# Patient Record
Sex: FEMALE | Race: WHITE | NOT HISPANIC OR LATINO | Employment: OTHER | URBAN - METROPOLITAN AREA
[De-identification: names, ages, dates, MRNs, and addresses within clinical notes are randomized per-mention and may not be internally consistent; named-entity substitution may affect disease eponyms.]

---

## 2017-02-10 ENCOUNTER — HOSPITAL ENCOUNTER (OUTPATIENT)
Dept: RADIOLOGY | Facility: HOSPITAL | Age: 82
Discharge: HOME | End: 2017-02-10
Attending: FAMILY MEDICINE
Payer: MEDICARE

## 2017-02-10 DIAGNOSIS — Z12.31 ENCOUNTER FOR SCREENING MAMMOGRAM FOR BREAST CANCER: ICD-10-CM

## 2017-02-10 PROCEDURE — 77067 SCR MAMMO BI INCL CAD: CPT | Mod: 26,,, | Performed by: RADIOLOGY

## 2017-02-10 PROCEDURE — 77063 BREAST TOMOSYNTHESIS BI: CPT | Mod: 26,,, | Performed by: RADIOLOGY

## 2017-02-10 PROCEDURE — 77067 SCR MAMMO BI INCL CAD: CPT | Mod: TC

## 2017-03-27 ENCOUNTER — TELEPHONE (OUTPATIENT)
Dept: FAMILY MEDICINE | Facility: CLINIC | Age: 82
End: 2017-03-27

## 2017-03-27 NOTE — TELEPHONE ENCOUNTER
----- Message from George Rodriguez LPN sent at 3/27/2017 10:35 AM CDT -----  Contact: [phone]  I believe she is returning your call about a referral to uro gynecologist.     ----- Message -----     From: Clara Roy     Sent: 3/27/2017  10:33 AM       To: Ami Cook Staff    Patient is returning your call

## 2017-03-27 NOTE — TELEPHONE ENCOUNTER
----- Message from Clara Roy sent at 3/27/2017 10:33 AM CDT -----  Contact: [phone]  Patient is returning your call

## 2017-03-27 NOTE — TELEPHONE ENCOUNTER
----- Message from Tona Guevara MA sent at 3/25/2017  9:46 AM CDT -----  Contact: [phone]/self      ----- Message -----     From: Gris Sanchez     Sent: 3/25/2017   8:57 AM       To: Ami Cook Staff    Patient is returning your call. Please advise

## 2017-05-22 ENCOUNTER — OFFICE VISIT (OUTPATIENT)
Dept: UROLOGY | Facility: CLINIC | Age: 82
End: 2017-05-22
Payer: MEDICARE

## 2017-05-22 ENCOUNTER — TELEPHONE (OUTPATIENT)
Dept: FAMILY MEDICINE | Facility: CLINIC | Age: 82
End: 2017-05-22

## 2017-05-22 VITALS
WEIGHT: 130.06 LBS | DIASTOLIC BLOOD PRESSURE: 72 MMHG | BODY MASS INDEX: 22.2 KG/M2 | SYSTOLIC BLOOD PRESSURE: 148 MMHG | HEIGHT: 64 IN | HEART RATE: 59 BPM

## 2017-05-22 DIAGNOSIS — N81.12 LATERAL CYSTOCELE: ICD-10-CM

## 2017-05-22 DIAGNOSIS — N39.46 MIXED STRESS AND URGE URINARY INCONTINENCE: Primary | ICD-10-CM

## 2017-05-22 DIAGNOSIS — N81.9 VAGINAL VAULT PROLAPSE: ICD-10-CM

## 2017-05-22 PROCEDURE — 99999 PR PBB SHADOW E&M-EST. PATIENT-LVL III: CPT | Mod: PBBFAC,,, | Performed by: UROLOGY

## 2017-05-22 PROCEDURE — 99205 OFFICE O/P NEW HI 60 MIN: CPT | Mod: 25,S$GLB,, | Performed by: UROLOGY

## 2017-05-22 PROCEDURE — 87086 URINE CULTURE/COLONY COUNT: CPT

## 2017-05-22 PROCEDURE — 51701 INSERT BLADDER CATHETER: CPT | Mod: S$GLB,,, | Performed by: UROLOGY

## 2017-05-22 PROCEDURE — 1126F AMNT PAIN NOTED NONE PRSNT: CPT | Mod: S$GLB,,, | Performed by: UROLOGY

## 2017-05-22 PROCEDURE — 1160F RVW MEDS BY RX/DR IN RCRD: CPT | Mod: S$GLB,,, | Performed by: UROLOGY

## 2017-05-22 PROCEDURE — 81002 URINALYSIS NONAUTO W/O SCOPE: CPT | Mod: S$GLB,,, | Performed by: UROLOGY

## 2017-05-22 PROCEDURE — 1159F MED LIST DOCD IN RCRD: CPT | Mod: S$GLB,,, | Performed by: UROLOGY

## 2017-05-22 RX ORDER — LIDOCAINE HYDROCHLORIDE 20 MG/ML
JELLY TOPICAL ONCE
Status: CANCELLED | OUTPATIENT
Start: 2017-05-22 | End: 2017-05-22

## 2017-05-22 RX ORDER — CIPROFLOXACIN 250 MG/1
500 TABLET, FILM COATED ORAL ONCE
Status: CANCELLED | OUTPATIENT
Start: 2017-05-22 | End: 2017-05-22

## 2017-05-22 NOTE — PROGRESS NOTES
CHIEF COMPLAINT:    Mrs. Ghosh is a 82 y.o. 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 Joey Mueller    PRESENTING ILLNESS:    Cheryl Ghosh is a 82 y.o. female who appears to be much younger than her stated age.  She states that she has a fallen bladder for many years but it has gotten worse.  She also has mixed incontinence and for the past 2-3 years has worn pads.  She generally goes through 3 during the day and 1-2 at night.  At night she can have urge incontinence with large volume losses.  In addition to the mixed incontinence she has incontinence without sensory awareness.  The pads are a medium incontinence pad and at night it is a heavier one.  She has nocturia x 2-3, cannot quantify the frequency, sometimes she splints to urinate.  She denies any gross hematuria or recurrent UTI.      , vaginal deliveries, hysterectomy bilateral oophorectomy in her 30's.  She is sexually active and has no pain.  Tends towards constipation and uses a stool softener, diet and occasional saline enema if needed.      REVIEW OF SYSTEMS:    Review of Systems   Constitutional: Negative.    HENT: Negative.    Eyes: Negative.    Respiratory: Negative.    Cardiovascular: Negative.    Gastrointestinal: Negative.    Genitourinary: Positive for frequency and urgency.        Mixed incontinence   Musculoskeletal: Negative.    Skin: Negative.    Neurological: Negative.    Endo/Heme/Allergies: Negative.    Psychiatric/Behavioral: Negative.      PATIENT HISTORY:    Past Medical History:   Diagnosis Date    Arthritis     lumbar    Blood transfusion     Degenerative disc disease     lumbar    Insulinoma     Pancreatic disease     Urinary incontinence 2015       Past Surgical History:   Procedure Laterality Date    ADENOIDECTOMY      APPENDECTOMY      CATARACT EXTRACTION, BILATERAL      EYE SURGERY      HYSTERECTOMY      SAMM/BSO    PANCREAS SURGERY       Insulanoma    REFRACTIVE SURGERY Bilateral     TONSILLECTOMY         Family History   Problem Relation Age of Onset    Cancer Mother      Breast    Breast cancer Mother     Heart disease Father     Aneurysm Sister     Glaucoma Daughter     No Known Problems Son      Social History    Marital status:      Social History Main Topics    Smoking status: Never Smoker    Smokeless tobacco: Not on file    Alcohol use 0.6 oz/week     1 Glasses of wine per week    Drug use: No    Sexual activity: Yes     Partners: Male       Allergies:  Pcn [penicillins]    Medications:  Outpatient Encounter Prescriptions as of 5/22/2017   Medication Sig Dispense Refill    b complex vitamins tablet Take 1 tablet by mouth once daily.      CALCIUM CARBONATE/VITAMIN D3 (VITAMIN D-3 ORAL) Take 5,000 Units by mouth once daily.      gabapentin (NEURONTIN) 300 MG capsule Take 1 capsule (300 mg total) by mouth every evening. 90 capsule 3    gabapentin (NEURONTIN) 300 MG capsule Take 1 capsule (300 mg total) by mouth every evening. 90 capsule 3    pyridoxine (VITAMIN B-6) 100 MG Tab Take 100 mg by mouth once daily.      calcium carbonate (OS-THOMAS) 600 mg (1,500 mg) Tab Take 600 mg by mouth 2 (two) times daily with meals.        fish oil-omega-3 fatty acids 300-1,000 mg capsule Take 2 g by mouth once daily.      meloxicam (MOBIC) 15 MG tablet Take 1 tablet (15 mg total) by mouth daily as needed for Pain. 90 tablet 3    triamcinolone acetonide 0.1% (KENALOG) 0.1 % cream Apply topically 2 (two) times daily. 45 g 3     No facility-administered encounter medications on file as of 5/22/2017.          PHYSICAL EXAMINATION:    The patient generally appears in good health, is appropriately interactive, and is in no apparent distress.    Skin: No lesions.    Mental: Cooperative with normal affect.    Neuro: Grossly intact.    HEENT: Normal. No evidence of lymphadenopathy.    Chest: Clear to ascultation bilaterally, normal  inspiratory effort.    Heart: Regular rhythm.  No murmurs    Abdomen: BS active. Soft, non-tender. No masses or organomegaly. Bladder is not palpable. No evidence of flank discomfort. No evidence of inguinal hernia.    Extremities: No clubbing, cyanosis, or edema    Normal external female genitalia  Urethral meatus is normal  Urethra and bladder are nontender to bimanual exam   Uterus and cervix are surgically absent  No adnexal masses  Grade II cystocele, with the turn point about 2 cm from the hymen  Vaginal vault prolapse, to -1 cm  Posteriorly, turn point is 7 cm.    TVL 9 cm  PVR by catheterization was 220 ml  Urethral mobility from 0-60 degrees    LABS:    UA 1.015, pH 5, otherwise, negative.    IMPRESSION:    Encounter Diagnoses   Name Primary?    Mixed stress and urge urinary incontinence Yes    Vaginal vault prolapse     Lateral cystocele        PLAN:    1.  Would recommend SUDS/cysto, which was ordered.  Discussed that she is beyond Kegels to help with symptoms.    2.  It is possible to do a vaginal repair with SSL fixation and anterior colporrhaphy, +/- sling depending on how well she empties with the prolapse reduced.      Copy to:  Dr. Mueller

## 2017-05-22 NOTE — PATIENT INSTRUCTIONS
What are Urodynamics Studies?     The bladder holds urine until it leaves the body through the urethra.     Urodynamics studies are a series of tests that give your doctor a close look at the working of your bladder and urethra. The tests can help your doctor learn about any problems storing urine or voiding (eliminating) urine from your body.  Understanding the lower urinary tract  The lower part of the urinary tract has several parts.  · The bladder stores urine until youre ready to release it.  · The urethra is the tube that carries urine from the bladder out of the body.  · The sphincter is made up of muscles around the opening of the bladder. The sphincter muscles tighten to hold urine in the bladder. They relax to let urine flow. Signals from the brain tell the sphincter when to tighten and relax. These signals also tell the bladder when to contract to let urine flow out of the body.  Why you need a urodynamics study  This test may be ordered if you:  · Are incontinent (leak urine).  · Have a bladder that does not empty all the way.  · Have symptoms such as the need to urinate often or a constant strong need to urinate.  · Have intermittent or weak urine stream.  · Have persistent urinary tract infections.  Preparing for the study  · Tell your doctor about any medications youre taking. Ask if you should stop them before the study.  · Keep a diary of your bathroom habits. Do this for a few days before the study. This diary can be a helpful part of the evaluation.  · Ask if you need to arrive for the study with a full bladder.  Date Last Reviewed: 9/12/2014  © 2203-7478 [employer]. [address] [place] [address], [place] [address]. All rights reserved. This information is not intended as a substitute for professional medical care. Always follow your healthcare professional's instructions.          Urodynamic Studies     The equipment used for the study varies depending upon the facility and what  tests are done.     Urodynamic studies may be done in your doctors office, a clinic, or a hospital. The studies may take up to an hour or more. This depends on which tests your doctor does. The tests are generally painless. You wont need sedating medication.  Tests that may be done  Uroflowmetry. This measures the amount and speed of urine you void from your bladder. You urinate into a funnel. Its attached to a computer that records your urine flow over time. The amount of urine left in your bladder after you void may also be measured right after this test.  Cystometry. This test evaluates how much your bladder can hold. It also measures how strong your bladder muscle is and how well the signals work that tell you when your bladder is full. Your health care provider fills your bladder with sterile water or saline solution, through a catheter. Your doctor will instruct you to report any sensations you feel. Mention if theyre similar to symptoms youve felt at home. Your doctor may ask you to cough, stand and walk, or bear down during this test.  Electromyogram. This helps evaluate the muscle contractions that control urination, such as sphincter muscle contractions. Your health care provider may place electrode patches or wires near your rectum or urethra to make the recording. He or she may ask you to try to tighten or relax your sphincter muscles during this test.  Pressure flow study. This test measures your detrusor, urethral, and abdominal pressures. Detrusor is the muscle surrounding the bladder walls that relaxes to allow your bladder to fill, and and contracts to squeeze out urine. A pressure flow study is often done after cystometry. Youre asked to urinate while a probe in your urethra measures pressures.  Video cystourethrography. This takes video pictures of urine flow through your urinary tract. It can help identify blockages or other problems. The bladder is filled with an X-ray contrast fluid. Then  X-ray video pictures are taken as the fluid is urinated out. Ultrasound imaging may also be combined with routine urodynamic studies.  Ambulatory urodynamics. This test can be used to evaluate you while doing usual activities.  Getting your results  After the study, youll get dressed and return to the consultation room. Test results may be ready soon after the study is finished. Or, you may return to your doctors office in a few days for your results. Your doctor can talk with you about the study report and your options.   Date Last Reviewed: 9/12/2014  © 2644-0286 [employer]. [address] [place] [address], [place] [address]. All rights reserved. This information is not intended as a substitute for professional medical care. Always follow your healthcare professional's instructions.

## 2017-05-22 NOTE — TELEPHONE ENCOUNTER
Spoke with patient about her appointment with Dr Rowe on today 05/22/17 for Dizziness, SOB and unable to walk.  Patient states she is experiencing Shortness of Breath while she walks and sitting followed with a dizziness spell.  As per Dr Rowe, patient was advised to get to a near ER.  Patient states she will be seen Dr Wiggins at [facility] today at 2:20 pm will ask if is ok to get to ER.  Patient verbalized understanding.

## 2017-05-22 NOTE — LETTER
May 22, 2017      Joey Mueller MD  [address]  [address]  [facility] [address]           [facility] - Urology 4th Floor  [address]  [place] [address]  Phone: [phone]          Patient: Cheryl Ghosh   MR Number: [id number]   YOB: 1934   Date of Visit: 5/22/2017       Dear Dr. Joey Mueller:    Thank you for referring Cheryl Ghosh to me for evaluation. Attached you will find relevant portions of my assessment and plan of care.    If you have questions, please do not hesitate to call me. I look forward to following Cheryl Ghosh along with you.    Sincerely,    Noelle Wiggins MD    Enclosure  CC:  No Recipients    If you would like to receive this communication electronically, please contact [email or web address] or [phone] to request more information on [employer] access.    For providers and/or their staff who would like to refer a patient to [facility], please contact us through our one-stop-shop provider referral line, [facility], at [phone].    If you feel you have received this communication in error or would no longer like to receive these types of communications, please e-mail [email or web address]

## 2017-05-24 LAB — BACTERIA UR CULT: NO GROWTH

## 2017-05-25 ENCOUNTER — OFFICE VISIT (OUTPATIENT)
Dept: FAMILY MEDICINE | Facility: CLINIC | Age: 82
End: 2017-05-25
Payer: MEDICARE

## 2017-05-25 VITALS
RESPIRATION RATE: 18 BRPM | TEMPERATURE: 98 F | HEART RATE: 58 BPM | HEIGHT: 64 IN | DIASTOLIC BLOOD PRESSURE: 68 MMHG | OXYGEN SATURATION: 98 % | SYSTOLIC BLOOD PRESSURE: 152 MMHG | WEIGHT: 130.63 LBS | BODY MASS INDEX: 22.3 KG/M2

## 2017-05-25 DIAGNOSIS — R53.83 FATIGUE, UNSPECIFIED TYPE: ICD-10-CM

## 2017-05-25 DIAGNOSIS — R94.31 ABNORMAL EKG: ICD-10-CM

## 2017-05-25 DIAGNOSIS — I49.9 CARDIAC ARRHYTHMIA, UNSPECIFIED CARDIAC ARRHYTHMIA TYPE: Primary | ICD-10-CM

## 2017-05-25 PROCEDURE — 1159F MED LIST DOCD IN RCRD: CPT | Mod: S$GLB,,, | Performed by: FAMILY MEDICINE

## 2017-05-25 PROCEDURE — 1126F AMNT PAIN NOTED NONE PRSNT: CPT | Mod: S$GLB,,, | Performed by: FAMILY MEDICINE

## 2017-05-25 PROCEDURE — 99999 PR PBB SHADOW E&M-EST. PATIENT-LVL IV: CPT | Mod: PBBFAC,,, | Performed by: FAMILY MEDICINE

## 2017-05-25 PROCEDURE — 1157F ADVNC CARE PLAN IN RCRD: CPT | Mod: 8P,S$GLB,, | Performed by: FAMILY MEDICINE

## 2017-05-25 PROCEDURE — 93005 ELECTROCARDIOGRAM TRACING: CPT | Mod: S$GLB,,, | Performed by: FAMILY MEDICINE

## 2017-05-25 PROCEDURE — 99214 OFFICE O/P EST MOD 30 MIN: CPT | Mod: S$GLB,,, | Performed by: FAMILY MEDICINE

## 2017-05-25 PROCEDURE — 93010 ELECTROCARDIOGRAM REPORT: CPT | Mod: S$GLB,,, | Performed by: INTERNAL MEDICINE

## 2017-05-25 NOTE — PROGRESS NOTES
HPI:  Cheryl Ghosh is a 82 y.o. year old female that  presents with concern of feeling fatigued, short of breath,  and having abnormal heart palpations. She has been noticing that she has not been able to keep up with her physical workouts and walking up steps that she normally does without a problem now cause her to be extremely tired and she is extremely tired. She feels a thumping in her chest. She has been taking sleep aide to help her sleep because her mind is racing.Dayne has had afew personal problems that keeps her thinking a lot. She still works daily at the [employer].She has a prolapsed bladder and she has seen   Chief Complaint   Patient presents with    Fatigue    Palpitations   .     HPI    Past Medical History:   Diagnosis Date    Arthritis     lumbar    Blood transfusion     Degenerative disc disease     lumbar    Insulinoma     Pancreatic disease     Urinary incontinence 12/7/2015     Social History     Social History    Marital status:      Spouse name: N/A    Number of children: N/A    Years of education: N/A     Occupational History    Not on file.     Social History Main Topics    Smoking status: Never Smoker    Smokeless tobacco: Never Used    Alcohol use 0.6 oz/week     1 Glasses of wine per week    Drug use: No    Sexual activity: Yes     Partners: Male     Other Topics Concern    Not on file     Social History Narrative    No narrative on file     Past Surgical History:   Procedure Laterality Date    ADENOIDECTOMY      APPENDECTOMY      CATARACT EXTRACTION, BILATERAL      EYE SURGERY      HYSTERECTOMY      SAMM/BSO    PANCREAS SURGERY      Insulanoma    REFRACTIVE SURGERY Bilateral     TONSILLECTOMY       Family History   Problem Relation Age of Onset    Breast cancer Mother     Heart disease Father     Aneurysm Sister     Glaucoma Daughter     Heart attack Son     Breast cancer Other            Review of Systems  General ROS: negative  "for chills, fever or weight loss  Psychological ROS: negative for hallucination, depression or suicidal ideation  Ophthalmic ROS: negative for blurry vision, photophobia or eye pain  ENT ROS: negative for epistaxis, sore throat or rhinorrhea  Respiratory ROS: no cough, shortness of breath, or wheezing  Cardiovascular ROS: no chest pain , pos dyspnea on exertion-new, palpations  Gastrointestinal ROS: no abdominal pain, change in bowel habits, or black/ bloody stools  Genito-Urinary ROS: no dysuria, trouble voiding, or hematuria  Musculoskeletal ROS: negative for gait disturbance or muscular weakness  Neurological ROS: no syncope or seizures; no ataxia  Dermatological ROS: negative for pruritis, rash and jaundice      Physical Exam:  BP (!) 152/68 (BP Location: Right arm, Patient Position: Sitting, BP Method: Manual)   Pulse (!) 58   Temp 97.8 °F (36.6 °C) (Oral)   Resp 18   Ht 5' 4" (1.626 m)   Wt 59.2 kg (130 lb 10 oz)   SpO2 98%   BMI 22.42 kg/m²   General appearance: alert, cooperative, no distress  Constitutional:Oriented to person, place, and time.appears well-developed and well-nourished.  HEENT: Normocephalic, atraumatic, neck symmetrical, no nasal discharge, TM - clear bilaterally   Eyes: conjunctivae/corneas clear, PERRL, EOM's intact  Lungs: clear to auscultation bilaterally, no dullness to percussion bilaterally  Heart: regular rate and rhythm without rub; no displacement of the PMI , pos murmur without radiation  Abdomen: soft, non-tender; bowel sounds normoactive; no organomegaly  Extremities: extremities symmetric; no clubbing, cyanosis, or edema  Integument: Skin color, texture, turgor normal; no rashes; hair distrubution normal  Neurologic: Alert and oriented X 3, normal strength, normal coordination and gait  Psychiatric: no pressured speech; normal affect; no evidence of impaired cognition   Physical Exam  LABS:    Complete Blood Count  Lab Results   Component Value Date    RBC 4.51 " 05/26/2017    HGB 14.0 05/26/2017    HCT 41.1 05/26/2017    MCV 91 05/26/2017    MCH 31.0 05/26/2017    MCHC 34.1 05/26/2017    RDW 13.2 05/26/2017     05/26/2017    MPV 10.6 05/26/2017    GRAN 2.7 05/26/2017    GRAN 43.7 05/26/2017    LYMPH 2.8 05/26/2017    LYMPH 45.0 05/26/2017    MONO 0.6 05/26/2017    MONO 9.0 05/26/2017    EOS 0.1 05/26/2017    BASO 0.03 05/26/2017    EOSINOPHIL 1.8 05/26/2017    BASOPHIL 0.5 05/26/2017    DIFFMETHOD Automated 05/26/2017       Comprehensive Metabolic Panel  Lab Results   Component Value Date    GLU 91 12/02/2016    BUN 21 12/02/2016    CREATININE 1.0 12/02/2016     12/02/2016    K 4.3 12/02/2016     12/02/2016    PROT 6.8 12/02/2016    ALBUMIN 3.8 12/02/2016    BILITOT 0.7 12/02/2016    AST 23 12/02/2016    ALKPHOS 58 12/02/2016    CO2 27 12/02/2016    ALT 14 12/02/2016    ANIONGAP 8 12/02/2016    EGFRNONAA 53 (A) 12/02/2016    ESTGFRAFRICA >60 12/02/2016       LIPID  Lab Results   Component Value Date    CHOL 205 (H) 12/02/2016    HDL 43 12/02/2016       TSH  Lab Results   Component Value Date    TSH 3.285 05/26/2017       Current Outpatient Prescriptions   Medication Sig Dispense Refill    b complex vitamins tablet Take 1 tablet by mouth once daily.      calcium carbonate (OS-THOMAS) 600 mg (1,500 mg) Tab Take 600 mg by mouth 2 (two) times daily with meals.        CALCIUM CARBONATE/VITAMIN D3 (VITAMIN D-3 ORAL) Take 5,000 Units by mouth once daily.      gabapentin (NEURONTIN) 300 MG capsule Take 1 capsule (300 mg total) by mouth every evening. 90 capsule 3    meloxicam (MOBIC) 15 MG tablet Take 1 tablet (15 mg total) by mouth daily as needed for Pain. 90 tablet 3    pyridoxine (VITAMIN B-6) 100 MG Tab Take 100 mg by mouth once daily.       No current facility-administered medications for this visit.        Assessment:    ICD-10-CM ICD-9-CM    1. Cardiac arrhythmia, unspecified cardiac arrhythmia type I49.9 427.9 IN OFFICE EKG 12-LEAD (to Muse)       Brain natriuretic peptide      CBC auto differential      Ambulatory Referral to Cardiology      TSH   2. Abnormal EKG R94.31 794.31 Brain natriuretic peptide      Ambulatory Referral to Cardiology   3. Fatigue, unspecified type R53.83 780.79 Brain natriuretic peptide      CBC auto differential      TSH         Plan:    Return in 5 weeks (on 6/26/2017).          Marika Vaz MD

## 2017-05-26 ENCOUNTER — LAB VISIT (OUTPATIENT)
Dept: LAB | Facility: HOSPITAL | Age: 82
End: 2017-05-26
Attending: FAMILY MEDICINE
Payer: MEDICARE

## 2017-05-26 ENCOUNTER — OFFICE VISIT (OUTPATIENT)
Dept: CARDIOLOGY | Facility: CLINIC | Age: 82
End: 2017-05-26
Payer: MEDICARE

## 2017-05-26 VITALS
OXYGEN SATURATION: 98 % | HEART RATE: 63 BPM | SYSTOLIC BLOOD PRESSURE: 134 MMHG | WEIGHT: 130 LBS | DIASTOLIC BLOOD PRESSURE: 78 MMHG | BODY MASS INDEX: 22.2 KG/M2 | HEIGHT: 64 IN

## 2017-05-26 DIAGNOSIS — I49.9 CARDIAC ARRHYTHMIA, UNSPECIFIED CARDIAC ARRHYTHMIA TYPE: ICD-10-CM

## 2017-05-26 DIAGNOSIS — R53.83 FATIGUE, UNSPECIFIED TYPE: ICD-10-CM

## 2017-05-26 DIAGNOSIS — R00.2 PALPITATIONS: Chronic | ICD-10-CM

## 2017-05-26 DIAGNOSIS — R06.09 DYSPNEA ON EXERTION: Chronic | ICD-10-CM

## 2017-05-26 DIAGNOSIS — R94.31 ABNORMAL EKG: ICD-10-CM

## 2017-05-26 LAB
BASOPHILS # BLD AUTO: 0.03 K/UL
BASOPHILS NFR BLD: 0.5 %
BNP SERPL-MCNC: 81 PG/ML
DIFFERENTIAL METHOD: NORMAL
EOSINOPHIL # BLD AUTO: 0.1 K/UL
EOSINOPHIL NFR BLD: 1.8 %
ERYTHROCYTE [DISTWIDTH] IN BLOOD BY AUTOMATED COUNT: 13.2 %
HCT VFR BLD AUTO: 41.1 %
HGB BLD-MCNC: 14 G/DL
LYMPHOCYTES # BLD AUTO: 2.8 K/UL
LYMPHOCYTES NFR BLD: 45 %
MCH RBC QN AUTO: 31 PG
MCHC RBC AUTO-ENTMCNC: 34.1 %
MCV RBC AUTO: 91 FL
MONOCYTES # BLD AUTO: 0.6 K/UL
MONOCYTES NFR BLD: 9 %
NEUTROPHILS # BLD AUTO: 2.7 K/UL
NEUTROPHILS NFR BLD: 43.7 %
PLATELET # BLD AUTO: 158 K/UL
PMV BLD AUTO: 10.6 FL
RBC # BLD AUTO: 4.51 M/UL
TSH SERPL DL<=0.005 MIU/L-ACNC: 3.29 UIU/ML
WBC # BLD AUTO: 6.13 K/UL

## 2017-05-26 PROCEDURE — 84443 ASSAY THYROID STIM HORMONE: CPT

## 2017-05-26 PROCEDURE — 1159F MED LIST DOCD IN RCRD: CPT | Mod: S$GLB,,, | Performed by: INTERNAL MEDICINE

## 2017-05-26 PROCEDURE — 1126F AMNT PAIN NOTED NONE PRSNT: CPT | Mod: S$GLB,,, | Performed by: INTERNAL MEDICINE

## 2017-05-26 PROCEDURE — 36415 COLL VENOUS BLD VENIPUNCTURE: CPT

## 2017-05-26 PROCEDURE — 83880 ASSAY OF NATRIURETIC PEPTIDE: CPT

## 2017-05-26 PROCEDURE — 99204 OFFICE O/P NEW MOD 45 MIN: CPT | Mod: S$GLB,,, | Performed by: INTERNAL MEDICINE

## 2017-05-26 PROCEDURE — 99999 PR PBB SHADOW E&M-EST. PATIENT-LVL III: CPT | Mod: PBBFAC,,, | Performed by: INTERNAL MEDICINE

## 2017-05-26 PROCEDURE — 85025 COMPLETE CBC W/AUTO DIFF WBC: CPT

## 2017-05-26 NOTE — PROGRESS NOTES
Subjective:   Chief Complaint:  Cheryl Ghosh is a 82 y.o. female who presents for evaluation of Fatigue; Shortness of Breath; Palpitations; and Dizziness      HPI:   Mrs. Ghosh is a healthy 82 year-old female hwo complains predominantly of palpitations, but also fatigue, easy tirability with poor exercise tolerance, and dyspnea with exertion which has been worsening for past 6 months.  Palpitations described as irregular heart beats lasting minutes to hours, occurring nearly daily.  No prior history of Afib or other arrhythmias.  No CHF symptoms. No chest pain.  No prior stroke.    Patient Active Problem List    Diagnosis Date Noted    Palpitations 2017    Dyspnea on exertion 2017    Neuropathy 2016    Sensation of fullness in both ears 2016    Impacted cerumen of both ears 2016    Female bladder prolapse 2016    Urinary incontinence 2015    Bilateral lumbar radiculopathy 2015    Pure hyperglyceridemia 2015    Osteopenia 2015    OA (osteoarthritis) of knee 2015    Intercostal neuralgia 2014    Trochanteric bursitis of right hip 2014    Lumbosacral spondylosis without myelopathy 2013    Facet arthritis of lumbar region 10/02/2012           Right Arm BP - Sittin/77  Left Arm BP - Sittin/75    Current Outpatient Prescriptions   Medication Sig    b complex vitamins tablet Take 1 tablet by mouth once daily.    calcium carbonate (OS-THOMAS) 600 mg (1,500 mg) Tab Take 600 mg by mouth 2 (two) times daily with meals.      CALCIUM CARBONATE/VITAMIN D3 (VITAMIN D-3 ORAL) Take 5,000 Units by mouth once daily.    gabapentin (NEURONTIN) 300 MG capsule Take 1 capsule (300 mg total) by mouth every evening.    meloxicam (MOBIC) 15 MG tablet Take 1 tablet (15 mg total) by mouth daily as needed for Pain.    pyridoxine (VITAMIN B-6) 100 MG Tab Take 100 mg by mouth once daily.     No current facility-administered  medications for this visit.        Review of Systems   Constitution: Negative for diaphoresis, weakness, malaise/fatigue, weight gain and weight loss.   HENT: Negative for nosebleeds.    Cardiovascular: Positive for chest pain. Negative for claudication, cyanosis, dyspnea on exertion, irregular heartbeat, leg swelling, near-syncope, orthopnea, palpitations, paroxysmal nocturnal dyspnea and syncope.   Respiratory: Negative for cough, hemoptysis, shortness of breath, sleep disturbances due to breathing, snoring, sputum production and wheezing.    Endocrine: Negative for polyuria.   Hematologic/Lymphatic: Negative for bleeding problem. Does not bruise/bleed easily.   Skin: Negative for poor wound healing and rash.   Musculoskeletal: Negative for myalgias.   Gastrointestinal: Negative for heartburn, hematemesis, hematochezia and melena.   Neurological: Negative for dizziness, focal weakness, light-headedness and loss of balance.   Psychiatric/Behavioral: Negative for altered mental status, depression and memory loss.         Objective:   Physical Exam   Constitutional: She is oriented to person, place, and time. She appears well-developed and well-nourished. No distress. She is not intubated.   HENT:   Head: Atraumatic.   Neck: No JVD present.   Cardiovascular: Normal rate, regular rhythm, S1 normal, S2 normal, normal heart sounds and intact distal pulses.  PMI is not displaced.  Exam reveals no gallop, no S3, no S4, no distant heart sounds, no friction rub, no midsystolic click and no opening snap.    No murmur heard.  Pulses:       Carotid pulses are 2+ on the right side, and 2+ on the left side.       Radial pulses are 2+ on the right side, and 2+ on the left side.        Dorsalis pedis pulses are 2+ on the right side, and 2+ on the left side.   Pulmonary/Chest: Effort normal and breath sounds normal. No accessory muscle usage. No apnea, no tachypnea and no bradypnea. She is not intubated. No respiratory distress.  She has no decreased breath sounds. She has no wheezes. She has no rhonchi. She has no rales. She exhibits no tenderness.   Abdominal: Soft. Normal aorta and bowel sounds are normal. She exhibits no distension, no abdominal bruit, no pulsatile midline mass and no mass. There is no tenderness.   Musculoskeletal: She exhibits no edema.   Neurological: She is alert and oriented to person, place, and time.   Skin: Skin is warm. No rash noted. No erythema. No pallor.   Psychiatric: She has a normal mood and affect. Her behavior is normal. Judgment and thought content normal.   Vitals reviewed.      LABS    LAST HbA1c  No results found for: HGBA1C    Lipid panel  Lab Results   Component Value Date    CHOL 205 (H) 12/02/2016    CHOL 201 (H) 09/12/2016    CHOL 214 (H) 02/26/2015     Lab Results   Component Value Date    HDL 43 12/02/2016    HDL 39 (L) 09/12/2016    HDL 49 02/26/2015     Lab Results   Component Value Date    LDLCALC 133.6 12/02/2016    LDLCALC 125.8 09/12/2016    LDLCALC 140.2 02/26/2015     Lab Results   Component Value Date    TRIG 142 12/02/2016    TRIG 181 (H) 09/12/2016    TRIG 124 02/26/2015     Lab Results   Component Value Date    CHOLHDL 21.0 12/02/2016    CHOLHDL 19.4 (L) 09/12/2016    CHOLHDL 22.9 02/26/2015        CMP  Sodium   Date Value Ref Range Status   12/02/2016 142 136 - 145 mmol/L Final     Potassium   Date Value Ref Range Status   12/02/2016 4.3 3.5 - 5.1 mmol/L Final     Chloride   Date Value Ref Range Status   12/02/2016 107 95 - 110 mmol/L Final     CO2   Date Value Ref Range Status   12/02/2016 27 23 - 29 mmol/L Final     Glucose   Date Value Ref Range Status   12/02/2016 91 70 - 110 mg/dL Final     BUN, Bld   Date Value Ref Range Status   12/02/2016 21 8 - 23 mg/dL Final     Creatinine   Date Value Ref Range Status   12/02/2016 1.0 0.5 - 1.4 mg/dL Final     Calcium   Date Value Ref Range Status   12/02/2016 9.1 8.7 - 10.5 mg/dL Final     Total Protein   Date Value Ref Range Status    12/02/2016 6.8 6.0 - 8.4 g/dL Final     Albumin   Date Value Ref Range Status   12/02/2016 3.8 3.5 - 5.2 g/dL Final     Total Bilirubin   Date Value Ref Range Status   12/02/2016 0.7 0.1 - 1.0 mg/dL Final     Comment:     For infants and newborns, interpretation of results should be based  on gestational age, weight and in agreement with clinical  observations.  Premature Infant recommended reference ranges:  Up to 24 hours.............<8.0 mg/dL  Up to 48 hours............<12.0 mg/dL  3-5 days..................<15.0 mg/dL  6-29 days.................<15.0 mg/dL       Alkaline Phosphatase   Date Value Ref Range Status   12/02/2016 58 55 - 135 U/L Final     AST   Date Value Ref Range Status   12/02/2016 23 10 - 40 U/L Final     ALT   Date Value Ref Range Status   12/02/2016 14 10 - 44 U/L Final     Anion Gap   Date Value Ref Range Status   12/02/2016 8 8 - 16 mmol/L Final     eGFR if    Date Value Ref Range Status   12/02/2016 >60 >60 mL/min/1.73 m^2 Final     eGFR if non    Date Value Ref Range Status   12/02/2016 53 (A) >60 mL/min/1.73 m^2 Final     Comment:     Calculation used to obtain the estimated glomerular filtration  rate (eGFR) is the CKD-EPI equation. Since race is unknown   in our information system, the eGFR values for   -American and Non--American patients are given   for each creatinine result.         Lab Results   Component Value Date    WBC 6.13 05/26/2017    HGB 14.0 05/26/2017    HCT 41.1 05/26/2017    MCV 91 05/26/2017     05/26/2017     LABS, ECG, RADIOLOGY DATA REVIEWED    Assessment:     1. Palpitations    2. Dyspnea on exertion      Symptoms concerning for Afib.  BNP was normal, but diastolic dysfunction/LVH or CAD still possible.  Plan:     -48 hour Holter monitor to evaluate for Afib.  -TTE  -If above normal, consider longer monitoring such as SEEQ or ILR and/or stress testing.  -RTC 1 month    Continue with current medical plan and  lifestyle changes.    I have reviewed the patient's medical history in detail and updated the computerized patient record.    Orders Placed This Encounter   Procedures    Holter monitor - 48 hour     Standing Status:   Future     Standing Expiration Date:   5/26/2018    2D echo with color flow doppler     Standing Status:   Future     Standing Expiration Date:   5/26/2018       Follow up as scheduled. Return sooner for concerns or questions      She expressed verbal understanding and agreed with the plan          Devin Contreras MD, FACC, CCDS  Interventional Cardiology  [facility]

## 2017-05-26 NOTE — LETTER
May 26, 2017      Marika Vaz MD  [address]  [address]  [facility] [address]           [facility]  [address], [address]  [place] [facility] [address]  Phone: [phone]          Patient: Cheryl Ghosh   MR Number: [id number]   YOB: 1934   Date of Visit: 5/26/2017       Dear Dr. Marika Vaz:    Thank you for referring Cheryl Ghosh to me for evaluation. Attached you will find relevant portions of my assessment and plan of care.    If you have questions, please do not hesitate to call me. I look forward to following Cheryl Ghosh along with you.    Sincerely,    Devin Contreras MD    Enclosure  CC:  No Recipients    If you would like to receive this communication electronically, please contact [email or web address] or [phone] to request more information on [email or web address] Link access.    For providers and/or their staff who would like to refer a patient to [facility], please contact us through our one-stop-shop provider referral line, [facility], at [phone].    If you feel you have received this communication in error or would no longer like to receive these types of communications, please e-mail [email or web address]

## 2017-06-14 ENCOUNTER — PROCEDURE VISIT (OUTPATIENT)
Dept: UROLOGY | Facility: CLINIC | Age: 82
End: 2017-06-14
Payer: MEDICARE

## 2017-06-14 VITALS
WEIGHT: 130 LBS | TEMPERATURE: 98 F | SYSTOLIC BLOOD PRESSURE: 156 MMHG | HEART RATE: 54 BPM | DIASTOLIC BLOOD PRESSURE: 78 MMHG | BODY MASS INDEX: 22.2 KG/M2 | HEIGHT: 64 IN

## 2017-06-14 DIAGNOSIS — N39.46 MIXED STRESS AND URGE URINARY INCONTINENCE: ICD-10-CM

## 2017-06-14 DIAGNOSIS — N81.12 LATERAL CYSTOCELE: ICD-10-CM

## 2017-06-14 DIAGNOSIS — N81.9 VAGINAL VAULT PROLAPSE: ICD-10-CM

## 2017-06-14 PROCEDURE — 51728 CYSTOMETROGRAM W/VP: CPT | Mod: S$GLB,,, | Performed by: UROLOGY

## 2017-06-14 PROCEDURE — 51797 INTRAABDOMINAL PRESSURE TEST: CPT | Mod: S$GLB,,, | Performed by: UROLOGY

## 2017-06-14 PROCEDURE — 51784 ANAL/URINARY MUSCLE STUDY: CPT | Mod: 51,S$GLB,, | Performed by: UROLOGY

## 2017-06-14 RX ORDER — CIPROFLOXACIN 250 MG/1
500 TABLET, FILM COATED ORAL ONCE
Status: COMPLETED | OUTPATIENT
Start: 2017-06-14 | End: 2017-06-14

## 2017-06-14 RX ADMIN — CIPROFLOXACIN 500 MG: 250 TABLET, FILM COATED ORAL at 02:06

## 2017-06-14 NOTE — PROCEDURES
Simple Urodynamics  Date/Time: 6/14/2017 6:11 PM  Performed by: SORAYA ORTEGA.  Authorized by: SORAYA ORTEGA.            Urodynamic Report    Indication:  Grade II cystocele, history of mixed incontinence    Patient was taken to the Urodynamic Suite with a comfortably full bladder and asked to perform a free uroflow.  Next, the patient was prepped and the urinary residual was drained with a 14 Fr catheter.  A 7 Fr dual lumen catheter was placed to measure intravesical pressures.  A 10 Fr balloon manometer was placed into the rectum for abdominal pressure measurements.  Patch EMG electrodes were placed on the perineum.  The patient was connected to the [facility] Urodynamic machine, using a multichannel technique, the data were interpreted.  The bladder was filled with sterile water at room temperature at a rate of 30 ml/min.  Patient is filled to urgency.  Filling is performed with the patient in the seated position.  Abdominal leak point pressures are checked at 1st desire, then serially at 50cc increments first with Valsalva then with coughing.  The patient was then asked to sit and void for a pressure flow study.    The following are the results of the study:  1.  Uroflow       Q max:  Could not void       Voided volume:       Pattern of the curve:    2.  Amount in bladder:  350 ml    3.  CMG       Sensation:         First Desire:  63.8 ml         Normal Desire:  110.7 ml         Strong Desire:  476.2 ml         Urgency:  546.4 ml       Capacity:  605.1 ml       Abnormal Contractions:  none       Compliance:  normal    4.  Abdominal Leak Point Pressure:  Checked with a half speculum       Valsalva:  59.3 cm H2O at 111.4 ml       Cough:  92.7 cm H2O at 480.2 ml    5.  EMG:  Normal guarding reflex, relaxed with voiding    6.  Voiding phase  Placed a vaginal pack for the voiding phase       Q max:  18.8 ml/sec       P det at Q max:  13.9 cm H2O       Pattern of the curve:  continuous       Voided volume:  80.1  ml       PVR:  525 ml    7.  Analysis:  Normal sensation and capacity, stress incontinence noted with incomplete bladder emptying.      8.  Recommendations:       A.  Anterior colporrhapy with SSL and no sling.  Recommended seeing how she does and then consider either a bulking agent or sling       B.  We also discussed pessary, observation.       C.  She asked about laparoscopic surgeries, most common is the lap robotic ASC which it sounds like a friend may have had but unable to tell for sure without additional information.  Warned that the incontinence could get worse after repair as the prolapse can compress the urethra.      The patient and her  expressed understanding.  She likes to swim in a pool, and the period post op for no swimming or sitting in water is 6 weeks.  Card given for Tadeo.

## 2017-06-14 NOTE — PATIENT INSTRUCTIONS
SIMPLE URODYNAMIC STUDY (SUDS)   UROLOGY CLINIC DISCHARGE INSTRUCTIONS    You have had a procedure that will require time to properly heal. Follow the instructions you have been given on how to care for yourself once you are home. Below is additional information to help in your recovery.    ACTIVITY  · There are no restrictions in activity. Start doing again the things you did before the procedure.  · You may experience a slight burning sensation. You may notice a small amount of blood in your urine. This will clear up within a day. Call the clinic if this continues beyond 48 hours.    DIET  · Continue your normal diet. You may eat the same foods you ate before your procedure.  · Drink plenty of fluids during the first 24-48 hours following your procedure.    MEDICATIONS  · Resume all other previous medications from your prescribing physician.  · Continue any pre=procedure antibiotics until they are all gone.    SIGNS AND SYMPTOMS TO REPORT TO THE DOCTOR  · Chills or fever greater than 101° F within 24 hours of procedure.  · Changes in urination, such as increased bleeding, foul smell, cloudy urine, or painful urination.  · Call your doctor with any questions or concerns.    For any emergency situation, call [phone] immediately or go to your nearest emergency room.    [facility]  [phone]      Instructed by_________________________________          Patient Signature___________________________________                                                                                                                                                                                             If any problems after hours or weekends, you may call [phone] and ask for the urology resident on call.

## 2017-06-26 ENCOUNTER — OFFICE VISIT (OUTPATIENT)
Dept: FAMILY MEDICINE | Facility: CLINIC | Age: 82
End: 2017-06-26
Payer: MEDICARE

## 2017-06-26 VITALS
HEART RATE: 68 BPM | SYSTOLIC BLOOD PRESSURE: 102 MMHG | RESPIRATION RATE: 18 BRPM | TEMPERATURE: 98 F | WEIGHT: 131.94 LBS | BODY MASS INDEX: 22.53 KG/M2 | DIASTOLIC BLOOD PRESSURE: 62 MMHG | OXYGEN SATURATION: 98 % | HEIGHT: 64 IN

## 2017-06-26 DIAGNOSIS — R53.83 FATIGUE, UNSPECIFIED TYPE: Primary | ICD-10-CM

## 2017-06-26 PROCEDURE — 1126F AMNT PAIN NOTED NONE PRSNT: CPT | Mod: S$GLB,,, | Performed by: FAMILY MEDICINE

## 2017-06-26 PROCEDURE — 99999 PR PBB SHADOW E&M-EST. PATIENT-LVL III: CPT | Mod: PBBFAC,,, | Performed by: FAMILY MEDICINE

## 2017-06-26 PROCEDURE — 99213 OFFICE O/P EST LOW 20 MIN: CPT | Mod: S$GLB,,, | Performed by: FAMILY MEDICINE

## 2017-06-26 PROCEDURE — 1159F MED LIST DOCD IN RCRD: CPT | Mod: S$GLB,,, | Performed by: FAMILY MEDICINE

## 2017-06-27 PROBLEM — R53.83 FATIGUE: Status: ACTIVE | Noted: 2017-06-27

## 2017-06-27 NOTE — PROGRESS NOTES
HPI:  Cheryl Ghosh is a 82 y.o. year old female that  presents for f/u after having her cardiac stress test. She has not had anymore episodes of fatigue with exertion. She performed very well on her stress test. Will review her labs. She has been away on a very relaxing holiday.  Chief Complaint   Patient presents with    Follow-up   .     HPI      Past Medical History:   Diagnosis Date    Arthritis     lumbar    Blood transfusion     Degenerative disc disease     lumbar    Insulinoma     Pancreatic disease     Urinary incontinence 12/7/2015     Social History     Social History    Marital status:      Spouse name: N/A    Number of children: N/A    Years of education: N/A     Occupational History    Not on file.     Social History Main Topics    Smoking status: Never Smoker    Smokeless tobacco: Never Used    Alcohol use 0.6 oz/week     1 Glasses of wine per week    Drug use: No    Sexual activity: Yes     Partners: Male     Other Topics Concern    Not on file     Social History Narrative    No narrative on file     Past Surgical History:   Procedure Laterality Date    ADENOIDECTOMY      APPENDECTOMY      CATARACT EXTRACTION, BILATERAL      EYE SURGERY      HYSTERECTOMY      SAMM/BSO    PANCREAS SURGERY      Insulanoma    REFRACTIVE SURGERY Bilateral     TONSILLECTOMY       Family History   Problem Relation Age of Onset    Breast cancer Mother     Heart disease Father     Aneurysm Sister     Glaucoma Daughter     Heart attack Son     Breast cancer Other            Review of Systems  General ROS: negative for chills, fever or weight loss  ENT ROS: negative for epistaxis, sore throat or rhinorrhea  Respiratory ROS: no cough, shortness of breath, or wheezing  Cardiovascular ROS: no chest pain or dyspnea on exertion  Gastrointestinal ROS: no abdominal pain, change in bowel habits, or black/ bloody stools    Physical Exam:  /62 (BP Location: Left arm, Patient Position:  "Sitting, BP Method: Automatic)   Pulse 68   Temp 97.9 °F (36.6 °C) (Oral)   Resp 18   Ht 5' 4" (1.626 m)   Wt 59.8 kg (131 lb 15.1 oz)   SpO2 98%   BMI 22.65 kg/m²   General appearance: alert, cooperative, no distress  Constitutional:Oriented to person, place, and time.appears well-developed and well-nourished.  HEENT: Normocephalic, atraumatic, neck symmetrical, no nasal discharge, TM- clear bilaterally  Lungs: clear to auscultation bilaterally, no dullness to percussion bilaterally  Heart: regular rate and rhythm without rub; no displacement of the PMI , S1&S2 present    Physical Exam    LABS:    Complete Blood Count  Lab Results   Component Value Date    RBC 4.51 05/26/2017    HGB 14.0 05/26/2017    HCT 41.1 05/26/2017    MCV 91 05/26/2017    MCH 31.0 05/26/2017    MCHC 34.1 05/26/2017    RDW 13.2 05/26/2017     05/26/2017    MPV 10.6 05/26/2017    GRAN 2.7 05/26/2017    GRAN 43.7 05/26/2017    LYMPH 2.8 05/26/2017    LYMPH 45.0 05/26/2017    MONO 0.6 05/26/2017    MONO 9.0 05/26/2017    EOS 0.1 05/26/2017    BASO 0.03 05/26/2017    EOSINOPHIL 1.8 05/26/2017    BASOPHIL 0.5 05/26/2017    DIFFMETHOD Automated 05/26/2017       Comprehensive Metabolic Panel  Lab Results   Component Value Date    GLU 91 12/02/2016    BUN 21 12/02/2016    CREATININE 1.0 12/02/2016     12/02/2016    K 4.3 12/02/2016     12/02/2016    PROT 6.8 12/02/2016    ALBUMIN 3.8 12/02/2016    BILITOT 0.7 12/02/2016    AST 23 12/02/2016    ALKPHOS 58 12/02/2016    CO2 27 12/02/2016    ALT 14 12/02/2016    ANIONGAP 8 12/02/2016    EGFRNONAA 53 (A) 12/02/2016    ESTGFRAFRICA >60 12/02/2016       LIPID  Lab Results   Component Value Date    CHOL 205 (H) 12/02/2016    HDL 43 12/02/2016         TSH  Lab Results   Component Value Date    TSH 3.285 05/26/2017       Current Outpatient Prescriptions   Medication Sig Dispense Refill    b complex vitamins tablet Take 1 tablet by mouth once daily.      calcium carbonate (OS-THOMAS) " 600 mg (1,500 mg) Tab Take 600 mg by mouth 2 (two) times daily with meals.        CALCIUM CARBONATE/VITAMIN D3 (VITAMIN D-3 ORAL) Take 5,000 Units by mouth once daily.      gabapentin (NEURONTIN) 300 MG capsule Take 1 capsule (300 mg total) by mouth every evening. 90 capsule 3    meloxicam (MOBIC) 15 MG tablet Take 1 tablet (15 mg total) by mouth daily as needed for Pain. 90 tablet 3    pyridoxine (VITAMIN B-6) 100 MG Tab Take 100 mg by mouth once daily.       No current facility-administered medications for this visit.        Assessment:    ICD-10-CM ICD-9-CM    1. Fatigue, unspecified type R53.83 780.79          Plan:  Labs; wnl with BNP =wnl, EF 60 -  65 %  Return in about 1 year (around 6/26/2018), or if symptoms worsen , for Annual Well Visit.          Marika Vaz MD

## 2017-07-07 ENCOUNTER — TELEPHONE (OUTPATIENT)
Dept: UROLOGY | Facility: CLINIC | Age: 82
End: 2017-07-07

## 2017-07-07 DIAGNOSIS — N81.12 LATERAL CYSTOCELE: ICD-10-CM

## 2017-07-07 DIAGNOSIS — N81.9 VAGINAL VAULT PROLAPSE: Primary | ICD-10-CM

## 2017-07-21 ENCOUNTER — PATIENT MESSAGE (OUTPATIENT)
Dept: UROLOGY | Facility: CLINIC | Age: 82
End: 2017-07-21

## 2017-07-21 ENCOUNTER — TELEPHONE (OUTPATIENT)
Dept: UROLOGY | Facility: CLINIC | Age: 82
End: 2017-07-21

## 2017-07-21 NOTE — TELEPHONE ENCOUNTER
"----- Message from Tadeo Alvarez MA sent at 7/21/2017  8:54 AM CDT -----  Pt scheduled for surgery on 8/29 and coming in for preop on 8/18. She states she is "on the fence" about the surgery and would like you to call her to discuss some things before her preop appt.  "

## 2017-07-24 ENCOUNTER — PATIENT MESSAGE (OUTPATIENT)
Dept: UROLOGY | Facility: CLINIC | Age: 82
End: 2017-07-24

## 2017-08-02 ENCOUNTER — OFFICE VISIT (OUTPATIENT)
Dept: FAMILY MEDICINE | Facility: CLINIC | Age: 82
End: 2017-08-02
Payer: MEDICARE

## 2017-08-02 VITALS
SYSTOLIC BLOOD PRESSURE: 129 MMHG | DIASTOLIC BLOOD PRESSURE: 75 MMHG | OXYGEN SATURATION: 98 % | HEART RATE: 67 BPM | WEIGHT: 131.38 LBS | BODY MASS INDEX: 22.43 KG/M2 | HEIGHT: 64 IN

## 2017-08-02 DIAGNOSIS — I70.0 AORTIC ATHEROSCLEROSIS: ICD-10-CM

## 2017-08-02 DIAGNOSIS — M85.80 OSTEOPENIA, UNSPECIFIED LOCATION: ICD-10-CM

## 2017-08-02 DIAGNOSIS — G62.9 NEUROPATHY: ICD-10-CM

## 2017-08-02 DIAGNOSIS — M47.819 ARTHRITIS OF FACET JOINTS AT MULTIPLE VERTEBRAL LEVELS: ICD-10-CM

## 2017-08-02 DIAGNOSIS — Z00.00 ENCOUNTER FOR PREVENTIVE HEALTH EXAMINATION: Primary | ICD-10-CM

## 2017-08-02 DIAGNOSIS — N18.30 CHRONIC KIDNEY DISEASE, STAGE III (MODERATE): ICD-10-CM

## 2017-08-02 DIAGNOSIS — E78.2 MIXED HYPERLIPIDEMIA: ICD-10-CM

## 2017-08-02 PROBLEM — R00.2 PALPITATIONS: Chronic | Status: RESOLVED | Noted: 2017-05-26 | Resolved: 2017-08-02

## 2017-08-02 PROBLEM — R06.09 DYSPNEA ON EXERTION: Chronic | Status: RESOLVED | Noted: 2017-05-26 | Resolved: 2017-08-02

## 2017-08-02 PROCEDURE — 99499 UNLISTED E&M SERVICE: CPT | Mod: S$GLB,,, | Performed by: NURSE PRACTITIONER

## 2017-08-02 PROCEDURE — G0439 PPPS, SUBSEQ VISIT: HCPCS | Mod: S$GLB,,, | Performed by: NURSE PRACTITIONER

## 2017-08-02 PROCEDURE — 99999 PR PBB SHADOW E&M-EST. PATIENT-LVL IV: CPT | Mod: PBBFAC,,, | Performed by: NURSE PRACTITIONER

## 2017-08-02 NOTE — PATIENT INSTRUCTIONS
Counseling and Referral of Other Preventative  (Italic type indicates deductible and co-insurance are waived)    Patient Name: Cheryl Ghosh  Today's Date: 8/2/2017      SERVICE LIMITATIONS RECOMMENDATION    Vaccines    · Pneumococcal (once after 65)    · Influenza (annually)    · Hepatitis B (if medium/high risk)    · Prevnar 13      Hepatitis B medium/high risk factors:       - End-stage renal disease       - Hemophiliacs who received Factor VII or         IX concentrates       - Clients of institutions for the mentally             retarded       - Persons who live in the same house as          a HepB carrier       - Homosexual men       - Illicit injectable drug abusers     Pneumococcal: Done, no repeat necessary     Influenza: Done, repeat in one year     Hepatitis B: N/A     Prevnar 13: Done, no repeat necessary    Mammogram (biennial age 50-74)  Annually (age 40 or over)  N/A    Pap (up to age 70 and after 70 if unknown history or abnormal study last 10 years)    N/A     The USPSTF recommends against screening for cervical cancer in women older than age 65 years who have had adequate prior screening and are not otherwise at high risk for cervical cancer.      Colorectal cancer screening (to age 75)    · Fecal occult blood test (annual)  · Flexible sigmoidoscopy (5y)  · Screening colonoscopy (10y)  · Barium enema   N/A    Diabetes self-management training (no USPSTF recommendations)  Requires referral by treating physician for patient with diabetes or renal disease. 10 hours of initial DSMT sessions of no less than 30 minutes each in a continuous 12-month period. 2 hours of follow-up DSMT in subsequent years.  N/A    Bone mass measurements (age 65 & older, biennial)  Requires diagnosis related to osteoporosis or estrogen deficiency. Biennial benefit unless patient has history of long-term glucocorticoid  Last done 12/08/2015, recommend to repeat every 3  years    Glaucoma screening (no USPSTF recommendation)   Diabetes mellitus, family history   , age 50 or over    American, age 65 or over  Done this year, repeat every year    Medical nutrition therapy for diabetes or renal disease (no recommended schedule)  Requires referral by treating physician for patient with diabetes or renal disease or kidney transplant within the past 3 years.  Can be provided in same year as diabetes self-management training (DSMT), and CMS recommends medical nutrition therapy take place after DSMT. Up to 3 hours for initial year and 2 hours in subsequent years.  N/A    Cardiovascular screening blood tests (every 5 years)  · Fasting lipid panel  Order as a panel if possible  Done this year, repeat every year    Diabetes screening tests (at least every 3 years, Medicare covers annually or at 6-month intervals for prediabetic patients)  · Fasting blood sugar (FBS) or glucose tolerance test (GTT)  Patient must be diagnosed with one of the following:       - Hypertension       - Dyslipidemia       - Obesity (BMI 30kg/m2)       - Previous elevated impaired FBS or GTT       ... or any two of the following:       - Overweight (BMI 25 but <30)       - Family history of diabetes       - Age 65 or older       - History of gestational diabetes or birth of baby weighing more than 9 pounds  Done this year, repeat every year    Abdominal aortic aneurysm screening (once)  · Sonogram   Limited to patients who meet one of the following criteria:       - Men who are 65-75 years old and have smoked more than 100 cigarette in their lifetime       - Anyone with a family history of abdominal aortic aneurysm       - Anyone recommended for screening by the USPSTF  N/A    HIV screening (annually for increased risk patients)  · HIV-1 and HIV-2 by EIA, or SOLEDAD, rapid antibody test or oral mucosa transudate  Patients must be at increased risk for HIV infection per USPSTF guidelines or pregnant. Tests covered annually for patient at increased risk  or as requested by the patient. Pregnant patients may receive up to 3 tests during pregnancy.  Risks discussed, screening is not recommended    Smoking cessation counseling (up to 8 sessions per year)  Patients must be asymptomatic of tobacco-related conditions to receive as a preventative service.  Non-smoker    Subsequent annual wellness visit  At least 12 months since last AWV  Return in one year     The following information is provided to all patients.  This information is to help you find resources for any of the problems found today that may be affecting your health:                Living healthy guide: www.[facility].[place].[facility]      Understanding Diabetes: www.diabetes.org      Eating healthy: www.cdc.gov/healthyweight      [facility] home safety checklist: www.cdc.gov/steadi/patient.html      Agency on Aging: www.goea.[place].[facility]      Alcoholics anonymous (AA): www.aa.org      Physical Activity: www.dhaval.nih.gov/vo2fzoj      Tobacco use: www.quitwithusla.org

## 2017-08-02 NOTE — PROGRESS NOTES
"Cheryl Ghosh presented for a  Medicare AWV and comprehensive Health Risk Assessment today. The following components were reviewed and updated:    · Medical history  · Family History  · Social history  · Allergies and Current Medications  · Health Risk Assessment  · Health Maintenance  · Care Team     ** See Completed Assessments for Annual Wellness Visit within the encounter summary.**       The following assessments were completed:  · Living Situation  · CAGE  · Depression Screening  · Timed Get Up and Go  · Whisper Test  · Cognitive Function Screening  · Nutrition Screening  · ADL Screening  · PAQ Screening    Vitals:    08/02/17 1315   BP: 129/75   Pulse: 67   SpO2: 98%   Weight: 59.6 kg (131 lb 6.3 oz)   Height: 5' 4" (1.626 m)     Body mass index is 22.55 kg/m².     Physical Exam   Constitutional: She is oriented to person, place, and time. She appears well-developed and well-nourished. No distress.   HENT:   Head: Normocephalic and atraumatic.   Eyes: EOM are normal. Pupils are equal, round, and reactive to light.   Neck: Neck supple. No JVD present. No tracheal deviation present.   Cardiovascular: Normal rate, regular rhythm, normal heart sounds and intact distal pulses.    No murmur heard.  Pulmonary/Chest: Effort normal and breath sounds normal. No respiratory distress. She has no wheezes. She has no rales.   Abdominal: Soft. Bowel sounds are normal. She exhibits no distension and no mass. There is no tenderness.   Musculoskeletal: Normal range of motion. She exhibits no edema or tenderness.   Neurological: She is alert and oriented to person, place, and time. Coordination normal.   Skin: Skin is warm and dry. No erythema. No pallor.   Psychiatric: She has a normal mood and affect. Her behavior is normal. Judgment and thought content normal. Cognition and memory are normal. She expresses no homicidal and no suicidal ideation.   Nursing note and vitals reviewed.        Diagnoses and health risks identified " today and associated recommendations/orders:    1. Encounter for preventive health examination    2. Mixed hyperlipidemia  Chronic; stable.  Followed by PCP.    3. Aortic atherosclerosis  Chronic; stable.  Seen on imaging dated 04/24/14.  Followed by PCP.    4. Chronic kidney disease, stage III (moderate)  Chronic; stable.  Followed by PCP.    5. Neuropathy  Chronic; stable on medication.  Followed by PCP.    6. 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  Seen on imaging dated 09/27/12.  Followed by PCP.    7. Osteopenia, unspecified location  Chronic; stable on medication.  Followed by PCP.    8. BMI 22.0-22.9, adult      Provided Cheryl with a 5-10 year written screening schedule and personal prevention plan. Recommendations were developed using the USPSTF age appropriate recommendations. Education, counseling, and referrals were provided as needed. After Visit Summary printed and given to patient which includes a list of additional screenings\tests needed.    Return in 7 weeks (on 9/22/2017) for annual visit with PCP, HRA visit in 1 year.    Melissa Martin NP

## 2017-08-16 ENCOUNTER — OFFICE VISIT (OUTPATIENT)
Dept: OBSTETRICS AND GYNECOLOGY | Facility: CLINIC | Age: 82
End: 2017-08-16
Payer: MEDICARE

## 2017-08-16 VITALS
SYSTOLIC BLOOD PRESSURE: 130 MMHG | WEIGHT: 132.25 LBS | BODY MASS INDEX: 22.58 KG/M2 | DIASTOLIC BLOOD PRESSURE: 70 MMHG | HEIGHT: 64 IN

## 2017-08-16 DIAGNOSIS — N95.2 VAGINAL ATROPHY: ICD-10-CM

## 2017-08-16 DIAGNOSIS — N39.46 MIXED STRESS AND URGE URINARY INCONTINENCE: Primary | ICD-10-CM

## 2017-08-16 PROCEDURE — 99999 PR PBB SHADOW E&M-EST. PATIENT-LVL II: CPT | Mod: PBBFAC,,, | Performed by: OBSTETRICS & GYNECOLOGY

## 2017-08-16 PROCEDURE — 99203 OFFICE O/P NEW LOW 30 MIN: CPT | Mod: 25,S$GLB,, | Performed by: OBSTETRICS & GYNECOLOGY

## 2017-08-16 PROCEDURE — 57160 INSERT PESSARY/OTHER DEVICE: CPT | Mod: S$GLB,,, | Performed by: OBSTETRICS & GYNECOLOGY

## 2017-08-16 PROCEDURE — 1126F AMNT PAIN NOTED NONE PRSNT: CPT | Mod: S$GLB,,, | Performed by: OBSTETRICS & GYNECOLOGY

## 2017-08-16 PROCEDURE — 3008F BODY MASS INDEX DOCD: CPT | Mod: S$GLB,,, | Performed by: OBSTETRICS & GYNECOLOGY

## 2017-08-16 PROCEDURE — 1159F MED LIST DOCD IN RCRD: CPT | Mod: S$GLB,,, | Performed by: OBSTETRICS & GYNECOLOGY

## 2017-08-16 NOTE — PROGRESS NOTES
GYNECOLOGY OFFICE NOTE    Reason for visit: mixed urinary incontinence    HPI: Pt is a 82 y.o.  female  who presents for evaluation of RAMEZ. Saw Dr. Wiggins for issue and was initially scheduled for anterior repair and sling and pt canceled surgery. Interested in pessary.    Past Medical History:   Diagnosis Date    Arthritis     lumbar    Blood transfusion     Chronic kidney disease, stage III (moderate) 2017    Degenerative disc disease     lumbar    Insulinoma     Pancreatic disease     Urinary incontinence 2015       Past Surgical History:   Procedure Laterality Date    ADENOIDECTOMY      APPENDECTOMY      CATARACT EXTRACTION, BILATERAL      EYE SURGERY      HYSTERECTOMY      SAMM/BSO    PANCREAS SURGERY      Insulanoma    REFRACTIVE SURGERY Bilateral     TONSILLECTOMY         Family History   Problem Relation Age of Onset    Breast cancer Mother     Heart disease Father     Aneurysm Sister     Glaucoma Daughter     Heart attack Son     Breast cancer Other        Social History   Substance Use Topics    Smoking status: Never Smoker    Smokeless tobacco: Never Used    Alcohol use 0.6 - 1.2 oz/week     1 - 2 Glasses of wine per week       OB History    Para Term  AB Living   2 2 2     2   SAB TAB Ectopic Multiple Live Births           2      # Outcome Date GA Lbr Rhett/2nd Weight Sex Delivery Anes PTL Lv   2 Term      Vag-Spont  N AZUL   1 Term      Vag-Spont  N AZUL          Current Outpatient Prescriptions   Medication Sig    b complex vitamins tablet Take 1 tablet by mouth once daily.    calcium carbonate (OS-THOMAS) 600 mg (1,500 mg) Tab Take 600 mg by mouth 2 (two) times daily with meals.      CALCIUM CARBONATE/VITAMIN D3 (VITAMIN D-3 ORAL) Take 5,000 Units by mouth once daily.    gabapentin (NEURONTIN) 300 MG capsule Take 1 capsule (300 mg total) by mouth every evening.    meloxicam (MOBIC) 15 MG tablet Take 1 tablet (15 mg total) by mouth daily as  "needed for Pain.    pyridoxine (VITAMIN B-6) 100 MG Tab Take 100 mg by mouth once daily.    conjugated estrogens (PREMARIN) vaginal cream Place 1 g vaginally every evening. Use 1 gram of estrogen cream in vagina nightly x 2 weeks, then twice a week thereafter.     No current facility-administered medications for this visit.        Allergies: Pcn [penicillins]     /70   Ht 5' 4" (1.626 m)   Wt 60 kg (132 lb 4.4 oz)   BMI 22.71 kg/m²     ROS:  GENERAL: Denies fever or chills.   SKIN: Denies rash or lesions.   HEAD: Denies head injury or headache.   CHEST: Denies chest pain or shortness of breath.   CARDIOVASCULAR: Denies palpitations or chest pain.   ABDOMEN: No abdominal pain, constipation, diarrhea, nausea, vomiting or rectal bleeding.   URINARY: No dysuria, hematuria, or burning on urination.  REPRODUCTIVE: See HPI.   BREASTS: Denies pain, lumps, or nipple discharge.   NEUROLOGIC: Denies syncope or weakness.     Physical Exam:  GENERAL: alert, appears stated age and cooperative  CHEST: Normal respiratory effort  HEART: S1 and S2 normal, regular rate and rhythm  NECK: normal appearance, no thyromegaly masses or tenderness  SKIN: no acne, striae, hirsutism  ABDOMEN: abdomen is soft without significant tenderness, masses, organomegaly or guarding, no hernias noted  EXTERNAL GENITALIA:  normal general appearance  URETHRA: normal urethra, normal urethral meatus  VAGINA:  atrophic mucosa, grade 2 cystocele with vaginal vault prolapse as well. Fitted with size #1 pessary. Able to place 1 finger between pessary and vaginal wall. Pt able to ambulate, void and brace as if having a BM without expelling. States urine flow normal with pessary in place instead of slow dribble.   CERVIX:  absent cervix  UTERUS:  surgically absent    Diagnosis:  1. Mixed stress and urge urinary incontinence    2. Vaginal atrophy        Plan:   1/2. Size 1 ring pessary with support placed. Rx premarin vaginal cream sent. RTC in 1 week " for reevaluation.    Orders Placed This Encounter    conjugated estrogens (PREMARIN) vaginal cream     Patient was counseled today on the new ACS guidelines for cervical cytology screening as well as the current recommendations for breast cancer screening. She was counseled to follow up with her PCP for other routine health maintenance.     Return in about 1 week (around 8/23/2017) for pessary check.      Fabby Alonzo MD  OB/GYN  Pager: [phone]

## 2017-08-24 ENCOUNTER — OFFICE VISIT (OUTPATIENT)
Dept: OBSTETRICS AND GYNECOLOGY | Facility: CLINIC | Age: 82
End: 2017-08-24
Payer: MEDICARE

## 2017-08-24 VITALS — DIASTOLIC BLOOD PRESSURE: 74 MMHG | BODY MASS INDEX: 23.08 KG/M2 | SYSTOLIC BLOOD PRESSURE: 138 MMHG | WEIGHT: 134.5 LBS

## 2017-08-24 DIAGNOSIS — Z46.89 PESSARY MAINTENANCE: Primary | ICD-10-CM

## 2017-08-24 DIAGNOSIS — N95.2 VAGINAL ATROPHY: ICD-10-CM

## 2017-08-24 PROCEDURE — 99212 OFFICE O/P EST SF 10 MIN: CPT | Mod: S$GLB,,, | Performed by: OBSTETRICS & GYNECOLOGY

## 2017-08-24 PROCEDURE — 3008F BODY MASS INDEX DOCD: CPT | Mod: S$GLB,,, | Performed by: OBSTETRICS & GYNECOLOGY

## 2017-08-24 PROCEDURE — 1126F AMNT PAIN NOTED NONE PRSNT: CPT | Mod: S$GLB,,, | Performed by: OBSTETRICS & GYNECOLOGY

## 2017-08-24 PROCEDURE — 99999 PR PBB SHADOW E&M-EST. PATIENT-LVL II: CPT | Mod: PBBFAC,,, | Performed by: OBSTETRICS & GYNECOLOGY

## 2017-08-24 PROCEDURE — 1159F MED LIST DOCD IN RCRD: CPT | Mod: S$GLB,,, | Performed by: OBSTETRICS & GYNECOLOGY

## 2017-08-24 RX ORDER — ESTRADIOL 0.1 MG/G
1 CREAM VAGINAL DAILY
Qty: 42.5 G | Refills: 1 | Status: SHIPPED | OUTPATIENT
Start: 2017-08-24 | End: 2017-09-29 | Stop reason: SDUPTHER

## 2017-08-24 NOTE — PROGRESS NOTES
GYNECOLOGY OFFICE NOTE    Reason for visit: pessary check    HPI: Pt is a 82 y.o.  female  who presents for pessary check. Was fitted with size #1 ring with support last week secondary to grade 2 cystocele. Pt denies  discomfort, expulsion, persistent bulge or pressure symptoms, difficulty with urination or bowel movements, or vaginal bleeding or discharge. Still some persistent RAMEZ but improved. Tried removing it once and reinserted successfully without issues.    Past Medical History:   Diagnosis Date    Arthritis     lumbar    Blood transfusion     Chronic kidney disease, stage III (moderate) 2017    Degenerative disc disease     lumbar    Insulinoma     Pancreatic disease     Urinary incontinence 2015       Past Surgical History:   Procedure Laterality Date    ADENOIDECTOMY      APPENDECTOMY      CATARACT EXTRACTION, BILATERAL      EYE SURGERY      HYSTERECTOMY      SAMM/BSO    PANCREAS SURGERY      Insulanoma    REFRACTIVE SURGERY Bilateral     TONSILLECTOMY         Family History   Problem Relation Age of Onset    Breast cancer Mother     Heart disease Father     Aneurysm Sister     Glaucoma Daughter     Heart attack Son     Breast cancer Other        Social History   Substance Use Topics    Smoking status: Never Smoker    Smokeless tobacco: Never Used    Alcohol use 0.6 - 1.2 oz/week     1 - 2 Glasses of wine per week       OB History    Para Term  AB Living   2 2 2     2   SAB TAB Ectopic Multiple Live Births           2      # Outcome Date GA Lbr Rhett/2nd Weight Sex Delivery Anes PTL Lv   2 Term      Vag-Spont  N AZUL   1 Term      Vag-Spont  N AZUL          Current Outpatient Prescriptions   Medication Sig    b complex vitamins tablet Take 1 tablet by mouth once daily.    calcium carbonate (OS-THOMAS) 600 mg (1,500 mg) Tab Take 600 mg by mouth 2 (two) times daily with meals.      CALCIUM CARBONATE/VITAMIN D3 (VITAMIN D-3 ORAL) Take 5,000 Units by  mouth once daily.    gabapentin (NEURONTIN) 300 MG capsule Take 1 capsule (300 mg total) by mouth every evening.    meloxicam (MOBIC) 15 MG tablet Take 1 tablet (15 mg total) by mouth daily as needed for Pain.    pyridoxine (VITAMIN B-6) 100 MG Tab Take 100 mg by mouth once daily.    estradiol (ESTRACE) 0.01 % (0.1 mg/gram) vaginal cream Place 1 g vaginally once daily. Use 1 gram of estrogen cream in vagina nightly x 2 weeks, then twice a week thereafter. .     No current facility-administered medications for this visit.        Allergies: Pcn [penicillins]     /74   Wt 61 kg (134 lb 7.7 oz)   BMI 23.08 kg/m²     ROS:  GENERAL: Denies fever or chills.   SKIN: Denies rash or lesions.   HEAD: Denies head injury or headache.   CHEST: Denies chest pain or shortness of breath.   CARDIOVASCULAR: Denies palpitations or chest pain.   ABDOMEN: No abdominal pain, constipation, diarrhea, nausea, vomiting or rectal bleeding.   URINARY: see HPI  REPRODUCTIVE: See HPI.   BREASTS: Denies pain, lumps, or nipple discharge.   NEUROLOGIC: Denies syncope or weakness.     Physical Exam:  GENERAL: alert, appears stated age and cooperative  CHEST: Normal respiratory effort  HEART: S1 and S2 normal, regular rate and rhythm  NECK: normal appearance, no thyromegaly masses or tenderness  SKIN: no acne, striae, hirsutism  ABDOMEN: abdomen is soft without significant tenderness, masses, organomegaly or guarding, no hernias noted  EXTERNAL GENITALIA:  normal general appearance  URETHRA: normal urethra, normal urethral meatus  VAGINA:  atrophic mucosa    The pessary was removed and cleaned with soap and water. Vagina examined and was free of erosions or ulcerations. She was taught how to remove, clean, and reinsert the pessary.     Diagnosis:  1. Pessary maintenance    2. Vaginal atrophy        Plan:   1. Reinserted without issues.     Care: Remove pessary as frequently as nightly and as infrequently as every 2-3 weeks.  Remove before  intercourse.  May need to remove before bowel movements if straining dislodges.   Wash with soap and water (no ).  Replace using small amount of water-based lubricant (like KY jelly) at end being introduced into vagina.      Instructions: The pessary should be removed, cleaned, and left out overnight every one to two weeks; it should also be removed before sexual intercourse.    Follow-up for self care patients: Second follow-up visit in one to two months, and every 12 months thereafter.   Follow-up for patient who need assistance with care: Second follow-up in one to two months, and then every three to six months thereafter for pessary cleaning and assessme    Orders Placed This Encounter    estradiol (ESTRACE) 0.01 % (0.1 mg/gram) vaginal cream       Patient was counseled today on the new ACS guidelines for cervical cytology screening as well as the current recommendations for breast cancer screening. She was counseled to follow up with her PCP for other routine health maintenance.     Return in about 2 months (around 10/24/2017) for pessary check.      Fabby Alonzo MD  OB/GYN  Pager: [phone]

## 2017-09-22 ENCOUNTER — LAB VISIT (OUTPATIENT)
Dept: LAB | Facility: HOSPITAL | Age: 82
End: 2017-09-22
Attending: FAMILY MEDICINE
Payer: MEDICARE

## 2017-09-22 ENCOUNTER — OFFICE VISIT (OUTPATIENT)
Dept: FAMILY MEDICINE | Facility: CLINIC | Age: 82
End: 2017-09-22
Attending: FAMILY MEDICINE
Payer: MEDICARE

## 2017-09-22 VITALS
BODY MASS INDEX: 22.5 KG/M2 | SYSTOLIC BLOOD PRESSURE: 138 MMHG | DIASTOLIC BLOOD PRESSURE: 84 MMHG | HEIGHT: 64 IN | WEIGHT: 131.81 LBS | OXYGEN SATURATION: 95 % | HEART RATE: 60 BPM

## 2017-09-22 DIAGNOSIS — G57.93 NEUROPATHIC PAIN OF BOTH LEGS: ICD-10-CM

## 2017-09-22 DIAGNOSIS — N18.30 CHRONIC KIDNEY DISEASE, STAGE III (MODERATE): ICD-10-CM

## 2017-09-22 DIAGNOSIS — G47.00 INSOMNIA, UNSPECIFIED TYPE: ICD-10-CM

## 2017-09-22 DIAGNOSIS — M47.819 ARTHRITIS OF FACET JOINTS AT MULTIPLE VERTEBRAL LEVELS: ICD-10-CM

## 2017-09-22 DIAGNOSIS — I70.0 AORTIC ATHEROSCLEROSIS: ICD-10-CM

## 2017-09-22 DIAGNOSIS — R32 URINARY INCONTINENCE, UNSPECIFIED TYPE: ICD-10-CM

## 2017-09-22 DIAGNOSIS — E78.2 MIXED HYPERLIPIDEMIA: ICD-10-CM

## 2017-09-22 DIAGNOSIS — M47.816 FACET ARTHRITIS OF LUMBAR REGION: ICD-10-CM

## 2017-09-22 DIAGNOSIS — M17.0 PRIMARY OSTEOARTHRITIS OF BOTH KNEES: ICD-10-CM

## 2017-09-22 DIAGNOSIS — Z00.00 ROUTINE GENERAL MEDICAL EXAMINATION AT A HEALTH CARE FACILITY: Primary | ICD-10-CM

## 2017-09-22 DIAGNOSIS — Z00.00 ROUTINE GENERAL MEDICAL EXAMINATION AT A HEALTH CARE FACILITY: ICD-10-CM

## 2017-09-22 LAB
25(OH)D3+25(OH)D2 SERPL-MCNC: 48 NG/ML
ALBUMIN SERPL BCP-MCNC: 3.7 G/DL
ALP SERPL-CCNC: 67 U/L
ALT SERPL W/O P-5'-P-CCNC: 11 U/L
ANION GAP SERPL CALC-SCNC: 7 MMOL/L
AST SERPL-CCNC: 19 U/L
BASOPHILS # BLD AUTO: 0.03 K/UL
BASOPHILS NFR BLD: 0.5 %
BILIRUB SERPL-MCNC: 0.8 MG/DL
BUN SERPL-MCNC: 22 MG/DL
CALCIUM SERPL-MCNC: 9.4 MG/DL
CHLORIDE SERPL-SCNC: 105 MMOL/L
CHOLEST SERPL-MCNC: 228 MG/DL
CHOLEST/HDLC SERPL: 5.2 {RATIO}
CO2 SERPL-SCNC: 29 MMOL/L
CREAT SERPL-MCNC: 1 MG/DL
DIFFERENTIAL METHOD: NORMAL
EOSINOPHIL # BLD AUTO: 0.1 K/UL
EOSINOPHIL NFR BLD: 1.5 %
ERYTHROCYTE [DISTWIDTH] IN BLOOD BY AUTOMATED COUNT: 13 %
EST. GFR  (AFRICAN AMERICAN): >60 ML/MIN/1.73 M^2
EST. GFR  (NON AFRICAN AMERICAN): 53 ML/MIN/1.73 M^2
GLUCOSE SERPL-MCNC: 90 MG/DL
HCT VFR BLD AUTO: 41.2 %
HDLC SERPL-MCNC: 44 MG/DL
HDLC SERPL: 19.3 %
HGB BLD-MCNC: 13.7 G/DL
LDLC SERPL CALC-MCNC: 146.4 MG/DL
LYMPHOCYTES # BLD AUTO: 2.6 K/UL
LYMPHOCYTES NFR BLD: 43.1 %
MCH RBC QN AUTO: 30.8 PG
MCHC RBC AUTO-ENTMCNC: 33.3 G/DL
MCV RBC AUTO: 93 FL
MONOCYTES # BLD AUTO: 0.5 K/UL
MONOCYTES NFR BLD: 8.3 %
NEUTROPHILS # BLD AUTO: 2.8 K/UL
NEUTROPHILS NFR BLD: 46.4 %
NONHDLC SERPL-MCNC: 184 MG/DL
PLATELET # BLD AUTO: 164 K/UL
PMV BLD AUTO: 10.1 FL
POTASSIUM SERPL-SCNC: 4.1 MMOL/L
PROT SERPL-MCNC: 7.2 G/DL
RBC # BLD AUTO: 4.45 M/UL
SODIUM SERPL-SCNC: 141 MMOL/L
TRIGL SERPL-MCNC: 188 MG/DL
TSH SERPL DL<=0.005 MIU/L-ACNC: 2.25 UIU/ML
WBC # BLD AUTO: 6.06 K/UL

## 2017-09-22 PROCEDURE — 99999 PR PBB SHADOW E&M-EST. PATIENT-LVL III: CPT | Mod: PBBFAC,,, | Performed by: FAMILY MEDICINE

## 2017-09-22 PROCEDURE — 80061 LIPID PANEL: CPT

## 2017-09-22 PROCEDURE — 99397 PER PM REEVAL EST PAT 65+ YR: CPT | Mod: S$GLB,,, | Performed by: FAMILY MEDICINE

## 2017-09-22 PROCEDURE — 82306 VITAMIN D 25 HYDROXY: CPT

## 2017-09-22 PROCEDURE — 84443 ASSAY THYROID STIM HORMONE: CPT

## 2017-09-22 PROCEDURE — 85025 COMPLETE CBC W/AUTO DIFF WBC: CPT

## 2017-09-22 PROCEDURE — 80053 COMPREHEN METABOLIC PANEL: CPT

## 2017-09-22 PROCEDURE — 99499 UNLISTED E&M SERVICE: CPT | Mod: S$GLB,,, | Performed by: FAMILY MEDICINE

## 2017-09-22 PROCEDURE — 36415 COLL VENOUS BLD VENIPUNCTURE: CPT

## 2017-09-22 RX ORDER — MELOXICAM 15 MG/1
15 TABLET ORAL DAILY PRN
Qty: 90 TABLET | Refills: 3 | Status: SHIPPED | OUTPATIENT
Start: 2017-09-22 | End: 2018-07-24 | Stop reason: SDUPTHER

## 2017-09-22 RX ORDER — TEMAZEPAM 7.5 MG/1
7.5 CAPSULE ORAL NIGHTLY PRN
Qty: 30 CAPSULE | Refills: 0 | Status: SHIPPED | OUTPATIENT
Start: 2017-09-22 | End: 2017-10-22

## 2017-09-22 RX ORDER — GABAPENTIN 300 MG/1
300 CAPSULE ORAL NIGHTLY
Qty: 90 CAPSULE | Refills: 3 | Status: SHIPPED | OUTPATIENT
Start: 2017-09-22 | End: 2018-09-25 | Stop reason: SDUPTHER

## 2017-09-22 NOTE — PROGRESS NOTES
Subjective:       Patient ID: Cheryl Ghosh is a 82 y.o. female.    Chief Complaint: No chief complaint on file.    82 yr old pleasant white female with OA, osteopenia hip, hyperlipidemia, chronic back pain, BPPV, presents today for annual wellness check and lab work. She also reports incontinence for  Years and seen GYN for that. C/o insomnia and nothing works.      Osteopenia hip - stable - had dexa in 12/15 and she is talking fosamax since many years and stopped last year- she is due for dexa in 12/17 - no side effects    HLD - uncontrolled but improving -   LDLCALC                  133.6               12/02/2016                  - she was asked to start statin but due to her fall and issues with her ribs and back, she was never able to start it.    Back pain - she follows Dr. Phillips for injection - c/o neuropathic pain in legs - was on neurontin in the past and she started taking it and working    HISTORY as below - reviewed    Health maintenance  -labs UTD  -mammo UTD  -colon screen UTD  -vaccines UTD      Hyperlipidemia   This is a chronic problem. The current episode started more than 1 year ago. The problem is uncontrolled. Recent lipid tests were reviewed and are high. She has no history of chronic renal disease, diabetes, hypothyroidism, liver disease, obesity or nephrotic syndrome. There are no known factors aggravating her hyperlipidemia. Pertinent negatives include no chest pain, focal sensory loss, leg pain, myalgias or shortness of breath. Current antihyperlipidemic treatment includes diet change. The current treatment provides mild improvement of lipids. There are no compliance problems.  Risk factors for coronary artery disease include dyslipidemia.     Review of Systems   Constitutional: Negative.  Negative for activity change, diaphoresis and unexpected weight change.   HENT: Negative.  Negative for congestion, ear discharge, hearing loss, rhinorrhea, sore throat and voice change.    Eyes:  Negative.  Negative for pain, discharge and visual disturbance.   Respiratory: Negative.  Negative for chest tightness, shortness of breath and wheezing.    Cardiovascular: Negative.  Negative for chest pain.   Gastrointestinal: Negative.  Negative for abdominal distention, anal bleeding, constipation and nausea.   Endocrine: Negative.  Negative for cold intolerance, polydipsia and polyuria.   Genitourinary: Negative.  Negative for decreased urine volume, difficulty urinating, dysuria, frequency, menstrual problem and vaginal pain.   Musculoskeletal: Negative.  Negative for arthralgias, gait problem and myalgias.   Skin: Negative.  Negative for color change, pallor and wound.   Allergic/Immunologic: Negative.  Negative for environmental allergies and immunocompromised state.   Neurological: Negative.  Negative for dizziness, tremors, seizures, speech difficulty and headaches.   Hematological: Negative.  Negative for adenopathy. Does not bruise/bleed easily.   Psychiatric/Behavioral: Negative.  Negative for agitation, confusion, decreased concentration, hallucinations, self-injury and suicidal ideas. The patient is not nervous/anxious.        PMH/PSH/FH/SH/MED/ALLERGY reviewed    Objective:       Vitals:    09/22/17 0923   BP: 138/84   Pulse: 60       Physical Exam   Constitutional: She is oriented to person, place, and time. She appears well-developed and well-nourished. No distress.   HENT:   Head: Normocephalic and atraumatic.   Right Ear: External ear normal.   Left Ear: External ear normal.   Nose: Nose normal.   Mouth/Throat: Oropharynx is clear and moist. No oropharyngeal exudate.   Eyes: Conjunctivae and EOM are normal. Pupils are equal, round, and reactive to light. Right eye exhibits no discharge. Left eye exhibits no discharge. No scleral icterus.   Neck: Normal range of motion. Neck supple. No JVD present. No tracheal deviation present. No thyromegaly present.   Cardiovascular: Normal rate, regular  rhythm, normal heart sounds and intact distal pulses.  Exam reveals no gallop and no friction rub.    No murmur heard.  Pulmonary/Chest: Effort normal and breath sounds normal. No stridor. She has no wheezes. She has no rales. She exhibits no tenderness.   Abdominal: Soft. Bowel sounds are normal. She exhibits no distension and no mass. There is no tenderness. There is no rebound and no guarding. No hernia.   Musculoskeletal: Normal range of motion. She exhibits no edema or tenderness.   Lymphadenopathy:     She has no cervical adenopathy.   Neurological: She is alert and oriented to person, place, and time. She has normal reflexes. She displays normal reflexes. No cranial nerve deficit. She exhibits normal muscle tone. Coordination normal.   Skin: Skin is warm and dry. No rash noted. She is not diaphoretic. No erythema. No pallor.   Psychiatric: She has a normal mood and affect. Her behavior is normal. Judgment and thought content normal.       Assessment:       1. Routine general medical examination at a health care facility    2. Aortic atherosclerosis    3. Mixed hyperlipidemia    4. Urinary incontinence, unspecified type    5. Chronic kidney disease, stage III (moderate)    6. 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 Facet arthritis of lumbar region    8. Insomnia, unspecified type    9. Neuropathic pain of both legs    10. Primary osteoarthritis of both knees        Plan:       Diagnoses and all orders for this visit:    Routine general medical examination at a health care facility  -     CBC auto differential; Future  -     Comprehensive metabolic panel; Future  -     Lipid panel; Future  -     Vitamin D; Future  -     TSH; Future    Aortic atherosclerosis  -     Vitamin D; Future    Mixed hyperlipidemia  -     CBC auto differential; Future  -     Comprehensive metabolic panel; Future  -     Lipid panel; Future  -     Vitamin D; Future  -     TSH; Future    Urinary incontinence,  unspecified type  -     Vitamin D; Future    Chronic kidney disease, stage III (moderate)  -     CBC auto differential; Future  -     Comprehensive metabolic panel; Future  -     Vitamin D; Future    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  -     Vitamin D; Future  -     TSH; Future    Neuropathic pain of both legs  -     gabapentin (NEURONTIN) 300 MG capsule; Take 1 capsule (300 mg total) by mouth every evening.  -     meloxicam (MOBIC) 15 MG tablet; Take 1 tablet (15 mg total) by mouth daily as needed for Pain.  -     Vitamin D; Future    Primary osteoarthritis of both knees  -     meloxicam (MOBIC) 15 MG tablet; Take 1 tablet (15 mg total) by mouth daily as needed for Pain.  -     Vitamin D; Future      Wellness check  -normal exam  -labs    Insomnia  -try benadryl and if that's not helping - try restoril    HLD  -diet controlled    Urine incontinence  -lifestyle changes - intolerant to meds    Neuropathic pain  -restart neurontin    OA knee B/L  -mobic prn        Spent adequate time in obtaining history and explaining differentials    40 minutes spent during this visit of which greater than 50% devoted to face-face counseling and coordination of care regarding diagnosis and management plan    Return in about 1 year (around 9/22/2018), or if symptoms worsen or fail to improve.

## 2017-09-29 ENCOUNTER — PATIENT MESSAGE (OUTPATIENT)
Dept: OBSTETRICS AND GYNECOLOGY | Facility: CLINIC | Age: 82
End: 2017-09-29

## 2017-09-29 DIAGNOSIS — Z46.89 PESSARY MAINTENANCE: ICD-10-CM

## 2017-09-29 DIAGNOSIS — N95.2 VAGINAL ATROPHY: ICD-10-CM

## 2017-09-29 RX ORDER — ESTRADIOL 0.1 MG/G
1 CREAM VAGINAL DAILY
Qty: 42.5 G | Refills: 1 | Status: SHIPPED | OUTPATIENT
Start: 2017-09-29 | End: 2019-01-14

## 2017-11-24 ENCOUNTER — HOSPITAL ENCOUNTER (OUTPATIENT)
Facility: HOSPITAL | Age: 82
Discharge: HOME | End: 2017-11-25
Attending: EMERGENCY MEDICINE | Admitting: INTERNAL MEDICINE
Payer: MEDICARE

## 2017-11-24 ENCOUNTER — NURSE TRIAGE (OUTPATIENT)
Dept: ADMINISTRATIVE | Facility: CLINIC | Age: 82
End: 2017-11-24

## 2017-11-24 DIAGNOSIS — N18.30 CHRONIC KIDNEY DISEASE, STAGE III (MODERATE): ICD-10-CM

## 2017-11-24 DIAGNOSIS — G45.9 TRANSIENT CEREBRAL ISCHEMIA, UNSPECIFIED TYPE: Primary | ICD-10-CM

## 2017-11-24 DIAGNOSIS — D64.9 NORMOCYTIC ANEMIA: ICD-10-CM

## 2017-11-24 DIAGNOSIS — E78.5 HYPERLIPIDEMIA, UNSPECIFIED HYPERLIPIDEMIA TYPE: ICD-10-CM

## 2017-11-24 DIAGNOSIS — I16.0 HYPERTENSIVE URGENCY: ICD-10-CM

## 2017-11-24 DIAGNOSIS — G45.9 TIA (TRANSIENT ISCHEMIC ATTACK): ICD-10-CM

## 2017-11-24 DIAGNOSIS — M85.80 OSTEOPENIA, UNSPECIFIED LOCATION: ICD-10-CM

## 2017-11-24 LAB
ALBUMIN SERPL BCP-MCNC: 3.4 G/DL
ALP SERPL-CCNC: 74 U/L
ALT SERPL W/O P-5'-P-CCNC: 17 U/L
ANION GAP SERPL CALC-SCNC: 8 MMOL/L
AST SERPL-CCNC: 21 U/L
BASOPHILS # BLD AUTO: 0.03 K/UL
BASOPHILS NFR BLD: 0.4 %
BILIRUB SERPL-MCNC: 0.3 MG/DL
BUN SERPL-MCNC: 21 MG/DL
CALCIUM SERPL-MCNC: 8.7 MG/DL
CHLORIDE SERPL-SCNC: 110 MMOL/L
CHOLEST SERPL-MCNC: 160 MG/DL
CHOLEST/HDLC SERPL: 4.3 {RATIO}
CO2 SERPL-SCNC: 25 MMOL/L
CREAT SERPL-MCNC: 1 MG/DL
DIASTOLIC DYSFUNCTION: YES
DIFFERENTIAL METHOD: NORMAL
EOSINOPHIL # BLD AUTO: 0.1 K/UL
EOSINOPHIL NFR BLD: 1.2 %
ERYTHROCYTE [DISTWIDTH] IN BLOOD BY AUTOMATED COUNT: 12.7 %
EST. GFR  (AFRICAN AMERICAN): >60 ML/MIN/1.73 M^2
EST. GFR  (NON AFRICAN AMERICAN): 53 ML/MIN/1.73 M^2
ESTIMATED AVG GLUCOSE: 103 MG/DL
ESTIMATED PA SYSTOLIC PRESSURE: 23.07
GLOBAL PERICARDIAL EFFUSION: ABNORMAL
GLUCOSE SERPL-MCNC: 96 MG/DL
HBA1C MFR BLD HPLC: 5.2 %
HCT VFR BLD AUTO: 37.9 %
HDLC SERPL-MCNC: 37 MG/DL
HDLC SERPL: 23.1 %
HGB BLD-MCNC: 12.7 G/DL
INR PPP: 0.9
LDLC SERPL CALC-MCNC: 92 MG/DL
LYMPHOCYTES # BLD AUTO: 3.1 K/UL
LYMPHOCYTES NFR BLD: 40.3 %
MCH RBC QN AUTO: 30.9 PG
MCHC RBC AUTO-ENTMCNC: 33.5 G/DL
MCV RBC AUTO: 92 FL
MITRAL VALVE REGURGITATION: ABNORMAL
MONOCYTES # BLD AUTO: 0.7 K/UL
MONOCYTES NFR BLD: 9.5 %
NEUTROPHILS # BLD AUTO: 3.7 K/UL
NEUTROPHILS NFR BLD: 48.6 %
NONHDLC SERPL-MCNC: 123 MG/DL
PLATELET # BLD AUTO: 156 K/UL
PMV BLD AUTO: 10.3 FL
POCT GLUCOSE: 96 MG/DL (ref 70–110)
POTASSIUM SERPL-SCNC: 4.1 MMOL/L
PROT SERPL-MCNC: 6.8 G/DL
PROTHROMBIN TIME: 10 SEC
RBC # BLD AUTO: 4.11 M/UL
RETIRED EF AND QEF - SEE NOTES: 60 (ref 55–65)
SODIUM SERPL-SCNC: 143 MMOL/L
TRIGL SERPL-MCNC: 155 MG/DL
TSH SERPL DL<=0.005 MIU/L-ACNC: 1.21 UIU/ML
WBC # BLD AUTO: 7.66 K/UL

## 2017-11-24 PROCEDURE — 25500020 PHARM REV CODE 255: Performed by: INTERNAL MEDICINE

## 2017-11-24 PROCEDURE — 93005 ELECTROCARDIOGRAM TRACING: CPT

## 2017-11-24 PROCEDURE — 83036 HEMOGLOBIN GLYCOSYLATED A1C: CPT

## 2017-11-24 PROCEDURE — 93010 ELECTROCARDIOGRAM REPORT: CPT | Mod: ,,, | Performed by: INTERNAL MEDICINE

## 2017-11-24 PROCEDURE — 94761 N-INVAS EAR/PLS OXIMETRY MLT: CPT

## 2017-11-24 PROCEDURE — 84443 ASSAY THYROID STIM HORMONE: CPT

## 2017-11-24 PROCEDURE — 80053 COMPREHEN METABOLIC PANEL: CPT

## 2017-11-24 PROCEDURE — 25000003 PHARM REV CODE 250: Performed by: STUDENT IN AN ORGANIZED HEALTH CARE EDUCATION/TRAINING PROGRAM

## 2017-11-24 PROCEDURE — 97530 THERAPEUTIC ACTIVITIES: CPT

## 2017-11-24 PROCEDURE — G0378 HOSPITAL OBSERVATION PER HR: HCPCS

## 2017-11-24 PROCEDURE — 80061 LIPID PANEL: CPT

## 2017-11-24 PROCEDURE — 93306 TTE W/DOPPLER COMPLETE: CPT | Mod: 26,,, | Performed by: INTERNAL MEDICINE

## 2017-11-24 PROCEDURE — 85025 COMPLETE CBC W/AUTO DIFF WBC: CPT

## 2017-11-24 PROCEDURE — 97165 OT EVAL LOW COMPLEX 30 MIN: CPT

## 2017-11-24 PROCEDURE — G8989 SELF CARE D/C STATUS: HCPCS | Mod: CH

## 2017-11-24 PROCEDURE — G8987 SELF CARE CURRENT STATUS: HCPCS | Mod: CH

## 2017-11-24 PROCEDURE — 93306 TTE W/DOPPLER COMPLETE: CPT

## 2017-11-24 PROCEDURE — G8988 SELF CARE GOAL STATUS: HCPCS | Mod: CH

## 2017-11-24 PROCEDURE — 63600175 PHARM REV CODE 636 W HCPCS: Performed by: INTERNAL MEDICINE

## 2017-11-24 PROCEDURE — 25000003 PHARM REV CODE 250: Performed by: INTERNAL MEDICINE

## 2017-11-24 PROCEDURE — 99285 EMERGENCY DEPT VISIT HI MDM: CPT

## 2017-11-24 PROCEDURE — 85610 PROTHROMBIN TIME: CPT

## 2017-11-24 RX ORDER — SODIUM CHLORIDE 9 MG/ML
INJECTION, SOLUTION INTRAVENOUS CONTINUOUS
Status: ACTIVE | OUTPATIENT
Start: 2017-11-24 | End: 2017-11-25

## 2017-11-24 RX ORDER — ENOXAPARIN SODIUM 100 MG/ML
40 INJECTION SUBCUTANEOUS EVERY 24 HOURS
Status: DISCONTINUED | OUTPATIENT
Start: 2017-11-24 | End: 2017-11-25 | Stop reason: HOSPADM

## 2017-11-24 RX ORDER — CALCIUM CARBONATE 500(1250)
1500 TABLET ORAL 2 TIMES DAILY WITH MEALS
Status: DISCONTINUED | OUTPATIENT
Start: 2017-11-24 | End: 2017-11-24 | Stop reason: DRUGHIGH

## 2017-11-24 RX ORDER — ATORVASTATIN CALCIUM 40 MG/1
40 TABLET, FILM COATED ORAL DAILY
Status: DISCONTINUED | OUTPATIENT
Start: 2017-11-24 | End: 2017-11-25 | Stop reason: HOSPADM

## 2017-11-24 RX ORDER — NAPROXEN SODIUM 220 MG/1
81 TABLET, FILM COATED ORAL DAILY
Status: DISCONTINUED | OUTPATIENT
Start: 2017-11-24 | End: 2017-11-25 | Stop reason: HOSPADM

## 2017-11-24 RX ORDER — LABETALOL HYDROCHLORIDE 5 MG/ML
10 INJECTION, SOLUTION INTRAVENOUS ONCE
Status: COMPLETED | OUTPATIENT
Start: 2017-11-24 | End: 2017-11-24

## 2017-11-24 RX ORDER — CHOLECALCIFEROL (VITAMIN D3) 25 MCG
5000 TABLET ORAL DAILY
Status: DISCONTINUED | OUTPATIENT
Start: 2017-11-24 | End: 2017-11-24 | Stop reason: SDUPTHER

## 2017-11-24 RX ORDER — LANOLIN ALCOHOL/MO/W.PET/CERES
100 CREAM (GRAM) TOPICAL DAILY
Status: DISCONTINUED | OUTPATIENT
Start: 2017-11-24 | End: 2017-11-25 | Stop reason: HOSPADM

## 2017-11-24 RX ORDER — CLOPIDOGREL BISULFATE 75 MG/1
75 TABLET ORAL DAILY
Status: DISCONTINUED | OUTPATIENT
Start: 2017-11-25 | End: 2017-11-25 | Stop reason: HOSPADM

## 2017-11-24 RX ORDER — SODIUM CHLORIDE 0.9 % (FLUSH) 0.9 %
5 SYRINGE (ML) INJECTION
Status: DISCONTINUED | OUTPATIENT
Start: 2017-11-24 | End: 2017-11-25 | Stop reason: HOSPADM

## 2017-11-24 RX ORDER — FERROUS SULFATE, DRIED 160(50) MG
2 TABLET, EXTENDED RELEASE ORAL 2 TIMES DAILY WITH MEALS
Status: DISCONTINUED | OUTPATIENT
Start: 2017-11-24 | End: 2017-11-25 | Stop reason: HOSPADM

## 2017-11-24 RX ADMIN — LABETALOL HYDROCHLORIDE 10 MG: 5 INJECTION INTRAVENOUS at 04:11

## 2017-11-24 RX ADMIN — ENOXAPARIN SODIUM 40 MG: 40 INJECTION SUBCUTANEOUS at 05:11

## 2017-11-24 RX ADMIN — PYRIDOXINE HCL TAB 50 MG 100 MG: 50 TAB at 03:11

## 2017-11-24 RX ADMIN — IOHEXOL 100 ML: 350 INJECTION, SOLUTION INTRAVENOUS at 09:11

## 2017-11-24 RX ADMIN — SODIUM CHLORIDE: 0.9 INJECTION, SOLUTION INTRAVENOUS at 03:11

## 2017-11-24 RX ADMIN — ASPIRIN 81 MG 81 MG: 81 TABLET ORAL at 03:11

## 2017-11-24 RX ADMIN — ATORVASTATIN CALCIUM 40 MG: 40 TABLET, FILM COATED ORAL at 03:11

## 2017-11-24 RX ADMIN — Medication 2 TABLET: at 04:11

## 2017-11-24 NOTE — PLAN OF CARE
Problem: Occupational Therapy Goal  Goal: Occupational Therapy Goal  Outcome: Outcome(s) achieved Date Met: 11/24/17  OT eval performed, report to follow    No OT needs, discharge

## 2017-11-24 NOTE — PROGRESS NOTES
V/O Dr Crawford Change Calcium and Vitamin D tablets to Oscal 500mg + Vitamin d 200iu 2 tabs po bid with meals

## 2017-11-24 NOTE — TELEPHONE ENCOUNTER
"  Reason for Disposition   [1] Weakness (i.e., paralysis, loss of muscle strength) of the face, arm / hand, or leg / foot on one side of the body AND [2] sudden onset AND [3] brief (now gone)    Protocols used:  NEUROLOGIC DEFICIT-A-    Cheryl is calling to report that on two separate occasions she had symptoms of a "mini stroke"  Cheryl states that she was dizzy, having difficulty walking, having difficulty controlling her arm and just felt weird.  No symptoms at this time.  Per protocol advised ER.  "

## 2017-11-24 NOTE — ED NOTES
Pt sitting up in bed, AAO x's 3,  at bedside. Pt stated she came to the ER with c/o dizziness and weakness. Pts  reports that she had some slurred speech about 3 days ago and she felt like she was walking on a slanted sidewalk. Pt reports recently being sick with a virus where she vomiting for a day. Denies CP, fever, SOB or blurred vision.   APPEARANCE: Alert, oriented and in no acute distress.  CARDIAC: Normal rate and rhythm, no murmur heard.   PERIPHERAL VASCULAR: peripheral pulses present. Normal cap refill. No edema. Warm to touch.    RESPIRATORY:Normal rate and effort, breath sounds clear bilaterally throughout chest. Respirations are equal and unlabored no obvious signs of distress.  GASTRO: soft, bowel sounds normal, no tenderness, no abdominal distention.  MUSC: Full ROM. No bony tenderness or soft tissue tenderness. No obvious deformity.  SKIN: Skin is warm and dry, normal skin turgor, mucous membranes moist.  NEURO: 5/5 strength major flexors/extensors bilaterally. Sensory intact to light touch bilaterally. Meseret coma scale: eyes open spontaneously-4, oriented & converses-5, obeys commands-6. No neurological abnormalities.   MENTAL STATUS: awake, alert and aware of environment.  EYE: PERRL, both eyes: pupils brisk and reactive to light. Normal size.  ENT: EARS: no obvious drainage. NOSE: no active bleeding.

## 2017-11-24 NOTE — ED PROVIDER NOTES
Encounter Date: 11/24/2017       History     Chief Complaint   Patient presents with    Weakness     pt having slurred speech, weakness , with feeling sidewalk slanting, symptoms since thursday.    Dizziness     82 y.o. female with PMH of HLD, BPPV, CKD3A, insulinoma s/p removal here with 9 days of aches, fatigue, n/v. 7 days ago had an episode where she felt like her legs were wobbly, and slurred her speech. Dizziness and vertigo intermittently, but felt different than previous episodes of vertigo. Yesterday, had numbness of L arm. No cough, SOB, CP. No diarrhea. She performs all her ADLS independently             Review of patient's allergies indicates:   Allergen Reactions    Pcn [penicillins] Hives and Nausea And Vomiting     Past Medical History:   Diagnosis Date    Arthritis     lumbar    Blood transfusion     Chronic kidney disease, stage III (moderate) 8/2/2017    Degenerative disc disease     lumbar    Insulinoma     Pancreatic disease     Urinary incontinence 12/7/2015     Past Surgical History:   Procedure Laterality Date    ADENOIDECTOMY      APPENDECTOMY      CATARACT EXTRACTION, BILATERAL      EYE SURGERY      HYSTERECTOMY      SAMM/BSO    PANCREAS SURGERY      Insulanoma    REFRACTIVE SURGERY Bilateral     TONSILLECTOMY       Family History   Problem Relation Age of Onset    Breast cancer Mother     Heart disease Father     Aneurysm Sister     Glaucoma Daughter     Heart attack Son     Breast cancer Other      Social History   Substance Use Topics    Smoking status: Never Smoker    Smokeless tobacco: Never Used    Alcohol use 0.6 - 1.2 oz/week     1 - 2 Glasses of wine per week     Review of Systems   Constitutional: Positive for fatigue. Negative for fever.   Respiratory: Negative.    Cardiovascular: Negative.    Neurological: Positive for dizziness, speech difficulty, weakness, light-headedness and numbness.   All other systems reviewed and are negative.      Physical Exam      Initial Vitals [11/24/17 0929]   BP Pulse Resp Temp SpO2   (!) 213/93 69 18 97.7 °F (36.5 °C) --      MAP       133         Physical Exam    Nursing note and vitals reviewed.  Constitutional: She appears well-developed and well-nourished. She appears distressed.   HENT:   Head: Normocephalic and atraumatic.   Eyes: EOM are normal. Pupils are equal, round, and reactive to light.   Neck: Normal range of motion. Neck supple.   Cardiovascular: Normal rate.   Pulmonary/Chest: No respiratory distress.   Abdominal: Soft.   Musculoskeletal: Normal range of motion.   Neurological: She is alert and oriented to person, place, and time. She has normal strength. No cranial nerve deficit.    AAOx3; CN II-XII intact, strength 5/5 and sensation intact to light touch in upper and lower extremities romberg negative, no dysmetria  No nystamgus  No reproduction of dizziness with head movements   Skin: Skin is warm and dry.   Psychiatric: She has a normal mood and affect.         ED Course   Critical Care  Date/Time: 12/5/2017 12:22 PM  Performed by: KRISSY SUN  Authorized by: MEGAN MAYER   Direct patient critical care time: 10 minutes  Additional history critical care time: 10 minutes  Ordering / reviewing critical care time: 10 minutes  Documentation critical care time: 10 minutes  Total critical care time (exclusive of procedural time) : 40 minutes  Critical care was necessary to treat or prevent imminent or life-threatening deterioration of the following conditions: CNS failure or compromise.  Critical care was time spent personally by me on the following activities: discussions with consultants, discussions with primary provider, evaluation of patient's response to treatment, interpretation of cardiac output measurements, examination of patient, obtaining history from patient or surrogate, ordering and performing treatments and interventions, ordering and review of laboratory studies, ordering and review of  radiographic studies, re-evaluation of patient's condition and review of old charts.        Labs Reviewed   COMPREHENSIVE METABOLIC PANEL - Abnormal; Notable for the following:        Result Value    Albumin 3.4 (*)     eGFR if non  53 (*)     All other components within normal limits   LIPID PANEL - Abnormal; Notable for the following:     Triglycerides 155 (*)     HDL 37 (*)     All other components within normal limits   CBC W/ AUTO DIFFERENTIAL   PROTIME-INR   TSH   POCT GLUCOSE          X-Rays:   Independently Interpreted Readings:   Other Readings:  Chest Xray was independently reviewed by me; I agree with radiologist interpretation.       Medical Decision Making:   Initial Assessment:   83 yo F here with weakness, and dizziness with multiple previous TIA's  Impression: TIA/CVA/hypertensive emergency  ADmission for neurology consult and med management, blood pressure control                   ED Course      Clinical Impression:   The primary encounter diagnosis was Transient cerebral ischemia, unspecified type. Diagnoses of TIA (transient ischemic attack), Normocytic anemia, Chronic kidney disease, stage III (moderate), Hyperlipidemia, unspecified hyperlipidemia type, Hypertensive urgency, and Osteopenia, unspecified location were also pertinent to this visit.                           Candice Rahman MD  12/05/17 [id number]

## 2017-11-24 NOTE — H&P
"[facility] History and Physical - Resident Note    Primary Team: [facility] - Team A  Attending Physician: Shira Mendoza MD  Resident: Jennie Daniels  Intern: Bertin Bates    Date of Admit: 11/24/2017    Chief Complaint     Dizziness and weakness x 9 days    Subjective:      History of Present Illness:  Cheryl Ghosh is a 82 y.o. female who has past medical history of HLD, BPPV, CKD3A, insulinoma s/p removal.    She was in her USOH, which is independent with all ADLs, until 9 day prior to admission when she began having nonbloody N/V. It lasted for 1 day and was associated with generalized aches and fatigue; denies dairrhea. Her fatigue and malaise persisted x 2 days. 7 days ago, she had an episode upon standing where she slurred her words, felt like her legs were "wobbly", and she "counldn't control [her] head" that lasted minutes. She had another episode 3 days PTA of weakness and dizziness that she describes as lightheadedness and different from her vertigo.  1 day PTA she had episode of L arm numbness lasting seconds. She states that the episodes of dizziness occur when going from sitting to standing. In addition to the episodes, she feels "fuzzy" and not quite like her self since 9 day PTA. She has had good PO intake. Denies focal weakness, changes in vision, headaches.    She has h/o BPPV that resolved with Teofilo-halpike. During my exam, had episode of dizziness when moving head.    Past Medical History:  Past Medical History:   Diagnosis Date    Arthritis     lumbar    Blood transfusion     Chronic kidney disease, stage III (moderate) 8/2/2017    Degenerative disc disease     lumbar    Insulinoma     Pancreatic disease     Urinary incontinence 12/7/2015       Past Surgical History:  Past Surgical History:   Procedure Laterality Date    ADENOIDECTOMY      APPENDECTOMY      CATARACT EXTRACTION, BILATERAL      EYE SURGERY      HYSTERECTOMY      SAMM/BSO    " PANCREAS SURGERY      Insulanoma    REFRACTIVE SURGERY Bilateral     TONSILLECTOMY         Allergies:  Review of patient's allergies indicates:   Allergen Reactions    Pcn [penicillins] Hives and Nausea And Vomiting       Home Medications:  Prior to Admission medications    Medication Sig Start Date End Date Taking? Authorizing Provider   b complex vitamins tablet Take 1 tablet by mouth once daily.    Historical Provider, MD   calcium carbonate (OS-THOMAS) 600 mg (1,500 mg) Tab Take 600 mg by mouth 2 (two) times daily with meals.      Historical Provider, MD   CALCIUM CARBONATE/VITAMIN D3 (VITAMIN D-3 ORAL) Take 5,000 Units by mouth once daily.    Historical Provider, MD   estradiol (ESTRACE) 0.01 % (0.1 mg/gram) vaginal cream Place 1 g vaginally once daily. Use 1 gram of estrogen cream in vagina nightly x 2 weeks, then twice a week thereafter. . 9/29/17 9/29/18  Fabby Alonzo MD   gabapentin (NEURONTIN) 300 MG capsule Take 1 capsule (300 mg total) by mouth every evening. 9/22/17   Joey Mueller MD   meloxicam (MOBIC) 15 MG tablet Take 1 tablet (15 mg total) by mouth daily as needed for Pain. 9/22/17   Joey Mueller MD   pyridoxine (VITAMIN B-6) 100 MG Tab Take 100 mg by mouth once daily.    Historical Provider, MD       Family History:  Family History   Problem Relation Age of Onset    Breast cancer Mother     Heart disease Father     Aneurysm Sister     Glaucoma Daughter     Heart attack Son     Breast cancer Other        Social History:  Social History   Substance Use Topics    Smoking status: Never Smoker    Smokeless tobacco: Never Used    Alcohol use 0.6 - 1.2 oz/week     1 - 2 Glasses of wine per week       Review of Systems:  Pertinent positives and negatives are listed in HPI.  All other systems are reviewed and are negative.    Health Maintaince :   Primary Care Physician: Ami  Immunizations:   TDap is up to date.  Influenza is up to date.  Pneumovax is up to date.  Cancer  "Screening:  PAP:hysterectomy  Mammogram: is up to date.   Colonoscopy: is up to date.     Objective:   Last 24 Hour Vital Signs:  BP  Min: 139/56  Max: 213/93  Temp  Av.7 °F (36.5 °C)  Min: 97.7 °F (36.5 °C)  Max: 97.7 °F (36.5 °C)  Pulse  Av  Min: 69  Max: 69  Resp  Av  Min: 18  Max: 18  Height  Av' 4" (162.6 cm)  Min: 5' 4" (162.6 cm)  Max: 5' 4" (162.6 cm)  Weight  Av.9 kg (132 lb)  Min: 59.9 kg (132 lb)  Max: 59.9 kg (132 lb)  Body mass index is 22.66 kg/m².  No intake/output data recorded.    Orthostatics:  Lyin/76, HR 66  Standin/73, HR 60    Physical Examination:  General: Alert and awake in NAD  Head:  Normocephalic and atraumatic  Eyes:  PERRL; EOMi with anicteric sclera and clear conjunctivae  Mouth:  Oropharynx clear and without exudate; moist mucous membranes  Cardio:  Regular rate and rhythm with normal S1 and S2; no murmurs or rubs; JVP 6; no carotid bruits  Resp:  CTAB and unlabored; no wheezes, crackles or rhonchi  Abdom: Soft, NTND with normoactive bowel sounds  Extrem: Trace edema to mid shins; WWP with no clubbing, cyanosis  Skin:  No rashes, lesions, or color changes  Pulses: 2+ and symmetric distally  Neuro:  AAOx3; CN II-XII intact, strength 5/5 and sensation intact to light touch in upper and lower extremities, reflexes 3/4 knee jerk and bicep, romberg negative, no dysmetria, PERRLA    Laboratory:  Most Recent Data:  CBC: Lab Results   Component Value Date    WBC 7.66 2017    HGB 12.7 2017    HCT 37.9 2017     2017    MCV 92 2017    RDW 12.7 2017     BMP: Lab Results   Component Value Date     2017    K 4.1 2017     2017    CO2 25 2017    BUN 21 2017    CREATININE 1.0 2017    GLU 96 2017    CALCIUM 8.7 2017     LFTs: Lab Results   Component Value Date    PROT 6.8 2017    ALBUMIN 3.4 (L) 2017    BILITOT 0.3 2017    AST 21 2017    " ALKPHOS 74 11/24/2017    ALT 17 11/24/2017     Coags:   Lab Results   Component Value Date    INR 0.9 11/24/2017     Urinalysis: Lab Results   Component Value Date    LABURIN No growth 05/22/2017    COLORU Yellow 04/21/2014    SPECGRAV 1.015 04/21/2014    NITRITE Negative 04/21/2014    KETONESU Negative 04/21/2014    UROBILINOGEN Negative 04/21/2014    WBCUA 1 11/15/2010       Trended Lab Data:    Recent Labs  Lab 11/24/17  1010   WBC 7.66   HGB 12.7   HCT 37.9      MCV 92   RDW 12.7      K 4.1      CO2 25   BUN 21   CREATININE 1.0   GLU 96   PROT 6.8   ALBUMIN 3.4*   BILITOT 0.3   AST 21   ALKPHOS 74   ALT 17       Trended Cardiac Data:  No results for input(s): TROPONINI, CKTOTAL, CKMB, BNP in the last 168 hours.    Microbiology Data:  none    Other Laboratory Data:  none    Other Results:  EKG (my interpretation):   Sinus bradycardia--rate 59    Radiology:  Imaging Results          CT Head Without Contrast (Final result)  Result time 11/24/17 10:43:05    Final result by Riley Osullivan MD (11/24/17 10:43:05)                 Impression:      No acute intracranial abnormality is identified on noncontrasted CT examination.        Dr. Riley Osullivan discussed preliminary findings with Dr. KRISSY SUN at 10:42:43 11/24/17      Electronically signed by: RILEY OSULLIVAN MD  Date:     11/24/17  Time:    10:43              Narrative:    Comparison: None    Technique: Noncontrast head CT with sagittal and coronal reformats.    Findings: No intracranial hemorrhage, mass, or noncontrast CT evidence of a major vascular territory infarction.  The ventricles and cisterns are within normal limits for size and configuration.  No extra-axial fluid collections are seen.  Craniocervical junction appears within normal limits.    Mastoid air cells and paranasal sinuses are clear.                             X-Ray Chest AP Portable (Final result)  Result time 11/24/17 10:19:02    Final result by Riley SILVESTRE  MD Abran (11/24/17 10:19:02)                 Impression:      No radiographic evidence of acute chest disease.      Electronically signed by: RILEY SNELL MD  Date:     11/24/17  Time:    10:19              Narrative:    Time of Procedure: 11/24/17 09:57:00  Accession # [id number]    Technique: Chest radiograph AP    Indication: Stroke    Comparison: CT chest 4/24/14    Findings:  Mediastinal structures are midline.  Cardiomediastinal silhouette is within normal limits for size and configuration.  There is no significant pleural fluid, pneumothorax, or consolidation.  The bones show mild degenerative changes.  Visualized upper abdominal structures are unremarkable.                                 Assessment:     Cheryl Ghosh is a 82 y.o. female with:     Plan:     TIA  -multiple episodes of generalized weakness, dizziness, slurred speech, left arm numbness  on admit, no neurologic complaints  neuro exam without focal findings  /90s, orthostatics negative  -Differential for episodic dizziness includes TIA, vertigo, orthostasis  -CT head without infarct  ABCD 4  -Started high intensity statin, ASA  Echo pending, MRI brain, MRA head/neck pending  will consider plavix  -PT/OT eval pending, SLP for bedside swallow  Q4h neuro checks    Hypertensive urgency  -On admit, /90  No signs of end organ damage  EKG without ischemic changes  -Permissive HTN, goal 150-170  PRN labetalol for SBP > 180    HLD  -Lipid panel-cholesterol 160, HDL 37, LDL 92, Triglycerides 155  -Started high intensity statin    CKD 3A  -On admit, Cr 1, GFR 35--at baseline  -will monitor    Mixed stress/urge incontinence  -No acute issues  follows with Dr. Wiggins    Osteopenia  -DEXA 2015, T-score -2  continue calcium supplementation  no acute issues    Chronic low back pain  -No acute issues  follows with Dr. Phillips    Code Status:     Full    Fluids: none  Diet: NPO pending bedside swallow  Ppx: zane Fatima  Yvette  \[facility]\"" Internal Medicine HO-I  \[facility]\"" Medicine Service    \[facility]\"" Medicine Hospitalist Pager numbers:   \[facility]\"" Hospitalist Medicine Team A (Kelly/Hamzah): [phone]  \[facility]\"" Hospitalist Medicine Team B (Karan/Laurent):  [phone]

## 2017-11-24 NOTE — PT/OT/SLP EVAL
"Occupational Therapy  Evaluation/Discharge    Cheryl Ghosh   MRN: [id number]   Admitting Diagnosis: TIA (transient ischemic attack)    OT Date of Treatment: 11/24/17   OT Start Time: 1510  OT Stop Time: 1534  OT Total Time (min): 24 min    Billable Minutes:  Evaluation 15  Therapeutic Activity 9    Diagnosis: TIA (transient ischemic attack)     Past Medical History:   Diagnosis Date    Arthritis     lumbar    Blood transfusion     Chronic kidney disease, stage III (moderate) 8/2/2017    Degenerative disc disease     lumbar    Insulinoma     Pancreatic disease     Urinary incontinence 12/7/2015      Past Surgical History:   Procedure Laterality Date    ADENOIDECTOMY      APPENDECTOMY      CATARACT EXTRACTION, BILATERAL      EYE SURGERY      HYSTERECTOMY      SAMM/BSO    PANCREAS SURGERY      Insulanoma    REFRACTIVE SURGERY Bilateral     TONSILLECTOMY           General Precautions: Standard, fall  Orthopedic Precautions:    Braces:            Patient History:  Living Environment  Living Environment Comment: Lives w/spouse in [place], [address].  Indep and active PTA, works at [employer]. No DME  Equipment Currently Used at Home: none    Prior level of function:            Dominant hand: right    Subjective:  Communicated with nurse prior to session.  I just didn't feel right for a couple of days"  Chief Complaint: none  Patient/Family stated goals: return to PLOF    Pain/Comfort  Pain Rating 1: 0/10  Pain Rating Post-Intervention 1: 0/10    Objective:       Cognitive Exam:  Oriented to: AO4  Follows Commands/attention: Follows multistep  commands  Communication: clear/fluent  Memory:  No Deficits noted  Safety awareness/insight to disability: intact  Coping skills/emotional control: Appropriate to situation    Visual/perceptual:  Intact    Physical Exam:  Postural examination/scapula alignment: Rounded shoulder  Skin integrity: Visible skin intact  Edema: None noted     Sensation: "   Intact    Upper Extremity Range of Motion:  Right Upper Extremity: WFL  Left Upper Extremity: WFL    Upper Extremity Strength:  Right Upper Extremity: WFL  Left Upper Extremity: WFL   Strength: WFL    Fine motor coordination:   Intact    Gross motor coordination: WFL    Functional Mobility:  Bed Mobility:  Rolling/Turning to Left: Modified independent  Rolling/Turning Right: Modified independent  Scooting/Bridging: Modified Independent  Supine to Sit: Modified Independent  Sit to Supine: Modified Independent    Transfers:  Sit <> Stand Assistance: Modified Independent  Sit <> Stand Assistive Device: No Assistive Device  Bed <> Chair Transfer Assistance: Modified Independent  Bed <> Chair Assistive Device: No Assistive Device  Toilet Transfer Technique: Stand Pivot  Toilet Transfer Assistance: Modified Independent  Toilet Transfer Assistive Device: No Assistive Device    Functional Ambulation: Mod I in room; gait from WC to bathroom and back to bed; no AD    Activities of Daily Living:  Feeding Level of Assistance: Independent  Feeding adaptive equipment:      UE adaptive equipment:   LE Dressing Level of Assistance: Modified independent  LE adaptive equipment:   Grooming Position: Standing at sink  Grooming Level of Assistance: Modified independent  Toileting Where Assessed: Toilet  Toileting Level of Assistance: Modified independent        Bathing adaptive equipment:     Balance:   Static Sit: NORMAL: No deviations seen in posture held statically  Dynamic Sit: NORMAL: No deviations seen in posture held dynamically  Static Stand: NORMAL: No deviations seen in posture held statically  Dynamic stand: NORMAL: No deviations seen in posture held dynamically    Therapeutic Activities and Exercises:  Pt educated on role of OT/POC, general home safety, benefits of continuing with exercise program, stretching program, relaxation techniques    AM-PAC 6 CLICK ADL  How much help from another person does this patient  "currently need?  1 = Unable, Total/Dependent Assistance  2 = A lot, Maximum/Moderate Assistance  3 = A little, Minimum/Contact Guard/Supervision  4 = None, Modified [place]/Independent    Putting on and taking off regular lower body clothing? : 4  Bathing (including washing, rinsing, drying)?: 4  Toileting, which includes using toilet, bedpan, or urinal? : 4  Putting on and taking off regular upper body clothing?: 4  Taking care of personal grooming such as brushing teeth?: 4  Eating meals?: 4  Total Score: 24    AM-PAC Raw Score CMS "G-Code Modifier Level of Impairment Assistance   6 % Total / Unable   7 - 9 CM 80 - 100% Maximal Assist   10-14 CL 60 - 80% Moderate Assist   15 - 19 CK 40 - 60% Moderate Assist   20 - 22 CJ 20 - 40% Minimal Assist   23 CI 1-20% SBA / CGA   24 CH 0% Independent/ Mod I       Patient left seated EOB with all lines intact, call button in reach and nurse present    Assessment:  Cheryl Ghosh is a 82 y.o. female with a medical diagnosis of TIA (transient ischemic attack) and presents with no deficits at this time.  No skilled OT needs identified; discharge OT.    Rehab identified problem list/impairments: Rehab identified problem list/impairments: other (comment) (none)    Rehab potential is excellent.    Activity tolerance: Excellent    Discharge recommendations: Discharge Facility/Level Of Care Needs: home     Barriers to discharge: Barriers to Discharge: None    Equipment recommendations: none     GOALS:    Occupational Therapy Goals     Not on file          Multidisciplinary Problems (Resolved)        Problem: Occupational Therapy Goal    Goal Priority Disciplines Outcome Interventions   Occupational Therapy Goal   (Resolved)     OT, PT/OT Outcome(s) achieved                    PLAN:  Discharge OT  Plan of Care reviewed with: patient    OT G-codes  Functional Assessment Tool Used: [facility]  Score: 24  Functional Limitation: Self care  Self Care Current Status (): "   Self Care Goal Status ():   Self Care Discharge Status (): AMAURY Reyes OT  11/24/2017

## 2017-11-24 NOTE — PT/OT/SLP PROGRESS
Physical Therapy      Cheryl Ghosh  MRN: [id number]    Patient NEERAJ in MRI. Will follow-up as able    Xavier Orosco, PT

## 2017-11-25 VITALS
HEIGHT: 64 IN | DIASTOLIC BLOOD PRESSURE: 72 MMHG | SYSTOLIC BLOOD PRESSURE: 152 MMHG | WEIGHT: 127 LBS | OXYGEN SATURATION: 97 % | HEART RATE: 62 BPM | RESPIRATION RATE: 16 BRPM | BODY MASS INDEX: 21.68 KG/M2 | TEMPERATURE: 97 F

## 2017-11-25 PROBLEM — I50.32 CHRONIC DIASTOLIC HEART FAILURE: Status: ACTIVE | Noted: 2017-11-25

## 2017-11-25 LAB
ANION GAP SERPL CALC-SCNC: 6 MMOL/L
BASOPHILS # BLD AUTO: 0.02 K/UL
BASOPHILS NFR BLD: 0.3 %
BUN SERPL-MCNC: 17 MG/DL
CALCIUM SERPL-MCNC: 8.3 MG/DL
CHLORIDE SERPL-SCNC: 112 MMOL/L
CO2 SERPL-SCNC: 25 MMOL/L
CREAT SERPL-MCNC: 0.9 MG/DL
DIFFERENTIAL METHOD: ABNORMAL
EOSINOPHIL # BLD AUTO: 0.2 K/UL
EOSINOPHIL NFR BLD: 2 %
ERYTHROCYTE [DISTWIDTH] IN BLOOD BY AUTOMATED COUNT: 13.1 %
EST. GFR  (AFRICAN AMERICAN): >60 ML/MIN/1.73 M^2
EST. GFR  (NON AFRICAN AMERICAN): 60 ML/MIN/1.73 M^2
FERRITIN SERPL-MCNC: 97 NG/ML
FOLATE SERPL-MCNC: 11.1 NG/ML
GLUCOSE SERPL-MCNC: 92 MG/DL
HCT VFR BLD AUTO: 35.3 %
HGB BLD-MCNC: 11.6 G/DL
IRON SERPL-MCNC: 54 UG/DL
LYMPHOCYTES # BLD AUTO: 3.6 K/UL
LYMPHOCYTES NFR BLD: 46.4 %
MAGNESIUM SERPL-MCNC: 1.9 MG/DL
MCH RBC QN AUTO: 30.5 PG
MCHC RBC AUTO-ENTMCNC: 32.9 G/DL
MCV RBC AUTO: 93 FL
MONOCYTES # BLD AUTO: 0.8 K/UL
MONOCYTES NFR BLD: 10.4 %
NEUTROPHILS # BLD AUTO: 3.1 K/UL
NEUTROPHILS NFR BLD: 40.6 %
PHOSPHATE SERPL-MCNC: 3.4 MG/DL
PLATELET # BLD AUTO: 159 K/UL
PMV BLD AUTO: 10.5 FL
POTASSIUM SERPL-SCNC: 4.5 MMOL/L
RBC # BLD AUTO: 3.8 M/UL
SATURATED IRON: 19 %
SODIUM SERPL-SCNC: 143 MMOL/L
TOTAL IRON BINDING CAPACITY: 281 UG/DL
TRANSFERRIN SERPL-MCNC: 190 MG/DL
VIT B12 SERPL-MCNC: 654 PG/ML
WBC # BLD AUTO: 7.68 K/UL

## 2017-11-25 PROCEDURE — 83735 ASSAY OF MAGNESIUM: CPT

## 2017-11-25 PROCEDURE — G8978 MOBILITY CURRENT STATUS: HCPCS | Mod: CH

## 2017-11-25 PROCEDURE — 97161 PT EVAL LOW COMPLEX 20 MIN: CPT

## 2017-11-25 PROCEDURE — 94761 N-INVAS EAR/PLS OXIMETRY MLT: CPT

## 2017-11-25 PROCEDURE — G8980 MOBILITY D/C STATUS: HCPCS | Mod: CH

## 2017-11-25 PROCEDURE — 85025 COMPLETE CBC W/AUTO DIFF WBC: CPT

## 2017-11-25 PROCEDURE — 80048 BASIC METABOLIC PNL TOTAL CA: CPT

## 2017-11-25 PROCEDURE — G0378 HOSPITAL OBSERVATION PER HR: HCPCS

## 2017-11-25 PROCEDURE — 82607 VITAMIN B-12: CPT

## 2017-11-25 PROCEDURE — 82746 ASSAY OF FOLIC ACID SERUM: CPT

## 2017-11-25 PROCEDURE — G8997 SWALLOW GOAL STATUS: HCPCS | Mod: CH

## 2017-11-25 PROCEDURE — 25000003 PHARM REV CODE 250: Performed by: STUDENT IN AN ORGANIZED HEALTH CARE EDUCATION/TRAINING PROGRAM

## 2017-11-25 PROCEDURE — 82728 ASSAY OF FERRITIN: CPT

## 2017-11-25 PROCEDURE — G8996 SWALLOW CURRENT STATUS: HCPCS | Mod: CH

## 2017-11-25 PROCEDURE — 25000003 PHARM REV CODE 250: Performed by: INTERNAL MEDICINE

## 2017-11-25 PROCEDURE — 84100 ASSAY OF PHOSPHORUS: CPT

## 2017-11-25 PROCEDURE — 92610 EVALUATE SWALLOWING FUNCTION: CPT

## 2017-11-25 PROCEDURE — G8998 SWALLOW D/C STATUS: HCPCS | Mod: CH

## 2017-11-25 PROCEDURE — 83540 ASSAY OF IRON: CPT

## 2017-11-25 PROCEDURE — G8979 MOBILITY GOAL STATUS: HCPCS | Mod: CH

## 2017-11-25 RX ORDER — ATORVASTATIN CALCIUM 40 MG/1
40 TABLET, FILM COATED ORAL DAILY
Qty: 90 TABLET | Refills: 3 | Status: SHIPPED | OUTPATIENT
Start: 2017-11-26 | End: 2018-03-05

## 2017-11-25 RX ORDER — NAPROXEN SODIUM 220 MG/1
81 TABLET, FILM COATED ORAL DAILY
Qty: 21 TABLET | Refills: 0 | Status: SHIPPED | OUTPATIENT
Start: 2017-11-26 | End: 2017-11-25

## 2017-11-25 RX ORDER — CLOPIDOGREL BISULFATE 75 MG/1
75 TABLET ORAL DAILY
Qty: 90 TABLET | Refills: 3 | Status: SHIPPED | OUTPATIENT
Start: 2017-11-26 | End: 2017-11-25

## 2017-11-25 RX ORDER — NAPROXEN SODIUM 220 MG/1
81 TABLET, FILM COATED ORAL DAILY
Qty: 90 TABLET | Refills: 3 | Status: SHIPPED | OUTPATIENT
Start: 2017-11-26 | End: 2017-11-25

## 2017-11-25 RX ORDER — CLOPIDOGREL BISULFATE 75 MG/1
75 TABLET ORAL DAILY
Qty: 90 TABLET | Refills: 0 | Status: SHIPPED | OUTPATIENT
Start: 2017-11-26 | End: 2018-03-05

## 2017-11-25 RX ORDER — NAPROXEN SODIUM 220 MG/1
81 TABLET, FILM COATED ORAL DAILY
Qty: 90 TABLET | Refills: 3 | Status: ON HOLD | OUTPATIENT
Start: 2017-11-26 | End: 2019-02-06 | Stop reason: HOSPADM

## 2017-11-25 RX ORDER — LISINOPRIL AND HYDROCHLOROTHIAZIDE 10; 12.5 MG/1; MG/1
1 TABLET ORAL DAILY
Qty: 90 TABLET | Refills: 3 | Status: SHIPPED | OUTPATIENT
Start: 2017-11-25 | End: 2018-03-05

## 2017-11-25 RX ORDER — LISINOPRIL 5 MG/1
10 TABLET ORAL DAILY
Status: DISCONTINUED | OUTPATIENT
Start: 2017-11-25 | End: 2017-11-25 | Stop reason: HOSPADM

## 2017-11-25 RX ORDER — HYDROCHLOROTHIAZIDE 12.5 MG/1
12.5 TABLET ORAL DAILY
Status: DISCONTINUED | OUTPATIENT
Start: 2017-11-25 | End: 2017-11-25 | Stop reason: HOSPADM

## 2017-11-25 RX ORDER — ATORVASTATIN CALCIUM 40 MG/1
40 TABLET, FILM COATED ORAL DAILY
Qty: 90 TABLET | Refills: 3 | Status: SHIPPED | OUTPATIENT
Start: 2017-11-26 | End: 2017-11-25

## 2017-11-25 RX ADMIN — PYRIDOXINE HCL TAB 50 MG 100 MG: 50 TAB at 08:11

## 2017-11-25 RX ADMIN — LISINOPRIL 10 MG: 5 TABLET ORAL at 12:11

## 2017-11-25 RX ADMIN — HYDROCHLOROTHIAZIDE 12.5 MG: 12.5 TABLET ORAL at 12:11

## 2017-11-25 RX ADMIN — CLOPIDOGREL BISULFATE 75 MG: 75 TABLET ORAL at 08:11

## 2017-11-25 RX ADMIN — ATORVASTATIN CALCIUM 40 MG: 40 TABLET, FILM COATED ORAL at 08:11

## 2017-11-25 RX ADMIN — Medication 2 TABLET: at 08:11

## 2017-11-25 RX ADMIN — ASPIRIN 81 MG 81 MG: 81 TABLET ORAL at 08:11

## 2017-11-25 RX ADMIN — SODIUM CHLORIDE: 0.9 INJECTION, SOLUTION INTRAVENOUS at 03:11

## 2017-11-25 NOTE — PT/OT/SLP EVAL
Physical Therapy  Evaluation/Discharge Summary    Cheryl Ghosh   MRN: [id number]   Admitting Diagnosis: TIA (transient ischemic attack)    PT Received On: 11/25/17  PT Start Time: 0915     PT Stop Time: 0924    PT Total Time (min): 9 min       Billable Minutes:  Evaluation 9    Diagnosis: TIA (transient ischemic attack)  The primary encounter diagnosis was Transient cerebral ischemia, unspecified type. Diagnoses of TIA (transient ischemic attack), Normocytic anemia, Chronic kidney disease, stage III (moderate), Hyperlipidemia, unspecified hyperlipidemia type, Hypertensive urgency, and Osteopenia, unspecified location were also pertinent to this visit.      Past Medical History:   Diagnosis Date    Arthritis     lumbar    Blood transfusion     Chronic kidney disease, stage III (moderate) 8/2/2017    Degenerative disc disease     lumbar    Insulinoma     Pancreatic disease     Urinary incontinence 12/7/2015      Past Surgical History:   Procedure Laterality Date    ADENOIDECTOMY      APPENDECTOMY      CATARACT EXTRACTION, BILATERAL      EYE SURGERY      HYSTERECTOMY      SAMM/BSO    PANCREAS SURGERY      Insulanoma    REFRACTIVE SURGERY Bilateral     TONSILLECTOMY         Referring physician: Tait Daniels MD  Date referred to PT: 11/24/17    General Precautions: Standard, fall  Orthopedic Precautions: N/A   Braces: N/A       Do you have any cultural, spiritual, Faith conflicts, given your current situation?: None verbalized to PT    Patient History:  Lives With: spouse  Living Arrangements: house  Home Accessibility: stairs to enter home  Number of Stairs to Enter Home: 3  Stair Railings at Home: outside, present at both sides  Transportation Available: car  Living Environment Comment: Pt lives with spouse in [facility] with [address] [place]. Indep with all ADLs works as  at [employer]. No DME, activity restrictions PTA  Equipment Currently Used at Home: none  DME owned (not currently used):  "none    Previous Level of Function:  Ambulation Skills: independent  Transfer Skills: independent  ADL Skills: independent  Work/Leisure Activity: independent    Subjective:  Communicated with MONALISA Camara prior to session.  Pt agreeable to treatment session.   "I get around great, I walk up and down about 15 steps everyday at work."  Chief Complaint: none   Patient goals: none stated.     Pain/Comfort  Pain Rating 1: 0/10  Pain Rating Post-Intervention 1: 0/10      Objective:   Patient found with: peripheral IV, telemetry     Cognitive Exam:  Oriented to: Person, Place, Time and Situation    Follows Commands/attention: Follows multistep  commands  Communication: clear/fluent  Safety awareness/insight to disability: intact    Physical Exam:  Postural examination/scapula alignment: Rounded shoulder and Head forward    Skin integrity: Visible skin intact  Edema: None noted BLE    Sensation:   Intact    Upper Extremity Range of Motion: See OT evaluation.     Lower Extremity Range of Motion:  Right Lower Extremity: WFL  Left Lower Extremity: WFL    Lower Extremity Strength:  Right Lower Extremity: WFL  Left Lower Extremity: WFL     Fine motor coordination:  Intact    Gross motor coordination: WFL    Functional Mobility:  Bed Mobility:  Rolling/Turning to Left: Modified independent  Rolling/Turning Right: Modified independent  Scooting/Bridging: Modified Independent  Supine to Sit: Modified Independent  Sit to Supine: Modified Independent    Transfers:  Sit <> Stand Assistance: Modified Independent  Sit <> Stand Assistive Device: No Assistive Device  Bed <> Chair Assistance: Activity did not occur  Toilet Transfer Technique:  (Ambulated)  Toilet Transfer Assistance: Modified Independent  Toilet Transfer Assistive Device: No Assistive Device  Car Transfer Assistance: Activity did not occur    Gait:   Gait Distance: >400 feet  Assistance 1: Modified Independent  Gait Assistive Device: No device  Gait Pattern: reciprocal  Gait " Deviation(s):  (no significant devations noted)    Stairs:  Activity did not occur.     Balance:   Static Sit: NORMAL: No deviations seen in posture held statically  Dynamic Sit: NORMAL: No deviations seen in posture held dynamically  Static Stand: NORMAL: No deviations seen in posture held statically  Dynamic stand: NORMAL: No deviations seen in posture held dynamically    Therapeutic Activities and Exercises:  -bed mobility as listed above.  -pt ambulated >400 feet without AD, demonstrating stable gait and no episodes of LOB.   -Pt mod I with toileting and hygiene.     AM-PAC 6 CLICK MOBILITY  How much help from another person does this patient currently need?   1 = Unable, Total/Dependent Assistance  2 = A lot, Maximum/Moderate Assistance  3 = A little, Minimum/Contact Guard/Supervision  4 = None, Modified [place]/Independent    Turning over in bed (including adjusting bedclothes, sheets and blankets)?: 4  Sitting down on and standing up from a chair with arms (e.g., wheelchair, bedside commode, etc.): 4  Moving from lying on back to sitting on the side of the bed?: 4  Moving to and from a bed to a chair (including a wheelchair)?: 4  Need to walk in hospital room?: 4  Climbing 3-5 steps with a railing?: 4  Total Score: 24     AM-PAC Raw Score CMS G-Code Modifier Level of Impairment Assistance   6 % Total / Unable   7 - 9 CM 80 - 100% Maximal Assist   10 - 14 CL 60 - 80% Moderate Assist   15 - 19 CK 40 - 60% Moderate Assist   20 - 22 CJ 20 - 40% Minimal Assist   23 CI 1-20% SBA / CGA   24 CH 0% Independent/ Mod I     Patient left HOB elevated with all lines intact, call button in reach and  present.    Assessment:   Cheryl Ghosh is a 82 y.o. female with a medical diagnosis of TIA (transient ischemic attack) and appears to be at her baseline. Pt with no PT needs at this time.     Rehab identified problem list/impairments: Rehab identified problem list/impairments:  (none)    Activity  tolerance: Excellent    Discharge recommendations: Discharge Facility/Level Of Care Needs: home     Barriers to discharge: Barriers to Discharge: None    Equipment recommendations: Equipment Needed After Discharge: none     GOALS:    Physical Therapy Goals     Not on file          Multidisciplinary Problems (Resolved)        Problem: Physical Therapy Goal    Goal Priority Disciplines Outcome Goal Variances Interventions   Physical Therapy Goal   (Resolved)     PT/OT, PT Outcome(s) achieved                     PLAN:    Patient to be discharged from PT at this time as she has no PT needs.   Plan of Care reviewed with: patient, spouse          Anibal Meyer, PT  11/25/2017

## 2017-11-25 NOTE — PLAN OF CARE
Problem: Physical Therapy Goal  Goal: Physical Therapy Goal  Outcome: Outcome(s) achieved Date Met: 11/25/17  Initial evaluation complete, pt with no PT needs at this time. Pt to be d/c from PT.

## 2017-11-25 NOTE — PLAN OF CARE
Problem: Patient Care Overview  Goal: Plan of Care Review  Outcome: Ongoing (interventions implemented as appropriate)   11/24/17 [id number]   Coping/Psychosocial   Plan Of Care Reviewed With patient;spouse   Patient awake, alert and oriented. Spouse at the bedside. Patient has a steady gait and has no deficits and no complaints of pain. Completed a BETZAIDA at the bedside, patient swallowed fine and I gave meds after BETZAIDA was complete. Patient ate sandwich and then MD ordered a CTA and made Pt NPO again. Test is scheduled for 830 pm tonight,

## 2017-11-25 NOTE — NURSING
Discharge instructions and follow up information reviewed. Educated on new home medications. All questions and concerns addressed. Follow up information provided. Wheeled off unit in wheelchair, discharged to home with .

## 2017-11-25 NOTE — PLAN OF CARE
Discharge orders noted, no HH or HME ordered.    Pt's nurse will go over medications/signs and symptoms prior to discharge       11/25/17 1551   Final Note   Assessment Type Final Discharge Note   Discharge Disposition Home   What phone number can be called within the next 1-3 days to see how you are doing after discharge? [phone]   Hospital Follow Up  Appt(s) scheduled? Yes   Right Care Referral Info   Post Acute Recommendation No Care     Alcira Ray RN Transitional Navigator  [phone]     DISPLAY PLAN FREE TEXT

## 2017-11-25 NOTE — PLAN OF CARE
Problem: SLP Goal  Goal: SLP Goal  SLP Short Term Goals:  1. Patient will successfully participate in clinical swallow evaluation and tolerate po trials with no overt s/s of aspiration. --MET 11/25  Outcome: Outcome(s) achieved Date Met: 11/25/17 11/25/2017: Swallow study completed this AM. Pt is at baseline for speech, language, cognition, and swallowing. Pt is safe for regular solids/thin liquids PO diet. No additional ST services warranted at this time.  PATRICIA Joel. CCC-SLP  Speech-Language Pathologist

## 2017-11-25 NOTE — CONSULTS
"NEUROLOGY INPATIENT CONSULT EVALUATION    Reason for consult:  TIA    Informant:  Patient, primary team       Other sources of information : past medical records    CC:  "dizziness"    HPI:   Cheryl Ghosh is a 82 y.o. year old with hyperlipidemia, peripheral neuropathy, CKD stage 3 and h/o insulinoma who presented on 11/24 following an episode of dizziness and admitted for TIA work-up. Neurology consulted for TIA.     Patient was at baseline until 11/16 when she developed several hours of NBNB vomiting without other infectious symptoms. On 11/17 she experienced several minutes of inability to control her neck and < 1 minute of slurred speech. All symptoms resolved completely. On 11/21 she experienced dizziness (room spinning) for ~18 hours and also fully resolved. She continued to feel fatigue over the past week. On 11/24 she came to the ER for further work-up for those episodes upon her family's insistence.     MRI brain 11/24 showed no acute infarction and MRA showed possible stenosis involving left petrous ICA and mid basilar artery. 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  Echo unremarkable.     Patient has never been on anticoagulation or antiplatelet medications in the past. No history of bleeding diathesis or GI bleed.     ROS: Pertinent items are noted in HPI.    Histories:     Allergies:  Pcn [penicillins]    Current Medications:    Current Facility-Administered Medications   Medication Dose Route Frequency Provider Last Rate Last Dose    aspirin chewable tablet 81 mg  81 mg Oral Daily Tati Daniels MD   81 mg at 11/25/17 0846    atorvastatin tablet 40 mg  40 mg Oral Daily Tati Daniels MD   40 mg at 11/25/17 0846    calcium-vitamin D3 500 mg(1,250mg) -200 unit per tablet 2 tablet  2 tablet Oral BID  Bertin Crawford MD   2 tablet at 11/25/17 0845    clopidogrel tablet 75 mg  75 mg Oral Daily Tati Daniels MD   75 mg at 11/25/17 0846    enoxaparin injection 40 " mg  40 mg Subcutaneous Daily Tati Daniels MD   40 mg at 11/24/17 1700    hydroCHLOROthiazide tablet 12.5 mg  12.5 mg Oral Daily Tati Daniels MD   12.5 mg at 11/25/17 1217    lisinopril tablet 10 mg  10 mg Oral Daily Tati Daniels MD   10 mg at 11/25/17 1217    pyridoxine (vitamin B6) tablet 100 mg  100 mg Oral Daily Tati Daniels MD   100 mg at 11/25/17 0846    sodium chloride 0.9% flush 5 mL  5 mL Intravenous PRN Tati Daniels MD           OTC medications of note: none    Past Medical/Surgical/Family History:  PMHx:   Past Medical History:   Diagnosis Date    Arthritis     lumbar    Blood transfusion     Chronic kidney disease, stage III (moderate) 8/2/2017    Degenerative disc disease     lumbar    Insulinoma     Pancreatic disease     Urinary incontinence 12/7/2015      Surgeries:   Past Surgical History:   Procedure Laterality Date    ADENOIDECTOMY      APPENDECTOMY      CATARACT EXTRACTION, BILATERAL      EYE SURGERY      HYSTERECTOMY      SAMM/BSO    PANCREAS SURGERY      Insulanoma    REFRACTIVE SURGERY Bilateral     TONSILLECTOMY        Family  Hx:   Family History   Problem Relation Age of Onset    Breast cancer Mother     Heart disease Father     Aneurysm Sister     Glaucoma Daughter     Heart attack Son     Breast cancer Other        Social History:    Substance Abuse/Dependence Histor  Tobacco: denied  EtOH: none  Illicit drugs: denied    Occupational/Employment History:  Occupation: part time     Outpatient Neurologist or PCP: Dr. Mueller (PCP)     Current Evaluation:     Vital Signs:   Vitals:    11/25/17 1123   BP:    Pulse: (!) 58   Resp:    Temp:       Neurological Exam   Mental Status:  Alert and oriented to self, place, and time.     Language:  No aphasia, no dysarthria.     Cranial Nerves:  Visual fields were intact to confrontation, no RAPD, no anisocoria, EOM intact bilaterally, V1-V3 with good sensation to light touch, no facial asymmetry, hearing  grossly intact, palate, and tongue midline. Good shoulder rug.     Motor:  Strength: 5/5 in all 4 extremities through out.   Tone: Good muscle tone.    No pronator drift    DTRs:  Symmetric and 2+ through out.     Sensation:  Intact to light touch through out.  Vibration and temperature intact in bilateral upper extremities; mildly decreased bilaterally in bilateral toes     Cerebellar:  Negative Romberg   Good finger to nose.  Good heal to shin  Rapid alternating movement without any problems.    Gait and Stand:  Normal gait and stand    LABORATORY STUDIES:  Recent Results (from the past 24 hour(s))   CBC auto differential    Collection Time: 11/25/17  5:47 AM   Result Value Ref Range    WBC 7.68 3.90 - 12.70 K/uL    RBC 3.80 (L) 4.00 - 5.40 M/uL    Hemoglobin 11.6 (L) 12.0 - 16.0 g/dL    Hematocrit 35.3 (L) 37.0 - 48.5 %    MCV 93 82 - 98 fL    MCH 30.5 27.0 - 31.0 pg    MCHC 32.9 32.0 - 36.0 g/dL    RDW 13.1 11.5 - 14.5 %    Platelets 159 150 - 350 K/uL    MPV 10.5 9.2 - 12.9 fL    Gran # 3.1 1.8 - 7.7 K/uL    Lymph # 3.6 1.0 - 4.8 K/uL    Mono # 0.8 0.3 - 1.0 K/uL    Eos # 0.2 0.0 - 0.5 K/uL    Baso # 0.02 0.00 - 0.20 K/uL    Gran% 40.6 38.0 - 73.0 %    Lymph% 46.4 18.0 - 48.0 %    Mono% 10.4 4.0 - 15.0 %    Eosinophil% 2.0 0.0 - 8.0 %    Basophil% 0.3 0.0 - 1.9 %    Differential Method Automated    Basic metabolic panel    Collection Time: 11/25/17  5:47 AM   Result Value Ref Range    Sodium 143 136 - 145 mmol/L    Potassium 4.5 3.5 - 5.1 mmol/L    Chloride 112 (H) 95 - 110 mmol/L    CO2 25 23 - 29 mmol/L    Glucose 92 70 - 110 mg/dL    BUN, Bld 17 8 - 23 mg/dL    Creatinine 0.9 0.5 - 1.4 mg/dL    Calcium 8.3 (L) 8.7 - 10.5 mg/dL    Anion Gap 6 (L) 8 - 16 mmol/L    eGFR if African American >60 >60 mL/min/1.73 m^2    eGFR if non African American 60 >60 mL/min/1.73 m^2   Magnesium    Collection Time: 11/25/17  5:47 AM   Result Value Ref Range    Magnesium 1.9 1.6 - 2.6 mg/dL   Phosphorus    Collection Time:  11/25/17  5:47 AM   Result Value Ref Range    Phosphorus 3.4 2.7 - 4.5 mg/dL   Vitamin B12    Collection Time: 11/25/17  5:47 AM   Result Value Ref Range    Vitamin B-12 654 210 - 950 pg/mL   Folate    Collection Time: 11/25/17  5:47 AM   Result Value Ref Range    Folate 11.1 4.0 - 24.0 ng/mL   Iron and TIBC    Collection Time: 11/25/17  5:47 AM   Result Value Ref Range    Iron 54 30 - 160 ug/dL    Transferrin 190 (L) 200 - 375 mg/dL    TIBC 281 250 - 450 ug/dL    Saturated Iron 19 (L) 20 - 50 %   Ferritin    Collection Time: 11/25/17  5:47 AM   Result Value Ref Range    Ferritin 97 20.0 - 300.0 ng/mL         RADIOLOGY STUDIES:  I have personally reviewed the images performed.     HEAD CT: normal result     CTA head 11/24:   1. No acute abnormality.    2. 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    3. Mild nonsignificant stenosis of the cavernous portion of the right internal carotid artery.    MRI/MRA brain 11/23  No acute infarction.    Subtle area of gradient susceptibility involving the left anterior inferior frontal lobe white matter presumably related to remote hemorrhage.    Possible area of stenosis involving the left petrous ICA, the mid basilar artery, and possibly both posterior cerebral arteries as detailed above.  If deemed clinically useful this could be further evaluated with CTA of the head or conventional angiography.      Assessment:  P     82 y.o. year old with hyperlipidemia, peripheral neuropathy, CKD stage 3 and h/o insulinoma who presented on 11/24 following an episode of dizziness and admitted for TIA work-up. Neurology consulted for TIA.     Treatment Plan    1. TIA: Several transient episodes of dizziness, vomiting, slurred speech within past 10 days, and all deficits resolved in < 24 hours. Currently normal neurologic exam. MRI brain 11/24 showed no acute infarction and MRA showed possible stenosis involving left petrous ICA and mid basilar artery. 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  Cardiac echo unremarkable. Risk factors for TIA/stroke includes age and hyperlipidemia. No h/o HTN. HgA1c, Tsh, folate and vit B12 WNL. Triglycerides 155, LDL 92.    - recommend to continue DAPT for secondary stroke prevention with aspirin 81mg daily and Plavix 75mg daily for 90 days for intracranial atherosclerosis. Common side effects of antiplatelet discussed with patient, and patient is to notify PCP or outpatient neurologist should she develop signs/symptoms of bleeding.   - Following 90 days of DAPT patient may step down to aspirin monotherapy as she has not been on anti-platelet therapy previously  - recommend high-dose statin: may start with Lipitor 40mg daily and re-check lipid panel in 3 months and increase to Lipitor 80mg at that time if well-tolerated.  - recommend carotid echo - may be done as outpatient   - permissive hypertension sBP < 180; patient may follow-up with PCP regarding BP management  - Follow-up with outpatient neurology Dr. Kapadia at [facility]. please fax a referral to [phone] with attention Gretel Blanco.  - life-style modifications discussed with patient    Differential diagnosis was explained to the patient. All questions were answered. Patient understood and agreed to adhere to plan.     Discussed with staff, Dr. Jones. Recommendations discussed with primary team.     Neurology will sign off. Please do not hesitate to contact us for further assistance in the care of this patient. We appreciate the consult.       Akiko Lan MD  PGY III, [facility]  11/25/2017

## 2017-11-25 NOTE — PLAN OF CARE
Spoke to Ct in reference to patient having scan done tonight at 2030. Patient last had something to eat @ 1630.

## 2017-11-25 NOTE — PT/OT/SLP EVAL
"Speech Language Pathology  Evaluation    Cheryl Ghosh   MRN: [id number]   Admitting Diagnosis: TIA (transient ischemic attack)    Diet recommendations: Solid Diet Level: Regular  Liquid Diet Level: Thin     SLP Treatment Date: 11/25/17  Speech Start Time: 1045     Speech Stop Time: 1100     Speech Total (min): 15 min       TREATMENT BILLABLE MINUTES:  Eval Swallow and Oral Function 15 minutes    Diagnosis: TIA (transient ischemic attack)  Cheryl Ghosh is a 82 y.o. female who has past medical history of HLD, BPPV, CKD3A, insulinoma s/p removal.     She was in her USOH, which is independent with all ADLs, until 9 day prior to admission when she began having nonbloody N/V. It lasted for 1 day and was associated with generalized aches and fatigue; denies dairrhea. Her fatigue and malaise persisted x 2 days. 7 days ago, she had an episode upon standing where she slurred her words, felt like her legs were "wobbly", and she "counldn't control [her] head" that lasted minutes. She had another episode 3 days PTA of weakness and dizziness that she describes as lightheadedness and different from her vertigo.  1 day PTA she had episode of L arm numbness lasting seconds. She states that the episodes of dizziness occur when going from sitting to standing. In addition to the episodes, she feels "fuzzy" and not quite like her self since 9 day PTA. She has had good PO intake. Denies focal weakness, changes in vision, headaches.     She has h/o BPPV that resolved with Dewitt-halpike. During my exam, had episode of dizziness when moving head.    Past Medical History:   Diagnosis Date    Arthritis     lumbar    Blood transfusion     Chronic kidney disease, stage III (moderate) 8/2/2017    Degenerative disc disease     lumbar    Insulinoma     Pancreatic disease     Urinary incontinence 12/7/2015     Past Surgical History:   Procedure Laterality Date    ADENOIDECTOMY      APPENDECTOMY      CATARACT EXTRACTION, BILATERAL      " EYE SURGERY      HYSTERECTOMY      SAMM/BSO    PANCREAS SURGERY      Insulanoma    REFRACTIVE SURGERY Bilateral     TONSILLECTOMY         Has the patient been evaluated by SLP for swallowing? : Yes  Keep patient NPO?: No   General Precautions: Standard, fall          Social Hx: Patient lives with spouse.    Prior diet: regular solids/thin liquids.    Subjective:  Pt awake, alert, sitting in bedside chair talking on phone. No anomia or dysarthria noted.    Pain/Comfort  Pain Rating 1: 0/10    Objective:   Patient found with: peripheral IV, telemetry    Oral Musculature Evaluation  Oral Musculature: WNL  Dentition: present and adequate  Mucosal Quality: good  Mandibular Strength and Mobility: WNL  Oral Labial Strength and Mobility: WNL  Lingual Strength and Mobility: WNL  Velar Elevation: WNL  Buccal Strength and Mobility: WNL     Bedside Swallow Eval:  No overt s/s of aspiration or dysphagia noted with any consistencies. Pt is at baseline.    FIM:  Social Interaction: 7 Complete Elliott--The patient interacts appropriately with staff, other patients, and family members (e.g., controls temper, accepts criticism, is aware that words and actions have an impact on others), and does not require medication for   Problem Solvin Complete Elliott--The patient consistently recognizes problems when present, makes appropriate decisions, initiates and carries out a sequence of steps to solve complex problems until the task is completed, and self-corrects if errors are made.   Comprehension: 7 Complete Elliott--The patient understand complex or abstract directions and conversation, and understand either spoken or written language (not necessarily English).   Expression: 7 Complete Elliott--The patient expresses complex or abstract ideas clearly and fluently (not necessarily in English).   Memory: 7 Complete Elliott--The patient recognizes people frequently encountered, remebers daily routines, and  executes requests of others without need for repetition.    Assessment:  Cheryl Ghosh is a 82 y.o. female with a medical diagnosis of TIA (transient ischemic attack) and presents with no swallowing/speech/language/cognitive deficits. Pt is at baseline and is safe for regular solids/thin liquids. No additional ST services warranted at this time.    Do you have any cultural, spiritual, Sabianism conflicts, given your current situation?: n/a     Discharge recommendations: Discharge Facility/Level Of Care Needs: home     Goals:    SLP Goals     Not on file          Multidisciplinary Problems (Resolved)        Problem: SLP Goal    Goal Priority Disciplines Outcome   SLP Goal   (Resolved)     SLP Outcome(s) achieved   Description:  SLP Short Term Goals:  1. Patient will successfully participate in clinical swallow evaluation and tolerate po trials with no overt s/s of aspiration. --MET 11/25                     Plan:   Patient to be seen     Plan of Care expires:    Plan of Care reviewed with: patient  SLP Follow-up?: No         SLP G-Codes  Functional Assessment Tool Used: NOMS  Score: 7  Functional Limitations: Swallowing  Swallow Current Status ():   Swallow Goal Status ():   Swallow Discharge Status (): AMAURY Thompson CCC-SLP  11/25/2017

## 2017-11-25 NOTE — DISCHARGE SUMMARY
"[facility] Discharge Summary    Primary Team: [facility] - Team A  Attending Physician: Shira Mendoza MD  Resident: Jennie Daniels  Intern: Bertin Crawford    Date of Admit: 11/24/2017  Date of Discharge: 11/25/2017    Discharge to: Home  Condition: stable    Discharge Diagnoses     Patient Active Problem List   Diagnosis    Facet arthritis of lumbar region    Lumbosacral spondylosis without myelopathy    Trochanteric bursitis of right hip    Intercostal neuralgia    OA (osteoarthritis) of knee    Osteopenia    Bilateral lumbar radiculopathy    Urinary incontinence    Female bladder prolapse    Neuropathy    Aortic atherosclerosis    Arthritis of facet joints at multiple vertebral levels    Hyperlipidemia    Chronic kidney disease, stage III (moderate)    TIA (transient ischemic attack)    Hypertensive urgency    Chronic diastolic heart failure       Consultants and Procedures     Consultants:  neurology    Procedures:   none    Brief History of Present Illness      Cheryl Ghosh is a 82 y.o. female who has past medical history of HLD, BPPV, CKD3A, insulinoma s/p removal.     She was in her USOH, which is independent with all ADLs, until 9 day prior to admission when she began having nonbloody N/V. It lasted for 1 day and was associated with generalized aches and fatigue; denies dairrhea. Her fatigue and malaise persisted x 2 days. 7 days ago, she had an episode upon standing where she slurred her words, felt like her legs were "wobbly", and she "counldn't control [her] head" that lasted minutes. She had another episode 3 days PTA of weakness and dizziness that she describes as lightheadedness and different from her vertigo.  1 day PTA she had episode of L arm numbness lasting seconds. She states that the episodes of dizziness occur when going from sitting to standing. In addition to the episodes, she feels "fuzzy" and not quite like her self since 9 day PTA. She has had good PO intake. Denies " focal weakness, changes in vision, headaches.     She has h/o BPPV that resolved with Teofilo-halpike. During my exam, had episode of dizziness when moving head.    Hospital Course By Problem with Pertinent Findings     TIA  -multiple episodes of generalized weakness, dizziness, slurred speech, left arm numbness  on admit, no neurologic complaints  neuro exam without focal findings  /90s, orthostatics negative  ABCD 4  -Differential for episodic dizziness includes TIA, vertigo, orthostasis  -CT head without infarct  MRI brain/MRA head&neck--no acute infarct. 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 Possible stenosis of left petrous ICA and mid basilar and b/l posterior cerebral arterieis-suggest CTA  CTA head with 50% stenosis of mid basilar artery and nonsignificant stenosis of cavernous ICA  -Started high intensity statin, ASA, plavix  Echo EF 60%, grade 1 diastolic d/f  -PT/OT rec-home, Passed bedside swallow  Q4h neuro checks  no additional episodes while inpatient  -Discharge with atorvastatin 40, ASA 81, and plavix for 90 days per neuro  F/u with Dr. Kapadia  carotid doppler as an outpatient     Hypertensive urgency  -On admit, /90  No signs of end organ damage  EKG without ischemic changes  -Permissive HTN, goal 150-170  PRN labetalol for SBP > 180  -Started on and discharge with HCTZ-lisinopril 12.5-10     HLD  -Lipid panel-cholesterol 160, HDL 37, LDL 92, Triglycerides 155  -Started high intensity statin     CKD 3A  -On admit, Cr 1, GFR 53--at baseline  -stable     Normocytic anemia  -H/h 11.6/35, MCV 96  anemia w/u consistent with chronic disease (Fe 54, TIBC 281, ferritin 97, folate 11, b12 654)     Mixed stress/urge incontinence  -No acute issues  follows with Dr. Wiggins     Osteopenia  -DEXA 2015, T-score -2  continue calcium supplementation  no acute issues     Chronic low back pain  -No acute issues  follows with Dr. Phillips     HCM  -A1C 5.2    Discharge  Medications        Medication List      START taking these medications    aspirin 81 MG Chew  Take 1 tablet (81 mg total) by mouth once daily.  Start taking on:  11/26/2017     atorvastatin 40 MG tablet  Commonly known as:  LIPITOR  Take 1 tablet (40 mg total) by mouth once daily.  Start taking on:  11/26/2017     clopidogrel 75 mg tablet  Commonly known as:  PLAVIX  Take 1 tablet (75 mg total) by mouth once daily.  Start taking on:  11/26/2017     lisinopril-hydrochlorothiazide 10-12.5 mg per tablet  Commonly known as:  PRINZIDE,ZESTORETIC  Take 1 tablet by mouth once daily.        CONTINUE taking these medications    b complex vitamins tablet     calcium carbonate 600 mg calcium (1,500 mg) Tab  Commonly known as:  OS-THOMAS     estradiol 0.01 % (0.1 mg/gram) vaginal cream  Commonly known as:  ESTRACE  Place 1 g vaginally once daily. Use 1 gram of estrogen cream in vagina nightly x 2 weeks, then twice a week thereafter. .     gabapentin 300 MG capsule  Commonly known as:  NEURONTIN  Take 1 capsule (300 mg total) by mouth every evening.     meloxicam 15 MG tablet  Commonly known as:  MOBIC  Take 1 tablet (15 mg total) by mouth daily as needed for Pain.     VITAMIN B-6 100 MG Tab  Generic drug:  pyridoxine (vitamin B6)     VITAMIN D-3 ORAL           Where to Get Your Medications      These medications [id number] [place] [facility] [address] [facility], [address]  [address] [address], [facility] [address]    Phone:  [phone]   · aspirin 81 MG Chew  · clopidogrel 75 mg tablet     You can get these medications from any pharmacy    Bring a paper prescription for each of these medications  · atorvastatin 40 MG tablet  · lisinopril-hydrochlorothiazide 10-12.5 mg per tablet         Discharge Information:   Diet:  cardiac    Physical Activity:  As tolerated    Instructions:  1. Take all medications as prescribed  2. Keep all follow-up appointments  3. Return to the hospital or call your primary care  physicians if any worsening symptoms such as weakness, dizziness occur.    Follow-Up Appointments:  PCP, Ami, 1 week  Neurology, Dr. Kapadia, 1 month    Bertin Crawford  [facility] Internal [facility], [facility]

## 2017-11-25 NOTE — PLAN OF CARE
Patient noted to have recent vital signs of pulse 35 and /67 at 1950. Went to see patient. Sitting up in bed in no acute distress. Pulse manually checked and 60 bpm. Manually checked BP and 157/60. Patient noted to have different pulsations alternating with each beat. Looked at her telemetry monitor and patient in bigeminy pattern with HR in 60s-70s. As patient is asymptomatic with no complaints, will monitor.     Goal BP 150s-180s. Holding off on initiating BP meds. Continue IVF at 125ml/hr.     Hoda Daniels  [facility] HO-3  [facility] - Pediatrics

## 2017-11-25 NOTE — DISCHARGE INSTRUCTIONS
Hydrochlorothiazide, HCTZ capsules or tablets  What is this medicine?  HYDROCHLOROTHIAZIDE (brandt droe klor oh THYE a zide) is a diuretic. It increases the amount of urine passed, which causes the body to lose salt and water. This medicine is used to treat high blood pressure. It is also reduces the swelling and water retention caused by various medical conditions, such as heart, liver, or kidney disease.  How should I use this medicine?  Take this medicine by mouth with a glass of water. Follow the directions on the prescription label. Take your medicine at regular intervals. Remember that you will need to pass urine frequently after taking this medicine. Do not take your doses at a time of day that will cause you problems. Do not stop taking your medicine unless your doctor tells you to.  Talk to your pediatrician regarding the use of this medicine in children. Special care may be needed.  What side effects may I notice from receiving this medicine?  Side effects that you should report to your doctor or health care professional as soon as possible:  · allergic reactions such as skin rash or itching, hives, swelling of the lips, mouth, tongue, or throat  · changes in vision  · chest pain  · eye pain  · fast or irregular heartbeat  · feeling faint or lightheaded, falls  · gout attack  · muscle pain or cramps  · pain or difficulty when passing urine  · pain, tingling, numbness in the hands or feet  · redness, blistering, peeling or loosening of the skin, including inside the mouth  · unusually weak or tired  Side effects that usually do not require medical attention (report to your doctor or health care professional if they continue or are bothersome):  · change in sex drive or performance  · dry mouth  · headache  · stomach upset  What may interact with this medicine?  · cholestyramine  · colestipol  · digoxin  · dofetilide  · lithium  · medicines for blood pressure  · medicines for diabetes  · medicines that relax  muscles for surgery  · other diuretics  · steroid medicines like prednisone or cortisone  What if I miss a dose?  If you miss a dose, take it as soon as you can. If it is almost time for your next dose, take only that dose. Do not take double or extra doses.  Where should I keep my medicine?  Keep out of the reach of children.  Store at room temperature between 15 and 30 degrees C (59 and 86 degrees F). Do not freeze. Protect from light and moisture. Keep container closed tightly. Throw away any unused medicine after the expiration date.  What should I tell my health care provider before I take this medicine?  They need to know if you have any of these conditions:  · diabetes  · gout  · immune system problems, like lupus  · kidney disease or kidney stones  · liver disease  · pancreatitis  · small amount of urine or difficulty passing urine  · an unusual or allergic reaction to hydrochlorothiazide, sulfa drugs, other medicines, foods, dyes, or preservatives  · pregnant or trying to get pregnant  · breast-feeding  What should I watch for while using this medicine?  Visit your doctor or health care professional for regular checks on your progress. Check your blood pressure as directed. Ask your doctor or health care professional what your blood pressure should be and when you should contact him or her.  You may need to be on a special diet while taking this medicine. Ask your doctor.  Check with your doctor or health care professional if you get an attack of severe diarrhea, nausea and vomiting, or if you sweat a lot. The loss of too much body fluid can make it dangerous for you to take this medicine.  You may get drowsy or dizzy. Do not drive, use machinery, or do anything that needs mental alertness until you know how this medicine affects you. Do not stand or sit up quickly, especially if you are an older patient. This reduces the risk of dizzy or fainting spells. Alcohol may interfere with the effect of this  medicine. Avoid alcoholic drinks.  This medicine may affect your blood sugar level. If you have diabetes, check with your doctor or health care professional before changing the dose of your diabetic medicine.  This medicine can make you more sensitive to the sun. Keep out of the sun. If you cannot avoid being in the sun, wear protective clothing and use sunscreen. Do not use sun lamps or tanning beds/booths.  NOTE:This sheet is a summary. It may not cover all possible information. If you have questions about this medicine, talk to your doctor, pharmacist, or health care provider. Copyright© 2017 [employer]        Lisinopril tablets  What is this medicine?  LISINOPRIL (lyse IN oh pril) is an ACE inhibitor. This medicine is used to treat high blood pressure and heart failure. It is also used to protect the heart immediately after a heart attack.  How should I use this medicine?  Take this medicine by mouth with a glass of water. Follow the directions on your prescription label. You may take this medicine with or without food. If it upsets your stomach, take it with food. Take your medicine at regular intervals. Do not take it more often than directed. Do not stop taking except on your doctor's advice.  Talk to your pediatrician regarding the use of this medicine in children. Special care may be needed. While this drug may be prescribed for children as young as 6 years of age for selected conditions, precautions do apply.  What side effects may I notice from receiving this medicine?  Side effects that you should report to your doctor or health care professional as soon as possible:  · allergic reactions like skin rash, itching or hives, swelling of the hands, feet, face, lips, throat, or tongue  · breathing problems  · signs and symptoms of kidney injury like trouble passing urine or change in the amount of urine  · signs and symptoms of increased potassium like muscle weakness; chest pain; or fast, irregular  heartbeat  · signs and symptoms of liver injury like dark yellow or brown urine; general ill feeling or flu-like symptoms; light-colored stools; loss of appetite; nausea; right upper belly pain; unusually weak or tired; yellowing of the eyes or skin  · signs and symptoms of low blood pressure like dizziness; feeling faint or lightheaded, falls; unusually weak or tired  · stomach pain with or without nausea and vomiting  Side effects that usually do not require medical attention (report to your doctor or health care professional if they continue or are bothersome):  · changes in taste  · cough  · dizziness  · fever  · headache  · sensitivity to light  What may interact with this medicine?  Do not take this medicine with any of the following medications:  · hymenoptera venomThis medicines may also interact with the following medications:  · aliskiren  · angiotensin receptor blockers, like losartan or valsartan  · certain medicines for diabetes  · diuretics  · everolimus  · gold compounds  · lithium  · NSAIDs, medicines for pain and inflammation, like ibuprofen or naproxen  · potassium salts or supplements  · salt substitutes  · sirolimus  · temsirolimus  What if I miss a dose?  If you miss a dose, take it as soon as you can. If it is almost time for your next dose, take only that dose. Do not take double or extra doses.  Where should I keep my medicine?  Keep out of the reach of children.  Store at room temperature between 15 and 30 degrees C (59 and 86 degrees F). Protect from moisture. Keep container tightly closed. Throw away any unused medicine after the expiration date.  What should I tell my health care provider before I take this medicine?  They need to know if you have any of these conditions:  · diabetes  · heart or blood vessel disease  · kidney disease  · low blood pressure  · previous swelling of the tongue, face, or lips with difficulty breathing, difficulty swallowing, hoarseness, or tightening of the  throat  · an unusual or allergic reaction to lisinopril, other ACE inhibitors, insect venom, foods, dyes, or preservatives  · pregnant or trying to get pregnant  · breast-feeding  What should I watch for while using this medicine?  Visit your doctor or health care professional for regular check ups. Check your blood pressure as directed. Ask your doctor what your blood pressure should be, and when you should contact him or her.  Do not treat yourself for coughs, colds, or pain while you are using this medicine without asking your doctor or health care professional for advice. Some ingredients may increase your blood pressure.  Women should inform their doctor if they wish to become pregnant or think they might be pregnant. There is a potential for serious side effects to an unborn child. Talk to your health care professional or pharmacist for more information.  Check with your doctor or health care professional if you get an attack of severe diarrhea, nausea and vomiting, or if you sweat a lot. The loss of too much body fluid can make it dangerous for you to take this medicine.  You may get drowsy or dizzy. Do not drive, use machinery, or do anything that needs mental alertness until you know how this drug affects you. Do not stand or sit up quickly, especially if you are an older patient. This reduces the risk of dizzy or fainting spells. Alcohol can make you more drowsy and dizzy. Avoid alcoholic drinks.  Avoid salt substitutes unless you are told otherwise by your doctor or health care professional.  NOTE:This sheet is a summary. It may not cover all possible information. If you have questions about this medicine, talk to your doctor, pharmacist, or health care provider. Copyright© 2017 [employer]

## 2017-11-25 NOTE — PROGRESS NOTES
"[facility] IM Resident Progress Note    Subjective:    She has no complaints today. Feels back at baseline. No additional episodes of dizziness/weakness. No focal weakness/numbness.    Objective:   Last 24 Hour Vital Signs:  BP  Min: 120/67  Max: 213/93  Temp  Av.9 °F (36.6 °C)  Min: 96.5 °F (35.8 °C)  Max: 100.2 °F (37.9 °C)  Pulse  Av.7  Min: 34  Max: 74  Resp  Av.7  Min: 18  Max: 20  SpO2  Av.5 %  Min: 96 %  Max: 99 %  Height  Av' 4" (162.6 cm)  Min: 5' 4" (162.6 cm)  Max: 5' 4" (162.6 cm)  Weight  Av.7 kg (129 lb 7.9 oz)  Min: 57.6 kg (126 lb 15.8 oz)  Max: 59.9 kg (132 lb)  I/O last 3 completed shifts:  In: 375 [P.O.:375]  Out: -     Physical Examination:  General:          Alert and awake in NAD  Head:               Normocephalic and atraumatic  Eyes:               PERRL; EOMi with anicteric sclera and clear conjunctivae  Mouth:             Oropharynx clear and without exudate; moist mucous membranes  Cardio:             Regular rate and rhythm with normal S1 and S2; no murmurs or rubs; JVP 6; no carotid bruits  Resp:               CTAB and unlabored; no wheezes, crackles or rhonchi  Abdom:            Soft, NTND with normoactive bowel sounds  Extrem:            Trace edema to mid shins; WWP with no clubbing, cyanosis  Skin:                No rashes, lesions, or color changes  Pulses:            2+ and symmetric distally  Neuro:             AAOx3; CN II-XII intact, strength 5/5 and sensation intact to light touch in upper and lower extremities, reflexes 3/4 knee jerk and bicep, romberg negative, no dysmetria, PERRLA    Laboratory:  Laboratory Data Reviewed: yes  Pertinent Findings:    Recent Labs  Lab 17  1010 17  0547   WBC 7.66 7.68   HGB 12.7 11.6*   HCT 37.9 35.3*    159   MCV 92 93   RDW 12.7 13.1    143   K 4.1 4.5    112*   CO2 25 25   BUN 21 17   CREATININE 1.0 0.9   GLU 96 92   PROT 6.8  --    ALBUMIN 3.4*  --    BILITOT 0.3  --    AST 21  --  "   ALKPHOS 74  --    ALT 17  --      A1C 5.2  TSH 1.2    Microbiology Data Reviewed: yes  Pertinent Findings:  none    Other Results:  EKG (my interpretation):   Sinus bradycardia--rate 59    Radiology Data Reviewed: yes  Pertinent Findings:  CT head, noncon: no hemorrhage  MRI brain/ MRA head and neck: no acute infarct. 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 Possible stenosis of left petrous ICA and mid basilar and b/l posterior cerebral arterieis-suggest CTA  CTA head-pending    Current Medications:     Infusions:        Scheduled:   aspirin  81 mg Oral Daily    atorvastatin  40 mg Oral Daily    calcium-vitamin D3  2 tablet Oral BID WM    clopidogrel  75 mg Oral Daily    enoxparin  40 mg Subcutaneous Daily    pyridoxine (vitamin B6)  100 mg Oral Daily        PRN:  sodium chloride 0.9%    Antibiotics and Day Number of Therapy:  none  Assessment:     Cheryl Ghosh is a 82 y.o.female with  Patient Active Problem List    Diagnosis Date Noted    TIA (transient ischemic attack) 11/24/2017    Hypertensive urgency 11/24/2017    Hyperlipidemia 08/02/2017    Chronic kidney disease, stage III (moderate) 08/02/2017    Neuropathy 12/02/2016    Female bladder prolapse 07/22/2016    Urinary incontinence 12/07/2015    Bilateral lumbar radiculopathy 08/13/2015    Osteopenia 02/26/2015    OA (osteoarthritis) of knee 01/05/2015    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    Aortic atherosclerosis 04/24/2014    Trochanteric bursitis of right hip 01/27/2014    Lumbosacral spondylosis without myelopathy 11/06/2013    Facet arthritis of lumbar region 10/02/2012    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 Possible stenosis of left petrous ICA and mid basilar and b/l posterior cerebral arterieis-suggest CTA  CTA head pending  -Started high intensity statin, ASA, plavix  Echo EF 60%, grade 1 diastolic d/f  -PT/OT rec-home, Passed bedside swallow  Q4h neuro checks  no additional episodes while inpatient     Hypertensive urgency  -On admit, /90  No signs of end organ damage  EKG without ischemic changes  -Permissive HTN, goal 150-170  PRN labetalol for SBP > 180  will start PO regimen if necessary or on discharge     HLD  -Lipid panel-cholesterol 160, HDL 37, LDL 92, Triglycerides 155  -Started high intensity statin     CKD 3A  -On admit, Cr 1, GFR 35--at baseline  -will monitor    Normocytic anemia  -H/h 11.6/35, MCV 96  anemia w/u pending     Mixed stress/urge incontinence  -No acute issues  follows with Dr. Wiggins     Osteopenia  -DEXA 2015, T-score -2  continue calcium supplementation  no acute issues     Chronic low back pain  -No acute issues  follows with Dr. Phillips    HCM  -A1C 5.2    Bertin Crawford  [facility] Internal Medicine HO-I  [facility] IM Service Team A    [facility] Medicine Hospitalist Pager numbers:   [facility] Hospitalist Medicine Team A (Kelly/Hamzah): [phone]  [facility] Hospitalist Medicine Team B (Karan/Laurent):  [phone]

## 2017-11-27 ENCOUNTER — TELEPHONE (OUTPATIENT)
Dept: FAMILY MEDICINE | Facility: CLINIC | Age: 82
End: 2017-11-27

## 2017-11-27 NOTE — TELEPHONE ENCOUNTER
----- Message from Clara Roy sent at 11/27/2017  9:02 AM CST -----  Contact: [phone]  Patient states she would like to see  this week for a hospital f/u

## 2017-11-29 ENCOUNTER — NURSE TRIAGE (OUTPATIENT)
Dept: ADMINISTRATIVE | Facility: CLINIC | Age: 82
End: 2017-11-29

## 2017-11-29 NOTE — TELEPHONE ENCOUNTER
Reason for Disposition   Fall in systolic BP > 20 mm Hg from normal and feeling dizzy, lightheaded, or weak    Protocols used: ST LOW BLOOD PRESSURE-A-OH    Ms. Ghosh states her blood pressure is low. She states her readings are 112/62, then 120/68 with a pulse of 67. She states she is dizzy with weakness. Patient advised to go to ED, patient refused. Ms. Ghosh is requesting to speak with Dr. Mueller, she thinks her medication is too strong.

## 2017-11-29 NOTE — TELEPHONE ENCOUNTER
Spoke to pt and states that she took her BP at 1:30pm and it was 155/65. Pt was instructed to go to the ER. Pt stated that she will go to be evaluated and treated.

## 2017-11-30 ENCOUNTER — OFFICE VISIT (OUTPATIENT)
Dept: NEUROLOGY | Facility: CLINIC | Age: 82
End: 2017-11-30
Payer: MEDICARE

## 2017-11-30 VITALS
DIASTOLIC BLOOD PRESSURE: 70 MMHG | HEIGHT: 64 IN | WEIGHT: 130.06 LBS | SYSTOLIC BLOOD PRESSURE: 120 MMHG | HEART RATE: 65 BPM | BODY MASS INDEX: 22.2 KG/M2

## 2017-11-30 DIAGNOSIS — G45.0 VERTEBROBASILAR ARTERY SYNDROME: ICD-10-CM

## 2017-11-30 PROCEDURE — 99204 OFFICE O/P NEW MOD 45 MIN: CPT | Mod: S$GLB,,, | Performed by: PSYCHIATRY & NEUROLOGY

## 2017-11-30 PROCEDURE — 99999 PR PBB SHADOW E&M-EST. PATIENT-LVL III: CPT | Mod: PBBFAC,,, | Performed by: PSYCHIATRY & NEUROLOGY

## 2017-11-30 NOTE — PROGRESS NOTES
Subjective:       Patient ID: Cheryl Ghosh is a 82 y.o. female.    Chief Complaint:  Transient Ischemic Attack      History of Present Illness  HPI  Stroke Follow-up  She presents for follow-up of TIA. Event occurred 7 days ago. She has residual symptoms of None.During episode, she developed slurred speech, BLE with lightheadedness, feeling of generalized weakness and malaise on Thanksgiving. Has stomach flu 1 week before for a few day and felt unwell for the week leading up to hospital stay.. Ataxia of gait and  She denies balance disturbance, inability to speak, slurred speech. Overall she feels the condition is improved. Stroke risk factors include hyperlipidemia and hypertension. She also complains of none. She denies chest pain, palpitations, seizures and shortness of breath.         Review of SystemsReview of Systems   Constitutional: Negative.    Neurological: Positive for light-headedness.   All other systems reviewed and are negative.      Objective:      Neurologic Exam     Mental Status   Oriented to person, place, and time.   Oriented to person.   Oriented to place.   Oriented to time. Oriented to year, month, date, day and season.   Registration: recalls 3 of 3 objects. Recall at 5 minutes: recalls 2 of 3 objects. Follows 3 step commands.   Attention: normal. Concentration: normal.   Speech: speech is normal   Level of consciousness: alert  Knowledge: good. Able to perform simple calculations.   Able to name object. Able to read. Able to repeat. Able to write. Normal comprehension.     Cranial Nerves     CN III, IV, VI   Pupils are equal, round, and reactive to light.  Extraocular motions are normal.     Motor Exam   Muscle bulk: normal  Overall muscle tone: normal    Strength   Strength 5/5 except as noted.     Sensory Exam   Light touch normal.   Vibration normal.   Proprioception normal.   Pinprick normal.     Gait, Coordination, and Reflexes     Gait  Gait: normal    Coordination   Romberg:  negative  Finger to nose coordination: normal  Heel to shin coordination: normal  Tandem walking coordination: normal    Tremor   Resting tremor: absent  Intention tremor: absent  Action tremor: absent    Reflexes   Reflexes 2+ except as noted.   Right plantar: normal  Left plantar: normal  Right Shipman: absent  Left Shipman: absent  Right ankle clonus: absent  Left ankle clonus: absent  Right pendular knee jerk: absent  Left pendular knee jerk: absent      Physical Exam   Constitutional: She is oriented to person, place, and time. She appears well-developed.   HENT:   Head: Normocephalic and atraumatic.   Eyes: EOM are normal. Pupils are equal, round, and reactive to light.   Neck: Normal range of motion. Neck supple.   Cardiovascular: Normal rate and normal heart sounds.    Pulmonary/Chest: Effort normal and breath sounds normal.   Musculoskeletal: Normal range of motion.   Neurological: She is alert and oriented to person, place, and time. She has a normal Finger-Nose-Finger Test, a normal Heel to Shin Test, a normal Romberg Test and a normal Tandem Gait Test. Gait normal.   Skin: Skin is warm and dry.   Psychiatric: She has a normal mood and affect. Her speech is normal and behavior is normal. Judgment and thought content normal.   Vitals reviewed.        Assessment:     Problem List Items Addressed This Visit        Neuro    TIA (transient ischemic attack)     Patient is doing well. Does not need carotid ultrasound. Continue ASA and Plavix. In 1 month, d/c Plavix.  RTC 6 months.                 Plan:

## 2017-11-30 NOTE — ASSESSMENT & PLAN NOTE
Patient is doing well. Does not need carotid ultrasound. Continue ASA and Plavix. In 1 month, d/c Plavix.  RTC 6 months.

## 2017-12-05 ENCOUNTER — TELEPHONE (OUTPATIENT)
Dept: FAMILY MEDICINE | Facility: CLINIC | Age: 82
End: 2017-12-05

## 2017-12-05 NOTE — TELEPHONE ENCOUNTER
----- Message from Flower Hayes sent at 12/5/2017  8:33 AM CST -----  No. [phone]    Patient returned your call.    Patient did not go to the ER.  She will see Dr. Mueller on 12/8/17.

## 2017-12-05 NOTE — TELEPHONE ENCOUNTER
Spoke to pt and states that she did not see the necessity in her going to the ER. Pt will see Dr. Mueller at her scheduled appt time on 12/8.

## 2017-12-08 ENCOUNTER — OFFICE VISIT (OUTPATIENT)
Dept: FAMILY MEDICINE | Facility: CLINIC | Age: 82
End: 2017-12-08
Attending: FAMILY MEDICINE
Payer: MEDICARE

## 2017-12-08 VITALS
SYSTOLIC BLOOD PRESSURE: 118 MMHG | DIASTOLIC BLOOD PRESSURE: 75 MMHG | WEIGHT: 130.94 LBS | OXYGEN SATURATION: 99 % | HEART RATE: 62 BPM | BODY MASS INDEX: 22.48 KG/M2

## 2017-12-08 DIAGNOSIS — N39.42 URINARY INCONTINENCE WITHOUT SENSORY AWARENESS: ICD-10-CM

## 2017-12-08 DIAGNOSIS — N18.30 CHRONIC KIDNEY DISEASE, STAGE III (MODERATE): ICD-10-CM

## 2017-12-08 DIAGNOSIS — E78.5 HYPERLIPIDEMIA, UNSPECIFIED HYPERLIPIDEMIA TYPE: ICD-10-CM

## 2017-12-08 DIAGNOSIS — M85.80 OSTEOPENIA, UNSPECIFIED LOCATION: ICD-10-CM

## 2017-12-08 DIAGNOSIS — G45.0 VERTEBROBASILAR ARTERY SYNDROME: ICD-10-CM

## 2017-12-08 DIAGNOSIS — I70.0 AORTIC ATHEROSCLEROSIS: Primary | ICD-10-CM

## 2017-12-08 DIAGNOSIS — I50.32 CHRONIC DIASTOLIC HEART FAILURE: ICD-10-CM

## 2017-12-08 PROBLEM — I16.0 HYPERTENSIVE URGENCY: Status: RESOLVED | Noted: 2017-11-24 | Resolved: 2017-12-08

## 2017-12-08 PROCEDURE — 99999 PR PBB SHADOW E&M-EST. PATIENT-LVL III: CPT | Mod: PBBFAC,,, | Performed by: FAMILY MEDICINE

## 2017-12-08 PROCEDURE — 99214 OFFICE O/P EST MOD 30 MIN: CPT | Mod: S$GLB,,, | Performed by: FAMILY MEDICINE

## 2017-12-08 PROCEDURE — 99499 UNLISTED E&M SERVICE: CPT | Mod: S$GLB,,, | Performed by: FAMILY MEDICINE

## 2017-12-08 NOTE — PATIENT INSTRUCTIONS
1. Decrease Lipitor to half tablet daily    2. Decrease lisinopril to half tablet as long as BP stays above 100 systolic. If systolic > 140 - then take the whole tablet. If BP below 100 systolic - DO NOT take BP medication

## 2017-12-08 NOTE — PROGRESS NOTES
Subjective:       Patient ID: Cheryl Ghosh is a 82 y.o. female.    Chief Complaint: Follow-up (hospital )    82 yr old pleasant white female with OA, osteopenia hip, hyperlipidemia, chronic back pain, BPPV, presents today for post hospital DC follow up and issues with BP medication. It s dropping very low and she feels lightheaded and tired and dry all the time. She also have mild dry cough. She had TIA before thanksgiving and she was admitted in hospital. MRI did not show any acute events. She was DC on plavix and lisinopril-HCTZ. Her BP is fluctuating since then. She saw neurology who recommend statin and follow up in 6 months.     Osteopenia hip - stable - had dexa in 12/15 and she is talking fosamax since many years and stopped last year- she is due for dexa in 12/17 - no side effects    HLD - controlled but improving - LDLCALC                  92.0                11/24/2017              - she was asked to start statin but due to her fall and issues with her ribs and back, she was never able to start it.    Back pain - she follows Dr. Phillips for injection - c/o neuropathic pain in legs - was on neurontin in the past and she started taking it and working    HISTORY as below - reviewed    Health maintenance  -labs UTD  -mammo UTD  -colon screen UTD  -vaccines UTD      Hyperlipidemia   This is a chronic problem. The current episode started more than 1 year ago. The problem is uncontrolled. Recent lipid tests were reviewed and are high. She has no history of chronic renal disease, diabetes, hypothyroidism, liver disease, obesity or nephrotic syndrome. There are no known factors aggravating her hyperlipidemia. Pertinent negatives include no chest pain, focal sensory loss, leg pain, myalgias or shortness of breath. Current antihyperlipidemic treatment includes diet change. The current treatment provides mild improvement of lipids. There are no compliance problems.  Risk factors for coronary artery disease include  dyslipidemia.     Review of Systems   Constitutional: Negative.  Negative for activity change, diaphoresis and unexpected weight change.   HENT: Negative.  Negative for congestion, ear discharge, hearing loss, rhinorrhea, sore throat and voice change.    Eyes: Negative.  Negative for pain, discharge and visual disturbance.   Respiratory: Negative.  Negative for chest tightness, shortness of breath and wheezing.    Cardiovascular: Negative.  Negative for chest pain.   Gastrointestinal: Negative.  Negative for abdominal distention, anal bleeding, constipation and nausea.   Endocrine: Negative.  Negative for cold intolerance, polydipsia and polyuria.   Genitourinary: Negative.  Negative for decreased urine volume, difficulty urinating, dysuria, frequency, menstrual problem and vaginal pain.   Musculoskeletal: Negative.  Negative for arthralgias, gait problem and myalgias.   Skin: Negative.  Negative for color change, pallor and wound.   Allergic/Immunologic: Negative.  Negative for environmental allergies and immunocompromised state.   Neurological: Negative.  Negative for dizziness, tremors, seizures, speech difficulty and headaches.   Hematological: Negative.  Negative for adenopathy. Does not bruise/bleed easily.   Psychiatric/Behavioral: Negative.  Negative for agitation, confusion, decreased concentration, hallucinations, self-injury and suicidal ideas. The patient is not nervous/anxious.        PMH/PSH/FH/SH/MED/ALLERGY reviewed    Objective:       Vitals:    12/08/17 1057   BP: 118/75   Pulse: 62       Physical Exam   Constitutional: She is oriented to person, place, and time. She appears well-developed and well-nourished. No distress.   HENT:   Head: Normocephalic and atraumatic.   Right Ear: External ear normal.   Left Ear: External ear normal.   Nose: Nose normal.   Mouth/Throat: Oropharynx is clear and moist. No oropharyngeal exudate.   Eyes: Conjunctivae and EOM are normal. Pupils are equal, round, and  reactive to light. Right eye exhibits no discharge. Left eye exhibits no discharge. No scleral icterus.   Neck: Normal range of motion. Neck supple. No JVD present. No tracheal deviation present. No thyromegaly present.   Cardiovascular: Normal rate, regular rhythm, normal heart sounds and intact distal pulses.  Exam reveals no gallop and no friction rub.    No murmur heard.  Pulmonary/Chest: Effort normal and breath sounds normal. No stridor. She has no wheezes. She has no rales. She exhibits no tenderness.   Abdominal: Soft. Bowel sounds are normal. She exhibits no distension and no mass. There is no tenderness. There is no rebound and no guarding. No hernia.   Musculoskeletal: Normal range of motion. She exhibits no edema or tenderness.   Lymphadenopathy:     She has no cervical adenopathy.   Neurological: She is alert and oriented to person, place, and time. She has normal reflexes. She displays normal reflexes. No cranial nerve deficit. She exhibits normal muscle tone. Coordination normal.   Skin: Skin is warm and dry. No rash noted. She is not diaphoretic. No erythema. No pallor.   Psychiatric: She has a normal mood and affect. Her behavior is normal. Judgment and thought content normal.       Assessment:       1. Aortic atherosclerosis    2. Hyperlipidemia, unspecified hyperlipidemia type    3. Vertebrobasilar artery syndrome    4. Chronic diastolic heart failure    5. Chronic kidney disease, stage III (moderate)    6. Urinary incontinence without sensory awareness    7. Osteopenia, unspecified location        Plan:       Cheryl was seen today for follow-up.    Diagnoses and all orders for this visit:    Aortic atherosclerosis    Hyperlipidemia, unspecified hyperlipidemia type    Vertebrobasilar artery syndrome    Chronic diastolic heart failure    Chronic kidney disease, stage III (moderate)    Urinary incontinence without sensory awareness    Osteopenia, unspecified location        Low BP  -Hold  lisinopril    TIA  -follow neurology - DC plavix per neuro recommendations and just stay on ASA    Insomnia  -try benadryl and if that's not helping - try restoril    HLD  -diet controlled and statin    Urine incontinence  -lifestyle changes - intolerant to meds    Neuropathic pain  -restart neurontin    OA knee B/L  -mobic prn        Spent adequate time in obtaining history and explaining differentials    40 minutes spent during this visit of which greater than 50% devoted to face-face counseling and coordination of care regarding diagnosis and management plan      Return in about 3 months (around 3/8/2018), or if symptoms worsen or fail to improve.

## 2018-01-28 ENCOUNTER — PATIENT MESSAGE (OUTPATIENT)
Dept: FAMILY MEDICINE | Facility: CLINIC | Age: 83
End: 2018-01-28

## 2018-02-19 ENCOUNTER — PATIENT MESSAGE (OUTPATIENT)
Dept: FAMILY MEDICINE | Facility: CLINIC | Age: 83
End: 2018-02-19

## 2018-02-19 DIAGNOSIS — N18.30 CHRONIC KIDNEY DISEASE, STAGE III (MODERATE): ICD-10-CM

## 2018-02-19 DIAGNOSIS — E78.5 HYPERLIPIDEMIA, UNSPECIFIED HYPERLIPIDEMIA TYPE: Primary | ICD-10-CM

## 2018-02-19 DIAGNOSIS — Z12.31 ENCOUNTER FOR SCREENING MAMMOGRAM FOR BREAST CANCER: ICD-10-CM

## 2018-02-25 ENCOUNTER — PATIENT MESSAGE (OUTPATIENT)
Dept: FAMILY MEDICINE | Facility: CLINIC | Age: 83
End: 2018-02-25

## 2018-02-26 ENCOUNTER — HOSPITAL ENCOUNTER (OUTPATIENT)
Dept: RADIOLOGY | Facility: HOSPITAL | Age: 83
Discharge: HOME | End: 2018-02-26
Attending: FAMILY MEDICINE
Payer: MEDICARE

## 2018-02-26 DIAGNOSIS — Z12.31 ENCOUNTER FOR SCREENING MAMMOGRAM FOR BREAST CANCER: ICD-10-CM

## 2018-02-26 PROCEDURE — 77063 BREAST TOMOSYNTHESIS BI: CPT | Mod: 26,,, | Performed by: RADIOLOGY

## 2018-02-26 PROCEDURE — 77067 SCR MAMMO BI INCL CAD: CPT | Mod: TC

## 2018-02-26 PROCEDURE — 77067 SCR MAMMO BI INCL CAD: CPT | Mod: 26,,, | Performed by: RADIOLOGY

## 2018-02-27 ENCOUNTER — LAB VISIT (OUTPATIENT)
Dept: LAB | Facility: HOSPITAL | Age: 83
End: 2018-02-27
Attending: FAMILY MEDICINE
Payer: MEDICARE

## 2018-02-27 DIAGNOSIS — N18.30 CHRONIC KIDNEY DISEASE, STAGE III (MODERATE): ICD-10-CM

## 2018-02-27 DIAGNOSIS — E78.5 HYPERLIPIDEMIA, UNSPECIFIED HYPERLIPIDEMIA TYPE: ICD-10-CM

## 2018-02-27 LAB
ALBUMIN SERPL BCP-MCNC: 3.6 G/DL
ALP SERPL-CCNC: 65 U/L
ALT SERPL W/O P-5'-P-CCNC: 13 U/L
ANION GAP SERPL CALC-SCNC: 7 MMOL/L
AST SERPL-CCNC: 20 U/L
BASOPHILS # BLD AUTO: 0.03 K/UL
BASOPHILS NFR BLD: 0.4 %
BILIRUB SERPL-MCNC: 0.7 MG/DL
BUN SERPL-MCNC: 25 MG/DL
CALCIUM SERPL-MCNC: 9.2 MG/DL
CHLORIDE SERPL-SCNC: 108 MMOL/L
CHOLEST SERPL-MCNC: 210 MG/DL
CHOLEST/HDLC SERPL: 5.7 {RATIO}
CO2 SERPL-SCNC: 26 MMOL/L
CREAT SERPL-MCNC: 1.1 MG/DL
DIFFERENTIAL METHOD: ABNORMAL
EOSINOPHIL # BLD AUTO: 0.3 K/UL
EOSINOPHIL NFR BLD: 4 %
ERYTHROCYTE [DISTWIDTH] IN BLOOD BY AUTOMATED COUNT: 13.8 %
EST. GFR  (AFRICAN AMERICAN): 54 ML/MIN/1.73 M^2
EST. GFR  (NON AFRICAN AMERICAN): 47 ML/MIN/1.73 M^2
GLUCOSE SERPL-MCNC: 96 MG/DL
HCT VFR BLD AUTO: 35.4 %
HDLC SERPL-MCNC: 37 MG/DL
HDLC SERPL: 17.6 %
HGB BLD-MCNC: 11.6 G/DL
LDLC SERPL CALC-MCNC: 136.8 MG/DL
LYMPHOCYTES # BLD AUTO: 3 K/UL
LYMPHOCYTES NFR BLD: 42.2 %
MCH RBC QN AUTO: 30.5 PG
MCHC RBC AUTO-ENTMCNC: 32.8 G/DL
MCV RBC AUTO: 93 FL
MONOCYTES # BLD AUTO: 0.6 K/UL
MONOCYTES NFR BLD: 8.8 %
NEUTROPHILS # BLD AUTO: 3.2 K/UL
NEUTROPHILS NFR BLD: 44.6 %
NONHDLC SERPL-MCNC: 173 MG/DL
PLATELET # BLD AUTO: 194 K/UL
PMV BLD AUTO: 10 FL
POTASSIUM SERPL-SCNC: 4.4 MMOL/L
PROT SERPL-MCNC: 6.7 G/DL
RBC # BLD AUTO: 3.8 M/UL
SODIUM SERPL-SCNC: 141 MMOL/L
TRIGL SERPL-MCNC: 181 MG/DL
WBC # BLD AUTO: 7.08 K/UL

## 2018-02-27 PROCEDURE — 85025 COMPLETE CBC W/AUTO DIFF WBC: CPT

## 2018-02-27 PROCEDURE — 36415 COLL VENOUS BLD VENIPUNCTURE: CPT

## 2018-02-27 PROCEDURE — 80053 COMPREHEN METABOLIC PANEL: CPT

## 2018-02-27 PROCEDURE — 80061 LIPID PANEL: CPT

## 2018-03-05 ENCOUNTER — LAB VISIT (OUTPATIENT)
Dept: LAB | Facility: HOSPITAL | Age: 83
End: 2018-03-05
Attending: FAMILY MEDICINE
Payer: MEDICARE

## 2018-03-05 ENCOUNTER — OFFICE VISIT (OUTPATIENT)
Dept: FAMILY MEDICINE | Facility: CLINIC | Age: 83
End: 2018-03-05
Attending: FAMILY MEDICINE
Payer: MEDICARE

## 2018-03-05 VITALS
BODY MASS INDEX: 22.88 KG/M2 | OXYGEN SATURATION: 99 % | HEART RATE: 66 BPM | HEIGHT: 64 IN | SYSTOLIC BLOOD PRESSURE: 119 MMHG | TEMPERATURE: 98 F | DIASTOLIC BLOOD PRESSURE: 64 MMHG | WEIGHT: 134 LBS

## 2018-03-05 DIAGNOSIS — G45.0 VERTEBROBASILAR ARTERY SYNDROME: ICD-10-CM

## 2018-03-05 DIAGNOSIS — I50.32 CHRONIC DIASTOLIC HEART FAILURE: ICD-10-CM

## 2018-03-05 DIAGNOSIS — I70.0 AORTIC ATHEROSCLEROSIS: ICD-10-CM

## 2018-03-05 DIAGNOSIS — N18.30 CHRONIC KIDNEY DISEASE, STAGE III (MODERATE): ICD-10-CM

## 2018-03-05 DIAGNOSIS — I10 ESSENTIAL HYPERTENSION: ICD-10-CM

## 2018-03-05 DIAGNOSIS — M17.0 PRIMARY OSTEOARTHRITIS OF BOTH KNEES: ICD-10-CM

## 2018-03-05 DIAGNOSIS — M47.819 ARTHRITIS OF FACET JOINTS AT MULTIPLE VERTEBRAL LEVELS: ICD-10-CM

## 2018-03-05 DIAGNOSIS — I10 ESSENTIAL HYPERTENSION: Primary | ICD-10-CM

## 2018-03-05 DIAGNOSIS — E78.5 HYPERLIPIDEMIA, UNSPECIFIED HYPERLIPIDEMIA TYPE: ICD-10-CM

## 2018-03-05 LAB
BILIRUB UR QL STRIP: NEGATIVE
CLARITY UR: CLEAR
COLOR UR: YELLOW
CREAT UR-MCNC: 74.5 MG/DL
GLUCOSE UR QL STRIP: NEGATIVE
HGB UR QL STRIP: NEGATIVE
KETONES UR QL STRIP: NEGATIVE
LEUKOCYTE ESTERASE UR QL STRIP: NEGATIVE
NITRITE UR QL STRIP: NEGATIVE
PH UR STRIP: 6 [PH] (ref 5–8)
PROT UR QL STRIP: NEGATIVE
PROT UR-MCNC: <7 MG/DL
PROT/CREAT RATIO, UR: NORMAL
SP GR UR STRIP: 1.01 (ref 1–1.03)
URN SPEC COLLECT METH UR: NORMAL
UROBILINOGEN UR STRIP-ACNC: NEGATIVE EU/DL

## 2018-03-05 PROCEDURE — 99499 UNLISTED E&M SERVICE: CPT | Mod: S$GLB,,, | Performed by: FAMILY MEDICINE

## 2018-03-05 PROCEDURE — 82570 ASSAY OF URINE CREATININE: CPT

## 2018-03-05 PROCEDURE — 99999 PR PBB SHADOW E&M-EST. PATIENT-LVL IV: CPT | Mod: PBBFAC,,, | Performed by: FAMILY MEDICINE

## 2018-03-05 PROCEDURE — 3074F SYST BP LT 130 MM HG: CPT | Mod: S$GLB,,, | Performed by: FAMILY MEDICINE

## 2018-03-05 PROCEDURE — 3078F DIAST BP <80 MM HG: CPT | Mod: S$GLB,,, | Performed by: FAMILY MEDICINE

## 2018-03-05 PROCEDURE — 81003 URINALYSIS AUTO W/O SCOPE: CPT

## 2018-03-05 PROCEDURE — 99214 OFFICE O/P EST MOD 30 MIN: CPT | Mod: S$GLB,,, | Performed by: FAMILY MEDICINE

## 2018-03-05 RX ORDER — LOSARTAN POTASSIUM 25 MG/1
25 TABLET ORAL DAILY
Qty: 90 TABLET | Refills: 3 | Status: SHIPPED | OUTPATIENT
Start: 2018-03-05 | End: 2018-09-14

## 2018-03-05 RX ORDER — OMEGA-3-ACID ETHYL ESTERS 1 G/1
2 CAPSULE, LIQUID FILLED ORAL 2 TIMES DAILY
COMMUNITY
End: 2019-01-14

## 2018-03-05 NOTE — PROGRESS NOTES
Subjective:       Patient ID: Cheryl Ghosh is a 83 y.o. female.    Chief Complaint: Follow-up    82 yr old pleasant white female with HTN, OA, osteopenia hip, hyperlipidemia, chronic back pain, BPPV, presents today for post hospital DC follow up and issues with BP medication.     Osteopenia hip - stable - had dexa in 12/15 and she is talking fosamax since many years and stopped last year- she is due for dexa in 12/17 - no side effects      HTN - fluctuating - on lisinopril-HCTZ - c/o dry hacking cough and low BP - also low kidney functions   HLD - slightly worsening since stopped statin x 30 days           - she was asked to start statin but due to her fall and issues with her ribs and back, she was never able to start it.    Back pain - she follows Dr. Phillips for injection - c/o neuropathic pain in legs - was on neurontin in the past and she started taking it and working    HISTORY as below - reviewed    Health maintenance  -labs UTD  -mammo UTD  -colon screen UTD  -vaccines UTD      Hyperlipidemia   This is a chronic problem. The current episode started more than 1 year ago. The problem is uncontrolled. Recent lipid tests were reviewed and are high. She has no history of chronic renal disease, diabetes, hypothyroidism, liver disease, obesity or nephrotic syndrome. There are no known factors aggravating her hyperlipidemia. Pertinent negatives include no chest pain, focal sensory loss, leg pain, myalgias or shortness of breath. Current antihyperlipidemic treatment includes diet change. The current treatment provides mild improvement of lipids. There are no compliance problems.  Risk factors for coronary artery disease include dyslipidemia.     Review of Systems   Constitutional: Negative.  Negative for activity change, diaphoresis and unexpected weight change.   HENT: Negative.  Negative for congestion, ear discharge, hearing loss, rhinorrhea, sore throat and voice change.    Eyes: Negative.  Negative for pain,  discharge and visual disturbance.   Respiratory: Negative.  Negative for chest tightness, shortness of breath and wheezing.    Cardiovascular: Negative.  Negative for chest pain.   Gastrointestinal: Negative.  Negative for abdominal distention, anal bleeding, constipation and nausea.   Endocrine: Negative.  Negative for cold intolerance, polydipsia and polyuria.   Genitourinary: Negative.  Negative for decreased urine volume, difficulty urinating, dysuria, frequency, menstrual problem and vaginal pain.   Musculoskeletal: Negative.  Negative for arthralgias, gait problem and myalgias.   Skin: Negative.  Negative for color change, pallor and wound.   Allergic/Immunologic: Negative.  Negative for environmental allergies and immunocompromised state.   Neurological: Negative.  Negative for dizziness, tremors, seizures, speech difficulty and headaches.   Hematological: Negative.  Negative for adenopathy. Does not bruise/bleed easily.   Psychiatric/Behavioral: Negative.  Negative for agitation, confusion, decreased concentration, hallucinations, self-injury and suicidal ideas. The patient is not nervous/anxious.        PMH/PSH/FH/SH/MED/ALLERGY reviewed    Objective:       Vitals:    03/05/18 1514   BP: 119/64   Pulse: 66   Temp: 98.3 °F (36.8 °C)       Physical Exam   Constitutional: She is oriented to person, place, and time. She appears well-developed and well-nourished. No distress.   HENT:   Head: Normocephalic and atraumatic.   Right Ear: External ear normal.   Left Ear: External ear normal.   Nose: Nose normal.   Mouth/Throat: Oropharynx is clear and moist. No oropharyngeal exudate.   Eyes: Conjunctivae and EOM are normal. Pupils are equal, round, and reactive to light. Right eye exhibits no discharge. Left eye exhibits no discharge. No scleral icterus.   Neck: Normal range of motion. Neck supple. No JVD present. No tracheal deviation present. No thyromegaly present.   Cardiovascular: Normal rate, regular rhythm,  normal heart sounds and intact distal pulses.  Exam reveals no gallop and no friction rub.    No murmur heard.  Pulmonary/Chest: Effort normal and breath sounds normal. No stridor. She has no wheezes. She has no rales. She exhibits no tenderness.   Abdominal: Soft. Bowel sounds are normal. She exhibits no distension and no mass. There is no tenderness. There is no rebound and no guarding. No hernia.   Musculoskeletal: Normal range of motion. She exhibits no edema or tenderness.   Lymphadenopathy:     She has no cervical adenopathy.   Neurological: She is alert and oriented to person, place, and time. She has normal reflexes. She displays normal reflexes. No cranial nerve deficit. She exhibits normal muscle tone. Coordination normal.   Skin: Skin is warm and dry. No rash noted. She is not diaphoretic. No erythema. No pallor.   Psychiatric: She has a normal mood and affect. Her behavior is normal. Judgment and thought content normal.       Assessment:       1. Essential hypertension    2. Chronic kidney disease, stage III (moderate)    3. Chronic diastolic heart failure    4. 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 Aortic atherosclerosis    6. Hyperlipidemia, unspecified hyperlipidemia type    7. Primary osteoarthritis of both knees    8. Vertebrobasilar artery syndrome        Plan:       Cheryl was seen today for follow-up.    Diagnoses and all orders for this visit:    Essential hypertension  -     losartan (COZAAR) 25 MG tablet; Take 1 tablet (25 mg total) by mouth once daily.  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    Aortic atherosclerosis    Hyperlipidemia, unspecified  hyperlipidemia type  -     Comprehensive metabolic panel; Future  -     Lipid panel; Future    Primary osteoarthritis of both knees    Vertebrobasilar artery syndrome    HTN  -DC lisinopril and try Losartan 25 daily    TIA  -follow neurology - DC plavix per neuro recommendations and just stay on ASA    Insomnia  -try benadryl and if that's not helping - try restoril    HLD  -diet controlled and statin    Urine incontinence  -lifestyle changes - intolerant to meds    Neuropathic pain  -continue neurontin    OA knee B/L  -mobic prn    CKD III  -lab and urine analysis  -DC HCTZ  -adequate hydration      Spent adequate time in obtaining history and explaining differentials    40 minutes spent during this visit of which greater than 50% devoted to face-face counseling and coordination of care regarding diagnosis and management plan    Follow-up in about 3 months (around 6/5/2018), or if symptoms worsen or fail to improve.

## 2018-04-02 ENCOUNTER — PATIENT MESSAGE (OUTPATIENT)
Dept: FAMILY MEDICINE | Facility: CLINIC | Age: 83
End: 2018-04-02

## 2018-04-06 ENCOUNTER — PATIENT MESSAGE (OUTPATIENT)
Dept: FAMILY MEDICINE | Facility: CLINIC | Age: 83
End: 2018-04-06

## 2018-04-07 ENCOUNTER — TELEPHONE (OUTPATIENT)
Dept: FAMILY MEDICINE | Facility: CLINIC | Age: 83
End: 2018-04-07

## 2018-04-10 ENCOUNTER — TELEPHONE (OUTPATIENT)
Dept: FAMILY MEDICINE | Facility: CLINIC | Age: 83
End: 2018-04-10

## 2018-04-10 NOTE — TELEPHONE ENCOUNTER
----- Message from Neha Neff sent at 4/10/2018  9:43 AM CDT -----  Contact: [phone]  Patient is requesting a call back regarding her appointment . Can she come in at 1 tomorrow instead of 4:20pm?

## 2018-04-11 ENCOUNTER — OFFICE VISIT (OUTPATIENT)
Dept: FAMILY MEDICINE | Facility: CLINIC | Age: 83
End: 2018-04-11
Attending: FAMILY MEDICINE
Payer: MEDICARE

## 2018-04-11 VITALS
SYSTOLIC BLOOD PRESSURE: 118 MMHG | WEIGHT: 132.5 LBS | TEMPERATURE: 97 F | HEART RATE: 75 BPM | DIASTOLIC BLOOD PRESSURE: 64 MMHG | BODY MASS INDEX: 22.62 KG/M2 | HEIGHT: 64 IN | OXYGEN SATURATION: 92 %

## 2018-04-11 DIAGNOSIS — G47.00 INSOMNIA, UNSPECIFIED TYPE: ICD-10-CM

## 2018-04-11 DIAGNOSIS — G62.9 NEUROPATHY: ICD-10-CM

## 2018-04-11 DIAGNOSIS — N81.10 FEMALE BLADDER PROLAPSE: ICD-10-CM

## 2018-04-11 DIAGNOSIS — I70.0 AORTIC ATHEROSCLEROSIS: ICD-10-CM

## 2018-04-11 DIAGNOSIS — E78.5 HYPERLIPIDEMIA, UNSPECIFIED HYPERLIPIDEMIA TYPE: ICD-10-CM

## 2018-04-11 DIAGNOSIS — N18.30 CHRONIC KIDNEY DISEASE, STAGE III (MODERATE): ICD-10-CM

## 2018-04-11 DIAGNOSIS — M47.816 FACET ARTHRITIS OF LUMBAR REGION: ICD-10-CM

## 2018-04-11 DIAGNOSIS — M47.819 ARTHRITIS OF FACET JOINTS AT MULTIPLE VERTEBRAL LEVELS: ICD-10-CM

## 2018-04-11 DIAGNOSIS — I50.32 CHRONIC DIASTOLIC HEART FAILURE: ICD-10-CM

## 2018-04-11 DIAGNOSIS — I10 HTN (HYPERTENSION), BENIGN: Primary | ICD-10-CM

## 2018-04-11 DIAGNOSIS — G45.0 VERTEBROBASILAR ARTERY SYNDROME: ICD-10-CM

## 2018-04-11 DIAGNOSIS — M47.817 LUMBOSACRAL SPONDYLOSIS WITHOUT MYELOPATHY: ICD-10-CM

## 2018-04-11 PROCEDURE — 99499 UNLISTED E&M SERVICE: CPT | Mod: S$PBB,,, | Performed by: FAMILY MEDICINE

## 2018-04-11 PROCEDURE — 3078F DIAST BP <80 MM HG: CPT | Mod: CPTII,S$GLB,, | Performed by: FAMILY MEDICINE

## 2018-04-11 PROCEDURE — 3074F SYST BP LT 130 MM HG: CPT | Mod: CPTII,S$GLB,, | Performed by: FAMILY MEDICINE

## 2018-04-11 PROCEDURE — 99214 OFFICE O/P EST MOD 30 MIN: CPT | Mod: S$GLB,,, | Performed by: FAMILY MEDICINE

## 2018-04-11 PROCEDURE — 99999 PR PBB SHADOW E&M-EST. PATIENT-LVL IV: CPT | Mod: PBBFAC,,, | Performed by: FAMILY MEDICINE

## 2018-04-11 RX ORDER — TEMAZEPAM 7.5 MG/1
15 CAPSULE ORAL NIGHTLY PRN
Qty: 30 CAPSULE | Refills: 0 | Status: SHIPPED | OUTPATIENT
Start: 2018-04-11 | End: 2018-04-19

## 2018-04-11 NOTE — PROGRESS NOTES
Subjective:       Patient ID: Cheryl Ghsoh is a 83 y.o. female.    Chief Complaint: Hypertension    83 yr old pleasant white female with HTN, OA, osteopenia hip, hyperlipidemia, chronic back pain, BPPV, presents today for her follow up and concerns regarding BP.    Osteopenia hip - stable - had dexa in 12/15 and she is talking fosamax since many years and stopped last year- she is due for dexa in 12/17 - no side effects      HTN - fluctuating - on losartan 25 daily - some numbers in evening are high - her diet has not changed -       HLD - slightly worsening since stopped statin x 30 days           - she was asked to start statin but due to her fall and issues with her ribs and back, she was never able to start it.    Back pain - she follows Dr. Phillips for injection - c/o neuropathic pain in legs - was on neurontin in the past and she started taking it and working    HISTORY as below - reviewed    Health maintenance  -labs UTD  -mammo UTD  -colon screen UTD  -vaccines UTD      Hypertension   This is a chronic problem. The current episode started more than 1 month ago. The problem has been waxing and waning since onset. The problem is resistant. Associated symptoms include anxiety, headaches and malaise/fatigue. Pertinent negatives include no blurred vision, chest pain, neck pain, orthopnea, palpitations, peripheral edema, PND, shortness of breath or sweats. There are no associated agents to hypertension. Risk factors for coronary artery disease include dyslipidemia and family history. Past treatments include nothing. Compliance problems include exercise and medication side effects.  There is no history of chronic renal disease.   Hyperlipidemia   This is a chronic problem. The current episode started more than 1 year ago. The problem is uncontrolled. Recent lipid tests were reviewed and are high. She has no history of chronic renal disease, diabetes, hypothyroidism, liver disease, obesity or nephrotic syndrome.  There are no known factors aggravating her hyperlipidemia. Pertinent negatives include no chest pain, focal sensory loss, leg pain, myalgias or shortness of breath. Current antihyperlipidemic treatment includes diet change. The current treatment provides mild improvement of lipids. There are no compliance problems.  Risk factors for coronary artery disease include dyslipidemia.     Review of Systems   Constitutional: Positive for malaise/fatigue. Negative for activity change, diaphoresis and unexpected weight change.   HENT: Negative.  Negative for congestion, ear discharge, hearing loss, rhinorrhea, sore throat and voice change.    Eyes: Negative.  Negative for blurred vision, pain, discharge and visual disturbance.   Respiratory: Negative.  Negative for chest tightness, shortness of breath and wheezing.    Cardiovascular: Negative.  Negative for chest pain, palpitations, orthopnea and PND.   Gastrointestinal: Negative.  Negative for abdominal distention, anal bleeding, constipation and nausea.   Endocrine: Negative.  Negative for cold intolerance, polydipsia and polyuria.   Genitourinary: Negative.  Negative for decreased urine volume, difficulty urinating, dysuria, frequency, menstrual problem and vaginal pain.   Musculoskeletal: Negative.  Negative for arthralgias, gait problem, myalgias and neck pain.   Skin: Negative.  Negative for color change, pallor and wound.   Allergic/Immunologic: Negative.  Negative for environmental allergies and immunocompromised state.   Neurological: Positive for headaches. Negative for dizziness, tremors, seizures and speech difficulty.   Hematological: Negative.  Negative for adenopathy. Does not bruise/bleed easily.   Psychiatric/Behavioral: Negative.  Negative for agitation, confusion, decreased concentration, hallucinations, self-injury and suicidal ideas. The patient is not nervous/anxious.        PMH/PSH/FH/SH/MED/ALLERGY reviewed    Objective:       Vitals:    04/11/18 1300    BP: 118/64   Pulse: 75   Temp: 97.4 °F (36.3 °C)       Physical Exam   Constitutional: She is oriented to person, place, and time. She appears well-developed and well-nourished. No distress.   HENT:   Head: Normocephalic and atraumatic.   Right Ear: External ear normal.   Left Ear: External ear normal.   Nose: Nose normal.   Mouth/Throat: Oropharynx is clear and moist. No oropharyngeal exudate.   Eyes: Conjunctivae and EOM are normal. Pupils are equal, round, and reactive to light. Right eye exhibits no discharge. Left eye exhibits no discharge. No scleral icterus.   Neck: Normal range of motion. Neck supple. No JVD present. No tracheal deviation present. No thyromegaly present.   Cardiovascular: Normal rate, regular rhythm, normal heart sounds and intact distal pulses.  Exam reveals no gallop and no friction rub.    No murmur heard.  Pulmonary/Chest: Effort normal and breath sounds normal. No stridor. She has no wheezes. She has no rales. She exhibits no tenderness.   Abdominal: Soft. Bowel sounds are normal. She exhibits no distension and no mass. There is no tenderness. There is no rebound and no guarding. No hernia.   Musculoskeletal: Normal range of motion. She exhibits no edema or tenderness.   Lymphadenopathy:     She has no cervical adenopathy.   Neurological: She is alert and oriented to person, place, and time. She has normal reflexes. She displays normal reflexes. No cranial nerve deficit. She exhibits normal muscle tone. Coordination normal.   Skin: Skin is warm and dry. No rash noted. She is not diaphoretic. No erythema. No pallor.   Psychiatric: She has a normal mood and affect. Her behavior is normal. Judgment and thought content normal.       Assessment:       1. HTN (hypertension), benign    2. Hyperlipidemia, unspecified hyperlipidemia type    3. Chronic kidney disease, stage III (moderate)    4. Chronic diastolic heart failure    5. 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  Aortic atherosclerosis    7. Vertebrobasilar artery syndrome    8. Facet arthritis of lumbar region    9. Female bladder prolapse    10. Neuropathy    11. Lumbosacral spondylosis without myelopathy    12. Insomnia, unspecified type        Plan:       Cheryl was seen today for hypertension.    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      HTN  -fluctuating  -increase losartan 50 mg daily    TIA  -follow neurology - DC plavix per neuro recommendations and just stay on ASA    Insomnia  -try benadryl and if that's not helping - try restoril    HLD  -diet controlled and statin    Urine incontinence  -lifestyle changes - intolerant to meds    Neuropathic pain  -continue neurontin    OA knee B/L  -mobic prn    CKD III  -lab and urine analysis  -DC HCTZ  -adequate hydration      Spent adequate time in obtaining history and explaining differentials    40 minutes spent during this visit of which greater than 50% devoted to face-face counseling and coordination of care regarding diagnosis and management plan    Follow-up in about 2 months (around 6/11/2018), or if symptoms worsen or fail to improve.

## 2018-04-13 ENCOUNTER — PATIENT MESSAGE (OUTPATIENT)
Dept: FAMILY MEDICINE | Facility: CLINIC | Age: 83
End: 2018-04-13

## 2018-04-19 ENCOUNTER — PATIENT MESSAGE (OUTPATIENT)
Dept: FAMILY MEDICINE | Facility: CLINIC | Age: 83
End: 2018-04-19

## 2018-04-19 DIAGNOSIS — G47.00 INSOMNIA, UNSPECIFIED TYPE: Primary | ICD-10-CM

## 2018-04-23 ENCOUNTER — PATIENT MESSAGE (OUTPATIENT)
Dept: FAMILY MEDICINE | Facility: CLINIC | Age: 83
End: 2018-04-23

## 2018-05-29 ENCOUNTER — PES CALL (OUTPATIENT)
Dept: ADMINISTRATIVE | Facility: CLINIC | Age: 83
End: 2018-05-29

## 2018-05-29 ENCOUNTER — LAB VISIT (OUTPATIENT)
Dept: LAB | Facility: HOSPITAL | Age: 83
End: 2018-05-29
Attending: FAMILY MEDICINE
Payer: MEDICARE

## 2018-05-29 DIAGNOSIS — E78.5 HYPERLIPIDEMIA, UNSPECIFIED HYPERLIPIDEMIA TYPE: ICD-10-CM

## 2018-05-29 DIAGNOSIS — I10 ESSENTIAL HYPERTENSION: ICD-10-CM

## 2018-05-29 DIAGNOSIS — N18.30 CHRONIC KIDNEY DISEASE, STAGE III (MODERATE): ICD-10-CM

## 2018-05-29 LAB
ALBUMIN SERPL BCP-MCNC: 3.8 G/DL
ALP SERPL-CCNC: 62 U/L
ALT SERPL W/O P-5'-P-CCNC: 14 U/L
ANION GAP SERPL CALC-SCNC: 4 MMOL/L
AST SERPL-CCNC: 20 U/L
BILIRUB SERPL-MCNC: 0.5 MG/DL
BUN SERPL-MCNC: 25 MG/DL
CALCIUM SERPL-MCNC: 9.3 MG/DL
CHLORIDE SERPL-SCNC: 108 MMOL/L
CHOLEST SERPL-MCNC: 230 MG/DL
CHOLEST/HDLC SERPL: 5.5 {RATIO}
CO2 SERPL-SCNC: 30 MMOL/L
CREAT SERPL-MCNC: 1.1 MG/DL
EST. GFR  (AFRICAN AMERICAN): 54 ML/MIN/1.73 M^2
EST. GFR  (NON AFRICAN AMERICAN): 47 ML/MIN/1.73 M^2
GLUCOSE SERPL-MCNC: 99 MG/DL
HDLC SERPL-MCNC: 42 MG/DL
HDLC SERPL: 18.3 %
LDLC SERPL CALC-MCNC: 163 MG/DL
NONHDLC SERPL-MCNC: 188 MG/DL
POTASSIUM SERPL-SCNC: 4.6 MMOL/L
PROT SERPL-MCNC: 6.9 G/DL
SODIUM SERPL-SCNC: 142 MMOL/L
TRIGL SERPL-MCNC: 125 MG/DL

## 2018-05-29 PROCEDURE — 80053 COMPREHEN METABOLIC PANEL: CPT

## 2018-05-29 PROCEDURE — 80061 LIPID PANEL: CPT

## 2018-05-29 PROCEDURE — 36415 COLL VENOUS BLD VENIPUNCTURE: CPT

## 2018-06-05 ENCOUNTER — OFFICE VISIT (OUTPATIENT)
Dept: FAMILY MEDICINE | Facility: CLINIC | Age: 83
End: 2018-06-05
Attending: FAMILY MEDICINE
Payer: MEDICARE

## 2018-06-05 VITALS
HEIGHT: 64 IN | DIASTOLIC BLOOD PRESSURE: 71 MMHG | WEIGHT: 130.31 LBS | BODY MASS INDEX: 22.25 KG/M2 | SYSTOLIC BLOOD PRESSURE: 117 MMHG | TEMPERATURE: 99 F | OXYGEN SATURATION: 98 % | HEART RATE: 61 BPM

## 2018-06-05 DIAGNOSIS — M47.819 ARTHRITIS OF FACET JOINTS AT MULTIPLE VERTEBRAL LEVELS: ICD-10-CM

## 2018-06-05 DIAGNOSIS — E78.5 HYPERLIPIDEMIA, UNSPECIFIED HYPERLIPIDEMIA TYPE: ICD-10-CM

## 2018-06-05 DIAGNOSIS — I10 HTN (HYPERTENSION), BENIGN: Primary | ICD-10-CM

## 2018-06-05 DIAGNOSIS — I70.0 AORTIC ATHEROSCLEROSIS: ICD-10-CM

## 2018-06-05 DIAGNOSIS — I50.32 CHRONIC DIASTOLIC HEART FAILURE: ICD-10-CM

## 2018-06-05 DIAGNOSIS — N18.30 CHRONIC KIDNEY DISEASE, STAGE III (MODERATE): ICD-10-CM

## 2018-06-05 DIAGNOSIS — M47.816 FACET ARTHRITIS OF LUMBAR REGION: ICD-10-CM

## 2018-06-05 PROCEDURE — 3074F SYST BP LT 130 MM HG: CPT | Mod: CPTII,S$GLB,, | Performed by: FAMILY MEDICINE

## 2018-06-05 PROCEDURE — 99499 UNLISTED E&M SERVICE: CPT | Mod: S$PBB,,, | Performed by: FAMILY MEDICINE

## 2018-06-05 PROCEDURE — 99999 PR PBB SHADOW E&M-EST. PATIENT-LVL III: CPT | Mod: PBBFAC,,, | Performed by: FAMILY MEDICINE

## 2018-06-05 PROCEDURE — 3078F DIAST BP <80 MM HG: CPT | Mod: CPTII,S$GLB,, | Performed by: FAMILY MEDICINE

## 2018-06-05 PROCEDURE — 99214 OFFICE O/P EST MOD 30 MIN: CPT | Mod: S$GLB,,, | Performed by: FAMILY MEDICINE

## 2018-06-05 RX ORDER — PRAVASTATIN SODIUM 10 MG/1
10 TABLET ORAL DAILY
Qty: 90 TABLET | Refills: 3 | Status: SHIPPED | OUTPATIENT
Start: 2018-06-05 | End: 2018-10-12 | Stop reason: SDUPTHER

## 2018-06-05 NOTE — PROGRESS NOTES
Subjective:       Patient ID: Cheryl Ghosh is a 83 y.o. female.    Chief Complaint: Hypertension and Results    83 yr old pleasant white female with HTN, OA, osteopenia hip, hyperlipidemia, chronic back pain, BPPV, presents today for her follow up and concerns regarding BP.    Osteopenia hip - stable - had dexa in 12/15 and she is talking fosamax since many years and stopped last year- she is due for dexa in 12/17 - no side effects      HTN - fluctuating - on losartan 25 daily - some numbers in evening are high - her diet has not changed -       HLD - slightly worsening since stopped statin x 30 days           - she was asked to start statin but due to her fall and issues with her ribs and back, she was never able to start it.    Back pain - she follows Dr. Phillips for injection - c/o neuropathic pain in legs - was on neurontin in the past and she started taking it and working    HISTORY as below - reviewed    Health maintenance  -labs UTD  -mammo UTD  -colon screen UTD  -vaccines UTD      Hypertension   This is a chronic problem. The current episode started more than 1 month ago. The problem has been waxing and waning since onset. The problem is resistant. Associated symptoms include anxiety, headaches and malaise/fatigue. Pertinent negatives include no blurred vision, chest pain, neck pain, orthopnea, palpitations, peripheral edema, PND, shortness of breath or sweats. There are no associated agents to hypertension. Risk factors for coronary artery disease include dyslipidemia and family history. Past treatments include nothing. Compliance problems include exercise and medication side effects.  There is no history of chronic renal disease.   Hyperlipidemia   This is a chronic problem. The current episode started more than 1 year ago. The problem is uncontrolled. Recent lipid tests were reviewed and are high. She has no history of chronic renal disease, diabetes, hypothyroidism, liver disease, obesity or nephrotic  syndrome. There are no known factors aggravating her hyperlipidemia. Associated symptoms include myalgias. Pertinent negatives include no chest pain, focal sensory loss, leg pain or shortness of breath. Current antihyperlipidemic treatment includes diet change. The current treatment provides mild improvement of lipids. There are no compliance problems.  Risk factors for coronary artery disease include dyslipidemia.     Review of Systems   Constitutional: Positive for malaise/fatigue. Negative for activity change, diaphoresis and unexpected weight change.   HENT: Negative.  Negative for congestion, ear discharge, hearing loss, rhinorrhea, sore throat and voice change.    Eyes: Negative.  Negative for blurred vision, pain, discharge and visual disturbance.   Respiratory: Negative.  Negative for chest tightness, shortness of breath and wheezing.    Cardiovascular: Negative.  Negative for chest pain, palpitations, orthopnea and PND.   Gastrointestinal: Negative.  Negative for abdominal distention, anal bleeding, constipation and nausea.   Endocrine: Negative.  Negative for cold intolerance, polydipsia and polyuria.   Genitourinary: Negative.  Negative for decreased urine volume, difficulty urinating, dysuria, frequency, menstrual problem and vaginal pain.   Musculoskeletal: Positive for arthralgias, back pain and myalgias. Negative for gait problem and neck pain.   Skin: Negative.  Negative for color change, pallor and wound.   Allergic/Immunologic: Negative.  Negative for environmental allergies and immunocompromised state.   Neurological: Positive for headaches. Negative for dizziness, tremors, seizures and speech difficulty.   Hematological: Negative.  Negative for adenopathy. Does not bruise/bleed easily.   Psychiatric/Behavioral: Negative.  Negative for agitation, confusion, decreased concentration, hallucinations, self-injury and suicidal ideas. The patient is not nervous/anxious.        PMH/PSH/FH/SH/MED/ALLERGY  reviewed    Objective:       Vitals:    06/05/18 1005   BP: 117/71   Pulse: 61   Temp: 98.5 °F (36.9 °C)       Physical Exam   Constitutional: She is oriented to person, place, and time. She appears well-developed and well-nourished. No distress.   HENT:   Head: Normocephalic and atraumatic.   Right Ear: External ear normal.   Left Ear: External ear normal.   Nose: Nose normal.   Mouth/Throat: Oropharynx is clear and moist. No oropharyngeal exudate.   Eyes: Conjunctivae and EOM are normal. Pupils are equal, round, and reactive to light. Right eye exhibits no discharge. Left eye exhibits no discharge. No scleral icterus.   Neck: Normal range of motion. Neck supple. No JVD present. No tracheal deviation present. No thyromegaly present.   Cardiovascular: Normal rate, regular rhythm, normal heart sounds and intact distal pulses.  Exam reveals no gallop and no friction rub.    No murmur heard.  Pulmonary/Chest: Effort normal and breath sounds normal. No stridor. She has no wheezes. She has no rales. She exhibits no tenderness.   Abdominal: Soft. Bowel sounds are normal. She exhibits no distension and no mass. There is no tenderness. There is no rebound and no guarding. No hernia.   Musculoskeletal: Normal range of motion. She exhibits no edema or tenderness.   Lymphadenopathy:     She has no cervical adenopathy.   Neurological: She is alert and oriented to person, place, and time. She has normal reflexes. She displays normal reflexes. No cranial nerve deficit. She exhibits normal muscle tone. Coordination normal.   Skin: Skin is warm and dry. No rash noted. She is not diaphoretic. No erythema. No pallor.   Psychiatric: She has a normal mood and affect. Her behavior is normal. Judgment and thought content normal.       Assessment:       1. HTN (hypertension), benign    2. Hyperlipidemia, unspecified hyperlipidemia type    3. Chronic diastolic heart failure    4. Aortic atherosclerosis    5. Facet arthritis of lumbar  region    6. 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 Chronic kidney disease, stage III (moderate)        Plan:       Cheryl was seen today for hypertension and results.    Diagnoses and all orders for this visit:    HTN (hypertension), benign  -     CBC auto differential; Future  -     Comprehensive metabolic panel; Future  -     Lipid panel; Future  -     Vitamin D; Future    Hyperlipidemia, unspecified hyperlipidemia type  -     pravastatin (PRAVACHOL) 10 MG tablet; Take 1 tablet (10 mg total) by mouth once daily.  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    Chronic kidney disease, stage III (moderate)  -     CBC auto differential; Future  -     Comprehensive metabolic panel; Future  -     PTH, intact; Future  -     Vitamin D; Future        HTN  continue losartan 25    TIA  -follow neurology - DC plavix per neuro recommendations and just stay on ASA    Insomnia  -try benadryl only if needed    HLD  -diet controlled and statin    Urine incontinence  -lifestyle changes - intolerant to meds    Neuropathic pain  -continue neurontin    OA knee B/L  -mobic prn    CKD III  -lab and urine analysis  -DC HCTZ  -adequate hydration      Spent adequate time in obtaining history and explaining differentials    40 minutes spent during this visit of which greater than 50% devoted to face-face counseling and coordination of care regarding diagnosis and management plan    Follow-up in about 3 months (around 9/5/2018), or if symptoms worsen or fail to improve.

## 2018-07-05 ENCOUNTER — PES CALL (OUTPATIENT)
Dept: ADMINISTRATIVE | Facility: CLINIC | Age: 83
End: 2018-07-05

## 2018-07-24 ENCOUNTER — OFFICE VISIT (OUTPATIENT)
Dept: PAIN MEDICINE | Facility: CLINIC | Age: 83
End: 2018-07-24
Payer: MEDICARE

## 2018-07-24 VITALS
DIASTOLIC BLOOD PRESSURE: 83 MMHG | WEIGHT: 132.81 LBS | HEIGHT: 64 IN | TEMPERATURE: 98 F | SYSTOLIC BLOOD PRESSURE: 204 MMHG | HEART RATE: 59 BPM | BODY MASS INDEX: 22.67 KG/M2

## 2018-07-24 DIAGNOSIS — M46.1 SACROILIITIS: Primary | ICD-10-CM

## 2018-07-24 DIAGNOSIS — M47.9 OSTEOARTHRITIS OF SPINE, UNSPECIFIED SPINAL OSTEOARTHRITIS COMPLICATION STATUS, UNSPECIFIED SPINAL REGION: ICD-10-CM

## 2018-07-24 DIAGNOSIS — M47.819 SPONDYLOSIS WITHOUT MYELOPATHY: ICD-10-CM

## 2018-07-24 DIAGNOSIS — M17.0 PRIMARY OSTEOARTHRITIS OF BOTH KNEES: ICD-10-CM

## 2018-07-24 PROCEDURE — 20611 DRAIN/INJ JOINT/BURSA W/US: CPT | Mod: RT,S$GLB,, | Performed by: ANESTHESIOLOGY

## 2018-07-24 PROCEDURE — 3079F DIAST BP 80-89 MM HG: CPT | Mod: CPTII,S$GLB,, | Performed by: ANESTHESIOLOGY

## 2018-07-24 PROCEDURE — 3077F SYST BP >= 140 MM HG: CPT | Mod: CPTII,S$GLB,, | Performed by: ANESTHESIOLOGY

## 2018-07-24 PROCEDURE — 99999 PR PBB SHADOW E&M-EST. PATIENT-LVL III: CPT | Mod: PBBFAC,,, | Performed by: ANESTHESIOLOGY

## 2018-07-24 PROCEDURE — 99214 OFFICE O/P EST MOD 30 MIN: CPT | Mod: 25,GC,S$GLB, | Performed by: ANESTHESIOLOGY

## 2018-07-24 PROCEDURE — 99499 UNLISTED E&M SERVICE: CPT | Mod: S$GLB,,, | Performed by: ANESTHESIOLOGY

## 2018-07-24 RX ORDER — MELOXICAM 15 MG/1
15 TABLET ORAL DAILY PRN
Qty: 90 TABLET | Refills: 0 | Status: SHIPPED | OUTPATIENT
Start: 2018-07-24 | End: 2018-10-12 | Stop reason: SDUPTHER

## 2018-07-24 NOTE — PROGRESS NOTES
Chronic patient Established Note (Follow up visit)      SUBJECTIVE:    Cheryl Ghosh presents to the clinic for a follow-up appointment for back pain. Since the last visit, Cheryl Ghosh states the pain has been persistant. Current pain intensity is 4/10.  She states that the onset was slow over several months.  The pain is mostly in the right buttock and back and does not radiate.  It is aching in character.  Alleviating factors are rest.  Exacerbating factors are standing and activity.The pain is constant.  It is scaled 8/10 at worst.  It seems to improve as the day goes on.   She gets 4-6 hrs of uninterrupted sleep per night.    Mr. Ghosh presents with complaints as above.  Of note, she is quite hypertensive in clinic, with sys BP of 209.  She denies HA, visual changes and was instructed to take her BP medication and contact her PCP.  She states she exercises frequently but has no undergone formal PT in some time.        Pain Disability Index Review:  Last 3 PDI Scores 2018   Pain Disability Index (PDI) 20 13 23       Pain Medications:    - Opioids: None  - Adjuvant Medications: Mobic (Meloxicam) and Neurontin (Gabapentin)  - Anti-Coagulants: Aspirin  - Others: See med list    Opioid Contract: no     report:  Reviewed and consistent with medication use as prescribed.    Pain Procedures:   RIGHT KNEE INTRAARTICULAR STEROID INJECTION 1/05/15  bilateral T7,8,9 INTERCOSTAL NERVE BLOCK,   Left L3,4,5 Facet MB RFA (13)   Right L3,4,5 Facet MB RFA (10/6/13)    Physical Therapy/Home Exercise: no    Imagin/13/15 Lumbar Spine MRI      Narrative      MRI OF THE LUMBAR SPINE WITHOUT CONTRAST.     Technique: Sagittal T1, sagittal T2, sagittal STIR, axial T1 and axial T2 weighted images of the lumbar spine obtained without contrast.     Comparison: None.     Findings:    Lumbar spine alignment is within normal limits. The vertebral body heights are well maintained, with no  fracture. No marrow signal abnormality suspicious for an infiltrative process.     The conus is normal in appearance, and terminates at the L1 level. The adjacent soft tissue structures show no significant abnormalities. 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    There are findings of multi-level lumbar spondylosis, as below.    L1/2 through L4/5 demonstrates disc desiccation and minimal posterior disc bulge producing mild neuroforaminal narrowing without significant canal stenosis. More pronounced disc space narrowing is seen at L4/5 and L5/S1. At L5/S1 there is moderate   bilateral neuroforaminal narrowing without significant canal stenosis.. Moderate facet arthropathy is also noted within the lower lumbar levels    L5-S1: There is no focal disc herniation. No significant central canal or neural foraminal narrowing.       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    Moderate bilateral neuroforaminal narrowing at L5/S1.        Electronically signed by: LUIS FERNANDO RUBIN MD  Date: 08/18/15  Time: 14:55                  Lumbar MRI 9/25/2012      MRI lumbar spine without contrast:    No comparative studies. No fracture or subluxation. Marrow space, spinal cord normal. Neural sheath ectasia at T11 and T12 neural foramen, right S2 level, later 1.7-CM. Left kidney smaller than right, 3.6 x 4.2 CM, compared with 4.3 x 5.4 cm.    Degenerative disk disease with Modic chronic endplate change particular L4 L5.     L1-L2 mild circumflex, bulge disk, no significant spinal or foraminal stenosis. L2-L3 mild circumferential bulge disk, mild spinal stenosis, facet joint arthropathy detected on right.    L3-L4 limited circumferential bulge, facet joint arthropathy, mild spinal stenosis.    L4-L5 mild circumferential bulge, spondylosis, mild spinal and foraminal stenosis, facet joint arthropathy.    L5-S1 facet joint arthropathy, linear fissure right foraminal annulus.        Result Impression      1.  Degenerative disk spondylosis at the L4-5. No significant spinal or foraminal stenosis, disk prolapse.         X-Ray cervical, thoracic, and lumbar spine 2/10/14      Result Narrative      COMPARISON: None    FINDINGS:     C-spine: Generalized osteopenia. Mild dextrocurvature of the cervical spine which may be positional. Cervical lordosis appears maintained. There is a 3-mm retrolisthesis of C4 on 5 and 2-mm anterolisthesis of C5 on 6 without radiolucency identified   and likely secondary to degenerative change. No displaced fracture or dislocation seen. Vertebral body heights appear maintained. Mild degenerative change at the atlanto-odontoid interval. Otherwise the dens and lateral masses appear intact. There   is mild to moderate degenerative disk disease with uncovertebral and facet arthrosis most prominent at C4-5. No prevertebral soft tissue swelling. Cervical soft tissues appear within normal limits. No subcutaneous emphysema or radiodense foreign body.   Visualized lung apices are clear.    T-spine: Generalized osteopenia. Mild levocurvature of the thoracic spine which may be positional. No displaced fracture, dislocation or significant listhesis seen. Vertebral body heights appear maintained. Vertebral body and disk space heights   appear maintained. Age-appropriate mild multilevel degenerative change. No prevertebral soft tissue swelling.    L-spine: Generalized osteopenia. There are 5 non-rib bearing lumbar type vertebral bodies. Mild levocurvature of the lumbar spine which may be positional. No displaced fracture, dislocation or significant listhesis seen. Vertebral body heights appear  maintained. Mild-to-moderate multilevel degenerative disk disease most prominent at L4-5 and age-appropriate mild to moderate multilevel degenerative change. No prevertebral soft tissue swelling. Vascular calcifications noted. Nonspecific bowel gas   pattern.      Please note that subtle ligamentous injuries may not be  detected by radiography and CT or MRI of the spine can be obtained if indicated.        Result Impression          No acute process seen.      Electronically signed by: MARLON ARREOLA MD, MD  Date: 02/10/14  Time: 18:33                XRAY KNEE 7/8/13      Result Narrative      Comparison is made with May 28, 2012. Bilateral erect sunrise and right lateral views were obtained. There is slight medial joint space narrowing is noted. Minor osteophytic spurs are emanating from the intercondylar prominence of the tibia and the   posterior superior aspect of the right patella. This is unchanged since 2012        Result Impression          Minor degenerative changes      Electronically signed by: Jorge Lagunas MD  Date: 07/08/13  Time: 13:58          Allergies:   Review of patient's allergies indicates:   Allergen Reactions    Pcn [penicillins] Hives and Nausea And Vomiting       Current Medications:   Current Outpatient Prescriptions   Medication Sig Dispense Refill    aspirin 81 MG Chew Take 1 tablet (81 mg total) by mouth once daily. 90 tablet 3    b complex vitamins tablet Take 1 tablet by mouth once daily.      estradiol (ESTRACE) 0.01 % (0.1 mg/gram) vaginal cream Place 1 g vaginally once daily. Use 1 gram of estrogen cream in vagina nightly x 2 weeks, then twice a week thereafter. . 42.5 g 1    gabapentin (NEURONTIN) 300 MG capsule Take 1 capsule (300 mg total) by mouth every evening. 90 capsule 3    losartan (COZAAR) 25 MG tablet Take 1 tablet (25 mg total) by mouth once daily. 90 tablet 3    meloxicam (MOBIC) 15 MG tablet Take 1 tablet (15 mg total) by mouth daily as needed for Pain. 90 tablet 3    omega-3 acid ethyl esters (LOVAZA) 1 gram capsule Take 2 g by mouth 2 (two) times daily.      pravastatin (PRAVACHOL) 10 MG tablet Take 1 tablet (10 mg total) by mouth once daily. 90 tablet 3    pyridoxine (VITAMIN B-6) 100 MG Tab Take 100 mg by mouth once daily.       No current facility-administered medications  for this visit.        REVIEW OF SYSTEMS:    GENERAL:  No weight loss, malaise or fevers.  HEENT:  Negative for frequent or significant headaches.  NECK:  Negative for lumps, goiter, pain and significant neck swelling.  RESPIRATORY:  Negative for cough, wheezing or shortness of breath.  CARDIOVASCULAR:  Negative for chest pain, leg swelling or palpitations.  GI:  Negative for abdominal discomfort, blood in stools or black stools or change in bowel habits.  MUSCULOSKELETAL:  See HPI.  SKIN:  Negative for lesions, rash, and itching.  PSYCH:  + for sleep disturbance, mood disorder and recent psychosocial stressors.  HEMATOLOGY/LYMPHOLOGY:  Negative for prolonged bleeding, bruising easily or swollen nodes.  NEURO:   No history of headaches, syncope, paralysis, seizures or tremors.  All other reviewed and negative other than HPI.    Past Medical History:  Past Medical History:   Diagnosis Date    Arthritis     lumbar    Blood transfusion     Chronic kidney disease, stage III (moderate) 8/2/2017    Degenerative disc disease     lumbar    Insulinoma     Pancreatic disease     Urinary incontinence 12/7/2015       Past Surgical History:  Past Surgical History:   Procedure Laterality Date    ADENOIDECTOMY      APPENDECTOMY      CATARACT EXTRACTION, BILATERAL      EYE SURGERY      HYSTERECTOMY      SAMM/BSO    OOPHORECTOMY      PANCREAS SURGERY      Insulanoma    REFRACTIVE SURGERY Bilateral     TONSILLECTOMY         Family History:  Family History   Problem Relation Age of Onset    Breast cancer Mother     Heart disease Father     Aneurysm Sister     Glaucoma Daughter     Heart attack Son     Breast cancer Other        Social History:  Social History     Social History    Marital status:      Spouse name: N/A    Number of children: N/A    Years of education: N/A     Social History Main Topics    Smoking status: Never Smoker    Smokeless tobacco: Never Used    Alcohol use 0.6 - 1.2 oz/week      1 - 2 Glasses of wine per week    Drug use: No    Sexual activity: Yes     Partners: Male     Other Topics Concern    Not on file     Social History Narrative    No narrative on file       OBJECTIVE:    LMP  (LMP Unknown)     PHYSICAL EXAMINATION:    General appearance: Well appearing, in no acute distress, alert and oriented x3.  Psych:  Mood and affect appropriate.  Skin: Skin color, texture, turgor normal, no rashes or lesions, in both upper and lower body.  Head/face:  Atraumatic, normocephalic. No palpable lymph nodes  Neck: No pain to palpation over the cervical paraspinous muscles. Spurling Negative. No pain with neck flexion, extension, or lateral flexion. .  Cor: RRR  Pulm: CTA  GI: Abdomen soft and non-tender.  Back: Straight leg raising in the sitting and supine positions is negative to radicular pain. No pain to palpation over the spine or costovertebral angles. Normal range of motion without pain reproduction.  Extremities: Peripheral joint ROM is full and pain free without obvious instability or laxity in all four extremities. No deformities, edema, or skin discoloration. Good capillary refill.  Musculoskeletal: CARMITA positive on right.  Other spine and hip provacative maneuvers are negative.  + Axial loading bilaterally, R>L.  . Bilateral upper and lower extremity strength is normal and symmetric.  No atrophy or tone abnormalities are noted.  Neuro: Bilateral upper and lower extremity coordination and muscle stretch reflexes are physiologic and symmetric.  Plantar response are downgoing. No loss of sensation is noted.  Gait: Normal.    IMAGING:  Narrative     MRI OF THE LUMBAR SPINE WITHOUT CONTRAST.     Technique:  Sagittal T1, sagittal T2, sagittal STIR, axial T1 and axial T2 weighted images of the lumbar spine obtained without contrast.     Comparison: None.     Findings:    Lumbar spine alignment is within normal limits. The vertebral body heights are well maintained, with no fracture.  No  marrow signal abnormality suspicious for an infiltrative process.      The conus is normal in appearance, and terminates at the L1 level.  The adjacent soft tissue structures show no significant abnormalities.  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    There are findings of multi-level  lumbar spondylosis, as below.    L1/2 through L4/5 demonstrates disc desiccation and minimal posterior disc bulge producing mild neuroforaminal narrowing without significant canal stenosis.  More pronounced disc space narrowing is seen at L4/5 and L5/S1.  At L5/S1 there is moderate   bilateral neuroforaminal narrowing without significant canal stenosis..  Moderate facet arthropathy is also noted within the lower lumbar levels    L5-S1: There is no focal disc herniation. No significant central canal or neural foraminal narrowing.   Impression          Tarlov cyst is seen along the left S2 nerve root.    Moderate bilateral neuroforaminal narrowing at L5/S1.        Electronically signed by: LUIS FERNANDO RUBIN MD  Date: 08/18/15  Time: 14:55          ASSESSMENT: 83 y.o. year old female with right low back pain, consistent with :     1. Sacroiliitis  X-Ray Lumbar Spine Complete 5 View    X-Ray Hips Bilateral 2 View Incl AP Pelvis   2. Primary osteoarthritis of both knees  meloxicam (MOBIC) 15 MG tablet    X-Ray Lumbar Spine Complete 5 View    X-Ray Hips Bilateral 2 View Incl AP Pelvis   3. Spondylosis without myelopathy  X-Ray Lumbar Spine Complete 5 View    X-Ray Hips Bilateral 2 View Incl AP Pelvis   4. Osteoarthritis of spine, unspecified spinal osteoarthritis complication status, unspecified spinal region  X-Ray Lumbar Spine Complete 5 View    X-Ray Hips Bilateral 2 View Incl AP Pelvis         PLAN:     - I have stressed the importance of physical activity and a home exercise plan to help with pain and improve health.  - Patient can continue with medications for now since they are providing benefits, using them appropriately, and  "without side effects.  - SI Joint under ultrasound today in office  - Encouraged to take daily meloxicam  - X-ray of hip, 2 view today, lumbar series  - RTC 2 weeks following procedure for f/u with APRN  - Counseled patient regarding the importance of activity modification, constant sleeping habits and physical therapy.      Patient Name: Cheryl Ghosh  MRN: [id number]    INFORMED CONSENT: The procedure, risks, benefits and options were discussed with patient. There are no contraindications to the procedure. The patient expressed understanding and agreed to proceed. The personnel performing the procedure was discussed. I verify that I personally obtained Cheryl's consent prior to the start of the procedure and the signed consent can be found on the patient's chart.    Procedure Date: 07/24/2018      Pre Procedure diagnosis:   1. Sacroiliitis    2. Primary osteoarthritis of both knees    3. Spondylosis without myelopathy    4. Osteoarthritis of spine, unspecified spinal osteoarthritis complication status, unspecified spinal region        Post-Procedure diagnosis: same      Sedation: None    PROCEDURE: right Sacroiliac Joint injection under ultrasound guidance      DESCRIPTION OF PROCEDURE: The patient was brought to the procedure room. . The patient was positioned prone on table. . . The skin overlying the rt sacroiliac area was prepped with chlorhexidene and draped in a sterile fashion.  A  21 gauge 4" simplex needle was slowly advanced through under ultrasound guidance into the cleft between the bony contours of the sacrum and ileum representing the posterior aspect of the SI joint with caudal tilt to enter the lower portion of the joint. When the needle tip was clearly visualized in the joint space. . Negative aspiration was confirmed. . A combination of 3 cc of Bupivacaine 0.25% and 40 mg depomedrol was easily injected. The needle was removed and bleeding was nil. A sterile dressing was applied.    Blood Loss: " Nill  Specimen: None    Eddie Vega MD      The above plan and management options were discussed at length with patient. Patient is in agreement with the above and verbalized understanding.    Stephon Luke MD  [facility]  [phone]  [id number]   I have personally reviewed the history and exam of this patient and agree with the resident/fellow/NPs note as stated above.    Eddie Vega MD

## 2018-07-25 ENCOUNTER — PATIENT MESSAGE (OUTPATIENT)
Dept: FAMILY MEDICINE | Facility: CLINIC | Age: 83
End: 2018-07-25

## 2018-07-25 ENCOUNTER — HOSPITAL ENCOUNTER (OUTPATIENT)
Dept: RADIOLOGY | Facility: HOSPITAL | Age: 83
Discharge: HOME | End: 2018-07-25
Attending: ANESTHESIOLOGY
Payer: MEDICARE

## 2018-07-25 DIAGNOSIS — M47.819 SPONDYLOSIS WITHOUT MYELOPATHY: ICD-10-CM

## 2018-07-25 DIAGNOSIS — M17.0 PRIMARY OSTEOARTHRITIS OF BOTH KNEES: ICD-10-CM

## 2018-07-25 DIAGNOSIS — M46.1 SACROILIITIS: ICD-10-CM

## 2018-07-25 DIAGNOSIS — M47.9 OSTEOARTHRITIS OF SPINE, UNSPECIFIED SPINAL OSTEOARTHRITIS COMPLICATION STATUS, UNSPECIFIED SPINAL REGION: ICD-10-CM

## 2018-07-25 PROCEDURE — 73521 X-RAY EXAM HIPS BI 2 VIEWS: CPT | Mod: 26,,, | Performed by: RADIOLOGY

## 2018-07-25 PROCEDURE — 73521 X-RAY EXAM HIPS BI 2 VIEWS: CPT | Mod: TC,FY

## 2018-07-25 PROCEDURE — 72110 X-RAY EXAM L-2 SPINE 4/>VWS: CPT | Mod: TC,FY

## 2018-07-25 PROCEDURE — 72110 X-RAY EXAM L-2 SPINE 4/>VWS: CPT | Mod: 26,,, | Performed by: RADIOLOGY

## 2018-07-27 ENCOUNTER — PATIENT MESSAGE (OUTPATIENT)
Dept: FAMILY MEDICINE | Facility: CLINIC | Age: 83
End: 2018-07-27

## 2018-07-30 ENCOUNTER — PATIENT MESSAGE (OUTPATIENT)
Dept: FAMILY MEDICINE | Facility: CLINIC | Age: 83
End: 2018-07-30

## 2018-07-31 ENCOUNTER — PATIENT MESSAGE (OUTPATIENT)
Dept: FAMILY MEDICINE | Facility: CLINIC | Age: 83
End: 2018-07-31

## 2018-08-03 ENCOUNTER — PATIENT MESSAGE (OUTPATIENT)
Dept: FAMILY MEDICINE | Facility: CLINIC | Age: 83
End: 2018-08-03

## 2018-08-03 NOTE — H&P (VIEW-ONLY)
Chronic patient Established Note (Follow up visit)      SUBJECTIVE:    Cheryl Ghosh presents to the clinic for a follow-up appointment for low back and right hip pain. She is S/P  right Sacroiliac Joint injection under ultrasound guidance on 18 with 90% continued relief, however she states she has been having low back pain, she has been treated per  Dr. Phillips for low back pain, she is s/p Right and Left L3-4-5 RFAs in  with great relief,  Previous imaging was reviewed and discussed with the patient today, discussed that we will repeat Right then left lumbar RFA  @ L3-4-5 bilaterally.    Since the last visit, Cheryl Ghosh states the pain has been improving. Current pain intensity is 3/10.    Pain Disability Index Review:  Last 3 PDI Scores 2018   Pain Disability Index (PDI) 31 20 13       Pain Medications:     - Opioids: None  - Adjuvant Medications: Mobic (Meloxicam) and Neurontin (Gabapentin)  - Anti-Coagulants: Aspirin  - Others: See med list     Opioid Contract: no      report:  Reviewed and consistent with medication use as prescribed.     Pain Procedures: 18  right Sacroiliac Joint injection under ultrasound guidance   RIGHT KNEE INTRAARTICULAR STEROID INJECTION 1/05/15  bilateral T7,8,9 INTERCOSTAL NERVE BLOCK,   Left L3,4,5 Facet MB RFA (13)   Right L3,4,5 Facet MB RFA (10/6/13)     Physical Therapy/Home Exercise: no     Imagin/13/15 Lumbar Spine MRI        Narrative      MRI OF THE LUMBAR SPINE WITHOUT CONTRAST.     Technique: Sagittal T1, sagittal T2, sagittal STIR, axial T1 and axial T2 weighted images of the lumbar spine obtained without contrast.     Comparison: None.     Findings:    Lumbar spine alignment is within normal limits. The vertebral body heights are well maintained, with no fracture. No marrow signal abnormality suspicious for an infiltrative process.     The conus is normal in appearance, and terminates at the L1 level. The  adjacent soft tissue structures show no significant abnormalities. 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    There are findings of multi-level lumbar spondylosis, as below.    L1/2 through L4/5 demonstrates disc desiccation and minimal posterior disc bulge producing mild neuroforaminal narrowing without significant canal stenosis. More pronounced disc space narrowing is seen at L4/5 and L5/S1. At L5/S1 there is moderate   bilateral neuroforaminal narrowing without significant canal stenosis.. Moderate facet arthropathy is also noted within the lower lumbar levels    L5-S1: There is no focal disc herniation. No significant central canal or neural foraminal narrowing.       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    Moderate bilateral neuroforaminal narrowing at L5/S1.        Electronically signed by: LUIS FERNANDO RUBIN MD  Date: 08/18/15  Time: 14:55                  Lumbar MRI 9/25/2012        MRI lumbar spine without contrast:    No comparative studies. No fracture or subluxation. Marrow space, spinal cord normal. Neural sheath ectasia at T11 and T12 neural foramen, right S2 level, later 1.7-CM. Left kidney smaller than right, 3.6 x 4.2 CM, compared with 4.3 x 5.4 cm.    Degenerative disk disease with Modic chronic endplate change particular L4 L5.     L1-L2 mild circumflex, bulge disk, no significant spinal or foraminal stenosis. L2-L3 mild circumferential bulge disk, mild spinal stenosis, facet joint arthropathy detected on right.    L3-L4 limited circumferential bulge, facet joint arthropathy, mild spinal stenosis.    L4-L5 mild circumferential bulge, spondylosis, mild spinal and foraminal stenosis, facet joint arthropathy.    L5-S1 facet joint arthropathy, linear fissure right foraminal annulus.        Result Impression      1. Degenerative disk spondylosis at the L4-5. No significant spinal or foraminal stenosis, disk prolapse.         X-Ray cervical, thoracic, and lumbar  spine 2/10/14        Result Narrative      COMPARISON: None    FINDINGS:     C-spine: Generalized osteopenia. Mild dextrocurvature of the cervical spine which may be positional. Cervical lordosis appears maintained. There is a 3-mm retrolisthesis of C4 on 5 and 2-mm anterolisthesis of C5 on 6 without radiolucency identified   and likely secondary to degenerative change. No displaced fracture or dislocation seen. Vertebral body heights appear maintained. Mild degenerative change at the atlanto-odontoid interval. Otherwise the dens and lateral masses appear intact. There   is mild to moderate degenerative disk disease with uncovertebral and facet arthrosis most prominent at C4-5. No prevertebral soft tissue swelling. Cervical soft tissues appear within normal limits. No subcutaneous emphysema or radiodense foreign body.   Visualized lung apices are clear.    T-spine: Generalized osteopenia. Mild levocurvature of the thoracic spine which may be positional. No displaced fracture, dislocation or significant listhesis seen. Vertebral body heights appear maintained. Vertebral body and disk space heights   appear maintained. Age-appropriate mild multilevel degenerative change. No prevertebral soft tissue swelling.    L-spine: Generalized osteopenia. There are 5 non-rib bearing lumbar type vertebral bodies. Mild levocurvature of the lumbar spine which may be positional. No displaced fracture, dislocation or significant listhesis seen. Vertebral body heights appear  maintained. Mild-to-moderate multilevel degenerative disk disease most prominent at L4-5 and age-appropriate mild to moderate multilevel degenerative change. No prevertebral soft tissue swelling. Vascular calcifications noted. Nonspecific bowel gas   pattern.      Please note that subtle ligamentous injuries may not be detected by radiography and CT or MRI of the spine can be obtained if indicated.        Result Impression          No acute process  seen.      Electronically signed by: MARLON ARREOLA MD, MD  Date: 02/10/14  Time: 18:33                XRAY KNEE 7/8/13        Result Narrative      Comparison is made with May 28, 2012. Bilateral erect sunrise and right lateral views were obtained. There is slight medial joint space narrowing is noted. Minor osteophytic spurs are emanating from the intercondylar prominence of the tibia and the   posterior superior aspect of the right patella. This is unchanged since 2012        Result Impression          Minor degenerative changes      Electronically signed by: Jorge Lagunas MD  Date: 07/08/13  Time: 13:58        Allergies:   Review of patient's allergies indicates:   Allergen Reactions    Pcn [penicillins] Hives and Nausea And Vomiting       Current Medications:   Current Outpatient Prescriptions   Medication Sig Dispense Refill    aspirin 81 MG Chew Take 1 tablet (81 mg total) by mouth once daily. 90 tablet 3    b complex vitamins tablet Take 1 tablet by mouth once daily.      estradiol (ESTRACE) 0.01 % (0.1 mg/gram) vaginal cream Place 1 g vaginally once daily. Use 1 gram of estrogen cream in vagina nightly x 2 weeks, then twice a week thereafter. . 42.5 g 1    gabapentin (NEURONTIN) 300 MG capsule Take 1 capsule (300 mg total) by mouth every evening. 90 capsule 3    losartan (COZAAR) 25 MG tablet Take 1 tablet (25 mg total) by mouth once daily. 90 tablet 3    meloxicam (MOBIC) 15 MG tablet Take 1 tablet (15 mg total) by mouth daily as needed for Pain. 90 tablet 0    omega-3 acid ethyl esters (LOVAZA) 1 gram capsule Take 2 g by mouth 2 (two) times daily.      pravastatin (PRAVACHOL) 10 MG tablet Take 1 tablet (10 mg total) by mouth once daily. 90 tablet 3    pyridoxine (VITAMIN B-6) 100 MG Tab Take 100 mg by mouth once daily.       No current facility-administered medications for this visit.        REVIEW OF SYSTEMS:    GENERAL:  No weight loss, malaise or fevers.  HEENT:  Negative for frequent or  significant headaches.  NECK:  Negative for lumps, goiter, pain and significant neck swelling.  RESPIRATORY:  Negative for cough, wheezing or shortness of breath.  CARDIOVASCULAR:  Negative for chest pain, leg swelling or palpitations.  GI:  Negative for abdominal discomfort, blood in stools or black stools or change in bowel habits.  MUSCULOSKELETAL:  See HPI.  SKIN:  Negative for lesions, rash, and itching.  PSYCH:  +  for sleep disturbance, mood disorder and recent psychosocial stressors.  HEMATOLOGY/LYMPHOLOGY:  Negative for prolonged bleeding, bruising easily or swollen nodes.  NEURO:   No history of headaches, syncope, paralysis, seizures or tremors.  All other reviewed and negative other than HPI.    Past Medical History:  Past Medical History:   Diagnosis Date    Arthritis     lumbar    Blood transfusion     Chronic kidney disease, stage III (moderate) 8/2/2017    Degenerative disc disease     lumbar    Insulinoma     Pancreatic disease     Urinary incontinence 12/7/2015       Past Surgical History:  Past Surgical History:   Procedure Laterality Date    ADENOIDECTOMY      APPENDECTOMY      CATARACT EXTRACTION, BILATERAL      EYE SURGERY      HYSTERECTOMY      SAMM/BSO    OOPHORECTOMY      PANCREAS SURGERY      Insulanoma    REFRACTIVE SURGERY Bilateral     TONSILLECTOMY         Family History:  Family History   Problem Relation Age of Onset    Breast cancer Mother     Heart disease Father     Aneurysm Sister     Glaucoma Daughter     Heart attack Son     Breast cancer Other        Social History:  Social History     Social History    Marital status:      Spouse name: N/A    Number of children: N/A    Years of education: N/A     Social History Main Topics    Smoking status: Never Smoker    Smokeless tobacco: Never Used    Alcohol use 0.6 - 1.2 oz/week     1 - 2 Glasses of wine per week    Drug use: No    Sexual activity: Yes     Partners: Male     Other Topics Concern     None     Social History Narrative    None       OBJECTIVE:    BP (!) 157/61   Pulse 65   Wt 60.3 kg (132 lb 15 oz)   LMP  (LMP Unknown)   BMI 22.82 kg/m²     PHYSICAL EXAMINATION:    General appearance: Well appearing, in no acute distress, alert and oriented x3.  Psych:  Mood and affect appropriate.  Skin: Skin color, texture, turgor normal, no rashes or lesions, in both upper and lower body.  Head/face:  Atraumatic, normocephalic. No palpable lymph nodes  Neck: No pain to palpation over the cervical paraspinous muscles. Spurling Negative. No pain with neck flexion, extension, or lateral flexion. .  Cor: RRR  Pulm: CTA  GI: Abdomen soft and non-tender.  Back: Straight leg raising in the sitting and supine positions is negative to radicular pain.  + pain to palpation over the L-spine or costovertebral angles. Normal range of motion with pain reproduction. + Facet Loading Bilaterally.  Extremities: Peripheral joint ROM is full and pain free without obvious instability or laxity in all four extremities. No deformities, edema, or skin discoloration. Good capillary refill.  Musculoskeletal: Shoulder, hip, sacroiliac and knee provocative maneuvers are negative. Bilateral upper and lower extremity strength is normal and symmetric.  No atrophy or tone abnormalities are noted.  Neuro: Bilateral upper and lower extremity coordination and muscle stretch reflexes are physiologic and symmetric.  Plantar response are downgoing. No loss of sensation is noted.  Gait: Normal.    ASSESSMENT: 83 y.o. year old female with axial low back  pain, consistent with      Diagnosis:    1. Spondylosis without myelopathy     2. Osteoarthritis of spine, unspecified spinal osteoarthritis complication status, unspecified spinal region     3. Facet arthritis of lumbar region           PLAN:     - I have stressed the importance of physical activity and a home exercise plan to help with pain and improve health.  - Patient can continue with  medications for now since they are providing benefits, using them appropriately, and without side effects.  - Counseled patient regarding the importance of activity modification, constant sleeping habits and physical therapy.  - Previous imaging was reviewed and discussed with the patient today.   - Schedule for a Radiofrequency ablation of the medial branches at L3,4, and L5 for longer pain relief. Right then Left  - I have explained the risks, benefits, and alternatives of the procedure in detail. The patient voices understanding and all questions have been answered. The patient agrees to proceed as planned. Written Consent obtained.   -RTC 2-3 weeks following procedure.  -The above plan and management options were discussed at length with patient. Patient is in agreement with the above and verbalized understanding. Dr. Vega   was consulted on this patient  and agrees with this plan.     TREVOR Kim  Interventional Pain Management      08/07/2018

## 2018-08-07 ENCOUNTER — OFFICE VISIT (OUTPATIENT)
Dept: PAIN MEDICINE | Facility: CLINIC | Age: 83
End: 2018-08-07
Payer: MEDICARE

## 2018-08-07 VITALS
BODY MASS INDEX: 22.82 KG/M2 | HEART RATE: 65 BPM | WEIGHT: 132.94 LBS | DIASTOLIC BLOOD PRESSURE: 61 MMHG | SYSTOLIC BLOOD PRESSURE: 157 MMHG

## 2018-08-07 DIAGNOSIS — M47.816 FACET ARTHRITIS OF LUMBAR REGION: ICD-10-CM

## 2018-08-07 DIAGNOSIS — M47.819 SPONDYLOSIS WITHOUT MYELOPATHY: Primary | ICD-10-CM

## 2018-08-07 DIAGNOSIS — M47.9 OSTEOARTHRITIS OF SPINE, UNSPECIFIED SPINAL OSTEOARTHRITIS COMPLICATION STATUS, UNSPECIFIED SPINAL REGION: ICD-10-CM

## 2018-08-07 PROCEDURE — 99999 PR PBB SHADOW E&M-EST. PATIENT-LVL III: CPT | Mod: PBBFAC,,, | Performed by: NURSE PRACTITIONER

## 2018-08-07 PROCEDURE — 99214 OFFICE O/P EST MOD 30 MIN: CPT | Mod: S$GLB,,, | Performed by: NURSE PRACTITIONER

## 2018-08-07 PROCEDURE — 3077F SYST BP >= 140 MM HG: CPT | Mod: CPTII,S$GLB,, | Performed by: NURSE PRACTITIONER

## 2018-08-07 PROCEDURE — 3078F DIAST BP <80 MM HG: CPT | Mod: CPTII,S$GLB,, | Performed by: NURSE PRACTITIONER

## 2018-08-16 ENCOUNTER — TELEPHONE (OUTPATIENT)
Dept: PAIN MEDICINE | Facility: CLINIC | Age: 83
End: 2018-08-16

## 2018-08-16 NOTE — TELEPHONE ENCOUNTER
Staff contacted and spoke to patient regarding her message.     She reports she spoke to someone last week whom she requested to have her procedure sooner, she was provided 08/28/2018 and 09/04/2018, however she is on the schedule for 09/04/2018 and 09/11/2018.    She would like someone to contact her to [facility] for the days she requested as she has rearranged a lot of her personal things to make those dates.     She was informed that her request will be presented to the schedulers in [place] to contact her.

## 2018-08-16 NOTE — TELEPHONE ENCOUNTER
----- Message from Hannah Zuluaga sent at 8/16/2018 11:03 AM CDT -----  Contact: Self. [phone]  Patient would like to speak with you about not having an appointment for 8/28/18 like she was told. Please advise

## 2018-08-27 NOTE — DISCHARGE INSTRUCTIONS
Home Care Instructions Pain Management:    1.  DIET:    You may resume your normal diet today.    2.  BATHING:    You may shower with luke warm water.    3.  DRESSING:    You may remove your bandage today.    4.  ACTIVITY LEVEL:      You may resume your normal activities 24 hours after your procedure.    5.  MEDICATIONS:    You may resume your normal medications today.    6.  SPECIAL INSTRUCTIONS:    No heat to the injection site for 24 hours including bath or shower, heating pad, moist heat or hot tubs.    Use an ice pack to the injection site for any pain or discomfort.  Apply ice packs for 20 minute intervals as needed.    If you have received any sedatives by mouth today, you can not drive for 12 hours.    If you have received sedation through an IV, you can not drive for 24 hours.    PLEASE CALL YOUR DOCTOR FOR THE FOLLOWIN.  Redness or swelling around the injection site.  2.  Fever of 101 degrees.  3.  Drainage (pus) from the injection site.  4.  For any continuous bleeding (some dried blood over the incision is normal.)    FOR EMERGENCIES:    If any unusual problems or difficulties occur during clinic hours, call [phone] or dial [phone].    Follow up with with your physician in 2-3 weeks.       Procedural Sedation  Procedural sedation is medicine to ease discomfort, pain, and anxiety during a procedure. The medicine is often given through an IV (intravenous) line in your arm or hand. In some cases, the medicine may be taken by mouth or inhaled. While you are under sedation, you will likely be awake. But you may not remember anything afterward.  Why procedural sedation is used  Sedation is used for many types of procedures. The goal is to reduce pain, anxiety, and stressful memories of a procedure. It can also help your healthcare provider treat you. For example, having a broken bone fixed may be easier if you feel relaxed.  Procedural sedation is used only for short, basic procedures. It is not used  for complex surgeries. Some procedures that use this type of sedation include:  · Dental surgery  · Breast biopsy, to take a sample of breast tissue  · Endoscopy, to look at gastrointestinal problems  · Bronchoscopy, to check for lung problems  · Bone or joint realignment, to fix a broken bone or dislocated joint  · Minor foot or skin surgery  · Electrical cardioversion, to restore a normal heart rhythm  · Lumbar puncture, to assess neurological disease  Risks of procedural sedation  Procedural sedation has some risks and possible side effects, such as:  · Headache  · Nausea and vomiting  · Unpleasant memory of the procedure  · Lowered rate of breathing  · Changes in heart rate and blood pressure (rare)  · Inhalation of stomach contents into your lungs (rare)  Side effects will likely go away shortly after the procedure. Your healthcare team will watch your heart rate and breathing during your sedation. This is to help prevent problems.  Your own risks may vary based on your age and your overall health. They also depend on the type of sedation you are given. Talk with your healthcare provider about the risks that apply most to you.  Getting ready for procedural sedation  Talk with your healthcare provider about how to get ready for your procedure. Tell him or her about all the medicines you take. This includes over-the-counter medicines such as ibuprofen. It also includes vitamins, herbs, and other supplements. You may need to stop taking some medicines before the procedure, such as blood thinners and aspirin. If you smoke, you should stop to lessen the chance of a lung issue. Talk with your healthcare provider if you need help to stop smoking.  Tell your healthcare provider if you:  · Have had any problems in the past with sedation or anesthesia  · Have had any recent changes in your health, such as an infection or fever  · Are pregnant or think you may be pregnant  Also be sure to:  · Ask a family member or friend  to take you home after the procedure. You cant drive on the day you receive sedation.  · Not eat or drink after midnight the night before your procedure, if advised.  · Not plan on making any important decisions, such as financial or legal, for the day after you receive the sedation.  · Follow all other instructions from your healthcare provider.  During your procedural sedation  You may have your procedure in a hospital or a medical clinic. Sedation is done by a trained healthcare provider. In general, you can expect the following:  · You will be given medicine through an IV line in your arm or hand. Or you may receive a shot. The medicine may also be given by mouth. Or you may inhale it through a mask.  · If you receive medicine through an IV, you may feel the effects very quickly. You will start to feel relaxed and drowsy.  · During the procedure, your heart rate, breathing, and blood pressure will be closely watched. Your breathing and blood pressure may decrease a little. But you will likely not need help with your breathing. You may receive a little extra oxygen through a mask or through some soft plastic prongs underneath your nose.  · You will probably be awake the entire time. If you do fall asleep, you should be easy to wake up, if needed. You should feel little or no pain.  · When your procedure is over, the sedative medicine will be stopped.  After your procedural sedation  You will begin to feel more awake and aware. But you will likely be drowsy for a while afterward. You will be closely watched as you become more alert. You may have a faint memory of the procedure. Or you may not remember it at all.  You should be able to return home within an hour or two after your procedure. Plan to have someone stay with you for a few hours. Side effects such as headache and nausea may go away quickly. Tell your healthcare provider if they continue.  Dont drive or make any important decisions for at least 24  hours. Be sure to follow all after-care instructions.     When to call your healthcare provider  Have someone call your healthcare provider right away if you have any of these:  · Drowsiness that gets worse  · Weakness or dizziness that gets worse  · Repeated vomiting  · You cant be awakened  · Severe or ongoing pain from the procedure, not relieved by the pain medicine   Date Last Reviewed: 2/1/2017  © 2280-5978 [employer]. [address] [place] [address], [place] [address]. All rights reserved. This information is not intended as a substitute for professional medical care. Always follow your healthcare professional's instructions.

## 2018-08-27 NOTE — OR NURSING
Instructed the patient to arrive for procedure at 0800, pt also instructed to fast after midnight and transportation is required. Pt verb. Understanding. Last ASA taken on 8/24/2018.

## 2018-08-28 ENCOUNTER — HOSPITAL ENCOUNTER (OUTPATIENT)
Facility: HOSPITAL | Age: 83
Discharge: HOME | End: 2018-08-28
Attending: ANESTHESIOLOGY | Admitting: ANESTHESIOLOGY
Payer: MEDICARE

## 2018-08-28 VITALS
DIASTOLIC BLOOD PRESSURE: 77 MMHG | OXYGEN SATURATION: 100 % | SYSTOLIC BLOOD PRESSURE: 178 MMHG | RESPIRATION RATE: 17 BRPM | BODY MASS INDEX: 22.53 KG/M2 | HEART RATE: 58 BPM | TEMPERATURE: 98 F | WEIGHT: 132 LBS | HEIGHT: 64 IN

## 2018-08-28 DIAGNOSIS — M47.816 LUMBAR SPONDYLOSIS: Primary | ICD-10-CM

## 2018-08-28 DIAGNOSIS — G89.29 CHRONIC PAIN: ICD-10-CM

## 2018-08-28 PROCEDURE — 64635 DESTROY LUMB/SAC FACET JNT: CPT | Mod: RT,,, | Performed by: ANESTHESIOLOGY

## 2018-08-28 PROCEDURE — 25000003 PHARM REV CODE 250: Performed by: ANESTHESIOLOGY

## 2018-08-28 PROCEDURE — 63600175 PHARM REV CODE 636 W HCPCS: Performed by: ANESTHESIOLOGY

## 2018-08-28 PROCEDURE — 64635 DESTROY LUMB/SAC FACET JNT: CPT | Performed by: ANESTHESIOLOGY

## 2018-08-28 PROCEDURE — 64636 DESTROY L/S FACET JNT ADDL: CPT | Mod: RT,,, | Performed by: ANESTHESIOLOGY

## 2018-08-28 PROCEDURE — 99152 MOD SED SAME PHYS/QHP 5/>YRS: CPT | Mod: ,,, | Performed by: ANESTHESIOLOGY

## 2018-08-28 PROCEDURE — 64636 DESTROY L/S FACET JNT ADDL: CPT | Performed by: ANESTHESIOLOGY

## 2018-08-28 RX ORDER — MIDAZOLAM HYDROCHLORIDE 1 MG/ML
INJECTION INTRAMUSCULAR; INTRAVENOUS
Status: DISCONTINUED | OUTPATIENT
Start: 2018-08-28 | End: 2018-08-28 | Stop reason: HOSPADM

## 2018-08-28 RX ORDER — LIDOCAINE HYDROCHLORIDE 10 MG/ML
INJECTION, SOLUTION EPIDURAL; INFILTRATION; INTRACAUDAL; PERINEURAL
Status: DISCONTINUED | OUTPATIENT
Start: 2018-08-28 | End: 2018-08-28 | Stop reason: HOSPADM

## 2018-08-28 RX ORDER — SODIUM CHLORIDE 9 MG/ML
500 INJECTION, SOLUTION INTRAVENOUS CONTINUOUS
Status: DISCONTINUED | OUTPATIENT
Start: 2018-08-28 | End: 2018-08-28 | Stop reason: HOSPADM

## 2018-08-28 RX ORDER — BUPIVACAINE HYDROCHLORIDE 2.5 MG/ML
INJECTION, SOLUTION EPIDURAL; INFILTRATION; INTRACAUDAL
Status: DISCONTINUED | OUTPATIENT
Start: 2018-08-28 | End: 2018-08-28 | Stop reason: HOSPADM

## 2018-08-28 RX ORDER — FENTANYL CITRATE 50 UG/ML
INJECTION, SOLUTION INTRAMUSCULAR; INTRAVENOUS
Status: DISCONTINUED | OUTPATIENT
Start: 2018-08-28 | End: 2018-08-28 | Stop reason: HOSPADM

## 2018-08-28 RX ORDER — DEXAMETHASONE SODIUM PHOSPHATE 4 MG/ML
INJECTION, SOLUTION INTRA-ARTICULAR; INTRALESIONAL; INTRAMUSCULAR; INTRAVENOUS; SOFT TISSUE
Status: DISCONTINUED | OUTPATIENT
Start: 2018-08-28 | End: 2018-08-28 | Stop reason: HOSPADM

## 2018-08-28 RX ADMIN — SODIUM CHLORIDE 500 ML: 0.9 INJECTION, SOLUTION INTRAVENOUS at 08:08

## 2018-08-28 NOTE — PLAN OF CARE
Problem: Patient Care Overview  Goal: Plan of Care Review  Procedure completed, tolerated well. Discharge instructions given and explained to patient . Patient verbalized understanding. Will adhere to scheduled procedure on next week. Discharged home with  () via private vehicle. Safety maintained.

## 2018-08-28 NOTE — DISCHARGE SUMMARY
Discharge Note  Short Stay      SUMMARY     Admit Date: 8/28/2018    Attending Physician: Stephon Luke      Discharge Physician: Stephon Luke      Discharge Date: 8/28/2018 10:38 AM    Procedure(s) (LRB):  RADIOFREQUENCY ABLATION, NERVE, MEDIAL BRANCH, LUMBAR, 1 LEVEL- Right L3,4,5 IV SEDATION (Right)    Final Diagnosis: Facet arthropathy, lumbar [M46.96]  Facet hypertrophy of lumbar region [M47.896]    Disposition: Home or self care    Patient Instructions:   Discharge Medication List as of 8/27/2018 10:36 AM      START taking these medications    Details   aspirin 81 MG Chew Take 1 tablet (81 mg total) by mouth once daily., Starting Sun 11/26/2017, Until Mon 11/26/2018, Normal      b complex vitamins tablet Take 1 tablet by mouth once daily., Until Discontinued, Historical Med      estradiol (ESTRACE) 0.01 % (0.1 mg/gram) vaginal cream Place 1 g vaginally once daily. Use 1 gram of estrogen cream in vagina nightly x 2 weeks, then twice a week thereafter. ., Starting Fri 9/29/2017, Until Sat 9/29/2018, Normal      gabapentin (NEURONTIN) 300 MG capsule Take 1 capsule (300 mg total) by mouth every evening., Starting Fri 9/22/2017, Normal      losartan (COZAAR) 25 MG tablet Take 1 tablet (25 mg total) by mouth once daily., Starting Mon 3/5/2018, Until Tue 3/5/2019, Normal      meloxicam (MOBIC) 15 MG tablet Take 1 tablet (15 mg total) by mouth daily as needed for Pain., Starting Tue 7/24/2018, Until Mon 10/22/2018, Normal      omega-3 acid ethyl esters (LOVAZA) 1 gram capsule Take 2 g by mouth 2 (two) times daily., Historical Med      pravastatin (PRAVACHOL) 10 MG tablet Take 1 tablet (10 mg total) by mouth once daily., Starting Tue 6/5/2018, Normal      pyridoxine (VITAMIN B-6) 100 MG Tab Take 100 mg by mouth once daily., Until Discontinued, Historical Med                 Discharge Diagnosis: Facet arthropathy, lumbar [M46.96]  Facet hypertrophy of lumbar region [M47.896]  Condition on Discharge:  Stable with no complications to procedure   Diet on Discharge: Same as before.  Activity: as per instruction sheet.  Discharge to: Home with a responsible adult.  Follow up: 2-4 weeks

## 2018-08-28 NOTE — INTERVAL H&P NOTE
"The patient has been examined and the H&P has been reviewed:    I concur with the findings and no changes have occurred since H&P was written.    Anesthesia/Surgery risks, benefits and alternative options discussed and understood by patient/family.    HPI    Mrs. Ghosh is a 83 year old female with a past medical history significant for spondylosis without myelopathy, osteoarthritis of the spine, and facet arthritis of the lumbar region who presents for RFA medial branch of right L3, L4, L5.    PMHx, PSHx, Allergies, Medications reviewed in epic    ROS negative except pain complaints in HPI    OBJECTIVE:    BP (!) 176/81 (BP Location: Right arm, Patient Position: Lying)   Pulse (!) 52   Temp 97.7 °F (36.5 °C) (Oral)   Resp 16   Ht 5' 4" (1.626 m)   Wt 59.9 kg (132 lb)   LMP  (LMP Unknown)   SpO2 100%   Breastfeeding? No   BMI 22.66 kg/m²     PHYSICAL EXAMINATION:    GENERAL: Well appearing, in no acute distress, alert and oriented x3.  PSYCH:  Mood and affect appropriate.  SKIN: Skin color, texture, turgor normal, no rashes or lesions.  CV: RRR with palpation of the radial artery.  PULM: No evidence of respiratory difficulty, symmetric chest rise. Clear to auscultation.  NEURO: Cranial nerves grossly intact.    Plan:    Proceed with procedure as planned    Thanh Sung  08/28/2018        Active Hospital Problems    Diagnosis  POA    Chronic pain [G89.29]  Yes      Resolved Hospital Problems   No resolved problems to display.     "

## 2018-08-28 NOTE — OP NOTE
Patient Name: Cheryl Ghosh  MRN: [id number]    INFORMED CONSENT: The procedure, risks, benefits and options were discussed with patient. There are no contraindications to the procedure. The patient expressed understanding and agreed to proceed. The personnel performing the procedure was discussed. I verify that I personally obtained Cheryl's consent prior to the start of the procedure and the signed consent can be found on the patient's chart.    Procedure Date: 08/28/2018    Anesthesia: Topical    Assistant:  Stephon Luke MD    Pre Procedure diagnosis: Facet arthropathy, lumbar [M46.96]  Facet hypertrophy of lumbar region [M47.896]  1. Lumbar spondylosis    2. Chronic pain    3. Osteoarthritis of the spine m47.9  Post-Procedure diagnosis: SAME      Moderate Sedation: Yes - Fentanyl 50 mcg and Midazolam 1 mg    PROCEDURE: L3, L4, L5 FACET MEDIAL BRANCH NERVE RADIOFREQUENCY NEUROTOMY (lumbar)        DESCRIPTION OF PROCEDURE: The patient was brought to the procedure room.  After performing time out IV access was obtained prior to the procedure. The patient was positioned prone on the fluoroscopy table. Continuous hemodynamic monitoring was initiated including blood pressure and pulse oximetry. IV sedation was administered incrementally to allow the patient to remain comfortable and conversant throughout the procedure. The area of the lumbar spine was prepped chlorhexidine three times and draped into a sterile field.  Fluoroscopy was used to identify the location of the left side L3, L4, and L5 medial branch nerves at the junctions of the superior articular process and the transverse processes of L4, L5, and the sacral ala respectively.  Skin anesthesia was achieved using 3 cc of Lidocaine 1% over the injection sites. A 20 gauge, 100mm (10mm active tip) curved RF needle was slowly inserted at each level using AP, lateral and oblique fluoroscopic imaging. Negative aspiration for blood or CSF was confirmed.  Sensory  stimulation at 50Hz below 0.5V was achieved at every level. Motor stimulation at 2Hz up to 1.5V did not cause any radicular symptoms at any level. Each level was anesthetized with 1.5 cc of lidocaine 1%.  Radiofrequency lesioning was performed for 90 seconds at 80 degrees in two different positions at each level.  Total of 3 cc of bupivacaine 0.25% and 10 mg of Decadron was injected was injected at all levels, cumulatively.. The needles were removed and bleeding was nil.  A sterile dressing was applied. Cheryl was taken back to the recovery room for further observation.     Stimulation Results:    L3 = Sensory positive @ 0.5, Motor negative @ 1.5  L4 = Sensory positive @ 0.5, Motor negative @ 1.5  L5 = Sensory positive @ 0.5, Motor negative @ 1.5    Blood Loss: Nill  Specimen: None    Stephon Luke MD       I certify that I provided the above services.  I was present for the entire procedure, which was performed by myself with the assistance of the resident/fellow physician.  There were no parts of the procedure that were performed not by myself or without my direct supervision.

## 2018-08-31 NOTE — DISCHARGE INSTRUCTIONS
Home Care Instructions Pain Management:    1.  DIET:    You may resume your normal diet today.    2.  BATHING:    You may shower with luke warm water.    3.  DRESSING:    You may remove your bandage today.    4.  ACTIVITY LEVEL:      You may resume your normal activities 24 hours after your procedure.    5.  MEDICATIONS:    You may resume your normal medications today.    6.  SPECIAL INSTRUCTIONS:    No heat to the injection site for 24 hours including bath or shower, heating pad, moist heat or hot tubs.    Use an ice pack to the injection site for any pain or discomfort.  Apply ice packs for 20 minute intervals as needed.    If you have received any sedatives by mouth today, you can not drive for 12 hours.    If you have received sedation through an IV, you can not drive for 24 hours.    PLEASE CALL YOUR DOCTOR FOR THE FOLLOWIN.  Redness or swelling around the injection site.  2.  Fever of 101 degrees.  3.  Drainage (pus) from the injection site.  4.  For any continuous bleeding (some dried blood over the incision is normal.)    FOR EMERGENCIES:    If any unusual problems or difficulties occur during clinic hours, call [phone] or dial [phone].    Follow up with with your physician in 2-3 weeks.       Recovery After Procedural Sedation (Adult)  You have been given medicine by vein to make you sleep during your surgery. This may have included both a pain medicine and sleeping medicine. Most of the effects have worn off. But you may still have some drowsiness for the next 6 to 8 hours.  Home care  Follow these guidelines when you get home:  · For the next 8 hours, you should be watched by a responsible adult. This person should make sure your condition is not getting worse.  · Don't drink any alcohol for the next 24 hours.  · Don't drive, operate dangerous machinery, or make important business or personal decisions during the next 24 hours.  Note: Your healthcare provider may tell you not to take any  medicine by mouth for pain or sleep in the next 4 hours. These medicines may react with the medicines you were given in the hospital. This could cause a much stronger response than usual.  Follow-up care  Follow up with your healthcare provider if you are not alert and back to your usual level of activity within 12 hours.  When to seek medical advice  Call your healthcare provider right away if any of these occur:  · Drowsiness gets worse  · Weakness or dizziness gets worse  · Repeated vomiting  · You can't be awakened   Date Last Reviewed: 10/18/2016  © 5895-0670 [employer]. [address] [place] [address]. All rights reserved. This information is not intended as a substitute for professional medical care. Always follow your healthcare professional's instructions.

## 2018-09-04 ENCOUNTER — PATIENT MESSAGE (OUTPATIENT)
Dept: FAMILY MEDICINE | Facility: CLINIC | Age: 83
End: 2018-09-04

## 2018-09-04 ENCOUNTER — HOSPITAL ENCOUNTER (OUTPATIENT)
Facility: HOSPITAL | Age: 83
Discharge: HOME | End: 2018-09-04
Attending: ANESTHESIOLOGY | Admitting: ANESTHESIOLOGY
Payer: MEDICARE

## 2018-09-04 VITALS
DIASTOLIC BLOOD PRESSURE: 74 MMHG | HEIGHT: 64 IN | SYSTOLIC BLOOD PRESSURE: 158 MMHG | TEMPERATURE: 97 F | BODY MASS INDEX: 22.53 KG/M2 | WEIGHT: 132 LBS | OXYGEN SATURATION: 100 % | HEART RATE: 54 BPM | RESPIRATION RATE: 16 BRPM

## 2018-09-04 DIAGNOSIS — G89.29 CHRONIC PAIN: ICD-10-CM

## 2018-09-04 DIAGNOSIS — M47.9 OSTEOARTHRITIS OF SPINE, UNSPECIFIED SPINAL OSTEOARTHRITIS COMPLICATION STATUS, UNSPECIFIED SPINAL REGION: Primary | ICD-10-CM

## 2018-09-04 DIAGNOSIS — M47.819 SPONDYLOSIS WITHOUT MYELOPATHY: ICD-10-CM

## 2018-09-04 DIAGNOSIS — M47.815 OSTEOARTHRITIS OF THORACOLUMBAR SPINE, UNSPECIFIED SPINAL OSTEOARTHRITIS COMPLICATION STATUS: ICD-10-CM

## 2018-09-04 PROCEDURE — 25000003 PHARM REV CODE 250: Performed by: ANESTHESIOLOGY

## 2018-09-04 PROCEDURE — 25000003 PHARM REV CODE 250: Performed by: STUDENT IN AN ORGANIZED HEALTH CARE EDUCATION/TRAINING PROGRAM

## 2018-09-04 PROCEDURE — 63600175 PHARM REV CODE 636 W HCPCS: Performed by: ANESTHESIOLOGY

## 2018-09-04 PROCEDURE — 64636 DESTROY L/S FACET JNT ADDL: CPT | Mod: LT,,, | Performed by: ANESTHESIOLOGY

## 2018-09-04 PROCEDURE — 64636 DESTROY L/S FACET JNT ADDL: CPT | Performed by: ANESTHESIOLOGY

## 2018-09-04 PROCEDURE — 99152 MOD SED SAME PHYS/QHP 5/>YRS: CPT | Mod: ,,, | Performed by: ANESTHESIOLOGY

## 2018-09-04 PROCEDURE — 64635 DESTROY LUMB/SAC FACET JNT: CPT | Performed by: ANESTHESIOLOGY

## 2018-09-04 PROCEDURE — 64635 DESTROY LUMB/SAC FACET JNT: CPT | Mod: LT,,, | Performed by: ANESTHESIOLOGY

## 2018-09-04 RX ORDER — FENTANYL CITRATE 50 UG/ML
INJECTION, SOLUTION INTRAMUSCULAR; INTRAVENOUS
Status: DISCONTINUED | OUTPATIENT
Start: 2018-09-04 | End: 2018-09-04 | Stop reason: HOSPADM

## 2018-09-04 RX ORDER — DEXAMETHASONE SODIUM PHOSPHATE 10 MG/ML
INJECTION INTRAMUSCULAR; INTRAVENOUS
Status: DISCONTINUED | OUTPATIENT
Start: 2018-09-04 | End: 2018-09-04 | Stop reason: HOSPADM

## 2018-09-04 RX ORDER — SODIUM CHLORIDE 9 MG/ML
500 INJECTION, SOLUTION INTRAVENOUS CONTINUOUS
Status: DISCONTINUED | OUTPATIENT
Start: 2018-09-04 | End: 2018-09-04 | Stop reason: HOSPADM

## 2018-09-04 RX ORDER — MIDAZOLAM HYDROCHLORIDE 1 MG/ML
INJECTION INTRAMUSCULAR; INTRAVENOUS
Status: DISCONTINUED | OUTPATIENT
Start: 2018-09-04 | End: 2018-09-04 | Stop reason: HOSPADM

## 2018-09-04 RX ORDER — LIDOCAINE HYDROCHLORIDE 10 MG/ML
INJECTION, SOLUTION EPIDURAL; INFILTRATION; INTRACAUDAL; PERINEURAL
Status: DISCONTINUED | OUTPATIENT
Start: 2018-09-04 | End: 2018-09-04 | Stop reason: HOSPADM

## 2018-09-04 RX ORDER — BUPIVACAINE HYDROCHLORIDE 2.5 MG/ML
INJECTION, SOLUTION EPIDURAL; INFILTRATION; INTRACAUDAL
Status: DISCONTINUED | OUTPATIENT
Start: 2018-09-04 | End: 2018-09-04 | Stop reason: HOSPADM

## 2018-09-04 RX ADMIN — SODIUM CHLORIDE 500 ML: 0.9 INJECTION, SOLUTION INTRAVENOUS at 10:09

## 2018-09-04 NOTE — PLAN OF CARE
Problem: Patient Care Overview  Goal: Plan of Care Review  Outcome: Outcome(s) achieved Date Met: 09/04/18  Procedure complete, tolerated well. Discharge instructions explained and given to patient. Patient verbalized understanding. Discharged home with family member Tanner () via private vehicle. Will adhere to follow up visit in pain clinic as scheduled. Safety maintained.

## 2018-09-04 NOTE — OP NOTE
Patient Name: Cheryl Ghosh  MRN: [id number]    INFORMED CONSENT: The procedure, risks, benefits and options were discussed with patient. There are no contraindications to the procedure. The patient expressed understanding and agreed to proceed. The personnel performing the procedure was discussed. I verify that I personally obtained Cheryl's consent prior to the start of the procedure and the signed consent can be found on the patient's chart.    Procedure Date: 09/04/2018    Anesthesia: Topical    Pre Procedure diagnosis: Facet arthropathy, lumbar [M46.96]  Facet hypertrophy of lumbar region [M47.896]  1. Osteoarthritis of spine, unspecified spinal osteoarthritis complication status, unspecified spinal region    2. Spondylosis without myelopathy    3. Osteoarthritis of thoracolumbar spine, unspecified spinal osteoarthritis complication status    4. Chronic pain      Post-Procedure diagnosis: SAME      Moderate Sedation: Yes - Fentanyl 50 mcg and Midazolam 1 mg    PROCEDURE: left L3-4-5 FACET MEDIAL BRANCH NERVE RADIOFREQUENCY NEUROTOMY (lumbar)          DESCRIPTION OF PROCEDURE: The patient was brought to the procedure room.  After performing time out IV access was obtained prior to the procedure. The patient was positioned prone on the fluoroscopy table. Continuous hemodynamic monitoring was initiated including blood pressure and pulse oximetry. IV sedation was administered incrementally to allow the patient to remain comfortable and conversant throughout the procedure. The area of the lumbar spine was prepped chlorhexidine three times and draped into a sterile field.  Fluoroscopy was used to identify the location of the left side L3, L4, and L5 medial branch nerves at the junctions of the superior articular process and the transverse processes of  L4, L5, and the sacral ala respectively.  Skin anesthesia was achieved using 3 cc of Lidocaine 1% over the injection sites. A 20 gauge, 100mm (10mm active tip) curved  RF needle was slowly inserted at each level using AP, lateral and oblique fluoroscopic imaging. Negative aspiration for blood or CSF was confirmed.  Sensory stimulation at 50Hz below 0.5V was achieved at every level. Motor stimulation at 2Hz up to 1.5V did not cause any radicular symptoms at any level. Each level was anesthetized with 1.5 cc of lidocaine 1%.  Radiofrequency lesioning was performed for 90 seconds at 80 degrees in two different positions at each level.  Total of 3 cc of bupivacaine 0.25% and 10 mg of Decadron was injected was injected at all levels.. The needles were removed and bleeding was nil.  A sterile dressing was applied. Cheryl was taken back to the recovery room for further observation.     Stimulation Results:    L3 = Sensory positive @ 0.4, Motor negative @ 1.5  L4 = Sensory positive @ 0.5, Motor negative @ 1.5  L5 = Sensory positive @ 0.4, Motor negative @ 1.5    Blood Loss: Nill  Specimen: None    Eddie Vega MD

## 2018-09-04 NOTE — DISCHARGE SUMMARY
Discharge Note  Short Stay      SUMMARY     Admit Date: 9/4/2018    Attending Physician: Eddie Vega      Discharge Physician: Eddie Vega      Discharge Date: 9/4/2018 11:16 AM    Procedure(s) (LRB):  RADIOFREQUENCY ABLATION, NERVE, MEDIAL BRANCH, LUMBAR, 1 LEVEL - Left L3,4,5 IV SEADTION (Left)    Final Diagnosis: Facet arthropathy, lumbar [M46.96]  Facet hypertrophy of lumbar region [M47.896]    Disposition: Home or self care    Patient Instructions:   Current Discharge Medication List      CONTINUE these medications which have NOT CHANGED    Details   b complex vitamins tablet Take 1 tablet by mouth once daily.      gabapentin (NEURONTIN) 300 MG capsule Take 1 capsule (300 mg total) by mouth every evening.  Qty: 90 capsule, Refills: 3    Associated Diagnoses: Neuropathic pain of both legs      losartan (COZAAR) 25 MG tablet Take 1 tablet (25 mg total) by mouth once daily.  Qty: 90 tablet, Refills: 3    Associated Diagnoses: Essential hypertension      meloxicam (MOBIC) 15 MG tablet Take 1 tablet (15 mg total) by mouth daily as needed for Pain.  Qty: 90 tablet, Refills: 0    Associated Diagnoses: Primary osteoarthritis of both knees      omega-3 acid ethyl esters (LOVAZA) 1 gram capsule Take 2 g by mouth 2 (two) times daily.      pravastatin (PRAVACHOL) 10 MG tablet Take 1 tablet (10 mg total) by mouth once daily.  Qty: 90 tablet, Refills: 3    Associated Diagnoses: Hyperlipidemia, unspecified hyperlipidemia type      pyridoxine (VITAMIN B-6) 100 MG Tab Take 100 mg by mouth once daily.      aspirin 81 MG Chew Take 1 tablet (81 mg total) by mouth once daily.  Qty: 90 tablet, Refills: 3      estradiol (ESTRACE) 0.01 % (0.1 mg/gram) vaginal cream Place 1 g vaginally once daily. Use 1 gram of estrogen cream in vagina nightly x 2 weeks, then twice a week thereafter. .  Qty: 42.5 g, Refills: 1    Comments: BIN#:[id number] PCN#:[id number] EDNA#:[id number] ID#:[id number]  Associated Diagnoses: Pessary maintenance; Vaginal  atrophy                 Discharge Diagnosis: Facet arthropathy, lumbar [M46.96]  Facet hypertrophy of lumbar region [M47.896]  Condition on Discharge: Stable with no complications to procedure   Diet on Discharge: Same as before.  Activity: as per instruction sheet.  Discharge to: Home with a responsible adult.  Follow up: 2-4 weeks

## 2018-09-04 NOTE — H&P
"HPI   Ms. Ghosh presents for lumbar radiofrequency ablation for lumbar osteoarthritis of the spine. She denies recent antibiotic use, anticoagulation or changes in her health history.         Past Medical History:   Diagnosis Date    Arthritis     lumbar    Blood transfusion     Chronic kidney disease, stage III (moderate) 8/2/2017    Degenerative disc disease     lumbar    Insulinoma     Pancreatic disease     Urinary incontinence 12/7/2015     Past Surgical History:   Procedure Laterality Date    ADENOIDECTOMY      APPENDECTOMY      CATARACT EXTRACTION, BILATERAL      EYE SURGERY      HYSTERECTOMY      SAMM/BSO    OOPHORECTOMY      PANCREAS SURGERY      Insulanoma    REFRACTIVE SURGERY Bilateral     TONSILLECTOMY       Review of patient's allergies indicates:   Allergen Reactions    Pcn [penicillins] Hives and Nausea And Vomiting        PMHx, PSHx, Allergies, Medications reviewed in epic      ROS negative except pain complaints in HPI    OBJECTIVE:    BP (!) 182/82 (BP Location: Right arm, Patient Position: Lying)   Pulse (!) 53   Temp 97.6 °F (36.4 °C) (Oral)   Resp 16   Ht 5' 4" (1.626 m)   Wt 59.9 kg (132 lb)   LMP  (LMP Unknown)   SpO2 100%   Breastfeeding? No   BMI 22.66 kg/m²     PHYSICAL EXAMINATION:    GENERAL: Well appearing, in no acute distress, alert and oriented x3.  PSYCH:  Mood and affect appropriate.  SKIN: Skin color, texture, turgor normal, no rashes or lesions.  CV: RRR with palpation of the radial artery.  PULM: No evidence of respiratory difficulty, symmetric chest rise. Clear to auscultation.  NEURO: Cranial nerves grossly intact.    Plan:    Proceed with procedure as planned lumbar radiofrequency ablation.     Kathy Meek  09/04/2018    "

## 2018-09-07 ENCOUNTER — LAB VISIT (OUTPATIENT)
Dept: LAB | Facility: HOSPITAL | Age: 83
End: 2018-09-07
Attending: FAMILY MEDICINE
Payer: MEDICARE

## 2018-09-07 DIAGNOSIS — N18.30 CHRONIC KIDNEY DISEASE, STAGE III (MODERATE): ICD-10-CM

## 2018-09-07 DIAGNOSIS — E78.5 HYPERLIPIDEMIA, UNSPECIFIED HYPERLIPIDEMIA TYPE: ICD-10-CM

## 2018-09-07 DIAGNOSIS — I10 HTN (HYPERTENSION), BENIGN: ICD-10-CM

## 2018-09-07 LAB
25(OH)D3+25(OH)D2 SERPL-MCNC: 39 NG/ML
ALBUMIN SERPL BCP-MCNC: 3.7 G/DL
ALP SERPL-CCNC: 59 U/L
ALT SERPL W/O P-5'-P-CCNC: 15 U/L
ANION GAP SERPL CALC-SCNC: 7 MMOL/L
AST SERPL-CCNC: 23 U/L
BASOPHILS # BLD AUTO: 0.03 K/UL
BASOPHILS NFR BLD: 0.4 %
BILIRUB SERPL-MCNC: 0.5 MG/DL
BUN SERPL-MCNC: 28 MG/DL
CALCIUM SERPL-MCNC: 9.4 MG/DL
CHLORIDE SERPL-SCNC: 110 MMOL/L
CHOLEST SERPL-MCNC: 183 MG/DL
CHOLEST/HDLC SERPL: 4.6 {RATIO}
CO2 SERPL-SCNC: 26 MMOL/L
CREAT SERPL-MCNC: 1.2 MG/DL
DIFFERENTIAL METHOD: ABNORMAL
EOSINOPHIL # BLD AUTO: 0.2 K/UL
EOSINOPHIL NFR BLD: 2 %
ERYTHROCYTE [DISTWIDTH] IN BLOOD BY AUTOMATED COUNT: 13.6 %
EST. GFR  (AFRICAN AMERICAN): 48 ML/MIN/1.73 M^2
EST. GFR  (NON AFRICAN AMERICAN): 42 ML/MIN/1.73 M^2
GLUCOSE SERPL-MCNC: 93 MG/DL
HCT VFR BLD AUTO: 36.9 %
HDLC SERPL-MCNC: 40 MG/DL
HDLC SERPL: 21.9 %
HGB BLD-MCNC: 12.1 G/DL
LDLC SERPL CALC-MCNC: 91.6 MG/DL
LYMPHOCYTES # BLD AUTO: 4.2 K/UL
LYMPHOCYTES NFR BLD: 52.2 %
MCH RBC QN AUTO: 30.6 PG
MCHC RBC AUTO-ENTMCNC: 32.8 G/DL
MCV RBC AUTO: 93 FL
MONOCYTES # BLD AUTO: 0.8 K/UL
MONOCYTES NFR BLD: 10.3 %
NEUTROPHILS # BLD AUTO: 2.8 K/UL
NEUTROPHILS NFR BLD: 35 %
NONHDLC SERPL-MCNC: 143 MG/DL
PLATELET # BLD AUTO: 157 K/UL
PMV BLD AUTO: 10.2 FL
POTASSIUM SERPL-SCNC: 4.4 MMOL/L
PROT SERPL-MCNC: 6.5 G/DL
PTH-INTACT SERPL-MCNC: 95.7 PG/ML
RBC # BLD AUTO: 3.95 M/UL
SODIUM SERPL-SCNC: 143 MMOL/L
TRIGL SERPL-MCNC: 257 MG/DL
WBC # BLD AUTO: 8.07 K/UL

## 2018-09-07 PROCEDURE — 80061 LIPID PANEL: CPT

## 2018-09-07 PROCEDURE — 82306 VITAMIN D 25 HYDROXY: CPT

## 2018-09-07 PROCEDURE — 85025 COMPLETE CBC W/AUTO DIFF WBC: CPT

## 2018-09-07 PROCEDURE — 83970 ASSAY OF PARATHORMONE: CPT

## 2018-09-07 PROCEDURE — 36415 COLL VENOUS BLD VENIPUNCTURE: CPT

## 2018-09-07 PROCEDURE — 80053 COMPREHEN METABOLIC PANEL: CPT

## 2018-09-14 ENCOUNTER — OFFICE VISIT (OUTPATIENT)
Dept: FAMILY MEDICINE | Facility: CLINIC | Age: 83
End: 2018-09-14
Attending: FAMILY MEDICINE
Payer: MEDICARE

## 2018-09-14 VITALS
WEIGHT: 134.06 LBS | DIASTOLIC BLOOD PRESSURE: 74 MMHG | BODY MASS INDEX: 22.89 KG/M2 | HEART RATE: 62 BPM | HEIGHT: 64 IN | TEMPERATURE: 98 F | OXYGEN SATURATION: 99 % | SYSTOLIC BLOOD PRESSURE: 139 MMHG

## 2018-09-14 DIAGNOSIS — E78.2 MIXED HYPERLIPIDEMIA: ICD-10-CM

## 2018-09-14 DIAGNOSIS — N18.30 CHRONIC KIDNEY DISEASE, STAGE III (MODERATE): ICD-10-CM

## 2018-09-14 DIAGNOSIS — I50.32 CHRONIC DIASTOLIC HEART FAILURE: ICD-10-CM

## 2018-09-14 DIAGNOSIS — I70.0 AORTIC ATHEROSCLEROSIS: ICD-10-CM

## 2018-09-14 DIAGNOSIS — I10 HTN (HYPERTENSION), BENIGN: Primary | ICD-10-CM

## 2018-09-14 PROCEDURE — 1101F PT FALLS ASSESS-DOCD LE1/YR: CPT | Mod: CPTII,,, | Performed by: FAMILY MEDICINE

## 2018-09-14 PROCEDURE — 99999 PR PBB SHADOW E&M-EST. PATIENT-LVL III: CPT | Mod: PBBFAC,,, | Performed by: FAMILY MEDICINE

## 2018-09-14 PROCEDURE — 99213 OFFICE O/P EST LOW 20 MIN: CPT | Mod: PBBFAC,PO | Performed by: FAMILY MEDICINE

## 2018-09-14 PROCEDURE — 3078F DIAST BP <80 MM HG: CPT | Mod: CPTII,,, | Performed by: FAMILY MEDICINE

## 2018-09-14 PROCEDURE — 3075F SYST BP GE 130 - 139MM HG: CPT | Mod: CPTII,,, | Performed by: FAMILY MEDICINE

## 2018-09-14 PROCEDURE — 99214 OFFICE O/P EST MOD 30 MIN: CPT | Mod: S$PBB,,, | Performed by: FAMILY MEDICINE

## 2018-09-14 RX ORDER — LISINOPRIL 20 MG/1
20 TABLET ORAL 2 TIMES DAILY
Qty: 60 TABLET | Refills: 2 | Status: SHIPPED | OUTPATIENT
Start: 2018-09-14 | End: 2018-10-12 | Stop reason: SDUPTHER

## 2018-09-14 NOTE — PATIENT INSTRUCTIONS
Take lisinopril 20 mg two times a day - skip the dosage if systolic is 110 or below - do take it if systolic is more than 110

## 2018-09-14 NOTE — PROGRESS NOTES
Subjective:       Patient ID: Cheryl Ghosh is a 83 y.o. female.    Chief Complaint: Hypertension    83 yr old pleasant white female with HTN, OA, osteopenia hip, hyperlipidemia, chronic back pain, BPPV, presents today for her follow up and concerns regarding BP.        HTN - fluctuating - on losartan 25 daily - some numbers in evening are high - her diet has not changed -       HLD - slightly worsening since stopped statin x 30 days           - she was asked to start statin but due to her fall and issues with her ribs and back, she was never able to start it.    Back pain - she follows pain clinic for injections - c/o neuropathic pain in legs - was on neurontin in the past and she started taking it and working    Osteopenia hip - stable - had dexa in 12/15 and she is talking fosamax since many years and stopped last year- she is due for dexa in 12/17 - no side effects    HISTORY as below - reviewed    Health maintenance  -labs UTD  -mammo UTD  -colon screen UTD  -vaccines UTD      Hypertension   This is a chronic problem. The current episode started more than 1 month ago. The problem has been gradually worsening since onset. The problem is resistant. Associated symptoms include headaches, malaise/fatigue and neck pain. Pertinent negatives include no anxiety, blurred vision, chest pain, orthopnea, palpitations, peripheral edema, PND, shortness of breath or sweats. Agents associated with hypertension include amphetamines and NSAIDs. Risk factors for coronary artery disease include dyslipidemia and family history. Past treatments include nothing. Compliance problems include exercise and medication side effects.  There is no history of chronic renal disease.   Hyperlipidemia   This is a chronic problem. The current episode started more than 1 year ago. The problem is uncontrolled. Recent lipid tests were reviewed and are high. She has no history of chronic renal disease, diabetes, hypothyroidism, liver disease,  obesity or nephrotic syndrome. There are no known factors aggravating her hyperlipidemia. Associated symptoms include myalgias. Pertinent negatives include no chest pain, focal sensory loss, leg pain or shortness of breath. Current antihyperlipidemic treatment includes diet change. The current treatment provides mild improvement of lipids. There are no compliance problems.  Risk factors for coronary artery disease include dyslipidemia.     Review of Systems   Constitutional: Positive for malaise/fatigue. Negative for activity change, diaphoresis and unexpected weight change.   HENT: Negative.  Negative for congestion, ear discharge, hearing loss, rhinorrhea, sore throat and voice change.    Eyes: Negative.  Negative for blurred vision, pain, discharge and visual disturbance.   Respiratory: Negative.  Negative for chest tightness, shortness of breath and wheezing.    Cardiovascular: Negative.  Negative for chest pain, palpitations, orthopnea and PND.   Gastrointestinal: Negative.  Negative for abdominal distention, anal bleeding, constipation and nausea.   Endocrine: Negative.  Negative for cold intolerance, polydipsia and polyuria.   Genitourinary: Negative.  Negative for decreased urine volume, difficulty urinating, dysuria, frequency, menstrual problem and vaginal pain.   Musculoskeletal: Positive for arthralgias, back pain, myalgias and neck pain. Negative for gait problem.   Skin: Negative.  Negative for color change, pallor and wound.   Allergic/Immunologic: Negative.  Negative for environmental allergies and immunocompromised state.   Neurological: Positive for headaches. Negative for dizziness, tremors, seizures and speech difficulty.   Hematological: Negative.  Negative for adenopathy. Does not bruise/bleed easily.   Psychiatric/Behavioral: Negative.  Negative for agitation, confusion, decreased concentration, hallucinations, self-injury and suicidal ideas. The patient is not nervous/anxious.         PMH/PSH/FH/SH/MED/ALLERGY reviewed    Objective:       Vitals:    09/14/18 0958   BP: 139/74   Pulse: 62   Temp: 97.8 °F (36.6 °C)       Physical Exam   Constitutional: She is oriented to person, place, and time. She appears well-developed and well-nourished. No distress.   HENT:   Head: Normocephalic and atraumatic.   Right Ear: External ear normal.   Left Ear: External ear normal.   Nose: Nose normal.   Mouth/Throat: Oropharynx is clear and moist. No oropharyngeal exudate.   Eyes: Conjunctivae and EOM are normal. Pupils are equal, round, and reactive to light. Right eye exhibits no discharge. Left eye exhibits no discharge. No scleral icterus.   Neck: Normal range of motion. Neck supple. No JVD present. No tracheal deviation present. No thyromegaly present.   Cardiovascular: Normal rate, regular rhythm, normal heart sounds and intact distal pulses. Exam reveals no gallop and no friction rub.   No murmur heard.  Pulmonary/Chest: Effort normal and breath sounds normal. No stridor. She has no wheezes. She has no rales. She exhibits no tenderness.   Abdominal: Soft. Bowel sounds are normal. She exhibits no distension and no mass. There is no tenderness. There is no rebound and no guarding. No hernia.   Musculoskeletal: Normal range of motion. She exhibits no edema or tenderness.   Lymphadenopathy:     She has no cervical adenopathy.   Neurological: She is alert and oriented to person, place, and time. She has normal reflexes. She displays normal reflexes. No cranial nerve deficit. She exhibits normal muscle tone. Coordination normal.   Skin: Skin is warm and dry. No rash noted. She is not diaphoretic. No erythema. No pallor.   Psychiatric: She has a normal mood and affect. Her behavior is normal. Judgment and thought content normal.       Assessment:       1. HTN (hypertension), benign    2. Chronic kidney disease, stage III (moderate)    3. Chronic diastolic heart failure    4. Aortic atherosclerosis    5. Mixed  hyperlipidemia        Plan:       Cheryl was seen today for hypertension.    Diagnoses and all orders for this visit:    HTN (hypertension), benign  -     lisinopril (PRINIVIL,ZESTRIL) 20 MG tablet; Take 1 tablet (20 mg total) by mouth 2 (two) times daily.    Chronic kidney disease, stage III (moderate)    Chronic diastolic heart failure    Aortic atherosclerosis    Mixed hyperlipidemia        HTN  -DC losartan 25 and start lisinopril 20 BID. Side effects of medications have been discussed and patient agreed to proceed with treatment and understands the risks and benefits.        TIA  -follow neurology - DC plavix per neuro recommendations and just stay on ASA    Insomnia  -try benadryl only if needed    HLD  -diet controlled and statin    Urine incontinence  -lifestyle changes - intolerant to meds    Neuropathic pain  -continue neurontin    OA knee B/L  -mobic prn    CKD III  -lab and urine analysis  -DC HCTZ  -adequate hydration      Spent adequate time in obtaining history and explaining differentials    40 minutes spent during this visit of which greater than 50% devoted to face-face counseling and coordination of care regarding diagnosis and management plan    Follow-up in about 4 weeks (around 10/12/2018), or if symptoms worsen or fail to improve.

## 2018-09-20 ENCOUNTER — OFFICE VISIT (OUTPATIENT)
Dept: PAIN MEDICINE | Facility: CLINIC | Age: 83
End: 2018-09-20
Payer: MEDICARE

## 2018-09-20 VITALS
DIASTOLIC BLOOD PRESSURE: 72 MMHG | BODY MASS INDEX: 24.33 KG/M2 | WEIGHT: 142.5 LBS | HEIGHT: 64 IN | SYSTOLIC BLOOD PRESSURE: 185 MMHG | HEART RATE: 67 BPM

## 2018-09-20 DIAGNOSIS — M46.1 SACROILIITIS: ICD-10-CM

## 2018-09-20 DIAGNOSIS — M47.9 OSTEOARTHRITIS OF SPINE, UNSPECIFIED SPINAL OSTEOARTHRITIS COMPLICATION STATUS, UNSPECIFIED SPINAL REGION: Primary | ICD-10-CM

## 2018-09-20 DIAGNOSIS — G89.4 CHRONIC PAIN SYNDROME: ICD-10-CM

## 2018-09-20 DIAGNOSIS — M47.819 SPONDYLOSIS WITHOUT MYELOPATHY: ICD-10-CM

## 2018-09-20 DIAGNOSIS — M47.816 FACET ARTHRITIS OF LUMBAR REGION: ICD-10-CM

## 2018-09-20 PROCEDURE — 99214 OFFICE O/P EST MOD 30 MIN: CPT | Mod: S$PBB,,, | Performed by: NURSE PRACTITIONER

## 2018-09-20 PROCEDURE — 99499 UNLISTED E&M SERVICE: CPT | Mod: S$GLB,,, | Performed by: NURSE PRACTITIONER

## 2018-09-20 PROCEDURE — 3077F SYST BP >= 140 MM HG: CPT | Mod: CPTII,,, | Performed by: NURSE PRACTITIONER

## 2018-09-20 PROCEDURE — 99213 OFFICE O/P EST LOW 20 MIN: CPT | Mod: PBBFAC,PO | Performed by: NURSE PRACTITIONER

## 2018-09-20 PROCEDURE — 1101F PT FALLS ASSESS-DOCD LE1/YR: CPT | Mod: CPTII,,, | Performed by: NURSE PRACTITIONER

## 2018-09-20 PROCEDURE — 99999 PR PBB SHADOW E&M-EST. PATIENT-LVL III: CPT | Mod: PBBFAC,,, | Performed by: NURSE PRACTITIONER

## 2018-09-20 PROCEDURE — 3078F DIAST BP <80 MM HG: CPT | Mod: CPTII,,, | Performed by: NURSE PRACTITIONER

## 2018-09-20 RX ORDER — METHYLPREDNISOLONE 4 MG/1
TABLET ORAL
Qty: 1 PACKAGE | Refills: 0 | Status: SHIPPED | OUTPATIENT
Start: 2018-09-20 | End: 2018-10-12

## 2018-09-21 ENCOUNTER — PATIENT MESSAGE (OUTPATIENT)
Dept: PAIN MEDICINE | Facility: CLINIC | Age: 83
End: 2018-09-21

## 2018-09-21 ENCOUNTER — TELEPHONE (OUTPATIENT)
Dept: PAIN MEDICINE | Facility: CLINIC | Age: 83
End: 2018-09-21

## 2018-09-25 DIAGNOSIS — G57.93 NEUROPATHIC PAIN OF BOTH LEGS: ICD-10-CM

## 2018-09-25 RX ORDER — GABAPENTIN 300 MG/1
300 CAPSULE ORAL NIGHTLY
Qty: 90 CAPSULE | Refills: 2 | Status: SHIPPED | OUTPATIENT
Start: 2018-09-25 | End: 2018-09-28 | Stop reason: SDUPTHER

## 2018-09-26 ENCOUNTER — PATIENT MESSAGE (OUTPATIENT)
Dept: PAIN MEDICINE | Facility: CLINIC | Age: 83
End: 2018-09-26

## 2018-09-28 DIAGNOSIS — G57.93 NEUROPATHIC PAIN OF BOTH LEGS: ICD-10-CM

## 2018-09-28 RX ORDER — GABAPENTIN 300 MG/1
300 CAPSULE ORAL NIGHTLY
Qty: 90 CAPSULE | Refills: 2 | Status: SHIPPED | OUTPATIENT
Start: 2018-09-28 | End: 2018-10-12 | Stop reason: SDUPTHER

## 2018-10-02 ENCOUNTER — HOSPITAL ENCOUNTER (OUTPATIENT)
Dept: RADIOLOGY | Facility: HOSPITAL | Age: 83
Discharge: HOME | End: 2018-10-02
Attending: NURSE PRACTITIONER
Payer: MEDICARE

## 2018-10-02 DIAGNOSIS — G89.4 CHRONIC PAIN SYNDROME: ICD-10-CM

## 2018-10-02 DIAGNOSIS — M47.819 SPONDYLOSIS WITHOUT MYELOPATHY: ICD-10-CM

## 2018-10-02 DIAGNOSIS — M47.9 OSTEOARTHRITIS OF SPINE, UNSPECIFIED SPINAL OSTEOARTHRITIS COMPLICATION STATUS, UNSPECIFIED SPINAL REGION: ICD-10-CM

## 2018-10-02 DIAGNOSIS — M47.816 FACET ARTHRITIS OF LUMBAR REGION: ICD-10-CM

## 2018-10-02 PROCEDURE — 72148 MRI LUMBAR SPINE W/O DYE: CPT | Mod: TC

## 2018-10-02 PROCEDURE — 72148 MRI LUMBAR SPINE W/O DYE: CPT | Mod: 26,,, | Performed by: RADIOLOGY

## 2018-10-03 ENCOUNTER — PATIENT MESSAGE (OUTPATIENT)
Dept: PAIN MEDICINE | Facility: CLINIC | Age: 83
End: 2018-10-03

## 2018-10-08 ENCOUNTER — OFFICE VISIT (OUTPATIENT)
Dept: PAIN MEDICINE | Facility: CLINIC | Age: 83
End: 2018-10-08
Payer: MEDICARE

## 2018-10-08 VITALS
BODY MASS INDEX: 23.12 KG/M2 | WEIGHT: 134.69 LBS | HEART RATE: 73 BPM | SYSTOLIC BLOOD PRESSURE: 131 MMHG | DIASTOLIC BLOOD PRESSURE: 73 MMHG

## 2018-10-08 DIAGNOSIS — M46.1 SACROILIITIS: ICD-10-CM

## 2018-10-08 DIAGNOSIS — G89.4 CHRONIC PAIN SYNDROME: ICD-10-CM

## 2018-10-08 DIAGNOSIS — M47.816 FACET ARTHRITIS OF LUMBAR REGION: ICD-10-CM

## 2018-10-08 DIAGNOSIS — M47.819 SPONDYLOSIS WITHOUT MYELOPATHY: ICD-10-CM

## 2018-10-08 DIAGNOSIS — M47.9 OSTEOARTHRITIS OF SPINE, UNSPECIFIED SPINAL OSTEOARTHRITIS COMPLICATION STATUS, UNSPECIFIED SPINAL REGION: Primary | ICD-10-CM

## 2018-10-08 PROCEDURE — 99214 OFFICE O/P EST MOD 30 MIN: CPT | Mod: S$PBB,,, | Performed by: NURSE PRACTITIONER

## 2018-10-08 PROCEDURE — 3078F DIAST BP <80 MM HG: CPT | Mod: CPTII,,, | Performed by: NURSE PRACTITIONER

## 2018-10-08 PROCEDURE — 1101F PT FALLS ASSESS-DOCD LE1/YR: CPT | Mod: CPTII,,, | Performed by: NURSE PRACTITIONER

## 2018-10-08 PROCEDURE — 3075F SYST BP GE 130 - 139MM HG: CPT | Mod: CPTII,,, | Performed by: NURSE PRACTITIONER

## 2018-10-08 PROCEDURE — 99213 OFFICE O/P EST LOW 20 MIN: CPT | Mod: PBBFAC,PO | Performed by: NURSE PRACTITIONER

## 2018-10-08 PROCEDURE — 99999 PR PBB SHADOW E&M-EST. PATIENT-LVL III: CPT | Mod: PBBFAC,,, | Performed by: NURSE PRACTITIONER

## 2018-10-08 PROCEDURE — 99499 UNLISTED E&M SERVICE: CPT | Mod: S$GLB,,, | Performed by: NURSE PRACTITIONER

## 2018-10-08 NOTE — PROGRESS NOTES
Chronic patient Established Note (Follow up visit)      SUBJECTIVE:    Cheryl Ghosh presents to the clinic for a follow-up appointment for low back right hip and MRI results. Since the last visit, Cheryl Ghosh states the pain has been stable. Current pain intensity is 2/10. Pt states today her pain level is down, MRI was reviewed and discussed with the patient today, discussed that we will hold off on any more pain interventions at this point pt will continue HE plan.  She is s/p recent  left L3-4-5 FACET MEDIAL BRANCH NERVE RADIOFREQUENCY NEUROTOMY on 9/4/18 with minimal to moderate relief.     Pain Disability Index Review:  Last 3 PDI Scores 10/8/2018 8/7/2018 7/24/2018   Pain Disability Index (PDI) 17 31 20       Pain Medications:     - Opioids: None  - Adjuvant Medications: Mobic (Meloxicam) and Neurontin (Gabapentin)  - Anti-Coagulants: Aspirin  - Others: See med list     Opioid Contract: no      report:  Reviewed and consistent with medication use as prescribed.     Pain Procedures: 9/4/18 left L3-4-5 FACET MEDIAL BRANCH NERVE RADIOFREQUENCY NEUROTOMY  8/29/18 L3, L4, L5 FACET MEDIAL BRANCH NERVE RADIOFREQUENCY NEUROTOMY   7/31/18  right Sacroiliac Joint injection under ultrasound guidance   RIGHT KNEE INTRAARTICULAR STEROID INJECTION 1/05/15  bilateral T7,8,9 INTERCOSTAL NERVE BLOCK,   Left L3,4,5 Facet MB RFA (11/27/13)   Right L3,4,5 Facet MB RFA (10/6/13)     Physical Therapy/Home Exercise: no     Imaging: 10/2/18  MRI Lumbar Spine Without Contrast    Narrative     EXAMINATION:  MRI LUMBAR SPINE WITHOUT CONTRAST    CLINICAL HISTORY:  chronic low back pain last mri was 2015; Spondylosis, unspecified    TECHNIQUE:  Multiplanar, multisequence MR images were acquired from the thoracolumbar junction to the sacrum without contrast.    COMPARISON:  08/18/2015    FINDINGS:  Alignment: Normal.    Vertebrae: Degenerative endplate changes are noted.  No evidence for fracture or marrow infiltrative  process.    Discs: There is multilevel disc desiccation.  Moderate to severe disc height loss noted at L4-L5 and L5-S1.    Cord: Normal.  Conus terminates at L1.    Degenerative findings:    T12-L1: No spinal canal stenosis or neural foraminal narrowing.    L1-L2: Circumferential disc bulge noted.  No spinal canal stenosis or neural foraminal narrowing.    L2-L3: Circumferential disc bulge and mild facet arthropathy noted.  No spinal canal stenosis or neural foraminal narrowing.    L3-L4: Circumferential disc bulge with right paracentral disc protrusion result in mild effacement of the right lateral recess.    L4-L5: Circumferential disc bulge and mild facet arthropathy result in mild right neural foraminal narrowing.    L5-S1: Circumferential disc bulge and moderate facet arthropathy result in mild bilateral neural foraminal narrowing.    Paraspinal muscles & soft tissues: Multiple stones layer within the gallbladder lumen.  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              08/13/15 Lumbar Spine MRI        Narrative      MRI OF THE LUMBAR SPINE WITHOUT CONTRAST.     Technique: Sagittal T1, sagittal T2, sagittal STIR, axial T1 and axial T2 weighted images of the lumbar spine obtained without contrast.     Comparison: None.     Findings:    Lumbar spine alignment is within normal limits. The vertebral body heights are well maintained, with no fracture. No marrow signal abnormality suspicious for an infiltrative process.     The conus is normal in appearance, and terminates at the L1 level. The adjacent soft tissue structures show no significant abnormalities. 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    There are findings of multi-level lumbar spondylosis, as below.    L1/2 through L4/5 demonstrates disc desiccation and minimal posterior disc bulge producing mild neuroforaminal narrowing without significant canal stenosis. More pronounced disc space narrowing is seen at L4/5 and L5/S1. At L5/S1 there is moderate    bilateral neuroforaminal narrowing without significant canal stenosis.. Moderate facet arthropathy is also noted within the lower lumbar levels    L5-S1: There is no focal disc herniation. No significant central canal or neural foraminal narrowing.       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    Moderate bilateral neuroforaminal narrowing at L5/S1.        Electronically signed by: LUIS FERNANDO RUBIN MD  Date: 08/18/15  Time: 14:55                  Lumbar MRI 9/25/2012        MRI lumbar spine without contrast:    No comparative studies. No fracture or subluxation. Marrow space, spinal cord normal. Neural sheath ectasia at T11 and T12 neural foramen, right S2 level, later 1.7-CM. Left kidney smaller than right, 3.6 x 4.2 CM, compared with 4.3 x 5.4 cm.    Degenerative disk disease with Modic chronic endplate change particular L4 L5.     L1-L2 mild circumflex, bulge disk, no significant spinal or foraminal stenosis. L2-L3 mild circumferential bulge disk, mild spinal stenosis, facet joint arthropathy detected on right.    L3-L4 limited circumferential bulge, facet joint arthropathy, mild spinal stenosis.    L4-L5 mild circumferential bulge, spondylosis, mild spinal and foraminal stenosis, facet joint arthropathy.    L5-S1 facet joint arthropathy, linear fissure right foraminal annulus.        Result Impression      1. Degenerative disk spondylosis at the L4-5. No significant spinal or foraminal stenosis, disk prolapse.         X-Ray cervical, thoracic, and lumbar spine 2/10/14        Result Narrative      COMPARISON: None    FINDINGS:     C-spine: Generalized osteopenia. Mild dextrocurvature of the cervical spine which may be positional. Cervical lordosis appears maintained. There is a 3-mm retrolisthesis of C4 on 5 and 2-mm anterolisthesis of C5 on 6 without radiolucency identified   and likely secondary to degenerative change. No displaced fracture or dislocation seen. Vertebral body heights  appear maintained. Mild degenerative change at the atlanto-odontoid interval. Otherwise the dens and lateral masses appear intact. There   is mild to moderate degenerative disk disease with uncovertebral and facet arthrosis most prominent at C4-5. No prevertebral soft tissue swelling. Cervical soft tissues appear within normal limits. No subcutaneous emphysema or radiodense foreign body.   Visualized lung apices are clear.    T-spine: Generalized osteopenia. Mild levocurvature of the thoracic spine which may be positional. No displaced fracture, dislocation or significant listhesis seen. Vertebral body heights appear maintained. Vertebral body and disk space heights   appear maintained. Age-appropriate mild multilevel degenerative change. No prevertebral soft tissue swelling.    L-spine: Generalized osteopenia. There are 5 non-rib bearing lumbar type vertebral bodies. Mild levocurvature of the lumbar spine which may be positional. No displaced fracture, dislocation or significant listhesis seen. Vertebral body heights appear  maintained. Mild-to-moderate multilevel degenerative disk disease most prominent at L4-5 and age-appropriate mild to moderate multilevel degenerative change. No prevertebral soft tissue swelling. Vascular calcifications noted. Nonspecific bowel gas   pattern.      Please note that subtle ligamentous injuries may not be detected by radiography and CT or MRI of the spine can be obtained if indicated.        Result Impression          No acute process seen.      Electronically signed by: MARLON ARREOLA MD, MD  Date: 02/10/14  Time: 18:33                XRAY KNEE 7/8/13        Result Narrative      Comparison is made with May 28, 2012. Bilateral erect sunrise and right lateral views were obtained. There is slight medial joint space narrowing is noted. Minor osteophytic spurs are emanating from the intercondylar prominence of the tibia and the   posterior superior aspect of the right patella. This is  unchanged since 2012        Result Impression          Minor degenerative changes      Electronically signed by: Jorge Lagunas MD  Date: 07/08/13  Time: 13:58               Allergies:   Review of patient's allergies indicates:   Allergen Reactions    Pcn [penicillins] Hives and Nausea And Vomiting       Current Medications:   Current Outpatient Medications   Medication Sig Dispense Refill    aspirin 81 MG Chew Take 1 tablet (81 mg total) by mouth once daily. 90 tablet 3    b complex vitamins tablet Take 1 tablet by mouth once daily.      gabapentin (NEURONTIN) 300 MG capsule Take 1 capsule (300 mg total) by mouth every evening. 90 capsule 2    lisinopril (PRINIVIL,ZESTRIL) 20 MG tablet Take 1 tablet (20 mg total) by mouth 2 (two) times daily. 60 tablet 2    meloxicam (MOBIC) 15 MG tablet Take 1 tablet (15 mg total) by mouth daily as needed for Pain. 90 tablet 0    methylPREDNISolone (MEDROL DOSEPACK) 4 mg tablet use as directed 1 Package 0    omega-3 acid ethyl esters (LOVAZA) 1 gram capsule Take 2 g by mouth 2 (two) times daily.      pravastatin (PRAVACHOL) 10 MG tablet Take 1 tablet (10 mg total) by mouth once daily. 90 tablet 3    pyridoxine (VITAMIN B-6) 100 MG Tab Take 100 mg by mouth once daily.      estradiol (ESTRACE) 0.01 % (0.1 mg/gram) vaginal cream Place 1 g vaginally once daily. Use 1 gram of estrogen cream in vagina nightly x 2 weeks, then twice a week thereafter. . 42.5 g 1     No current facility-administered medications for this visit.        REVIEW OF SYSTEMS:    GENERAL:  No weight loss, malaise or fevers.  HEENT:  Negative for frequent or significant headaches.  NECK:  Negative for lumps, goiter, pain and significant neck swelling.  RESPIRATORY:  Negative for cough, wheezing or shortness of breath.  CARDIOVASCULAR:  Negative for chest pain, leg swelling or palpitations.  GI:  Negative for abdominal discomfort, blood in stools or black stools or change in bowel habits.  MUSCULOSKELETAL:   See HPI.  SKIN:  Negative for lesions, rash, and itching.  PSYCH:  +  for sleep disturbance, mood disorder and recent psychosocial stressors.  HEMATOLOGY/LYMPHOLOGY:  Negative for prolonged bleeding, bruising easily or swollen nodes.  NEURO:   No history of headaches, syncope, paralysis, seizures or tremors.  All other reviewed and negative other than HPI.    Past Medical History:  Past Medical History:   Diagnosis Date    Arthritis     lumbar    Blood transfusion     Chronic kidney disease, stage III (moderate) 8/2/2017    Degenerative disc disease     lumbar    Insulinoma     Pancreatic disease     Urinary incontinence 12/7/2015       Past Surgical History:  Past Surgical History:   Procedure Laterality Date    ADENOIDECTOMY      APPENDECTOMY      CATARACT EXTRACTION, BILATERAL      EYE SURGERY      HYSTERECTOMY      SAMM/BSO    OOPHORECTOMY      PANCREAS SURGERY      Insulanoma    RADIOFREQUENCY ABLATION OF LUMBAR MEDIAL BRANCH NERVE AT SINGLE LEVEL Right 8/28/2018    Procedure: RADIOFREQUENCY ABLATION, NERVE, MEDIAL BRANCH, LUMBAR, 1 LEVEL- Right L3,4,5 IV SEDATION;  Surgeon: Eddie Vega MD;  Location: [facility];  Service: Pain Management;  Laterality: Right;    RADIOFREQUENCY ABLATION OF LUMBAR MEDIAL BRANCH NERVE AT SINGLE LEVEL Left 9/4/2018    Procedure: RADIOFREQUENCY ABLATION, NERVE, MEDIAL BRANCH, LUMBAR, 1 LEVEL - Left L3,4,5 IV SEADTION;  Surgeon: Eddie Vega MD;  Location: [facility];  Service: Pain Management;  Laterality: Left;  Patient takes ASA     RADIOFREQUENCY ABLATION, NERVE, MEDIAL BRANCH, LUMBAR, 1 LEVEL - Left L3,4,5 IV SEADTION Left 9/4/2018    Performed by Eddie Vega MD at [facility]    RADIOFREQUENCY ABLATION, NERVE, MEDIAL BRANCH, LUMBAR, 1 LEVEL- Right L3,4,5 IV SEDATION Right 8/28/2018    Performed by Eddie [facility]ega MD at [facility]    REFRACTIVE SURGERY Bilateral     TONSILLECTOMY         Family History:  Family History    Problem Relation Age of Onset    Breast cancer Mother     Heart disease Father     Aneurysm Sister     Glaucoma Daughter     Heart attack Son     Breast cancer Other        Social History:  Social History     Socioeconomic History    Marital status:      Spouse name: None    Number of children: None    Years of education: None    Highest education level: None   Social Needs    Financial resource strain: None    Food insecurity - worry: None    Food insecurity - inability: None    Transportation needs - medical: None    Transportation needs - non-medical: None   Occupational History    None   Tobacco Use    Smoking status: Never Smoker    Smokeless tobacco: Never Used   Substance and Sexual Activity    Alcohol use: Yes     Alcohol/week: 0.6 - 1.2 oz     Types: 1 - 2 Glasses of wine per week    Drug use: No    Sexual activity: Yes     Partners: Male   Other Topics Concern    None   Social History Narrative    None       OBJECTIVE:    /73   Pulse 73   Wt 61.1 kg (134 lb 11.2 oz)   LMP  (LMP Unknown)   BMI 23.12 kg/m²     PHYSICAL EXAMINATION:    General appearance: Well appearing, in no acute distress, alert and oriented x3.  Psych:  Mood and affect appropriate.  Skin: Skin color, texture, turgor normal, no rashes or lesions, in both upper and lower body.  Head/face:  Atraumatic, normocephalic. No palpable lymph nodes  Neck: No pain to palpation over the cervical paraspinous muscles. Spurling Negative. No pain with neck flexion, extension, or lateral flexion. .  Cor: RRR  Pulm: CTA  GI: Abdomen soft and non-tender.  Back: Straight leg raising in the sitting and supine positions is negative to radicular pain.  + pain to palpation over the L-spine or costovertebral angles. Normal range of motion with pain reproduction.   Extremities: Peripheral joint ROM is full and pain free without obvious instability or laxity in all four extremities. No deformities, edema, or skin  discoloration. Good capillary refill.  Musculoskeletal: Shoulder, hip, sacroiliac and knee provocative maneuvers are negative. Bilateral upper and lower extremity strength is normal and symmetric.  No atrophy or tone abnormalities are noted.  Neuro: Bilateral upper and lower extremity coordination and muscle stretch reflexes are physiologic and symmetric.  Plantar response are downgoing. No loss of sensation is noted.  Gait: Normal.       ASSESSMENT: 83 y.o. year old female with low back  pain, consistent with      Diagnosis:    1. Osteoarthritis of spine, unspecified spinal osteoarthritis complication status, unspecified spinal region     2. Spondylosis without myelopathy     3. Chronic pain syndrome     4. Facet arthritis of lumbar region     5. Sacroiliitis           PLAN:     - I have stressed the importance of physical activity and a home exercise plan to help with pain and improve health.  - Patient can continue with medications for now since they are providing benefits, using them appropriately, and without side effects.  - Previous imaging was reviewed and discussed with the patient today.   - Counseled patient regarding the importance of activity modification, constant sleeping habits and physical therapy.  - In the future will consider bilateral SI joint injections  -RTC as needed for returning or new pain  --More than 25 minutes spent with patient, over 50% of that time was spent in counseling.  -The above plan and management options were discussed at length with patient. Patient is in agreement with the above and verbalized understanding. Dr. Vega   was consulted on this patient  and agrees with this plan.     TREVOR Kim  Interventional Pain Management      10/08/2018     Disclaimer: This note was partly generated using dictation software which may occasionally result in transcription errors.

## 2018-10-12 ENCOUNTER — OFFICE VISIT (OUTPATIENT)
Dept: FAMILY MEDICINE | Facility: CLINIC | Age: 83
End: 2018-10-12
Attending: FAMILY MEDICINE
Payer: MEDICARE

## 2018-10-12 VITALS
TEMPERATURE: 98 F | WEIGHT: 133.38 LBS | HEART RATE: 78 BPM | OXYGEN SATURATION: 95 % | BODY MASS INDEX: 22.77 KG/M2 | HEIGHT: 64 IN | SYSTOLIC BLOOD PRESSURE: 120 MMHG | DIASTOLIC BLOOD PRESSURE: 73 MMHG

## 2018-10-12 DIAGNOSIS — G57.93 NEUROPATHIC PAIN OF BOTH LEGS: ICD-10-CM

## 2018-10-12 DIAGNOSIS — M47.816 FACET ARTHRITIS OF LUMBAR REGION: ICD-10-CM

## 2018-10-12 DIAGNOSIS — M17.0 PRIMARY OSTEOARTHRITIS OF BOTH KNEES: ICD-10-CM

## 2018-10-12 DIAGNOSIS — M46.1 SACROILIITIS: ICD-10-CM

## 2018-10-12 DIAGNOSIS — E78.2 MIXED HYPERLIPIDEMIA: ICD-10-CM

## 2018-10-12 DIAGNOSIS — I70.0 AORTIC ATHEROSCLEROSIS: ICD-10-CM

## 2018-10-12 DIAGNOSIS — I50.32 CHRONIC DIASTOLIC HEART FAILURE: ICD-10-CM

## 2018-10-12 DIAGNOSIS — N25.81 SECONDARY HYPERPARATHYROIDISM OF RENAL ORIGIN: ICD-10-CM

## 2018-10-12 DIAGNOSIS — Z23 IMMUNIZATION DUE: ICD-10-CM

## 2018-10-12 DIAGNOSIS — E78.5 HYPERLIPIDEMIA, UNSPECIFIED HYPERLIPIDEMIA TYPE: ICD-10-CM

## 2018-10-12 DIAGNOSIS — I10 HTN (HYPERTENSION), BENIGN: ICD-10-CM

## 2018-10-12 DIAGNOSIS — Z00.00 ROUTINE GENERAL MEDICAL EXAMINATION AT A HEALTH CARE FACILITY: Primary | ICD-10-CM

## 2018-10-12 PROCEDURE — 99999 PR PBB SHADOW E&M-EST. PATIENT-LVL III: CPT | Mod: PBBFAC,,, | Performed by: FAMILY MEDICINE

## 2018-10-12 PROCEDURE — 99397 PER PM REEVAL EST PAT 65+ YR: CPT | Mod: 25,S$PBB,, | Performed by: FAMILY MEDICINE

## 2018-10-12 PROCEDURE — 99499 UNLISTED E&M SERVICE: CPT | Mod: S$GLB,,, | Performed by: FAMILY MEDICINE

## 2018-10-12 PROCEDURE — 3078F DIAST BP <80 MM HG: CPT | Mod: CPTII,,, | Performed by: FAMILY MEDICINE

## 2018-10-12 PROCEDURE — 90662 IIV NO PRSV INCREASED AG IM: CPT | Mod: PBBFAC,PO

## 2018-10-12 PROCEDURE — 99213 OFFICE O/P EST LOW 20 MIN: CPT | Mod: PBBFAC,PO,25 | Performed by: FAMILY MEDICINE

## 2018-10-12 PROCEDURE — 3074F SYST BP LT 130 MM HG: CPT | Mod: CPTII,,, | Performed by: FAMILY MEDICINE

## 2018-10-12 RX ORDER — PRAVASTATIN SODIUM 10 MG/1
10 TABLET ORAL DAILY
Qty: 90 TABLET | Refills: 3 | Status: SHIPPED | OUTPATIENT
Start: 2018-10-12 | End: 2019-03-15 | Stop reason: SDUPTHER

## 2018-10-12 RX ORDER — LISINOPRIL 20 MG/1
20 TABLET ORAL 2 TIMES DAILY
Qty: 180 TABLET | Refills: 3 | Status: SHIPPED | OUTPATIENT
Start: 2018-10-12 | End: 2019-03-15 | Stop reason: SDUPTHER

## 2018-10-12 RX ORDER — MELOXICAM 15 MG/1
15 TABLET ORAL DAILY PRN
Qty: 90 TABLET | Refills: 3 | Status: ON HOLD | OUTPATIENT
Start: 2018-10-12 | End: 2019-02-06 | Stop reason: HOSPADM

## 2018-10-12 RX ORDER — GABAPENTIN 300 MG/1
300 CAPSULE ORAL NIGHTLY
Qty: 90 CAPSULE | Refills: 3 | Status: SHIPPED | OUTPATIENT
Start: 2018-10-12 | End: 2019-03-04 | Stop reason: SDUPTHER

## 2018-10-12 NOTE — PROGRESS NOTES
Subjective:       Patient ID: Cheryl Ghosh is a 83 y.o. female.    Chief Complaint: Annual Exam    83 yr old pleasant white female with HTN, OA, osteopenia hip, hyperlipidemia, chronic back pain, BPPV, presents today for her annual wellness check, lab work and follow up and concerns regarding BP.        HTN - fluctuating - on lisinopril 20 BID - stable home log  - her diet has not changed -       HLD - slightly worsening since stopped statin x 30 days           - she was asked to start statin but due to her fall and issues with her ribs and back, she was never able to start it.    Back pain - she follows pain clinic for injections - c/o neuropathic pain in legs - was on neurontin in the past and she started taking it and working    Osteopenia hip - stable - had dexa in 12/15 and she is talking fosamax since many years and stopped last year- she is due for dexa in 12/17 - no side effects    HISTORY as below - reviewed    Health maintenance  -labs UTD  -mammo UTD  -colon screen UTD  -vaccines UTD      Hypertension   This is a chronic problem. The current episode started more than 1 month ago. The problem has been waxing and waning since onset. The problem is resistant. Associated symptoms include headaches, malaise/fatigue and neck pain. Pertinent negatives include no anxiety, blurred vision, chest pain, orthopnea, palpitations, peripheral edema, PND, shortness of breath or sweats. Agents associated with hypertension include NSAIDs. There are no known risk factors for coronary artery disease. Past treatments include nothing. Compliance problems include exercise.  There is no history of chronic renal disease.   Hyperlipidemia   This is a chronic problem. The current episode started more than 1 year ago. The problem is uncontrolled. Recent lipid tests were reviewed and are high. She has no history of chronic renal disease, diabetes, hypothyroidism, liver disease, obesity or nephrotic syndrome. There are no known  factors aggravating her hyperlipidemia. Associated symptoms include myalgias. Pertinent negatives include no chest pain, focal sensory loss, leg pain or shortness of breath. Current antihyperlipidemic treatment includes diet change. The current treatment provides mild improvement of lipids. There are no compliance problems.  Risk factors for coronary artery disease include dyslipidemia.     Review of Systems   Constitutional: Positive for malaise/fatigue. Negative for activity change, diaphoresis and unexpected weight change.   HENT: Negative.  Negative for congestion, ear discharge, hearing loss, rhinorrhea, sore throat and voice change.    Eyes: Negative.  Negative for blurred vision, pain, discharge and visual disturbance.   Respiratory: Negative.  Negative for chest tightness, shortness of breath and wheezing.    Cardiovascular: Negative.  Negative for chest pain, palpitations, orthopnea and PND.   Gastrointestinal: Negative.  Negative for abdominal distention, anal bleeding, constipation and nausea.   Endocrine: Negative.  Negative for cold intolerance, polydipsia and polyuria.   Genitourinary: Negative.  Negative for decreased urine volume, difficulty urinating, dysuria, frequency, menstrual problem and vaginal pain.   Musculoskeletal: Positive for myalgias and neck pain. Negative for arthralgias and gait problem.   Skin: Negative.  Negative for color change, pallor and wound.   Allergic/Immunologic: Negative.  Negative for environmental allergies and immunocompromised state.   Neurological: Positive for headaches. Negative for dizziness, tremors, seizures and speech difficulty.   Hematological: Negative.  Negative for adenopathy. Does not bruise/bleed easily.   Psychiatric/Behavioral: Negative.  Negative for agitation, confusion, decreased concentration, hallucinations, self-injury and suicidal ideas. The patient is not nervous/anxious.        PMH/PSH/FH/SH/MED/ALLERGY reviewed  '  Objective:       Vitals:     10/12/18 0954   BP: 120/73   Pulse: 78   Temp: 98.4 °F (36.9 °C)       Physical Exam   Constitutional: She is oriented to person, place, and time. She appears well-developed and well-nourished. No distress.   HENT:   Head: Normocephalic and atraumatic.   Right Ear: External ear normal.   Left Ear: External ear normal.   Nose: Nose normal.   Mouth/Throat: Oropharynx is clear and moist. No oropharyngeal exudate.   Eyes: Conjunctivae and EOM are normal. Pupils are equal, round, and reactive to light. Right eye exhibits no discharge. Left eye exhibits no discharge. No scleral icterus.   Neck: Normal range of motion. Neck supple. No JVD present. No tracheal deviation present. No thyromegaly present.   Cardiovascular: Normal rate, regular rhythm, normal heart sounds and intact distal pulses. Exam reveals no gallop and no friction rub.   No murmur heard.  Pulmonary/Chest: Effort normal and breath sounds normal. No stridor. She has no wheezes. She has no rales. She exhibits no tenderness.   Abdominal: Soft. Bowel sounds are normal. She exhibits no distension and no mass. There is no tenderness. There is no rebound and no guarding. No hernia.   Musculoskeletal: Normal range of motion. She exhibits no edema or tenderness.   Lymphadenopathy:     She has no cervical adenopathy.   Neurological: She is alert and oriented to person, place, and time. She has normal reflexes. She displays normal reflexes. No cranial nerve deficit. She exhibits normal muscle tone. Coordination normal.   Skin: Skin is warm and dry. No rash noted. She is not diaphoretic. No erythema. No pallor.   Psychiatric: She has a normal mood and affect. Her behavior is normal. Judgment and thought content normal.       Assessment:       1. Routine general medical examination at a health care facility    2. HTN (hypertension), benign    3. Chronic diastolic heart failure    4. Mixed hyperlipidemia    5. Aortic atherosclerosis    6. Facet arthritis of lumbar region     7. Sacroiliitis    8. Secondary hyperparathyroidism of renal origin    9. Hyperlipidemia, unspecified hyperlipidemia type    10. Neuropathic pain of both legs    11. Primary osteoarthritis of both knees    12. Immunization due        Plan:       Cheryl was seen today for annual exam.    Diagnoses and all orders for this visit:    Routine general medical examination at a health care facility    HTN (hypertension), benign  -     lisinopril (PRINIVIL,ZESTRIL) 20 MG tablet; Take 1 tablet (20 mg total) by mouth 2 (two) times daily.    Chronic diastolic heart failure    Mixed hyperlipidemia  -     pravastatin (PRAVACHOL) 10 MG tablet; Take 1 tablet (10 mg total) by mouth once daily.    Aortic atherosclerosis  -     pravastatin (PRAVACHOL) 10 MG tablet; Take 1 tablet (10 mg total) by mouth once daily.    Facet arthritis of lumbar region  -     meloxicam (MOBIC) 15 MG tablet; Take 1 tablet (15 mg total) by mouth daily as needed for Pain.    Sacroiliitis  -     meloxicam (MOBIC) 15 MG tablet; Take 1 tablet (15 mg total) by mouth daily as needed for Pain.    Secondary hyperparathyroidism of renal origin    Hyperlipidemia, unspecified hyperlipidemia type  -     pravastatin (PRAVACHOL) 10 MG tablet; Take 1 tablet (10 mg total) by mouth once daily.    Neuropathic pain of both legs  -     gabapentin (NEURONTIN) 300 MG capsule; Take 1 capsule (300 mg total) by mouth every evening.    Primary osteoarthritis of both knees  -     meloxicam (MOBIC) 15 MG tablet; Take 1 tablet (15 mg total) by mouth daily as needed for Pain.    Immunization due  -     Influenza - High Dose (65+) (PF) (IM)      Wellness check  -normal exam  -labs      HTN  -continue lisinopril 20 BID. Side effects of medications have been discussed and patient agreed to proceed with treatment and understands the risks and benefits.        TIA  -follow neurology - DC plavix per neuro recommendations and just stay on ASA    Insomnia  -try benadryl only if  needed    HLD  -diet controlled and statin    Urine incontinence  -lifestyle changes - intolerant to meds    Neuropathic pain  -continue neurontin    OA knee B/L  -mobic prn    CKD III  -lab and urine analysis  -DC HCTZ  -adequate hydration      Spent adequate time in obtaining history and explaining differentials    40 minutes spent during this visit of which greater than 50% devoted to face-face counseling and coordination of care regarding diagnosis and management plan    Follow-up in about 3 months (around 1/12/2019), or if symptoms worsen or fail to improve.

## 2018-11-01 ENCOUNTER — PATIENT MESSAGE (OUTPATIENT)
Dept: PAIN MEDICINE | Facility: CLINIC | Age: 83
End: 2018-11-01

## 2018-11-23 ENCOUNTER — PATIENT MESSAGE (OUTPATIENT)
Dept: FAMILY MEDICINE | Facility: CLINIC | Age: 83
End: 2018-11-23

## 2018-11-26 ENCOUNTER — OFFICE VISIT (OUTPATIENT)
Dept: NEUROSURGERY | Facility: CLINIC | Age: 83
End: 2018-11-26
Payer: MEDICARE

## 2018-11-26 VITALS
BODY MASS INDEX: 23.5 KG/M2 | SYSTOLIC BLOOD PRESSURE: 152 MMHG | WEIGHT: 136.88 LBS | DIASTOLIC BLOOD PRESSURE: 72 MMHG | HEART RATE: 63 BPM

## 2018-11-26 DIAGNOSIS — M53.3 SACROILIAC JOINT DYSFUNCTION OF RIGHT SIDE: Primary | ICD-10-CM

## 2018-11-26 PROCEDURE — 3077F SYST BP >= 140 MM HG: CPT | Mod: CPTII,HCNC,S$GLB, | Performed by: NEUROLOGICAL SURGERY

## 2018-11-26 PROCEDURE — 1101F PT FALLS ASSESS-DOCD LE1/YR: CPT | Mod: CPTII,HCNC,S$GLB, | Performed by: NEUROLOGICAL SURGERY

## 2018-11-26 PROCEDURE — 99999 PR PBB SHADOW E&M-EST. PATIENT-LVL III: CPT | Mod: PBBFAC,HCNC,, | Performed by: NEUROLOGICAL SURGERY

## 2018-11-26 PROCEDURE — 3078F DIAST BP <80 MM HG: CPT | Mod: CPTII,HCNC,S$GLB, | Performed by: NEUROLOGICAL SURGERY

## 2018-11-26 PROCEDURE — 99204 OFFICE O/P NEW MOD 45 MIN: CPT | Mod: HCNC,S$GLB,, | Performed by: NEUROLOGICAL SURGERY

## 2018-11-26 NOTE — PROGRESS NOTES
NEUROSURGICAL OUTPATIENT CONSULTATION NOTE    DATE OF SERVICE:  11/26/2018    ATTENDING PHYSICIAN:  Xavier Dasilva MD    CONSULT REQUESTED BY:  Dr Vega    REASON FOR CONSULT:  Right SI joint pain    SUBJECTIVE:    HISTORY OF PRESENT ILLNESS:  This is a very pleasant 83 y.o. female who reports lumbosacral pain, severe right SI joint pain that has been worsening over the last 10 years. The pain is interfering with walking, climbing up stairs. The pain can be stabbing and sharp. Pain is constant. Pain travels to the hip on the right side. She did Pilates and now is doing SI joint PT exercises on her own with some improvement. Pain today is better than usual. Received medial branch block with mild pain relief. Remember receiving a SI joint block in the office under ultrasound with some relief. Not using a SI joint belt.     Low Back Pain Scale  R Low Back-Pain Score: 6  R Low Back-Pain Intensity: Pain killers give very little relief from pain  R Low Back-Pain Score: It is painful to look after myself and I am slow and careful  Low Back-Lifting: Pain prevents me from lifting heavy weights  but I can manage light weights if they are conveniently placed   Low Back-Walking: Pain prevents me walking more than .25 mile   Low Back-Sitting: I can sit only in my favorite chair as long as I like   Low Back-Standing: I cannot stand for longer than 30 mins without increasing pain   Low Back-Sleeping: I have no pain in bed   Low Back-Social Life: Pain has no significant effect on my social life apart from limiting my more en   Low Back-Traveling: I have some pain when traveling but kevin of my usual forms of travel make it any worse   Low Back-Changing Degree of Pain: My pain is gradually worsening         PAST MEDICAL HISTORY:  Active Ambulatory Problems     Diagnosis Date Noted    Facet arthritis of lumbar region 10/02/2012    Spondylosis without myelopathy 11/06/2013    Trochanteric bursitis of right hip 01/27/2014     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    OA (osteoarthritis) of knee 01/05/2015    Osteopenia 02/26/2015    Bilateral lumbar radiculopathy 08/13/2015    Urinary incontinence 12/07/2015    Female bladder prolapse 07/22/2016    Neuropathy 12/02/2016    Aortic atherosclerosis 04/24/2014    Osteoarthritis of spine 09/27/2012    Hyperlipidemia 08/02/2017    Chronic kidney disease, stage III (moderate) 08/02/2017    TIA (transient ischemic attack) 11/24/2017    Chronic diastolic heart failure 11/25/2017    HTN (hypertension), benign 04/11/2018    Sacroiliitis 07/24/2018    Chronic pain 08/28/2018    Secondary hyperparathyroidism of renal origin 10/12/2018     Resolved Ambulatory Problems     Diagnosis Date Noted    Chest pain 05/01/2014    Cholecystolithiasis 05/01/2014    Chest wall pain 08/26/2014    BPPV (benign paroxysmal positional vertigo) 01/08/2015    Acute sinusitis 11/16/2015    Laryngitis 11/16/2015    Bronchitis 11/16/2015    BMI 22.0-22.9, adult 07/22/2016    Palpitations 05/26/2017    Dyspnea on exertion 05/26/2017    Hypertensive urgency 11/24/2017     Past Medical History:   Diagnosis Date    Arthritis     Blood transfusion     Chronic kidney disease, stage III (moderate) 8/2/2017    Degenerative disc disease     Insulinoma     Pancreatic disease     Urinary incontinence 12/7/2015       PAST SURGICAL HISTORY:  Past Surgical History:   Procedure Laterality Date    ADENOIDECTOMY      APPENDECTOMY      CATARACT EXTRACTION, BILATERAL      EYE SURGERY      HYSTERECTOMY      SAMM/BSO    OOPHORECTOMY      PANCREAS SURGERY      Insulanoma    RADIOFREQUENCY ABLATION OF LUMBAR MEDIAL BRANCH NERVE AT SINGLE LEVEL Right 8/28/2018    Procedure: RADIOFREQUENCY ABLATION, NERVE, MEDIAL BRANCH, LUMBAR, 1 LEVEL- Right L3,4,5 IV SEDATION;  Surgeon: Eddie Vega MD;  Location: [facility];  Service: Pain Management;  Laterality: Right;    RADIOFREQUENCY ABLATION OF LUMBAR MEDIAL  BRANCH NERVE AT SINGLE LEVEL Left 9/4/2018    Procedure: RADIOFREQUENCY ABLATION, NERVE, MEDIAL BRANCH, LUMBAR, 1 LEVEL - Left L3,4,5 IV SEADTION;  Surgeon: Eddie Vega MD;  Location: [facility];  Service: Pain Management;  Laterality: Left;  Patient takes ASA     RADIOFREQUENCY ABLATION, NERVE, MEDIAL BRANCH, LUMBAR, 1 LEVEL - Left L3,4,5 IV SEADTION Left 9/4/2018    Performed by Eddie Vega MD at [facility]    RADIOFREQUENCY ABLATION, NERVE, MEDIAL BRANCH, LUMBAR, 1 LEVEL- Right L3,4,5 IV SEDATION Right 8/28/2018    Performed by Eddie Vega MD at [facility]    REFRACTIVE SURGERY Bilateral     TONSILLECTOMY         SOCIAL HISTORY:   Social History     Socioeconomic History    Marital status:      Spouse name: Not on file    Number of children: Not on file    Years of education: Not on file    Highest education level: Not on file   Social Needs    Financial resource strain: Not on file    Food insecurity - worry: Not on file    Food insecurity - inability: Not on file    Transportation needs - medical: Not on file    Transportation needs - non-medical: Not on file   Occupational History    Not on file   Tobacco Use    Smoking status: Never Smoker    Smokeless tobacco: Never Used   Substance and Sexual Activity    Alcohol use: Yes     Alcohol/week: 0.6 - 1.2 oz     Types: 1 - 2 Glasses of wine per week    Drug use: No    Sexual activity: Yes     Partners: Male   Other Topics Concern    Not on file   Social History Narrative    Not on file       FAMILY HISTORY:  Family History   Problem Relation Age of Onset    Breast cancer Mother     Heart disease Father     Aneurysm Sister     Glaucoma Daughter     Heart attack Son     Breast cancer Other        CURRENTS MEDICATIONS:  Current Outpatient Medications on File Prior to Visit   Medication Sig Dispense Refill    aspirin 81 MG Chew Take 1 tablet (81 mg total) by mouth once daily. 90 tablet 3    b complex  vitamins tablet Take 1 tablet by mouth once daily.      estradiol (ESTRACE) 0.01 % (0.1 mg/gram) vaginal cream Place 1 g vaginally once daily. Use 1 gram of estrogen cream in vagina nightly x 2 weeks, then twice a week thereafter. . 42.5 g 1    gabapentin (NEURONTIN) 300 MG capsule Take 1 capsule (300 mg total) by mouth every evening. 90 capsule 3    lisinopril (PRINIVIL,ZESTRIL) 20 MG tablet Take 1 tablet (20 mg total) by mouth 2 (two) times daily. 180 tablet 3    meloxicam (MOBIC) 15 MG tablet Take 1 tablet (15 mg total) by mouth daily as needed for Pain. 90 tablet 3    omega-3 acid ethyl esters (LOVAZA) 1 gram capsule Take 2 g by mouth 2 (two) times daily.      pravastatin (PRAVACHOL) 10 MG tablet Take 1 tablet (10 mg total) by mouth once daily. 90 tablet 3    pyridoxine (VITAMIN B-6) 100 MG Tab Take 100 mg by mouth once daily.       No current facility-administered medications on file prior to visit.        ALLERGIES:  Review of patient's allergies indicates:   Allergen Reactions    Pcn [penicillins] Hives and Nausea And Vomiting       REVIEW OF SYSTEMS:  Review of Systems   Constitutional: Negative for diaphoresis, fever and weight loss.   Respiratory: Negative for shortness of breath.    Cardiovascular: Negative for chest pain.   Gastrointestinal: Negative for blood in stool.   Genitourinary: Negative for hematuria.   Endo/Heme/Allergies: Does not bruise/bleed easily.   All other systems reviewed and are negative.      OBJECTIVE:    PHYSICAL EXAMINATION:   Vitals:    11/26/18 1105   BP: (!) 152/72   Pulse: 63       Physical Exam:  Vitals reviewed.    Constitutional: She appears well-developed and well-nourished.     Eyes: Pupils are equal, round, and reactive to light. Conjunctivae and EOM are normal.     Cardiovascular: Normal distal pulses and no edema.     Abdominal: Soft.     Skin: Skin displays no rash on trunk and no rash on extremities. Skin displays no lesions on trunk and no lesions on  extremities.     Psych/Behavior: She is alert. She is oriented to person, place, and time. She has a normal mood and affect.     Musculoskeletal:        Neck: Range of motion is full.     Neurological:        DTRs: Tricep reflexes are 2+ on the right side and 2+ on the left side. Bicep reflexes are 2+ on the right side and 2+ on the left side. Brachioradialis reflexes are 2+ on the right side and 2+ on the left side. Patellar reflexes are 2+ on the right side and 2+ on the left side. Achilles reflexes are 2+ on the right side and 2+ on the left side.       Back Exam     Tenderness   The patient is experiencing tenderness in the sacroiliac (right iliac crest higher than left, right acute pain on SI joint palpation).    Range of Motion   Extension: normal   Flexion:  70 abnormal   Lateral bend right: normal   Lateral bend left: normal   Rotation right: normal   Rotation left: normal     Muscle Strength   Right Quadriceps:  5/5   Left Quadriceps:  5/5   Right Hamstrings:  5/5   Left Hamstrings:  5/5     Tests   Straight leg raise right: negative  Straight leg raise left: negative    Other   Toe walk: normal  Heel walk: normal            SI joint:   Palpation at the right SI joint is painful  CARMITA test is positive on the right side, poor hip flexibility on the right side, no pain in internal rotation.   Thigh thrust test is positive on the right side    Neurologic Exam     Mental Status   Oriented to person, place, and time.   Speech: speech is normal   Level of consciousness: alert    Cranial Nerves   Cranial nerves II through XII intact.     CN III, IV, VI   Pupils are equal, round, and reactive to light.  Extraocular motions are normal.     Motor Exam   Muscle bulk: normal  Overall muscle tone: normal    Strength   Right deltoid: 5/5  Left deltoid: 5/5  Right biceps: 5/5  Left biceps: 5/5  Right triceps: 5/5  Left triceps: 5/5  Right wrist flexion: 5/5  Left wrist flexion: 5/5  Right wrist extension: 5/5  Left wrist  extension: 5/5  Right interossei: 5/5  Left interossei: 5/5  Right iliopsoas: 5/5  Left iliopsoas: 5/5  Right quadriceps: 5/5  Left quadriceps: 5/5  Right hamstrin/5  Left hamstrin/5  Right anterior tibial: 5/5  Left anterior tibial: 5/5  Right posterior tibial: 5/5  Left posterior tibial: 5/5  Right peroneal: 5/5  Left peroneal: 5/5  Right gastroc: 5/5  Left gastroc: 5/5    Sensory Exam   Light touch normal.   Pinprick normal.     Gait, Coordination, and Reflexes     Gait  Gait: normal    Coordination   Finger to nose coordination: normal  Tandem walking coordination: normal    Reflexes   Right brachioradialis: 2+  Left brachioradialis: 2+  Right biceps: 2+  Left biceps: 2+  Right triceps: 2+  Left triceps: 2+  Right patellar: 2+  Left patellar: 2+  Right achilles: 2+  Left achilles: 2+  Right plantar: normal  Left plantar: normal  Right Shipman: absent  Left Shipman: absent  Right ankle clonus: absent  Left ankle clonus: absent        DIAGNOSTIC DATA:  I personally interpreted the following imaging:   Lumbar spine MRI 10/02/2018: diffuse spondylosis without nerve root compression, no spondylolisthesis, stretching of the right iliolumbar ligament compared to the left side.     ASSESMENT:  This is a 83 y.o. female with     Problem List Items Addressed This Visit     None      Visit Diagnoses     Sacroiliac joint dysfunction of right side    -  Primary    Relevant Orders    Ambulatory consult to Physical Therapy          PLAN:  SI joint belt  SI joint PT 3 times a week for 6 weeks  Right SI joint block with pain journal            Xavier Dasilva MD  Cell:[phone]

## 2018-11-26 NOTE — LETTER
November 26, 2018      Eddie Vega MD  [address]  [facility] [address]           [facility]  [address]  [facility] [address]  Phone: [phone]          Patient: Cheryl Ghosh   MR Number: [id number]   YOB: 1934   Date of Visit: 11/26/2018       Dear Dr. Eddie Vega:    Thank you for referring Cheryl Ghosh to me for evaluation. Attached you will find relevant portions of my assessment and plan of care.    If you have questions, please do not hesitate to call me. I look forward to following Cheryl Ghosh along with you.    Sincerely,    Xavier Dasilva MD    Enclosure  CC:  No Recipients    If you would like to receive this communication electronically, please contact [email or web address] or [phone] to request more information on [employer] access.    For providers and/or their staff who would like to refer a patient to [facility], please contact us through our one-stop-shop provider referral line, [facility], at [phone].    If you feel you have received this communication in error or would no longer like to receive these types of communications, please e-mail [email or web address]

## 2018-11-28 ENCOUNTER — TELEPHONE (OUTPATIENT)
Dept: NEUROSURGERY | Facility: CLINIC | Age: 83
End: 2018-11-28

## 2018-11-28 DIAGNOSIS — M53.3 SI (SACROILIAC) JOINT DYSFUNCTION: Primary | ICD-10-CM

## 2018-12-07 ENCOUNTER — TELEPHONE (OUTPATIENT)
Dept: FAMILY MEDICINE | Facility: CLINIC | Age: 83
End: 2018-12-07

## 2018-12-07 DIAGNOSIS — N18.30 CHRONIC KIDNEY DISEASE, STAGE III (MODERATE): ICD-10-CM

## 2018-12-07 DIAGNOSIS — I10 HTN (HYPERTENSION), BENIGN: ICD-10-CM

## 2018-12-07 DIAGNOSIS — E78.2 MIXED HYPERLIPIDEMIA: Primary | ICD-10-CM

## 2018-12-19 ENCOUNTER — ANESTHESIA EVENT (OUTPATIENT)
Dept: SURGERY | Facility: HOSPITAL | Age: 83
End: 2018-12-19
Payer: MEDICARE

## 2018-12-20 ENCOUNTER — HOSPITAL ENCOUNTER (OUTPATIENT)
Facility: HOSPITAL | Age: 83
Discharge: HOME | End: 2018-12-20
Attending: NEUROLOGICAL SURGERY | Admitting: NEUROLOGICAL SURGERY
Payer: MEDICARE

## 2018-12-20 ENCOUNTER — ANESTHESIA (OUTPATIENT)
Dept: SURGERY | Facility: HOSPITAL | Age: 83
End: 2018-12-20
Payer: MEDICARE

## 2018-12-20 VITALS
WEIGHT: 132 LBS | TEMPERATURE: 98 F | HEIGHT: 64 IN | BODY MASS INDEX: 22.53 KG/M2 | RESPIRATION RATE: 18 BRPM | DIASTOLIC BLOOD PRESSURE: 62 MMHG | HEART RATE: 63 BPM | SYSTOLIC BLOOD PRESSURE: 137 MMHG

## 2018-12-20 DIAGNOSIS — G89.29 CHRONIC SI JOINT PAIN: ICD-10-CM

## 2018-12-20 DIAGNOSIS — M53.3 CHRONIC SI JOINT PAIN: ICD-10-CM

## 2018-12-20 PROCEDURE — 36000704 HC OR TIME LEV I 1ST 15 MIN: Mod: HCNC | Performed by: NEUROLOGICAL SURGERY

## 2018-12-20 PROCEDURE — 37000009 HC ANESTHESIA EA ADD 15 MINS: Mod: HCNC | Performed by: NEUROLOGICAL SURGERY

## 2018-12-20 PROCEDURE — 25500020 PHARM REV CODE 255: Mod: HCNC | Performed by: NEUROLOGICAL SURGERY

## 2018-12-20 PROCEDURE — 71000015 HC POSTOP RECOV 1ST HR: Mod: HCNC | Performed by: NEUROLOGICAL SURGERY

## 2018-12-20 PROCEDURE — 25000003 PHARM REV CODE 250: Mod: HCNC | Performed by: NURSE ANESTHETIST, CERTIFIED REGISTERED

## 2018-12-20 PROCEDURE — 63600175 PHARM REV CODE 636 W HCPCS: Mod: HCNC | Performed by: NURSE ANESTHETIST, CERTIFIED REGISTERED

## 2018-12-20 PROCEDURE — 71000016 HC POSTOP RECOV ADDL HR: Mod: HCNC | Performed by: NEUROLOGICAL SURGERY

## 2018-12-20 PROCEDURE — 25000003 PHARM REV CODE 250: Mod: HCNC | Performed by: NEUROLOGICAL SURGERY

## 2018-12-20 PROCEDURE — 27096 INJECT SACROILIAC JOINT: CPT | Mod: HCNC,RT,, | Performed by: NEUROLOGICAL SURGERY

## 2018-12-20 PROCEDURE — 37000008 HC ANESTHESIA 1ST 15 MINUTES: Mod: HCNC | Performed by: NEUROLOGICAL SURGERY

## 2018-12-20 PROCEDURE — 36000705 HC OR TIME LEV I EA ADD 15 MIN: Mod: HCNC | Performed by: NEUROLOGICAL SURGERY

## 2018-12-20 PROCEDURE — S0020 INJECTION, BUPIVICAINE HYDRO: HCPCS | Mod: HCNC | Performed by: NEUROLOGICAL SURGERY

## 2018-12-20 RX ORDER — OXYCODONE HYDROCHLORIDE 5 MG/1
5 TABLET ORAL
Status: CANCELLED | OUTPATIENT
Start: 2018-12-20

## 2018-12-20 RX ORDER — PROPOFOL 10 MG/ML
VIAL (ML) INTRAVENOUS CONTINUOUS PRN
Status: DISCONTINUED | OUTPATIENT
Start: 2018-12-20 | End: 2018-12-20

## 2018-12-20 RX ORDER — SODIUM CHLORIDE, SODIUM LACTATE, POTASSIUM CHLORIDE, CALCIUM CHLORIDE 600; 310; 30; 20 MG/100ML; MG/100ML; MG/100ML; MG/100ML
INJECTION, SOLUTION INTRAVENOUS CONTINUOUS PRN
Status: DISCONTINUED | OUTPATIENT
Start: 2018-12-20 | End: 2018-12-20

## 2018-12-20 RX ORDER — MIDAZOLAM HYDROCHLORIDE 1 MG/ML
INJECTION, SOLUTION INTRAMUSCULAR; INTRAVENOUS
Status: DISCONTINUED | OUTPATIENT
Start: 2018-12-20 | End: 2018-12-20

## 2018-12-20 RX ORDER — MUPIROCIN 20 MG/G
1 OINTMENT TOPICAL 2 TIMES DAILY
Status: DISCONTINUED | OUTPATIENT
Start: 2018-12-20 | End: 2018-12-20 | Stop reason: HOSPADM

## 2018-12-20 RX ORDER — HYDROMORPHONE HYDROCHLORIDE 2 MG/ML
0.5 INJECTION, SOLUTION INTRAMUSCULAR; INTRAVENOUS; SUBCUTANEOUS EVERY 5 MIN PRN
Status: CANCELLED | OUTPATIENT
Start: 2018-12-20

## 2018-12-20 RX ORDER — LIDOCAINE HYDROCHLORIDE 10 MG/ML
INJECTION INFILTRATION; PERINEURAL
Status: DISCONTINUED | OUTPATIENT
Start: 2018-12-20 | End: 2018-12-20 | Stop reason: HOSPADM

## 2018-12-20 RX ORDER — GLYCOPYRROLATE 0.2 MG/ML
INJECTION INTRAMUSCULAR; INTRAVENOUS
Status: DISCONTINUED | OUTPATIENT
Start: 2018-12-20 | End: 2018-12-20

## 2018-12-20 RX ORDER — LIDOCAINE HCL/PF 100 MG/5ML
SYRINGE (ML) INTRAVENOUS
Status: DISCONTINUED | OUTPATIENT
Start: 2018-12-20 | End: 2018-12-20

## 2018-12-20 RX ORDER — FENTANYL CITRATE 50 UG/ML
INJECTION, SOLUTION INTRAMUSCULAR; INTRAVENOUS
Status: DISCONTINUED | OUTPATIENT
Start: 2018-12-20 | End: 2018-12-20

## 2018-12-20 RX ORDER — BUPIVACAINE HYDROCHLORIDE 5 MG/ML
INJECTION, SOLUTION EPIDURAL; INTRACAUDAL
Status: DISCONTINUED | OUTPATIENT
Start: 2018-12-20 | End: 2018-12-20 | Stop reason: HOSPADM

## 2018-12-20 RX ORDER — KETAMINE HYDROCHLORIDE 50 MG/ML
INJECTION, SOLUTION INTRAMUSCULAR; INTRAVENOUS
Status: DISCONTINUED | OUTPATIENT
Start: 2018-12-20 | End: 2018-12-20

## 2018-12-20 RX ORDER — ACETAMINOPHEN 325 MG/1
325 TABLET ORAL EVERY 6 HOURS PRN
Status: DISCONTINUED | OUTPATIENT
Start: 2018-12-20 | End: 2018-12-20 | Stop reason: HOSPADM

## 2018-12-20 RX ORDER — ONDANSETRON 2 MG/ML
4 INJECTION INTRAMUSCULAR; INTRAVENOUS DAILY PRN
Status: CANCELLED | OUTPATIENT
Start: 2018-12-20

## 2018-12-20 RX ADMIN — MIDAZOLAM 1 MG: 1 INJECTION INTRAMUSCULAR; INTRAVENOUS at 08:12

## 2018-12-20 RX ADMIN — LIDOCAINE HYDROCHLORIDE 50 MG: 20 INJECTION, SOLUTION INTRAVENOUS at 08:12

## 2018-12-20 RX ADMIN — KETAMINE HYDROCHLORIDE 10 MG: 50 INJECTION, SOLUTION, CONCENTRATE INTRAMUSCULAR; INTRAVENOUS at 08:12

## 2018-12-20 RX ADMIN — PROPOFOL 100 MCG/KG/MIN: 10 INJECTION, EMULSION INTRAVENOUS at 08:12

## 2018-12-20 RX ADMIN — FENTANYL CITRATE 50 MCG: 50 INJECTION, SOLUTION INTRAMUSCULAR; INTRAVENOUS at 08:12

## 2018-12-20 RX ADMIN — GLYCOPYRROLATE 0.2 MG: 0.2 INJECTION, SOLUTION INTRAMUSCULAR; INTRAVENOUS at 08:12

## 2018-12-20 RX ADMIN — FENTANYL CITRATE 25 MCG: 50 INJECTION, SOLUTION INTRAMUSCULAR; INTRAVENOUS at 08:12

## 2018-12-20 RX ADMIN — KETAMINE HYDROCHLORIDE 15 MG: 50 INJECTION, SOLUTION, CONCENTRATE INTRAMUSCULAR; INTRAVENOUS at 08:12

## 2018-12-20 RX ADMIN — SODIUM CHLORIDE, SODIUM LACTATE, POTASSIUM CHLORIDE, AND CALCIUM CHLORIDE: .6; .31; .03; .02 INJECTION, SOLUTION INTRAVENOUS at 08:12

## 2018-12-20 RX ADMIN — KETAMINE HYDROCHLORIDE 5 MG: 50 INJECTION, SOLUTION, CONCENTRATE INTRAMUSCULAR; INTRAVENOUS at 08:12

## 2018-12-20 NOTE — DISCHARGE INSTRUCTIONS
-Patient to complete pain journal.  -2 week follow up with Dr. Dasilva in clinic.  -Take tylenol if have any mild pain from injection.     ANESTHESIA  -For the first 24 hours after surgery:  Do not drive, use heavy equipment, make important decisions, or drink alcohol  -It is normal to feel sleepy for several hours.  Rest until you are more awake.  -Have someone stay with you, if needed.  They can watch for problems and help keep you safe.  -Some possible post anesthesia side effects include: nausea and vomiting, sore throat and hoarseness, sleepiness, and dizziness.    PAIN  -If you have pain after surgery, pain medicine will help you feel better.  Take it as directed, before pain becomes severe.  Most pain relievers taken by mouth need at least 20-30 minutes to start working.  -Do not drive or drink alcohol while taking pain medicine.  -Pain medication can upset your stomach.  Taking them with a little food may help.  -Other ways to help control pain: elevation, ice, and relaxation  -Call your surgeon if still having unmanageable pain an hour after taking pain medicine.  -Pain medicine can cause constipation.  Taking an over-the counter stool softener while on prescription pain medicine and drinking plenty of fluids can prevent this side effect.  -Call your surgeon if you have severe side effects like: breathing problems, trouble waking up, dizziness, confusion, or severe constipation.    NAUSEA  -Some people have nausea after surgery.  This is often because of anesthesia, pain, pain medicine, or the stress of surgery.  -Do not push yourself to eat.  Start off with clear liquids and soup.  Slowly move to solid foods.  Don't eat fatty, rich, spicy foods at first.  Eat smaller amounts.  -If you develop persistent nausea and vomiting please notify your surgeon immediately.    BLEEDING  -Different types of surgery require different types of care and dressing changes.  It is important to follow all instructions and  advice from your surgeon.  Change dressing as directed.  Call your surgeon for any concerns regarding postop bleeding.    SIGNS OF INFECTION  -Signs of infection include: fever, swelling, drainage, and redness  -Notify your surgeon if you have a fever of 100.4 F (38.0 C) or higher.  -Notify your surgeon if you notice redness, swelling, increased pain, pus, or a foul smell at the incision site.

## 2018-12-20 NOTE — H&P
NEUROSURGICAL H&P NOTE     DATE OF SERVICE:  12/20/2018     ATTENDING PHYSICIAN:  Xavier Dasilva MD     CONSULT REQUESTED BY:  Dr Vega     REASON FOR CONSULT:  Right SI joint pain     SUBJECTIVE:     HISTORY OF PRESENT ILLNESS:  This is a very pleasant 83 y.o. female who reports lumbosacral pain, severe right SI joint pain that has been worsening over the last 10 years. The pain is interfering with walking, climbing up stairs. The pain can be stabbing and sharp. Pain is constant. Pain travels to the hip on the right side. She did Pilates and now is doing SI joint PT exercises on her own with some improvement. Pain today is better than usual. Received medial branch block with mild pain relief. Remember receiving a SI joint block in the office under ultrasound with some relief. Not using a SI joint belt.      Low Back Pain Scale  R Low Back-Pain Score: 6  R Low Back-Pain Intensity: Pain killers give very little relief from pain  R Low Back-Pain Score: It is painful to look after myself and I am slow and careful  Low Back-Lifting: Pain prevents me from lifting heavy weights  but I can manage light weights if they are conveniently placed   Low Back-Walking: Pain prevents me walking more than .25 mile   Low Back-Sitting: I can sit only in my favorite chair as long as I like   Low Back-Standing: I cannot stand for longer than 30 mins without increasing pain   Low Back-Sleeping: I have no pain in bed   Low Back-Social Life: Pain has no significant effect on my social life apart from limiting my more en   Low Back-Traveling: I have some pain when traveling but kevin of my usual forms of travel make it any worse   Low Back-Changing Degree of Pain: My pain is gradually worsening        PAST MEDICAL HISTORY:       Active Ambulatory Problems     Diagnosis Date Noted    Facet arthritis of lumbar region 10/02/2012    Spondylosis without myelopathy 11/06/2013    Trochanteric bursitis of right hip 01/27/2014    Intercostal  neuralgia 08/26/2014    OA (osteoarthritis) of knee 01/05/2015    Osteopenia 02/26/2015    Bilateral lumbar radiculopathy 08/13/2015    Urinary incontinence 12/07/2015    Female bladder prolapse 07/22/2016    Neuropathy 12/02/2016    Aortic atherosclerosis 04/24/2014    Osteoarthritis of spine 09/27/2012    Hyperlipidemia 08/02/2017    Chronic kidney disease, stage III (moderate) 08/02/2017    TIA (transient ischemic attack) 11/24/2017    Chronic diastolic heart failure 11/25/2017    HTN (hypertension), benign 04/11/2018    Sacroiliitis 07/24/2018    Chronic pain 08/28/2018    Secondary hyperparathyroidism of renal origin 10/12/2018           Resolved Ambulatory Problems     Diagnosis Date Noted    Chest pain 05/01/2014    Cholecystolithiasis 05/01/2014    Chest wall pain 08/26/2014    BPPV (benign paroxysmal positional vertigo) 01/08/2015    Acute sinusitis 11/16/2015    Laryngitis 11/16/2015    Bronchitis 11/16/2015    BMI 22.0-22.9, adult 07/22/2016    Palpitations 05/26/2017    Dyspnea on exertion 05/26/2017    Hypertensive urgency 11/24/2017           Past Medical History:   Diagnosis Date    Arthritis      Blood transfusion      Chronic kidney disease, stage III (moderate) 8/2/2017    Degenerative disc disease      Insulinoma      Pancreatic disease      Urinary incontinence 12/7/2015         PAST SURGICAL HISTORY:        Past Surgical History:   Procedure Laterality Date    ADENOIDECTOMY        APPENDECTOMY        CATARACT EXTRACTION, BILATERAL        EYE SURGERY        HYSTERECTOMY         SAMM/BSO    OOPHORECTOMY        PANCREAS SURGERY         Insulanoma    RADIOFREQUENCY ABLATION OF LUMBAR MEDIAL BRANCH NERVE AT SINGLE LEVEL Right 8/28/2018     Procedure: RADIOFREQUENCY ABLATION, NERVE, MEDIAL BRANCH, LUMBAR, 1 LEVEL- Right L3,4,5 IV SEDATION;  Surgeon: Eddie Vega MD;  Location: [facility];  Service: Pain Management;  Laterality: Right;     RADIOFREQUENCY ABLATION OF LUMBAR MEDIAL BRANCH NERVE AT SINGLE LEVEL Left 9/4/2018     Procedure: RADIOFREQUENCY ABLATION, NERVE, MEDIAL BRANCH, LUMBAR, 1 LEVEL - Left L3,4,5 IV SEADTION;  Surgeon: Eddie Vega MD;  Location: [facility];  Service: Pain Management;  Laterality: Left;  Patient takes ASA     RADIOFREQUENCY ABLATION, NERVE, MEDIAL BRANCH, LUMBAR, 1 LEVEL - Left L3,4,5 IV SEADTION Left 9/4/2018     Performed by Eddie Vega MD at [facility]    RADIOFREQUENCY ABLATION, NERVE, MEDIAL BRANCH, LUMBAR, 1 LEVEL- Right L3,4,5 IV SEDATION Right 8/28/2018     Performed by Eddie Vega MD at [facility]    REFRACTIVE SURGERY Bilateral      TONSILLECTOMY             SOCIAL HISTORY:   Social History               Socioeconomic History    Marital status:        Spouse name: Not on file    Number of children: Not on file    Years of education: Not on file    Highest education level: Not on file   Social Needs    Financial resource strain: Not on file    Food insecurity - worry: Not on file    Food insecurity - inability: Not on file    Transportation needs - medical: Not on file    Transportation needs - non-medical: Not on file   Occupational History    Not on file   Tobacco Use    Smoking status: Never Smoker    Smokeless tobacco: Never Used   Substance and Sexual Activity    Alcohol use: Yes       Alcohol/week: 0.6 - 1.2 oz       Types: 1 - 2 Glasses of wine per week    Drug use: No    Sexual activity: Yes       Partners: Male   Other Topics Concern    Not on file   Social History Narrative    Not on file            FAMILY HISTORY:        Family History   Problem Relation Age of Onset    Breast cancer Mother      Heart disease Father      Aneurysm Sister      Glaucoma Daughter      Heart attack Son      Breast cancer Other           CURRENTS MEDICATIONS:         Current Outpatient Medications on File Prior to Visit   Medication Sig Dispense Refill     aspirin 81 MG Chew Take 1 tablet (81 mg total) by mouth once daily. 90 tablet 3    b complex vitamins tablet Take 1 tablet by mouth once daily.        estradiol (ESTRACE) 0.01 % (0.1 mg/gram) vaginal cream Place 1 g vaginally once daily. Use 1 gram of estrogen cream in vagina nightly x 2 weeks, then twice a week thereafter. . 42.5 g 1    gabapentin (NEURONTIN) 300 MG capsule Take 1 capsule (300 mg total) by mouth every evening. 90 capsule 3    lisinopril (PRINIVIL,ZESTRIL) 20 MG tablet Take 1 tablet (20 mg total) by mouth 2 (two) times daily. 180 tablet 3    meloxicam (MOBIC) 15 MG tablet Take 1 tablet (15 mg total) by mouth daily as needed for Pain. 90 tablet 3    omega-3 acid ethyl esters (LOVAZA) 1 gram capsule Take 2 g by mouth 2 (two) times daily.        pravastatin (PRAVACHOL) 10 MG tablet Take 1 tablet (10 mg total) by mouth once daily. 90 tablet 3    pyridoxine (VITAMIN B-6) 100 MG Tab Take 100 mg by mouth once daily.          No current facility-administered medications on file prior to visit.          ALLERGIES:       Review of patient's allergies indicates:   Allergen Reactions    Pcn [penicillins] Hives and Nausea And Vomiting         REVIEW OF SYSTEMS:  Review of Systems   Constitutional: Negative for diaphoresis, fever and weight loss.   Respiratory: Negative for shortness of breath.    Cardiovascular: Negative for chest pain.   Gastrointestinal: Negative for blood in stool.   Genitourinary: Negative for hematuria.   Endo/Heme/Allergies: Does not bruise/bleed easily.   All other systems reviewed and are negative.        OBJECTIVE:     PHYSICAL EXAMINATION:       Vitals:     11/26/18 1105   BP: (!) 152/72   Pulse: 63         Physical Exam:  Vitals reviewed.     Constitutional: She appears well-developed and well-nourished.      Eyes: Pupils are equal, round, and reactive to light. Conjunctivae and EOM are normal.      Cardiovascular: Normal distal pulses and no edema.      Abdominal: Soft.       Skin: Skin displays no rash on trunk and no rash on extremities. Skin displays no lesions on trunk and no lesions on extremities.     Psych/Behavior: She is alert. She is oriented to person, place, and time. She has a normal mood and affect.     Musculoskeletal:        Neck: Range of motion is full.     Neurological:        DTRs: Tricep reflexes are 2+ on the right side and 2+ on the left side. Bicep reflexes are 2+ on the right side and 2+ on the left side. Brachioradialis reflexes are 2+ on the right side and 2+ on the left side. Patellar reflexes are 2+ on the right side and 2+ on the left side. Achilles reflexes are 2+ on the right side and 2+ on the left side.         Back Exam      Tenderness   The patient is experiencing tenderness in the sacroiliac (right iliac crest higher than left, right acute pain on SI joint palpation).     Range of Motion   Extension: normal   Flexion:  70 abnormal   Lateral bend right: normal   Lateral bend left: normal   Rotation right: normal   Rotation left: normal      Muscle Strength   Right Quadriceps:  5/5   Left Quadriceps:  5/5   Right Hamstrings:  5/5   Left Hamstrings:  5/5      Tests   Straight leg raise right: negative  Straight leg raise left: negative     Other   Toe walk: normal  Heel walk: normal                 SI joint:   Palpation at the right SI joint is painful  CARMITA test is positive on the right side, poor hip flexibility on the right side, no pain in internal rotation.   Thigh thrust test is positive on the right side     Neurologic Exam      Mental Status   Oriented to person, place, and time.   Speech: speech is normal   Level of consciousness: alert     Cranial Nerves   Cranial nerves II through XII intact.      CN III, IV, VI   Pupils are equal, round, and reactive to light.  Extraocular motions are normal.      Motor Exam   Muscle bulk: normal  Overall muscle tone: normal     Strength   Right deltoid: 5/5  Left deltoid: 5/5  Right biceps: 5/5  Left  biceps: 5/5  Right triceps: 5/5  Left triceps: 5/5  Right wrist flexion: 5/5  Left wrist flexion: 5/5  Right wrist extension: 5/5  Left wrist extension: 5/5  Right interossei: 5/5  Left interossei: 5/5  Right iliopsoas: 5/5  Left iliopsoas: 5/5  Right quadriceps: 5/5  Left quadriceps: 5/5  Right hamstrin/5  Left hamstrin/5  Right anterior tibial: 5/5  Left anterior tibial: 5/5  Right posterior tibial: 5/5  Left posterior tibial: 5/5  Right peroneal: 5/5  Left peroneal: 5/5  Right gastroc: 5/5  Left gastroc: 5/5     Sensory Exam   Light touch normal.   Pinprick normal.      Gait, Coordination, and Reflexes      Gait  Gait: normal     Coordination   Finger to nose coordination: normal  Tandem walking coordination: normal     Reflexes   Right brachioradialis: 2+  Left brachioradialis: 2+  Right biceps: 2+  Left biceps: 2+  Right triceps: 2+  Left triceps: 2+  Right patellar: 2+  Left patellar: 2+  Right achilles: 2+  Left achilles: 2+  Right plantar: normal  Left plantar: normal  Right Shipman: absent  Left Shipman: absent  Right ankle clonus: absent  Left ankle clonus: absent           DIAGNOSTIC DATA:  I personally interpreted the following imaging:   Lumbar spine MRI 10/02/2018: diffuse spondylosis without nerve root compression, no spondylolisthesis, stretching of the right iliolumbar ligament compared to the left side.      ASSESMENT:  This is a 83 y.o. female with          Problem List Items Addressed This Visit      None               Visit Diagnoses      Sacroiliac joint dysfunction of right side    -  Primary     Relevant Orders     Ambulatory consult to Physical Therapy             PLAN:    Right SI joint block with pain journal                Xavier Dasilva MD  Cell:[phone]

## 2018-12-20 NOTE — ANESTHESIA PREPROCEDURE EVALUATION
12/20/2018  Cheryl Ghosh is a 84 y.o., female here for SI Joint Injection    Past Medical History:   Diagnosis Date    Arthritis     lumbar    Blood transfusion     Chronic kidney disease, stage III (moderate) 8/2/2017    Degenerative disc disease     lumbar    Insulinoma     Pancreatic disease     Urinary incontinence 12/7/2015     Past Surgical History:   Procedure Laterality Date    ADENOIDECTOMY      APPENDECTOMY      CATARACT EXTRACTION, BILATERAL      EYE SURGERY      HYSTERECTOMY      SAMM/BSO    OOPHORECTOMY      PANCREAS SURGERY      Insulanoma    RADIOFREQUENCY ABLATION OF LUMBAR MEDIAL BRANCH NERVE AT SINGLE LEVEL Right 8/28/2018    Procedure: RADIOFREQUENCY ABLATION, NERVE, MEDIAL BRANCH, LUMBAR, 1 LEVEL- Right L3,4,5 IV SEDATION;  Surgeon: Eddie Vega MD;  Location: [facility];  Service: Pain Management;  Laterality: Right;    RADIOFREQUENCY ABLATION OF LUMBAR MEDIAL BRANCH NERVE AT SINGLE LEVEL Left 9/4/2018    Procedure: RADIOFREQUENCY ABLATION, NERVE, MEDIAL BRANCH, LUMBAR, 1 LEVEL - Left L3,4,5 IV SEADTION;  Surgeon: Eddie Vega MD;  Location: [facility];  Service: Pain Management;  Laterality: Left;  Patient takes ASA     RADIOFREQUENCY ABLATION, NERVE, MEDIAL BRANCH, LUMBAR, 1 LEVEL - Left L3,4,5 IV SEADTION Left 9/4/2018    Performed by Eddie Vega MD at [facility]    RADIOFREQUENCY ABLATION, NERVE, MEDIAL BRANCH, LUMBAR, 1 LEVEL- Right L3,4,5 IV SEDATION Right 8/28/2018    Performed by Eddie Vega MD at [facility]    REFRACTIVE SURGERY Bilateral     TONSILLECTOMY           Anesthesia Evaluation    I have reviewed the Patient Summary Reports.    I have reviewed the Nursing Notes.   I have reviewed the Medications.     Review of Systems  Anesthesia Hx:  History of prior surgery of interest to airway management or planning:   Denies Personal Hx of Anesthesia complications.   Hematology/Oncology:     Oncology Normal     Cardiovascular:   Hypertension    Pulmonary:  Pulmonary Normal    Renal/:   Chronic Renal Disease    Musculoskeletal:   Arthritis  Lower back pain Spine Disorders:    Neurological:   TIA,    Endocrine:  Endocrine Normal Hx of insulinoma       Physical Exam  General:  Well nourished     Eyes/Ears/Nose:  EYES/EARS/NOSE FINDINGS: Normal    Chest/Lungs:  Chest/Lungs Clear    Heart/Vascular:  Heart Findings: Normal            Anesthesia Plan  Type of Anesthesia, risks & benefits discussed:  Anesthesia Type:  general, MAC  Patient's Preference:   Intra-op Monitoring Plan:   Intra-op Monitoring Plan Comments:   Post Op Pain Control Plan:   Post Op Pain Control Plan Comments:   Induction:    Beta Blocker:         Informed Consent: Patient understands risks and agrees with Anesthesia plan.  Questions answered. Anesthesia consent signed with patient.  ASA Score: 2     Day of Surgery Review of History & Physical:            Ready For Surgery From Anesthesia Perspective.

## 2018-12-20 NOTE — ANESTHESIA POSTPROCEDURE EVALUATION
"Anesthesia Post Evaluation    Patient: Cheryl Ghosh    Procedure(s) Performed: Procedure(s) (LRB):  INJECTION, STEROID-- Right SI Joint Block and  Steroid Injection (Right)    Final Anesthesia Type: MAC  Patient location during evaluation: OPS  Patient participation: Yes- Able to Participate  Level of consciousness: awake and alert and oriented  Post-procedure vital signs: reviewed and stable  Pain management: adequate  Airway patency: patent  PONV status at discharge: No PONV  Anesthetic complications: no      Cardiovascular status: blood pressure returned to baseline, hemodynamically stable and stable  Respiratory status: spontaneous ventilation, unassisted and room air  Hydration status: euvolemic  Follow-up not needed.        Visit Vitals  BP (!) 154/67   Pulse (!) 57   Temp 36.6 °C (97.9 °F) (Oral)   Resp 20   Ht 5' 4" (1.626 m)   Wt 59.9 kg (132 lb)   LMP  (LMP Unknown)   SpO2 (!) 0%   Breastfeeding? No   BMI 22.66 kg/m²       Pain/Enid Score: No Data Recorded      "

## 2018-12-20 NOTE — PROGRESS NOTES
Certification of Assistant at Surgery       Surgery Date: 12/20/2018     Participating Surgeons:  Surgeon(s) and Role:     * Xavier Dasilva MD - Primary     * Wiliam Urias PA-C - Assisting       Procedures:  Procedure(s) (LRB):  INJECTION, STEROID-- Right SI Joint Block and  Steroid Injection (Right)    Assistant Surgeon's Certification of Necessity:  I understand that section 1842 (b) (6) (d) of the Social Security Act generally prohibits Medicare Part B reasonable charge payment for the services of assistants at surgery in [facility] hospitals when qualified residents are available to furnish such services. I certify that the services for which payment is claimed were medically necessary, and that no qualified resident was available to perform the services. I further understand that these services are subject to post-payment review by the Medicare carrier.      Wiliam Uiras PA-C    12/20/2018  2:54 PM

## 2018-12-20 NOTE — TRANSFER OF CARE
"Anesthesia Transfer of Care Note    Patient: Cheryl Ghosh    Procedure(s) Performed: Procedure(s) (LRB):  INJECTION, STEROID-- Right SI Joint Block and  Steroid Injection (Right)    Patient location: OPS    Anesthesia Type: MAC    Transport from OR: Transported from OR on room air with adequate spontaneous ventilation    Post pain: adequate analgesia    Post assessment: no apparent anesthetic complications and tolerated procedure well    Post vital signs: stable    Level of consciousness: awake, alert and oriented    Nausea/Vomiting: no nausea/vomiting    Complications: none    Transfer of care protocol was followed      Last vitals:   Visit Vitals  BP (!) 154/67   Pulse (!) 57   Temp 36.6 °C (97.9 °F) (Oral)   Resp 20   Ht 5' 4" (1.626 m)   Wt 59.9 kg (132 lb)   LMP  (LMP Unknown)   SpO2 (!) 0%   Breastfeeding? No   BMI 22.66 kg/m²     "

## 2018-12-20 NOTE — HPI
Cheryl Ghosh is a very pleasant 83 y.o. 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 The pain is interfering with walking, climbing up stairs. The pain can be stabbing and sharp. Pain is constant. Pain travels to the hip on the right side. She did Pilates and now is doing SI joint PT exercises on her own with some improvement.Received medial branch block with mild pain relief. Remember receiving a SI joint block in the office under ultrasound with some relief. Not using a SI joint belt.     Patient is here for elective right SI joint steroid injection.

## 2018-12-20 NOTE — HOSPITAL COURSE
Patient presented to or for right SI joint steroid injection.  she tolerated the procedure well, and there were no intra-operative difficulties. She recovered in PACU, where her pain was controlled. Patient was able to void on her own, ambulated without assistance, and tolerated PO diet. On 12/19/2018, patient was discharged home with pain medication and follow up appointments. Regular diet. Activity as tolerated. At the time of discharge, vital signs were stable, patient was afebrile and neurologically stable.  she  was counseled on wound care, activity restrictions, and follow up prior to discharge.  Discharge instructions were given verbally/written to the patient and their family. All of their questions were answered. Patient and family voiced understanding. They were encouraged to call the clinic with any questions they might have prior to the follow up appointments.       Please see the patient instructions tab for detailed instructions and follow up information

## 2018-12-20 NOTE — OP NOTE
DATE OF PROCEDURE: 12/20/2018     PREOPERATIVE DIAGNOSES:  1. Right sacroiliac joint dysfunction and refractory pain     POSTOPERATIVE DIAGNOSES:   1. Right sacroiliac joint dysfunction and refractory pain     NAME OF OPERATION:  1. Right sacroiliac joint block and steroid injection  2. Fluoroscopy     PRIMARY SURGEON: Xavier Dasilva M.D.     ASSISTANT SURGEON: PAT        INDICATION FOR PROCEDURE: This is a 84-year-old female who presented with right SI joint pain that was refractory to conservative treatment. With walking the pain can increase to 6/10 on average. Risks, benefits, alternative and limitation were discussed with the patient. The risk included nerve root injury that can cause paralysis, cerebrospinal fluid leak, wound infection and blood loss. The patient wanted to proceed and a consent was obtained.      DESCRIPTION OF THE PROCEDURE     The patient was placed on MAC anesthesia and was prone on the Zaki table.  All pressure points were carefully padded. The patient was prepped and draped   in the typical sterile fashion and the incision was made with a #15 blade.   Hemostasis was complete and the gluteal fascia was penetrated with a k-wire. Using the lateral and outlet views, and after local anesthesia with 1% lidocaine, the 22 spinal christoph needle was inserted inside the right sacroiliac joint and 0.5cc of Omnipaque was injected to confirm the needle tip placement.  We then injected a mixture of 1cc of 40mg of Depo-Medrol and 3 cc of 0.25% marcaine. The wounds were dressed with a sterile dressing. The blood loss was less than 1 cc. The final count was completed and nothing was missing. There was no complication.

## 2019-01-03 ENCOUNTER — PATIENT MESSAGE (OUTPATIENT)
Dept: FAMILY MEDICINE | Facility: CLINIC | Age: 84
End: 2019-01-03

## 2019-01-07 ENCOUNTER — LAB VISIT (OUTPATIENT)
Dept: LAB | Facility: HOSPITAL | Age: 84
End: 2019-01-07
Attending: FAMILY MEDICINE
Payer: MEDICARE

## 2019-01-07 DIAGNOSIS — N18.30 CHRONIC KIDNEY DISEASE, STAGE III (MODERATE): ICD-10-CM

## 2019-01-07 DIAGNOSIS — E78.2 MIXED HYPERLIPIDEMIA: ICD-10-CM

## 2019-01-07 DIAGNOSIS — I10 HTN (HYPERTENSION), BENIGN: ICD-10-CM

## 2019-01-07 LAB
ALBUMIN SERPL BCP-MCNC: 3.6 G/DL
ALP SERPL-CCNC: 61 U/L
ALT SERPL W/O P-5'-P-CCNC: 14 U/L
ANION GAP SERPL CALC-SCNC: 7 MMOL/L
AST SERPL-CCNC: 20 U/L
BILIRUB SERPL-MCNC: 0.3 MG/DL
BUN SERPL-MCNC: 40 MG/DL
CALCIUM SERPL-MCNC: 9.3 MG/DL
CHLORIDE SERPL-SCNC: 111 MMOL/L
CHOLEST SERPL-MCNC: 192 MG/DL
CHOLEST/HDLC SERPL: 4 {RATIO}
CO2 SERPL-SCNC: 24 MMOL/L
CREAT SERPL-MCNC: 1.4 MG/DL
EST. GFR  (AFRICAN AMERICAN): 40 ML/MIN/1.73 M^2
EST. GFR  (NON AFRICAN AMERICAN): 35 ML/MIN/1.73 M^2
GLUCOSE SERPL-MCNC: 99 MG/DL
HDLC SERPL-MCNC: 48 MG/DL
HDLC SERPL: 25 %
LDLC SERPL CALC-MCNC: 119.8 MG/DL
NONHDLC SERPL-MCNC: 144 MG/DL
POTASSIUM SERPL-SCNC: 5.1 MMOL/L
PROT SERPL-MCNC: 6.7 G/DL
SODIUM SERPL-SCNC: 142 MMOL/L
TRIGL SERPL-MCNC: 121 MG/DL

## 2019-01-07 PROCEDURE — 80061 LIPID PANEL: CPT

## 2019-01-07 PROCEDURE — 36415 COLL VENOUS BLD VENIPUNCTURE: CPT

## 2019-01-07 PROCEDURE — 80053 COMPREHEN METABOLIC PANEL: CPT

## 2019-01-08 ENCOUNTER — OFFICE VISIT (OUTPATIENT)
Dept: NEUROSURGERY | Facility: CLINIC | Age: 84
End: 2019-01-08
Payer: MEDICARE

## 2019-01-08 VITALS
DIASTOLIC BLOOD PRESSURE: 75 MMHG | SYSTOLIC BLOOD PRESSURE: 173 MMHG | HEART RATE: 56 BPM | HEIGHT: 64 IN | WEIGHT: 132 LBS | BODY MASS INDEX: 22.53 KG/M2

## 2019-01-08 DIAGNOSIS — M53.3 SACROILIAC JOINT DYSFUNCTION OF RIGHT SIDE: Primary | ICD-10-CM

## 2019-01-08 PROCEDURE — 3077F SYST BP >= 140 MM HG: CPT | Mod: CPTII,S$GLB,, | Performed by: NEUROLOGICAL SURGERY

## 2019-01-08 PROCEDURE — 3077F PR MOST RECENT SYSTOLIC BLOOD PRESSURE >= 140 MM HG: ICD-10-PCS | Mod: CPTII,S$GLB,, | Performed by: NEUROLOGICAL SURGERY

## 2019-01-08 PROCEDURE — 99999 PR PBB SHADOW E&M-EST. PATIENT-LVL III: ICD-10-PCS | Mod: PBBFAC,,, | Performed by: NEUROLOGICAL SURGERY

## 2019-01-08 PROCEDURE — 3078F DIAST BP <80 MM HG: CPT | Mod: CPTII,S$GLB,, | Performed by: NEUROLOGICAL SURGERY

## 2019-01-08 PROCEDURE — 3078F PR MOST RECENT DIASTOLIC BLOOD PRESSURE < 80 MM HG: ICD-10-PCS | Mod: CPTII,S$GLB,, | Performed by: NEUROLOGICAL SURGERY

## 2019-01-08 PROCEDURE — 99999 PR PBB SHADOW E&M-EST. PATIENT-LVL III: CPT | Mod: PBBFAC,,, | Performed by: NEUROLOGICAL SURGERY

## 2019-01-08 PROCEDURE — 1101F PT FALLS ASSESS-DOCD LE1/YR: CPT | Mod: CPTII,S$GLB,, | Performed by: NEUROLOGICAL SURGERY

## 2019-01-08 PROCEDURE — 99214 OFFICE O/P EST MOD 30 MIN: CPT | Mod: S$GLB,,, | Performed by: NEUROLOGICAL SURGERY

## 2019-01-08 PROCEDURE — 1101F PR PT FALLS ASSESS DOC 0-1 FALLS W/OUT INJ PAST YR: ICD-10-PCS | Mod: CPTII,S$GLB,, | Performed by: NEUROLOGICAL SURGERY

## 2019-01-08 PROCEDURE — 99214 PR OFFICE/OUTPT VISIT, EST, LEVL IV, 30-39 MIN: ICD-10-PCS | Mod: S$GLB,,, | Performed by: NEUROLOGICAL SURGERY

## 2019-01-08 NOTE — PROGRESS NOTES
NEUROSURGICAL POST-OPERATIVE PROGRESS NOTE    DATE OF SERVICE:  01/08/2019      ATTENDING PHYSICIAN:  Xavier Dasilva MD    SUBJECTIVE:    INTERIM HISTORY:    This is a very pleasant 84 y.o. y.o. female, who is s/p right SI joint injection 12/20/18 presents in fu.  Pt states that she experienced relief of pain for about 4 days following the injection.  However pain has returned and she still describes deep right buttocks pain that is nonradiating.  She has tried PT with minimal relief.  At this point she is interested in SI joint fusion.  She is still ambulating but finds it difficult to work as  secondary to pain.  She also is unable to climb stairs d/t her right SI pain.    Low Back Pain Scale  R Low Back-Pain Score: 2  R Low Back-Pain Intensity: Pain killers give moderate relief from pain  R Low Back-Pain Score: I need some help, but manage most of my personal care  Low Back-Lifting: Pain prevents me from lifting heavy weights  but I can manage light weights if they are conveniently placed   Low Back-Walking: Pain prevents me walking more than .25 mile   Low Back-Sitting: I can sit only in my favorite chair as long as I like   Low Back-Standing: I cannot stand for longer than 30 mins without increasing pain   Low Back-Sleeping: I have pain in bed but it does not prevent me from sleeping well   Low Back-Social Life: My social life is normal but it increases the degree of pain   Low Back-Traveling: I have extra pain while traveling but it does not compel me to seek alternate forms of travel   Low Back-Changing Degree of Pain: (My pain is neither getting better or worse)         OBJECTIVE:    PHYSICAL EXAMINATION:         Back Exam     Tenderness   The patient is experiencing tenderness in the sacroiliac (right).    Range of Motion   Extension: normal   Flexion: normal   Lateral bend right: normal   Lateral bend left: normal   Rotation right: normal   Rotation left: normal     Muscle Strength   Right  Quadriceps:  5/5   Left Quadriceps:  5/5   Right Hamstrings:  5/5   Left Hamstrings:  5/5             Neurologic Exam     Mental Status   Oriented to person, place, and time.   Speech: speech is normal   Level of consciousness: alert    Cranial Nerves   Cranial nerves II through XII intact.     Motor Exam   Muscle bulk: normal  Overall muscle tone: normal    Strength   Right deltoid: 5/5  Left deltoid: 5/5  Right biceps: 5/5  Left biceps: 5/5  Right triceps: 5/5  Left triceps: 5/5  Right wrist flexion: 5/5  Left wrist flexion: 5/5  Right wrist extension: 5/5  Left wrist extension: 5/5  Right interossei: 5/5  Left interossei: 5/5  Right iliopsoas: 5/5  Left iliopsoas: 5/5  Right quadriceps: 5/5  Left quadriceps: 5/5  Right hamstrin/5  Left hamstrin/5  Right anterior tibial: 5/5  Left anterior tibial: 5/5  Right posterior tibial: 5/5  Left posterior tibial: 5/5  Right peroneal: 5/5  Left peroneal: 5/5  Right gastroc: 5/5  Left gastroc: 5/5    Sensory Exam   Light touch normal.   Pinprick normal.     Gait, Coordination, and Reflexes     Gait  Gait: normal    Coordination   Finger to nose coordination: normal  Tandem walking coordination: normal    Reflexes   Right brachioradialis: 2+  Left brachioradialis: 2+  Right biceps: 2+  Left biceps: 2+  Right triceps: 2+  Left triceps: 2+  Right patellar: 2+  Left patellar: 2+  Right achilles: 2+  Left achilles: 2+  Right plantar: normal  Left plantar: normal  Right Shipman: absent  Left Shipman: absent  Right ankle clonus: absent  Left ankle clonus: absent      SI joint exam:   Enriqueta test is positive on the right  Tristian test is positive on the right  Gaenslen test is positive on the right  Ever's test is negative      DIAGNOSTIC DATA:    Lumbar spine MRI 10/02/2018: widening of the bilateral SI joint spaces, no stenosis.     ASSESMENT:    This is a 84 y.o. female who is s/p right SI joint injection. She was more functional after the SI joint block for 3 days. Was able  to do more ADLs. Reports 100% pain relief for more than 4 consecutive hours. She failed conservative management including PT aimed at SI joint dysfunction. She had many RFAs in the past without significant pain relief.     Problem List Items Addressed This Visit     None      Chronic Right SI joint pain.  Right SI joint dysfunction.    PLAN:    I explained the natural history of the disease and all treatment options. I recommended a minimally invasive right SI joint fusion using the iFuse system.     We have discussed the risks of surgery including bleeding, infection, failure of surgery, CSF leak, nerve root injury, spinal cord injury, weakness, paralysis, peripheral neuropathy, malplaced hardware, migration of hardware, non-union, need for reoperation. Patient understands the risks and would like to proceed with surgery.      Continue PT in the interim          Xavier Dasilva MD  Pager: [phone]

## 2019-01-10 ENCOUNTER — TELEPHONE (OUTPATIENT)
Dept: NEUROSURGERY | Facility: CLINIC | Age: 84
End: 2019-01-10

## 2019-01-10 DIAGNOSIS — Z98.1 S/P SPINAL FUSION: ICD-10-CM

## 2019-01-10 DIAGNOSIS — M53.3 SACROILIAC JOINT DYSFUNCTION OF RIGHT SIDE: Primary | ICD-10-CM

## 2019-01-14 ENCOUNTER — PATIENT MESSAGE (OUTPATIENT)
Dept: FAMILY MEDICINE | Facility: CLINIC | Age: 84
End: 2019-01-14

## 2019-01-14 ENCOUNTER — OFFICE VISIT (OUTPATIENT)
Dept: FAMILY MEDICINE | Facility: CLINIC | Age: 84
End: 2019-01-14
Attending: FAMILY MEDICINE
Payer: MEDICARE

## 2019-01-14 ENCOUNTER — HOSPITAL ENCOUNTER (OUTPATIENT)
Dept: RADIOLOGY | Facility: HOSPITAL | Age: 84
Discharge: HOME | End: 2019-01-14
Attending: FAMILY MEDICINE
Payer: MEDICARE

## 2019-01-14 VITALS
DIASTOLIC BLOOD PRESSURE: 75 MMHG | WEIGHT: 134.06 LBS | HEART RATE: 67 BPM | SYSTOLIC BLOOD PRESSURE: 135 MMHG | OXYGEN SATURATION: 99 % | BODY MASS INDEX: 22.89 KG/M2 | HEIGHT: 64 IN

## 2019-01-14 DIAGNOSIS — Z01.818 PRE-OPERATIVE CLEARANCE: Primary | ICD-10-CM

## 2019-01-14 DIAGNOSIS — I10 HTN (HYPERTENSION), BENIGN: ICD-10-CM

## 2019-01-14 DIAGNOSIS — N18.9 CHRONIC KIDNEY DISEASE, UNSPECIFIED CKD STAGE: ICD-10-CM

## 2019-01-14 DIAGNOSIS — N18.30 CHRONIC KIDNEY DISEASE, STAGE III (MODERATE): ICD-10-CM

## 2019-01-14 DIAGNOSIS — M46.1 SACROILIITIS: ICD-10-CM

## 2019-01-14 DIAGNOSIS — Z01.818 PRE-OPERATIVE CLEARANCE: ICD-10-CM

## 2019-01-14 DIAGNOSIS — E78.2 MIXED HYPERLIPIDEMIA: ICD-10-CM

## 2019-01-14 DIAGNOSIS — I50.32 CHRONIC DIASTOLIC HEART FAILURE: ICD-10-CM

## 2019-01-14 PROCEDURE — 99214 PR OFFICE/OUTPT VISIT, EST, LEVL IV, 30-39 MIN: ICD-10-PCS | Mod: 25,S$GLB,, | Performed by: FAMILY MEDICINE

## 2019-01-14 PROCEDURE — 93010 EKG 12-LEAD: ICD-10-PCS | Mod: S$GLB,,, | Performed by: INTERNAL MEDICINE

## 2019-01-14 PROCEDURE — 3075F PR MOST RECENT SYSTOLIC BLOOD PRESS GE 130-139MM HG: ICD-10-PCS | Mod: CPTII,S$GLB,, | Performed by: FAMILY MEDICINE

## 2019-01-14 PROCEDURE — 3075F SYST BP GE 130 - 139MM HG: CPT | Mod: CPTII,S$GLB,, | Performed by: FAMILY MEDICINE

## 2019-01-14 PROCEDURE — 71046 X-RAY EXAM CHEST 2 VIEWS: CPT | Mod: 26,,, | Performed by: RADIOLOGY

## 2019-01-14 PROCEDURE — 93010 ELECTROCARDIOGRAM REPORT: CPT | Mod: S$GLB,,, | Performed by: INTERNAL MEDICINE

## 2019-01-14 PROCEDURE — 3078F DIAST BP <80 MM HG: CPT | Mod: CPTII,S$GLB,, | Performed by: FAMILY MEDICINE

## 2019-01-14 PROCEDURE — 99499 RISK ADDL DX/OHS AUDIT: ICD-10-PCS | Mod: S$GLB,,, | Performed by: FAMILY MEDICINE

## 2019-01-14 PROCEDURE — 1101F PR PT FALLS ASSESS DOC 0-1 FALLS W/OUT INJ PAST YR: ICD-10-PCS | Mod: CPTII,S$GLB,, | Performed by: FAMILY MEDICINE

## 2019-01-14 PROCEDURE — 71046 X-RAY EXAM CHEST 2 VIEWS: CPT | Mod: TC,FY

## 2019-01-14 PROCEDURE — 3078F PR MOST RECENT DIASTOLIC BLOOD PRESSURE < 80 MM HG: ICD-10-PCS | Mod: CPTII,S$GLB,, | Performed by: FAMILY MEDICINE

## 2019-01-14 PROCEDURE — 99499 UNLISTED E&M SERVICE: CPT | Mod: S$GLB,,, | Performed by: FAMILY MEDICINE

## 2019-01-14 PROCEDURE — 99999 PR PBB SHADOW E&M-EST. PATIENT-LVL IV: CPT | Mod: PBBFAC,,, | Performed by: FAMILY MEDICINE

## 2019-01-14 PROCEDURE — 99999 PR PBB SHADOW E&M-EST. PATIENT-LVL IV: ICD-10-PCS | Mod: PBBFAC,,, | Performed by: FAMILY MEDICINE

## 2019-01-14 PROCEDURE — 1101F PT FALLS ASSESS-DOCD LE1/YR: CPT | Mod: CPTII,S$GLB,, | Performed by: FAMILY MEDICINE

## 2019-01-14 PROCEDURE — 93005 ELECTROCARDIOGRAM TRACING: CPT

## 2019-01-14 PROCEDURE — 99214 OFFICE O/P EST MOD 30 MIN: CPT | Mod: 25,S$GLB,, | Performed by: FAMILY MEDICINE

## 2019-01-14 PROCEDURE — 71046 XR CHEST PA AND LATERAL: ICD-10-PCS | Mod: 26,,, | Performed by: RADIOLOGY

## 2019-01-14 NOTE — PROGRESS NOTES
Subjective:       Patient ID: Cheryl Ghosh is a 84 y.o. female.    Chief Complaint: Hypertension and Ear Fullness (Right Ear)    84 yr old pleasant white female with HTN, OA, osteopenia hip, hyperlipidemia, chronic back pain, BPPV, presents today for her pre op clearance, lab work and follow up and concerns regarding BP. She brought log and her evening BP readings are elevated. She is asymptomatic though. She has right ear fullness as well.    Pre op clearance - she is scheduled for spinal surgery on 02/06 and need clearance. She denies any cardiovascular symptoms. She is able to do activities equal to 4 Mets with no symptoms. Non smoker.      HTN - fluctuating - on lisinopril 20 BID - stable home log  - her diet has not changed -       HLD - slightly worsening since stopped statin x 30 days           - she was asked to start statin but due to her fall and issues with her ribs and back, she was never able to start it.    Back pain - she follows pain clinic for injections - c/o neuropathic pain in legs - was on neurontin in the past and she started taking it and working    Osteopenia hip - stable - had dexa in 12/15 and she is talking fosamax since many years and stopped last year- she is due for dexa in 12/17 - no side effects    HISTORY as below - reviewed    Health maintenance  -labs UTD  -mammo UTD  -colon screen UTD  -vaccines UTD      Hypertension   This is a chronic problem. The current episode started more than 1 year ago. The problem has been waxing and waning since onset. The problem is resistant. Associated symptoms include malaise/fatigue. Pertinent negatives include no anxiety, blurred vision, chest pain, headaches, neck pain, orthopnea, palpitations, peripheral edema, PND, shortness of breath or sweats. Agents associated with hypertension include NSAIDs. Risk factors for coronary artery disease include family history. Past treatments include lifestyle changes. Compliance problems include exercise.   There is no history of chronic renal disease.   Hyperlipidemia   This is a chronic problem. The current episode started more than 1 year ago. The problem is uncontrolled. Recent lipid tests were reviewed and are high. She has no history of chronic renal disease, diabetes, hypothyroidism, liver disease, obesity or nephrotic syndrome. There are no known factors aggravating her hyperlipidemia. Associated symptoms include myalgias. Pertinent negatives include no chest pain, focal sensory loss, leg pain or shortness of breath. Current antihyperlipidemic treatment includes diet change. The current treatment provides mild improvement of lipids. There are no compliance problems.  Risk factors for coronary artery disease include dyslipidemia.     Review of Systems   Constitutional: Positive for malaise/fatigue. Negative for activity change, diaphoresis and unexpected weight change.   HENT: Positive for hearing loss. Negative for congestion, ear discharge, rhinorrhea, sore throat and voice change.    Eyes: Negative.  Negative for blurred vision, pain, discharge and visual disturbance.   Respiratory: Negative.  Negative for chest tightness, shortness of breath and wheezing.    Cardiovascular: Negative.  Negative for chest pain, palpitations, orthopnea and PND.   Gastrointestinal: Negative.  Negative for abdominal distention, anal bleeding, constipation and nausea.   Endocrine: Negative.  Negative for cold intolerance, polydipsia and polyuria.   Genitourinary: Negative.  Negative for decreased urine volume, difficulty urinating, dysuria, frequency, menstrual problem and vaginal pain.   Musculoskeletal: Positive for myalgias. Negative for arthralgias, gait problem and neck pain.   Skin: Negative.  Negative for color change, pallor and wound.   Allergic/Immunologic: Negative.  Negative for environmental allergies and immunocompromised state.   Neurological: Negative.  Negative for dizziness, tremors, seizures, speech difficulty  and headaches.   Hematological: Negative.  Negative for adenopathy. Does not bruise/bleed easily.   Psychiatric/Behavioral: Negative.  Negative for agitation, confusion, decreased concentration, hallucinations, self-injury and suicidal ideas. The patient is not nervous/anxious.        PMH/PSH/FH/SH/MED/ALLERGY reviewed    Objective:       Vitals:    01/14/19 1311   BP: 135/75   Pulse: 67       Physical Exam   Constitutional: She is oriented to person, place, and time. She appears well-developed and well-nourished. No distress.   HENT:   Head: Normocephalic and atraumatic.   Right Ear: External ear normal.   Left Ear: External ear normal.   Nose: Nose normal.   Mouth/Throat: Oropharynx is clear and moist. No oropharyngeal exudate.   Cerumen impaction R>L   Eyes: Conjunctivae and EOM are normal. Pupils are equal, round, and reactive to light. Right eye exhibits no discharge. Left eye exhibits no discharge. No scleral icterus.   Neck: Normal range of motion. Neck supple. No JVD present. No tracheal deviation present. No thyromegaly present.   Cardiovascular: Normal rate, regular rhythm, normal heart sounds and intact distal pulses. Exam reveals no gallop and no friction rub.   No murmur heard.  Pulmonary/Chest: Effort normal and breath sounds normal. No stridor. She has no wheezes. She has no rales. She exhibits no tenderness.   Abdominal: Soft. Bowel sounds are normal. She exhibits no distension and no mass. There is no tenderness. There is no rebound and no guarding. No hernia.   Musculoskeletal: Normal range of motion. She exhibits no edema or tenderness.   Lymphadenopathy:     She has no cervical adenopathy.   Neurological: She is alert and oriented to person, place, and time. She has normal reflexes. She displays normal reflexes. No cranial nerve deficit. She exhibits normal muscle tone. Coordination normal.   Skin: Skin is warm and dry. No rash noted. She is not diaphoretic. No erythema. No pallor.   Psychiatric:  She has a normal mood and affect. Her behavior is normal. Judgment and thought content normal.       Assessment:       1. Pre-operative clearance    2. HTN (hypertension), benign    3. Mixed hyperlipidemia    4. Sacroiliitis    5. Chronic kidney disease, stage III (moderate)    6. Chronic diastolic heart failure    7. Chronic kidney disease, unspecified CKD stage         Plan:       Cheryl was seen today for hypertension and ear fullness.    Diagnoses and all orders for this visit:    Pre-operative clearance  -     X-Ray Chest PA And Lateral; Future  -     EKG 12-lead  -     APTT; Future  -     Protime-INR; Future  -     Urinalysis; Future    HTN (hypertension), benign  -     X-Ray Chest PA And Lateral; Future  -     EKG 12-lead  -     APTT; Future  -     Protime-INR; Future  -     Urinalysis; Future    Mixed hyperlipidemia    Sacroiliitis    Chronic kidney disease, stage III (moderate)  -     APTT; Future  -     Protime-INR; Future    Chronic diastolic heart failure  -     EKG 12-lead    Chronic kidney disease, unspecified CKD stage   -     APTT; Future  -     Protime-INR; Future      Pre op clearance  -labs  -EKG and CXR  -UA    ACC/AHA 2007 Guidelines for clearance    Step 1: No need for emergency non cardiac surgery  Step 2: No active cardiac conditions  Step 3: No low risk surgery  Step4: Yes - Functional capacity greater than or equal to 4 METs without symptoms - YES - Class IIa, ABDULAZIZ B - Proceed with surgery    She is medically optimized and intermediate risk for surgery    HTN  -continue lisinopril 20 BID. Side effects of medications have been discussed and patient agreed to proceed with treatment and understands the risks and benefits.        TIA  -follow neurology - DC plavix per neuro recommendations and just stay on ASA    Insomnia  -try benadryl only if needed    HLD  -diet controlled and statin    Urine incontinence  -lifestyle changes - intolerant to meds    Neuropathic pain  -continue neurontin    OA  knee B/L  -mobic prn    CKD III  -lab and urine analysis  -adequate hydration    Cerumen impaction  -ear wax removal      Spent adequate time in obtaining history and explaining differentials    40 minutes spent during this visit of which greater than 50% devoted to face-face counseling and coordination of care regarding diagnosis and management plan    Follow-up in about 6 weeks (around 2/22/2019), or if symptoms worsen or fail to improve.

## 2019-01-22 ENCOUNTER — PATIENT MESSAGE (OUTPATIENT)
Dept: SURGERY | Facility: HOSPITAL | Age: 84
End: 2019-01-22

## 2019-01-28 ENCOUNTER — TELEPHONE (OUTPATIENT)
Dept: NEUROSURGERY | Facility: CLINIC | Age: 84
End: 2019-01-28

## 2019-01-28 NOTE — TELEPHONE ENCOUNTER
Returned call pt thought that her pre-admit appt on 2/4/19 Was the same as the surgical clearance appt she had with her PCP. I informed pt that this appt was with admitting. Pt verbalized understanding      ----- Message from Hannah Zuluaga sent at 1/28/2019  2:41 PM CST -----  Contact: Self [phone]  Patient would like to speak with you about already having pre op with her PCP. Please advise

## 2019-02-01 ENCOUNTER — PATIENT MESSAGE (OUTPATIENT)
Dept: SURGERY | Facility: HOSPITAL | Age: 84
End: 2019-02-01

## 2019-02-04 ENCOUNTER — DOCUMENTATION ONLY (OUTPATIENT)
Dept: NEUROSURGERY | Facility: CLINIC | Age: 84
End: 2019-02-04

## 2019-02-04 ENCOUNTER — HOSPITAL ENCOUNTER (OUTPATIENT)
Dept: PREADMISSION TESTING | Facility: HOSPITAL | Age: 84
Discharge: HOME | End: 2019-02-04
Attending: NEUROLOGICAL SURGERY
Payer: MEDICARE

## 2019-02-04 ENCOUNTER — ANESTHESIA EVENT (OUTPATIENT)
Dept: SURGERY | Facility: HOSPITAL | Age: 84
End: 2019-02-04
Payer: MEDICARE

## 2019-02-04 RX ORDER — SODIUM CHLORIDE, SODIUM LACTATE, POTASSIUM CHLORIDE, CALCIUM CHLORIDE 600; 310; 30; 20 MG/100ML; MG/100ML; MG/100ML; MG/100ML
INJECTION, SOLUTION INTRAVENOUS CONTINUOUS
Status: CANCELLED | OUTPATIENT
Start: 2019-02-04

## 2019-02-04 RX ORDER — LIDOCAINE HYDROCHLORIDE 10 MG/ML
1 INJECTION, SOLUTION EPIDURAL; INFILTRATION; INTRACAUDAL; PERINEURAL ONCE
Status: CANCELLED | OUTPATIENT
Start: 2019-02-04 | End: 2019-02-04

## 2019-02-04 NOTE — ANESTHESIA PREPROCEDURE EVALUATION
"                                                                                                             02/04/2019  Cheryl Ghosh is a 84 y.o., female is scheduled for minimally invasive right SI joint fusion on 2/6/19.    Per PCP, "She is medically optimized and intermediate risk for surgery"    Past Medical History:   Diagnosis Date    Arthritis     lumbar    Blood transfusion     Chronic kidney disease, stage III (moderate) 8/2/2017    Degenerative disc disease     lumbar    Insulinoma     Pancreatic disease     Urinary incontinence 12/7/2015     Past Surgical History:   Procedure Laterality Date    ADENOIDECTOMY      APPENDECTOMY      CATARACT EXTRACTION, BILATERAL      EYE SURGERY      HYSTERECTOMY      SAMM/BSO    INJECTION, STEROID-- Right SI Joint Block and  Steroid Injection Right 12/20/2018    [facility] by [facility]    OOPHORECTOMY      PANCREAS SURGERY      Insulanoma    RADIOFREQUENCY ABLATION, NERVE, MEDIAL BRANCH, LUMBAR, 1 LEVEL - Left L3,4,5 IV SEADTION Left 9/4/2018    Performed by [facility] at [facility]    RADIOFREQUENCY ABLATION, NERVE, MEDIAL BRANCH, LUMBAR, 1 LEVEL- Right L3,4,5 IV SEDATION Right 8/28/2018    Performed by [facility] at [facility]    REFRACTIVE SURGERY Bilateral     TONSILLECTOMY         Anesthesia Evaluation    I have reviewed the Patient Summary Reports.    I have reviewed the Nursing Notes.   I have reviewed the Medications.     Review of Systems  Anesthesia Hx:  No problems with previous Anesthesia  History of prior surgery of interest to airway management or planning:   Social:  Non-Smoker, Social Alcohol Use    Hematology/Oncology:     Oncology Normal     Cardiovascular:   Hypertension  Denies Angina. hyperlipidemia        Pulmonary:  Pulmonary Normal    Renal/:   Chronic Renal Disease    Hepatic/GI:  Hepatic/GI Normal  Denies GERD. Denies Liver Disease.    Musculoskeletal:   Arthritis   Spine " Disorders:    Neurological:   TIA, Denies Seizures.    Endocrine:  Endocrine Normal        Physical Exam  General:  Well nourished     Eyes/Ears/Nose:  EYES/EARS/NOSE FINDINGS: Normal   Dental:  Dental Findings:    Chest/Lungs:  Chest/Lungs Findings: Clear to auscultation, Normal Respiratory Rate     Heart/Vascular:  Heart Findings: Rate: Normal  Rhythm: Regular Rhythm  Sounds: Normal        Mental Status:  Mental Status Findings:  Cooperative, Alert and Oriented       EKG 1/14/19:  Sinus bradycardia  Otherwise normal ECG  When compared with ECG of 24-NOV-2017 10:11, No significant change was found  Confirmed by Rajat TRAYLOR, [facility]     Echo 11/24/17:  CONCLUSIONS     1 - Normal left ventricular systolic function (EF 60-65%).     2 - Impaired LV relaxation, normal LAP (grade 1 diastolic dysfunction).     3 - No wall motion abnormalities.     4 - Normal right ventricular systolic function .     5 - The estimated PA systolic pressure is 23 mmHg.       Anesthesia Plan  Type of Anesthesia, risks & benefits discussed:  Anesthesia Type:  general  Patient's Preference:   Intra-op Monitoring Plan:   Intra-op Monitoring Plan Comments:   Post Op Pain Control Plan:   Post Op Pain Control Plan Comments:   Induction:   IV  Beta Blocker:  Patient is not currently on a Beta-Blocker (No further documentation required).       Informed Consent: Patient understands risks and agrees with Anesthesia plan.  Questions answered.   ASA Score: 2     Day of Surgery Review of History & Physical:        Anesthesia Plan Notes: Anesthesia consent to be signed prior to procedure on 2/6/19  PCP medical optimization in Epic          Ready For Surgery From Anesthesia Perspective.

## 2019-02-04 NOTE — DISCHARGE INSTRUCTIONS
Your surgery is scheduled for 2/6/19.    Please report to Outpatient Surgery Intake Office on the 2nd FLOOR at 5:30 a.m.          INSTRUCTIONS IMPORTANT!!!  ¨ Do not eat or drink after 12 midnight-including water. OK to brush teeth, no   gum, candy or mints!    ¨ Take only these medicines with a small swallow of water-morning of surgery: Lisinopril          ____  Do not wear makeup, including mascara.  ____  No powder, lotions or creams to surgical area.  ____  Please remove all jewelry, including piercings and leave at home.  ____  No money or valuables needed. Please leave at home.  ____  Please bring any documents given by your doctor.  ____  If going home the same day, arrange for a ride home. You will not be able to             drive if Anesthesia was used.  ____  Wear loose fitting clothing. Allow for dressings, bandages.  ____  Stop Aspirin, Ibuprofen, Motrin and Aleve at least 3-5 days before surgery, unless otherwise instructed by your doctor, or the nurse.   You MAY use Tylenol/acetaminophen until day of surgery.  ____  Wash the surgical area with Hibiclens the night before surgery, and again the             morning of surgery.  Be sure to rinse hibiclens off completely (if instructed by   nurse).  ____  If you take diabetic medication, do not take am of surgery unless instructed by Doctor.  ____  Call MD for temperature above 101 degrees or any other signs of infection such as Urinary (bladder) infection, Upper respiratory infection, skin boils, etc.  ____ Stop taking any Fish Oil supplement or any Vitamins that contain Vitamin E at least 5 days prior to surgery.  ____ Do Not wear your contact lenses the day of your procedure.  You may wear your glasses.      ____Do not shave for 3 days prior to surgery.  ____ Practice Good hand washing before, during, and after procedure.      I have read or had read and explained to me, and understand the above information.  Additional comments or instructions:  For  additional questions call [phone]      Pre-Op Bathing Instructions    Before surgery, you can play an important role in your own health.    Because skin is not sterile, we need to be sure that your skin is as free of germs as possible. By following the instructions below, you can reduce the number of germs on your skin before surgery.    IMPORTANT: You will need to shower with a special soap called Hibiclens*, available at any pharmacy.  If you are allergic to Chlorhexidine (the antiseptic in Hibiclens), use an antibacterial soap such as Dial Soap for your preoperative shower.  You will shower with Hibiclens both the night before your surgery and the morning of your surgery.  Do not use Hibiclens on the head, face or genitals to avoid injury to those areas.    STEP #1: THE NIGHT BEFORE YOUR SURGERY     1. Do not shave the area of your body where your surgery will be performed.  2. Shower and wash your hair and body as usual with your normal soap and shampoo.  3. Rinse your hair and body thoroughly after you shower to remove all soap residue.  4. With your hand, apply one packet of Hibiclens soap to the surgical site.   5. Wash the site gently for five (5) minutes. Do not scrub your skin too hard.   6. Do not wash with your regular soap after Hibiclens is used.  7. Rinse your body thoroughly.  8. Pat yourself dry with a clean, soft towel.  9. Do not use lotion, cream, or powder.  10. Wear clean clothes.    STEP #2: THE MORNING OF YOUR SURGERY     1. Repeat Step #1.    * Not to be used by people allergic to Chlorhexidine.

## 2019-02-04 NOTE — PRE-PROCEDURE INSTRUCTIONS
Jace  [phone] -     Allergies, medical, surgical, family and psychosocial histories reviewed with patient. Periop plan of care reviewed. Preop instructions given, including medications to take and to hold. Hibiclens soap and instructions on use given. Time allotted for questions to be addressed.  Patient verbalized understanding.

## 2019-02-04 NOTE — PROGRESS NOTES
Patient was diagnosed with right SI joint dysfunction.    Her SI joint exam shows positive right Enriqueta test, CARMITA test, Gaenslen test, compression test and thigh trust test    There is no destructive lesion in the SI joint shown on MRI of the lumbar spine, her hips XR did not show hip pathologies    She had 100% pain relief for 4 consecutive days after a selective right SI joint block on two separate occasions.    She is a good candidate for a right SI joint fusion using the iFuse system.    Xavier Dasilva MD   [phone]

## 2019-02-06 ENCOUNTER — HOSPITAL ENCOUNTER (OUTPATIENT)
Facility: HOSPITAL | Age: 84
Discharge: HOME | End: 2019-02-06
Attending: NEUROLOGICAL SURGERY | Admitting: NEUROLOGICAL SURGERY
Payer: MEDICARE

## 2019-02-06 ENCOUNTER — ANESTHESIA (OUTPATIENT)
Dept: SURGERY | Facility: HOSPITAL | Age: 84
End: 2019-02-06
Payer: MEDICARE

## 2019-02-06 VITALS
OXYGEN SATURATION: 99 % | SYSTOLIC BLOOD PRESSURE: 150 MMHG | WEIGHT: 132 LBS | HEIGHT: 64 IN | RESPIRATION RATE: 20 BRPM | BODY MASS INDEX: 22.53 KG/M2 | HEART RATE: 70 BPM | TEMPERATURE: 98 F | DIASTOLIC BLOOD PRESSURE: 68 MMHG

## 2019-02-06 DIAGNOSIS — M53.3 SACROILIAC JOINT DYSFUNCTION OF RIGHT SIDE: ICD-10-CM

## 2019-02-06 PROCEDURE — 36000710: Performed by: NEUROLOGICAL SURGERY

## 2019-02-06 PROCEDURE — 37000009 HC ANESTHESIA EA ADD 15 MINS: Performed by: NEUROLOGICAL SURGERY

## 2019-02-06 PROCEDURE — 25000003 PHARM REV CODE 250: Performed by: NURSE PRACTITIONER

## 2019-02-06 PROCEDURE — 71000033 HC RECOVERY, INTIAL HOUR: Performed by: NEUROLOGICAL SURGERY

## 2019-02-06 PROCEDURE — 71000015 HC POSTOP RECOV 1ST HR: Performed by: NEUROLOGICAL SURGERY

## 2019-02-06 PROCEDURE — 37000008 HC ANESTHESIA 1ST 15 MINUTES: Performed by: NEUROLOGICAL SURGERY

## 2019-02-06 PROCEDURE — 36000711: Performed by: NEUROLOGICAL SURGERY

## 2019-02-06 PROCEDURE — 27096 INJECT SACROILIAC JOINT: CPT | Mod: 51,RT,, | Performed by: NEUROLOGICAL SURGERY

## 2019-02-06 PROCEDURE — 27279 ARTHRD SI JT PLMT TARTCLR DV: CPT | Mod: AS,RT,,

## 2019-02-06 PROCEDURE — 63600175 PHARM REV CODE 636 W HCPCS: Performed by: PHYSICIAN ASSISTANT

## 2019-02-06 PROCEDURE — 27279 PR ARTHRODESIS SACROILIAC JNT PERQ W/IMAGE GUIDE INCL BONE GRAFT: ICD-10-PCS | Mod: AS,RT,,

## 2019-02-06 PROCEDURE — 63600175 PHARM REV CODE 636 W HCPCS: Performed by: NEUROLOGICAL SURGERY

## 2019-02-06 PROCEDURE — 25000003 PHARM REV CODE 250: Performed by: NURSE ANESTHETIST, CERTIFIED REGISTERED

## 2019-02-06 PROCEDURE — 27279 ARTHRD SI JT PLMT TARTCLR DV: CPT | Mod: RT,,, | Performed by: NEUROLOGICAL SURGERY

## 2019-02-06 PROCEDURE — 25000003 PHARM REV CODE 250: Performed by: NEUROLOGICAL SURGERY

## 2019-02-06 PROCEDURE — 63600175 PHARM REV CODE 636 W HCPCS: Performed by: NURSE ANESTHETIST, CERTIFIED REGISTERED

## 2019-02-06 PROCEDURE — 25000003 PHARM REV CODE 250: Performed by: PHYSICIAN ASSISTANT

## 2019-02-06 PROCEDURE — 27279 PR ARTHRODESIS SACROILIAC JNT PERQ W/IMAGE GUIDE INCL BONE GRAFT: ICD-10-PCS | Mod: RT,,, | Performed by: NEUROLOGICAL SURGERY

## 2019-02-06 PROCEDURE — 27096 PR INJECTION,SACROILIAC JOINT: ICD-10-PCS | Mod: 51,RT,, | Performed by: NEUROLOGICAL SURGERY

## 2019-02-06 PROCEDURE — 71000016 HC POSTOP RECOV ADDL HR: Performed by: NEUROLOGICAL SURGERY

## 2019-02-06 PROCEDURE — C1769 GUIDE WIRE: HCPCS | Performed by: NEUROLOGICAL SURGERY

## 2019-02-06 PROCEDURE — 27800903 OPTIME MED/SURG SUP & DEVICES OTHER IMPLANTS: Performed by: NEUROLOGICAL SURGERY

## 2019-02-06 RX ORDER — PROPOFOL 10 MG/ML
VIAL (ML) INTRAVENOUS
Status: DISCONTINUED | OUTPATIENT
Start: 2019-02-06 | End: 2019-02-06

## 2019-02-06 RX ORDER — LIDOCAINE HYDROCHLORIDE 10 MG/ML
1 INJECTION, SOLUTION EPIDURAL; INFILTRATION; INTRACAUDAL; PERINEURAL ONCE
Status: DISCONTINUED | OUTPATIENT
Start: 2019-02-06 | End: 2019-02-06 | Stop reason: HOSPADM

## 2019-02-06 RX ORDER — VANCOMYCIN HYDROCHLORIDE 1 G/20ML
INJECTION, POWDER, LYOPHILIZED, FOR SOLUTION INTRAVENOUS
Status: DISCONTINUED | OUTPATIENT
Start: 2019-02-06 | End: 2019-02-06 | Stop reason: HOSPADM

## 2019-02-06 RX ORDER — NEOSTIGMINE METHYLSULFATE 1 MG/ML
INJECTION, SOLUTION INTRAVENOUS
Status: DISCONTINUED | OUTPATIENT
Start: 2019-02-06 | End: 2019-02-06

## 2019-02-06 RX ORDER — OXYCODONE HYDROCHLORIDE 5 MG/1
5 TABLET ORAL EVERY 6 HOURS PRN
Status: DISCONTINUED | OUTPATIENT
Start: 2019-02-06 | End: 2019-02-06 | Stop reason: HOSPADM

## 2019-02-06 RX ORDER — CELECOXIB 100 MG/1
200 CAPSULE ORAL
Status: COMPLETED | OUTPATIENT
Start: 2019-02-06 | End: 2019-02-06

## 2019-02-06 RX ORDER — ACETAMINOPHEN 500 MG
1000 TABLET ORAL
Status: COMPLETED | OUTPATIENT
Start: 2019-02-06 | End: 2019-02-06

## 2019-02-06 RX ORDER — VANCOMYCIN HCL IN 5 % DEXTROSE 1G/250ML
1000 PLASTIC BAG, INJECTION (ML) INTRAVENOUS
Status: DISCONTINUED | OUTPATIENT
Start: 2019-02-06 | End: 2019-02-06 | Stop reason: HOSPADM

## 2019-02-06 RX ORDER — CYCLOBENZAPRINE HCL 5 MG
5 TABLET ORAL
Status: COMPLETED | OUTPATIENT
Start: 2019-02-06 | End: 2019-02-06

## 2019-02-06 RX ORDER — HYDROCODONE BITARTRATE AND ACETAMINOPHEN 5; 325 MG/1; MG/1
1 TABLET ORAL EVERY 6 HOURS PRN
Qty: 40 TABLET | Refills: 0 | Status: SHIPPED | OUTPATIENT
Start: 2019-02-06 | End: 2019-04-17

## 2019-02-06 RX ORDER — SODIUM CHLORIDE 0.9 % (FLUSH) 0.9 %
3 SYRINGE (ML) INJECTION EVERY 8 HOURS
Status: DISCONTINUED | OUTPATIENT
Start: 2019-02-06 | End: 2019-02-06 | Stop reason: HOSPADM

## 2019-02-06 RX ORDER — EPHEDRINE SULFATE 50 MG/ML
INJECTION, SOLUTION INTRAVENOUS
Status: DISCONTINUED | OUTPATIENT
Start: 2019-02-06 | End: 2019-02-06

## 2019-02-06 RX ORDER — MUPIROCIN 20 MG/G
1 OINTMENT TOPICAL 2 TIMES DAILY
Status: DISCONTINUED | OUTPATIENT
Start: 2019-02-06 | End: 2019-02-06 | Stop reason: HOSPADM

## 2019-02-06 RX ORDER — GLYCOPYRROLATE 0.2 MG/ML
INJECTION INTRAMUSCULAR; INTRAVENOUS
Status: DISCONTINUED | OUTPATIENT
Start: 2019-02-06 | End: 2019-02-06

## 2019-02-06 RX ORDER — HYDROMORPHONE HYDROCHLORIDE 2 MG/ML
0.5 INJECTION, SOLUTION INTRAMUSCULAR; INTRAVENOUS; SUBCUTANEOUS
Status: DISCONTINUED | OUTPATIENT
Start: 2019-02-06 | End: 2019-02-06 | Stop reason: HOSPADM

## 2019-02-06 RX ORDER — GENTAMICIN SULFATE 80 MG/100ML
80 INJECTION, SOLUTION INTRAVENOUS
Status: DISCONTINUED | OUTPATIENT
Start: 2019-02-06 | End: 2019-02-06

## 2019-02-06 RX ORDER — ACETAMINOPHEN 325 MG/1
650 TABLET ORAL EVERY 6 HOURS PRN
Status: DISCONTINUED | OUTPATIENT
Start: 2019-02-06 | End: 2019-02-06 | Stop reason: HOSPADM

## 2019-02-06 RX ORDER — BACITRACIN 50000 [IU]/1
INJECTION, POWDER, FOR SOLUTION INTRAMUSCULAR
Status: DISCONTINUED | OUTPATIENT
Start: 2019-02-06 | End: 2019-02-06 | Stop reason: HOSPADM

## 2019-02-06 RX ORDER — SODIUM CHLORIDE 0.9 % (FLUSH) 0.9 %
3 SYRINGE (ML) INJECTION
Status: DISCONTINUED | OUTPATIENT
Start: 2019-02-06 | End: 2019-02-06 | Stop reason: HOSPADM

## 2019-02-06 RX ORDER — METHYLPREDNISOLONE ACETATE 40 MG/ML
INJECTION, SUSPENSION INTRA-ARTICULAR; INTRALESIONAL; INTRAMUSCULAR; SOFT TISSUE
Status: DISCONTINUED | OUTPATIENT
Start: 2019-02-06 | End: 2019-02-06 | Stop reason: HOSPADM

## 2019-02-06 RX ORDER — SODIUM CHLORIDE, SODIUM LACTATE, POTASSIUM CHLORIDE, CALCIUM CHLORIDE 600; 310; 30; 20 MG/100ML; MG/100ML; MG/100ML; MG/100ML
INJECTION, SOLUTION INTRAVENOUS CONTINUOUS
Status: DISCONTINUED | OUTPATIENT
Start: 2019-02-06 | End: 2019-02-06 | Stop reason: HOSPADM

## 2019-02-06 RX ORDER — TIZANIDINE 2 MG/1
2 TABLET ORAL EVERY 8 HOURS PRN
Qty: 40 TABLET | Refills: 0 | Status: SHIPPED | OUTPATIENT
Start: 2019-02-06 | End: 2019-02-20

## 2019-02-06 RX ORDER — FENTANYL CITRATE 50 UG/ML
INJECTION, SOLUTION INTRAMUSCULAR; INTRAVENOUS
Status: DISCONTINUED | OUTPATIENT
Start: 2019-02-06 | End: 2019-02-06

## 2019-02-06 RX ORDER — ROCURONIUM BROMIDE 10 MG/ML
INJECTION, SOLUTION INTRAVENOUS
Status: DISCONTINUED | OUTPATIENT
Start: 2019-02-06 | End: 2019-02-06

## 2019-02-06 RX ORDER — BUPIVACAINE HYDROCHLORIDE 2.5 MG/ML
INJECTION, SOLUTION EPIDURAL; INFILTRATION; INTRACAUDAL
Status: DISCONTINUED | OUTPATIENT
Start: 2019-02-06 | End: 2019-02-06 | Stop reason: HOSPADM

## 2019-02-06 RX ORDER — DEXAMETHASONE SODIUM PHOSPHATE 4 MG/ML
INJECTION, SOLUTION INTRA-ARTICULAR; INTRALESIONAL; INTRAMUSCULAR; INTRAVENOUS; SOFT TISSUE
Status: DISCONTINUED | OUTPATIENT
Start: 2019-02-06 | End: 2019-02-06

## 2019-02-06 RX ORDER — ONDANSETRON 2 MG/ML
4 INJECTION INTRAMUSCULAR; INTRAVENOUS ONCE AS NEEDED
Status: DISCONTINUED | OUTPATIENT
Start: 2019-02-06 | End: 2019-02-06 | Stop reason: HOSPADM

## 2019-02-06 RX ORDER — SODIUM CHLORIDE 9 MG/ML
INJECTION, SOLUTION INTRAVENOUS CONTINUOUS PRN
Status: DISCONTINUED | OUTPATIENT
Start: 2019-02-06 | End: 2019-02-06

## 2019-02-06 RX ORDER — ONDANSETRON 2 MG/ML
INJECTION INTRAMUSCULAR; INTRAVENOUS
Status: DISCONTINUED | OUTPATIENT
Start: 2019-02-06 | End: 2019-02-06

## 2019-02-06 RX ORDER — SUCCINYLCHOLINE CHLORIDE 20 MG/ML
INJECTION INTRAMUSCULAR; INTRAVENOUS
Status: DISCONTINUED | OUTPATIENT
Start: 2019-02-06 | End: 2019-02-06

## 2019-02-06 RX ORDER — HYDROMORPHONE HYDROCHLORIDE 2 MG/ML
0.5 INJECTION, SOLUTION INTRAMUSCULAR; INTRAVENOUS; SUBCUTANEOUS EVERY 5 MIN PRN
Status: DISCONTINUED | OUTPATIENT
Start: 2019-02-06 | End: 2019-02-06 | Stop reason: HOSPADM

## 2019-02-06 RX ORDER — LIDOCAINE HCL/PF 100 MG/5ML
SYRINGE (ML) INTRAVENOUS
Status: DISCONTINUED | OUTPATIENT
Start: 2019-02-06 | End: 2019-02-06

## 2019-02-06 RX ORDER — PREGABALIN 50 MG/1
50 CAPSULE ORAL
Status: COMPLETED | OUTPATIENT
Start: 2019-02-06 | End: 2019-02-06

## 2019-02-06 RX ORDER — OXYCODONE HYDROCHLORIDE 5 MG/1
5 TABLET ORAL
Status: DISCONTINUED | OUTPATIENT
Start: 2019-02-06 | End: 2019-02-06 | Stop reason: HOSPADM

## 2019-02-06 RX ORDER — LIDOCAINE HYDROCHLORIDE AND EPINEPHRINE 10; 10 MG/ML; UG/ML
INJECTION, SOLUTION INFILTRATION; PERINEURAL
Status: DISCONTINUED | OUTPATIENT
Start: 2019-02-06 | End: 2019-02-06 | Stop reason: HOSPADM

## 2019-02-06 RX ORDER — PHENYLEPHRINE HYDROCHLORIDE 10 MG/ML
INJECTION INTRAVENOUS
Status: DISCONTINUED | OUTPATIENT
Start: 2019-02-06 | End: 2019-02-06

## 2019-02-06 RX ADMIN — ROCURONIUM BROMIDE 15 MG: 10 INJECTION, SOLUTION INTRAVENOUS at 07:02

## 2019-02-06 RX ADMIN — ONDANSETRON 8 MG: 2 INJECTION, SOLUTION INTRAMUSCULAR; INTRAVENOUS at 07:02

## 2019-02-06 RX ADMIN — LIDOCAINE HYDROCHLORIDE 50 MG: 20 INJECTION, SOLUTION INTRAVENOUS at 07:02

## 2019-02-06 RX ADMIN — SODIUM CHLORIDE: 0.9 INJECTION, SOLUTION INTRAVENOUS at 07:02

## 2019-02-06 RX ADMIN — ACETAMINOPHEN 1000 MG: 500 TABLET ORAL at 06:02

## 2019-02-06 RX ADMIN — SUCCINYLCHOLINE CHLORIDE 100 MG: 20 INJECTION, SOLUTION INTRAMUSCULAR; INTRAVENOUS at 07:02

## 2019-02-06 RX ADMIN — GLYCOPYRROLATE 0.6 MCG: 0.2 INJECTION, SOLUTION INTRAMUSCULAR; INTRAVENOUS at 07:02

## 2019-02-06 RX ADMIN — NEOSTIGMINE METHYLSULFATE 5 MG: 1 INJECTION INTRAVENOUS at 07:02

## 2019-02-06 RX ADMIN — EPHEDRINE SULFATE 5 MG: 50 INJECTION, SOLUTION INTRAMUSCULAR; INTRAVENOUS; SUBCUTANEOUS at 08:02

## 2019-02-06 RX ADMIN — PHENYLEPHRINE HYDROCHLORIDE 50 MCG: 10 INJECTION INTRAVENOUS at 07:02

## 2019-02-06 RX ADMIN — SODIUM CHLORIDE, SODIUM LACTATE, POTASSIUM CHLORIDE, AND CALCIUM CHLORIDE 10 ML/HR: .6; .31; .03; .02 INJECTION, SOLUTION INTRAVENOUS at 06:02

## 2019-02-06 RX ADMIN — PREGABALIN 50 MG: 50 CAPSULE ORAL at 06:02

## 2019-02-06 RX ADMIN — CELECOXIB 200 MG: 100 CAPSULE ORAL at 06:02

## 2019-02-06 RX ADMIN — EPHEDRINE SULFATE 10 MG: 50 INJECTION, SOLUTION INTRAMUSCULAR; INTRAVENOUS; SUBCUTANEOUS at 07:02

## 2019-02-06 RX ADMIN — ROCURONIUM BROMIDE 5 MG: 10 INJECTION, SOLUTION INTRAVENOUS at 07:02

## 2019-02-06 RX ADMIN — PHENYLEPHRINE HYDROCHLORIDE 100 MCG: 10 INJECTION INTRAVENOUS at 07:02

## 2019-02-06 RX ADMIN — CYCLOBENZAPRINE HYDROCHLORIDE 5 MG: 5 TABLET, FILM COATED ORAL at 06:02

## 2019-02-06 RX ADMIN — Medication 1000 MG: at 07:02

## 2019-02-06 RX ADMIN — PROPOFOL 150 MG: 10 INJECTION, EMULSION INTRAVENOUS at 07:02

## 2019-02-06 RX ADMIN — DEXAMETHASONE SODIUM PHOSPHATE 4 MG: 4 INJECTION, SOLUTION INTRAMUSCULAR; INTRAVENOUS at 07:02

## 2019-02-06 RX ADMIN — FENTANYL CITRATE 100 MCG: 50 INJECTION, SOLUTION INTRAMUSCULAR; INTRAVENOUS at 07:02

## 2019-02-06 NOTE — PLAN OF CARE
Patient has met PACU discharge criteria, VSS, denies pain. Family updated by phone. Released from PACU by Dr. Chandra

## 2019-02-06 NOTE — ANESTHESIA POSTPROCEDURE EVALUATION
"Anesthesia Post Evaluation    Patient: Cheryl Ghosh    Procedure(s) Performed: Procedure(s) (LRB):  FUSION, SPINE, MINIMALLY INVASIVE Right Sacroiliac Joint Fusion -  I Fuse (Right)    Final Anesthesia Type: general  Patient location during evaluation: PACU  Patient participation: Yes- Able to Participate  Level of consciousness: awake and alert  Post-procedure vital signs: reviewed and stable  Pain management: adequate  Airway patency: patent  PONV status at discharge: No PONV  Anesthetic complications: no      Cardiovascular status: blood pressure returned to baseline  Respiratory status: unassisted  Hydration status: euvolemic  Follow-up not needed.        Visit Vitals  BP (!) 149/68   Pulse 62   Temp 36.6 °C (97.8 °F)   Resp 16   Ht 5' 4" (1.626 m)   Wt 59.9 kg (132 lb)   LMP  (LMP Unknown)   SpO2 97%   Breastfeeding? No   BMI 22.66 kg/m²       Pain/Enid Score: Pain Rating Prior to Med Admin: 4 (2/6/2019  6:46 AM)  Enid Score: 9 (2/6/2019  9:15 AM)        "

## 2019-02-06 NOTE — PROGRESS NOTES
Certification of Assistant at Surgery       Surgery Date: 2/6/2019     Participating Surgeons:  Surgeon(s) and Role:     * Xavier Dasilva MD - Primary     * Wiliam Urias PA-C - Assisting    Procedures:  Procedure(s) (LRB):  FUSION, SPINE, MINIMALLY INVASIVE Right Sacroiliac Joint Fusion -  I Fuse (Right)    Assistant Surgeon's Certification of Necessity:  I understand that section 1842 (b) (6) (d) of the Social Security Act generally prohibits Medicare Part B reasonable charge payment for the services of assistants at surgery in teaching hospitals when qualified residents are available to furnish such services. I certify that the services for which payment is claimed were medically necessary, and that no qualified resident was available to perform the services. I further understand that these services are subject to post-payment review by the Medicare carrier.      Wiliam Urias PA-C    02/06/2019  8:24 AM

## 2019-02-06 NOTE — OP NOTE
DATE OF PROCEDURE: 02/06/2019     PREOPERATIVE DIAGNOSES:  1. 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 Right sacroiliitis       POSTOPERATIVE DIAGNOSES:   1. 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 Right sacroiliitis       NAME OF OPERATION:  1. Right minimally invasive sacroiliac joint fusion using the SI-Bone system under fluoroscopy   2. Right sacro-iliac joint block and steroid injection under fluoroscopy        PRIMARY SURGEON: Xavier Dasilva M.D.     ASSISTANT SURGEON: SARA Arredondo was the first assistant for this procedure as there was no qualified available resident to assist.     INDICATION FOR PROCEDURE: This is a 84-year-old female who presented with right SI joint pain that was refractory to conservative treatment. Risks, benefits, alternative and limitation were discussed with the patient. The risk included nerve root injury that can cause paralysis, cerebrospinal fluid leak, wound infection and blood loss. The patient wanted to proceed and a consent was obtained.      DESCRIPTION OF THE PROCEDURE     The patient was intubated under general anesthesia and was placed prone on the Zaki 4-posts table. All pressure points were carefully padded. A 25 mm incision was planned on the right side using fluoroscopy at the intersection on the alar line and the dorsal sacrum line. The patient was prepped and draped in the typical sterile fashion and the incision was made with a #15 blade.   Hemostasis was complete and the gluteal fascia was penetrated with a k-wire. Using the lateral, outlet and inlet views the k-wire was hammered and power-drilled across the SI joint making sure to respect the pelvic brim and the S1 foramen bilaterally.  We dilated the muscle. A measuring guide was used the measure the length of each implant. A broche was used over the k-wire. On the right side, cranial to caudal, we impacted one 40 mm, one 35 mm and one 40 mm  triangular coated iFuse implants.  We removed the dilator and k-wire. Then after copious irrigation we put some vancomycin powder in the surgical sites.     Using the lateral and outlet views, and after local anesthesia with 1% lidocaine, the 22 spinal christoph needle was inserted inside the right sacroiliac joint and 0.5cc of Omnipaque was injected to confirm the needle tip placement.  We then injected a mixture of 1cc of 40mg of Depo-Medrol and 2 cc of 0.25% marcaine.      Subcutaneous layer was closed with inverted 3-0 Vicryl and the skin was closed with a running 4-0 Monocryl. The wounds were dressed with Steri-Strips and the patient was transferred to the Recovery Room under the care of the anesthesiologist. The blood loss was about 20 mL. The final count was completed and nothing was missing. There was no complication.

## 2019-02-06 NOTE — H&P
NEUROSURGICAL POST-OPERATIVE PROGRESS NOTE     DATE OF SERVICE:  2/6/2019        ATTENDING PHYSICIAN:  Xavier Dasilva MD     SUBJECTIVE:     INTERIM HISTORY:     This is a very pleasant 84 y.o. y.o. female, who is s/p right SI joint injection 12/20/18 presents in fu.  Pt states that she experienced relief of pain for about 4 days following the injection.  However pain has returned and she still describes deep right buttocks pain that is nonradiating.  She has tried PT with minimal relief.  At this point she is interested in SI joint fusion.  She is still ambulating but finds it difficult to work as  secondary to pain.  She also is unable to climb stairs d/t her right SI pain.     Low Back Pain Scale  R Low Back-Pain Score: 2  R Low Back-Pain Intensity: Pain killers give moderate relief from pain  R Low Back-Pain Score: I need some help, but manage most of my personal care  Low Back-Lifting: Pain prevents me from lifting heavy weights  but I can manage light weights if they are conveniently placed   Low Back-Walking: Pain prevents me walking more than .25 mile   Low Back-Sitting: I can sit only in my favorite chair as long as I like   Low Back-Standing: I cannot stand for longer than 30 mins without increasing pain   Low Back-Sleeping: I have pain in bed but it does not prevent me from sleeping well   Low Back-Social Life: My social life is normal but it increases the degree of pain   Low Back-Traveling: I have extra pain while traveling but it does not compel me to seek alternate forms of travel   Low Back-Changing Degree of Pain: (My pain is neither getting better or worse)        OBJECTIVE:     PHYSICAL EXAMINATION:            Back Exam      Tenderness   The patient is experiencing tenderness in the sacroiliac (right).     Range of Motion   Extension: normal   Flexion: normal   Lateral bend right: normal   Lateral bend left: normal   Rotation right: normal   Rotation left: normal      Muscle Strength    Right Quadriceps:  5/5   Left Quadriceps:  5/5   Right Hamstrings:  5/5   Left Hamstrings:  5/5                  Neurologic Exam      Mental Status   Oriented to person, place, and time.   Speech: speech is normal   Level of consciousness: alert     Cranial Nerves   Cranial nerves II through XII intact.      Motor Exam   Muscle bulk: normal  Overall muscle tone: normal     Strength   Right deltoid: 5/5  Left deltoid: 5/5  Right biceps: 5/5  Left biceps: 5/5  Right triceps: 5/5  Left triceps: 5/5  Right wrist flexion: 5/5  Left wrist flexion: 5/5  Right wrist extension: 5/5  Left wrist extension: 5/5  Right interossei: 5/5  Left interossei: 5/5  Right iliopsoas: 5/5  Left iliopsoas: 5/5  Right quadriceps: 5/5  Left quadriceps: 5/5  Right hamstrin/5  Left hamstrin/5  Right anterior tibial: 5/5  Left anterior tibial: 5/5  Right posterior tibial: 5/5  Left posterior tibial: 5/5  Right peroneal: 5/5  Left peroneal: 5/5  Right gastroc: 5/5  Left gastroc: 5/5     Sensory Exam   Light touch normal.   Pinprick normal.      Gait, Coordination, and Reflexes      Gait  Gait: normal     Coordination   Finger to nose coordination: normal  Tandem walking coordination: normal     Reflexes   Right brachioradialis: 2+  Left brachioradialis: 2+  Right biceps: 2+  Left biceps: 2+  Right triceps: 2+  Left triceps: 2+  Right patellar: 2+  Left patellar: 2+  Right achilles: 2+  Left achilles: 2+  Right plantar: normal  Left plantar: normal  Right Shipman: absent  Left Shipman: absent  Right ankle clonus: absent  Left ankle clonus: absent        SI joint exam:   Enriqueta test is positive on the right  Tristian test is positive on the right  Gaenslen test is positive on the right  Ever's test is negative        DIAGNOSTIC DATA:     Lumbar spine MRI 10/02/2018: widening of the bilateral SI joint spaces, no stenosis.      ASSESMENT:     This is a 84 y.o. female who is s/p right SI joint injection. She was more functional after the SI  joint block for 3 days. Was able to do more ADLs. Reports 100% pain relief for more than 4 consecutive hours. She failed conservative management including PT aimed at SI joint dysfunction. She had many RFAs in the past without significant pain relief.          Problem List Items Addressed This Visit      None       Chronic Right SI joint pain.  Right SI joint dysfunction.     PLAN:     I explained the natural history of the disease and all treatment options. I recommended a minimally invasive right SI joint fusion using the iFuse system.      We have discussed the risks of surgery including bleeding, infection, failure of surgery, CSF leak, nerve root injury, spinal cord injury, weakness, paralysis, peripheral neuropathy, malplaced hardware, migration of hardware, non-union, need for reoperation. Patient understands the risks and would like to proceed with surgery.           Xavier Dasilva MD  Pager: [phone]

## 2019-02-06 NOTE — DISCHARGE SUMMARY
[facility]  Neurosurgery  Discharge Summary      Patient Name: Cheryl Ghosh  MRN: [id number]  Admission Date: 2/6/2019  Hospital Length of Stay: 0 days  Discharge Date and Time:  02/06/2019   Attending Physician: Xavier Dasilva MD   Discharging Provider: Wiliam Urias PA-C  Primary Care Provider: Joey Mueller MD     HPI/Hospital Course:   Cheryl Ghosh is a 84-year-old female who presented with right SI joint pain that was refractory to conservative treatment.  She presents for elective right SI joint fusion. Patient failed conservative therapy. After the risks, benefits, and alternatives were described, the patient wished to proceed with surgery. She tolerated the procedure well, and there were no intra-operative difficulties. She recovered in day of surgery, where her pain was controlled. Patient was able to void on her own, ambulated without assistance, and tolerated PO diet. On 2/6/19, patient was discharged home with pain medication and follow up appointments. Regular diet. Activity as tolerated. At the time of discharge, vital signs were stable, patient was afebrile and neurologically stable.  she  was counseled on wound care, activity restrictions, and follow up prior to discharge.  Discharge instructions were given verbally/written to the patient and their family. All of their questions were answered. Patient and family voiced understanding. They were encouraged to call the clinic with any questions they might have prior to the follow up appointments.           Procedure(s) (LRB):  FUSION, SPINE, MINIMALLY INVASIVE Right Sacroiliac Joint Fusion -  I Fuse (Right)           Pending Diagnostic Studies:     None        Final Active Diagnoses:    Diagnosis Date Noted POA    Sacroiliac joint dysfunction of right side [M53.3] 02/06/2019 Yes      Problems Resolved During this Admission:      Discharged Condition: good    Disposition: home     Follow Up:  Follow-up Information     Wiliam  ISSAC Urias PA-C.    Specialty:  Neurosurgery  Contact information:  [address]  [address]  [place] [id number] [phone]                 Patient Instructions:   -Please see the patient instructions tab for detailed instructions and follow up information  -resume ASA 81mg 3 days after surgery       Diet general     Remove dressing in 48 hours     Medications:  Reconciled Home Medications:      Medication List      START taking these medications    HYDROcodone-acetaminophen 5-325 mg per tablet  Commonly known as:  NORCO  Take 1 tablet by mouth every 6 (six) hours as needed for Pain.     tiZANidine 2 MG tablet  Commonly known as:  ZANAFLEX  Take 1 tablet (2 mg total) by mouth every 8 (eight) hours as needed.        CONTINUE taking these medications    b complex vitamins tablet  Take 1 tablet by mouth once daily.     FISH -160-1,000 mg Cap  Generic drug:  omega 3-dha-epa-fish oil     gabapentin 300 MG capsule  Commonly known as:  NEURONTIN  Take 1 capsule (300 mg total) by mouth every evening.     lisinopril 20 MG tablet  Commonly known as:  PRINIVIL,ZESTRIL  Take 1 tablet (20 mg total) by mouth 2 (two) times daily.     pravastatin 10 MG tablet  Commonly known as:  PRAVACHOL  Take 1 tablet (10 mg total) by mouth once daily.     VITAMIN B-6 100 MG Tab  Generic drug:  pyridoxine (vitamin B6)  Take 100 mg by mouth once daily.        STOP taking these medications    aspirin 81 MG Chew     meloxicam 15 MG tablet  Commonly known as:  MOBIC            Wiliam Urias PA-C  Neurosurgery  [facility]

## 2019-02-06 NOTE — PLAN OF CARE
Discharge instructions gone over with patient and family. Patient discharged to home in wheelchair with family

## 2019-02-06 NOTE — TRANSFER OF CARE
"Anesthesia Transfer of Care Note    Patient: Cheryl Ghosh    Procedure(s) Performed: Procedure(s) (LRB):  FUSION, SPINE, MINIMALLY INVASIVE Right Sacroiliac Joint Fusion -  I Fuse (Right)    Patient location: PACU    Anesthesia Type: general    Transport from OR: Transported from OR on 6-10 L/min O2 by face mask with adequate spontaneous ventilation    Post pain: adequate analgesia    Post assessment: no apparent anesthetic complications    Post vital signs: stable    Level of consciousness: awake and alert    Nausea/Vomiting: no nausea/vomiting    Complications: none    Transfer of care protocol was followed      Last vitals:   Visit Vitals  /78   Pulse 82    Temp 36.6 °C (97.9 °F) (Skin)   Resp 15   Ht 5' 4" (1.626 m)   Wt 59.9 kg (132 lb)   LMP  (LMP Unknown)   SpO2 100%   Breastfeeding? No   BMI 22.66 kg/m²     "

## 2019-02-06 NOTE — DISCHARGE INSTRUCTIONS
Remain active with walking and other light activities daily.  No heavy lifting, no extreme bending or twisting.   Use a walker at home for at least one week  Remove outer clear dressing on 02/08/2019. Do not remove white steri strips. Allow steri-strips to fall off on their own.  Ok to shower on 02/08/2019, but do not immerse wound in water.  Resume ASA 81mg 3 days after surgery    ANESTHESIA  -For the first 24 hours after surgery:  Do not drive, use heavy equipment, make important decisions, or drink alcohol  -It is normal to feel sleepy for several hours.  Rest until you are more awake.  -Have someone stay with you, if needed.  They can watch for problems and help keep you safe.  -Some possible post anesthesia side effects include: nausea and vomiting, sore throat and hoarseness, sleepiness, and dizziness.    PAIN  -If you have pain after surgery, pain medicine will help you feel better.  Take it as directed, before pain becomes severe.  Most pain relievers taken by mouth need at least 20-30 minutes to start working.  -Do not drive or drink alcohol while taking pain medicine.  -Pain medication can upset your stomach.  Taking them with a little food may help.  -Other ways to help control pain: elevation, ice, and relaxation  -Call your surgeon if still having unmanageable pain an hour after taking pain medicine.  -Pain medicine can cause constipation.  Taking an over-the counter stool softener while on prescription pain medicine and drinking plenty of fluids can prevent this side effect.  -Call your surgeon if you have severe side effects like: breathing problems, trouble waking up, dizziness, confusion, or severe constipation.    NAUSEA  -Some people have nausea after surgery.  This is often because of anesthesia, pain, pain medicine, or the stress of surgery.  -Do not push yourself to eat.  Start off with clear liquids and soup.  Slowly move to solid foods.  Don't eat fatty, rich, spicy foods at first.  Eat  smaller amounts.  -If you develop persistent nausea and vomiting please notify your surgeon immediately.    BLEEDING  -Different types of surgery require different types of care and dressing changes.  It is important to follow all instructions and advice from your surgeon.  Change dressing as directed.  Call your surgeon for any concerns regarding postop bleeding.    SIGNS OF INFECTION  -Signs of infection include: fever, swelling, drainage, and redness  -Notify your surgeon if you have a fever of 100.4 F (38.0 C) or higher.  -Notify your surgeon if you notice redness, swelling, increased pain, pus, or a foul smell at the incision site.

## 2019-02-07 ENCOUNTER — TELEPHONE (OUTPATIENT)
Dept: FAMILY MEDICINE | Facility: CLINIC | Age: 84
End: 2019-02-07

## 2019-02-07 DIAGNOSIS — M85.80 OSTEOPENIA, UNSPECIFIED LOCATION: Primary | ICD-10-CM

## 2019-02-07 DIAGNOSIS — M89.9 DISORDER OF BONE: ICD-10-CM

## 2019-02-07 NOTE — TELEPHONE ENCOUNTER
----- Message from Wiliam Urias PA-C sent at 2/6/2019  8:33 AM CST -----  Mat Mueller,    We did SI joint fusion on this patient today and we are concerned she may have worsening osteoporosis given her bone quality in surgery. Last dexa scan was 2015. Can you follow up with this patient to see if she can start treatment?     Thanks!  Wiliam

## 2019-02-14 ENCOUNTER — HOSPITAL ENCOUNTER (OUTPATIENT)
Dept: RADIOLOGY | Facility: HOSPITAL | Age: 84
Discharge: HOME | End: 2019-02-14
Attending: FAMILY MEDICINE
Payer: MEDICARE

## 2019-02-14 DIAGNOSIS — M89.9 DISORDER OF BONE: ICD-10-CM

## 2019-02-14 DIAGNOSIS — M85.80 OSTEOPENIA, UNSPECIFIED LOCATION: ICD-10-CM

## 2019-02-14 PROCEDURE — 77080 DXA BONE DENSITY AXIAL: CPT | Mod: TC

## 2019-02-14 PROCEDURE — 77080 DEXA BONE DENSITY SPINE HIP: ICD-10-PCS | Mod: 26,,, | Performed by: RADIOLOGY

## 2019-02-14 PROCEDURE — 77080 DXA BONE DENSITY AXIAL: CPT | Mod: 26,,, | Performed by: RADIOLOGY

## 2019-02-19 ENCOUNTER — PATIENT MESSAGE (OUTPATIENT)
Dept: FAMILY MEDICINE | Facility: CLINIC | Age: 84
End: 2019-02-19

## 2019-02-19 DIAGNOSIS — I10 HTN (HYPERTENSION), BENIGN: Primary | ICD-10-CM

## 2019-02-22 ENCOUNTER — HOSPITAL ENCOUNTER (OUTPATIENT)
Dept: RADIOLOGY | Facility: HOSPITAL | Age: 84
Discharge: HOME | End: 2019-02-22
Attending: NEUROLOGICAL SURGERY
Payer: MEDICARE

## 2019-02-22 ENCOUNTER — OFFICE VISIT (OUTPATIENT)
Dept: NEUROSURGERY | Facility: CLINIC | Age: 84
End: 2019-02-22
Payer: MEDICARE

## 2019-02-22 VITALS
DIASTOLIC BLOOD PRESSURE: 70 MMHG | HEART RATE: 57 BPM | BODY MASS INDEX: 22.74 KG/M2 | WEIGHT: 133.19 LBS | SYSTOLIC BLOOD PRESSURE: 130 MMHG | HEIGHT: 64 IN

## 2019-02-22 DIAGNOSIS — Z98.1 S/P SPINAL FUSION: ICD-10-CM

## 2019-02-22 DIAGNOSIS — Z51.89 VISIT FOR WOUND CHECK: Primary | ICD-10-CM

## 2019-02-22 PROCEDURE — 99999 PR PBB SHADOW E&M-EST. PATIENT-LVL III: ICD-10-PCS | Mod: PBBFAC,,, | Performed by: PHYSICIAN ASSISTANT

## 2019-02-22 PROCEDURE — 72202 XR SACROILIAC JOINTS COMPLETE: ICD-10-PCS | Mod: 26,,, | Performed by: RADIOLOGY

## 2019-02-22 PROCEDURE — 99024 PR POST-OP FOLLOW-UP VISIT: ICD-10-PCS | Mod: S$GLB,,, | Performed by: PHYSICIAN ASSISTANT

## 2019-02-22 PROCEDURE — 99999 PR PBB SHADOW E&M-EST. PATIENT-LVL III: CPT | Mod: PBBFAC,,, | Performed by: PHYSICIAN ASSISTANT

## 2019-02-22 PROCEDURE — 72202 X-RAY EXAM SI JOINTS 3/> VWS: CPT | Mod: TC,PN

## 2019-02-22 PROCEDURE — 99024 POSTOP FOLLOW-UP VISIT: CPT | Mod: S$GLB,,, | Performed by: PHYSICIAN ASSISTANT

## 2019-02-22 PROCEDURE — 72202 X-RAY EXAM SI JOINTS 3/> VWS: CPT | Mod: 26,,, | Performed by: RADIOLOGY

## 2019-02-22 RX ORDER — MULTIVITAMIN
TABLET ORAL DAILY
COMMUNITY
Start: 2019-02-18

## 2019-02-22 RX ORDER — LIDOCAINE 50 MG/G
OINTMENT TOPICAL
Refills: 3 | COMMUNITY
Start: 2019-02-13 | End: 2019-03-21 | Stop reason: SDUPTHER

## 2019-02-22 RX ORDER — GEL BASE NO.184
GEL (GRAM) TOPICAL
Refills: 3 | COMMUNITY
Start: 2019-02-13 | End: 2021-02-02

## 2019-02-22 RX ORDER — DICLOFENAC SODIUM 10 MG/G
GEL TOPICAL
Refills: 3 | COMMUNITY
Start: 2019-02-13 | End: 2021-02-02

## 2019-02-22 NOTE — PROGRESS NOTES
Neurosurgery Wound Check Progress Note    HPI  Cheryl Ghosh is 84 y.o. female with history of hypertension, hyperlipidemia, CHF, CKD stage 3, secondary hyperparathyroidism, previous TIA, and right sacroiliac dysfunction who is now status post right SI joint fusion on 02/06/2019 with Dr. Dasilva.  Patient presents for 2 weeks wound check.  She states she is doing very well overall.  She still has significant incisional soreness and tenderness.  Preoperative right buttock deep aching pain has resolved.  She has been taking a very easy and not doing a lot of activities secondary to the pain.  Denies any incisional issues, fever, no radicular leg pain/weakness, or gait issues.  She has been using compound pain cream on right buttocks.  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        Low Back Pain Scale  R Low Back-Pain Intensity: I can tolerate the pain I have without having to use pain killers  R Low Back-Pain Score: I can look after myself normally but it causes extra pain   Low Back-Walking: Pain prevents me walking more than 1 mile   Low Back-Sitting: I can sit only in my favorite chair as long as I like   Low Back-Standing: I cannot stand for longer than 10 minutes with increasing pain   Low Back-Changing Degree of Pain: My pain fluctuates but is definitely getting better           EXAM  Vitals:    02/22/19 1021   BP: 130/70   Pulse: (!) 57       General: well developed, well nourished. no acute distress. Comfortable.   Neurologic: Awake, alert and oriented x3. Thought content appropriate.  Head: normocephalic, atraumatic    GCS: Motor: 6/Verbal: 5/Eyes: 4 GCS Total: 15  Cranial nerves: face symmetric, tongue midline, pupils equal, round, reactive to light with accomodation, extraocular muscles intact. CN II-XII grossly intact.    Sensory: response to light touch throughout  Motor Strength: Moves all extremities spontaneously  with good tone. Full strength upper and lower extremities. No abnormal movements seen.    No focal numbness or weakness   No midline or paraspinal tenderness to palpation  Gait is normal.   Sensation intact to light touch.  Negative straight leg raise bilaterally.    No tenderness to palpation bilateral SI joint.    Heart: RRR, no cyanosis, pallor, or edema.    Lungs: normal respiratory effort  Abdomen: soft, non-tender and symmetric  Extremities: warm with no cyanosis, edema, or clubbing  Skin: warm, dry and intact. No visible rashes or lesions.      Right buttock incision:  Surgical incision is C/D/I without any signs of infection.  Incision is healing well.  No erythema, warmth, edema, TTP.  No drainage.  Wound edges are well approximated.          IMAGING:  Sacroiliac x-rays dated 02/22/2019 personally reviewed and compared to previous imaging   Stable fusion of right SI joint.  No migration/fractures of hardware.    DEXA bone scan dated 02/14/2018 personally reviewed  Osteopenia present      ASSESSMENT/PLAN     84 y.o. female with history of hypertension, hyperlipidemia, CHF, CKD stage 3, secondary hyperparathyroidism, previous TIA, and right sacroiliac dysfunction who is now status post right SI joint fusion on 02/06/2019 with Dr. Dasilva.  Patient presents for 2 weeks wound check.  She states she is doing very well overall.  She still has significant incisional soreness and tenderness which is expected.  Preoperative right deep buttock pain has resolved since surgery.  Incision is healing very well.  X-rays are stable. I have reiterated wound care, activity restrictions, and follow up appts as below.     -continue using compound pain cream as directed.  -Do not submerge incision for another 6 weeks. Pat the incision dry after your shower.  -Patient to continue using bacitracin twice a day for another week.  -Keep incision open to air.  -Monocryl suture will dissolve on it's own.   -Continue p.r.n. stool  softener and miralax to prevent constipation with pain med use.   -Increase ambulation. No heavy lifting >10lbs.   No over bending or twisting at incisional site.  Fall precautions.  -Patient has follow up with Dr. Dasilva in 6 weeks with sacroiliac xrays.     All questions were answered. Patient was encouraged to call clinic with any future concerns prior to follow up appt. If any worsening symptoms, patient should report to ED.     Please call with any questions.    Wiliam Urias PA-C  Neurosurgery

## 2019-03-01 DIAGNOSIS — G57.93 NEUROPATHIC PAIN OF BOTH LEGS: ICD-10-CM

## 2019-03-01 RX ORDER — GABAPENTIN 300 MG/1
300 CAPSULE ORAL NIGHTLY
Qty: 90 CAPSULE | Refills: 3 | Status: CANCELLED | OUTPATIENT
Start: 2019-03-01

## 2019-03-04 DIAGNOSIS — G57.93 NEUROPATHIC PAIN OF BOTH LEGS: ICD-10-CM

## 2019-03-04 RX ORDER — GABAPENTIN 300 MG/1
300 CAPSULE ORAL NIGHTLY
Qty: 90 CAPSULE | Refills: 3 | Status: SHIPPED | OUTPATIENT
Start: 2019-03-04 | End: 2019-04-29 | Stop reason: SDUPTHER

## 2019-03-08 ENCOUNTER — PATIENT MESSAGE (OUTPATIENT)
Dept: FAMILY MEDICINE | Facility: CLINIC | Age: 84
End: 2019-03-08

## 2019-03-08 ENCOUNTER — TELEPHONE (OUTPATIENT)
Dept: NEUROSURGERY | Facility: CLINIC | Age: 84
End: 2019-03-08

## 2019-03-08 NOTE — TELEPHONE ENCOUNTER
----- Message from Xavier Dasilva MD sent at 3/8/2019 10:45 AM CST -----  Contact: [phone]  She needs to go to the ED if her knee is swollen  ----- Message -----  From: Humera Lui  Sent: 3/8/2019  10:17 AM  To: [facility] Staff    Patient advised her knee has been swollen since Sunday and would like to be seen sooner than the next available appt. Please call.

## 2019-03-15 DIAGNOSIS — I70.0 AORTIC ATHEROSCLEROSIS: ICD-10-CM

## 2019-03-15 DIAGNOSIS — E78.2 MIXED HYPERLIPIDEMIA: ICD-10-CM

## 2019-03-15 DIAGNOSIS — E78.5 HYPERLIPIDEMIA, UNSPECIFIED HYPERLIPIDEMIA TYPE: ICD-10-CM

## 2019-03-15 DIAGNOSIS — I10 HTN (HYPERTENSION), BENIGN: ICD-10-CM

## 2019-03-15 RX ORDER — LISINOPRIL 20 MG/1
20 TABLET ORAL 2 TIMES DAILY
Qty: 180 TABLET | Refills: 3 | Status: SHIPPED | OUTPATIENT
Start: 2019-03-15 | End: 2019-04-17

## 2019-03-15 RX ORDER — PRAVASTATIN SODIUM 10 MG/1
10 TABLET ORAL DAILY
Qty: 90 TABLET | Refills: 3 | Status: SHIPPED | OUTPATIENT
Start: 2019-03-15 | End: 2020-03-14 | Stop reason: SDUPTHER

## 2019-03-18 ENCOUNTER — HOSPITAL ENCOUNTER (OUTPATIENT)
Dept: RADIOLOGY | Facility: HOSPITAL | Age: 84
Discharge: HOME | End: 2019-03-18
Attending: NEUROLOGICAL SURGERY
Payer: MEDICARE

## 2019-03-18 ENCOUNTER — OFFICE VISIT (OUTPATIENT)
Dept: NEUROSURGERY | Facility: CLINIC | Age: 84
End: 2019-03-18
Payer: MEDICARE

## 2019-03-18 VITALS
HEART RATE: 67 BPM | SYSTOLIC BLOOD PRESSURE: 141 MMHG | DIASTOLIC BLOOD PRESSURE: 70 MMHG | WEIGHT: 134 LBS | BODY MASS INDEX: 23 KG/M2

## 2019-03-18 DIAGNOSIS — M47.817 ARTHROPATHY OF LUMBOSACRAL FACET JOINT: ICD-10-CM

## 2019-03-18 DIAGNOSIS — M53.3 SACROILIAC JOINT DYSFUNCTION OF RIGHT SIDE: Primary | ICD-10-CM

## 2019-03-18 DIAGNOSIS — Z98.1 S/P SPINAL FUSION: ICD-10-CM

## 2019-03-18 DIAGNOSIS — Z78.0 OSTEOPENIA AFTER MENOPAUSE: ICD-10-CM

## 2019-03-18 DIAGNOSIS — M85.80 OSTEOPENIA AFTER MENOPAUSE: ICD-10-CM

## 2019-03-18 PROCEDURE — 99999 PR PBB SHADOW E&M-EST. PATIENT-LVL III: ICD-10-PCS | Mod: PBBFAC,,, | Performed by: NEUROLOGICAL SURGERY

## 2019-03-18 PROCEDURE — 72202 X-RAY EXAM SI JOINTS 3/> VWS: CPT | Mod: TC,PN

## 2019-03-18 PROCEDURE — 72202 XR SACROILIAC JOINTS COMPLETE: ICD-10-PCS | Mod: 26,,, | Performed by: RADIOLOGY

## 2019-03-18 PROCEDURE — 99499 RISK ADDL DX/OHS AUDIT: ICD-10-PCS | Mod: S$GLB,,, | Performed by: NEUROLOGICAL SURGERY

## 2019-03-18 PROCEDURE — 99024 PR POST-OP FOLLOW-UP VISIT: ICD-10-PCS | Mod: S$GLB,,, | Performed by: NEUROLOGICAL SURGERY

## 2019-03-18 PROCEDURE — 99024 POSTOP FOLLOW-UP VISIT: CPT | Mod: S$GLB,,, | Performed by: NEUROLOGICAL SURGERY

## 2019-03-18 PROCEDURE — 99999 PR PBB SHADOW E&M-EST. PATIENT-LVL III: CPT | Mod: PBBFAC,,, | Performed by: NEUROLOGICAL SURGERY

## 2019-03-18 PROCEDURE — 99499 UNLISTED E&M SERVICE: CPT | Mod: S$GLB,,, | Performed by: NEUROLOGICAL SURGERY

## 2019-03-18 PROCEDURE — 72202 X-RAY EXAM SI JOINTS 3/> VWS: CPT | Mod: 26,,, | Performed by: RADIOLOGY

## 2019-03-18 NOTE — PROGRESS NOTES
NEUROSURGICAL POST-OPERATIVE PROGRESS NOTE    DATE OF SERVICE:  03/18/2019      ATTENDING PHYSICIAN:  Xavier Dasilva MD    SUBJECTIVE:    INTERIM HISTORY:    This is a very pleasant 84 y.o. y.o. female, who is status right SI joint fusion using the iFuse system.  She reports partial improvement in the right SI joint pain.  She still has low back pain on the right side around the L5-S1 area.  She has been compliant with her activity restrictions.  No new numbness or weakness.  She still has pain when she bend over rapidly .     Low Back Pain Scale  R Low Back-Pain Score: 3  R Low Back-Pain Intensity: Pain killers give moderate relief from pain  R Low Back-Pain Score: I need some help, but manage most of my personal care  Low Back-Lifting: Pain prevents me from lifting heavy weights off the floor, but I can manage if they are conveniently positioned for example on a table   Low Back-Walking: Pain prevents me walking more than .25 mile   Low Back-Sitting: I can sit only in my favorite chair as long as I like   Low Back-Standing: I cannot stand for longer than 10 minutes with increasing pain   Low Back-Sleeping: I have pain in bed but it does not prevent me from sleeping well   Low Back-Social Life: My social life is normal but it increases the degree of pain   Low Back-Traveling: I have extra pain while traveling which compels me to seek alternate forms of travel   Low Back-Changing Degree of Pain: My pain seems to be getting better but improvement is slow         OBJECTIVE:    PHYSICAL EXAMINATION:   Vitals:    03/18/19 1543   BP: (!) 141/70   Pulse: 67       Neurosurgery Physical Exam    Ortho Exam    Neurologic Exam     Motor Exam     Strength   Strength 5/5 throughout.     Pain on palpation of the right L5-S1 facet joint  CARMITA test is positive on the right side    Wound has healed.    DIAGNOSTIC DATA:    X-ray of the SI joint reveals the fusion hardware is intact. No loosening of screws or migration of  hardware.  Bone density study shows osteopenia    ASSESMENT:    This is a 84 y.o. female who is s/p 6 weeks right SI joint fusion with partial improvement in her pain.    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  She is aware that she has decreased renal function.  Ok to resumed SI joint physical therapy  Compound cream ordered  Follow-up in 6 weeks        Xavier Dasilva MD  Pager: [phone]

## 2019-03-20 ENCOUNTER — PATIENT MESSAGE (OUTPATIENT)
Dept: NEUROSURGERY | Facility: CLINIC | Age: 84
End: 2019-03-20

## 2019-03-21 RX ORDER — LIDOCAINE 50 MG/G
OINTMENT TOPICAL
Qty: 1 TUBE | Refills: 3 | Status: SHIPPED | OUTPATIENT
Start: 2019-03-21 | End: 2019-03-26 | Stop reason: SDUPTHER

## 2019-03-25 RX ORDER — LOSARTAN POTASSIUM 25 MG/1
TABLET ORAL
Qty: 90 TABLET | Refills: 2 | Status: SHIPPED | OUTPATIENT
Start: 2019-03-25 | End: 2019-04-17

## 2019-03-26 RX ORDER — LIDOCAINE 50 MG/G
OINTMENT TOPICAL
Qty: 1 TUBE | Refills: 11 | Status: SHIPPED | OUTPATIENT
Start: 2019-03-26 | End: 2021-02-02

## 2019-04-05 ENCOUNTER — TELEPHONE (OUTPATIENT)
Dept: NEUROSURGERY | Facility: CLINIC | Age: 84
End: 2019-04-05

## 2019-04-05 NOTE — TELEPHONE ENCOUNTER
----- Message from Karolina Zuleta sent at 4/5/2019  8:48 AM CDT -----  Contact: Self/ [phone]  Patient has a question about surgery she recently had.    Please call.

## 2019-04-07 ENCOUNTER — PATIENT MESSAGE (OUTPATIENT)
Dept: NEUROSURGERY | Facility: CLINIC | Age: 84
End: 2019-04-07

## 2019-04-07 DIAGNOSIS — N28.9 RENAL FUNCTION IMPAIRMENT: Primary | ICD-10-CM

## 2019-04-07 RX ORDER — CELECOXIB 100 MG/1
100 CAPSULE ORAL 2 TIMES DAILY
Qty: 60 CAPSULE | Refills: 6 | Status: SHIPPED | OUTPATIENT
Start: 2019-04-07 | End: 2019-04-29 | Stop reason: SDUPTHER

## 2019-04-08 ENCOUNTER — TELEPHONE (OUTPATIENT)
Dept: ORTHOPEDICS | Facility: CLINIC | Age: 84
End: 2019-04-08

## 2019-04-08 ENCOUNTER — LAB VISIT (OUTPATIENT)
Dept: LAB | Facility: HOSPITAL | Age: 84
End: 2019-04-08
Attending: NEUROLOGICAL SURGERY
Payer: MEDICARE

## 2019-04-08 DIAGNOSIS — N28.9 RENAL FUNCTION IMPAIRMENT: ICD-10-CM

## 2019-04-08 LAB
ANION GAP SERPL CALC-SCNC: 4 MMOL/L (ref 8–16)
BUN SERPL-MCNC: 21 MG/DL (ref 8–23)
CALCIUM SERPL-MCNC: 9.6 MG/DL (ref 8.7–10.5)
CHLORIDE SERPL-SCNC: 106 MMOL/L (ref 95–110)
CO2 SERPL-SCNC: 30 MMOL/L (ref 23–29)
CREAT SERPL-MCNC: 1.3 MG/DL (ref 0.5–1.4)
EST. GFR  (AFRICAN AMERICAN): 44 ML/MIN/1.73 M^2
EST. GFR  (NON AFRICAN AMERICAN): 38 ML/MIN/1.73 M^2
GLUCOSE SERPL-MCNC: 99 MG/DL (ref 70–110)
POTASSIUM SERPL-SCNC: 4.6 MMOL/L (ref 3.5–5.1)
SODIUM SERPL-SCNC: 140 MMOL/L (ref 136–145)

## 2019-04-08 PROCEDURE — 36415 COLL VENOUS BLD VENIPUNCTURE: CPT

## 2019-04-08 PROCEDURE — 80048 BASIC METABOLIC PNL TOTAL CA: CPT

## 2019-04-08 NOTE — TELEPHONE ENCOUNTER
----- Message from Humera Lui sent at 4/8/2019 11:12 AM CDT -----  Contact: [phone]  Patient called in returning your call. Please call.

## 2019-04-16 ENCOUNTER — PES CALL (OUTPATIENT)
Dept: ADMINISTRATIVE | Facility: CLINIC | Age: 84
End: 2019-04-16

## 2019-04-16 ENCOUNTER — TELEPHONE (OUTPATIENT)
Dept: FAMILY MEDICINE | Facility: CLINIC | Age: 84
End: 2019-04-16

## 2019-04-16 NOTE — TELEPHONE ENCOUNTER
----- Message from Marcelle Anderson sent at 4/16/2019  1:47 PM CDT -----  Contact: Marcelle Anderson  I contacted patient to schedule an AWV visit and while we were on the phone she stated that her blood pressure was low and had dropped while she was at work and she would like for someone to contact her to discuss this as she isn't feeling well.

## 2019-04-17 ENCOUNTER — OFFICE VISIT (OUTPATIENT)
Dept: FAMILY MEDICINE | Facility: CLINIC | Age: 84
End: 2019-04-17
Attending: FAMILY MEDICINE
Payer: MEDICARE

## 2019-04-17 VITALS
BODY MASS INDEX: 22.89 KG/M2 | SYSTOLIC BLOOD PRESSURE: 120 MMHG | HEART RATE: 60 BPM | WEIGHT: 134.06 LBS | HEIGHT: 64 IN | DIASTOLIC BLOOD PRESSURE: 60 MMHG | OXYGEN SATURATION: 99 %

## 2019-04-17 DIAGNOSIS — I70.0 AORTIC ATHEROSCLEROSIS: ICD-10-CM

## 2019-04-17 DIAGNOSIS — M47.815 OSTEOARTHRITIS OF THORACOLUMBAR SPINE, UNSPECIFIED SPINAL OSTEOARTHRITIS COMPLICATION STATUS: ICD-10-CM

## 2019-04-17 DIAGNOSIS — N18.30 CHRONIC KIDNEY DISEASE, STAGE III (MODERATE): ICD-10-CM

## 2019-04-17 DIAGNOSIS — M46.1 SACROILIITIS: ICD-10-CM

## 2019-04-17 DIAGNOSIS — M47.816 FACET ARTHRITIS OF LUMBAR REGION: ICD-10-CM

## 2019-04-17 DIAGNOSIS — M54.16 BILATERAL LUMBAR RADICULOPATHY: ICD-10-CM

## 2019-04-17 DIAGNOSIS — M17.0 PRIMARY OSTEOARTHRITIS OF BOTH KNEES: ICD-10-CM

## 2019-04-17 DIAGNOSIS — I50.32 CHRONIC DIASTOLIC HEART FAILURE: ICD-10-CM

## 2019-04-17 DIAGNOSIS — I10 HTN (HYPERTENSION), BENIGN: Primary | ICD-10-CM

## 2019-04-17 DIAGNOSIS — N25.81 SECONDARY HYPERPARATHYROIDISM OF RENAL ORIGIN: ICD-10-CM

## 2019-04-17 PROCEDURE — 99999 PR PBB SHADOW E&M-EST. PATIENT-LVL III: CPT | Mod: PBBFAC,,, | Performed by: FAMILY MEDICINE

## 2019-04-17 PROCEDURE — 3078F PR MOST RECENT DIASTOLIC BLOOD PRESSURE < 80 MM HG: ICD-10-PCS | Mod: CPTII,S$GLB,, | Performed by: FAMILY MEDICINE

## 2019-04-17 PROCEDURE — 99214 OFFICE O/P EST MOD 30 MIN: CPT | Mod: S$GLB,,, | Performed by: FAMILY MEDICINE

## 2019-04-17 PROCEDURE — 1101F PR PT FALLS ASSESS DOC 0-1 FALLS W/OUT INJ PAST YR: ICD-10-PCS | Mod: CPTII,S$GLB,, | Performed by: FAMILY MEDICINE

## 2019-04-17 PROCEDURE — 99499 UNLISTED E&M SERVICE: CPT | Mod: S$GLB,,, | Performed by: FAMILY MEDICINE

## 2019-04-17 PROCEDURE — 3078F DIAST BP <80 MM HG: CPT | Mod: CPTII,S$GLB,, | Performed by: FAMILY MEDICINE

## 2019-04-17 PROCEDURE — 1101F PT FALLS ASSESS-DOCD LE1/YR: CPT | Mod: CPTII,S$GLB,, | Performed by: FAMILY MEDICINE

## 2019-04-17 PROCEDURE — 3074F SYST BP LT 130 MM HG: CPT | Mod: CPTII,S$GLB,, | Performed by: FAMILY MEDICINE

## 2019-04-17 PROCEDURE — 99214 PR OFFICE/OUTPT VISIT, EST, LEVL IV, 30-39 MIN: ICD-10-PCS | Mod: S$GLB,,, | Performed by: FAMILY MEDICINE

## 2019-04-17 PROCEDURE — 99499 RISK ADDL DX/OHS AUDIT: ICD-10-PCS | Mod: S$GLB,,, | Performed by: FAMILY MEDICINE

## 2019-04-17 PROCEDURE — 3074F PR MOST RECENT SYSTOLIC BLOOD PRESSURE < 130 MM HG: ICD-10-PCS | Mod: CPTII,S$GLB,, | Performed by: FAMILY MEDICINE

## 2019-04-17 PROCEDURE — 99999 PR PBB SHADOW E&M-EST. PATIENT-LVL III: ICD-10-PCS | Mod: PBBFAC,,, | Performed by: FAMILY MEDICINE

## 2019-04-17 NOTE — PROGRESS NOTES
Subjective:       Patient ID: Cheryl Ghosh is a 84 y.o. female.    Chief Complaint: Hypotension    84 yr old pleasant white female with HTN, OA, osteopenia hip, hyperlipidemia, chronic back pain, BPPV, presents today for her 3 month check, lab work review and follow up and concerns regarding BP.        HTN - controlled - low salt diet - having issues with medications lisinopril n losartan      HLD - slightly worsening since stopped statin x 30 days           - she was asked to start statin but due to her fall and issues with her ribs and back, she was never able to start it.    Back pain - she follows pain clinic for injections - c/o neuropathic pain in legs - was on neurontin in the past and she started taking it and working    Osteopenia hip - stable - had dexa in 12/15 and she is talking fosamax since many years and stopped last year- she is due for dexa in 12/17 - no side effects    HISTORY as below - reviewed    Health maintenance  -labs UTD  -mammo UTD  -colon screen UTD  -vaccines UTD    Hypertension   This is a chronic problem. The current episode started more than 1 month ago. The problem has been waxing and waning since onset. The problem is resistant. Associated symptoms include headaches, malaise/fatigue and neck pain. Pertinent negatives include no anxiety, blurred vision, chest pain, orthopnea, palpitations, peripheral edema, PND, shortness of breath or sweats. Agents associated with hypertension include NSAIDs. There are no known risk factors for coronary artery disease. Past treatments include nothing. Compliance problems include exercise.  There is no history of chronic renal disease.   Hyperlipidemia   This is a chronic problem. The current episode started more than 1 year ago. The problem is uncontrolled. Recent lipid tests were reviewed and are high. She has no history of chronic renal disease, diabetes, hypothyroidism, liver disease, obesity or nephrotic syndrome. There are no known factors  aggravating her hyperlipidemia. Associated symptoms include myalgias. Pertinent negatives include no chest pain, focal sensory loss, leg pain or shortness of breath. Current antihyperlipidemic treatment includes diet change. The current treatment provides mild improvement of lipids. There are no compliance problems.  Risk factors for coronary artery disease include dyslipidemia.     Review of Systems   Constitutional: Positive for malaise/fatigue. Negative for activity change, diaphoresis and unexpected weight change.   HENT: Negative.  Negative for congestion, ear discharge, hearing loss, rhinorrhea, sore throat and voice change.    Eyes: Negative.  Negative for blurred vision, pain, discharge and visual disturbance.   Respiratory: Negative.  Negative for chest tightness, shortness of breath and wheezing.    Cardiovascular: Negative.  Negative for chest pain, palpitations, orthopnea and PND.   Gastrointestinal: Negative.  Negative for abdominal distention, anal bleeding, constipation and nausea.   Endocrine: Negative.  Negative for cold intolerance, polydipsia and polyuria.   Genitourinary: Negative.  Negative for decreased urine volume, difficulty urinating, dysuria, frequency, menstrual problem and vaginal pain.   Musculoskeletal: Positive for myalgias and neck pain. Negative for arthralgias and gait problem.   Skin: Negative.  Negative for color change, pallor and wound.   Allergic/Immunologic: Negative.  Negative for environmental allergies and immunocompromised state.   Neurological: Positive for headaches. Negative for dizziness, tremors, seizures and speech difficulty.   Hematological: Negative.  Negative for adenopathy. Does not bruise/bleed easily.   Psychiatric/Behavioral: Negative.  Negative for agitation, confusion, decreased concentration, hallucinations, self-injury and suicidal ideas. The patient is not nervous/anxious.        PMH/PSH/FH/SH/MED/ALLERGY reviewed    Objective:       Vitals:    04/17/19  1054   BP: 120/60   Pulse: 60       Physical Exam   Constitutional: She is oriented to person, place, and time. She appears well-developed and well-nourished. No distress.   HENT:   Head: Normocephalic and atraumatic.   Right Ear: External ear normal.   Left Ear: External ear normal.   Nose: Nose normal.   Mouth/Throat: Oropharynx is clear and moist. No oropharyngeal exudate.   Cerumen impaction R>L   Eyes: Pupils are equal, round, and reactive to light. Conjunctivae and EOM are normal. Right eye exhibits no discharge. Left eye exhibits no discharge. No scleral icterus.   Neck: Normal range of motion. Neck supple. No JVD present. No tracheal deviation present. No thyromegaly present.   Cardiovascular: Normal rate, regular rhythm, normal heart sounds and intact distal pulses. Exam reveals no gallop and no friction rub.   No murmur heard.  Pulmonary/Chest: Effort normal and breath sounds normal. No stridor. She has no wheezes. She has no rales. She exhibits no tenderness.   Abdominal: Soft. Bowel sounds are normal. She exhibits no distension and no mass. There is no tenderness. There is no rebound and no guarding. No hernia.   Musculoskeletal: Normal range of motion. She exhibits no edema or tenderness.   Lymphadenopathy:     She has no cervical adenopathy.   Neurological: She is alert and oriented to person, place, and time. She has normal reflexes. She displays normal reflexes. No cranial nerve deficit. She exhibits normal muscle tone. Coordination normal.   Skin: Skin is warm and dry. No rash noted. She is not diaphoretic. No erythema. No pallor.   Psychiatric: She has a normal mood and affect. Her behavior is normal. Judgment and thought content normal.       Assessment:       1. HTN (hypertension), benign    2. Facet arthritis of lumbar region    3. Chronic kidney disease, stage III (moderate)    4. Chronic diastolic heart failure    5. Bilateral lumbar radiculopathy    6. Aortic atherosclerosis    7. Primary  osteoarthritis of both knees    8. Osteoarthritis of thoracolumbar spine, unspecified spinal osteoarthritis complication status    9. Sacroiliitis    10. Secondary hyperparathyroidism of renal origin        Plan:         Cheryl was seen today for hypotension.    Diagnoses and all orders for this visit:    HTN (hypertension), benign  -     Ambulatory referral to Cardiology  -     Comprehensive metabolic panel; Future    Facet arthritis of lumbar region    Chronic kidney disease, stage III (moderate)  -     Comprehensive metabolic panel; Future    Chronic diastolic heart failure    Bilateral lumbar radiculopathy    Aortic atherosclerosis    Primary osteoarthritis of both knees    Osteoarthritis of thoracolumbar spine, unspecified spinal osteoarthritis complication status    Sacroiliitis    Secondary hyperparathyroidism of renal origin      HTN  -controlled  -DC all meds  -low salt diet  -refer cardiology          TIA  -follow neurology - DC plavix per neuro recommendations and just stay on ASA      Insomnia  -try benadryl only if needed    HLD  -diet controlled and statin    Urine incontinence  -lifestyle changes - intolerant to meds    Neuropathic pain  -continue neurontin    OA knee B/L  -mobic prn    CKD III  -lab and urine analysis  -adequate hydration    Cerumen impaction  -ear wax removal      Spent adequate time in obtaining history and explaining differentials    40 minutes spent during this visit of which greater than 50% devoted to face-face counseling and coordination of care regarding diagnosis and management plan    RTC 3 months

## 2019-04-22 ENCOUNTER — OFFICE VISIT (OUTPATIENT)
Dept: CARDIOLOGY | Facility: CLINIC | Age: 84
End: 2019-04-22
Attending: FAMILY MEDICINE
Payer: MEDICARE

## 2019-04-22 ENCOUNTER — PATIENT MESSAGE (OUTPATIENT)
Dept: CARDIOLOGY | Facility: CLINIC | Age: 84
End: 2019-04-22

## 2019-04-22 ENCOUNTER — PES CALL (OUTPATIENT)
Dept: ADMINISTRATIVE | Facility: CLINIC | Age: 84
End: 2019-04-22

## 2019-04-22 VITALS
OXYGEN SATURATION: 96 % | BODY MASS INDEX: 23.29 KG/M2 | SYSTOLIC BLOOD PRESSURE: 162 MMHG | WEIGHT: 136.44 LBS | DIASTOLIC BLOOD PRESSURE: 74 MMHG | HEIGHT: 64 IN | HEART RATE: 71 BPM

## 2019-04-22 DIAGNOSIS — G45.9 TIA (TRANSIENT ISCHEMIC ATTACK): ICD-10-CM

## 2019-04-22 DIAGNOSIS — E78.2 MIXED HYPERLIPIDEMIA: ICD-10-CM

## 2019-04-22 DIAGNOSIS — M46.1 SACROILIITIS: ICD-10-CM

## 2019-04-22 DIAGNOSIS — I10 HTN (HYPERTENSION), BENIGN: Primary | ICD-10-CM

## 2019-04-22 PROCEDURE — 3078F DIAST BP <80 MM HG: CPT | Mod: CPTII,S$GLB,, | Performed by: STUDENT IN AN ORGANIZED HEALTH CARE EDUCATION/TRAINING PROGRAM

## 2019-04-22 PROCEDURE — 3078F PR MOST RECENT DIASTOLIC BLOOD PRESSURE < 80 MM HG: ICD-10-PCS | Mod: CPTII,S$GLB,, | Performed by: STUDENT IN AN ORGANIZED HEALTH CARE EDUCATION/TRAINING PROGRAM

## 2019-04-22 PROCEDURE — 1101F PT FALLS ASSESS-DOCD LE1/YR: CPT | Mod: CPTII,S$GLB,, | Performed by: STUDENT IN AN ORGANIZED HEALTH CARE EDUCATION/TRAINING PROGRAM

## 2019-04-22 PROCEDURE — 1101F PR PT FALLS ASSESS DOC 0-1 FALLS W/OUT INJ PAST YR: ICD-10-PCS | Mod: CPTII,S$GLB,, | Performed by: STUDENT IN AN ORGANIZED HEALTH CARE EDUCATION/TRAINING PROGRAM

## 2019-04-22 PROCEDURE — 99999 PR PBB SHADOW E&M-EST. PATIENT-LVL III: ICD-10-PCS | Mod: PBBFAC,,, | Performed by: STUDENT IN AN ORGANIZED HEALTH CARE EDUCATION/TRAINING PROGRAM

## 2019-04-22 PROCEDURE — 99204 PR OFFICE/OUTPT VISIT, NEW, LEVL IV, 45-59 MIN: ICD-10-PCS | Mod: S$GLB,,, | Performed by: STUDENT IN AN ORGANIZED HEALTH CARE EDUCATION/TRAINING PROGRAM

## 2019-04-22 PROCEDURE — 99204 OFFICE O/P NEW MOD 45 MIN: CPT | Mod: S$GLB,,, | Performed by: STUDENT IN AN ORGANIZED HEALTH CARE EDUCATION/TRAINING PROGRAM

## 2019-04-22 PROCEDURE — 3077F PR MOST RECENT SYSTOLIC BLOOD PRESSURE >= 140 MM HG: ICD-10-PCS | Mod: CPTII,S$GLB,, | Performed by: STUDENT IN AN ORGANIZED HEALTH CARE EDUCATION/TRAINING PROGRAM

## 2019-04-22 PROCEDURE — 99999 PR PBB SHADOW E&M-EST. PATIENT-LVL III: CPT | Mod: PBBFAC,,, | Performed by: STUDENT IN AN ORGANIZED HEALTH CARE EDUCATION/TRAINING PROGRAM

## 2019-04-22 PROCEDURE — 3077F SYST BP >= 140 MM HG: CPT | Mod: CPTII,S$GLB,, | Performed by: STUDENT IN AN ORGANIZED HEALTH CARE EDUCATION/TRAINING PROGRAM

## 2019-04-22 RX ORDER — AMLODIPINE BESYLATE 2.5 MG/1
2.5 TABLET ORAL DAILY
Qty: 30 TABLET | Refills: 3 | Status: SHIPPED | OUTPATIENT
Start: 2019-04-22 | End: 2019-05-24 | Stop reason: SDUPTHER

## 2019-04-22 NOTE — PROGRESS NOTES
"   Cardiology Clinic note    Subjective:   Patient ID:  Cheryl Ghohs is a 84 y.o. female who presents for evaluation of HTN    HPI:   Cheryl Ghosh  has a past medical history of Arthritis, Blood transfusion, Chronic kidney disease, stage III (moderate) (8/2/2017), Degenerative disc disease, Insulinoma, Pancreatic disease, and Urinary incontinence (12/7/2015).  Nov 2018: HTN urgency ER vist    4/22/19: Pt seen and examined. Referral from Dr. Mueller. Pt notes Episodes of flushing, throwing up, changes in vision. She has been taking her BP very religiously. She is most asymptomatic aside from these episodes of throwing up and weakness. Since then, Dr. Mueller instructed pt to be off lisinopril. Most -130's but gets up 150's with moderate salt meals  - Seems to be salt senitive with lisinopril. Home log of BP range is from ; mean 's    Notes back pain from significant sacroillac joint pain.   Vitals  Vitals:    04/22/19 1030   BP: (!) 162/74   Pulse: 71   SpO2: 96%   Weight: 61.9 kg (136 lb 7.4 oz)   Height: 5' 4" (1.626 m)       Patient Active Problem List    Diagnosis Date Noted    Sacroiliac joint dysfunction of right side 02/06/2019    Chronic SI joint pain 12/20/2018    Secondary hyperparathyroidism of renal origin 10/12/2018    Chronic pain 08/28/2018    Sacroiliitis 07/24/2018    HTN (hypertension), benign 04/11/2018    Chronic diastolic heart failure 11/25/2017    TIA (transient ischemic attack) 11/24/2017    Hyperlipidemia 08/02/2017    Chronic kidney disease, stage III (moderate) 08/02/2017    Neuropathy 12/02/2016    Female bladder prolapse 07/22/2016    Urinary incontinence 12/07/2015    Bilateral lumbar radiculopathy 08/13/2015    Osteopenia 02/26/2015    OA (osteoarthritis) of knee 01/05/2015    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    B COMPLEX VITAMINS TABLET    Take 1 tablet by mouth once daily.    CALCIUM-VITAMIN D (CALCIUM WITH VITAMIN D) 600 MG(1,500MG) -400 UNIT TAB        CELECOXIB (CELEBREX) 100 MG CAPSULE    Take 1 capsule (100 mg total) by mouth 2 (two) times daily.    DICLOFENAC SODIUM (VOLTAREN) 1 % GEL    APPLY 2.5 GRAMS TO THE AFFECTED AREA 3-4 TIMES DAILY STARTING 2 WEEKS POST SURGERY.    GABAPENTIN (NEURONTIN) 300 MG CAPSULE    Take 1 capsule (300 mg total) by mouth every evening.    LIDOCAINE (XYLOCAINE) 5 % OINT OINTMENT    APPLY 2.5 GRAMS TO THE AFFECTED AREA 3-4 TIMES DAILY STARTING 2 WEEKS POST SURGERY.    OMEGA 3-DHA-EPA-FISH OIL (FISH OIL) 100-160-1,000 MG CAP        PRAVASTATIN (PRAVACHOL) 10 MG TABLET    Take 1 tablet (10 mg total) by mouth once daily.    PYRIDOXINE (VITAMIN B-6) 100 MG TAB    Take 100 mg by mouth once daily.    SANARE ADV SCAR THERAPY BASE GEL    APPLY 1/2 GRAM (1 PUMP) TO AFFECTED AREAS TWICE DAILY STARTING 4 WEEKS POST SURGERY.   Modified Medications    No medications on file   Discontinued Medications    No medications on file         Review of Systems   Constitution: Negative for chills, decreased appetite, malaise/fatigue and weight gain.   HENT: Negative for congestion and ear discharge.    Eyes: Negative for blurred vision and double vision.   Cardiovascular: Negative for chest pain, cyanosis, dyspnea on exertion, irregular heartbeat, leg swelling, near-syncope, orthopnea, palpitations and syncope.   Respiratory: Negative for cough, shortness of breath and sleep disturbances due to breathing.    Skin: Negative for color change and dry skin.   Musculoskeletal: Negative for joint pain, joint  swelling and muscle cramps.   Gastrointestinal: Negative for bloating, heartburn, hematemesis and hematochezia.   Genitourinary: Negative for bladder incontinence and dysuria.   Neurological: Negative for aphonia, excessive daytime sleepiness, dizziness, focal weakness, headaches, light-headedness, loss of balance and weakness.   Psychiatric/Behavioral: Negative for altered mental status, depression and memory loss. The patient does not have insomnia and is not nervous/anxious.          Objective:   Physical Exam   Constitutional: She is oriented to person, place, and time. She appears well-developed and well-nourished.   HENT:   Head: Normocephalic and atraumatic.   Eyes: Conjunctivae and EOM are normal.   Neck: Normal range of motion. Neck supple. No JVD present.   Cardiovascular: Normal rate, regular rhythm and normal heart sounds.   No murmur heard.  Pulmonary/Chest: Effort normal and breath sounds normal. No respiratory distress. She has no wheezes. She has no rales.   Abdominal: Soft. Bowel sounds are normal. She exhibits no distension.   Musculoskeletal: Normal range of motion. She exhibits no edema.   Neurological: She is alert and oriented to person, place, and time.   Skin: Skin is warm and dry. No erythema.   Psychiatric: She has a normal mood and affect. Her behavior is normal. Judgment and thought content normal.   Nursing note and vitals reviewed.      Lab Results    Lab Results   Component Value Date     04/08/2019    K 4.6 04/08/2019     04/08/2019    CO2 30 (H) 04/08/2019    BUN 21 04/08/2019    CREATININE 1.3 04/08/2019    GLU 99 04/08/2019    HGBA1C 5.2 11/24/2017    MG 1.9 11/25/2017    AST 20 01/07/2019    ALT 14 01/07/2019    ALBUMIN 3.6 01/07/2019    PROT 6.7 01/07/2019    BILITOT 0.3 01/07/2019    WBC 8.07 09/07/2018    HGB 12.1 09/07/2018    HCT 36.9 (L) 09/07/2018    MCV 93 09/07/2018     09/07/2018    INR 0.9 01/14/2019    TSH 1.208 11/24/2017    CHOL 192 01/07/2019     HDL 48 01/07/2019    LDLCALC 119.8 01/07/2019    TRIG 121 01/07/2019    BNP 81 05/26/2017       Lipid panel  Lab Results   Component Value Date    CHOL 192 01/07/2019     Lab Results   Component Value Date    HDL 48 01/07/2019     Lab Results   Component Value Date    LDLCALC 119.8 01/07/2019     Lab Results   Component Value Date    TRIG 121 01/07/2019       Cardiac Studies  Significant Imaging: Echocardiogram:   2D echo with color flow doppler:   Results for orders placed or performed during the hospital encounter of 11/24/17   2D echo with color flow doppler   Result Value Ref Range    QEF 60 55 - 65    Mitral Valve Regurgitation TRIVIAL     Diastolic Dysfunction Yes (A)     Est. PA Systolic Pressure 23.07     Pericardial Effusion NONE     and Transthoracic echo (TTE) complete (Cupid Only): No results found for this or any previous visit.  ECG:  unchanged from previous tracings, sinus bradycardia. Jan 2019    Cath study : n/a    Cardiac stress testing. 2-3 years were WNL.  Assessment:     1. HTN (hypertension), benign    2. Mixed hyperlipidemia    3. TIA (transient ischemic attack)    4. Sacroiliitis        Plan:     1. Essential HTN  - agree to stop lisinopril  - will try norvasc 2.5 (vasodilator) rather than salt medication.  - pt seems to very salt sensitive,  - pt was drinking gatorades episodically   - advised only to check BP if pt has episodes.  - unabel to do digitial HTN because doesn't have smart phone.    2. HLD  - will spend time at next visit trying to add statin for 2nd prevention of TIA    Continue with current medical plan and lifestyle changes.  Return sooner for concerns or questions. If symptoms persist go to the ED  Total duration of face to face visit time 30 minutes.  Total time spent counseling greater than fifty percent of total visit time.  Counseling included discussion regarding imaging findings, diagnosis, possibilities, treatment options, risks and benefits.      No orders of the  defined types were placed in this encounter.      Follow up as scheduled. Return to clinic in 4-6 weeks for bp check  She expressed verbal understanding and agreed with the plan    Thank you for the opportunity to care for this patient. Will be available for questions if needed.     kB Rojo MD [facility]  Interventional Cardiology  [facility]  Pager: [phone]

## 2019-04-29 ENCOUNTER — OFFICE VISIT (OUTPATIENT)
Dept: NEUROSURGERY | Facility: CLINIC | Age: 84
End: 2019-04-29
Payer: MEDICARE

## 2019-04-29 ENCOUNTER — HOSPITAL ENCOUNTER (OUTPATIENT)
Dept: RADIOLOGY | Facility: HOSPITAL | Age: 84
Discharge: HOME | End: 2019-04-29
Attending: NEUROLOGICAL SURGERY
Payer: MEDICARE

## 2019-04-29 VITALS
DIASTOLIC BLOOD PRESSURE: 66 MMHG | HEART RATE: 55 BPM | WEIGHT: 137.38 LBS | SYSTOLIC BLOOD PRESSURE: 162 MMHG | BODY MASS INDEX: 23.45 KG/M2 | HEIGHT: 64 IN

## 2019-04-29 DIAGNOSIS — M53.3 SACROILIAC JOINT DYSFUNCTION OF RIGHT SIDE: ICD-10-CM

## 2019-04-29 DIAGNOSIS — G57.93 NEUROPATHIC PAIN OF BOTH LEGS: ICD-10-CM

## 2019-04-29 DIAGNOSIS — G89.29 CHRONIC BILATERAL LOW BACK PAIN WITHOUT SCIATICA: Primary | ICD-10-CM

## 2019-04-29 DIAGNOSIS — M54.50 CHRONIC BILATERAL LOW BACK PAIN WITHOUT SCIATICA: Primary | ICD-10-CM

## 2019-04-29 DIAGNOSIS — Z98.1 S/P SPINAL FUSION: ICD-10-CM

## 2019-04-29 PROCEDURE — 72202 XR SACROILIAC JOINTS COMPLETE: ICD-10-PCS | Mod: 26,,, | Performed by: RADIOLOGY

## 2019-04-29 PROCEDURE — 99999 PR PBB SHADOW E&M-EST. PATIENT-LVL IV: CPT | Mod: PBBFAC,,, | Performed by: NEUROLOGICAL SURGERY

## 2019-04-29 PROCEDURE — 99999 PR PBB SHADOW E&M-EST. PATIENT-LVL IV: ICD-10-PCS | Mod: PBBFAC,,, | Performed by: NEUROLOGICAL SURGERY

## 2019-04-29 PROCEDURE — 72202 X-RAY EXAM SI JOINTS 3/> VWS: CPT | Mod: TC,PN

## 2019-04-29 PROCEDURE — 99024 POSTOP FOLLOW-UP VISIT: CPT | Mod: S$GLB,,, | Performed by: NEUROLOGICAL SURGERY

## 2019-04-29 PROCEDURE — 99024 PR POST-OP FOLLOW-UP VISIT: ICD-10-PCS | Mod: S$GLB,,, | Performed by: NEUROLOGICAL SURGERY

## 2019-04-29 PROCEDURE — 72202 X-RAY EXAM SI JOINTS 3/> VWS: CPT | Mod: 26,,, | Performed by: RADIOLOGY

## 2019-04-29 RX ORDER — CELECOXIB 200 MG/1
200 CAPSULE ORAL 2 TIMES DAILY
Qty: 60 CAPSULE | Refills: 6 | Status: SHIPPED | OUTPATIENT
Start: 2019-04-29 | End: 2019-07-18 | Stop reason: CLARIF

## 2019-04-29 RX ORDER — TIZANIDINE 4 MG/1
4 TABLET ORAL EVERY 8 HOURS PRN
Qty: 90 TABLET | Refills: 3 | Status: SHIPPED | OUTPATIENT
Start: 2019-04-29 | End: 2019-05-09

## 2019-04-29 RX ORDER — GABAPENTIN 300 MG/1
300 CAPSULE ORAL 3 TIMES DAILY
Qty: 90 CAPSULE | Refills: 3 | Status: SHIPPED | OUTPATIENT
Start: 2019-04-29 | End: 2019-07-20 | Stop reason: SDUPTHER

## 2019-04-29 NOTE — PROGRESS NOTES
NEUROSURGICAL POST-OPERATIVE PROGRESS NOTE    DATE OF SERVICE:  04/29/2019      ATTENDING PHYSICIAN:  Xavier Dasilva MD    SUBJECTIVE:    INTERIM HISTORY:    This is a very pleasant 84 y.o. y.o. female, who is status less than 3 months right SI joint fusion using the [employer] system.  She reports significant improvement in her right SI joint pain.  However her lumbosacral pain has worsened.  Pain is usually triggered by ADLs.  Pain seems to be myofascial.  Pains affect her emotionally.  She reports significant improvement of pain with Celebrex and muscle relaxer.  No new weakness or numbness      Low Back Pain Scale  R Low Back-Pain Score: 4  R Low Back-Pain Intensity: Pain killers give very little relief from pain  R Low Back-Pain Score: I need some help, but manage most of my personal care  Low Back-Lifting: Pain prevents me from lifting heavy weights off the floor, but I can manage if they are conveniently positioned for example on a table   Low Back-Walking: Pain prevents me walking more than .25 mile   Low Back-Sitting: I can sit only in my favorite chair as long as I like   Low Back-Standing: I cannot stand for longer than 10 minutes with increasing pain   Low Back-Sleeping: Because of pain my normal nights sleep is reduced by less than one quarter   Low Back-Social Life: Pain has no significant effect on my social life apart from limiting my more en   Low Back-Traveling: I have extra pain while traveling which compels me to seek alternate forms of travel   Low Back-Changing Degree of Pain: My pain fluctuates but is definitely getting better         OBJECTIVE:    PHYSICAL EXAMINATION:     Neurosurgery Physical Exam    Back Exam     Tenderness   The patient is experiencing tenderness in the lumbar (Iliac crest asymmetry, right higher than left).            Neurologic Exam    Wound has healed.    DIAGNOSTIC DATA:    X-ray of the SI joint  views reveals the fusion hardware is intact. No loosening of screws or  migration of hardware.    ASSESMENT:    This is a 84 y.o. female who is s/p less than 3 months right SI joint fusion with improvement of the SI joint pain but worsening lumbosacral pain    Problem List Items Addressed This Visit        Neuro    Sacroiliac joint dysfunction of right side    Relevant Orders    Ambulatory Referral to [facility] ([facility])      Other Visit Diagnoses     Chronic bilateral low back pain without sciatica    -  Primary    Relevant Orders    Ambulatory Referral to [facility] ([facility])    Neuropathic pain of both legs        Relevant Medications    gabapentin (NEURONTIN) 300 MG capsule          PLAN:    [facility]  Follow-up in 6 weeks  The have increase the gabapentin to 300 mg 3 times a day, increase the Celebrex to 200 mg twice a day and ordered Zanaflex 4 mg 3 times a day as needed        Xavier Dasilva MD  Pager: [phone]

## 2019-05-24 ENCOUNTER — OFFICE VISIT (OUTPATIENT)
Dept: CARDIOLOGY | Facility: CLINIC | Age: 84
End: 2019-05-24
Payer: MEDICARE

## 2019-05-24 VITALS
BODY MASS INDEX: 23.34 KG/M2 | SYSTOLIC BLOOD PRESSURE: 169 MMHG | HEART RATE: 58 BPM | OXYGEN SATURATION: 98 % | HEIGHT: 64 IN | DIASTOLIC BLOOD PRESSURE: 75 MMHG | WEIGHT: 136.69 LBS

## 2019-05-24 DIAGNOSIS — I10 HTN (HYPERTENSION), BENIGN: ICD-10-CM

## 2019-05-24 DIAGNOSIS — G89.4 CHRONIC PAIN SYNDROME: Primary | ICD-10-CM

## 2019-05-24 PROCEDURE — 3078F PR MOST RECENT DIASTOLIC BLOOD PRESSURE < 80 MM HG: ICD-10-PCS | Mod: CPTII,S$GLB,, | Performed by: STUDENT IN AN ORGANIZED HEALTH CARE EDUCATION/TRAINING PROGRAM

## 2019-05-24 PROCEDURE — 99999 PR PBB SHADOW E&M-EST. PATIENT-LVL III: CPT | Mod: PBBFAC,,, | Performed by: STUDENT IN AN ORGANIZED HEALTH CARE EDUCATION/TRAINING PROGRAM

## 2019-05-24 PROCEDURE — 3288F PR FALLS RISK ASSESSMENT DOCUMENTED: ICD-10-PCS | Mod: CPTII,S$GLB,, | Performed by: STUDENT IN AN ORGANIZED HEALTH CARE EDUCATION/TRAINING PROGRAM

## 2019-05-24 PROCEDURE — 3077F SYST BP >= 140 MM HG: CPT | Mod: CPTII,S$GLB,, | Performed by: STUDENT IN AN ORGANIZED HEALTH CARE EDUCATION/TRAINING PROGRAM

## 2019-05-24 PROCEDURE — 99499 RISK ADDL DX/OHS AUDIT: ICD-10-PCS | Mod: S$GLB,,, | Performed by: STUDENT IN AN ORGANIZED HEALTH CARE EDUCATION/TRAINING PROGRAM

## 2019-05-24 PROCEDURE — 99999 PR PBB SHADOW E&M-EST. PATIENT-LVL III: ICD-10-PCS | Mod: PBBFAC,,, | Performed by: STUDENT IN AN ORGANIZED HEALTH CARE EDUCATION/TRAINING PROGRAM

## 2019-05-24 PROCEDURE — 3288F FALL RISK ASSESSMENT DOCD: CPT | Mod: CPTII,S$GLB,, | Performed by: STUDENT IN AN ORGANIZED HEALTH CARE EDUCATION/TRAINING PROGRAM

## 2019-05-24 PROCEDURE — 99214 PR OFFICE/OUTPT VISIT, EST, LEVL IV, 30-39 MIN: ICD-10-PCS | Mod: S$GLB,,, | Performed by: STUDENT IN AN ORGANIZED HEALTH CARE EDUCATION/TRAINING PROGRAM

## 2019-05-24 PROCEDURE — 3077F PR MOST RECENT SYSTOLIC BLOOD PRESSURE >= 140 MM HG: ICD-10-PCS | Mod: CPTII,S$GLB,, | Performed by: STUDENT IN AN ORGANIZED HEALTH CARE EDUCATION/TRAINING PROGRAM

## 2019-05-24 PROCEDURE — 1100F PTFALLS ASSESS-DOCD GE2>/YR: CPT | Mod: CPTII,S$GLB,, | Performed by: STUDENT IN AN ORGANIZED HEALTH CARE EDUCATION/TRAINING PROGRAM

## 2019-05-24 PROCEDURE — 3078F DIAST BP <80 MM HG: CPT | Mod: CPTII,S$GLB,, | Performed by: STUDENT IN AN ORGANIZED HEALTH CARE EDUCATION/TRAINING PROGRAM

## 2019-05-24 PROCEDURE — 1100F PR PT FALLS ASSESS DOC 2+ FALLS/FALL W/INJURY/YR: ICD-10-PCS | Mod: CPTII,S$GLB,, | Performed by: STUDENT IN AN ORGANIZED HEALTH CARE EDUCATION/TRAINING PROGRAM

## 2019-05-24 PROCEDURE — 99499 UNLISTED E&M SERVICE: CPT | Mod: S$GLB,,, | Performed by: STUDENT IN AN ORGANIZED HEALTH CARE EDUCATION/TRAINING PROGRAM

## 2019-05-24 PROCEDURE — 99214 OFFICE O/P EST MOD 30 MIN: CPT | Mod: S$GLB,,, | Performed by: STUDENT IN AN ORGANIZED HEALTH CARE EDUCATION/TRAINING PROGRAM

## 2019-05-24 RX ORDER — AMLODIPINE BESYLATE 2.5 MG/1
2.5 TABLET ORAL 2 TIMES DAILY
Qty: 180 TABLET | Refills: 3 | Status: SHIPPED | OUTPATIENT
Start: 2019-05-24 | End: 2019-10-28 | Stop reason: SDUPTHER

## 2019-05-24 NOTE — PROGRESS NOTES
"   Cardiology Clinic note    Subjective:   Patient ID:  Cheryl Ghosh is a 84 y.o. female who presents for evaluation of HTN    HPI:   Cheryl Ghosh  has a past medical history of Arthritis, Blood transfusion, Chronic kidney disease, stage III (moderate) (8/2/2017), Degenerative disc disease, Insulinoma, Pancreatic disease, and Urinary incontinence (12/7/2015).  Nov 2018: HTN urgency ER vist    4/22/19: Pt seen and examined. Referral from Dr. Mueller. Pt notes Episodes of flushing, throwing up, changes in vision. She has been taking her BP very religiously. She is most asymptomatic aside from these episodes of throwing up and weakness. Since then, Dr. Mueller instructed pt to be off lisinopril. Most -130's but gets up 150's with moderate salt meals  - Seems to be salt senitive with lisinopril. Home log of BP range is from ; mean 's    Notes back pain from significant sacroillac joint pain.     5/24/19: Here for HTN check  SBP is 130-150. Switched to norvasc 2.5 last visit (lisinopril)  One time took old hydrocodone and SBP dropped to 100's  - Pt has a detailed BP log.. Good SBP mostly. Again explained pt does not need to record BP on a regular basis, only if she feels bad.  - is mod uncontrolled today.    Vitals  Vitals:    05/24/19 1008   BP: (!) 169/75   Pulse: (!) 58   SpO2: 98%   Weight: 62 kg (136 lb 11 oz)   Height: 5' 4" (1.626 m)       Patient Active Problem List    Diagnosis Date Noted    Sacroiliac joint dysfunction of right side 02/06/2019    Chronic SI joint pain 12/20/2018    Secondary hyperparathyroidism of renal origin 10/12/2018    Chronic pain 08/28/2018    Sacroiliitis 07/24/2018    HTN (hypertension), benign 04/11/2018    Chronic diastolic heart failure 11/25/2017    TIA (transient ischemic attack) 11/24/2017    Hyperlipidemia 08/02/2017    Chronic kidney disease, stage III (moderate) 08/02/2017    Neuropathy 12/02/2016    Female bladder prolapse 07/22/2016    " Urinary incontinence 12/07/2015    Bilateral lumbar radiculopathy 08/13/2015    Osteopenia 02/26/2015    OA (osteoarthritis) of knee 01/05/2015    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    B COMPLEX VITAMINS TABLET    Take 1 tablet by mouth once daily.    CALCIUM-VITAMIN D (CALCIUM WITH VITAMIN D) 600 MG(1,500MG) -400 UNIT TAB        CELECOXIB (CELEBREX) 200 MG CAPSULE    Take 1 capsule (200 mg total) by mouth 2 (two) times daily.    DICLOFENAC SODIUM (VOLTAREN) 1 % GEL    APPLY 2.5 GRAMS TO THE AFFECTED AREA 3-4 TIMES DAILY STARTING 2 WEEKS POST SURGERY.    GABAPENTIN (NEURONTIN) 300 MG CAPSULE    Take 1 capsule (300 mg total) by mouth 3 (three) times daily.    LIDOCAINE (XYLOCAINE) 5 % OINT OINTMENT    APPLY 2.5 GRAMS TO THE AFFECTED AREA 3-4 TIMES DAILY STARTING 2 WEEKS POST SURGERY.    OMEGA 3-DHA-EPA-FISH OIL (FISH OIL) 100-160-1,000 MG CAP        PRAVASTATIN (PRAVACHOL) 10 MG TABLET    Take 1 tablet (10 mg total) by mouth once daily.    PYRIDOXINE (VITAMIN B-6) 100 MG TAB    Take 100 mg by mouth once daily.    SANARE ADV SCAR THERAPY BASE GEL    APPLY 1/2 GRAM (1 PUMP) TO AFFECTED AREAS TWICE DAILY STARTING 4 WEEKS POST SURGERY.   Modified Medications    Modified Medication Previous Medication    AMLODIPINE (NORVASC) 2.5 MG TABLET amLODIPine (NORVASC) 2.5 MG tablet       Take 1 tablet (2.5 mg total) by mouth 2 (two) times daily.    Take 1 tablet (2.5 mg total) by mouth once daily.   Discontinued Medications    No medications on file         Review of Systems    Constitution: Negative for chills, decreased appetite, malaise/fatigue and weight gain.   HENT: Negative for congestion and ear discharge.    Eyes: Negative for blurred vision and double vision.   Cardiovascular: Negative for chest pain, cyanosis, dyspnea on exertion, irregular heartbeat, leg swelling, near-syncope, orthopnea, palpitations and syncope.   Respiratory: Negative for cough, shortness of breath and sleep disturbances due to breathing.    Skin: Negative for color change and dry skin.   Musculoskeletal: Negative for joint pain, joint swelling and muscle cramps.   Gastrointestinal: Negative for bloating, heartburn, hematemesis and hematochezia.   Genitourinary: Negative for bladder incontinence and dysuria.   Neurological: Negative for aphonia, excessive daytime sleepiness, dizziness, focal weakness, headaches, light-headedness, loss of balance and weakness.   Psychiatric/Behavioral: Negative for altered mental status, depression and memory loss. The patient does not have insomnia and is not nervous/anxious.          Objective:   Physical Exam   Constitutional: She is oriented to person, place, and time. She appears well-developed and well-nourished.   HENT:   Head: Normocephalic and atraumatic.   Eyes: Conjunctivae and EOM are normal.   Neck: Normal range of motion. Neck supple. No JVD present.   Cardiovascular: Normal rate, regular rhythm and normal heart sounds.   No murmur heard.  Pulmonary/Chest: Effort normal and breath sounds normal. No respiratory distress. She has no wheezes. She has no rales.   Abdominal: Soft. Bowel sounds are normal. She exhibits no distension.   Musculoskeletal: Normal range of motion. She exhibits no edema.   Neurological: She is alert and oriented to person, place, and time.   Skin: Skin is warm and dry. No erythema.   Psychiatric: She has a normal mood and affect. Her behavior is normal. Judgment and thought content normal.   Nursing note and vitals reviewed.      Lab  Results    Lab Results   Component Value Date     04/08/2019    K 4.6 04/08/2019     04/08/2019    CO2 30 (H) 04/08/2019    BUN 21 04/08/2019    CREATININE 1.3 04/08/2019    GLU 99 04/08/2019    HGBA1C 5.2 11/24/2017    MG 1.9 11/25/2017    AST 20 01/07/2019    ALT 14 01/07/2019    ALBUMIN 3.6 01/07/2019    PROT 6.7 01/07/2019    BILITOT 0.3 01/07/2019    WBC 8.07 09/07/2018    HGB 12.1 09/07/2018    HCT 36.9 (L) 09/07/2018    MCV 93 09/07/2018     09/07/2018    INR 0.9 01/14/2019    TSH 1.208 11/24/2017    CHOL 192 01/07/2019    HDL 48 01/07/2019    LDLCALC 119.8 01/07/2019    TRIG 121 01/07/2019    BNP 81 05/26/2017       Lipid panel  Lab Results   Component Value Date    CHOL 192 01/07/2019     Lab Results   Component Value Date    HDL 48 01/07/2019     Lab Results   Component Value Date    LDLCALC 119.8 01/07/2019     Lab Results   Component Value Date    TRIG 121 01/07/2019       Cardiac Studies  Significant Imaging: Echocardiogram:   2D echo with color flow doppler:   Results for orders placed or performed during the hospital encounter of 11/24/17   2D echo with color flow doppler   Result Value Ref Range    QEF 60 55 - 65    Mitral Valve Regurgitation TRIVIAL     Diastolic Dysfunction Yes (A)     Est. PA Systolic Pressure 23.07     Pericardial Effusion NONE     and Transthoracic echo (TTE) complete (Cupid Only): No results found for this or any previous visit.  ECG:  unchanged from previous tracings, sinus bradycardia. Jan 2019    Cath study : n/a    Cardiac stress testing. 2-3 years were WNL.  Assessment:     1. Chronic pain syndrome    2. HTN (hypertension), benign        Plan:     1. Essential HTN  - doing better with norvasc 2.5 daily  - will perscribe it BID, dosing now (norvasc 5 total daily)  - advised to only take norvasc once a day if she takes another hydrocodone for her chronic pain that day.    2. HLD  - will spend time at next visit trying to add statin for 2nd prevention of  TIA  - LDL is controlled with LDL <120    3. Muscle spasm  -PT/OT    Continue with current medical plan and lifestyle changes.  Return sooner for concerns or questions. If symptoms persist go to the ED  Total duration of face to face visit time 30 minutes.  Total time spent counseling greater than fifty percent of total visit time.  Counseling included discussion regarding imaging findings, diagnosis, possibilities, treatment options, risks and benefits.      No orders of the defined types were placed in this encounter.      Follow up as scheduled. Return to clinic in 2 months  weeks for bp check  She expressed verbal understanding and agreed with the plan    Thank you for the opportunity to care for this patient. Will be available for questions if needed.     Bk Rojo MD [facility]  Interventional Cardiology  [facility]  Pager: [phone]

## 2019-06-06 ENCOUNTER — TELEPHONE (OUTPATIENT)
Dept: NEUROSURGERY | Facility: CLINIC | Age: 84
End: 2019-06-06

## 2019-06-06 DIAGNOSIS — Z98.1 S/P SPINAL FUSION: Primary | ICD-10-CM

## 2019-06-10 ENCOUNTER — HOSPITAL ENCOUNTER (OUTPATIENT)
Dept: RADIOLOGY | Facility: HOSPITAL | Age: 84
Discharge: HOME | End: 2019-06-10
Attending: NEUROLOGICAL SURGERY
Payer: MEDICARE

## 2019-06-10 ENCOUNTER — OFFICE VISIT (OUTPATIENT)
Dept: NEUROSURGERY | Facility: CLINIC | Age: 84
End: 2019-06-10
Payer: MEDICARE

## 2019-06-10 VITALS
DIASTOLIC BLOOD PRESSURE: 79 MMHG | WEIGHT: 135.81 LBS | SYSTOLIC BLOOD PRESSURE: 176 MMHG | HEIGHT: 64 IN | BODY MASS INDEX: 23.18 KG/M2 | HEART RATE: 56 BPM

## 2019-06-10 DIAGNOSIS — Z98.1 S/P SPINAL FUSION: ICD-10-CM

## 2019-06-10 DIAGNOSIS — M53.3 SACROILIAC JOINT DYSFUNCTION OF LEFT SIDE: Primary | ICD-10-CM

## 2019-06-10 PROCEDURE — 99213 PR OFFICE/OUTPT VISIT, EST, LEVL III, 20-29 MIN: ICD-10-PCS | Mod: S$GLB,,, | Performed by: NEUROLOGICAL SURGERY

## 2019-06-10 PROCEDURE — 72202 X-RAY EXAM SI JOINTS 3/> VWS: CPT | Mod: TC,PN

## 2019-06-10 PROCEDURE — 72202 X-RAY EXAM SI JOINTS 3/> VWS: CPT | Mod: 26,,, | Performed by: RADIOLOGY

## 2019-06-10 PROCEDURE — 3078F DIAST BP <80 MM HG: CPT | Mod: CPTII,S$GLB,, | Performed by: NEUROLOGICAL SURGERY

## 2019-06-10 PROCEDURE — 3077F PR MOST RECENT SYSTOLIC BLOOD PRESSURE >= 140 MM HG: ICD-10-PCS | Mod: CPTII,S$GLB,, | Performed by: NEUROLOGICAL SURGERY

## 2019-06-10 PROCEDURE — 99999 PR PBB SHADOW E&M-EST. PATIENT-LVL III: ICD-10-PCS | Mod: PBBFAC,,, | Performed by: NEUROLOGICAL SURGERY

## 2019-06-10 PROCEDURE — 3078F PR MOST RECENT DIASTOLIC BLOOD PRESSURE < 80 MM HG: ICD-10-PCS | Mod: CPTII,S$GLB,, | Performed by: NEUROLOGICAL SURGERY

## 2019-06-10 PROCEDURE — 1101F PR PT FALLS ASSESS DOC 0-1 FALLS W/OUT INJ PAST YR: ICD-10-PCS | Mod: CPTII,S$GLB,, | Performed by: NEUROLOGICAL SURGERY

## 2019-06-10 PROCEDURE — 99213 OFFICE O/P EST LOW 20 MIN: CPT | Mod: S$GLB,,, | Performed by: NEUROLOGICAL SURGERY

## 2019-06-10 PROCEDURE — 72202 XR SACROILIAC JOINTS COMPLETE: ICD-10-PCS | Mod: 26,,, | Performed by: RADIOLOGY

## 2019-06-10 PROCEDURE — 1101F PT FALLS ASSESS-DOCD LE1/YR: CPT | Mod: CPTII,S$GLB,, | Performed by: NEUROLOGICAL SURGERY

## 2019-06-10 PROCEDURE — 3077F SYST BP >= 140 MM HG: CPT | Mod: CPTII,S$GLB,, | Performed by: NEUROLOGICAL SURGERY

## 2019-06-10 PROCEDURE — 99999 PR PBB SHADOW E&M-EST. PATIENT-LVL III: CPT | Mod: PBBFAC,,, | Performed by: NEUROLOGICAL SURGERY

## 2019-06-10 RX ORDER — OXYCODONE AND ACETAMINOPHEN 5; 325 MG/1; MG/1
1 TABLET ORAL
Qty: 20 TABLET | Refills: 0 | Status: SHIPPED | OUTPATIENT
Start: 2019-06-10 | End: 2020-01-02 | Stop reason: SDUPTHER

## 2019-06-11 NOTE — PROGRESS NOTES
NEUROSURGICAL PROGRESS NOTE    DATE OF SERVICE:  06/10/2019    ATTENDING PHYSICIAN:  Xavier Dasilva MD    SUBJECTIVE:    INTERIM HISTORY:    This is a very pleasant 84 y.o. female, who is status post right SI joint fusion using the iFuse system 4 months ago. Since the right SI joint fusion she reports complete resolution of her right sided low back and si joint area pain. She has been doing PT and fitness exercises such as swimming, hip strengthening and flexibility exercises. However in the last 6 weeks she has developed worsening left sided si joint pain irradiating in her left groin, posterior and anterior thigh. The pain is intermittent but worse with activities. The pain can be severe and interferes with her QOL and functional level.     Low Back Pain Scale  R Low Back-Pain Score: 6  R Low Back-Pain Intensity: Pain killers give very little relief from pain  R Low Back-Pain Score: I need some help, but manage most of my personal care  Low Back-Lifting: Pain prevents me from lifting heavy weights off the floor, but I can manage if they are conveniently positioned for example on a table   Low Back-Walking: Pain prevents me walking more than .25 mile   Low Back-Sitting: I can sit only in my favorite chair as long as I like   Low Back-Standing: I cannot stand for longer than 30 mins without increasing pain   Low Back-Sleeping: Because of pain my normal nights sleep is reduced by less than three quarters   Low Back-Social Life: Pain has no significant effect on my social life apart from limiting my more en   Low Back-Traveling: Pain restricts me to short necessary journeys under 30 minutes   Low Back-Changing Degree of Pain: My pain is gradually worsening         PAST MEDICAL HISTORY:  Active Ambulatory Problems     Diagnosis Date Noted    Facet arthritis of lumbar region 10/02/2012    Spondylosis without myelopathy 11/06/2013    Trochanteric bursitis of right hip 01/27/2014    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     OA (osteoarthritis) of knee 01/05/2015    Osteopenia 02/26/2015    Bilateral lumbar radiculopathy 08/13/2015    Urinary incontinence 12/07/2015    Female bladder prolapse 07/22/2016    Neuropathy 12/02/2016    Aortic atherosclerosis 04/24/2014    Osteoarthritis of spine 09/27/2012    Hyperlipidemia 08/02/2017    Chronic kidney disease, stage III (moderate) 08/02/2017    TIA (transient ischemic attack) 11/24/2017    Chronic diastolic heart failure 11/25/2017    HTN (hypertension), benign 04/11/2018    Sacroiliitis 07/24/2018    Chronic pain 08/28/2018    Secondary hyperparathyroidism of renal origin 10/12/2018    Chronic SI joint pain 12/20/2018    Sacroiliac joint dysfunction of right side 02/06/2019     Resolved Ambulatory Problems     Diagnosis Date Noted    Chest pain 05/01/2014    Cholecystolithiasis 05/01/2014    Chest wall pain 08/26/2014    BPPV (benign paroxysmal positional vertigo) 01/08/2015    Acute sinusitis 11/16/2015    Laryngitis 11/16/2015    Bronchitis 11/16/2015    BMI 22.0-22.9, adult 07/22/2016    Palpitations 05/26/2017    Dyspnea on exertion 05/26/2017    Hypertensive urgency 11/24/2017     Past Medical History:   Diagnosis Date    Arthritis     Blood transfusion     Chronic kidney disease, stage III (moderate) 8/2/2017    Degenerative disc disease     Insulinoma     Pancreatic disease     Urinary incontinence 12/7/2015       PAST SURGICAL HISTORY:  Past Surgical History:   Procedure Laterality Date    ADENOIDECTOMY      APPENDECTOMY      CATARACT EXTRACTION, BILATERAL      EYE SURGERY      FUSION, SPINE, MINIMALLY INVASIVE Right Sacroiliac Joint Fusion -  I Fuse Right 2/6/2019    [facility]    HYSTERECTOMY      SAMM/BSO    INJECTION, STEROID-- Right SI Joint Block and  Steroid Injection Right 12/20/2018    [facility] OR    OOPHORECTOMY      PANCREAS SURGERY      Insulanoma     RADIOFREQUENCY ABLATION, NERVE, MEDIAL BRANCH, LUMBAR, 1 LEVEL - Left L3,4,5 IV SEADTION Left 9/4/2018    [facility]    RADIOFREQUENCY ABLATION, NERVE, MEDIAL BRANCH, LUMBAR, 1 LEVEL- Right L3,4,5 IV SEDATION Right 8/28/2018    [facility]    REFRACTIVE SURGERY Bilateral     TONSILLECTOMY         SOCIAL HISTORY:   Social History     Socioeconomic History    Marital status:      Spouse name: Not on file    Number of children: Not on file    Years of education: Not on file    Highest education level: Not on file   Occupational History    Not on file   Social Needs    Financial resource strain: Not on file    Food insecurity:     Worry: Not on file     Inability: Not on file    Transportation needs:     Medical: Not on file     Non-medical: Not on file   Tobacco Use    Smoking status: Never Smoker    Smokeless tobacco: Never Used   Substance and Sexual Activity    Alcohol use: Yes     Alcohol/week: 0.6 - 1.2 oz     Types: 1 - 2 Glasses of wine per week    Drug use: No    Sexual activity: Yes     Partners: Male   Lifestyle    Physical activity:     Days per week: Not on file     Minutes per session: Not on file    Stress: Not on file   Relationships    Social connections:     Talks on phone: Not on file     Gets together: Not on file     Attends Islam service: Not on file     Active member of club or organization: Not on file     Attends meetings of clubs or organizations: Not on file     Relationship status: Not on file   Other Topics Concern    Not on file   Social History Narrative    Not on file       FAMILY HISTORY:  Family History   Problem Relation Age of Onset    Breast cancer Mother     Heart disease Father     Aneurysm Sister     Glaucoma Daughter     Heart attack Son     Breast cancer Other        CURRENTS MEDICATIONS:  Current Outpatient Medications on File Prior to Visit   Medication Sig  Dispense Refill    acetaminophen (TYLENOL 8 HOUR ORAL) Take by mouth.      amLODIPine (NORVASC) 2.5 MG tablet Take 1 tablet (2.5 mg total) by mouth 2 (two) times daily. (Patient taking differently: Take 5 mg by mouth once daily. ) 180 tablet 3    b complex vitamins tablet Take 1 tablet by mouth once daily.      calcium-vitamin D (CALCIUM WITH VITAMIN D) 600 mg(1,500mg) -400 unit Tab       celecoxib (CELEBREX) 200 MG capsule Take 1 capsule (200 mg total) by mouth 2 (two) times daily. 60 capsule 6    diclofenac sodium (VOLTAREN) 1 % Gel APPLY 2.5 GRAMS TO THE AFFECTED AREA 3-4 TIMES DAILY STARTING 2 WEEKS POST SURGERY.  3    gabapentin (NEURONTIN) 300 MG capsule Take 1 capsule (300 mg total) by mouth 3 (three) times daily. 90 capsule 3    lidocaine (XYLOCAINE) 5 % Oint ointment APPLY 2.5 GRAMS TO THE AFFECTED AREA 3-4 TIMES DAILY STARTING 2 WEEKS POST SURGERY. 1 Tube 11    omega 3-dha-epa-fish oil (FISH OIL) 100-160-1,000 mg Cap       pravastatin (PRAVACHOL) 10 MG tablet Take 1 tablet (10 mg total) by mouth once daily. 90 tablet 3    pyridoxine (VITAMIN B-6) 100 MG Tab Take 100 mg by mouth once daily.      SANARE ADV SCAR THERAPY BASE Gel APPLY 1/2 GRAM (1 PUMP) TO AFFECTED AREAS TWICE DAILY STARTING 4 WEEKS POST SURGERY.  3     No current facility-administered medications on file prior to visit.        ALLERGIES:  Review of patient's allergies indicates:   Allergen Reactions    Pcn [penicillins] Hives and Nausea And Vomiting       REVIEW OF SYSTEMS:  Review of Systems   Constitutional: Negative for diaphoresis, fever and weight loss.   Respiratory: Negative for shortness of breath.    Cardiovascular: Negative for chest pain.   Gastrointestinal: Negative for blood in stool.   Genitourinary: Negative for hematuria.   Endo/Heme/Allergies: Does not bruise/bleed easily.   All other systems reviewed and are negative.        OBJECTIVE:    PHYSICAL EXAMINATION:   Vitals:    06/10/19 0847   BP: (!) 176/79    Pulse: (!) 56       Physical Exam:  Vitals reviewed.    Constitutional: She appears well-developed and well-nourished.     Eyes: Pupils are equal, round, and reactive to light. Conjunctivae and EOM are normal.     Cardiovascular: Normal distal pulses and no edema.     Abdominal: Soft.     Skin: Skin displays no rash on trunk and no rash on extremities. Skin displays no lesions on trunk and no lesions on extremities.     Psych/Behavior: She is alert. She is oriented to person, place, and time. She has a normal mood and affect.     Musculoskeletal:        Neck: Range of motion is full.     Neurological:        DTRs: Tricep reflexes are 2+ on the right side and 2+ on the left side. Bicep reflexes are 2+ on the right side and 2+ on the left side. Brachioradialis reflexes are 2+ on the right side and 2+ on the left side. Patellar reflexes are 2+ on the right side and 2+ on the left side. Achilles reflexes are 2+ on the right side and 2+ on the left side.       Back Exam     Tenderness   The patient is experiencing tenderness in the sacroiliac (left side).    Range of Motion   Extension: normal   Flexion: normal   Lateral bend right: normal   Lateral bend left: normal   Rotation right: normal   Rotation left: normal     Muscle Strength   Right Quadriceps:  5/5   Left Quadriceps:  5/5   Right Hamstrings:  5/5   Left Hamstrings:  5/5     Tests   Straight leg raise right: negative  Straight leg raise left: negative    Other   Toe walk: normal  Heel walk: normal            SI joint:   Palpation at the left si joint produces pain  CARMITA test is positive on the left side  Gaenslen test is positive on the left side      Neurologic Exam     Mental Status   Oriented to person, place, and time.   Speech: speech is normal   Level of consciousness: alert    Cranial Nerves   Cranial nerves II through XII intact.     CN III, IV, VI   Pupils are equal, round, and reactive to light.  Extraocular motions are normal.     Motor Exam    Muscle bulk: normal  Overall muscle tone: normal    Strength   Right deltoid: 5/5  Left deltoid: 5/5  Right biceps: 5/5  Left biceps: 5/5  Right triceps: 5/5  Left triceps: 5/5  Right wrist flexion: 5/5  Left wrist flexion: 5/5  Right wrist extension: 5/5  Left wrist extension: 5/5  Right interossei: 5/5  Left interossei: 5/5  Right iliopsoas: 5/5  Left iliopsoas: 5/5  Right quadriceps: 5/5  Left quadriceps: 5/5  Right hamstrin/5  Left hamstrin/5  Right anterior tibial: 5/5  Left anterior tibial: 5/5  Right posterior tibial: 5/5  Left posterior tibial: 5/5  Right peroneal: 5/5  Left peroneal: 5/5  Right gastroc: 5/5  Left gastroc: 5/5    Sensory Exam   Light touch normal.   Pinprick normal.     Gait, Coordination, and Reflexes     Gait  Gait: normal    Coordination   Finger to nose coordination: normal  Tandem walking coordination: normal    Reflexes   Right brachioradialis: 2+  Left brachioradialis: 2+  Right biceps: 2+  Left biceps: 2+  Right triceps: 2+  Left triceps: 2+  Right patellar: 2+  Left patellar: 2+  Right achilles: 2+  Left achilles: 2+  Right plantar: normal  Left plantar: normal  Right Shipman: absent  Left Shipman: absent  Right ankle clonus: absent  Left ankle clonus: absent        DIAGNOSTIC DATA:  I personally interpreted the following imaging:   SI joint XR shows correct positioning of the si joint implant on the right side    ASSESMENT:  This is a 84 y.o. female with     Problem List Items Addressed This Visit     None      Visit Diagnoses     Sacroiliac joint dysfunction of left side    -  Primary        Resolution of right si joint pain after succesfull right si joint fusion      PLAN:  Left SI joint block and steroid injection to diagnose and treat left SI joint pain.   All questions answered.         Xavier Dasilva MD  Cell:[phone]

## 2019-06-14 ENCOUNTER — TELEPHONE (OUTPATIENT)
Dept: NEUROSURGERY | Facility: CLINIC | Age: 84
End: 2019-06-14

## 2019-06-14 DIAGNOSIS — M53.3 SACROILIAC JOINT DYSFUNCTION OF LEFT SIDE: Primary | ICD-10-CM

## 2019-06-17 ENCOUNTER — TELEPHONE (OUTPATIENT)
Dept: NEUROSURGERY | Facility: CLINIC | Age: 84
End: 2019-06-17

## 2019-06-19 ENCOUNTER — ANESTHESIA (OUTPATIENT)
Dept: SURGERY | Facility: HOSPITAL | Age: 84
End: 2019-06-19
Payer: MEDICARE

## 2019-06-19 ENCOUNTER — HOSPITAL ENCOUNTER (OUTPATIENT)
Facility: HOSPITAL | Age: 84
Discharge: HOME | End: 2019-06-19
Attending: NEUROLOGICAL SURGERY | Admitting: NEUROLOGICAL SURGERY
Payer: MEDICARE

## 2019-06-19 ENCOUNTER — ANESTHESIA EVENT (OUTPATIENT)
Dept: SURGERY | Facility: HOSPITAL | Age: 84
End: 2019-06-19
Payer: MEDICARE

## 2019-06-19 VITALS
SYSTOLIC BLOOD PRESSURE: 168 MMHG | OXYGEN SATURATION: 97 % | HEART RATE: 55 BPM | RESPIRATION RATE: 18 BRPM | DIASTOLIC BLOOD PRESSURE: 88 MMHG | TEMPERATURE: 98 F

## 2019-06-19 DIAGNOSIS — G89.29 CHRONIC SI JOINT PAIN: Primary | ICD-10-CM

## 2019-06-19 DIAGNOSIS — M53.3 PAIN OF LEFT SACROILIAC JOINT: ICD-10-CM

## 2019-06-19 DIAGNOSIS — M53.3 CHRONIC SI JOINT PAIN: Primary | ICD-10-CM

## 2019-06-19 PROCEDURE — 37000009 HC ANESTHESIA EA ADD 15 MINS: Performed by: NEUROLOGICAL SURGERY

## 2019-06-19 PROCEDURE — 25000003 PHARM REV CODE 250: Performed by: NEUROLOGICAL SURGERY

## 2019-06-19 PROCEDURE — 63600175 PHARM REV CODE 636 W HCPCS: Performed by: NURSE ANESTHETIST, CERTIFIED REGISTERED

## 2019-06-19 PROCEDURE — 27096 PR INJECTION,SACROILIAC JOINT: ICD-10-PCS | Mod: LT,,, | Performed by: NEUROLOGICAL SURGERY

## 2019-06-19 PROCEDURE — S0020 INJECTION, BUPIVICAINE HYDRO: HCPCS | Performed by: NEUROLOGICAL SURGERY

## 2019-06-19 PROCEDURE — 37000008 HC ANESTHESIA 1ST 15 MINUTES: Performed by: NEUROLOGICAL SURGERY

## 2019-06-19 PROCEDURE — 27096 INJECT SACROILIAC JOINT: CPT | Mod: LT,,, | Performed by: NEUROLOGICAL SURGERY

## 2019-06-19 PROCEDURE — 36000705 HC OR TIME LEV I EA ADD 15 MIN: Performed by: NEUROLOGICAL SURGERY

## 2019-06-19 PROCEDURE — 63600175 PHARM REV CODE 636 W HCPCS: Performed by: NEUROLOGICAL SURGERY

## 2019-06-19 PROCEDURE — 25000003 PHARM REV CODE 250: Performed by: NURSE ANESTHETIST, CERTIFIED REGISTERED

## 2019-06-19 PROCEDURE — 71000016 HC POSTOP RECOV ADDL HR: Performed by: NEUROLOGICAL SURGERY

## 2019-06-19 PROCEDURE — 36000704 HC OR TIME LEV I 1ST 15 MIN: Performed by: NEUROLOGICAL SURGERY

## 2019-06-19 PROCEDURE — 25500020 PHARM REV CODE 255: Performed by: NEUROLOGICAL SURGERY

## 2019-06-19 PROCEDURE — 71000015 HC POSTOP RECOV 1ST HR: Performed by: NEUROLOGICAL SURGERY

## 2019-06-19 RX ORDER — METHYLPREDNISOLONE ACETATE 40 MG/ML
INJECTION, SUSPENSION INTRA-ARTICULAR; INTRALESIONAL; INTRAMUSCULAR; SOFT TISSUE
Status: DISCONTINUED | OUTPATIENT
Start: 2019-06-19 | End: 2019-06-19 | Stop reason: HOSPADM

## 2019-06-19 RX ORDER — PROPOFOL 10 MG/ML
VIAL (ML) INTRAVENOUS
Status: DISCONTINUED | OUTPATIENT
Start: 2019-06-19 | End: 2019-06-19

## 2019-06-19 RX ORDER — LIDOCAINE HCL/PF 100 MG/5ML
SYRINGE (ML) INTRAVENOUS
Status: DISCONTINUED | OUTPATIENT
Start: 2019-06-19 | End: 2019-06-19

## 2019-06-19 RX ORDER — MUPIROCIN 20 MG/G
1 OINTMENT TOPICAL 2 TIMES DAILY
Status: DISCONTINUED | OUTPATIENT
Start: 2019-06-19 | End: 2019-06-19

## 2019-06-19 RX ORDER — SODIUM CHLORIDE, SODIUM LACTATE, POTASSIUM CHLORIDE, CALCIUM CHLORIDE 600; 310; 30; 20 MG/100ML; MG/100ML; MG/100ML; MG/100ML
INJECTION, SOLUTION INTRAVENOUS CONTINUOUS PRN
Status: DISCONTINUED | OUTPATIENT
Start: 2019-06-19 | End: 2019-06-19

## 2019-06-19 RX ORDER — BUPIVACAINE HYDROCHLORIDE 2.5 MG/ML
INJECTION, SOLUTION INFILTRATION; PERINEURAL
Status: DISCONTINUED | OUTPATIENT
Start: 2019-06-19 | End: 2019-06-19 | Stop reason: HOSPADM

## 2019-06-19 RX ORDER — ACETAMINOPHEN 325 MG/1
325 TABLET ORAL EVERY 6 HOURS PRN
Status: DISCONTINUED | OUTPATIENT
Start: 2019-06-19 | End: 2019-06-19 | Stop reason: HOSPADM

## 2019-06-19 RX ORDER — LIDOCAINE HYDROCHLORIDE 10 MG/ML
INJECTION INFILTRATION; PERINEURAL
Status: DISCONTINUED | OUTPATIENT
Start: 2019-06-19 | End: 2019-06-19 | Stop reason: HOSPADM

## 2019-06-19 RX ORDER — PROPOFOL 10 MG/ML
VIAL (ML) INTRAVENOUS CONTINUOUS PRN
Status: DISCONTINUED | OUTPATIENT
Start: 2019-06-19 | End: 2019-06-19

## 2019-06-19 RX ADMIN — LIDOCAINE HYDROCHLORIDE 60 MG: 20 INJECTION, SOLUTION INTRAVENOUS at 11:06

## 2019-06-19 RX ADMIN — PROPOFOL 30 MG: 10 INJECTION, EMULSION INTRAVENOUS at 11:06

## 2019-06-19 RX ADMIN — SODIUM CHLORIDE, SODIUM LACTATE, POTASSIUM CHLORIDE, AND CALCIUM CHLORIDE: .6; .31; .03; .02 INJECTION, SOLUTION INTRAVENOUS at 11:06

## 2019-06-19 RX ADMIN — PROPOFOL 20 MG: 10 INJECTION, EMULSION INTRAVENOUS at 11:06

## 2019-06-19 RX ADMIN — PROPOFOL 50 MCG/KG/MIN: 10 INJECTION, EMULSION INTRAVENOUS at 11:06

## 2019-06-19 NOTE — PLAN OF CARE
Discharge instructions given to pt and , understood, no questions, reinstructed to do her pain diary

## 2019-06-19 NOTE — TRANSFER OF CARE
Anesthesia Transfer of Care Note    Patient: Cheryl Ghosh    Procedure(s) Performed: Procedure(s) (LRB):  INJECTION, STEROID Procedure:Left sacroiliac joint block / steroid injection Surgery Time: 30mins LOS: Anesthesia: MAC Radiology: C-arm Bed: Regular Bed Position: Prone (Left)    Patient location: OPS    Anesthesia Type: MAC    Transport from OR: Transported from OR on room air with adequate spontaneous ventilation    Post pain: adequate analgesia    Post assessment: no apparent anesthetic complications    Post vital signs: stable    Level of consciousness: awake, alert and oriented    Nausea/Vomiting: no nausea/vomiting    Complications: none    Transfer of care protocol was followed      Last vitals:   Visit Vitals  BP (!) 170/77   LMP  (LMP Unknown)   Breastfeeding? No

## 2019-06-19 NOTE — ANESTHESIA POSTPROCEDURE EVALUATION
Anesthesia Post Evaluation    Patient: Cheryl Ghosh    Procedure(s) Performed: Procedure(s) (LRB):  INJECTION, STEROID Procedure:Left sacroiliac joint block / steroid injection Surgery Time: 30mins LOS: Anesthesia: MAC Radiology: C-arm Bed: Regular Bed Position: Prone (Left)    Final Anesthesia Type: MAC  Patient location during evaluation: OPS  Patient participation: Yes- Able to Participate  Level of consciousness: awake and alert and oriented  Post-procedure vital signs: reviewed and stable  Pain management: adequate  Airway patency: patent  PONV status at discharge: No PONV  Anesthetic complications: no      Cardiovascular status: blood pressure returned to baseline  Respiratory status: unassisted, room air and spontaneous ventilation  Hydration status: euvolemic  Follow-up not needed.          Vitals Value Taken Time   /68 6/19/2019  8:07 AM   Temp 97.8 6/19/2019 11:38 AM   Pulse 72 6/19/2019 11:38 AM   Resp 20 6/19/2019 11:38 AM   SpO2 100 6/19/2019 11:38 AM         No case tracking events are documented in the log.      Pain/Enid Score: No data recorded

## 2019-06-19 NOTE — HPI
Cheryl Ghosh is a very pleasant 84 y.o. female, who is status post right SI joint fusion using the iFuse system 4 months ago. Since the right SI joint fusion she reports complete resolution of her right sided low back and si joint area pain. She has been doing PT and fitness exercises such as swimming, hip strengthening and flexibility exercises. However in the last 6 weeks she has developed worsening left sided si joint pain irradiating in her left groin, posterior and anterior thigh. The pain is intermittent but worse with activities. The pain can be severe and interferes with her QOL and functional level.     She presents for elective left SI joint steroid injection.

## 2019-06-19 NOTE — DISCHARGE INSTRUCTIONS
-please complete pain diary and return to clinic at follow-up with Dr. Dasilva.  ANESTHESIA  -For the first 24 hours after surgery:  Do not drive, use heavy equipment, make important decisions, or drink alcohol  -It is normal to feel sleepy for several hours.  Rest until you are more awake.  -Have someone stay with you, if needed.  They can watch for problems and help keep you safe.  -Some possible post anesthesia side effects include: nausea and vomiting, sore throat and hoarseness, sleepiness, and dizziness.    PAIN  -If you have pain after surgery, pain medicine will help you feel better.  Take it as directed, before pain becomes severe.  Most pain relievers taken by mouth need at least 20-30 minutes to start working.  -Do not drive or drink alcohol while taking pain medicine.  -Pain medication can upset your stomach.  Taking them with a little food may help.  -Other ways to help control pain: elevation, ice, and relaxation  -Call your surgeon if still having unmanageable pain an hour after taking pain medicine.  -Pain medicine can cause constipation.  Taking an over-the counter stool softener while on prescription pain medicine and drinking plenty of fluids can prevent this side effect.  -Call your surgeon if you have severe side effects like: breathing problems, trouble waking up, dizziness, confusion, or severe constipation.    NAUSEA  -Some people have nausea after surgery.  This is often because of anesthesia, pain, pain medicine, or the stress of surgery.  -Do not push yourself to eat.  Start off with clear liquids and soup.  Slowly move to solid foods.  Don't eat fatty, rich, spicy foods at first.  Eat smaller amounts.  -If you develop persistent nausea and vomiting please notify your surgeon immediately.    BLEEDING  -Different types of surgery require different types of care and dressing changes.  It is important to follow all instructions and advice from your surgeon.  Change dressing as directed.  Call  your surgeon for any concerns regarding postop bleeding.    SIGNS OF INFECTION  -Signs of infection include: fever, swelling, drainage, and redness  -Notify your surgeon if you have a fever of 100.4 F (38.0 C) or higher.  -Notify your surgeon if you notice redness, swelling, increased pain, pus, or a foul smell at the incision site.      Back Pain (Acute or Chronic)    Back pain is one of the most common problems. The good news is that most people feel better in 1 to 2 weeks, and most of the rest in 1 to 2 months. Most people can remain active.  People experience and describe pain differently; not everyone is the same.  · The pain can be sharp, stabbing, shooting, aching, cramping or burning.  · Movement, standing, bending, lifting, sitting, or walking may worsen pain.  · It can be localized to one spot or area, or it can be more generalized.  · It can spread or radiate upwards, to the front, or go down your arms or legs (sciatica).  · It can cause muscle spasm.  Most of the time, mechanical problems with the muscles or spine cause the pain. Mechanical problems are usually caused by an injury to the muscles or ligaments. While illness can cause back pain, it is usually not caused by a serious illness. Mechanical problems include:   · Physical activity such as sports, exercise, work, or normal activity  · Overexertion, lifting, pushing, pulling incorrectly or too aggressively  · Sudden twisting, bending, or stretching from an accident, or accidental movement  · Poor posture  · Stretching or moving wrong, without noticing pain at the time  · Poor coordination, lack of regular exercise (check with your doctor about this)  · Spinal disc disease or arthritis  · Stress  Pain can also be related to pregnancy, or illness like appendicitis, bladder or kidney infections, pelvic infections, and many other things.  Acute back pain usually gets better in 1 to 2 weeks. Back pain related to disk disease, arthritis in the spinal  joints or spinal stenosis (narrowing of the spinal canal) can become chronic and last for months or years.  Unless you had a physical injury (for example, a car accident or fall) X-rays are usually not needed for the initial evaluation of back pain. If pain continues and does not respond to medical treatment, X-rays and other tests may be needed.  Home care  Try these home care recommendations:  · When in bed, try to find a position of comfort. A firm mattress is best. Try lying flat on your back with pillows under your knees. You can also try lying on your side with your knees bent up towards your chest and a pillow between your knees.  · At first, do not try to stretch out the sore spots. If there is a strain, it is not like the good soreness you get after exercising without an injury. In this case, stretching may make it worse.  · Avoid prolong sitting, long car rides, or travel. This puts more stress on the lower back than standing or walking.  · During the first 24 to 72 hours after an acute injury or flare up of chronic back pain, apply an ice pack to the painful area for 20 minutes and then remove it for 20 minutes. Do this over a period of 60 to 90 minutes or several times a day. This will reduce swelling and pain. Wrap the ice pack in a thin towel or plastic to protect your skin.  · You can start with ice, then switch to heat. Heat (hot shower, hot bath, or heating pad) reduces pain and works well for muscle spasms. Heat can be applied to the painful area for 20 minutes then remove it for 20 minutes. Do this over a period of 60 to 90 minutes or several times a day. Do not sleep on a heating pad. It can lead to skin burns or tissue damage.  · You can alternate ice and heat therapy. Talk with your doctor about the best treatment for your back pain.  · Therapeutic massage can help relax the back muscles without stretching them.  · Be aware of safe lifting methods and do not lift anything without stretching  first.  Medicines  Talk to your doctor before using medicine, especially if you have other medical problems or are taking other medicines.  · You may use over-the-counter medicine as directed on the bottle to control pain, unless another pain medicine was prescribed. If you have chronic conditions like diabetes, liver or kidney disease, stomach ulcers, or gastrointestinal bleeding, or are taking blood thinners, talk to your doctor before taking any medicine.  · Be careful if you are given a prescription medicines, narcotics, or medicine for muscle spasms. They can cause drowsiness, affect your coordination, reflexes, and judgement. Do not drive or operate heavy machinery.  Follow-up care  Follow up with your healthcare provider, or as advised.   A radiologist will review any X-rays that were taken. Your provide will notify you of any new findings that may affect your care.  Call 911  Call emergency services if any of the following occur:  · Trouble breathing  · Confusion  · Very drowsy or trouble awakening  · Fainting or loss of consciousness  · Rapid or very slow heart rate  · Loss of bowel or bladder control  When to seek medical advice  Call your healthcare provider right away if any of these occur:   · Pain becomes worse or spreads to your legs  · Weakness or numbness in one or both legs  · Numbness in the groin or genital area  Date Last Reviewed: 7/1/2016  © 6256-7050 [employer], [employer]. [address] [place] [address], [place] [address]. All rights reserved. This information is not intended as a substitute for professional medical care. Always follow your healthcare professional's instructions.

## 2019-06-19 NOTE — ANESTHESIA PREPROCEDURE EVALUATION
06/19/2019  Cherly Ghosh is a 84 y.o., female presents for steroid injection under MAC.    Past Medical History:   Diagnosis Date    Arthritis     lumbar    Blood transfusion     Chronic kidney disease, stage III (moderate) 8/2/2017    Degenerative disc disease     lumbar    Insulinoma     Pancreatic disease     Urinary incontinence 12/7/2015     Past Surgical History:   Procedure Laterality Date    ADENOIDECTOMY      APPENDECTOMY      CATARACT EXTRACTION, BILATERAL      EYE SURGERY      FUSION, SPINE, MINIMALLY INVASIVE Right Sacroiliac Joint Fusion -  I Fuse Right 2/6/2019    Performed by [facility] at [facility] OR    HYSTERECTOMY      SAMM/BSO    INJECTION, STEROID-- Right SI Joint Block and  Steroid Injection Right 12/20/2018    Per[facility] by [facility] at [facility] OR    OOPHORECTOMY      PANCREAS SURGERY      Insulanoma    RADIOFREQUENCY ABLATION, NERVE, MEDIAL BRANCH, LUMBAR, 1 LEVEL - Left L3,4,5 IV SEADTION Left 9/4/2018    Performed by Eddie Vega MD at [facility] PAIN T    RADIOFREQUENCY ABLATION, NERVE, MEDIAL BRANCH, LUMBAR, 1 LEVEL- Right L3,4,5 IV SEDATION Right 8/28/2018    Performed by Eddie Vega MD at [facility] PAIN [facility]    REFRACTIVE SURGERY Bilateral     TONSILLECTOMY           Anesthesia Evaluation    I have reviewed the Patient Summary Reports.    I have reviewed the Nursing Notes.   I have reviewed the Medications.     Review of Systems  Anesthesia Hx:  No problems with previous Anesthesia    Cardiovascular:   Hypertension    Pulmonary:  Pulmonary Normal    Renal/:   Chronic Renal Disease    Hepatic/GI:  Hepatic/GI Normal    Musculoskeletal:   Arthritis     Neurological:   TIA, Neuromuscular Disease,    Endocrine:  Endocrine Normal        Physical Exam  General:  Well nourished    Airway/Jaw/Neck:  Airway Findings: Mouth Opening: Normal Tongue:  Normal       Chest/Lungs:  Chest/Lungs Findings: Clear to auscultation, Normal Respiratory Rate     Heart/Vascular:  Heart Findings: Rate: Normal  Rhythm: Regular Rhythm  Sounds: Normal        Mental Status:  Mental Status Findings:  Cooperative, Alert and Oriented         Anesthesia Plan  Type of Anesthesia, risks & benefits discussed:  Anesthesia Type:  MAC  Patient's Preference:   Intra-op Monitoring Plan:   Intra-op Monitoring Plan Comments:   Post Op Pain Control Plan: multimodal analgesia  Post Op Pain Control Plan Comments:   Induction:   IV  Beta Blocker:         Informed Consent: Patient understands risks and agrees with Anesthesia plan.  Questions answered. Anesthesia consent signed with patient.  ASA Score: 3     Day of Surgery Review of History & Physical:  There are no significant changes.          Ready For Surgery From Anesthesia Perspective.

## 2019-06-19 NOTE — DISCHARGE SUMMARY
[facility]  Neurosurgery  Discharge Summary      Patient Name: Cheryl Ghosh  MRN: [id number]  Admission Date: 6/19/2019  Hospital Length of Stay: 0 days  Discharge Date and Time:  06/19/2019 11:27 AM  Attending Physician: Xavier Dasilva MD   Discharging Provider: Wiliam Urias PA-C  Primary Care Provider: Joey Mueller MD    HPI:   Cheryl Ghosh is a very pleasant 84 y.o. female, who is status post right SI joint fusion using the iFuse system 4 months ago. Since the right SI joint fusion she reports complete resolution of her right sided low back and si joint area pain. She has been doing PT and fitness exercises such as swimming, hip strengthening and flexibility exercises. However in the last 6 weeks she has developed worsening left sided si joint pain irradiating in her left groin, posterior and anterior thigh. The pain is intermittent but worse with activities. The pain can be severe and interferes with her QOL and functional level.     She presents for elective left SI joint steroid injection.    Procedure(s) (LRB):  INJECTION, STEROID Procedure:Left sacroiliac joint block / steroid injection Surgery Time: 30mins LOS: Anesthesia: MAC Radiology: C-arm Bed: Regular Bed Position: Prone (Left)       Hospital Course:  Cheryl Ghosh presented to [facility] on  6/19/19 for left SI joint injection. she tolerated the procedure well, and there were no intra-operative difficulties. She recovered in PACU, where her pain was controlled. Patient was able to void on her own, ambulated without assistance, and tolerated PO diet. On 6/19/19, patient was discharged home with pain medication and follow up appointments. Regular diet. Activity as tolerated. At the time of discharge, vital signs were stable, patient was afebrile and neurologically stable.  she  was counseled on wound care, activity restrictions, and follow up prior to discharge.  Discharge instructions were given verbally/written to the  patient and their family. All of their questions were answered. Patient and family voiced understanding. They were encouraged to call the clinic with any questions they might have prior to the follow up appointments.         Pending Diagnostic Studies:     Procedure Component Value Units Date/Time    SURG FL Surgery Fluoro Usage [[id number]] Resulted:  06/19/19 1127    Order Status:  Sent Lab Status:  In process Updated:  06/19/19 1127        Final Active Diagnoses:    Diagnosis Date Noted POA    PRINCIPAL PROBLEM:  Pain of left sacroiliac joint [M53.3] 06/19/2019 Yes      Problems Resolved During this Admission:      Discharged Condition: good    Disposition:  home     Follow Up: 2 weeks    Patient Instructions:      Diet general     Medications:  Reconciled Home Medications:      Medication List      CHANGE how you take these medications    amLODIPine 2.5 MG tablet  Commonly known as:  NORVASC  Take 1 tablet (2.5 mg total) by mouth 2 (two) times daily.  What changed:    · how much to take  · when to take this        CONTINUE taking these medications    b complex vitamins tablet  Take 1 tablet by mouth once daily.     CALCIUM WITH VITAMIN D 600 mg(1,500mg) -400 unit Tab  Generic drug:  calcium-vitamin D     celecoxib 200 MG capsule  Commonly known as:  CeleBREX  Take 1 capsule (200 mg total) by mouth 2 (two) times daily.     diclofenac sodium 1 % Gel  Commonly known as:  VOLTAREN  APPLY 2.5 GRAMS TO THE AFFECTED AREA 3-4 TIMES DAILY STARTING 2 WEEKS POST SURGERY.     FISH -160-1,000 mg Cap  Generic drug:  omega 3-dha-epa-fish oil     gabapentin 300 MG capsule  Commonly known as:  NEURONTIN  Take 1 capsule (300 mg total) by mouth 3 (three) times daily.     lidocaine 5 % Oint ointment  Commonly known as:  XYLOCAINE  APPLY 2.5 GRAMS TO THE AFFECTED AREA 3-4 TIMES DAILY STARTING 2 WEEKS POST SURGERY.     oxyCODONE-acetaminophen 5-325 mg per tablet  Commonly known as:  PERCOCET  Take 1 tablet by mouth every 24  hours as needed for Pain.     pravastatin 10 MG tablet  Commonly known as:  PRAVACHOL  Take 1 tablet (10 mg total) by mouth once daily.     SANARE ADV SCAR THERAPY BASE Gel  Generic drug:  gel base no.184 (bulk)  APPLY 1/2 GRAM (1 PUMP) TO AFFECTED AREAS TWICE DAILY STARTING 4 WEEKS POST SURGERY.     TYLENOL 8 HOUR ORAL  Take by mouth.     VITAMIN B-6 100 MG Tab  Generic drug:  pyridoxine (vitamin B6)  Take 100 mg by mouth once daily.            Wiliam Urias PA-C  Neurosurgery  [facility]

## 2019-06-19 NOTE — PLAN OF CARE
Admitted to post op observation, awake and alert, no co of pain, bandaid site with scant sang drainage, no swelling or pain, no co of numbness, tingling in ext, none legs,apical pulse 55 regular, noted pulse in the 50's in surg

## 2019-06-19 NOTE — OP NOTE
DATE OF PROCEDURE: 06/19/2019     PREOPERATIVE DIAGNOSES:  1. Left sacroiliac joint dysfunction and refractory pain     POSTOPERATIVE DIAGNOSES:   1. Left sacroiliac joint dysfunction and refractory pain     NAME OF OPERATION:  1. Left sacroiliac joint block and steroid injection  2. Fluoroscopy     PRIMARY SURGEON: Xavier Dasilva M.D.     ASSISTANT SURGEON: PAT        INDICATION FOR PROCEDURE: This is a 84-year-old female who presented with left SI joint pain that was refractory to conservative treatment. Risks, benefits, alternative and limitation were discussed with the patient. The risk included nerve root injury that can cause paralysis, cerebrospinal fluid leak, wound infection and blood loss. The patient wanted to proceed and a consent was obtained.      DESCRIPTION OF THE PROCEDURE     The patient was placed on MAC anesthesia and was prone on the Zaki table.  All pressure points were carefully padded. The patient was prepped and draped   in the typical sterile fashion and the incision was made with a #15 blade.   Hemostasis was complete and the gluteal fascia was penetrated with a k-wire. Using the lateral and outlet views, and after local anesthesia with 1% lidocaine, the 22 spinal christoph needle was inserted inside the left sacroiliac joint and 0.5cc of Omnipaque was injected to confirm the needle tip placement.  We then injected a mixture of 1cc of 40mg of Depo-Medrol and 3 cc of 0.25% marcaine. The wounds were dressed with a sterile dressing. The blood loss was less than 1 cc. The final count was completed and nothing was missing. There was no complication.

## 2019-06-19 NOTE — H&P
NEUROSURGICAL PROGRESS NOTE     DATE OF SERVICE:  06/19/19     ATTENDING PHYSICIAN:  Xavier Dasilva MD     SUBJECTIVE:     INTERIM HISTORY:     This is a very pleasant 84 y.o. female, who is status post right SI joint fusion using the iFuse system 4 months ago. Since the right SI joint fusion she reports complete resolution of her right sided low back and si joint area pain. She has been doing PT and fitness exercises such as swimming, hip strengthening and flexibility exercises. However in the last 6 weeks she has developed worsening left sided si joint pain irradiating in her left groin, posterior and anterior thigh. The pain is intermittent but worse with activities. The pain can be severe and interferes with her QOL and functional level.      Low Back Pain Scale  R Low Back-Pain Score: 6  R Low Back-Pain Intensity: Pain killers give very little relief from pain  R Low Back-Pain Score: I need some help, but manage most of my personal care  Low Back-Lifting: Pain prevents me from lifting heavy weights off the floor, but I can manage if they are conveniently positioned for example on a table   Low Back-Walking: Pain prevents me walking more than .25 mile   Low Back-Sitting: I can sit only in my favorite chair as long as I like   Low Back-Standing: I cannot stand for longer than 30 mins without increasing pain   Low Back-Sleeping: Because of pain my normal nights sleep is reduced by less than three quarters   Low Back-Social Life: Pain has no significant effect on my social life apart from limiting my more en   Low Back-Traveling: Pain restricts me to short necessary journeys under 30 minutes   Low Back-Changing Degree of Pain: My pain is gradually worsening        PAST MEDICAL HISTORY:       Active Ambulatory Problems     Diagnosis Date Noted    Facet arthritis of lumbar region 10/02/2012    Spondylosis without myelopathy 11/06/2013    Trochanteric bursitis of right hip 01/27/2014    Intercostal neuralgia  08/26/2014    OA (osteoarthritis) of knee 01/05/2015    Osteopenia 02/26/2015    Bilateral lumbar radiculopathy 08/13/2015    Urinary incontinence 12/07/2015    Female bladder prolapse 07/22/2016    Neuropathy 12/02/2016    Aortic atherosclerosis 04/24/2014    Osteoarthritis of spine 09/27/2012    Hyperlipidemia 08/02/2017    Chronic kidney disease, stage III (moderate) 08/02/2017    TIA (transient ischemic attack) 11/24/2017    Chronic diastolic heart failure 11/25/2017    HTN (hypertension), benign 04/11/2018    Sacroiliitis 07/24/2018    Chronic pain 08/28/2018    Secondary hyperparathyroidism of renal origin 10/12/2018    Chronic SI joint pain 12/20/2018    Sacroiliac joint dysfunction of right side 02/06/2019           Resolved Ambulatory Problems     Diagnosis Date Noted    Chest pain 05/01/2014    Cholecystolithiasis 05/01/2014    Chest wall pain 08/26/2014    BPPV (benign paroxysmal positional vertigo) 01/08/2015    Acute sinusitis 11/16/2015    Laryngitis 11/16/2015    Bronchitis 11/16/2015    BMI 22.0-22.9, adult 07/22/2016    Palpitations 05/26/2017    Dyspnea on exertion 05/26/2017    Hypertensive urgency 11/24/2017           Past Medical History:   Diagnosis Date    Arthritis      Blood transfusion      Chronic kidney disease, stage III (moderate) 8/2/2017    Degenerative disc disease      Insulinoma      Pancreatic disease      Urinary incontinence 12/7/2015         PAST SURGICAL HISTORY:        Past Surgical History:   Procedure Laterality Date    ADENOIDECTOMY        APPENDECTOMY        CATARACT EXTRACTION, BILATERAL        EYE SURGERY        FUSION, SPINE, MINIMALLY INVASIVE Right Sacroiliac Joint Fusion -  I Fuse Right 2/6/2019     [facility] OR    HYSTERECTOMY         SAMM/BSO    INJECTION, STEROID-- Right SI Joint Block and  Steroid Injection Right 12/20/2018     [facility]    OOPHORECTOMY         PANCREAS SURGERY         Insulanoma    RADIOFREQUENCY ABLATION, NERVE, MEDIAL BRANCH, LUMBAR, 1 LEVEL - Left L3,4,5 IV SEADTION Left 9/4/2018     [facility] PAIN T    RADIOFREQUENCY ABLATION, NERVE, MEDIAL BRANCH, LUMBAR, 1 LEVEL- Right L3,4,5 IV SEDATION Right 8/28/2018     [facility]    REFRACTIVE SURGERY Bilateral      TONSILLECTOMY             SOCIAL HISTORY:   Social History               Socioeconomic History    Marital status:        Spouse name: Not on file    Number of children: Not on file    Years of education: Not on file    Highest education level: Not on file   Occupational History    Not on file   Social Needs    Financial resource strain: Not on file    Food insecurity:       Worry: Not on file       Inability: Not on file    Transportation needs:       Medical: Not on file       Non-medical: Not on file   Tobacco Use    Smoking status: Never Smoker    Smokeless tobacco: Never Used   Substance and Sexual Activity    Alcohol use: Yes       Alcohol/week: 0.6 - 1.2 oz       Types: 1 - 2 Glasses of wine per week    Drug use: No    Sexual activity: Yes       Partners: Male   Lifestyle    Physical activity:       Days per week: Not on file       Minutes per session: Not on file    Stress: Not on file   Relationships    Social connections:       Talks on phone: Not on file       Gets together: Not on file       Attends Alevism service: Not on file       Active member of club or organization: Not on file       Attends meetings of clubs or organizations: Not on file       Relationship status: Not on file   Other Topics Concern    Not on file   Social History Narrative    Not on file            FAMILY HISTORY:        Family History   Problem Relation Age of Onset    Breast cancer Mother      Heart disease Father      Aneurysm Sister      Glaucoma Daughter      Heart attack Son      Breast  cancer Other           CURRENTS MEDICATIONS:         Current Outpatient Medications on File Prior to Visit   Medication Sig Dispense Refill    acetaminophen (TYLENOL 8 HOUR ORAL) Take by mouth.        amLODIPine (NORVASC) 2.5 MG tablet Take 1 tablet (2.5 mg total) by mouth 2 (two) times daily. (Patient taking differently: Take 5 mg by mouth once daily. ) 180 tablet 3    b complex vitamins tablet Take 1 tablet by mouth once daily.        calcium-vitamin D (CALCIUM WITH VITAMIN D) 600 mg(1,500mg) -400 unit Tab          celecoxib (CELEBREX) 200 MG capsule Take 1 capsule (200 mg total) by mouth 2 (two) times daily. 60 capsule 6    diclofenac sodium (VOLTAREN) 1 % Gel APPLY 2.5 GRAMS TO THE AFFECTED AREA 3-4 TIMES DAILY STARTING 2 WEEKS POST SURGERY.   3    gabapentin (NEURONTIN) 300 MG capsule Take 1 capsule (300 mg total) by mouth 3 (three) times daily. 90 capsule 3    lidocaine (XYLOCAINE) 5 % Oint ointment APPLY 2.5 GRAMS TO THE AFFECTED AREA 3-4 TIMES DAILY STARTING 2 WEEKS POST SURGERY. 1 Tube 11    omega 3-dha-epa-fish oil (FISH OIL) 100-160-1,000 mg Cap          pravastatin (PRAVACHOL) 10 MG tablet Take 1 tablet (10 mg total) by mouth once daily. 90 tablet 3    pyridoxine (VITAMIN B-6) 100 MG Tab Take 100 mg by mouth once daily.        SANARE ADV SCAR THERAPY BASE Gel APPLY 1/2 GRAM (1 PUMP) TO AFFECTED AREAS TWICE DAILY STARTING 4 WEEKS POST SURGERY.   3      No current facility-administered medications on file prior to visit.          ALLERGIES:       Review of patient's allergies indicates:   Allergen Reactions    Pcn [penicillins] Hives and Nausea And Vomiting         REVIEW OF SYSTEMS:  Review of Systems   Constitutional: Negative for diaphoresis, fever and weight loss.   Respiratory: Negative for shortness of breath.    Cardiovascular: Negative for chest pain.   Gastrointestinal: Negative for blood in stool.   Genitourinary: Negative for hematuria.   Endo/Heme/Allergies: Does not bruise/bleed  easily.   All other systems reviewed and are negative.           OBJECTIVE:     PHYSICAL EXAMINATION:       Vitals:     06/10/19 0847   BP: (!) 176/79   Pulse: (!) 56         Physical Exam:  Vitals reviewed.     Constitutional: She appears well-developed and well-nourished.      Eyes: Pupils are equal, round, and reactive to light. Conjunctivae and EOM are normal.      Cardiovascular: Normal distal pulses and no edema.      Abdominal: Soft.      Skin: Skin displays no rash on trunk and no rash on extremities. Skin displays no lesions on trunk and no lesions on extremities.     Psych/Behavior: She is alert. She is oriented to person, place, and time. She has a normal mood and affect.     Musculoskeletal:        Neck: Range of motion is full.     Neurological:        DTRs: Tricep reflexes are 2+ on the right side and 2+ on the left side. Bicep reflexes are 2+ on the right side and 2+ on the left side. Brachioradialis reflexes are 2+ on the right side and 2+ on the left side. Patellar reflexes are 2+ on the right side and 2+ on the left side. Achilles reflexes are 2+ on the right side and 2+ on the left side.         Back Exam      Tenderness   The patient is experiencing tenderness in the sacroiliac (left side).     Range of Motion   Extension: normal   Flexion: normal   Lateral bend right: normal   Lateral bend left: normal   Rotation right: normal   Rotation left: normal      Muscle Strength   Right Quadriceps:  5/5   Left Quadriceps:  5/5   Right Hamstrings:  5/5   Left Hamstrings:  5/5      Tests   Straight leg raise right: negative  Straight leg raise left: negative     Other   Toe walk: normal  Heel walk: normal                 SI joint:   Palpation at the left si joint produces pain  CARMITA test is positive on the left side  Gaenslen test is positive on the left side        Neurologic Exam      Mental Status   Oriented to person, place, and time.   Speech: speech is normal   Level of consciousness:  alert     Cranial Nerves   Cranial nerves II through XII intact.      CN III, IV, VI   Pupils are equal, round, and reactive to light.  Extraocular motions are normal.      Motor Exam   Muscle bulk: normal  Overall muscle tone: normal     Strength   Right deltoid: 5/5  Left deltoid: 5/5  Right biceps: 5/5  Left biceps: 5/5  Right triceps: 5/5  Left triceps: 5/5  Right wrist flexion: 5/5  Left wrist flexion: 5/5  Right wrist extension: 5/5  Left wrist extension: 5/5  Right interossei: 5/5  Left interossei: 5/5  Right iliopsoas: 5/5  Left iliopsoas: 5/5  Right quadriceps: 5/5  Left quadriceps: 5/5  Right hamstrin/5  Left hamstrin/5  Right anterior tibial: 5/5  Left anterior tibial: 5/5  Right posterior tibial: 5/5  Left posterior tibial: 5/5  Right peroneal: 5/5  Left peroneal: 5/5  Right gastroc: 5/5  Left gastroc: 5/5     Sensory Exam   Light touch normal.   Pinprick normal.      Gait, Coordination, and Reflexes      Gait  Gait: normal     Coordination   Finger to nose coordination: normal  Tandem walking coordination: normal     Reflexes   Right brachioradialis: 2+  Left brachioradialis: 2+  Right biceps: 2+  Left biceps: 2+  Right triceps: 2+  Left triceps: 2+  Right patellar: 2+  Left patellar: 2+  Right achilles: 2+  Left achilles: 2+  Right plantar: normal  Left plantar: normal  Right Shipman: absent  Left Shipman: absent  Right ankle clonus: absent  Left ankle clonus: absent           DIAGNOSTIC DATA:  I personally interpreted the following imaging:   SI joint XR shows correct positioning of the si joint implant on the right side     ASSESMENT:  This is a 84 y.o. female with          Problem List Items Addressed This Visit      None               Visit Diagnoses      Sacroiliac joint dysfunction of left side    -  Primary          Resolution of right si joint pain after succesfull right si joint fusion        PLAN:  Left SI joint block and steroid injection to diagnose and treat left SI joint pain.    All questions answered.           Xavier Dasilva MD  Cell:[phone]

## 2019-06-21 ENCOUNTER — TELEPHONE (OUTPATIENT)
Dept: CARDIOLOGY | Facility: CLINIC | Age: 84
End: 2019-06-21

## 2019-06-22 DIAGNOSIS — I70.0 AORTIC ATHEROSCLEROSIS: ICD-10-CM

## 2019-06-22 DIAGNOSIS — E78.5 HYPERLIPIDEMIA, UNSPECIFIED HYPERLIPIDEMIA TYPE: ICD-10-CM

## 2019-06-22 DIAGNOSIS — E78.2 MIXED HYPERLIPIDEMIA: ICD-10-CM

## 2019-06-22 RX ORDER — PRAVASTATIN SODIUM 10 MG/1
TABLET ORAL
Qty: 90 TABLET | Refills: 2 | Status: SHIPPED | OUTPATIENT
Start: 2019-06-22 | End: 2019-07-10 | Stop reason: SDUPTHER

## 2019-06-28 ENCOUNTER — PATIENT MESSAGE (OUTPATIENT)
Dept: NEUROSURGERY | Facility: CLINIC | Age: 84
End: 2019-06-28

## 2019-07-09 ENCOUNTER — TELEPHONE (OUTPATIENT)
Dept: NEUROSURGERY | Facility: CLINIC | Age: 84
End: 2019-07-09

## 2019-07-09 ENCOUNTER — OFFICE VISIT (OUTPATIENT)
Dept: NEUROSURGERY | Facility: CLINIC | Age: 84
End: 2019-07-09
Payer: MEDICARE

## 2019-07-09 VITALS
DIASTOLIC BLOOD PRESSURE: 60 MMHG | WEIGHT: 133.63 LBS | TEMPERATURE: 98 F | HEIGHT: 64 IN | BODY MASS INDEX: 22.81 KG/M2 | SYSTOLIC BLOOD PRESSURE: 142 MMHG | HEART RATE: 50 BPM

## 2019-07-09 DIAGNOSIS — M24.60 FUSION OF JOINT: ICD-10-CM

## 2019-07-09 DIAGNOSIS — M53.3 SACROILIAC JOINT DYSFUNCTION: Primary | ICD-10-CM

## 2019-07-09 PROCEDURE — 1101F PT FALLS ASSESS-DOCD LE1/YR: CPT | Mod: CPTII,S$GLB,, | Performed by: PHYSICIAN ASSISTANT

## 2019-07-09 PROCEDURE — 1101F PR PT FALLS ASSESS DOC 0-1 FALLS W/OUT INJ PAST YR: ICD-10-PCS | Mod: CPTII,S$GLB,, | Performed by: PHYSICIAN ASSISTANT

## 2019-07-09 PROCEDURE — 3077F SYST BP >= 140 MM HG: CPT | Mod: CPTII,S$GLB,, | Performed by: PHYSICIAN ASSISTANT

## 2019-07-09 PROCEDURE — 3077F PR MOST RECENT SYSTOLIC BLOOD PRESSURE >= 140 MM HG: ICD-10-PCS | Mod: CPTII,S$GLB,, | Performed by: PHYSICIAN ASSISTANT

## 2019-07-09 PROCEDURE — 99999 PR PBB SHADOW E&M-EST. PATIENT-LVL IV: CPT | Mod: PBBFAC,,, | Performed by: PHYSICIAN ASSISTANT

## 2019-07-09 PROCEDURE — 99214 OFFICE O/P EST MOD 30 MIN: CPT | Mod: S$GLB,,, | Performed by: PHYSICIAN ASSISTANT

## 2019-07-09 PROCEDURE — 3078F PR MOST RECENT DIASTOLIC BLOOD PRESSURE < 80 MM HG: ICD-10-PCS | Mod: CPTII,S$GLB,, | Performed by: PHYSICIAN ASSISTANT

## 2019-07-09 PROCEDURE — 99214 PR OFFICE/OUTPT VISIT, EST, LEVL IV, 30-39 MIN: ICD-10-PCS | Mod: S$GLB,,, | Performed by: PHYSICIAN ASSISTANT

## 2019-07-09 PROCEDURE — 99999 PR PBB SHADOW E&M-EST. PATIENT-LVL IV: ICD-10-PCS | Mod: PBBFAC,,, | Performed by: PHYSICIAN ASSISTANT

## 2019-07-09 PROCEDURE — 3078F DIAST BP <80 MM HG: CPT | Mod: CPTII,S$GLB,, | Performed by: PHYSICIAN ASSISTANT

## 2019-07-09 RX ORDER — MAGNESIUM 250 MG
TABLET ORAL
COMMUNITY
Start: 2019-04-01 | End: 2021-02-08 | Stop reason: ALTCHOICE

## 2019-07-09 RX ORDER — TIZANIDINE 4 MG/1
TABLET ORAL
COMMUNITY
Start: 2019-07-07 | End: 2020-03-02 | Stop reason: SDUPTHER

## 2019-07-09 NOTE — PROGRESS NOTES
Established Patient    SUBJECTIVE:     History of Present Illness:  Cheryl Ghosh is a 84 y.o. female with history of hypertension, hyperlipidemia, CHF, CKD stage 3, secondary hyperparathyroidism, previous TIA, and right sacroiliac dysfunction who is now status post right SI joint fusion on 02/06/2019 with Dr. Dasilva.   patient had complete resolution right side low back pain right SI joint pain after fusion but later developed left sided si joint pain irradiating in her left groin, posterior and anterior thigh.  Pain is severe that it affects her quality of life and ADL.  She is unable to sleep secondary to severe pain.  She underwent left SI joint injection with Dr. Dasilva on 06/19/2019 and had significant relief with pain for couple days.  Pain gradually returned.  Patient is here with her  and like to proceed with left SI joint fusion.           Low Back Pain Scale  R Low Back-Pain Score: 7  R Low Back-Pain Intensity: Pain killers give very little relief from pain  R Low Back-Pain Score: I need some help, but manage most of my personal care  Low Back-Lifting: Pain prevents me from lifting heavy weights  but I can manage light weights if they are conveniently placed   Low Back-Walking: Pain prevents me walking more than .25 mile   Low Back-Sitting: Pain prevents me from sitting more than 30 minutes   Low Back-Standing: I cannot stand for longer than 10 minutes with increasing pain   Low Back-Sleeping: Because of pain my normal nights sleep is reduced by less than one quarter   Low Back-Social Life: My social life is normal but it increases the degree of pain   Low Back-Traveling: Pain restricts me to short necessary journeys under 30 minutes   Low Back-Changing Degree of Pain: My pain seems to be getting better but improvement is slow             Review of patient's allergies indicates:   Allergen Reactions    Pcn [penicillins] Hives and Nausea And Vomiting       Current Outpatient Medications    Medication Sig Dispense Refill    acetaminophen (TYLENOL 8 HOUR ORAL) Take by mouth.      amLODIPine (NORVASC) 2.5 MG tablet Take 1 tablet (2.5 mg total) by mouth 2 (two) times daily. (Patient taking differently: Take 5 mg by mouth once daily. ) 180 tablet 3    b complex vitamins tablet Take 1 tablet by mouth once daily.      calcium-vitamin D (CALCIUM WITH VITAMIN D) 600 mg(1,500mg) -400 unit Tab       celecoxib (CELEBREX) 200 MG capsule Take 1 capsule (200 mg total) by mouth 2 (two) times daily. 60 capsule 6    diclofenac sodium (VOLTAREN) 1 % Gel APPLY 2.5 GRAMS TO THE AFFECTED AREA 3-4 TIMES DAILY STARTING 2 WEEKS POST SURGERY.  3    gabapentin (NEURONTIN) 300 MG capsule Take 1 capsule (300 mg total) by mouth 3 (three) times daily. 90 capsule 3    lidocaine (XYLOCAINE) 5 % Oint ointment APPLY 2.5 GRAMS TO THE AFFECTED AREA 3-4 TIMES DAILY STARTING 2 WEEKS POST SURGERY. 1 Tube 11    magnesium 250 mg Tab       omega 3-dha-epa-fish oil (FISH OIL) 100-160-1,000 mg Cap       oxyCODONE-acetaminophen (PERCOCET) 5-325 mg per tablet Take 1 tablet by mouth every 24 hours as needed for Pain. 20 tablet 0    potassium 99 mg Tab       pravastatin (PRAVACHOL) 10 MG tablet TAKE 1 TABLET (10 MG TOTAL) BY MOUTH ONCE DAILY AT BEDTIME 90 tablet 2    pyridoxine (VITAMIN B-6) 100 MG Tab Take 100 mg by mouth once daily.      SANARE ADV SCAR THERAPY BASE Gel APPLY 1/2 GRAM (1 PUMP) TO AFFECTED AREAS TWICE DAILY STARTING 4 WEEKS POST SURGERY.  3    tiZANidine (ZANAFLEX) 4 MG tablet        No current facility-administered medications for this visit.        Past Medical History:   Diagnosis Date    Arthritis     lumbar    Blood transfusion     Chronic kidney disease, stage III (moderate) 8/2/2017    Degenerative disc disease     lumbar    Insulinoma     Pancreatic disease     Urinary incontinence 12/7/2015     Past Surgical History:   Procedure Laterality Date    ADENOIDECTOMY      APPENDECTOMY      CATARACT  EXTRACTION, BILATERAL      EYE SURGERY      FUSION, SPINE, MINIMALLY INVASIVE Right Sacroiliac Joint Fusion -  I Fuse Right 2/6/2019    [facility]    HYSTERECTOMY      SAMM/BSO    INJECTION, STEROID Procedure:Left sacroiliac joint block / steroid injection Surgery Time: 30mins LOS: Anesthesia: MAC Radiology: C-arm Bed: Regular Bed Position: Prone Left 6/19/2019    [facility]    INJECTION, STEROID-- Right SI Joint Block and  Steroid Injection Right 12/20/2018    [facility]    OOPHORECTOMY      PANCREAS SURGERY      Insulanoma    RADIOFREQUENCY ABLATION, NERVE, MEDIAL BRANCH, LUMBAR, 1 LEVEL - Left L3,4,5 IV SEADTION Left 9/4/2018    Per[facility] by Eddie [facility] at [facility] PAIN MGT    RADIOFREQUENCY ABLATION, NERVE, MEDIAL BRANCH, LUMBAR, 1 LEVEL- Right L3,4,5 IV SEDATION Right 8/28/2018    Performed by [facility] at [facility] PAIN MGT    REFRACTIVE SURGERY Bilateral     TONSILLECTOMY       Family History     Problem Relation (Age of Onset)    Aneurysm Sister    Breast cancer Mother, Other    Glaucoma Daughter    Heart attack Son    Heart disease Father        Social History     Socioeconomic History    Marital status:      Spouse name: Not on file    Number of children: Not on file    Years of education: Not on file    Highest education level: Not on file   Occupational History    Not on file   Social Needs    Financial resource strain: Not on file    Food insecurity:     Worry: Not on file     Inability: Not on file    Transportation needs:     Medical: Not on file     Non-medical: Not on file   Tobacco Use    Smoking status: Never Smoker    Smokeless tobacco: Never Used   Substance and Sexual Activity    Alcohol use: Yes     Alcohol/week: 0.6 - 1.2 oz     Types: 1 - 2 Glasses of wine per week    Drug use: No    Sexual activity: Yes     Partners: Male   Lifestyle    Physical  "activity:     Days per week: Not on file     Minutes per session: Not on file    Stress: Not on file   Relationships    Social connections:     Talks on phone: Not on file     Gets together: Not on file     Attends Taoist service: Not on file     Active member of club or organization: Not on file     Attends meetings of clubs or organizations: Not on file     Relationship status: Not on file   Other Topics Concern    Not on file   Social History Narrative    Not on file       Review of Systems:  Review of Systems    Constitutional: no fever, chills or night sweats. No changes in weight   Eyes: no visual changes   ENT: no nasal congestion or sore throat   Respiratory: no cough or shortness of breath   Cardiovascular: no chest pain or palpitations   Gastrointestinal: no nausea or vomiting   Genitourinary: no hematuria or dysuria   Integument/Breast: no rash or pruritis   Hematologic/Lymphatic: no easy bruising or lymphadenopathy   Musculoskeletal: no arthralgias or myalgias.  Left SI joint pain radiating left thigh/groin pain.  Neurological: no seizures or tremors. No weakness or paresthesia.   Behavioral/Psych: no auditory or visual hallucinations   Endocrine: no heat or cold intolerance     OBJECTIVE:     Vital Signs  Temp: 98.1 °F (36.7 °C)  Pulse: (!) 50  BP: (!) 142/60  Pain Score:   6  Height: 5' 4" (162.6 cm)  Weight: 60.6 kg (133 lb 9.6 oz)  Body mass index is 22.93 kg/m².    Physical Exam:  Neurosurgery Physical Exam    General: well developed, well nourished, no distress.   Neurologic: Awake, alert and oriented x3. Thought content appropriate.  GCS: Motor: 6/Verbal: 5/Eyes: 4 GCS Total: 15  Cranial nerves: II-XII grossly intact. PERRLA. EOMI without nystagmus. Face symmetric and sensation intact to light touch, tongue midline, shoulder shrug symmetric bilaterally.  Hearing equal bilaterally to finger rub. Palate and uvula rise and fall normally in midline.    Language: no aphasia  Speech: no " dysarthria   Neck: supple, without obvious masses    Sensory: intact to light touch B/L UE and LE  Motor Strength: Moves all extremities spontaneously with good tone. Full strength upper and lower extremities. No abnormal movements seen.      Mild pain limited weakness on left lower extremity  Strength  Deltoids Triceps Biceps Wrist Extension Wrist Flexion Hand    Upper: R 5/5 5/5 5/5 5/5 5/5 5/5    L 5/5 5/5 5/5 5/5 5/5 5/5     Iliopsoas Quadriceps Knee  Flexion Tibialis  anterior Gastro- cnemius EHL   Lower: R 5/5 5/5 5/5 5/5 5/5 5/5    L 4+/5 4+/5 4+/5 5/5 5/5 5/5     Interossi muscle strength- intact    DTR's - 2 + and symmetric in UE and LE  Ankle Clonus - Negative           SLR - Negative   Gait - normal      Tandem Gait - No difficulty    Able to walk/stand on heels & toes  Cervical ROM - full  Lumbar ROM - full  No focal numbness or weakness  No midline or paraspinal tenderness to palpation  No difficulty transitioning from seated to standing position or vice versa.  ENT: normal hearing with finger rub  Heart: RRR, no cyanosis, pallor, or edema.   Lungs- normal respiratory effort  Abdomen-  soft, symmetric and nontender  Skin: grossly intact in all 4 extremities without obvious rashes or lesions  Extremities: warm with no cyanosis or edema, or clubbing  Pulses: palpable distal pulses    SI Joint tenderness - positive bilaterally (left> right)  Distraction test-positive  Compression test-   positive on    Gaenslen's Test-positive  Greater Trochanter Joint tenderness - Negative  Reynaldo's Test - Negative   Pain on Hip ROM - Negative      Posture-  No obvious kyphosis with standing posture.  With bending posture, no obvious scapula wing        Diagnostic Results:  All imaging personally reviewed    Sacroiliac x-rays 06/10/2019  Appropriate hardware placement at right SI joint    ASSESSMENT/PLAN:     84 y.o. female with history of hypertension, hyperlipidemia, CHF, CKD stage 3, secondary hyperparathyroidism,  previous TIA, and right sacroiliac dysfunction who is now status post right SI joint fusion on 02/06/2019 with Dr. Dasilva.   patient now with left SI joint pain and dysfunction.  Provocative exam positive for left SI joint dysfunction and diagnostic left SI joint injection with significant relief for couple days.  At this time, given her improvements in symptoms with left SI joint diagnostic injection, we will proceed with left SI joint fusion.  Patient and  knows risk, benefits, and alternatives of surgical procedure and would like to proceed.  We will schedule her for left SI joint fusion accordingly.        All imaging were reviewed with patient, family, and staff. All questions were answered.  Patient verbalized understanding and agreed with the above plan of care.  Patient was encouraged to call clinic with any future concerns prior to follow up appt. If any worsening symptoms, patient should report to ED.     Please call with any questions.    Discussed with Dr. Dasilva and he agreed with the above plan of care.     Wiliam Urias PA-C  Neurosurgery          Note dictated with voice recognition software, please excuse any grammatical errors.

## 2019-07-10 ENCOUNTER — OFFICE VISIT (OUTPATIENT)
Dept: CARDIOLOGY | Facility: CLINIC | Age: 84
End: 2019-07-10
Payer: MEDICARE

## 2019-07-10 ENCOUNTER — PATIENT MESSAGE (OUTPATIENT)
Dept: FAMILY MEDICINE | Facility: CLINIC | Age: 84
End: 2019-07-10

## 2019-07-10 VITALS
OXYGEN SATURATION: 100 % | SYSTOLIC BLOOD PRESSURE: 147 MMHG | HEART RATE: 56 BPM | DIASTOLIC BLOOD PRESSURE: 78 MMHG | BODY MASS INDEX: 22.81 KG/M2 | HEIGHT: 64 IN | WEIGHT: 133.63 LBS

## 2019-07-10 DIAGNOSIS — Z01.818 PRE-OP EVALUATION: Primary | ICD-10-CM

## 2019-07-10 DIAGNOSIS — N18.30 CHRONIC KIDNEY DISEASE, STAGE III (MODERATE): ICD-10-CM

## 2019-07-10 DIAGNOSIS — M53.3 CHRONIC SI JOINT PAIN: Primary | ICD-10-CM

## 2019-07-10 DIAGNOSIS — I70.0 AORTIC ATHEROSCLEROSIS: ICD-10-CM

## 2019-07-10 DIAGNOSIS — M53.3 CHRONIC SI JOINT PAIN: ICD-10-CM

## 2019-07-10 DIAGNOSIS — G62.9 NEUROPATHY: ICD-10-CM

## 2019-07-10 DIAGNOSIS — E78.2 MIXED HYPERLIPIDEMIA: ICD-10-CM

## 2019-07-10 DIAGNOSIS — I10 HTN (HYPERTENSION), BENIGN: ICD-10-CM

## 2019-07-10 DIAGNOSIS — M79.609 PAIN IN EXTREMITY, UNSPECIFIED EXTREMITY: ICD-10-CM

## 2019-07-10 DIAGNOSIS — M47.816 FACET ARTHRITIS OF LUMBAR REGION: ICD-10-CM

## 2019-07-10 DIAGNOSIS — G89.29 CHRONIC SI JOINT PAIN: ICD-10-CM

## 2019-07-10 DIAGNOSIS — E78.5 HYPERLIPIDEMIA, UNSPECIFIED HYPERLIPIDEMIA TYPE: ICD-10-CM

## 2019-07-10 DIAGNOSIS — G89.29 CHRONIC SI JOINT PAIN: Primary | ICD-10-CM

## 2019-07-10 DIAGNOSIS — M47.819 SPONDYLOSIS WITHOUT MYELOPATHY: ICD-10-CM

## 2019-07-10 PROCEDURE — 3077F SYST BP >= 140 MM HG: CPT | Mod: CPTII,S$GLB,, | Performed by: STUDENT IN AN ORGANIZED HEALTH CARE EDUCATION/TRAINING PROGRAM

## 2019-07-10 PROCEDURE — 99214 PR OFFICE/OUTPT VISIT, EST, LEVL IV, 30-39 MIN: ICD-10-PCS | Mod: S$GLB,,, | Performed by: STUDENT IN AN ORGANIZED HEALTH CARE EDUCATION/TRAINING PROGRAM

## 2019-07-10 PROCEDURE — 1101F PT FALLS ASSESS-DOCD LE1/YR: CPT | Mod: CPTII,S$GLB,, | Performed by: STUDENT IN AN ORGANIZED HEALTH CARE EDUCATION/TRAINING PROGRAM

## 2019-07-10 PROCEDURE — 99214 OFFICE O/P EST MOD 30 MIN: CPT | Mod: S$GLB,,, | Performed by: STUDENT IN AN ORGANIZED HEALTH CARE EDUCATION/TRAINING PROGRAM

## 2019-07-10 PROCEDURE — 99999 PR PBB SHADOW E&M-EST. PATIENT-LVL III: ICD-10-PCS | Mod: PBBFAC,,, | Performed by: STUDENT IN AN ORGANIZED HEALTH CARE EDUCATION/TRAINING PROGRAM

## 2019-07-10 PROCEDURE — 3078F DIAST BP <80 MM HG: CPT | Mod: CPTII,S$GLB,, | Performed by: STUDENT IN AN ORGANIZED HEALTH CARE EDUCATION/TRAINING PROGRAM

## 2019-07-10 PROCEDURE — 99999 PR PBB SHADOW E&M-EST. PATIENT-LVL III: CPT | Mod: PBBFAC,,, | Performed by: STUDENT IN AN ORGANIZED HEALTH CARE EDUCATION/TRAINING PROGRAM

## 2019-07-10 PROCEDURE — 3077F PR MOST RECENT SYSTOLIC BLOOD PRESSURE >= 140 MM HG: ICD-10-PCS | Mod: CPTII,S$GLB,, | Performed by: STUDENT IN AN ORGANIZED HEALTH CARE EDUCATION/TRAINING PROGRAM

## 2019-07-10 PROCEDURE — 1101F PR PT FALLS ASSESS DOC 0-1 FALLS W/OUT INJ PAST YR: ICD-10-PCS | Mod: CPTII,S$GLB,, | Performed by: STUDENT IN AN ORGANIZED HEALTH CARE EDUCATION/TRAINING PROGRAM

## 2019-07-10 PROCEDURE — 3078F PR MOST RECENT DIASTOLIC BLOOD PRESSURE < 80 MM HG: ICD-10-PCS | Mod: CPTII,S$GLB,, | Performed by: STUDENT IN AN ORGANIZED HEALTH CARE EDUCATION/TRAINING PROGRAM

## 2019-07-10 RX ORDER — PRAVASTATIN SODIUM 40 MG/1
40 TABLET ORAL NIGHTLY
Qty: 90 TABLET | Refills: 3 | Status: SHIPPED | OUTPATIENT
Start: 2019-07-10 | End: 2019-10-28 | Stop reason: SDUPTHER

## 2019-07-10 NOTE — PROGRESS NOTES
"   Cardiology Clinic note    Subjective:   Patient ID:  Cheryl Ghosh is a 84 y.o. female who presents for evaluation of HTN    HPI:   Cheryl Ghosh  has a past medical history of Arthritis, Blood transfusion, Chronic kidney disease, stage III (moderate) (8/2/2017), Degenerative disc disease, Insulinoma, Pancreatic disease, and Urinary incontinence (12/7/2015).  Nov 2018: HTN urgency ER vist    4/22/19: Pt seen and examined. Referral from Dr. Mueller. Pt notes Episodes of flushing, throwing up, changes in vision. She has been taking her BP very religiously. She is most asymptomatic aside from these episodes of throwing up and weakness. Since then, Dr. Mueller instructed pt to be off lisinopril. Most -130's but gets up 150's with moderate salt meals  - Seems to be salt senitive with lisinopril. Home log of BP range is from ; mean 's    Notes back pain from significant sacroillac joint pain.     5/24/19: Here for HTN check  SBP is 130-150. Switched to norvasc 2.5 last visit (lisinopril)  One time took old hydrocodone and SBP dropped to 100's  - Pt has a detailed BP log.. Good SBP mostly. Again explained pt does not need to record BP on a regular basis, only if she feels bad.  - is mod uncontrolled today.    7/10/19: Here for follow up of Blood pressure. Doing well. Only recording BP every once in a while. Most readings are in the 130 range.  No acute issues. Back pain / sciatic pain is improved with injections.    Vitals  Vitals:    07/10/19 1615   BP: (!) 147/78   Pulse: (!) 56   SpO2: 100%   Weight: 60.6 kg (133 lb 9.6 oz)   Height: 5' 4" (1.626 m)       Patient Active Problem List    Diagnosis Date Noted    Pain of left sacroiliac joint 06/19/2019    Sacroiliac joint dysfunction of right side 02/06/2019    Chronic SI joint pain 12/20/2018    Secondary hyperparathyroidism of renal origin 10/12/2018    Chronic pain 08/28/2018    Sacroiliitis 07/24/2018    HTN (hypertension), benign " 04/11/2018    Chronic diastolic heart failure 11/25/2017    TIA (transient ischemic attack) 11/24/2017    Hyperlipidemia 08/02/2017    Chronic kidney disease, stage III (moderate) 08/02/2017    Neuropathy 12/02/2016    Female bladder prolapse 07/22/2016    Urinary incontinence 12/07/2015    Bilateral lumbar radiculopathy 08/13/2015    Osteopenia 02/26/2015    OA (osteoarthritis) of knee 01/05/2015    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    AMLODIPINE (NORVASC) 2.5 MG TABLET    Take 1 tablet (2.5 mg total) by mouth 2 (two) times daily.    B COMPLEX VITAMINS TABLET    Take 1 tablet by mouth once daily.    CALCIUM-VITAMIN D (CALCIUM WITH VITAMIN D) 600 MG(1,500MG) -400 UNIT TAB        CELECOXIB (CELEBREX) 200 MG CAPSULE    Take 1 capsule (200 mg total) by mouth 2 (two) times daily.    DICLOFENAC SODIUM (VOLTAREN) 1 % GEL    APPLY 2.5 GRAMS TO THE AFFECTED AREA 3-4 TIMES DAILY STARTING 2 WEEKS POST SURGERY.    GABAPENTIN (NEURONTIN) 300 MG CAPSULE    Take 1 capsule (300 mg total) by mouth 3 (three) times daily.    LIDOCAINE (XYLOCAINE) 5 % OINT OINTMENT    APPLY 2.5 GRAMS TO THE AFFECTED AREA 3-4 TIMES DAILY STARTING 2 WEEKS POST SURGERY.    MAGNESIUM 250 MG TAB        OMEGA 3-DHA-EPA-FISH OIL (FISH OIL) 100-160-1,000 MG CAP        OXYCODONE-ACETAMINOPHEN (PERCOCET) 5-325 MG PER TABLET    Take 1 tablet by mouth every 24 hours as needed for Pain.    POTASSIUM 99 MG TAB        PYRIDOXINE (VITAMIN B-6) 100 MG TAB    Take 100 mg by mouth once daily.    SANARE ADV SCAR  THERAPY BASE GEL    APPLY 1/2 GRAM (1 PUMP) TO AFFECTED AREAS TWICE DAILY STARTING 4 WEEKS POST SURGERY.    TIZANIDINE (ZANAFLEX) 4 MG TABLET       Modified Medications    Modified Medication Previous Medication    PRAVASTATIN (PRAVACHOL) 40 MG TABLET pravastatin (PRAVACHOL) 10 MG tablet       Take 1 tablet (40 mg total) by mouth every evening.    TAKE 1 TABLET (10 MG TOTAL) BY MOUTH ONCE DAILY AT BEDTIME   Discontinued Medications    No medications on file         Review of Systems   Constitution: Negative for chills, decreased appetite, malaise/fatigue and weight gain.   HENT: Negative for congestion and ear discharge.    Eyes: Negative for blurred vision and double vision.   Cardiovascular: Negative for chest pain, cyanosis, dyspnea on exertion, irregular heartbeat, leg swelling, near-syncope, orthopnea, palpitations and syncope.   Respiratory: Negative for cough, shortness of breath and sleep disturbances due to breathing.    Skin: Negative for color change and dry skin.   Musculoskeletal: Negative for joint pain, joint swelling and muscle cramps.   Gastrointestinal: Negative for bloating, heartburn, hematemesis and hematochezia.   Genitourinary: Negative for bladder incontinence and dysuria.   Neurological: Negative for aphonia, excessive daytime sleepiness, dizziness, focal weakness, headaches, light-headedness, loss of balance and weakness.   Psychiatric/Behavioral: Negative for altered mental status, depression and memory loss. The patient does not have insomnia and is not nervous/anxious.          Objective:   Physical Exam   Constitutional: She is oriented to person, place, and time. She appears well-developed and well-nourished.   HENT:   Head: Normocephalic and atraumatic.   Eyes: Conjunctivae and EOM are normal.   Neck: Normal range of motion. Neck supple. No JVD present.   Cardiovascular: Normal rate, regular rhythm and normal heart sounds.   No murmur heard.  Pulmonary/Chest: Effort normal and breath  sounds normal. No respiratory distress. She has no wheezes. She has no rales.   Abdominal: Soft. Bowel sounds are normal. She exhibits no distension.   Musculoskeletal: Normal range of motion. She exhibits no edema.   Neurological: She is alert and oriented to person, place, and time.   Skin: Skin is warm and dry. No erythema.   Psychiatric: She has a normal mood and affect. Her behavior is normal. Judgment and thought content normal.   Nursing note and vitals reviewed.      Lab Results    Lab Results   Component Value Date     07/15/2019    K 4.0 07/15/2019     07/15/2019    CO2 29 07/15/2019    BUN 22 07/15/2019    CREATININE 1.2 07/15/2019    GLU 96 07/15/2019    HGBA1C 5.2 11/24/2017    MG 1.9 11/25/2017    AST 23 07/15/2019    ALT 15 07/15/2019    ALBUMIN 3.9 07/15/2019    PROT 6.8 07/15/2019    BILITOT 0.4 07/15/2019    WBC 5.87 07/15/2019    HGB 12.7 07/15/2019    HCT 39.1 07/15/2019    MCV 93 07/15/2019     07/15/2019    INR 0.9 07/15/2019    TSH 1.208 11/24/2017    CHOL 192 01/07/2019    HDL 48 01/07/2019    LDLCALC 119.8 01/07/2019    TRIG 121 01/07/2019    BNP 81 05/26/2017       Lipid panel  Lab Results   Component Value Date    CHOL 192 01/07/2019     Lab Results   Component Value Date    HDL 48 01/07/2019     Lab Results   Component Value Date    LDLCALC 119.8 01/07/2019     Lab Results   Component Value Date    TRIG 121 01/07/2019       Cardiac Studies  Significant Imaging: Echocardiogram:   2D echo with color flow doppler:   Results for orders placed or performed during the hospital encounter of 11/24/17   2D echo with color flow doppler   Result Value Ref Range    QEF 60 55 - 65    Mitral Valve Regurgitation TRIVIAL     Diastolic Dysfunction Yes (A)     Est. PA Systolic Pressure 23.07     Pericardial Effusion NONE     and Transthoracic echo (TTE) complete (Cupid Only): No results found for this or any previous visit.  ECG:  unchanged from previous tracings, sinus bradycardia. Keyshawn  2019    Cath study : n/a    Cardiac stress testing. 2-3 years were WNL.  Assessment:     1. Hyperlipidemia, unspecified hyperlipidemia type    2. Mixed hyperlipidemia    3. Aortic atherosclerosis        Plan:     1. Essential HTN  - controlled  - doing better with norvasc 2.5 BID    2. HLD  - elevated ASCVD : 38%  - LDL is controlled with LDL <120  - will add moderate dose statin    3. Pre-op cardiovascular clearance for DJD disease  - Pt is at intermediate for cardiovascular events (mostly due to age). Proceed with intermediate risk procedure. No acute cardiac interventions are planned.    Continue with current medical plan and lifestyle changes.  Return sooner for concerns or questions. If symptoms persist go to the ED  Total duration of face to face visit time 30 minutes.  Total time spent counseling greater than fifty percent of total visit time.  Counseling included discussion regarding imaging findings, diagnosis, possibilities, treatment options, risks and benefits.      No orders of the defined types were placed in this encounter.    Follow up as scheduled. Return to clinic in 4 months, routine visit.  She expressed verbal understanding and agreed with the plan    Thank you for the opportunity to care for this patient. Will be available for questions if needed.     Bk Rojo MD [facility]  Interventional Cardiology  [facility]  Pager: [phone]

## 2019-07-11 ENCOUNTER — PATIENT MESSAGE (OUTPATIENT)
Dept: FAMILY MEDICINE | Facility: CLINIC | Age: 84
End: 2019-07-11

## 2019-07-11 DIAGNOSIS — Z12.31 ENCOUNTER FOR SCREENING MAMMOGRAM FOR BREAST CANCER: Primary | ICD-10-CM

## 2019-07-12 ENCOUNTER — TELEPHONE (OUTPATIENT)
Dept: FAMILY MEDICINE | Facility: CLINIC | Age: 84
End: 2019-07-12

## 2019-07-15 ENCOUNTER — HOSPITAL ENCOUNTER (OUTPATIENT)
Dept: RADIOLOGY | Facility: HOSPITAL | Age: 84
Discharge: HOME | End: 2019-07-15
Attending: FAMILY MEDICINE
Payer: MEDICARE

## 2019-07-15 ENCOUNTER — CLINICAL SUPPORT (OUTPATIENT)
Dept: LAB | Facility: HOSPITAL | Age: 84
End: 2019-07-15
Attending: FAMILY MEDICINE
Payer: MEDICARE

## 2019-07-15 DIAGNOSIS — G89.29 CHRONIC SI JOINT PAIN: ICD-10-CM

## 2019-07-15 DIAGNOSIS — M47.816 FACET ARTHRITIS OF LUMBAR REGION: ICD-10-CM

## 2019-07-15 DIAGNOSIS — N18.30 CHRONIC KIDNEY DISEASE, STAGE III (MODERATE): ICD-10-CM

## 2019-07-15 DIAGNOSIS — G62.9 NEUROPATHY: ICD-10-CM

## 2019-07-15 DIAGNOSIS — Z01.818 PRE-OP EVALUATION: ICD-10-CM

## 2019-07-15 DIAGNOSIS — I10 HTN (HYPERTENSION), BENIGN: ICD-10-CM

## 2019-07-15 DIAGNOSIS — M47.819 SPONDYLOSIS WITHOUT MYELOPATHY: ICD-10-CM

## 2019-07-15 DIAGNOSIS — M53.3 CHRONIC SI JOINT PAIN: ICD-10-CM

## 2019-07-15 DIAGNOSIS — E78.2 MIXED HYPERLIPIDEMIA: ICD-10-CM

## 2019-07-15 PROCEDURE — 93010 ELECTROCARDIOGRAM REPORT: CPT | Mod: ,,, | Performed by: INTERNAL MEDICINE

## 2019-07-15 PROCEDURE — 93010 EKG 12-LEAD: ICD-10-PCS | Mod: ,,, | Performed by: INTERNAL MEDICINE

## 2019-07-15 PROCEDURE — 93005 ELECTROCARDIOGRAM TRACING: CPT

## 2019-07-15 PROCEDURE — 71046 XR CHEST PA AND LATERAL: ICD-10-PCS | Mod: 26,,, | Performed by: RADIOLOGY

## 2019-07-15 PROCEDURE — 71046 X-RAY EXAM CHEST 2 VIEWS: CPT | Mod: TC,FY

## 2019-07-15 PROCEDURE — 71046 X-RAY EXAM CHEST 2 VIEWS: CPT | Mod: 26,,, | Performed by: RADIOLOGY

## 2019-07-16 ENCOUNTER — PATIENT MESSAGE (OUTPATIENT)
Dept: SURGERY | Facility: HOSPITAL | Age: 84
End: 2019-07-16

## 2019-07-18 ENCOUNTER — ANESTHESIA EVENT (OUTPATIENT)
Dept: SURGERY | Facility: HOSPITAL | Age: 84
End: 2019-07-18
Payer: MEDICARE

## 2019-07-18 ENCOUNTER — HOSPITAL ENCOUNTER (OUTPATIENT)
Dept: PREADMISSION TESTING | Facility: HOSPITAL | Age: 84
Discharge: HOME | End: 2019-07-18
Attending: NEUROLOGICAL SURGERY
Payer: MEDICARE

## 2019-07-18 ENCOUNTER — OFFICE VISIT (OUTPATIENT)
Dept: FAMILY MEDICINE | Facility: CLINIC | Age: 84
End: 2019-07-18
Attending: FAMILY MEDICINE
Payer: MEDICARE

## 2019-07-18 ENCOUNTER — HOSPITAL ENCOUNTER (OUTPATIENT)
Dept: RADIOLOGY | Facility: HOSPITAL | Age: 84
Discharge: HOME | End: 2019-07-18
Attending: FAMILY MEDICINE
Payer: MEDICARE

## 2019-07-18 VITALS
BODY MASS INDEX: 22.94 KG/M2 | DIASTOLIC BLOOD PRESSURE: 69 MMHG | SYSTOLIC BLOOD PRESSURE: 155 MMHG | OXYGEN SATURATION: 98 % | HEART RATE: 53 BPM | TEMPERATURE: 98 F | HEIGHT: 64 IN | WEIGHT: 134.38 LBS | RESPIRATION RATE: 18 BRPM

## 2019-07-18 VITALS
OXYGEN SATURATION: 99 % | BODY MASS INDEX: 22.92 KG/M2 | HEART RATE: 54 BPM | DIASTOLIC BLOOD PRESSURE: 78 MMHG | WEIGHT: 134.25 LBS | SYSTOLIC BLOOD PRESSURE: 136 MMHG | HEIGHT: 64 IN

## 2019-07-18 DIAGNOSIS — E78.2 MIXED HYPERLIPIDEMIA: ICD-10-CM

## 2019-07-18 DIAGNOSIS — Z01.818 PRE-OPERATIVE CLEARANCE: Primary | ICD-10-CM

## 2019-07-18 DIAGNOSIS — G45.9 TIA (TRANSIENT ISCHEMIC ATTACK): ICD-10-CM

## 2019-07-18 DIAGNOSIS — Z12.31 ENCOUNTER FOR SCREENING MAMMOGRAM FOR BREAST CANCER: ICD-10-CM

## 2019-07-18 DIAGNOSIS — I70.0 AORTIC ATHEROSCLEROSIS: ICD-10-CM

## 2019-07-18 DIAGNOSIS — M47.816 FACET ARTHRITIS OF LUMBAR REGION: ICD-10-CM

## 2019-07-18 DIAGNOSIS — N39.42 URINARY INCONTINENCE WITHOUT SENSORY AWARENESS: ICD-10-CM

## 2019-07-18 DIAGNOSIS — M46.1 SACROILIITIS: ICD-10-CM

## 2019-07-18 DIAGNOSIS — N18.30 CHRONIC KIDNEY DISEASE, STAGE III (MODERATE): ICD-10-CM

## 2019-07-18 DIAGNOSIS — I50.32 CHRONIC DIASTOLIC HEART FAILURE: ICD-10-CM

## 2019-07-18 DIAGNOSIS — N25.81 SECONDARY HYPERPARATHYROIDISM OF RENAL ORIGIN: ICD-10-CM

## 2019-07-18 DIAGNOSIS — G47.00 INSOMNIA, UNSPECIFIED TYPE: ICD-10-CM

## 2019-07-18 DIAGNOSIS — I10 HTN (HYPERTENSION), BENIGN: ICD-10-CM

## 2019-07-18 PROCEDURE — 3075F PR MOST RECENT SYSTOLIC BLOOD PRESS GE 130-139MM HG: ICD-10-PCS | Mod: CPTII,S$GLB,, | Performed by: FAMILY MEDICINE

## 2019-07-18 PROCEDURE — 1101F PR PT FALLS ASSESS DOC 0-1 FALLS W/OUT INJ PAST YR: ICD-10-PCS | Mod: CPTII,S$GLB,, | Performed by: FAMILY MEDICINE

## 2019-07-18 PROCEDURE — 3078F PR MOST RECENT DIASTOLIC BLOOD PRESSURE < 80 MM HG: ICD-10-PCS | Mod: CPTII,S$GLB,, | Performed by: FAMILY MEDICINE

## 2019-07-18 PROCEDURE — 99999 PR PBB SHADOW E&M-EST. PATIENT-LVL III: CPT | Mod: PBBFAC,,, | Performed by: FAMILY MEDICINE

## 2019-07-18 PROCEDURE — 77063 BREAST TOMOSYNTHESIS BI: CPT | Mod: 26,,, | Performed by: RADIOLOGY

## 2019-07-18 PROCEDURE — 77067 SCR MAMMO BI INCL CAD: CPT | Mod: TC

## 2019-07-18 PROCEDURE — 99214 OFFICE O/P EST MOD 30 MIN: CPT | Mod: S$GLB,,, | Performed by: FAMILY MEDICINE

## 2019-07-18 PROCEDURE — 99999 PR PBB SHADOW E&M-EST. PATIENT-LVL III: ICD-10-PCS | Mod: PBBFAC,,, | Performed by: FAMILY MEDICINE

## 2019-07-18 PROCEDURE — 1101F PT FALLS ASSESS-DOCD LE1/YR: CPT | Mod: CPTII,S$GLB,, | Performed by: FAMILY MEDICINE

## 2019-07-18 PROCEDURE — 99499 RISK ADDL DX/OHS AUDIT: ICD-10-PCS | Mod: S$GLB,,, | Performed by: FAMILY MEDICINE

## 2019-07-18 PROCEDURE — 3078F DIAST BP <80 MM HG: CPT | Mod: CPTII,S$GLB,, | Performed by: FAMILY MEDICINE

## 2019-07-18 PROCEDURE — 77067 MAMMO DIGITAL SCREENING BILAT WITH TOMOSYNTHESIS_CAD: ICD-10-PCS | Mod: 26,,, | Performed by: RADIOLOGY

## 2019-07-18 PROCEDURE — 99499 UNLISTED E&M SERVICE: CPT | Mod: S$GLB,,, | Performed by: FAMILY MEDICINE

## 2019-07-18 PROCEDURE — 77067 SCR MAMMO BI INCL CAD: CPT | Mod: 26,,, | Performed by: RADIOLOGY

## 2019-07-18 PROCEDURE — 99214 PR OFFICE/OUTPT VISIT, EST, LEVL IV, 30-39 MIN: ICD-10-PCS | Mod: S$GLB,,, | Performed by: FAMILY MEDICINE

## 2019-07-18 PROCEDURE — 3075F SYST BP GE 130 - 139MM HG: CPT | Mod: CPTII,S$GLB,, | Performed by: FAMILY MEDICINE

## 2019-07-18 PROCEDURE — 77063 MAMMO DIGITAL SCREENING BILAT WITH TOMOSYNTHESIS_CAD: ICD-10-PCS | Mod: 26,,, | Performed by: RADIOLOGY

## 2019-07-18 RX ORDER — LIDOCAINE HYDROCHLORIDE 10 MG/ML
1 INJECTION, SOLUTION EPIDURAL; INFILTRATION; INTRACAUDAL; PERINEURAL ONCE
Status: CANCELLED | OUTPATIENT
Start: 2019-07-18 | End: 2019-07-18

## 2019-07-18 RX ORDER — TEMAZEPAM 7.5 MG/1
15 CAPSULE ORAL NIGHTLY PRN
Qty: 30 CAPSULE | Refills: 0 | Status: SHIPPED | OUTPATIENT
Start: 2019-07-18 | End: 2019-08-17

## 2019-07-18 RX ORDER — ASPIRIN 81 MG/1
81 TABLET ORAL DAILY
Status: ON HOLD | COMMUNITY
End: 2019-07-31 | Stop reason: SDUPTHER

## 2019-07-18 RX ORDER — SODIUM CHLORIDE, SODIUM LACTATE, POTASSIUM CHLORIDE, CALCIUM CHLORIDE 600; 310; 30; 20 MG/100ML; MG/100ML; MG/100ML; MG/100ML
INJECTION, SOLUTION INTRAVENOUS CONTINUOUS
Status: CANCELLED | OUTPATIENT
Start: 2019-07-18

## 2019-07-18 NOTE — LETTER
July 18, 2019        Xavier Dasilva MD  [address]  [address]  [place] [address]             [facility]  [address]  [place] [address]  Phone: [phone]  Fax: [phone]   Patient: Cheryl Ghosh   MR Number: [id number]   YOB: 1934   Date of Visit: 7/18/2019       Dear Dr. Dasilva:    Thank you for referring Cheryl Ghosh to me for evaluation. Below are the relevant portions of my assessment and plan of care.        1. Pre-operative clearance    2. TIA (transient ischemic attack)    3. Aortic atherosclerosis    4. Mixed hyperlipidemia    5. Chronic diastolic heart failure    6. HTN (hypertension), benign    7. Urinary incontinence without sensory awareness    8. Chronic kidney disease, stage III (moderate)    9. Secondary hyperparathyroidism of renal origin    10. Facet arthritis of lumbar region    11. Sacroiliitis    12. Insomnia, unspecified type              Cheryl was seen today for pre-op exam.    Diagnoses and all orders for this visit:    Pre-operative clearance    TIA (transient ischemic attack)    Aortic atherosclerosis    Mixed hyperlipidemia    Chronic diastolic heart failure    HTN (hypertension), benign    Urinary incontinence without sensory awareness    Chronic kidney disease, stage III (moderate)    Secondary hyperparathyroidism of renal origin    Facet arthritis of lumbar region    Sacroiliitis    Insomnia, unspecified type  -     temazepam (RESTORIL) 7.5 MG Cap; Take 2 capsules (15 mg total) by mouth nightly as needed.      Pre op clearance  -labs  -EKG and CXR  -UA    ACC/AHA 2007 Guidelines for clearance    Step 1: No need for emergency non cardiac surgery  Step 2: No active cardiac conditions  Step 3: No low risk surgery  Step4: Yes - Functional capacity greater than or equal to 4 METs without symptoms - YES - Class IIa, ABDULAZIZ B - Proceed with surgery    She is medically optimized and intermediate risk for  surgery    HTN  -continue lisinopril 20 BID. Side effects of medications have been discussed and patient agreed to proceed with treatment and understands the risks and benefits.        TIA  -follow neurology - DC plavix per neuro recommendations and just stay on ASA    Insomnia  -try benadryl only if needed    HLD  -diet controlled and statin    Urine incontinence  -lifestyle changes - intolerant to meds    Neuropathic pain  -continue neurontin    OA knee B/L  -mobic prn    CKD III  -lab and urine analysis  -adequate hydration    Insomnia  -restoril prn        If you have questions, please do not hesitate to call me. I look forward to following Cheryl along with you.    Sincerely,      Joey Mueller MD           CC  No Recipients

## 2019-07-18 NOTE — PROGRESS NOTES
Subjective:       Patient ID: Cheryl Ghosh is a 84 y.o. female.    Chief Complaint: Pre-op Exam    84 yr old pleasant white female with HTN, OA, osteopenia hip, hyperlipidemia, chronic back pain, BPPV, presents today for her pre op clearance, lab work and follow up and concerns regarding BP. She brought log and her evening BP readings are elevated. She is asymptomatic though. She has right ear fullness as well.    Pre op clearance - she is scheduled for spinal surgery and need clearance. She denies any cardiovascular symptoms. She is able to do activities equal to 4 Mets with no symptoms. Non smoker.      HTN - fluctuating - on lisinopril 20 BID - stable home log  - her diet has not changed -       HLD - slightly worsening since stopped statin x 30 days           - she was asked to start statin but due to her fall and issues with her ribs and back, she was never able to start it.    Back pain - she follows pain clinic for injections - c/o neuropathic pain in legs - was on neurontin in the past and she started taking it and working    Osteopenia hip - stable - had dexa in 12/15 and she is talking fosamax since many years and stopped last year- she is due for dexa in 12/17 - no side effects    HISTORY as below - reviewed    Health maintenance  -labs UTD  -mammo UTD  -colon screen UTD  -vaccines UTD    Hypertension   This is a chronic problem. The current episode started more than 1 year ago. The problem has been gradually improving since onset. The problem is resistant. Associated symptoms include headaches, malaise/fatigue and palpitations. Pertinent negatives include no anxiety, blurred vision, chest pain, neck pain, orthopnea, peripheral edema, PND, shortness of breath or sweats. Agents associated with hypertension include amphetamines and NSAIDs. Risk factors for coronary artery disease include dyslipidemia and family history. Past treatments include lifestyle changes. The current treatment provides moderate  improvement. Compliance problems include exercise and medication side effects.  There is no history of chronic renal disease.   Hyperlipidemia   This is a chronic problem. The current episode started more than 1 year ago. The problem is uncontrolled. Recent lipid tests were reviewed and are high. She has no history of chronic renal disease, diabetes, hypothyroidism, liver disease, obesity or nephrotic syndrome. There are no known factors aggravating her hyperlipidemia. Associated symptoms include myalgias. Pertinent negatives include no chest pain, focal sensory loss, leg pain or shortness of breath. Current antihyperlipidemic treatment includes diet change. The current treatment provides mild improvement of lipids. There are no compliance problems.  Risk factors for coronary artery disease include dyslipidemia.     Review of Systems   Constitutional: Positive for malaise/fatigue. Negative for activity change, diaphoresis and unexpected weight change.   HENT: Negative.  Negative for congestion, ear discharge, hearing loss, rhinorrhea, sore throat and voice change.    Eyes: Negative.  Negative for blurred vision, pain, discharge and visual disturbance.   Respiratory: Negative for chest tightness, shortness of breath and wheezing.    Cardiovascular: Positive for palpitations. Negative for chest pain, orthopnea and PND.   Gastrointestinal: Negative.  Negative for abdominal distention, anal bleeding, constipation and nausea.   Endocrine: Negative.  Negative for cold intolerance, polydipsia and polyuria.   Genitourinary: Negative.  Negative for decreased urine volume, difficulty urinating, dysuria, frequency, menstrual problem and vaginal pain.   Musculoskeletal: Positive for myalgias. Negative for arthralgias, gait problem and neck pain.   Skin: Negative.  Negative for color change, pallor and wound.   Allergic/Immunologic: Negative.  Negative for environmental allergies and immunocompromised state.   Neurological:  Positive for headaches. Negative for dizziness, tremors, seizures and speech difficulty.   Hematological: Negative.  Negative for adenopathy. Does not bruise/bleed easily.   Psychiatric/Behavioral: Negative.  Negative for agitation, confusion, decreased concentration, hallucinations, self-injury and suicidal ideas. The patient is not nervous/anxious.        Active Ambulatory Problems     Diagnosis Date Noted    Facet arthritis of lumbar region 10/02/2012    Spondylosis without myelopathy 11/06/2013    Trochanteric bursitis of right hip 01/27/2014    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    OA (osteoarthritis) of knee 01/05/2015    Osteopenia 02/26/2015    Bilateral lumbar radiculopathy 08/13/2015    Urinary incontinence 12/07/2015    Female bladder prolapse 07/22/2016    Neuropathy 12/02/2016    Aortic atherosclerosis 04/24/2014    Osteoarthritis of spine 09/27/2012    Hyperlipidemia 08/02/2017    Chronic kidney disease, stage III (moderate) 08/02/2017    TIA (transient ischemic attack) 11/24/2017    Chronic diastolic heart failure 11/25/2017    HTN (hypertension), benign 04/11/2018    Sacroiliitis 07/24/2018    Chronic pain 08/28/2018    Secondary hyperparathyroidism of renal origin 10/12/2018    Chronic SI joint pain 12/20/2018    Sacroiliac joint dysfunction of right side 02/06/2019    Pain of left sacroiliac joint 06/19/2019     Resolved Ambulatory Problems     Diagnosis Date Noted    Chest pain 05/01/2014    Cholecystolithiasis 05/01/2014    Chest wall pain 08/26/2014    BPPV (benign paroxysmal positional vertigo) 01/08/2015    Acute sinusitis 11/16/2015    Laryngitis 11/16/2015    Bronchitis 11/16/2015    BMI 22.0-22.9, adult 07/22/2016    Palpitations 05/26/2017    Dyspnea on exertion 05/26/2017    Hypertensive urgency 11/24/2017     Past Medical History:   Diagnosis Date    Arthritis     Blood transfusion     Chronic kidney disease, stage III (moderate) 8/2/2017     Degenerative disc disease     Insulinoma     Pancreatic disease     Urinary incontinence 12/7/2015     Past Surgical History:   Procedure Laterality Date    ADENOIDECTOMY      APPENDECTOMY      CATARACT EXTRACTION, BILATERAL      EYE SURGERY      FUSION, SPINE, MINIMALLY INVASIVE Right Sacroiliac Joint Fusion -  I Fuse Right 2/6/2019    Performed by [facility] at [facility] OR    HYSTERECTOMY      SAMM/BSO    INJECTION, STEROID Procedure:Left sacroiliac joint block / steroid injection Surgery Time: 30mins LOS: Anesthesia: MAC Radiology: C-arm Bed: Regular Bed Position: Prone Left 6/19/2019    [facility] OR    INJECTION, STEROID-- Right SI Joint Block and  Steroid Injection Right 12/20/2018    [facility] OR    OOPHORECTOMY      PANCREAS SURGERY      Insulanoma    RADIOFREQUENCY ABLATION, NERVE, MEDIAL BRANCH, LUMBAR, 1 LEVEL - Left L3,4,5 IV SEADTION Left 9/4/2018    Performed by Eddie Vega MD at [facility] PAIN MGT    RADIOFREQUENCY ABLATION, NERVE, MEDIAL BRANCH, LUMBAR, 1 LEVEL- Right L3,4,5 IV SEDATION Right 8/28/2018    Performed by Eddie [facility] at [facility] PAIN MGT    REFRACTIVE SURGERY Bilateral     TONSILLECTOMY       Family History   Problem Relation Age of Onset    Breast cancer Mother     Heart disease Father     Aneurysm Sister     Glaucoma Daughter     Heart attack Son     Breast cancer Other      Social History     Socioeconomic History    Marital status:      Spouse name: Not on file    Number of children: Not on file    Years of education: Not on file    Highest education level: Not on file   Occupational History    Not on file   Social Needs    Financial resource strain: Not hard at all    Food insecurity:     Worry: Never true     Inability: Never true    Transportation needs:     Medical: No     Non-medical: No   Tobacco Use    Smoking status: Never Smoker    Smokeless tobacco: Never Used    Substance and Sexual Activity    Alcohol use: Yes     Alcohol/week: 0.6 - 1.2 oz     Types: 1 - 2 Glasses of wine per week     Frequency: Monthly or less     Drinks per session: 1 or 2     Binge frequency: Never    Drug use: No    Sexual activity: Yes     Partners: Male   Lifestyle    Physical activity:     Days per week: 0 days     Minutes per session: 0 min    Stress: Rather much   Relationships    Social connections:     Talks on phone: More than three times a week     Gets together: Once a week     Attends Jewish service: Not on file     Active member of club or organization: Yes     Attends meetings of clubs or organizations: More than 4 times per year     Relationship status:    Other Topics Concern    Not on file   Social History Narrative    Not on file     Review of patient's allergies indicates:   Allergen Reactions    Pcn [penicillins] Hives and Nausea And Vomiting     Current Outpatient Medications on File Prior to Visit   Medication Sig Dispense Refill    acetaminophen (TYLENOL 8 HOUR ORAL) Take by mouth.      amLODIPine (NORVASC) 2.5 MG tablet Take 1 tablet (2.5 mg total) by mouth 2 (two) times daily. (Patient taking differently: Take 5 mg by mouth once daily. ) 180 tablet 3    b complex vitamins tablet Take 1 tablet by mouth once daily.      calcium-vitamin D (CALCIUM WITH VITAMIN D) 600 mg(1,500mg) -400 unit Tab       celecoxib (CELEBREX) 200 MG capsule Take 1 capsule (200 mg total) by mouth 2 (two) times daily. 60 capsule 6    diclofenac sodium (VOLTAREN) 1 % Gel APPLY 2.5 GRAMS TO THE AFFECTED AREA 3-4 TIMES DAILY STARTING 2 WEEKS POST SURGERY.  3    gabapentin (NEURONTIN) 300 MG capsule Take 1 capsule (300 mg total) by mouth 3 (three) times daily. 90 capsule 3    lidocaine (XYLOCAINE) 5 % Oint ointment APPLY 2.5 GRAMS TO THE AFFECTED AREA 3-4 TIMES DAILY STARTING 2 WEEKS POST SURGERY. 1 Tube 11    magnesium 250 mg Tab       omega 3-dha-epa-fish oil (FISH OIL)  100-160-1,000 mg Cap       oxyCODONE-acetaminophen (PERCOCET) 5-325 mg per tablet Take 1 tablet by mouth every 24 hours as needed for Pain. 20 tablet 0    potassium 99 mg Tab       pravastatin (PRAVACHOL) 40 MG tablet Take 1 tablet (40 mg total) by mouth every evening. 90 tablet 3    pyridoxine (VITAMIN B-6) 100 MG Tab Take 100 mg by mouth once daily.      SANARE ADV SCAR THERAPY BASE Gel APPLY 1/2 GRAM (1 PUMP) TO AFFECTED AREAS TWICE DAILY STARTING 4 WEEKS POST SURGERY.  3    tiZANidine (ZANAFLEX) 4 MG tablet        No current facility-administered medications on file prior to visit.        Objective:       Vitals:    07/18/19 0956   BP: 136/78   Pulse: (!) 54       Physical Exam   Constitutional: She is oriented to person, place, and time. She appears well-developed and well-nourished. No distress.   HENT:   Head: Normocephalic and atraumatic.   Right Ear: External ear normal.   Left Ear: External ear normal.   Nose: Nose normal.   Mouth/Throat: Oropharynx is clear and moist. No oropharyngeal exudate.   Cerumen impaction R>L   Eyes: Pupils are equal, round, and reactive to light. Conjunctivae and EOM are normal. Right eye exhibits no discharge. Left eye exhibits no discharge. No scleral icterus.   Neck: Normal range of motion. Neck supple. No JVD present. No tracheal deviation present. No thyromegaly present.   Cardiovascular: Normal rate, regular rhythm, normal heart sounds and intact distal pulses. Exam reveals no gallop and no friction rub.   No murmur heard.  Pulmonary/Chest: Effort normal and breath sounds normal. No stridor. She has no wheezes. She has no rales. She exhibits no tenderness.   Abdominal: Soft. Bowel sounds are normal. She exhibits no distension and no mass. There is no tenderness. There is no rebound and no guarding. No hernia.   Musculoskeletal: Normal range of motion. She exhibits no edema or tenderness.   Lymphadenopathy:     She has no cervical adenopathy.   Neurological: She is  alert and oriented to person, place, and time. She has normal reflexes. She displays normal reflexes. No cranial nerve deficit. She exhibits normal muscle tone. Coordination normal.   Skin: Skin is warm and dry. No rash noted. She is not diaphoretic. No erythema. No pallor.   Psychiatric: She has a normal mood and affect. Her behavior is normal. Judgment and thought content normal.       X-Ray Chest PA And Lateral 07/15/2019 None Specified             RESULTS:     EXAMINATION:  XR CHEST PA AND LATERAL     TECHNIQUE:  Two views of the chest were obtained, with PA and lateral projections   submitted.     COMPARISON:  Comparison is made to 01/14/2019.     FINDINGS:  Heart size is normal, as is the appearance of the pulmonary vascularity.    Lung zones are clear, and free of significant airspace consolidation or   volume loss.  No pleural fluid.  No hilar or mediastinal mass lesion.  No   pneumothorax.     Impression:     No significant intrathoracic abnormality.  No significant detrimental   interval change in the appearance of the chest since 01/14/2019.     Lab Visit on 07/15/2019   Component Date Value Ref Range Status    Sodium 07/15/2019 142  136 - 145 mmol/L Final    Potassium 07/15/2019 4.0  3.5 - 5.1 mmol/L Final    Chloride 07/15/2019 106  95 - 110 mmol/L Final    CO2 07/15/2019 29  23 - 29 mmol/L Final    Glucose 07/15/2019 96  70 - 110 mg/dL Final    BUN, Bld 07/15/2019 22  8 - 23 mg/dL Final    Creatinine 07/15/2019 1.2  0.5 - 1.4 mg/dL Final    Calcium 07/15/2019 9.2  8.7 - 10.5 mg/dL Final    Total Protein 07/15/2019 6.8  6.0 - 8.4 g/dL Final    Albumin 07/15/2019 3.9  3.5 - 5.2 g/dL Final    Total Bilirubin 07/15/2019 0.4  0.1 - 1.0 mg/dL Final    Comment: For infants and newborns, interpretation of results should be based  on gestational age, weight and in agreement with clinical  observations.  Premature Infant recommended reference ranges:  Up to 24 hours.............<8.0 mg/dL  Up to 48  hours............<12.0 mg/dL  3-5 days..................<15.0 mg/dL  6-29 days.................<15.0 mg/dL      Alkaline Phosphatase 07/15/2019 62  55 - 135 U/L Final    AST 07/15/2019 23  10 - 40 U/L Final    ALT 07/15/2019 15  10 - 44 U/L Final    Anion Gap 07/15/2019 7* 8 - 16 mmol/L Final    eGFR if  07/15/2019 48* >60 mL/min/1.73 m^2 Final    eGFR if non African American 07/15/2019 42* >60 mL/min/1.73 m^2 Final    Comment: Calculation used to obtain the estimated glomerular filtration  rate (eGFR) is the CKD-EPI equation.       WBC 07/15/2019 5.87  3.90 - 12.70 K/uL Final    RBC 07/15/2019 4.21  4.00 - 5.40 M/uL Final    Hemoglobin 07/15/2019 12.7  12.0 - 16.0 g/dL Final    Hematocrit 07/15/2019 39.1  37.0 - 48.5 % Final    Mean Corpuscular Volume 07/15/2019 93  82 - 98 fL Final    Mean Corpuscular Hemoglobin 07/15/2019 30.2  27.0 - 31.0 pg Final    Mean Corpuscular Hemoglobin Conc 07/15/2019 32.5  32.0 - 36.0 g/dL Final    RDW 07/15/2019 13.5  11.5 - 14.5 % Final    Platelets 07/15/2019 163  150 - 350 K/uL Final    MPV 07/15/2019 10.1  9.2 - 12.9 fL Final    Gran # (ANC) 07/15/2019 2.1  1.8 - 7.7 K/uL Final    Lymph # 07/15/2019 3.1  1.0 - 4.8 K/uL Final    Mono # 07/15/2019 0.6  0.3 - 1.0 K/uL Final    Eos # 07/15/2019 0.1  0.0 - 0.5 K/uL Final    Baso # 07/15/2019 0.02  0.00 - 0.20 K/uL Final    Gran% 07/15/2019 35.4* 38.0 - 73.0 % Final    Lymph% 07/15/2019 52.0* 18.0 - 48.0 % Final    Mono% 07/15/2019 9.9  4.0 - 15.0 % Final    Eosinophil% 07/15/2019 2.4  0.0 - 8.0 % Final    Basophil% 07/15/2019 0.3  0.0 - 1.9 % Final    Differential Method 07/15/2019 Automated   Final    Prothrombin Time 07/15/2019 9.4  9.0 - 12.5 sec Final    INR 07/15/2019 0.9  0.8 - 1.2 Final    Comment: Coumadin Therapy:  2.0 - 3.0 for INR for all indicators except mechanical heart valves  and antiphospholipid syndromes which should use 2.5 - 3.5.      aPTT 07/15/2019 25.4  21.0 - 32.0  sec Final    aPTT therapeutic range = 39-69 seconds   Lab Visit on 07/15/2019   Component Date Value Ref Range Status    Specimen UA 07/15/2019 Urine, Clean Catch   Final    Color, UA 07/15/2019 Yellow  Yellow, Straw, Martha Final    Appearance, UA 07/15/2019 Clear  Clear Final    pH, UA 07/15/2019 6.0  5.0 - 8.0 Final    Specific Gravity, UA 07/15/2019 <=1.005* 1.005 - 1.030 Final    Protein, UA 07/15/2019 Negative  Negative Final    Comment: Recommend a 24 hour urine protein or a urine   protein/creatinine ratio if globulin induced proteinuria is  clinically suspected.      Glucose, UA 07/15/2019 Negative  Negative Final    Ketones, UA 07/15/2019 Negative  Negative Final    Bilirubin (UA) 07/15/2019 Negative  Negative Final    Occult Blood UA 07/15/2019 Negative  Negative Final    Nitrite, UA 07/15/2019 Negative  Negative Final    Urobilinogen, UA 07/15/2019 Negative  <2.0 EU/dL Final    Leukocytes, UA 07/15/2019 Negative  Negative Final         Assessment:       1. Pre-operative clearance    2. TIA (transient ischemic attack)    3. Aortic atherosclerosis    4. Mixed hyperlipidemia    5. Chronic diastolic heart failure    6. HTN (hypertension), benign    7. Urinary incontinence without sensory awareness    8. Chronic kidney disease, stage III (moderate)    9. Secondary hyperparathyroidism of renal origin    10. Facet arthritis of lumbar region    11. Sacroiliitis    12. Insomnia, unspecified type        Plan:           Cheryl was seen today for pre-op exam.    Diagnoses and all orders for this visit:    Pre-operative clearance    TIA (transient ischemic attack)    Aortic atherosclerosis    Mixed hyperlipidemia    Chronic diastolic heart failure    HTN (hypertension), benign    Urinary incontinence without sensory awareness    Chronic kidney disease, stage III (moderate)    Secondary hyperparathyroidism of renal origin    Facet arthritis of lumbar region    Sacroiliitis    Insomnia, unspecified type  -      temazepam (RESTORIL) 7.5 MG Cap; Take 2 capsules (15 mg total) by mouth nightly as needed.      Pre op clearance  -labs  -EKG and CXR  -UA    ACC/AHA 2007 Guidelines for clearance    Step 1: No need for emergency non cardiac surgery  Step 2: No active cardiac conditions  Step 3: No low risk surgery  Step4: Yes - Functional capacity greater than or equal to 4 METs without symptoms - YES - Class IIa, ABDULAZIZ B - Proceed with surgery    She is medically optimized and intermediate risk for surgery    HTN  -continue lisinopril 20 BID. Side effects of medications have been discussed and patient agreed to proceed with treatment and understands the risks and benefits.        TIA  -follow neurology - DC plavix per neuro recommendations and just stay on ASA    Insomnia  -try benadryl only if needed    HLD  -diet controlled and statin    Urine incontinence  -lifestyle changes - intolerant to meds    Neuropathic pain  -continue neurontin    OA knee B/L  -mobic prn    CKD III  -lab and urine analysis  -adequate hydration    Cerumen impaction  -ear wax removal      Spent adequate time in obtaining history and explaining differentials    40 minutes spent during this visit of which greater than 50% devoted to face-face counseling and coordination of care regarding diagnosis and management plan    RTC 3 months or prn

## 2019-07-18 NOTE — ANESTHESIA PREPROCEDURE EVALUATION
07/18/2019  Cheryl Ghosh is a 84 y.o., female scheduled for SI joint fusion on 7/31/19.    Cardiac risk stratification in Epic, 7/16/19.  PCP optimization in Epic, 7/18/19.    Past Medical History:   Diagnosis Date    Arthritis     lumbar    Blood transfusion     Chronic kidney disease, stage III (moderate) 8/2/2017    Degenerative disc disease     lumbar    Insulinoma     Pancreatic disease     Urinary incontinence 12/7/2015     Past Surgical History:   Procedure Laterality Date    ADENOIDECTOMY      APPENDECTOMY      CATARACT EXTRACTION, BILATERAL      EYE SURGERY      FUSION, SPINE, MINIMALLY INVASIVE Right Sacroiliac Joint Fusion -  I Fuse Right 2/6/2019    Performed by [facility] at [facility] OR    HYSTERECTOMY      SAMM/BSO    INJECTION, STEROID Procedure:Left sacroiliac joint block / steroid injection Surgery Time: 30mins LOS: Anesthesia: MAC Radiology: C-arm Bed: Regular Bed Position: Prone Left 6/19/2019    [facility] at [facility] OR    INJECTION, STEROID-- Right SI Joint Block and  Steroid Injection Right 12/20/2018    Performed by [facility] at [facility] OR    OOPHORECTOMY      PANCREAS SURGERY      Insulanoma    RADIOFREQUENCY ABLATION, NERVE, MEDIAL BRANCH, LUMBAR, 1 LEVEL - Left L3,4,5 IV SEADTION Left 9/4/2018    Performed by Eddie Vega MD at [facility] PAIN T    RADIOFREQUENCY ABLATION, NERVE, MEDIAL BRANCH, LUMBAR, 1 LEVEL- Right L3,4,5 IV SEDATION Right 8/28/2018    Performed by Eddie [facility] at [facility] PAIN [facility]    REFRACTIVE SURGERY Bilateral     TONSILLECTOMY           Anesthesia Evaluation    I have reviewed the Patient Summary Reports.    I have reviewed the Nursing Notes.   I have reviewed the Medications.     Review of Systems  Anesthesia Hx:  No problems with previous Anesthesia  Denies Family Hx of Anesthesia complications.     Social:  Non-Smoker, Social Alcohol Use    Cardiovascular:   Exercise tolerance: good Hypertension  Denies Angina.     ECG has been reviewed.    Pulmonary:  Pulmonary Normal    Renal/:   Chronic Renal Disease (CKD III)    Hepatic/GI:  Hepatic/GI Normal    Musculoskeletal:   Arthritis     Neurological:   TIA, (2017) ASA for CVA prevention    Endocrine:  Endocrine Normal        Physical Exam  General:  Well nourished    Airway/Jaw/Neck:  Airway Findings: Mouth Opening: Normal Tongue: Normal  Mallampati: I  TM Distance: Normal, at least 6 cm      Dental:  DENTAL FINDINGS: Normal   Chest/Lungs:  Chest/Lungs Findings: Clear to auscultation, Normal Respiratory Rate     Heart/Vascular:  Heart Findings: Rate: Normal  Rhythm: Regular Rhythm  Sounds: Normal  Heart murmur: negative       Mental Status:  Mental Status Findings:  Cooperative, Alert and Oriented       EKG 7/15/19  Sinus bradycardia  Nonspecific ST abnormality  Abnormal ECG  When compared with ECG of 14-JAN-2019 14:16,  No significant change was found    Echo 11/24/17  CONCLUSIONS     1 - Normal left ventricular systolic function (EF 60-65%).     2 - Impaired LV relaxation, normal LAP (grade 1 diastolic dysfunction).     3 - No wall motion abnormalities.     4 - Normal right ventricular systolic function .     5 - The estimated PA systolic pressure is 23 mmHg.       Anesthesia Plan  Type of Anesthesia, risks & benefits discussed:  Anesthesia Type:  general  Patient's Preference:   Intra-op Monitoring Plan:   Intra-op Monitoring Plan Comments:   Post Op Pain Control Plan:   Post Op Pain Control Plan Comments:   Induction:   IV  Beta Blocker:         Informed Consent: Patient understands risks and agrees with Anesthesia plan.  Questions answered. Anesthesia consent signed with patient.  ASA Score: 3     Day of Surgery Review of History & Physical:            Ready For Surgery From Anesthesia Perspective.

## 2019-07-18 NOTE — DISCHARGE INSTRUCTIONS
· Arrive on  7/31/2019  at  05:30 a.m.  · Report to the 2nd floor Procedural Check In Room .   · Nothing to eat or drink after midnight the night before your procedure.  · Take the AMLODIPINE medications the morning of surgery with a small sip of water.                                                         · Please remove all body piercings and leave all your jewelery and valuables at home .  · Please remove your contact lenses.   · Wear loose comfortable clothing.  · You will not be able to drive that day, please make arrangement for transportation to and from your procedure.  · You cannot be alone for 24 hours after anesthesia. Make arrangements as needed.  · Shower the night before your procedure and the morning of your procedure with Hibiclens antibacterial solution.  · No lotions, creams or powders  · Do not shave 3 days prior to procedure  · Report any signs or symptoms of an infection to your surgeon. Examples: urinary (bladder) infection, upper respiratory infection, skin boils.   · Practice good hand washing before, during, and after procedure.   · Stop Aspirin, Ibuprofen, Advil, Motrin, Aleve, Nuprin, Naprosyn (all NSAID Medication) or any other blood thinners 5 days before your procedure unless directed by your physician.  Also hold all over the counter vitamins and herbal supplements for 5 days prior to your procedure.  · You may take Tylenol or Acetaminophen up the day of surgery for any pain.        I have read or had read and explained to me, and understand the above information.    Additional comments or instructions:  For additional questions call [phone]        Pre-Op Bathing Instructions    Before surgery, you can play an important role in your own health.    Because skin is not sterile, we need to be sure that your skin is as free of germs as possible. By following the instructions below, you can reduce the number of germs on your skin before surgery.    IMPORTANT: You will need to shower with  a special soap called Hibiclens*, available at any pharmacy, over the counter usually in the first aid isle.  If you are allergic to Chlorhexidine (the antiseptic in Hibiclens), use an antibacterial soap such as Dial Soap for your preoperative showers.  You will shower with Hibiclens the night before and the morning of surgery. Both the night before your surgery and the morning of your surgery see steps 2 and 3.   Do not use Hibiclens on the head, face or genitals to avoid injury to those areas.    STEP #1  1.  Shower with Hibiclens solution night before and the morning of surgery.      STEP #2: THE NIGHT BEFORE YOUR SURGERY     1. Do not shave the area of your body where your surgery will be performed.  2. Wash your hair as usual with your normal  Shampoo. Wash body shoulder to toes with your normal soap.  3. Squeeze Hibiclens into hand apply to surgical site.   4. Wash the site gently for five (5) minutes. Do not scrub your skin too hard.   5. Do not wash with your regular soap after Hibiclens is used.  6. Rinse your body thoroughly.  7. Pat yourself dry with a clean, soft towel.  8. Do not use lotion, cream, or powder.  9. Wear clean clothes.    STEP #3: THE MORNING OF YOUR SURGERY     1. Repeat Step #2.    * Not to be used by people allergic to Chlorhexidine.          ANESTHESIA  -For the first 24 hours after surgery:  Do not drive, use heavy equipment, make important decisions, or drink alcohol  -It is normal to feel sleepy for several hours.  Rest until you are more awake.  -Have someone stay with you, if needed.  They can watch for problems and help keep you safe.  -Some possible post anesthesia side effects include: nausea and vomiting, sore throat and hoarseness, sleepiness, and dizziness.            Anesthesia: General Anesthesia     You are watched continuously during your procedure by your anesthesia provider.     Youre due to have surgery. During surgery, youll be given medicine called anesthesia or  anesthetic. This will keep you comfortable and pain-free. Your anesthesia provider will use general anesthesia.  What is general anesthesia?  General anesthesia puts you into a state like deep sleep. It goes into the bloodstream (IV anesthetics), into the lungs (gas anesthetics), or both. You feel nothing during the procedure. You will not remember it. During the procedure, the anesthesia provider monitors you continuously. He or she checks your heart rate and rhythm, blood pressure, breathing, and blood oxygen.  · IV anesthetics. IV anesthetics are given through an IV line in your arm. Theyre often given first. This is so you are asleep before a gas anesthetic is started. Some kinds of IV anesthetics relieve pain. Others relax you. Your doctor will decide which kind is best in your case.  · Gas anesthetics. Gas anesthetics are breathed into the lungs. They are often used to keep you asleep. They can be given through a facemask or a tube placed in your larynx or trachea (breathing tube).  ¨ If you have a facemask, your anesthesia provider will most likely place it over your nose and mouth while youre still awake. Youll breathe oxygen through the mask as your IV anesthetic is started. Gas anesthetic may be added through the mask.  ¨ If you have a tube in the larynx or trachea, it will be inserted into your throat after youre asleep.  Anesthesia tools and medicines  You will likely have:  · IV anesthetics. These are put into an IV line into your bloodstream.  · Gas anesthetics. You breathe these anesthetics into your lungs, where they pass into your bloodstream.  · Pulse oximeter. This is a small clip that is attached to the end of your finger. This measures your blood oxygen level.  · Electrocardiography leads (electrodes). These are small sticky pads that are placed on your chest. They record your heart rate and rhythm.  · Blood pressure cuff. This reads your blood pressure.  Risks and possible  complications  General anesthesia has some risks. These include:  · Breathing problems  · Nausea and vomiting  · Sore throat or hoarseness (usually temporary)  · Allergic reaction to the anesthetic  · Irregular heartbeat (rare)  · Cardiac arrest (rare)   Anesthesia safety  · Follow all instructions you are given for how long not to eat or drink before your procedure.  · Be sure your doctor knows what medicines and drugs you take. This includes over-the-counter medicines, herbs, supplements, alcohol or other drugs. You will be asked when those were last taken.  · Have an adult family member or friend drive you home after the procedure.  · For the first 24 hours after your surgery:  ¨ Do not drive or use heavy equipment.  ¨ Do not make important decisions or sign legal documents. If important decisions or signing legal documents is necessary during the first 24 hours after surgery, have a trusted family member or spouse act on your behalf.  ¨ Avoid alcohol.  ¨ Have a responsible adult stay with you. He or she can watch for problems and help keep you safe.  Date Last Reviewed: 12/1/2016 © 2000-2017 [employer]. [address] [place] [address], [place] [address]. All rights reserved. This information is not intended as a substitute for professional medical care. Always follow your healthcare professional's instructions.

## 2019-07-20 DIAGNOSIS — G57.93 NEUROPATHIC PAIN OF BOTH LEGS: ICD-10-CM

## 2019-07-21 RX ORDER — GABAPENTIN 300 MG/1
CAPSULE ORAL
Qty: 270 CAPSULE | Refills: 1 | Status: SHIPPED | OUTPATIENT
Start: 2019-07-21 | End: 2020-02-24

## 2019-07-24 ENCOUNTER — TELEPHONE (OUTPATIENT)
Dept: NEUROSURGERY | Facility: CLINIC | Age: 84
End: 2019-07-24

## 2019-07-24 NOTE — TELEPHONE ENCOUNTER
CPT code updated with scheduling.         From: Barbara Borjas   Sent: 7/23/2019  12:06 PM   To: [facility] Staff   Subject: CPT clarification needed, MRN [id number]             Good Morning,     Sergio with [facility] needs clarification on the CPT code for Ms. Ghosh's procedure on 7/31/19. [place] 34256, but she says that is for the hand and notes are saying procedure is for SI joint infusion. Please clarify.     She also needs to know how long Pt was trying PT and any activity modifications. Please advise.     Thank you,   Barbara GILLIS.   Pre-service   [phone]

## 2019-07-26 ENCOUNTER — TELEPHONE (OUTPATIENT)
Dept: NEUROSURGERY | Facility: CLINIC | Age: 84
End: 2019-07-26

## 2019-07-26 DIAGNOSIS — M53.3 SACROILIAC JOINT DYSFUNCTION OF LEFT SIDE: Primary | ICD-10-CM

## 2019-07-26 NOTE — TELEPHONE ENCOUNTER
Spoke with Ms. [place] LUGO- manager in authorizations,  Re: CPT code 28639,  has been resubmitted.

## 2019-07-31 ENCOUNTER — HOSPITAL ENCOUNTER (OUTPATIENT)
Facility: HOSPITAL | Age: 84
Discharge: HOME | End: 2019-07-31
Attending: NEUROLOGICAL SURGERY | Admitting: NEUROLOGICAL SURGERY
Payer: MEDICARE

## 2019-07-31 ENCOUNTER — ANESTHESIA (OUTPATIENT)
Dept: SURGERY | Facility: HOSPITAL | Age: 84
End: 2019-07-31
Payer: MEDICARE

## 2019-07-31 VITALS
WEIGHT: 132 LBS | RESPIRATION RATE: 15 BRPM | BODY MASS INDEX: 22.53 KG/M2 | TEMPERATURE: 98 F | DIASTOLIC BLOOD PRESSURE: 69 MMHG | HEART RATE: 61 BPM | HEIGHT: 64 IN | SYSTOLIC BLOOD PRESSURE: 130 MMHG | OXYGEN SATURATION: 98 %

## 2019-07-31 DIAGNOSIS — M53.3 SACROILIAC JOINT DYSFUNCTION OF RIGHT SIDE: Primary | ICD-10-CM

## 2019-07-31 DIAGNOSIS — M53.3 SACROILIAC JOINT DYSFUNCTION OF LEFT SIDE: ICD-10-CM

## 2019-07-31 PROCEDURE — 27279 ARTHRD SI JT PLMT TARTCLR DV: CPT | Mod: LT,,, | Performed by: NEUROLOGICAL SURGERY

## 2019-07-31 PROCEDURE — 25000003 PHARM REV CODE 250: Performed by: NURSE ANESTHETIST, CERTIFIED REGISTERED

## 2019-07-31 PROCEDURE — 37000008 HC ANESTHESIA 1ST 15 MINUTES: Performed by: NEUROLOGICAL SURGERY

## 2019-07-31 PROCEDURE — 25000003 PHARM REV CODE 250: Performed by: NEUROLOGICAL SURGERY

## 2019-07-31 PROCEDURE — 71000039 HC RECOVERY, EACH ADD'L HOUR: Performed by: NEUROLOGICAL SURGERY

## 2019-07-31 PROCEDURE — 36000711: Performed by: NEUROLOGICAL SURGERY

## 2019-07-31 PROCEDURE — C1769 GUIDE WIRE: HCPCS | Performed by: NEUROLOGICAL SURGERY

## 2019-07-31 PROCEDURE — 27800903 OPTIME MED/SURG SUP & DEVICES OTHER IMPLANTS: Performed by: NEUROLOGICAL SURGERY

## 2019-07-31 PROCEDURE — 71000015 HC POSTOP RECOV 1ST HR: Performed by: NEUROLOGICAL SURGERY

## 2019-07-31 PROCEDURE — 36000710: Performed by: NEUROLOGICAL SURGERY

## 2019-07-31 PROCEDURE — 71000033 HC RECOVERY, INTIAL HOUR: Performed by: NEUROLOGICAL SURGERY

## 2019-07-31 PROCEDURE — 63600175 PHARM REV CODE 636 W HCPCS: Performed by: NURSE PRACTITIONER

## 2019-07-31 PROCEDURE — 63600175 PHARM REV CODE 636 W HCPCS: Performed by: NURSE ANESTHETIST, CERTIFIED REGISTERED

## 2019-07-31 PROCEDURE — 37000009 HC ANESTHESIA EA ADD 15 MINS: Performed by: NEUROLOGICAL SURGERY

## 2019-07-31 PROCEDURE — 27279 PR ARTHRODESIS SACROILIAC JNT PERQ W/IMAGE GUIDE INCL BONE GRAFT: ICD-10-PCS | Mod: LT,,, | Performed by: NEUROLOGICAL SURGERY

## 2019-07-31 PROCEDURE — 63600175 PHARM REV CODE 636 W HCPCS: Performed by: PHYSICIAN ASSISTANT

## 2019-07-31 PROCEDURE — 63600175 PHARM REV CODE 636 W HCPCS: Performed by: NEUROLOGICAL SURGERY

## 2019-07-31 PROCEDURE — 25000003 PHARM REV CODE 250: Performed by: PHYSICIAN ASSISTANT

## 2019-07-31 RX ORDER — ACETAMINOPHEN 500 MG
1000 TABLET ORAL
Status: COMPLETED | OUTPATIENT
Start: 2019-07-31 | End: 2019-07-31

## 2019-07-31 RX ORDER — CYCLOBENZAPRINE HCL 10 MG
10 TABLET ORAL
Status: COMPLETED | OUTPATIENT
Start: 2019-07-31 | End: 2019-07-31

## 2019-07-31 RX ORDER — ASPIRIN 81 MG/1
81 TABLET ORAL DAILY
Refills: 0 | COMMUNITY
Start: 2019-07-31

## 2019-07-31 RX ORDER — ONDANSETRON HYDROCHLORIDE 2 MG/ML
INJECTION, SOLUTION INTRAMUSCULAR; INTRAVENOUS
Status: DISCONTINUED | OUTPATIENT
Start: 2019-07-31 | End: 2019-07-31

## 2019-07-31 RX ORDER — BUPIVACAINE HCL/EPINEPHRINE 0.5-1:200K
VIAL (ML) INJECTION
Status: DISCONTINUED | OUTPATIENT
Start: 2019-07-31 | End: 2019-07-31 | Stop reason: HOSPADM

## 2019-07-31 RX ORDER — OXYCODONE HCL 10 MG/1
10 TABLET, FILM COATED, EXTENDED RELEASE ORAL
Status: COMPLETED | OUTPATIENT
Start: 2019-07-31 | End: 2019-07-31

## 2019-07-31 RX ORDER — SUCCINYLCHOLINE CHLORIDE 20 MG/ML
INJECTION INTRAMUSCULAR; INTRAVENOUS
Status: DISCONTINUED | OUTPATIENT
Start: 2019-07-31 | End: 2019-07-31

## 2019-07-31 RX ORDER — ACETAMINOPHEN 10 MG/ML
INJECTION, SOLUTION INTRAVENOUS
Status: DISCONTINUED | OUTPATIENT
Start: 2019-07-31 | End: 2019-07-31

## 2019-07-31 RX ORDER — ROCURONIUM BROMIDE 10 MG/ML
INJECTION, SOLUTION INTRAVENOUS
Status: DISCONTINUED | OUTPATIENT
Start: 2019-07-31 | End: 2019-07-31

## 2019-07-31 RX ORDER — SODIUM CHLORIDE, SODIUM LACTATE, POTASSIUM CHLORIDE, CALCIUM CHLORIDE 600; 310; 30; 20 MG/100ML; MG/100ML; MG/100ML; MG/100ML
INJECTION, SOLUTION INTRAVENOUS CONTINUOUS
Status: DISCONTINUED | OUTPATIENT
Start: 2019-07-31 | End: 2019-07-31 | Stop reason: HOSPADM

## 2019-07-31 RX ORDER — VANCOMYCIN HYDROCHLORIDE 1 G/20ML
INJECTION, POWDER, LYOPHILIZED, FOR SOLUTION INTRAVENOUS
Status: DISCONTINUED | OUTPATIENT
Start: 2019-07-31 | End: 2019-07-31 | Stop reason: HOSPADM

## 2019-07-31 RX ORDER — EPHEDRINE SULFATE 50 MG/ML
INJECTION, SOLUTION INTRAVENOUS
Status: DISCONTINUED | OUTPATIENT
Start: 2019-07-31 | End: 2019-07-31

## 2019-07-31 RX ORDER — OXYCODONE HYDROCHLORIDE 5 MG/1
5-10 TABLET ORAL EVERY 4 HOURS PRN
Qty: 84 TABLET | Refills: 0 | Status: SHIPPED | OUTPATIENT
Start: 2019-07-31 | End: 2019-09-17 | Stop reason: SDUPTHER

## 2019-07-31 RX ORDER — PREGABALIN 75 MG/1
75 CAPSULE ORAL
Status: COMPLETED | OUTPATIENT
Start: 2019-07-31 | End: 2019-07-31

## 2019-07-31 RX ORDER — FENTANYL CITRATE 50 UG/ML
INJECTION, SOLUTION INTRAMUSCULAR; INTRAVENOUS
Status: DISCONTINUED | OUTPATIENT
Start: 2019-07-31 | End: 2019-07-31

## 2019-07-31 RX ORDER — LIDOCAINE HCL/PF 100 MG/5ML
SYRINGE (ML) INTRAVENOUS
Status: DISCONTINUED | OUTPATIENT
Start: 2019-07-31 | End: 2019-07-31

## 2019-07-31 RX ORDER — GENTAMICIN SULFATE 80 MG/100ML
80 INJECTION, SOLUTION INTRAVENOUS
Status: DISPENSED | OUTPATIENT
Start: 2019-07-31

## 2019-07-31 RX ORDER — LIDOCAINE HYDROCHLORIDE 10 MG/ML
1 INJECTION, SOLUTION EPIDURAL; INFILTRATION; INTRACAUDAL; PERINEURAL ONCE
Status: DISCONTINUED | OUTPATIENT
Start: 2019-07-31 | End: 2019-07-31 | Stop reason: HOSPADM

## 2019-07-31 RX ORDER — OXYCODONE AND ACETAMINOPHEN 5; 325 MG/1; MG/1
1 TABLET ORAL EVERY 4 HOURS PRN
Status: DISCONTINUED | OUTPATIENT
Start: 2019-07-31 | End: 2019-07-31 | Stop reason: HOSPADM

## 2019-07-31 RX ORDER — VANCOMYCIN HCL IN 5 % DEXTROSE 1G/250ML
1000 PLASTIC BAG, INJECTION (ML) INTRAVENOUS
Status: ACTIVE | OUTPATIENT
Start: 2019-07-31

## 2019-07-31 RX ORDER — PROPOFOL 10 MG/ML
VIAL (ML) INTRAVENOUS
Status: DISCONTINUED | OUTPATIENT
Start: 2019-07-31 | End: 2019-07-31

## 2019-07-31 RX ORDER — BACITRACIN 50000 [IU]/1
INJECTION, POWDER, FOR SOLUTION INTRAMUSCULAR
Status: DISCONTINUED | OUTPATIENT
Start: 2019-07-31 | End: 2019-07-31 | Stop reason: HOSPADM

## 2019-07-31 RX ORDER — CELECOXIB 100 MG/1
200 CAPSULE ORAL
Status: COMPLETED | OUTPATIENT
Start: 2019-07-31 | End: 2019-07-31

## 2019-07-31 RX ADMIN — GENTAMICIN SULFATE 80 MG: 80 INJECTION, SOLUTION INTRAVENOUS at 07:07

## 2019-07-31 RX ADMIN — EPHEDRINE SULFATE 10 MG: 50 INJECTION, SOLUTION INTRAMUSCULAR; INTRAVENOUS; SUBCUTANEOUS at 07:07

## 2019-07-31 RX ADMIN — SODIUM CHLORIDE, SODIUM LACTATE, POTASSIUM CHLORIDE, AND CALCIUM CHLORIDE: .6; .31; .03; .02 INJECTION, SOLUTION INTRAVENOUS at 07:07

## 2019-07-31 RX ADMIN — FENTANYL CITRATE 50 MCG: 50 INJECTION, SOLUTION INTRAMUSCULAR; INTRAVENOUS at 07:07

## 2019-07-31 RX ADMIN — PREGABALIN 75 MG: 75 CAPSULE ORAL at 06:07

## 2019-07-31 RX ADMIN — CYCLOBENZAPRINE HYDROCHLORIDE 10 MG: 10 TABLET, FILM COATED ORAL at 06:07

## 2019-07-31 RX ADMIN — LIDOCAINE HYDROCHLORIDE 50 MG: 20 INJECTION, SOLUTION INTRAVENOUS at 07:07

## 2019-07-31 RX ADMIN — EPHEDRINE SULFATE 10 MG: 50 INJECTION, SOLUTION INTRAMUSCULAR; INTRAVENOUS; SUBCUTANEOUS at 08:07

## 2019-07-31 RX ADMIN — PROPOFOL 80 MG: 10 INJECTION, EMULSION INTRAVENOUS at 07:07

## 2019-07-31 RX ADMIN — ACETAMINOPHEN 1000 MG: 10 INJECTION, SOLUTION INTRAVENOUS at 08:07

## 2019-07-31 RX ADMIN — OXYCODONE HYDROCHLORIDE 10 MG: 10 TABLET, FILM COATED, EXTENDED RELEASE ORAL at 06:07

## 2019-07-31 RX ADMIN — SUCCINYLCHOLINE CHLORIDE 100 MG: 20 INJECTION, SOLUTION INTRAMUSCULAR; INTRAVENOUS at 07:07

## 2019-07-31 RX ADMIN — ROCURONIUM BROMIDE 5 MG: 10 INJECTION, SOLUTION INTRAVENOUS at 07:07

## 2019-07-31 RX ADMIN — SODIUM CHLORIDE, SODIUM LACTATE, POTASSIUM CHLORIDE, AND CALCIUM CHLORIDE: .6; .31; .03; .02 INJECTION, SOLUTION INTRAVENOUS at 06:07

## 2019-07-31 RX ADMIN — Medication 1000 MG: at 07:07

## 2019-07-31 RX ADMIN — CELECOXIB 200 MG: 100 CAPSULE ORAL at 06:07

## 2019-07-31 RX ADMIN — ACETAMINOPHEN 1000 MG: 500 TABLET ORAL at 06:07

## 2019-07-31 RX ADMIN — ONDANSETRON 8 MG: 2 INJECTION, SOLUTION INTRAMUSCULAR; INTRAVENOUS at 08:07

## 2019-07-31 NOTE — H&P
Established Patient     SUBJECTIVE:      History of Present Illness:  Cheryl Ghosh is a 84 y.o. female with history of hypertension, hyperlipidemia, CHF, CKD stage 3, secondary hyperparathyroidism, previous TIA, and right sacroiliac dysfunction who is now status post right SI joint fusion on 02/06/2019 with Dr. Dasilva.   patient had complete resolution right side low back pain right SI joint pain after fusion but later developed left sided si joint pain irradiating in her left groin, posterior and anterior thigh.  Pain is severe that it affects her quality of life and ADL.  She is unable to sleep secondary to severe pain.  She underwent left SI joint injection with Dr. Dasilva on 06/19/2019 and had significant relief with pain for couple days.  Pain gradually returned.  Patient is here with her  and like to proceed with left SI joint fusion.              Low Back Pain Scale  R Low Back-Pain Score: 7  R Low Back-Pain Intensity: Pain killers give very little relief from pain  R Low Back-Pain Score: I need some help, but manage most of my personal care  Low Back-Lifting: Pain prevents me from lifting heavy weights  but I can manage light weights if they are conveniently placed   Low Back-Walking: Pain prevents me walking more than .25 mile   Low Back-Sitting: Pain prevents me from sitting more than 30 minutes   Low Back-Standing: I cannot stand for longer than 10 minutes with increasing pain   Low Back-Sleeping: Because of pain my normal nights sleep is reduced by less than one quarter   Low Back-Social Life: My social life is normal but it increases the degree of pain   Low Back-Traveling: Pain restricts me to short necessary journeys under 30 minutes   Low Back-Changing Degree of Pain: My pain seems to be getting better but improvement is slow                   Review of patient's allergies indicates:   Allergen Reactions    Pcn [penicillins] Hives and Nausea And Vomiting         Current Medications           Current Outpatient Medications   Medication Sig Dispense Refill    acetaminophen (TYLENOL 8 HOUR ORAL) Take by mouth.        amLODIPine (NORVASC) 2.5 MG tablet Take 1 tablet (2.5 mg total) by mouth 2 (two) times daily. (Patient taking differently: Take 5 mg by mouth once daily. ) 180 tablet 3    b complex vitamins tablet Take 1 tablet by mouth once daily.        calcium-vitamin D (CALCIUM WITH VITAMIN D) 600 mg(1,500mg) -400 unit Tab          celecoxib (CELEBREX) 200 MG capsule Take 1 capsule (200 mg total) by mouth 2 (two) times daily. 60 capsule 6    diclofenac sodium (VOLTAREN) 1 % Gel APPLY 2.5 GRAMS TO THE AFFECTED AREA 3-4 TIMES DAILY STARTING 2 WEEKS POST SURGERY.   3    gabapentin (NEURONTIN) 300 MG capsule Take 1 capsule (300 mg total) by mouth 3 (three) times daily. 90 capsule 3    lidocaine (XYLOCAINE) 5 % Oint ointment APPLY 2.5 GRAMS TO THE AFFECTED AREA 3-4 TIMES DAILY STARTING 2 WEEKS POST SURGERY. 1 Tube 11    magnesium 250 mg Tab          omega 3-dha-epa-fish oil (FISH OIL) 100-160-1,000 mg Cap          oxyCODONE-acetaminophen (PERCOCET) 5-325 mg per tablet Take 1 tablet by mouth every 24 hours as needed for Pain. 20 tablet 0    potassium 99 mg Tab          pravastatin (PRAVACHOL) 10 MG tablet TAKE 1 TABLET (10 MG TOTAL) BY MOUTH ONCE DAILY AT BEDTIME 90 tablet 2    pyridoxine (VITAMIN B-6) 100 MG Tab Take 100 mg by mouth once daily.        SANARE ADV SCAR THERAPY BASE Gel APPLY 1/2 GRAM (1 PUMP) TO AFFECTED AREAS TWICE DAILY STARTING 4 WEEKS POST SURGERY.   3    tiZANidine (ZANAFLEX) 4 MG tablet            No current facility-administered medications for this visit.                  Past Medical History:   Diagnosis Date    Arthritis       lumbar    Blood transfusion      Chronic kidney disease, stage III (moderate) 8/2/2017    Degenerative disc disease       lumbar    Insulinoma      Pancreatic disease      Urinary incontinence 12/7/2015      Past Surgical History:    Procedure Laterality Date    ADENOIDECTOMY        APPENDECTOMY        CATARACT EXTRACTION, BILATERAL        EYE SURGERY        FUSION, SPINE, MINIMALLY INVASIVE Right Sacroiliac Joint Fusion -  I Fuse Right 2/6/2019     [facility]    HYSTERECTOMY         SAMM/BSO    INJECTION, STEROID Procedure:Left sacroiliac joint block / steroid injection Surgery Time: 30mins LOS: Anesthesia: MAC Radiology: C-arm Bed: Regular Bed Position: Prone Left 6/19/2019     [facility]    INJECTION, STEROID-- Right SI Joint Block and  Steroid Injection Right 12/20/2018     [facility]    OOPHORECTOMY        PANCREAS SURGERY         Insulanoma    RADIOFREQUENCY ABLATION, NERVE, MEDIAL BRANCH, LUMBAR, 1 LEVEL - Left L3,4,5 IV SEADTION Left 9/4/2018     Performed by Eddie Vega MD at [facility] PAIN MGT    RADIOFREQUENCY ABLATION, NERVE, MEDIAL BRANCH, LUMBAR, 1 LEVEL- Right L3,4,5 IV SEDATION Right 8/28/2018     Performed by [facility] at [facility] PAIN MGT    REFRACTIVE SURGERY Bilateral      TONSILLECTOMY               Family History      Problem Relation (Age of Onset)     Aneurysm Sister     Breast cancer Mother, Other     Glaucoma Daughter     Heart attack Son     Heart disease Father          Social History            Socioeconomic History    Marital status:        Spouse name: Not on file    Number of children: Not on file    Years of education: Not on file    Highest education level: Not on file   Occupational History    Not on file   Social Needs    Financial resource strain: Not on file    Food insecurity:       Worry: Not on file       Inability: Not on file    Transportation needs:       Medical: Not on file       Non-medical: Not on file   Tobacco Use    Smoking status: Never Smoker    Smokeless tobacco: Never Used   Substance and Sexual Activity    Alcohol use: Yes       Alcohol/week: 0.6 -  "1.2 oz       Types: 1 - 2 Glasses of wine per week    Drug use: No    Sexual activity: Yes       Partners: Male   Lifestyle    Physical activity:       Days per week: Not on file       Minutes per session: Not on file    Stress: Not on file   Relationships    Social connections:       Talks on phone: Not on file       Gets together: Not on file       Attends [employer] service: Not on file       Active member of club or organization: Not on file       Attends meetings of clubs or organizations: Not on file       Relationship status: Not on file   Other Topics Concern    Not on file   Social History Narrative    Not on file         Review of Systems:  Review of Systems     Constitutional: no fever, chills or night sweats. No changes in weight   Eyes: no visual changes   ENT: no nasal congestion or sore throat   Respiratory: no cough or shortness of breath   Cardiovascular: no chest pain or palpitations   Gastrointestinal: no nausea or vomiting   Genitourinary: no hematuria or dysuria   Integument/Breast: no rash or pruritis   Hematologic/Lymphatic: no easy bruising or lymphadenopathy   Musculoskeletal: no arthralgias or myalgias.  Left SI joint pain radiating left thigh/groin pain.  Neurological: no seizures or tremors. No weakness or paresthesia.   Behavioral/Psych: no auditory or visual hallucinations   Endocrine: no heat or cold intolerance      OBJECTIVE:      Vital Signs  Temp: 98.1 °F (36.7 °C)  Pulse: (!) 50  BP: (!) 142/60  Pain Score:   6  Height: 5' 4" (162.6 cm)  Weight: 60.6 kg (133 lb 9.6 oz)  Body mass index is 22.93 kg/m².     Physical Exam:  Neurosurgery Physical Exam     General: well developed, well nourished, no distress.   Neurologic: Awake, alert and oriented x3. Thought content appropriate.  GCS: Motor: 6/Verbal: 5/Eyes: 4 GCS Total: 15  Cranial nerves: II-XII grossly intact. PERRLA. EOMI without nystagmus. Face symmetric and sensation intact to light touch, tongue midline, shoulder shrug " symmetric bilaterally.  Hearing equal bilaterally to finger rub. Palate and uvula rise and fall normally in midline.     Language: no aphasia  Speech: no dysarthria   Neck: supple, without obvious masses     Sensory: intact to light touch B/L UE and LE  Motor Strength: Moves all extremities spontaneously with good tone. Full strength upper and lower extremities. No abnormal movements seen.       Mild pain limited weakness on left lower extremity  Strength   Deltoids Triceps Biceps Wrist Extension Wrist Flexion Hand    Upper: R 5/5 5/5 5/5 5/5 5/5 5/5     L 5/5 5/5 5/5 5/5 5/5 5/5       Iliopsoas Quadriceps Knee  Flexion Tibialis  anterior Gastro- cnemius EHL   Lower: R 5/5 5/5 5/5 5/5 5/5 5/5     L 4+/5 4+/5 4+/5 5/5 5/5 5/5      Interossi muscle strength- intact     DTR's - 2 + and symmetric in UE and LE  Ankle Clonus - Negative           SLR - Negative             Gait - normal      Tandem Gait - No difficulty     Able to walk/stand on heels & toes  Cervical ROM - full                  Lumbar ROM - full  No focal numbness or weakness  No midline or paraspinal tenderness to palpation  No difficulty transitioning from seated to standing position or vice versa.  ENT: normal hearing with finger rub  Heart: RRR, no cyanosis, pallor, or edema.   Lungs- normal respiratory effort  Abdomen-  soft, symmetric and nontender  Skin: grossly intact in all 4 extremities without obvious rashes or lesions  Extremities: warm with no cyanosis or edema, or clubbing  Pulses: palpable distal pulses     SI Joint tenderness - positive bilaterally (left> right)  Distraction test-positive                      Compression test-       positive on                     Gaenslen's Test-positive  Greater Trochanter Joint tenderness - Negative  Reynaldo's Test - Negative           Pain on Hip ROM - Negative        Posture-  No obvious kyphosis with standing posture.  With bending posture, no obvious scapula wing           Diagnostic  Results:  All imaging personally reviewed     Sacroiliac x-rays 06/10/2019  Appropriate hardware placement at right SI joint     ASSESSMENT/PLAN:      84 y.o. female with history of hypertension, hyperlipidemia, CHF, CKD stage 3, secondary hyperparathyroidism, previous TIA, and right sacroiliac dysfunction who is now status post right SI joint fusion on 02/06/2019 with Dr. Dasilva.   patient now with left SI joint pain and dysfunction.  Provocative exam positive for left SI joint dysfunction and diagnostic left SI joint injection with significant relief for couple days.  At this time, given her improvements in symptoms with left SI joint diagnostic injection, we will proceed with left SI joint fusion.  Patient and  knows risk, benefits, and alternatives of surgical procedure and would like to proceed.  We will schedule her for left SI joint fusion accordingly.       We have discussed the risks of surgery including bleeding, infection, failure of surgery, CSF leak, nerve root injury, spinal cord injury, ureter injury, weakness, paralysis, peripheral neuropathy, malplaced hardware, migration of hardware, non-union, need for reoperation. Patient understands the risks and would like to proceed with surgery.

## 2019-07-31 NOTE — PLAN OF CARE
Patient has met PACU discharge criteria, VSS, pain well controlled. Family updated by phone. Released from PACU by Dr. Montiel*

## 2019-07-31 NOTE — ANESTHESIA POSTPROCEDURE EVALUATION
Anesthesia Post Evaluation    Patient: Cheryl Ghosh    Procedure(s) Performed: Procedure(s) (LRB):  FUSION, SPINE SI Joint Fusion (Left)    Final Anesthesia Type: general  Patient location during evaluation: PACU  Patient participation: Yes- Able to Participate  Level of consciousness: awake and alert  Post-procedure vital signs: reviewed and stable  Pain management: adequate  Airway patency: patent  PONV status at discharge: No PONV  Anesthetic complications: no      Cardiovascular status: blood pressure returned to baseline  Respiratory status: unassisted  Hydration status: euvolemic            Vitals Value Taken Time   /59 7/31/2019 10:07 AM   Temp 36.8 °C (98.2 °F) 7/31/2019  9:45 AM   Pulse 56 7/31/2019 10:09 AM   Resp 8 7/31/2019 10:09 AM   SpO2 98 % 7/31/2019 10:09 AM   Vitals shown include unvalidated device data.      Event Time     Out of Recovery 10:09:26          Pain/Enid Score: Pain Rating Prior to Med Admin: 0 (7/31/2019  6:26 AM)  Enid Score: 9 (7/31/2019 10:05 AM)

## 2019-07-31 NOTE — OP NOTE
DATE OF PROCEDURE: 07/31/2019     PREOPERATIVE DIAGNOSES:  1. Left sacroiliac joint dysfunction and refractory pain  2. Left sacroiliitis        POSTOPERATIVE DIAGNOSES:   1. Left sacroiliac joint dysfunction and refractory pain  2. Left sacroiliitis        NAME OF OPERATION:  1. Left minimally invasive sacroiliac joint fusion using the SI-Bone system under fluoroscopy         PRIMARY SURGEON: Xavier Dasilva M.D.     ASSISTANT SURGEON: PAT        INDICATION FOR PROCEDURE: This is a 84-year-old female who presented with left SI joint pain that was refractory to conservative treatment. Risks, benefits, alternative and limitation were discussed with the patient. The risk included nerve root injury that can cause paralysis, cerebrospinal fluid leak, wound infection and blood loss. The patient wanted to proceed and a consent was obtained.      DESCRIPTION OF THE PROCEDURE     The patient was intubated under general anesthesia and was placed prone on the Zaki 4-posts table. All pressure points were carefully padded. A 25 mm incision was planned on the left side using fluoroscopy at the intersection on the alar line and the dorsal sacrum line. The patient was prepped and draped in the typical sterile fashion and the incision was made with a #15 blade.   Hemostasis was complete and the gluteal fascia was penetrated with a k-wire. Using the lateral, outlet and inlet views the k-wire was hammered and power-drilled across the SI joint making sure to respect the pelvic brim and the S1 foramen bilaterally.  We dilated the muscle. A measuring guide was used the measure the length of each implant. A broche was used over the k-wire. On the left side, cranial to caudal, we impacted one 45 mm, one 40 mm and one 40 mm triangular coated iFuse implants.  We removed the dilator and k-wire. Then after copious irrigation we put some vancomycin powder in the surgical sites.     Subcutaneous layer was closed with inverted 3-0 Vicryl and the  skin was closed with a running 4-0 Monocryl. The wounds were dressed with Steri-Strips and the patient was transferred to the Recovery Room under the care of the anesthesiologist. The blood loss was about 60 mL. The final count was completed and nothing was missing. There was no complication.

## 2019-07-31 NOTE — DISCHARGE INSTRUCTIONS
Remove dressing on 08/02/2019.  Allow steri-strips to fall off on their own.  Ok to shower on 08/02/2019, but do not immerse wound in water  Remain active with walking and other light activities daily.  No heavy lifting, no extreme bending or twisting. Use a walker at home to avoid weight bearing on left leg.     ANESTHESIA  -For the first 24 hours after surgery:  Do not drive, use heavy equipment, make important decisions, or drink alcohol  -It is normal to feel sleepy for several hours.  Rest until you are more awake.  -Have someone stay with you, if needed.  They can watch for problems and help keep you safe.  -Some possible post anesthesia side effects include: nausea and vomiting, sore throat and hoarseness, sleepiness, and dizziness.    PAIN  -If you have pain after surgery, pain medicine will help you feel better.  Take it as directed, before pain becomes severe.  Most pain relievers taken by mouth need at least 20-30 minutes to start working.  -Do not drive or drink alcohol while taking pain medicine.  -Pain medication can upset your stomach.  Taking them with a little food may help.  -Other ways to help control pain: elevation, ice, and relaxation  -Call your surgeon if still having unmanageable pain an hour after taking pain medicine.  -Pain medicine can cause constipation.  Taking an over-the counter stool softener while on prescription pain medicine and drinking plenty of fluids can prevent this side effect.  -Call your surgeon if you have severe side effects like: breathing problems, trouble waking up, dizziness, confusion, or severe constipation.    NAUSEA  -Some people have nausea after surgery.  This is often because of anesthesia, pain, pain medicine, or the stress of surgery.  -Do not push yourself to eat.  Start off with clear liquids and soup.  Slowly move to solid foods.  Don't eat fatty, rich, spicy foods at first.  Eat smaller amounts.  -If you develop persistent nausea and vomiting please  notify your surgeon immediately.    BLEEDING  -Different types of surgery require different types of care and dressing changes.  It is important to follow all instructions and advice from your surgeon.  Change dressing as directed.  Call your surgeon for any concerns regarding postop bleeding.    SIGNS OF INFECTION  -Signs of infection include: fever, swelling, drainage, and redness  -Notify your surgeon if you have a fever of 100.4 F (38.0 C) or higher.  -Notify your surgeon if you notice redness, swelling, increased pain, pus, or a foul smell at the incision site.

## 2019-07-31 NOTE — TRANSFER OF CARE
"Anesthesia Transfer of Care Note    Patient: Cheryl Ghosh    Procedure(s) Performed: Procedure(s) (LRB):  FUSION, SPINE SI Joint Fusion (Left)    Patient location: PACU    Anesthesia Type: general    Transport from OR: Transported from OR on 6-10 L/min O2 by face mask with adequate spontaneous ventilation    Post pain: adequate analgesia    Post assessment: no apparent anesthetic complications and tolerated procedure well    Post vital signs: stable    Level of consciousness: awake and oriented    Nausea/Vomiting: no nausea/vomiting    Complications: none    Transfer of care protocol was followed      Last vitals:   Visit Vitals  BP (!) 150/63 (BP Location: Left arm)   Pulse (!) 51   Temp 36.7 °C (98.1 °F) (Skin)   Resp 17   Ht 5' 4" (1.626 m)   Wt 59.9 kg (132 lb)   LMP  (LMP Unknown)   SpO2 99%   Breastfeeding? No   BMI 22.66 kg/m²     "

## 2019-07-31 NOTE — PLAN OF CARE
Discharge criteria met,voicing desire to go home. Discharge instructions given patient &  per Toan Rn/ops; verbalized understanding. Discharge home via wheelchair in care of .

## 2019-08-13 ENCOUNTER — OFFICE VISIT (OUTPATIENT)
Dept: NEUROSURGERY | Facility: CLINIC | Age: 84
End: 2019-08-13
Payer: MEDICARE

## 2019-08-13 ENCOUNTER — HOSPITAL ENCOUNTER (OUTPATIENT)
Dept: RADIOLOGY | Facility: HOSPITAL | Age: 84
Discharge: HOME | End: 2019-08-13
Attending: PHYSICIAN ASSISTANT
Payer: MEDICARE

## 2019-08-13 VITALS
HEART RATE: 55 BPM | HEIGHT: 64 IN | TEMPERATURE: 99 F | SYSTOLIC BLOOD PRESSURE: 111 MMHG | DIASTOLIC BLOOD PRESSURE: 62 MMHG | BODY MASS INDEX: 22.99 KG/M2 | WEIGHT: 134.69 LBS

## 2019-08-13 DIAGNOSIS — M24.60 FUSION OF JOINT: ICD-10-CM

## 2019-08-13 DIAGNOSIS — Z51.89 VISIT FOR WOUND CHECK: Primary | ICD-10-CM

## 2019-08-13 PROCEDURE — 99024 POSTOP FOLLOW-UP VISIT: CPT | Mod: S$GLB,,, | Performed by: PHYSICIAN ASSISTANT

## 2019-08-13 PROCEDURE — 99999 PR PBB SHADOW E&M-EST. PATIENT-LVL IV: CPT | Mod: PBBFAC,,, | Performed by: PHYSICIAN ASSISTANT

## 2019-08-13 PROCEDURE — 72202 X-RAY EXAM SI JOINTS 3/> VWS: CPT | Mod: 26,,, | Performed by: RADIOLOGY

## 2019-08-13 PROCEDURE — 99999 PR PBB SHADOW E&M-EST. PATIENT-LVL IV: ICD-10-PCS | Mod: PBBFAC,,, | Performed by: PHYSICIAN ASSISTANT

## 2019-08-13 PROCEDURE — 99024 PR POST-OP FOLLOW-UP VISIT: ICD-10-PCS | Mod: S$GLB,,, | Performed by: PHYSICIAN ASSISTANT

## 2019-08-13 PROCEDURE — 72202 XR SACROILIAC JOINTS COMPLETE: ICD-10-PCS | Mod: 26,,, | Performed by: RADIOLOGY

## 2019-08-13 PROCEDURE — 72202 X-RAY EXAM SI JOINTS 3/> VWS: CPT | Mod: TC,FY

## 2019-08-13 RX ORDER — MUPIROCIN 20 MG/G
OINTMENT TOPICAL
Refills: 0 | COMMUNITY
Start: 2019-07-18 | End: 2021-02-02

## 2019-08-13 NOTE — PROGRESS NOTES
Neurosurgery Wound Check Progress Note    HPI  Cheryl Ghosh is 84 y.o. female with history of hypertension, hyperlipidemia, CHF, CKD stage 3, secondary hyperparathyroidism, previous TIA, and right sacroiliac dysfunction who is now status post right SI joint fusion on 02/06/2019 with Dr. Dasilva.   patient had complete resolution right side low back pain right SI joint pain after fusion but later developed left sided si joint pain irradiating in her left groin, posterior and anterior thigh.  She does not have left SI joint dysfunction and subsequently underwent left SI joint fusion with Dr. Dasilva on 07/31/2019.  Patient presents for 2 weeks wound check.  She is here with her .  She reports that initially she did very well after surgery however about a week ago she had increased physical activity which worsened her low back pain and cause left buttocks/posterior thigh radiating pain.  Pain has improved since starting for oxycodone 5-10 mg p.r.n. severe pain and muscle relaxant.  She reports that she has has not been very mobile secondary to the pain but will start mobilizing more now that her pain is improved.  Denies any leg weakness, bladder bowel incontinence, fever, chills, or incisional issues.                    EXAM  Vitals:    08/13/19 0929   BP: 111/62   Pulse: (!) 55   Temp: 98.7 °F (37.1 °C)     Body mass index is 23.12 kg/m².      General: well developed, well nourished. no acute distress. Comfortable.   Neurologic: Awake, alert and oriented x3. Thought content appropriate.  Head: normocephalic, atraumatic    GCS: Motor: 6/Verbal: 5/Eyes: 4 GCS Total: 15  Cranial nerves: face symmetric, tongue midline, pupils equal, round, reactive to light with accomodation, extraocular muscles intact. CN II-XII grossly intact.    Sensory: response to light touch throughout  Motor Strength: Moves all extremities spontaneously with good tone. Full strength upper and lower extremities. No abnormal movements seen.       Strength   Deltoids Triceps Biceps Wrist Extension Wrist Flexion Hand    Upper: R 5/5 5/5 5/5 5/5 5/5 5/5     L 5/5 5/5 5/5 5/5 5/5 5/5       Iliopsoas Quadriceps Knee  Flexion Tibialis  anterior Gastro- cnemius EHL   Lower: R 5/5 5/5 5/5 5/5 5/5 5/5     L 5/5 5/5 5/5 5/5 5/5 5/5         Negative straight leg raise    No focal numbness or weakness   No midline or paraspinal tenderness to palpation  Gait is normal.       Heart: RRR, no cyanosis, pallor, or edema.    Lungs: normal respiratory effort  Abdomen: soft, non-tender and symmetric  Extremities: warm with no cyanosis, edema, or clubbing  Skin: warm, dry and intact. No visible rashes or lesions.        Left buttock Surgical incision:  C/D/I without any signs of infection.  Incision is healing well / slowly.  No erythema, warmth, edema, TTP.  No drainage.  Wound edges are well approximated.    steri strips intact and removed without difficulty.          IMAGING/LABS  SI joint x-ray 08/13/2019 personally reviewed and compared to previous imaging   Appropriate hardware fusion on left SI joint.  Previous right SI joint fusion stable.      ASSESSMENT/PLAN    84 y.o. female with history of hypertension, hyperlipidemia, CHF, CKD stage 3, secondary hyperparathyroidism, previous TIA, and right sacroiliac dysfunction who is now status post right SI joint fusion on 02/06/2019 with Dr. Dasilva.  She is now status post left SI joint fusion with Dr. Joya on 07/31/2019.  She presents for 2 weeks wound check.    -Patient is doing well postoperatively. Some incisional pain that is expected. Incision is healing well. Imaging stable. I have reiterated wound care, activity restrictions, and follow up appts as below.       -continue current right oxycodone and muscle relaxant p.r.n. severe pain.  May supplement with OTC Tylenol with mild pain. do not exceed > 3-4 g daily limit.  -Do not submerge incision for another 6 weeks. Pat the incision dry after your shower.  -Keep  incision open to air. Turn q2h to promote appropriate wound healing.  -Monocryl suture will dissolve on it's own.   -Continue p.r.n. stool softener and miralax to prevent constipation with pain med use.   -Increase ambulation. No heavy lifting >10lbs.   No over bending or twisting at incisional site.  Fall precautions.  -Patient has follow up with Dr. Dasilva in 4-6 weeks with SI xrays.     All questions were answered. Patient was encouraged to call clinic with any future concerns prior to follow up appt. If any worsening symptoms, patient should report to ED.     Please call with any questions.    Wiliam Urias PA-C  Neurosurgery      Note dictated with voice recognition software, please excuse any grammatical errors.

## 2019-08-14 RX ORDER — AMLODIPINE BESYLATE 2.5 MG/1
2.5 TABLET ORAL 2 TIMES DAILY
Qty: 180 TABLET | Refills: 1 | OUTPATIENT
Start: 2019-08-14

## 2019-09-09 ENCOUNTER — PATIENT MESSAGE (OUTPATIENT)
Dept: NEUROSURGERY | Facility: CLINIC | Age: 84
End: 2019-09-09

## 2019-09-09 DIAGNOSIS — M54.5 CHRONIC LOW BACK PAIN, UNSPECIFIED BACK PAIN LATERALITY, WITH SCIATICA PRESENCE UNSPECIFIED: ICD-10-CM

## 2019-09-09 DIAGNOSIS — M53.3 SI (SACROILIAC) JOINT DYSFUNCTION: Primary | ICD-10-CM

## 2019-09-09 DIAGNOSIS — G89.29 CHRONIC LOW BACK PAIN, UNSPECIFIED BACK PAIN LATERALITY, WITH SCIATICA PRESENCE UNSPECIFIED: ICD-10-CM

## 2019-09-11 ENCOUNTER — PATIENT MESSAGE (OUTPATIENT)
Dept: NEUROSURGERY | Facility: CLINIC | Age: 84
End: 2019-09-11

## 2019-09-12 ENCOUNTER — PATIENT MESSAGE (OUTPATIENT)
Dept: NEUROSURGERY | Facility: CLINIC | Age: 84
End: 2019-09-12

## 2019-09-17 ENCOUNTER — OFFICE VISIT (OUTPATIENT)
Dept: NEUROSURGERY | Facility: CLINIC | Age: 84
End: 2019-09-17
Payer: MEDICARE

## 2019-09-17 ENCOUNTER — HOSPITAL ENCOUNTER (OUTPATIENT)
Dept: RADIOLOGY | Facility: HOSPITAL | Age: 84
Discharge: HOME | End: 2019-09-17
Attending: PHYSICIAN ASSISTANT
Payer: MEDICARE

## 2019-09-17 VITALS
SYSTOLIC BLOOD PRESSURE: 120 MMHG | HEIGHT: 64 IN | DIASTOLIC BLOOD PRESSURE: 71 MMHG | WEIGHT: 134.69 LBS | HEART RATE: 60 BPM | BODY MASS INDEX: 22.99 KG/M2

## 2019-09-17 DIAGNOSIS — M24.60 FUSION OF JOINT: ICD-10-CM

## 2019-09-17 DIAGNOSIS — M70.61 TROCHANTERIC BURSITIS OF RIGHT HIP: Primary | ICD-10-CM

## 2019-09-17 DIAGNOSIS — M53.3 SACROILIAC JOINT DYSFUNCTION OF BOTH SIDES: ICD-10-CM

## 2019-09-17 PROCEDURE — 99024 PR POST-OP FOLLOW-UP VISIT: ICD-10-PCS | Mod: S$GLB,,, | Performed by: NEUROLOGICAL SURGERY

## 2019-09-17 PROCEDURE — 99999 PR PBB SHADOW E&M-EST. PATIENT-LVL IV: CPT | Mod: PBBFAC,,, | Performed by: NEUROLOGICAL SURGERY

## 2019-09-17 PROCEDURE — 72202 X-RAY EXAM SI JOINTS 3/> VWS: CPT | Mod: TC,FY

## 2019-09-17 PROCEDURE — 99999 PR PBB SHADOW E&M-EST. PATIENT-LVL IV: ICD-10-PCS | Mod: PBBFAC,,, | Performed by: NEUROLOGICAL SURGERY

## 2019-09-17 PROCEDURE — 99024 POSTOP FOLLOW-UP VISIT: CPT | Mod: S$GLB,,, | Performed by: NEUROLOGICAL SURGERY

## 2019-09-17 PROCEDURE — 72202 XR SACROILIAC JOINTS COMPLETE: ICD-10-PCS | Mod: 26,,, | Performed by: RADIOLOGY

## 2019-09-17 PROCEDURE — 72202 X-RAY EXAM SI JOINTS 3/> VWS: CPT | Mod: 26,,, | Performed by: RADIOLOGY

## 2019-09-17 RX ORDER — OXYCODONE HYDROCHLORIDE 5 MG/1
5-10 TABLET ORAL EVERY 4 HOURS PRN
Qty: 84 TABLET | Refills: 0 | Status: SHIPPED | OUTPATIENT
Start: 2019-09-17 | End: 2019-09-26

## 2019-09-18 NOTE — PROGRESS NOTES
NEUROSURGICAL POST-OPERATIVE PROGRESS NOTE    DATE OF SERVICE:  09/17/2019      ATTENDING PHYSICIAN:  Xavier Dasilva MD    SUBJECTIVE:    INTERIM HISTORY:    This is a very pleasant 84 y.o. y.o. female, who is status bilateral SI joint fusion using the iFuse system. Her right SI joint was fused in 02/2019 and her left SI joint was fused 6 weeks ago. Since the surgery she has resumed work as a . Her pain has significantly improved compared to before the surgery. She is continuing her PT. She is more mobile and is regaining strength. She is complaining of pain when she lies in bed on her right hip. No new weakness or numbness.      Low Back Pain Scale  R Low Back-Pain Score: 7  R Low Back-Pain Intensity: Pain killers give moderate relief from pain  R Low Back-Pain Score: I need some help, but manage most of my personal care  Low Back-Lifting: Pain prevents me from lifting heavy weights  but I can manage light weights if they are conveniently placed   Low Back-Walking: Pain prevents me walking more than .25 mile   Low Back-Sitting: I can sit only in my favorite chair as long as I like   Low Back-Standing: I cannot stand for longer than 10 minutes with increasing pain   Low Back-Sleeping: Because of pain my normal nights sleep is reduced by less than one half   Low Back-Social Life: Pain has no significant effect on my social life apart from limiting my more en   Low Back-Traveling: I have extra pain while traveling which compels me to seek alternate forms of travel   Low Back-Changing Degree of Pain: My pain seems to be getting better but improvement is slow         OBJECTIVE:    PHYSICAL EXAMINATION:   Vitals:    09/17/19 1617   BP: 120/71   Pulse: 60       Neurosurgery Physical Exam    Ortho Exam    Neurologic Exam     Motor Exam     Strength   Strength 5/5 throughout.       Wound has healed.    DIAGNOSTIC DATA:    X-ray of the SI joints reveals the fusion hardware is intact. No loosening or migration of  hardware.    ASSESMENT:    This is a 84 y.o. female who is s/p bilateral SI joint fusion with improve functional status and pain.     Problem List Items Addressed This Visit        Orthopedic    Trochanteric bursitis of right hip - Primary    Relevant Orders    Ambulatory referral/consult to Orthopedics      Other Visit Diagnoses     Sacroiliac joint dysfunction of both sides        Relevant Orders    Ambulatory referral/consult to Orthopedics          PLAN:    Will see her back in 6 weeks        Xavier Dasilva MD  Pager: [phone]

## 2019-09-19 ENCOUNTER — TELEPHONE (OUTPATIENT)
Dept: CARDIOLOGY | Facility: CLINIC | Age: 84
End: 2019-09-19

## 2019-09-19 NOTE — TELEPHONE ENCOUNTER
Called pt to reschedule her appt on 10-11-19. Could not leave a msg pt does not have a voice mail set up

## 2019-09-22 ENCOUNTER — PATIENT MESSAGE (OUTPATIENT)
Dept: NEUROSURGERY | Facility: CLINIC | Age: 84
End: 2019-09-22

## 2019-09-23 ENCOUNTER — PATIENT MESSAGE (OUTPATIENT)
Dept: NEUROSURGERY | Facility: CLINIC | Age: 84
End: 2019-09-23

## 2019-09-23 ENCOUNTER — TELEPHONE (OUTPATIENT)
Dept: NEUROSURGERY | Facility: CLINIC | Age: 84
End: 2019-09-23

## 2019-09-23 ENCOUNTER — PATIENT OUTREACH (OUTPATIENT)
Dept: ADMINISTRATIVE | Facility: OTHER | Age: 84
End: 2019-09-23

## 2019-09-23 NOTE — TELEPHONE ENCOUNTER
Spoke to the patient this morning, she is asking who you are referring her to for a hip injection. I looked at the last visit note and did not see anything.

## 2019-09-23 NOTE — TELEPHONE ENCOUNTER
Spoke to pharmacy instructed to fill 7 day supply this time and Dr Dasilva will write medically necessary on the next prescription.

## 2019-09-23 NOTE — TELEPHONE ENCOUNTER
----- Message from Terese Cagle sent at 9/23/2019  9:17 AM CDT -----  Contact: [phone] [facility]   The pharmacy is calling to talk to nurse in regards to patients pain medication. The hard copy has to have the new laws written on the RX.

## 2019-09-24 ENCOUNTER — OFFICE VISIT (OUTPATIENT)
Dept: ORTHOPEDICS | Facility: CLINIC | Age: 84
End: 2019-09-24
Payer: MEDICARE

## 2019-09-24 ENCOUNTER — HOSPITAL ENCOUNTER (OUTPATIENT)
Dept: RADIOLOGY | Facility: HOSPITAL | Age: 84
Discharge: HOME | End: 2019-09-24
Attending: NURSE PRACTITIONER
Payer: MEDICARE

## 2019-09-24 VITALS
BODY MASS INDEX: 23.18 KG/M2 | TEMPERATURE: 97 F | HEART RATE: 65 BPM | DIASTOLIC BLOOD PRESSURE: 66 MMHG | HEIGHT: 64 IN | SYSTOLIC BLOOD PRESSURE: 146 MMHG | WEIGHT: 135.81 LBS | RESPIRATION RATE: 18 BRPM

## 2019-09-24 DIAGNOSIS — M25.551 PAIN OF BOTH HIP JOINTS: ICD-10-CM

## 2019-09-24 DIAGNOSIS — M25.552 PAIN OF BOTH HIP JOINTS: ICD-10-CM

## 2019-09-24 DIAGNOSIS — M70.61 TROCHANTERIC BURSITIS OF BOTH HIPS: Primary | ICD-10-CM

## 2019-09-24 DIAGNOSIS — M70.62 TROCHANTERIC BURSITIS OF BOTH HIPS: Primary | ICD-10-CM

## 2019-09-24 PROCEDURE — 73521 XR HIPS BILATERAL 2 VIEW INCL AP PELVIS: ICD-10-PCS | Mod: 26,,, | Performed by: RADIOLOGY

## 2019-09-24 PROCEDURE — 73521 X-RAY EXAM HIPS BI 2 VIEWS: CPT | Mod: 26,,, | Performed by: RADIOLOGY

## 2019-09-24 PROCEDURE — 1101F PT FALLS ASSESS-DOCD LE1/YR: CPT | Mod: CPTII,S$GLB,, | Performed by: NURSE PRACTITIONER

## 2019-09-24 PROCEDURE — 20610 DRAIN/INJ JOINT/BURSA W/O US: CPT | Mod: 50,S$GLB,, | Performed by: NURSE PRACTITIONER

## 2019-09-24 PROCEDURE — 99214 PR OFFICE/OUTPT VISIT, EST, LEVL IV, 30-39 MIN: ICD-10-PCS | Mod: 25,S$GLB,, | Performed by: NURSE PRACTITIONER

## 2019-09-24 PROCEDURE — 3078F PR MOST RECENT DIASTOLIC BLOOD PRESSURE < 80 MM HG: ICD-10-PCS | Mod: CPTII,S$GLB,, | Performed by: NURSE PRACTITIONER

## 2019-09-24 PROCEDURE — 73521 X-RAY EXAM HIPS BI 2 VIEWS: CPT | Mod: TC

## 2019-09-24 PROCEDURE — 3077F SYST BP >= 140 MM HG: CPT | Mod: CPTII,S$GLB,, | Performed by: NURSE PRACTITIONER

## 2019-09-24 PROCEDURE — 99214 OFFICE O/P EST MOD 30 MIN: CPT | Mod: 25,S$GLB,, | Performed by: NURSE PRACTITIONER

## 2019-09-24 PROCEDURE — 20610 PR DRAIN/INJECT LARGE JOINT/BURSA: ICD-10-PCS | Mod: 50,S$GLB,, | Performed by: NURSE PRACTITIONER

## 2019-09-24 PROCEDURE — 3078F DIAST BP <80 MM HG: CPT | Mod: CPTII,S$GLB,, | Performed by: NURSE PRACTITIONER

## 2019-09-24 PROCEDURE — 99999 PR PBB SHADOW E&M-EST. PATIENT-LVL V: ICD-10-PCS | Mod: PBBFAC,,, | Performed by: NURSE PRACTITIONER

## 2019-09-24 PROCEDURE — 99999 PR PBB SHADOW E&M-EST. PATIENT-LVL V: CPT | Mod: PBBFAC,,, | Performed by: NURSE PRACTITIONER

## 2019-09-24 PROCEDURE — 3077F PR MOST RECENT SYSTOLIC BLOOD PRESSURE >= 140 MM HG: ICD-10-PCS | Mod: CPTII,S$GLB,, | Performed by: NURSE PRACTITIONER

## 2019-09-24 PROCEDURE — 1101F PR PT FALLS ASSESS DOC 0-1 FALLS W/OUT INJ PAST YR: ICD-10-PCS | Mod: CPTII,S$GLB,, | Performed by: NURSE PRACTITIONER

## 2019-09-24 RX ORDER — TRIAMCINOLONE ACETONIDE 40 MG/ML
40 INJECTION, SUSPENSION INTRA-ARTICULAR; INTRAMUSCULAR
Status: COMPLETED | OUTPATIENT
Start: 2019-09-24 | End: 2019-09-24

## 2019-09-24 RX ADMIN — TRIAMCINOLONE ACETONIDE 40 MG: 40 INJECTION, SUSPENSION INTRA-ARTICULAR; INTRAMUSCULAR at 04:09

## 2019-09-24 NOTE — PROGRESS NOTES
SUBJECTIVE:     Chief Complaint & History of Present Illness:  Cheryl Ghosh is a Established 84 y.o. year old female patient presenting today for constant bilateral hip pain which started several months ago.  There is not a history of trauma.  She reports she had SI spine fusion by Dr. Dasilva right done Feb 2019 and Left approximately 6 weeks ago.  She is currently in aqua therapy.  States pain is to lateral aspect of each hip and radiates down to her knee.  Pain is worsen with activity and when she lays on affected side.  She is currently using OxyContin for severe pain, muscle relaxants PRN and heated vibration therapy in the morning.  She denies any fever or chills, has a history of neuropathy but no numbness or tingling sensation to her feet at this time.  She was seen by Dr. Dasilva last week, he referred her to Ortho for steroid injections.          Past Medical History:   Diagnosis Date    Arthritis     lumbar    Blood transfusion     Chronic kidney disease, stage III (moderate) 8/2/2017    Degenerative disc disease     lumbar    Insulinoma     Pancreatic disease     Urinary incontinence 12/7/2015       Past Surgical History:   Procedure Laterality Date    ADENOIDECTOMY      APPENDECTOMY      CATARACT EXTRACTION, BILATERAL      EYE SURGERY      HYSTERECTOMY      SAMM/BSO    INJECTION OF STEROID Right 12/20/2018    Procedure: INJECTION, STEROID-- Right SI Joint Block and  Steroid Injection;  Surgeon: Xavier Dasilva MD;  Location: [facility];  Service: Neurosurgery;  Laterality: Right;  INJECTION, STEROID-- Right SI Joint Block and  Steroid Injection  SURGERY TIME:1 HR  LOS: 0 DAYS  RADIOLOGY: C- arm  BED: Regular Bed  with Pillows  POSITION: Prone      INJECTION OF STEROID Left 6/19/2019    Procedure: INJECTION, STEROID Procedure:Left sacroiliac joint block / steroid injection Surgery Time: 30mins LOS: Anesthesia: MAC Radiology: C-arm Bed: Regular Bed Position: Prone;  Surgeon: Xavier Dasilva  MD;  Location[facility];  Service: Neurosurgery;  Laterality: Left;    MINIMALLY INVASIVE FUSION OF SPINE Right 2/6/2019    Procedure: FUSION, SPINE, MINIMALLY INVASIVE Right Sacroiliac Joint Fusion -  I Fuse;  Surgeon: Xavier Dasilva MD;  Location: [facility] OR;  Service: Neurosurgery;  Laterality: Right;  Procedure: Right Sacroiliac Joint Fusion -  I Fuse  Surgery Time: 1.5 Hr  LOS: 0  Anesthesia: General  Radiology: C-Arm  Bed: Zaki 4 Poster  Position: Prone  Equipment: I Randy Kincaid [id number] (notified 1/24 EF)    OOPHORECTOMY      PANCREAS SURGERY      Insulanoma    RADIOFREQUENCY ABLATION OF LUMBAR MEDIAL BRANCH NERVE AT SINGLE LEVEL Right 8/28/2018    Procedure: RADIOFREQUENCY ABLATION, NERVE, MEDIAL BRANCH, LUMBAR, 1 LEVEL- Right L3,4,5 IV SEDATION;  Surgeon: Eddie Vega MD;  Location: [facility] PAIN [facility]GT;  Service: Pain Management;  Laterality: Right;    RADIOFREQUENCY ABLATION OF LUMBAR MEDIAL BRANCH NERVE AT SINGLE LEVEL Left 9/4/2018    Procedure: RADIOFREQUENCY ABLATION, NERVE, MEDIAL BRANCH, LUMBAR, 1 LEVEL - Left L3,4,5 IV SEADTION;  Surgeon: Eddie Vega MD;  Location: [facility] PAIN [facility];  Service: Pain Management;  Laterality: Left;  Patient takes ASA     REFRACTIVE SURGERY Bilateral     SPINAL FUSION Left 7/31/2019    Procedure: FUSION, SPINE SI Joint Fusion;  Surgeon: Xavier Dasilva MD;  Location: [facility] OR;  Service: Neurosurgery;  Laterality: Left;  Procedure: Left SI joint fusion  Surgery Time: 1.5hr  LOS:0  Anesthesia: General   Radiology: C-arm   Bed: Zaki 4 Poster  Position: Prone  Equipment: Codi Diaz [phone] (Codi notified)    TONSILLECTOMY         Family History   Problem Relation Age of Onset    Breast cancer Mother     Heart disease Father     Aneurysm Sister     Glaucoma Daughter     Heart attack Son     Breast cancer Other        Review of patient's allergies indicates:   Allergen Reactions    Pcn [penicillins] Hives and Nausea And Vomiting          Current Outpatient Medications:     acetaminophen (TYLENOL 8 HOUR ORAL), Take by mouth., Disp: , Rfl:     amLODIPine (NORVASC) 2.5 MG tablet, Take 1 tablet (2.5 mg total) by mouth 2 (two) times daily. (Patient taking differently: Take 5 mg by mouth once daily. ), Disp: 180 tablet, Rfl: 3    aspirin (ECOTRIN) 81 MG EC tablet, Take 1 tablet (81 mg total) by mouth once daily. Resume 48 hours post-surgery, Disp: , Rfl: 0    b complex vitamins tablet, Take 1 tablet by mouth once daily., Disp: , Rfl:     calcium-vitamin D (CALCIUM WITH VITAMIN D) 600 mg(1,500mg) -400 unit Tab, , Disp: , Rfl:     diclofenac sodium (VOLTAREN) 1 % Gel, APPLY 2.5 GRAMS TO THE AFFECTED AREA 3-4 TIMES DAILY STARTING 2 WEEKS POST SURGERY., Disp: , Rfl: 3    gabapentin (NEURONTIN) 300 MG capsule, TAKE 1 CAPSULE BY MOUTH THREE TIMES A DAY, Disp: 270 capsule, Rfl: 1    lidocaine (XYLOCAINE) 5 % Oint ointment, APPLY 2.5 GRAMS TO THE AFFECTED AREA 3-4 TIMES DAILY STARTING 2 WEEKS POST SURGERY., Disp: 1 Tube, Rfl: 11    magnesium 250 mg Tab, , Disp: , Rfl:     mupirocin (BACTROBAN) 2 % ointment, APPLY 1/2 GRAM TO EACH NOSTRIL TWICE A DAY FOR UP TO 5 DAYS PRIOR TO SURGERY OR AS DIRECTED BY YOUR PHYSICIAN., Disp: , Rfl: 0    omega 3-dha-epa-fish oil (FISH OIL) 100-160-1,000 mg Cap, , Disp: , Rfl:     oxyCODONE (ROXICODONE) 5 MG immediate release tablet, Take 1 to 2 tablets by mouth every 4 (four) hours as needed for Pain, Disp: 84 tablet, Rfl: 0    oxyCODONE-acetaminophen (PERCOCET) 5-325 mg per tablet, Take 1 tablet by mouth every 24 hours as needed for Pain., Disp: 20 tablet, Rfl: 0    potassium 99 mg Tab, , Disp: , Rfl:     pravastatin (PRAVACHOL) 40 MG tablet, Take 1 tablet (40 mg total) by mouth every evening., Disp: 90 tablet, Rfl: 3    pyridoxine (VITAMIN B-6) 100 MG Tab, Take 100 mg by mouth once daily., Disp: , Rfl:     SANARE ADV SCAR THERAPY BASE Gel, APPLY 1/2 GRAM (1 PUMP) TO AFFECTED AREAS TWICE DAILY STARTING 4  WEEKS POST SURGERY., Disp: , Rfl: 3    tiZANidine (ZANAFLEX) 4 MG tablet, , Disp: , Rfl:   No current facility-administered medications for this visit.     Facility-Administered Medications Ordered in Other Visits:     gentamicin 80 mg/100ml NACL IVPB IVPB 80 mg, 80 mg, Intravenous, 30 Min Pre-Op, Wiliam Urias PA-C, 80 mg at 07/31/19 0747    vancomycin in dextrose 5 % 1 gram/250 mL IVPB 1,000 mg, 1,000 mg, Intravenous, 30 Min Pre-Op, Wiliam Urias PA-C, 1,000 mg at 07/31/19 0738    Review of Systems:  ROS:  Constitutional: no fever or chills  Eyes: no visual changes  ENT: no nasal congestion or sore throat  Respiratory: no cough or shortness of breath  Cardiovascular: no chest pain or palpitations  Gastrointestinal: no nausea or vomiting, tolerating diet  Genitourinary: no hematuria or dysuria  Integument/Breast: no rash or pruritis  Hematologic/Lymphatic: no easy bruising or lymphadenopathy  Musculoskeletal: positive for arthralgias  Neurological: no seizures or tremors  Behavioral/Psych: no auditory or visual hallucinations  Endocrine: no heat or cold intolerance      PE:  LMP  (LMP Unknown)   General: Pleasant, cooperative, NAD   HEENT: NCAT, sclera nonicteric   Lungs: Respirations are equal and unlabored.   Abdomen: Soft and non-tender.  CV: 2+ bilateral upper and lower extremity pulses.   Skin: Intact throughout LE with no rashes, erythema, or lesions  Extremities: No LE edema, NVI lower extremities      Hip Exam:   bilateralpositives: tenderness over greater trochanter    90 degrees flexion  0 degrees extension   45 degrees internal rotation  40 degrees external rotation  25 degrees abduction  25 degrees adduction         RADIOGRAPHS:  X-ray of bilateral hips obtained, personally reviewed by me.  No fractures or dislocations seen. She has hardware bilaterally to her SI joints.  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    ASSESSMENT/PLAN:       ICD-10-CM ICD-9-CM   1. Trochanteric bursitis of both hips  M70.61 726.5    M70.62    2. Pain of both hip joints M25.551 719.45    M25.552        Plan: We discussed with the patient at length all the different treatment options available for arthrosis of the hip including anti-inflammatories, acetaminophen, rest, ice, lower extremity strengthening exercise, occasional cortisone injections for temporary relief, and finally total hip arthroplasty.     -Patient presents with bilateral hip pain.  -X-ray as above.  -Requesting CSI today.  -Recommend to continue pain modalities as previously prescribed.    -Recommend alternating NSAIDs and Tylenol PRN for mild pain.  -Suggest using a cold compact and roll up and down lateral aspect of leg after activity and warm compress in the morning.  -Continue Aqua therapy.  -She can follow up in 6 weeks PRN or sooner for new or worsening pain.  Not eligible for another steroid injection for 3 months.    PROCEDURE:  I have explained the risks, benefits, and alternatives of the procedure in detail.  The patient voices understanding and all questions have been answered.  The patient agrees to proceed as planned. So after I performed a sterile pre of the skin in the normal fashion the bilateral hips at the greater trochanteric area was injected from the lateral approach using an 2 inch 22 gauge needle with a combination of 4cc 1% lidocaine and 40 mg of Kenalog to each hip.  The patient is cautioned and immediate relief of pain is secondary to the local anesthetic and will be temporary.  After the anesthetic wears off there may be a increase in pain that may last for a few hours or a few days and they should use ice to help alleviate this flair up of pain.     Patient tolerated procedure well and post injection they reported improvement in their pain.

## 2019-09-24 NOTE — LETTER
September 24, 2019      Xavier Dasilva MD  [address]  [address]  [facility] [address]           [facility] - Orthopedics  [address]  [facility] [address]  Phone: [phone]          Patient: Cheryl Ghosh   MR Number: [id number]   YOB: 1934   Date of Visit: 9/24/2019       Dear Dr. Xavier Dasilva:    Thank you for referring Cheryl Ghosh to me for evaluation. Attached you will find relevant portions of my assessment and plan of care.    If you have questions, please do not hesitate to call me. I look forward to following Cheryl Ghosh along with you.    Sincerely,    Glenis Valderrama MA    Enclosure  CC:  No Recipients    If you would like to receive this communication electronically, please contact [email or web address] or [phone] to request more information on [employer] access.    For providers and/or their staff who would like to refer a patient to [facility], please contact us through our one-stop-shop provider referral line, [facility] , at [phone].    If you feel you have received this communication in error or would no longer like to receive these types of communications, please e-mail [email or web address]

## 2019-09-26 ENCOUNTER — TELEPHONE (OUTPATIENT)
Dept: NEUROSURGERY | Facility: CLINIC | Age: 84
End: 2019-09-26

## 2019-09-26 RX ORDER — OXYCODONE HYDROCHLORIDE 5 MG/1
5 CAPSULE ORAL EVERY 12 HOURS PRN
Qty: 40 CAPSULE | Refills: 0 | Status: SHIPPED | OUTPATIENT
Start: 2019-09-26 | End: 2021-02-02

## 2019-09-26 NOTE — TELEPHONE ENCOUNTER
Please resend script stating quantity greater than 7 days is medically necessary - per pharmacist

## 2019-09-26 NOTE — TELEPHONE ENCOUNTER
----- Message from Sho Alexis sent at 9/26/2019 10:24 AM CDT -----  Contact[facility] - [phone]    Needs a call back on the below. Please advise       oxyCODONE (ROXICODONE) 5 MG immediate release tablet

## 2019-10-17 ENCOUNTER — OFFICE VISIT (OUTPATIENT)
Dept: FAMILY MEDICINE | Facility: CLINIC | Age: 84
End: 2019-10-17
Attending: FAMILY MEDICINE
Payer: MEDICARE

## 2019-10-17 ENCOUNTER — LAB VISIT (OUTPATIENT)
Dept: LAB | Facility: HOSPITAL | Age: 84
End: 2019-10-17
Attending: FAMILY MEDICINE
Payer: MEDICARE

## 2019-10-17 VITALS
SYSTOLIC BLOOD PRESSURE: 132 MMHG | DIASTOLIC BLOOD PRESSURE: 76 MMHG | HEART RATE: 59 BPM | HEIGHT: 64 IN | OXYGEN SATURATION: 99 % | BODY MASS INDEX: 21.79 KG/M2 | WEIGHT: 127.63 LBS

## 2019-10-17 DIAGNOSIS — E78.2 MIXED HYPERLIPIDEMIA: ICD-10-CM

## 2019-10-17 DIAGNOSIS — I10 HTN (HYPERTENSION), BENIGN: ICD-10-CM

## 2019-10-17 DIAGNOSIS — N18.30 CHRONIC KIDNEY DISEASE, STAGE III (MODERATE): ICD-10-CM

## 2019-10-17 DIAGNOSIS — I10 HTN (HYPERTENSION), BENIGN: Primary | ICD-10-CM

## 2019-10-17 DIAGNOSIS — H61.23 BILATERAL IMPACTED CERUMEN: ICD-10-CM

## 2019-10-17 LAB
ALBUMIN SERPL BCP-MCNC: 3.8 G/DL (ref 3.5–5.2)
ALP SERPL-CCNC: 60 U/L (ref 55–135)
ALT SERPL W/O P-5'-P-CCNC: 15 U/L (ref 10–44)
ANION GAP SERPL CALC-SCNC: 8 MMOL/L (ref 8–16)
AST SERPL-CCNC: 21 U/L (ref 10–40)
BILIRUB SERPL-MCNC: 0.5 MG/DL (ref 0.1–1)
BUN SERPL-MCNC: 20 MG/DL (ref 8–23)
CALCIUM SERPL-MCNC: 9.2 MG/DL (ref 8.7–10.5)
CHLORIDE SERPL-SCNC: 106 MMOL/L (ref 95–110)
CO2 SERPL-SCNC: 27 MMOL/L (ref 23–29)
CREAT SERPL-MCNC: 1.1 MG/DL (ref 0.5–1.4)
EST. GFR  (AFRICAN AMERICAN): 53 ML/MIN/1.73 M^2
EST. GFR  (NON AFRICAN AMERICAN): 46 ML/MIN/1.73 M^2
GLUCOSE SERPL-MCNC: 101 MG/DL (ref 70–110)
POTASSIUM SERPL-SCNC: 4.6 MMOL/L (ref 3.5–5.1)
PROT SERPL-MCNC: 6.7 G/DL (ref 6–8.4)
SODIUM SERPL-SCNC: 141 MMOL/L (ref 136–145)

## 2019-10-17 PROCEDURE — 3078F PR MOST RECENT DIASTOLIC BLOOD PRESSURE < 80 MM HG: ICD-10-PCS | Mod: CPTII,S$GLB,, | Performed by: FAMILY MEDICINE

## 2019-10-17 PROCEDURE — 99214 PR OFFICE/OUTPT VISIT, EST, LEVL IV, 30-39 MIN: ICD-10-PCS | Mod: S$GLB,,, | Performed by: FAMILY MEDICINE

## 2019-10-17 PROCEDURE — 3075F SYST BP GE 130 - 139MM HG: CPT | Mod: CPTII,S$GLB,, | Performed by: FAMILY MEDICINE

## 2019-10-17 PROCEDURE — 99999 PR PBB SHADOW E&M-EST. PATIENT-LVL III: CPT | Mod: PBBFAC,,, | Performed by: FAMILY MEDICINE

## 2019-10-17 PROCEDURE — 80053 COMPREHEN METABOLIC PANEL: CPT

## 2019-10-17 PROCEDURE — 3078F DIAST BP <80 MM HG: CPT | Mod: CPTII,S$GLB,, | Performed by: FAMILY MEDICINE

## 2019-10-17 PROCEDURE — 99999 PR PBB SHADOW E&M-EST. PATIENT-LVL III: ICD-10-PCS | Mod: PBBFAC,,, | Performed by: FAMILY MEDICINE

## 2019-10-17 PROCEDURE — 1101F PR PT FALLS ASSESS DOC 0-1 FALLS W/OUT INJ PAST YR: ICD-10-PCS | Mod: CPTII,S$GLB,, | Performed by: FAMILY MEDICINE

## 2019-10-17 PROCEDURE — 36415 COLL VENOUS BLD VENIPUNCTURE: CPT

## 2019-10-17 PROCEDURE — 99214 OFFICE O/P EST MOD 30 MIN: CPT | Mod: S$GLB,,, | Performed by: FAMILY MEDICINE

## 2019-10-17 PROCEDURE — 1101F PT FALLS ASSESS-DOCD LE1/YR: CPT | Mod: CPTII,S$GLB,, | Performed by: FAMILY MEDICINE

## 2019-10-17 PROCEDURE — 3075F PR MOST RECENT SYSTOLIC BLOOD PRESS GE 130-139MM HG: ICD-10-PCS | Mod: CPTII,S$GLB,, | Performed by: FAMILY MEDICINE

## 2019-10-17 NOTE — PROGRESS NOTES
Subjective:       Patient ID: Cheryl Ghosh is a 84 y.o. female.    Chief Complaint: Follow-up (3 mths follow-up)    84 yr old pleasant white female with HTN, OA, osteopenia hip, hyperlipidemia, chronic back pain, BPPV, presents today for her 3 month check, lab work review. No new complaints today.      HTN - controlled - low salt diet - having issues with medications lisinopril n losartan      HLD - slightly worsening since stopped statin x 30 days           - she was asked to start statin but due to her fall and issues with her ribs and back, she was never able to start it.    Back pain - she follows pain clinic for injections - c/o neuropathic pain in legs - was on neurontin in the past and she started taking it and working    Osteopenia hip - stable - had dexa in 12/15 and she is talking fosamax since many years and stopped last year- she is due for dexa in 12/17 - no side effects    HISTORY as below - reviewed    Health maintenance  -labs UTD  -mammo UTD  -colon screen UTD  -vaccines UTD    Hypertension   This is a chronic problem. The current episode started more than 1 year ago. The problem has been gradually improving since onset. The problem is resistant. Associated symptoms include headaches. Pertinent negatives include no anxiety, blurred vision, chest pain, malaise/fatigue, neck pain, orthopnea, palpitations, peripheral edema, PND, shortness of breath or sweats. Agents associated with hypertension include amphetamines and NSAIDs. Risk factors for coronary artery disease include family history. Past treatments include nothing. The current treatment provides moderate improvement. Compliance problems include exercise.  There is no history of chronic renal disease.   Hyperlipidemia   This is a chronic problem. The current episode started more than 1 year ago. The problem is uncontrolled. Recent lipid tests were reviewed and are high. She has no history of chronic renal disease, diabetes, hypothyroidism,  liver disease, obesity or nephrotic syndrome. There are no known factors aggravating her hyperlipidemia. Associated symptoms include myalgias. Pertinent negatives include no chest pain, focal sensory loss, leg pain or shortness of breath. Current antihyperlipidemic treatment includes diet change. The current treatment provides mild improvement of lipids. There are no compliance problems.  Risk factors for coronary artery disease include dyslipidemia.     Review of Systems   Constitutional: Negative.  Negative for activity change, diaphoresis, malaise/fatigue and unexpected weight change.   HENT: Negative.  Negative for congestion, ear discharge, hearing loss, rhinorrhea, sore throat and voice change.    Eyes: Negative.  Negative for blurred vision, pain, discharge and visual disturbance.   Respiratory: Negative.  Negative for chest tightness, shortness of breath and wheezing.    Cardiovascular: Negative.  Negative for chest pain, palpitations, orthopnea and PND.   Gastrointestinal: Negative.  Negative for abdominal distention, anal bleeding, constipation and nausea.   Endocrine: Negative.  Negative for cold intolerance, polydipsia and polyuria.   Genitourinary: Negative.  Negative for decreased urine volume, difficulty urinating, dysuria, frequency, menstrual problem and vaginal pain.   Musculoskeletal: Positive for myalgias. Negative for arthralgias, gait problem and neck pain.   Skin: Negative.  Negative for color change, pallor and wound.   Allergic/Immunologic: Negative.  Negative for environmental allergies and immunocompromised state.   Neurological: Positive for headaches. Negative for dizziness, tremors, seizures and speech difficulty.   Hematological: Negative.  Negative for adenopathy. Does not bruise/bleed easily.   Psychiatric/Behavioral: Negative.  Negative for agitation, confusion, decreased concentration, hallucinations, self-injury and suicidal ideas. The patient is not nervous/anxious.         PMH/PSH/FH/SH/MED/ALLERGY reviewed    Objective:       Vitals:    10/17/19 1123   BP: 132/76   Pulse: (!) 59       Physical Exam   Constitutional: She is oriented to person, place, and time. She appears well-developed and well-nourished. No distress.   HENT:   Head: Normocephalic and atraumatic.   Right Ear: External ear normal.   Left Ear: External ear normal.   Nose: Nose normal.   Mouth/Throat: Oropharynx is clear and moist. No oropharyngeal exudate.   Cerumen impaction R>L   Eyes: Pupils are equal, round, and reactive to light. Conjunctivae and EOM are normal. Right eye exhibits no discharge. Left eye exhibits no discharge. No scleral icterus.   Neck: Normal range of motion. Neck supple. No JVD present. No tracheal deviation present. No thyromegaly present.   Cardiovascular: Normal rate, regular rhythm, normal heart sounds and intact distal pulses. Exam reveals no gallop and no friction rub.   No murmur heard.  Pulmonary/Chest: Effort normal and breath sounds normal. No stridor. She has no wheezes. She has no rales. She exhibits no tenderness.   Abdominal: Soft. Bowel sounds are normal. She exhibits no distension and no mass. There is no tenderness. There is no rebound and no guarding. No hernia.   Musculoskeletal: Normal range of motion. She exhibits no edema or tenderness.   Lymphadenopathy:     She has no cervical adenopathy.   Neurological: She is alert and oriented to person, place, and time. She has normal reflexes. She displays normal reflexes. No cranial nerve deficit. She exhibits normal muscle tone. Coordination normal.   Skin: Skin is warm and dry. No rash noted. She is not diaphoretic. No erythema. No pallor.   Psychiatric: She has a normal mood and affect. Her behavior is normal. Judgment and thought content normal.       Assessment:       1. HTN (hypertension), benign    2. Mixed hyperlipidemia    3. Chronic kidney disease, stage III (moderate)    4. Bilateral impacted cerumen        Plan:            Cheryl was seen today for follow-up.    Diagnoses and all orders for this visit:    HTN (hypertension), benign  -     Comprehensive metabolic panel; Future    Mixed hyperlipidemia  -     Comprehensive metabolic panel; Future    Chronic kidney disease, stage III (moderate)  -     Comprehensive metabolic panel; Future    Bilateral impacted cerumen         HTN  -controlled          TIA  -follow neurology - DC plavix per neuro recommendations and just stay on ASA      Insomnia  -try benadryl only if needed    HLD  -diet controlled and statin    Urine incontinence  -lifestyle changes - intolerant to meds    Neuropathic pain  -continue neurontin    OA knee B/L  -mobic prn    CKD III  -lab and urine analysis  -adequate hydration    Cerumen impaction  -ear wax removal      Spent adequate time in obtaining history and explaining differentials    25 minutes spent during this visit of which greater than 50% devoted to face-face counseling and coordination of care regarding diagnosis and management plan    RTC 3 months

## 2019-10-25 ENCOUNTER — TELEPHONE (OUTPATIENT)
Dept: OTOLARYNGOLOGY | Facility: CLINIC | Age: 84
End: 2019-10-25

## 2019-10-25 NOTE — TELEPHONE ENCOUNTER
----- Message from Mariama Sellers LPN sent at 10/17/2019  4:55 PM CDT -----  Regarding: make an appt  Caller: [phone]/self (Today,  3:48 PM)    Type:  Sooner Apoointment Request     Caller is requesting a sooner appointment.  Caller declined first available appointment listed below.  Caller will not accept being placed on the waitlist and is requesting a message be sent to doctor.   Name of Caller:patient   When is the first available appointment? 10-   Symptoms:ear wax cleaning   Would the patient rather a call back or a response via [facility]? Callback   Best Call Back Number:[phone]

## 2019-10-28 ENCOUNTER — OFFICE VISIT (OUTPATIENT)
Dept: CARDIOLOGY | Facility: CLINIC | Age: 84
End: 2019-10-28
Payer: MEDICARE

## 2019-10-28 VITALS
WEIGHT: 130.38 LBS | OXYGEN SATURATION: 98 % | DIASTOLIC BLOOD PRESSURE: 76 MMHG | HEIGHT: 64 IN | HEART RATE: 58 BPM | BODY MASS INDEX: 22.26 KG/M2 | SYSTOLIC BLOOD PRESSURE: 144 MMHG

## 2019-10-28 DIAGNOSIS — I70.0 AORTIC ATHEROSCLEROSIS: ICD-10-CM

## 2019-10-28 DIAGNOSIS — I10 HTN (HYPERTENSION), BENIGN: Primary | ICD-10-CM

## 2019-10-28 DIAGNOSIS — E78.2 MIXED HYPERLIPIDEMIA: ICD-10-CM

## 2019-10-28 DIAGNOSIS — E78.5 HYPERLIPIDEMIA, UNSPECIFIED HYPERLIPIDEMIA TYPE: ICD-10-CM

## 2019-10-28 PROCEDURE — 1101F PR PT FALLS ASSESS DOC 0-1 FALLS W/OUT INJ PAST YR: ICD-10-PCS | Mod: CPTII,S$GLB,, | Performed by: STUDENT IN AN ORGANIZED HEALTH CARE EDUCATION/TRAINING PROGRAM

## 2019-10-28 PROCEDURE — 99999 PR PBB SHADOW E&M-EST. PATIENT-LVL III: ICD-10-PCS | Mod: PBBFAC,,, | Performed by: STUDENT IN AN ORGANIZED HEALTH CARE EDUCATION/TRAINING PROGRAM

## 2019-10-28 PROCEDURE — 1101F PT FALLS ASSESS-DOCD LE1/YR: CPT | Mod: CPTII,S$GLB,, | Performed by: STUDENT IN AN ORGANIZED HEALTH CARE EDUCATION/TRAINING PROGRAM

## 2019-10-28 PROCEDURE — 99999 PR PBB SHADOW E&M-EST. PATIENT-LVL III: CPT | Mod: PBBFAC,,, | Performed by: STUDENT IN AN ORGANIZED HEALTH CARE EDUCATION/TRAINING PROGRAM

## 2019-10-28 PROCEDURE — 99214 OFFICE O/P EST MOD 30 MIN: CPT | Mod: S$GLB,,, | Performed by: STUDENT IN AN ORGANIZED HEALTH CARE EDUCATION/TRAINING PROGRAM

## 2019-10-28 PROCEDURE — 3077F SYST BP >= 140 MM HG: CPT | Mod: CPTII,S$GLB,, | Performed by: STUDENT IN AN ORGANIZED HEALTH CARE EDUCATION/TRAINING PROGRAM

## 2019-10-28 PROCEDURE — 3077F PR MOST RECENT SYSTOLIC BLOOD PRESSURE >= 140 MM HG: ICD-10-PCS | Mod: CPTII,S$GLB,, | Performed by: STUDENT IN AN ORGANIZED HEALTH CARE EDUCATION/TRAINING PROGRAM

## 2019-10-28 PROCEDURE — 3078F DIAST BP <80 MM HG: CPT | Mod: CPTII,S$GLB,, | Performed by: STUDENT IN AN ORGANIZED HEALTH CARE EDUCATION/TRAINING PROGRAM

## 2019-10-28 PROCEDURE — 3078F PR MOST RECENT DIASTOLIC BLOOD PRESSURE < 80 MM HG: ICD-10-PCS | Mod: CPTII,S$GLB,, | Performed by: STUDENT IN AN ORGANIZED HEALTH CARE EDUCATION/TRAINING PROGRAM

## 2019-10-28 PROCEDURE — 99214 PR OFFICE/OUTPT VISIT, EST, LEVL IV, 30-39 MIN: ICD-10-PCS | Mod: S$GLB,,, | Performed by: STUDENT IN AN ORGANIZED HEALTH CARE EDUCATION/TRAINING PROGRAM

## 2019-10-28 RX ORDER — AMLODIPINE BESYLATE 2.5 MG/1
2.5 TABLET ORAL 2 TIMES DAILY
Qty: 180 TABLET | Refills: 3 | Status: SHIPPED | OUTPATIENT
Start: 2019-10-28 | End: 2020-04-16 | Stop reason: SDUPTHER

## 2019-10-28 RX ORDER — PRAVASTATIN SODIUM 40 MG/1
40 TABLET ORAL NIGHTLY
Qty: 90 TABLET | Refills: 3 | Status: SHIPPED | OUTPATIENT
Start: 2019-10-28 | End: 2020-02-19 | Stop reason: SDUPTHER

## 2019-10-28 NOTE — PROGRESS NOTES
"   Cardiology Clinic note    Subjective:   Patient ID:  Cheryl Ghosh is a 84 y.o. female who presents for evaluation of HTN    HPI:   Cheryl Ghosh  has a past medical history of Arthritis, Blood transfusion, Chronic kidney disease, stage III (moderate) (8/2/2017), Degenerative disc disease, Insulinoma, Pancreatic disease, and Urinary incontinence (12/7/2015).  Nov 2018: HTN urgency ER vist    4/22/19: Pt seen and examined. Referral from Dr. Mueller. Pt notes Episodes of flushing, throwing up, changes in vision. She has been taking her BP very religiously. She is most asymptomatic aside from these episodes of throwing up and weakness. Since then, Dr. Mueller instructed pt to be off lisinopril. Most -130's but gets up 150's with moderate salt meals  - Seems to be salt senitive with lisinopril. Home log of BP range is from ; mean 's    Notes back pain from significant sacroillac joint pain.     5/24/19: Here for HTN check  SBP is 130-150. Switched to norvasc 2.5 last visit (lisinopril)  One time took old hydrocodone and SBP dropped to 100's  - Pt has a detailed BP log.. Good SBP mostly. Again explained pt does not need to record BP on a regular basis, only if she feels bad.  - is mod uncontrolled today.    7/10/19: Here for follow up of Blood pressure. Doing well. Only recording BP every once in a while. Most readings are in the 130 range.  No acute issues. Back pain / sciatic pain is improved with injections.    10/28/19: Here for follow up of BP and statin tolerance. Doing well. No medication ADR. No hospitalization. No limitations with ADOL. No drastic changes with Bp    Vitals  Vitals:    10/28/19 0952   BP: (!) 144/76   Pulse: (!) 58   SpO2: 98%   Weight: 59.1 kg (130 lb 6.4 oz)   Height: 5' 4.02" (1.626 m)       Patient Active Problem List    Diagnosis Date Noted    Bilateral impacted cerumen 10/17/2019    Sacroiliac joint dysfunction of left side 07/31/2019    Pain of left sacroiliac " joint 06/19/2019    Sacroiliac joint dysfunction of right side 02/06/2019    Chronic SI joint pain 12/20/2018    Secondary hyperparathyroidism of renal origin 10/12/2018    Chronic pain 08/28/2018    Sacroiliitis 07/24/2018    HTN (hypertension), benign 04/11/2018    Chronic diastolic heart failure 11/25/2017    TIA (transient ischemic attack) 11/24/2017    Hyperlipidemia 08/02/2017    Chronic kidney disease, stage III (moderate) 08/02/2017    Neuropathy 12/02/2016    Female bladder prolapse 07/22/2016    Urinary incontinence 12/07/2015    Bilateral lumbar radiculopathy 08/13/2015    Osteopenia 02/26/2015    OA (osteoarthritis) of knee 01/05/2015    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    ASPIRIN (ECOTRIN) 81 MG EC TABLET    Take 1 tablet (81 mg total) by mouth once daily. Resume 48 hours post-surgery    B COMPLEX VITAMINS TABLET    Take 1 tablet by mouth once daily.    CALCIUM-VITAMIN D (CALCIUM WITH VITAMIN D) 600 MG(1,500MG) -400 UNIT TAB        DICLOFENAC SODIUM (VOLTAREN) 1 % GEL    APPLY 2.5 GRAMS TO THE AFFECTED AREA 3-4 TIMES DAILY STARTING 2 WEEKS POST SURGERY.    FLUZONE HIGH-DOSE 2019-20, PF, 180 MCG/0.5 ML SYRG        GABAPENTIN (NEURONTIN) 300 MG CAPSULE    TAKE 1 CAPSULE BY MOUTH THREE TIMES A DAY    LIDOCAINE (XYLOCAINE) 5 % OINT OINTMENT    APPLY 2.5 GRAMS TO THE AFFECTED AREA 3-4 TIMES DAILY STARTING 2 WEEKS POST SURGERY.    MAGNESIUM 250 MG TAB        MUPIROCIN (BACTROBAN) 2 % OINTMENT    APPLY 1/2 GRAM TO EACH NOSTRIL TWICE A DAY FOR UP TO  5 DAYS PRIOR TO SURGERY OR AS DIRECTED BY YOUR PHYSICIAN.    OMEGA 3-DHA-EPA-FISH OIL (FISH OIL) 100-160-1,000 MG CAP        OXYCODONE (OXY-IR) 5 MG CAP    Take 1 capsule (5 mg total) by mouth every 12 (twelve) hours as needed for Pain.    OXYCODONE-ACETAMINOPHEN (PERCOCET) 5-325 MG PER TABLET    Take 1 tablet by mouth every 24 hours as needed for Pain.    POTASSIUM 99 MG TAB        PYRIDOXINE (VITAMIN B-6) 100 MG TAB    Take 100 mg by mouth once daily.    SANARE ADV SCAR THERAPY BASE GEL    APPLY 1/2 GRAM (1 PUMP) TO AFFECTED AREAS TWICE DAILY STARTING 4 WEEKS POST SURGERY.    TIZANIDINE (ZANAFLEX) 4 MG TABLET       Modified Medications    Modified Medication Previous Medication    AMLODIPINE (NORVASC) 2.5 MG TABLET amLODIPine (NORVASC) 2.5 MG tablet       Take 1 tablet (2.5 mg total) by mouth 2 (two) times daily.    Take 1 tablet (2.5 mg total) by mouth 2 (two) times daily.    PRAVASTATIN (PRAVACHOL) 40 MG TABLET pravastatin (PRAVACHOL) 40 MG tablet       Take 1 tablet (40 mg total) by mouth every evening.    Take 1 tablet (40 mg total) by mouth every evening.   Discontinued Medications    No medications on file         Review of Systems   Constitution: Negative for chills, decreased appetite, malaise/fatigue and weight gain.   HENT: Negative for congestion and ear discharge.    Eyes: Negative for blurred vision and double vision.   Cardiovascular: Negative for chest pain, cyanosis, dyspnea on exertion, irregular heartbeat, leg swelling, near-syncope, orthopnea, palpitations and syncope.   Respiratory: Negative for cough, shortness of breath and sleep disturbances due to breathing.    Skin: Negative for color change and dry skin.   Musculoskeletal: Negative for joint pain, joint swelling and muscle cramps.   Gastrointestinal: Negative for bloating, heartburn, hematemesis and hematochezia.   Genitourinary: Negative for bladder incontinence and dysuria.   Neurological: Negative for aphonia, excessive daytime  sleepiness, dizziness, focal weakness, headaches, light-headedness, loss of balance and weakness.   Psychiatric/Behavioral: Negative for altered mental status, depression and memory loss. The patient does not have insomnia and is not nervous/anxious.          Objective:   Physical Exam   Constitutional: She is oriented to person, place, and time. She appears well-developed and well-nourished.   HENT:   Head: Normocephalic and atraumatic.   Eyes: Conjunctivae and EOM are normal.   Neck: Normal range of motion. Neck supple. No JVD present.   Cardiovascular: Normal rate, regular rhythm and normal heart sounds.   No murmur heard.  Pulmonary/Chest: Effort normal and breath sounds normal. No respiratory distress. She has no wheezes. She has no rales.   Abdominal: Soft. Bowel sounds are normal. She exhibits no distension.   Musculoskeletal: Normal range of motion. She exhibits no edema.   Neurological: She is alert and oriented to person, place, and time.   Skin: Skin is warm and dry. No erythema.   Psychiatric: She has a normal mood and affect. Her behavior is normal. Judgment and thought content normal.   Nursing note and vitals reviewed.      Lab Results    Lab Results   Component Value Date     10/17/2019    K 4.6 10/17/2019     10/17/2019    CO2 27 10/17/2019    BUN 20 10/17/2019    CREATININE 1.1 10/17/2019     10/17/2019    HGBA1C 5.2 11/24/2017    MG 1.9 11/25/2017    AST 21 10/17/2019    ALT 15 10/17/2019    ALBUMIN 3.8 10/17/2019    PROT 6.7 10/17/2019    BILITOT 0.5 10/17/2019    WBC 5.87 07/15/2019    HGB 12.7 07/15/2019    HCT 39.1 07/15/2019    MCV 93 07/15/2019     07/15/2019    INR 0.9 07/15/2019    TSH 1.208 11/24/2017    CHOL 192 01/07/2019    HDL 48 01/07/2019    LDLCALC 119.8 01/07/2019    TRIG 121 01/07/2019    BNP 81 05/26/2017       Lipid panel  Lab Results   Component Value Date    CHOL 192 01/07/2019     Lab Results   Component Value Date    HDL 48 01/07/2019     Lab  Results   Component Value Date    LDLCALC 119.8 01/07/2019     Lab Results   Component Value Date    TRIG 121 01/07/2019       Cardiac Studies  Significant Imaging: Echocardiogram:   2D echo with color flow doppler:   Results for orders placed or performed during the hospital encounter of 11/24/17   2D echo with color flow doppler   Result Value Ref Range    QEF 60 55 - 65    Mitral Valve Regurgitation TRIVIAL     Diastolic Dysfunction Yes (A)     Est. PA Systolic Pressure 23.07     Pericardial Effusion NONE     and Transthoracic echo (TTE) complete (Cupid Only): No results found for this or any previous visit.  ECG:  unchanged from previous tracings, sinus bradycardia. Jan 2019    Cath study : n/a    Cardiac stress testing. 2-3 years were WNL.  Assessment:     1. HTN (hypertension), benign    2. Hyperlipidemia, unspecified hyperlipidemia type    3. Mixed hyperlipidemia    4. Aortic atherosclerosis        Plan:     1. Essential HTN  - controlled  - doing better with norvasc 2.5 BID    2. HLD  - elevated ASCVD : 38%  - LDL is controlled with LDL <120  - tolerating pravastatin 40mg daily w/o issues.     3. Pre-op cardiovascular clearance for DJD disease  - Pt is at intermediate for cardiovascular events (mostly due to age). Proceed with intermediate risk procedure. No acute cardiac interventions are planned.    Continue with current medical plan and lifestyle changes.  Return sooner for concerns or questions. If symptoms persist go to the ED  Total duration of face to face visit time 30 minutes.  Total time spent counseling greater than fifty percent of total visit time.  Counseling included discussion regarding imaging findings, diagnosis, possibilities, treatment options, risks and benefits.      No orders of the defined types were placed in this encounter.    Follow up as scheduled. Return to clinic in 12 months, / PRN. No active cardiac issues at this time.  She expressed verbal understanding and agreed with the  plan    Thank you for the opportunity to care for this patient. Will be available for questions if needed.     Bk Rojo MD [facility]  Interventional Cardiology  [facility]  Pager: [phone]

## 2019-11-01 ENCOUNTER — OFFICE VISIT (OUTPATIENT)
Dept: NEUROSURGERY | Facility: CLINIC | Age: 84
End: 2019-11-01
Payer: MEDICARE

## 2019-11-01 VITALS
SYSTOLIC BLOOD PRESSURE: 153 MMHG | DIASTOLIC BLOOD PRESSURE: 78 MMHG | BODY MASS INDEX: 22.2 KG/M2 | HEART RATE: 63 BPM | TEMPERATURE: 98 F | HEIGHT: 64 IN | WEIGHT: 130 LBS

## 2019-11-01 DIAGNOSIS — G89.29 CHRONIC SI JOINT PAIN: Primary | ICD-10-CM

## 2019-11-01 DIAGNOSIS — M53.3 SACROILIAC JOINT DYSFUNCTION OF LEFT SIDE: ICD-10-CM

## 2019-11-01 DIAGNOSIS — M53.3 CHRONIC SI JOINT PAIN: Primary | ICD-10-CM

## 2019-11-01 DIAGNOSIS — M53.3 SACROILIAC JOINT DYSFUNCTION OF RIGHT SIDE: ICD-10-CM

## 2019-11-01 PROCEDURE — 99999 PR PBB SHADOW E&M-EST. PATIENT-LVL IV: CPT | Mod: PBBFAC,,, | Performed by: PHYSICIAN ASSISTANT

## 2019-11-01 PROCEDURE — 99024 POSTOP FOLLOW-UP VISIT: CPT | Mod: S$GLB,,, | Performed by: PHYSICIAN ASSISTANT

## 2019-11-01 PROCEDURE — 99999 PR PBB SHADOW E&M-EST. PATIENT-LVL IV: ICD-10-PCS | Mod: PBBFAC,,, | Performed by: PHYSICIAN ASSISTANT

## 2019-11-01 PROCEDURE — 99024 PR POST-OP FOLLOW-UP VISIT: ICD-10-PCS | Mod: S$GLB,,, | Performed by: PHYSICIAN ASSISTANT

## 2019-11-01 NOTE — PROGRESS NOTES
Established Patient    SUBJECTIVE:     History of Present Illness:  Cheryl Ghosh is a 84 y.o. female who presents for neurosurgical re-evaluation.  Patient is 3 months status post left SI joint fusion.  Patient is here with her .  She feels like left hip pain is gradually improving since surgery.  Intermittently, she will have low back pain and left hip pain radiating to left thigh with increased physical activity, prolonged standing, prolonged walking, or laying on her left side.  This occurs maybe once-twice a week while she is working as a .  She is undergoing physical therapy and dry kneeling; this is helping her symptoms.  She had left hip injection with orthopedic a month ago; feels like injection slightly helped briefly.      Interval History    Cheryl Ghosh is 84 y.o. female with history of hypertension, hyperlipidemia, CHF, CKD stage 3, secondary hyperparathyroidism, previous TIA, and right sacroiliac dysfunction who is now status post right SI joint fusion on 02/06/2019 with Dr. Dasilva. She subsequently underwent left SI joint fusion with Dr. Dasilva on 07/31/2019.          Low Back Pain Scale  R Low Back-Pain Score: 8  R Low Back-Pain Intensity: Pain killers give complete relief from pain  R Low Back-Pain Score: I can look after myself normally without causing extra pain  Low Back-Lifting: Pain prevents me from lifting heavy weights  but I can manage light weights if they are conveniently placed   Low Back-Walking: Pain prevents me walking more than .25 mile   Low Back-Sitting: I can sit only in my favorite chair as long as I like   Low Back-Standing: I cannot stand for longer than 10 minutes with increasing pain   Low Back-Sleeping: (n/a)   Low Back-Social Life: Pain has no significant effect on my social life apart from limiting my more en   Low Back-Traveling: I have some pain when traveling but kevin of my usual forms of travel make it any worse   Low Back-Changing Degree of Pain:  My pain seems to be getting better but improvement is slow             Review of patient's allergies indicates:   Allergen Reactions    Pcn [penicillins] Hives and Nausea And Vomiting       Current Outpatient Medications   Medication Sig Dispense Refill    acetaminophen (TYLENOL 8 HOUR ORAL) Take by mouth.      amLODIPine (NORVASC) 2.5 MG tablet Take 1 tablet (2.5 mg total) by mouth 2 (two) times daily. 180 tablet 3    aspirin (ECOTRIN) 81 MG EC tablet Take 1 tablet (81 mg total) by mouth once daily. Resume 48 hours post-surgery  0    b complex vitamins tablet Take 1 tablet by mouth once daily.      calcium-vitamin D (CALCIUM WITH VITAMIN D) 600 mg(1,500mg) -400 unit Tab       diclofenac sodium (VOLTAREN) 1 % Gel APPLY 2.5 GRAMS TO THE AFFECTED AREA 3-4 TIMES DAILY STARTING 2 WEEKS POST SURGERY.  3    FLUZONE HIGH-DOSE 2019-20, PF, 180 mcg/0.5 mL Syrg       gabapentin (NEURONTIN) 300 MG capsule TAKE 1 CAPSULE BY MOUTH THREE TIMES A  capsule 1    lidocaine (XYLOCAINE) 5 % Oint ointment APPLY 2.5 GRAMS TO THE AFFECTED AREA 3-4 TIMES DAILY STARTING 2 WEEKS POST SURGERY. 1 Tube 11    magnesium 250 mg Tab       mupirocin (BACTROBAN) 2 % ointment APPLY 1/2 GRAM TO EACH NOSTRIL TWICE A DAY FOR UP TO 5 DAYS PRIOR TO SURGERY OR AS DIRECTED BY YOUR PHYSICIAN.  0    omega 3-dha-epa-fish oil (FISH OIL) 100-160-1,000 mg Cap       oxyCODONE (OXY-IR) 5 mg Cap Take 1 capsule (5 mg total) by mouth every 12 (twelve) hours as needed for Pain. 40 capsule 0    oxyCODONE-acetaminophen (PERCOCET) 5-325 mg per tablet Take 1 tablet by mouth every 24 hours as needed for Pain. 20 tablet 0    potassium 99 mg Tab       pravastatin (PRAVACHOL) 40 MG tablet Take 1 tablet (40 mg total) by mouth every evening. 90 tablet 3    pyridoxine (VITAMIN B-6) 100 MG Tab Take 100 mg by mouth once daily.      SANARE ADV SCAR THERAPY BASE Gel APPLY 1/2 GRAM (1 PUMP) TO AFFECTED AREAS TWICE DAILY STARTING 4 WEEKS POST SURGERY.  3     tiZANidine (ZANAFLEX) 4 MG tablet        No current facility-administered medications for this visit.      Facility-Administered Medications Ordered in Other Visits   Medication Dose Route Frequency Provider Last Rate Last Dose    gentamicin 80 mg/100ml NACL IVPB IVPB 80 mg  80 mg Intravenous 30 Min Pre-Op Wiliam Urias PA-C   80 mg at 07/31/19 0747    vancomycin in dextrose 5 % 1 gram/250 mL IVPB 1,000 mg  1,000 mg Intravenous 30 Min Pre-Op SARA Hull-THEA   1,000 mg at 07/31/19 0738       Past Medical History:   Diagnosis Date    Arthritis     lumbar    Blood transfusion     Chronic kidney disease, stage III (moderate) 8/2/2017    Degenerative disc disease     lumbar    Insulinoma     Pancreatic disease     Urinary incontinence 12/7/2015     Past Surgical History:   Procedure Laterality Date    ADENOIDECTOMY      APPENDECTOMY      CATARACT EXTRACTION, BILATERAL      EYE SURGERY      HYSTERECTOMY      SAMM/BSO    INJECTION OF STEROID Right 12/20/2018    Procedure: INJECTION, STEROID-- Right SI Joint Block and  Steroid Injection;  Surgeon: Xavier Dasilva MD;  Location: [facility] OR;  Service: Neurosurgery;  Laterality: Right;  INJECTION, STEROID-- Right SI Joint Block and  Steroid Injection  SURGERY TIME:1 HR  LOS: 0 DAYS  RADIOLOGY: C- arm  BED: Regular Bed  with Pillows  POSITION: Prone      INJECTION OF STEROID Left 6/19/2019    Procedure: INJECTION, STEROID Procedure:Left sacroiliac joint block / steroid injection Surgery Time: 30mins LOS: Anesthesia: MAC Radiology: C-arm Bed: Regular Bed Position: Prone;  Surgeon: Xavier Dasilva MD;  Location: [facility] OR;  Service: Neurosurgery;  Laterality: Left;    MINIMALLY INVASIVE FUSION OF SPINE Right 2/6/2019    Procedure: FUSION, SPINE, MINIMALLY INVASIVE Right Sacroiliac Joint Fusion -  I Fuse;  Surgeon: Xavier Dasilva MD;  Location: [facility] OR;  Service: Neurosurgery;  Laterality: Right;  Procedure: Right Sacroiliac Joint Fusion -  I Fuse  Surgery  Time: 1.5 Hr  LOS: 0  Anesthesia: General  Radiology: C-Arm  Bed: Zaik 4 Poster  Position: Prone  Equipment: TAY Kincaid [id number] (notified 1/24 EF)    OOPHORECTOMY      PANCREAS SURGERY      Insulanoma    RADIOFREQUENCY ABLATION OF LUMBAR MEDIAL BRANCH NERVE AT SINGLE LEVEL Right 8/28/2018    Procedure: RADIOFREQUENCY ABLATION, NERVE, MEDIAL BRANCH, LUMBAR, 1 LEVEL- Right L3,4,5 IV SEDATION;  Surgeon: Eddie Vega MD;  Location: [facility];  Service: Pain Management;  Laterality: Right;    RADIOFREQUENCY ABLATION OF LUMBAR MEDIAL BRANCH NERVE AT SINGLE LEVEL Left 9/4/2018    Procedure: RADIOFREQUENCY ABLATION, NERVE, MEDIAL BRANCH, LUMBAR, 1 LEVEL - Left L3,4,5 IV SEADTION;  Surgeon: Eddie Vega MD;  Location: [facility];  Service: Pain Management;  Laterality: Left;  Patient takes ASA     REFRACTIVE SURGERY Bilateral     SPINAL FUSION Left 7/31/2019    Procedure: FUSION, SPINE SI Joint Fusion;  Surgeon: Xavier Dasilva MD;  Location: [facility] OR;  Service: Neurosurgery;  Laterality: Left;  Procedure: Left SI joint fusion  Surgery Time: 1.5hr  LOS:0  Anesthesia: General   Radiology: C-arm   Bed: Zaki 4 Poster  Position: Prone  Equipment: Codi Mabry [id number] (Codi notified)    TONSILLECTOMY       Family History     Problem Relation (Age of Onset)    Aneurysm Sister    Breast cancer Mother, Other    Glaucoma Daughter    Heart attack Son    Heart disease Father        Social History     Socioeconomic History    Marital status:      Spouse name: Not on file    Number of children: Not on file    Years of education: Not on file    Highest education level: Not on file   Occupational History    Not on file   Social Needs    Financial resource strain: Not hard at all    Food insecurity:     Worry: Never true     Inability: Never true    Transportation needs:     Medical: No     Non-medical: No   Tobacco Use    Smoking status: Never Smoker    Smokeless tobacco:  "Never Used   Substance and Sexual Activity    Alcohol use: Yes     Alcohol/week: 1.0 - 2.0 standard drinks     Types: 1 - 2 Glasses of wine per week     Frequency: Monthly or less     Drinks per session: 1 or 2     Binge frequency: Never    Drug use: No    Sexual activity: Yes     Partners: Male   Lifestyle    Physical activity:     Days per week: 0 days     Minutes per session: 0 min    Stress: Rather much   Relationships    Social connections:     Talks on phone: More than three times a week     Gets together: Once a week     Attends Gnosticist service: Not on file     Active member of club or organization: Yes     Attends meetings of clubs or organizations: More than 4 times per year     Relationship status:    Other Topics Concern    Not on file   Social History Narrative    Not on file       Review of Systems:  Review of Systems    See above  OBJECTIVE:     Vital Signs  Temp: 98.1 °F (36.7 °C)  Pulse: 63  BP: (!) 153/78  Pain Score:   8  Height: 5' 4" (162.6 cm)  Weight: 59 kg (130 lb)  Body mass index is 22.31 kg/m².    Physical Exam:  Neurosurgery Physical Exam    General: well developed, well nourished, no distress.   Neurologic: Awake, alert and oriented x3. Thought content appropriate.  GCS: Motor: 6/Verbal: 5/Eyes: 4 GCS Total: 15  Cranial nerves: II-XII grossly intact. PERRLA. EOMI without nystagmus. Face symmetric and sensation intact to light touch, tongue midline, shoulder shrug symmetric bilaterally.  Hearing equal bilaterally to finger rub. Palate and uvula rise and fall normally in midline.    Language: no aphasia  Speech: no dysarthria   Neck: supple, without obvious masses    Sensory: intact to light touch B/L UE and LE  Motor Strength: Moves all extremities spontaneously with good tone. Full strength upper and lower extremities. No abnormal movements seen.    Strength  Deltoids Triceps Biceps Wrist Extension Wrist Flexion Hand    Upper: R 5/5 5/5 5/5 5/5 5/5 5/5    L 5/5 5/5 " 5/5 5/5 5/5 5/5     Iliopsoas Quadriceps Knee  Flexion Tibialis  anterior Gastro- cnemius EHL   Lower: R 5/5 5/5 5/5 5/5 5/5 5/5    L 5/5 5/5 5/5 5/5 5/5 5/5     Negative straight leg raise bilaterally  Gait - normal        No focal numbness or weakness  ENT: normal hearing with finger rub  Heart: RRR, no cyanosis, pallor, or edema.   Lungs- normal respiratory effort  Abdomen-  soft, symmetric and nontender  Skin: grossly intact in all 4 extremities without obvious rashes or lesions  Extremities: warm with no cyanosis or edema, or clubbing  Pulses: palpable distal pulses    SI Joint tenderness - Negative  Previous incisions are well healed.    Posture-  No obvious kyphosis with standing posture.  With bending posture, no obvious scapula wing        Diagnostic Results:  All imaging personally reviewed    Previous hip x-ray on 09/24/2019   Stable bilateral SI joint fusion    ASSESSMENT/PLAN:     84 y.o. female who is status post right SI joint fusion on 02/06/2019 with Dr. Dasilva And is 3 months status post left SI joint fusion with Dr. Dasilva on 07/31/2019.        Patient has gradual improvement of left hip/low back pain since surgery.  Continue physical therapy/dry needling.  Follow-up Dr. Dasilva in 3 months for 6 months postop visit.  She will need x-rays prior.          All imaging were reviewed with patient, family, and staff. All questions were answered.  Patient verbalized understanding and agreed with the above plan of care.  Patient was encouraged to call clinic with any future concerns prior to follow up appt.    Please call with any questions.      Wiliam Urias PA-C  Neurosurgery          Note dictated with voice recognition software, please excuse any grammatical errors.

## 2019-11-05 RX ORDER — AMLODIPINE BESYLATE 2.5 MG/1
TABLET ORAL
Qty: 90 TABLET | Refills: 1 | OUTPATIENT
Start: 2019-11-05

## 2019-12-20 ENCOUNTER — PATIENT MESSAGE (OUTPATIENT)
Dept: NEUROSURGERY | Facility: CLINIC | Age: 84
End: 2019-12-20

## 2020-01-01 ENCOUNTER — PATIENT MESSAGE (OUTPATIENT)
Dept: NEUROSURGERY | Facility: CLINIC | Age: 85
End: 2020-01-01

## 2020-01-02 ENCOUNTER — TELEPHONE (OUTPATIENT)
Dept: NEUROSURGERY | Facility: CLINIC | Age: 85
End: 2020-01-02

## 2020-01-02 RX ORDER — OXYCODONE AND ACETAMINOPHEN 5; 325 MG/1; MG/1
1 TABLET ORAL
Qty: 20 TABLET | Refills: 0 | Status: SHIPPED | OUTPATIENT
Start: 2020-01-02 | End: 2021-02-02

## 2020-01-13 ENCOUNTER — TELEPHONE (OUTPATIENT)
Dept: PODIATRY | Facility: CLINIC | Age: 85
End: 2020-01-13

## 2020-01-13 ENCOUNTER — OFFICE VISIT (OUTPATIENT)
Dept: NEUROSURGERY | Facility: CLINIC | Age: 85
End: 2020-01-13
Payer: MEDICARE

## 2020-01-13 ENCOUNTER — PATIENT MESSAGE (OUTPATIENT)
Dept: NEUROSURGERY | Facility: CLINIC | Age: 85
End: 2020-01-13

## 2020-01-13 ENCOUNTER — TELEPHONE (OUTPATIENT)
Dept: NEUROSURGERY | Facility: CLINIC | Age: 85
End: 2020-01-13

## 2020-01-13 ENCOUNTER — PATIENT MESSAGE (OUTPATIENT)
Dept: PAIN MEDICINE | Facility: CLINIC | Age: 85
End: 2020-01-13

## 2020-01-13 ENCOUNTER — HOSPITAL ENCOUNTER (OUTPATIENT)
Dept: RADIOLOGY | Facility: HOSPITAL | Age: 85
Discharge: HOME | End: 2020-01-13
Attending: PHYSICIAN ASSISTANT
Payer: MEDICARE

## 2020-01-13 VITALS
SYSTOLIC BLOOD PRESSURE: 152 MMHG | BODY MASS INDEX: 22.2 KG/M2 | DIASTOLIC BLOOD PRESSURE: 79 MMHG | HEIGHT: 64 IN | WEIGHT: 130 LBS | HEART RATE: 62 BPM

## 2020-01-13 DIAGNOSIS — M70.62 TROCHANTERIC BURSITIS OF BOTH HIPS: Primary | ICD-10-CM

## 2020-01-13 DIAGNOSIS — M24.60 FUSION OF JOINT: ICD-10-CM

## 2020-01-13 DIAGNOSIS — M70.61 TROCHANTERIC BURSITIS OF BOTH HIPS: Primary | ICD-10-CM

## 2020-01-13 PROCEDURE — 99213 PR OFFICE/OUTPT VISIT, EST, LEVL III, 20-29 MIN: ICD-10-PCS | Mod: HCNC,S$GLB,, | Performed by: NEUROLOGICAL SURGERY

## 2020-01-13 PROCEDURE — 3077F SYST BP >= 140 MM HG: CPT | Mod: HCNC,CPTII,S$GLB, | Performed by: NEUROLOGICAL SURGERY

## 2020-01-13 PROCEDURE — 3078F PR MOST RECENT DIASTOLIC BLOOD PRESSURE < 80 MM HG: ICD-10-PCS | Mod: HCNC,CPTII,S$GLB, | Performed by: NEUROLOGICAL SURGERY

## 2020-01-13 PROCEDURE — 99999 PR PBB SHADOW E&M-EST. PATIENT-LVL V: ICD-10-PCS | Mod: PBBFAC,HCNC,, | Performed by: NEUROLOGICAL SURGERY

## 2020-01-13 PROCEDURE — 3078F DIAST BP <80 MM HG: CPT | Mod: HCNC,CPTII,S$GLB, | Performed by: NEUROLOGICAL SURGERY

## 2020-01-13 PROCEDURE — 1159F MED LIST DOCD IN RCRD: CPT | Mod: HCNC,S$GLB,, | Performed by: NEUROLOGICAL SURGERY

## 2020-01-13 PROCEDURE — 1159F PR MEDICATION LIST DOCUMENTED IN MEDICAL RECORD: ICD-10-PCS | Mod: HCNC,S$GLB,, | Performed by: NEUROLOGICAL SURGERY

## 2020-01-13 PROCEDURE — 99213 OFFICE O/P EST LOW 20 MIN: CPT | Mod: HCNC,S$GLB,, | Performed by: NEUROLOGICAL SURGERY

## 2020-01-13 PROCEDURE — 1101F PR PT FALLS ASSESS DOC 0-1 FALLS W/OUT INJ PAST YR: ICD-10-PCS | Mod: HCNC,CPTII,S$GLB, | Performed by: NEUROLOGICAL SURGERY

## 2020-01-13 PROCEDURE — 3077F PR MOST RECENT SYSTOLIC BLOOD PRESSURE >= 140 MM HG: ICD-10-PCS | Mod: HCNC,CPTII,S$GLB, | Performed by: NEUROLOGICAL SURGERY

## 2020-01-13 PROCEDURE — 72202 X-RAY EXAM SI JOINTS 3/> VWS: CPT | Mod: TC,HCNC,PN

## 2020-01-13 PROCEDURE — 1125F AMNT PAIN NOTED PAIN PRSNT: CPT | Mod: HCNC,S$GLB,, | Performed by: NEUROLOGICAL SURGERY

## 2020-01-13 PROCEDURE — 1125F PR PAIN SEVERITY QUANTIFIED, PAIN PRESENT: ICD-10-PCS | Mod: HCNC,S$GLB,, | Performed by: NEUROLOGICAL SURGERY

## 2020-01-13 PROCEDURE — 72202 X-RAY EXAM SI JOINTS 3/> VWS: CPT | Mod: 26,HCNC,, | Performed by: RADIOLOGY

## 2020-01-13 PROCEDURE — 99999 PR PBB SHADOW E&M-EST. PATIENT-LVL V: CPT | Mod: PBBFAC,HCNC,, | Performed by: NEUROLOGICAL SURGERY

## 2020-01-13 PROCEDURE — 72202 XR SACROILIAC JOINTS COMPLETE: ICD-10-PCS | Mod: 26,HCNC,, | Performed by: RADIOLOGY

## 2020-01-13 PROCEDURE — 1101F PT FALLS ASSESS-DOCD LE1/YR: CPT | Mod: HCNC,CPTII,S$GLB, | Performed by: NEUROLOGICAL SURGERY

## 2020-01-13 RX ORDER — CELECOXIB 200 MG/1
200 CAPSULE ORAL 2 TIMES DAILY
Qty: 180 CAPSULE | Refills: 3 | Status: SHIPPED | OUTPATIENT
Start: 2020-01-13 | End: 2021-02-14 | Stop reason: ALTCHOICE

## 2020-01-13 NOTE — PROGRESS NOTES
NEUROSURGICAL PROGRESS NOTE    DATE OF SERVICE:  01/13/2020    ATTENDING PHYSICIAN:  Xavier Dasilva MD    SUBJECTIVE:    INTERIM HISTORY:    This is a very pleasant 85 y.o. female, who is status post 7 months bilateral SI joint fusion using the iFuse system.  Although she reported some improvement compared to before surgery.  The pain is not excruciating anymore.  She still has bilateral hip and groin pain when she walks for prolonged period of time.  The pain radiates in the lateral thigh toward the knee.  She has more left than right hip pain.  She cannot walk more than a quarter of a mi.  She is unable to sleep flat on her back in bed.  She has to sleep in a recliner.  She does physical therapy and water exercises.  She is not taking anti inflammation medicine.  No new weakness or numbness.              PAST MEDICAL HISTORY:  Active Ambulatory Problems     Diagnosis Date Noted    Facet arthritis of lumbar region 10/02/2012    Spondylosis without myelopathy 11/06/2013    Trochanteric bursitis of right hip 01/27/2014    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    OA (osteoarthritis) of knee 01/05/2015    Osteopenia 02/26/2015    Bilateral lumbar radiculopathy 08/13/2015    Urinary incontinence 12/07/2015    Female bladder prolapse 07/22/2016    Neuropathy 12/02/2016    Aortic atherosclerosis 04/24/2014    Osteoarthritis of spine 09/27/2012    Hyperlipidemia 08/02/2017    Chronic kidney disease, stage III (moderate) 08/02/2017    TIA (transient ischemic attack) 11/24/2017    Chronic diastolic heart failure 11/25/2017    HTN (hypertension), benign 04/11/2018    Sacroiliitis 07/24/2018    Chronic pain 08/28/2018    Secondary hyperparathyroidism of renal origin 10/12/2018    Chronic SI joint pain 12/20/2018    Sacroiliac joint dysfunction of right side 02/06/2019    Pain of left sacroiliac joint 06/19/2019    Sacroiliac joint dysfunction of left side 07/31/2019    Bilateral impacted cerumen 10/17/2019      Resolved Ambulatory Problems     Diagnosis Date Noted    Chest pain 05/01/2014    Cholecystolithiasis 05/01/2014    Chest wall pain 08/26/2014    BPPV (benign paroxysmal positional vertigo) 01/08/2015    Acute sinusitis 11/16/2015    Laryngitis 11/16/2015    Bronchitis 11/16/2015    BMI 22.0-22.9, adult 07/22/2016    Palpitations 05/26/2017    Dyspnea on exertion 05/26/2017    Hypertensive urgency 11/24/2017     Past Medical History:   Diagnosis Date    Arthritis     Blood transfusion     Degenerative disc disease     Insulinoma     Pancreatic disease        PAST SURGICAL HISTORY:  Past Surgical History:   Procedure Laterality Date    ADENOIDECTOMY      APPENDECTOMY      CATARACT EXTRACTION, BILATERAL      EYE SURGERY      HYSTERECTOMY      SAMM/BSO    INJECTION OF STEROID Right 12/20/2018    Procedure: INJECTION, STEROID-- Right SI Joint Block and  Steroid Injection;  Surgeon: Xavier Dasilva MD;  Location: [facility] OR;  Service: Neurosurgery;  Laterality: Right;  INJECTION, STEROID-- Right SI Joint Block and  Steroid Injection  SURGERY TIME:1 HR  LOS: 0 DAYS  RADIOLOGY: C- arm  BED: Regular Bed  with Pillows  POSITION: Prone      INJECTION OF STEROID Left 6/19/2019    Procedure: INJECTION, STEROID Procedure:Left sacroiliac joint block / steroid injection Surgery Time: 30mins LOS: Anesthesia: MAC Radiology: C-arm Bed: Regular Bed Position: Prone;  Surgeon: Xavier Dasilva MD;  Location: [facility] OR;  Service: Neurosurgery;  Laterality: Left;    MINIMALLY INVASIVE FUSION OF SPINE Right 2/6/2019    Procedure: FUSION, SPINE, MINIMALLY INVASIVE Right Sacroiliac Joint Fusion -  I Fuse;  Surgeon: Xavier Dasilva MD;  Location: [facility] OR;  Service: Neurosurgery;  Laterality: Right;  Procedure: Right Sacroiliac Joint Fusion -  I Fuse  Surgery Time: 1.5 Hr  LOS: 0  Anesthesia: General  Radiology: C-Arm  Bed: [place] [id number] Poster  Position: Prone  Equipment: I Randy Kincaid [phone] (notified 1/24 EF)     OOPHORECTOMY      PANCREAS SURGERY      Insulanoma    RADIOFREQUENCY ABLATION OF LUMBAR MEDIAL BRANCH NERVE AT SINGLE LEVEL Right 8/28/2018    Procedure: RADIOFREQUENCY ABLATION, NERVE, MEDIAL BRANCH, LUMBAR, 1 LEVEL- Right L3,4,5 IV SEDATION;  Surgeon: Eddie Vega MD;  Location: [facility] PAIN MGT;  Service: Pain Management;  Laterality: Right;    RADIOFREQUENCY ABLATION OF LUMBAR MEDIAL BRANCH NERVE AT SINGLE LEVEL Left 9/4/2018    Procedure: RADIOFREQUENCY ABLATION, NERVE, MEDIAL BRANCH, LUMBAR, 1 LEVEL - Left L3,4,5 IV SEADTION;  Surgeon: Eddie Vega MD;  Location: [facility] PAIN [facility]GT;  Service: Pain Management;  Laterality: Left;  Patient takes ASA     REFRACTIVE SURGERY Bilateral     SPINAL FUSION Left 7/31/2019    Procedure: FUSION, SPINE SI Joint Fusion;  Surgeon: Xavier Dasilva MD;  Location: [facility] OR;  Service: Neurosurgery;  Laterality: Left;  Procedure: Left SI joint fusion  Surgery Time: 1.5hr  LOS:0  Anesthesia: General   Radiology: C-arm   Bed: [place] [id number] Poster  Position: Prone  Equipment: Codi Diaz- [phone] (Codi notified)    TONSILLECTOMY         SOCIAL HISTORY:   Social History     Socioeconomic History    Marital status:      Spouse name: Not on file    Number of children: Not on file    Years of education: Not on file    Highest education level: Not on file   Occupational History    Not on file   Social Needs    Financial resource strain: Not hard at all    Food insecurity:     Worry: Never true     Inability: Never true    Transportation needs:     Medical: No     Non-medical: No   Tobacco Use    Smoking status: Never Smoker    Smokeless tobacco: Never Used   Substance and Sexual Activity    Alcohol use: Yes     Alcohol/week: 1.0 - 2.0 standard drinks     Types: 1 - 2 Glasses of wine per week     Frequency: Monthly or less     Drinks per session: 1 or 2     Binge frequency: Never    Drug use: No    Sexual activity: Yes     Partners: Male   Lifestyle     Physical activity:     Days per week: 0 days     Minutes per session: 0 min    Stress: Rather much   Relationships    Social connections:     Talks on phone: More than three times a week     Gets together: Once a week     Attends Sikh service: Not on file     Active member of club or organization: Yes     Attends meetings of clubs or organizations: More than 4 times per year     Relationship status:    Other Topics Concern    Not on file   Social History Narrative    Not on file       FAMILY HISTORY:  Family History   Problem Relation Age of Onset    Breast cancer Mother     Heart disease Father     Aneurysm Sister     Glaucoma Daughter     Heart attack Son     Breast cancer Other        CURRENTS MEDICATIONS:  Current Outpatient Medications on File Prior to Visit   Medication Sig Dispense Refill    acetaminophen (TYLENOL 8 HOUR ORAL) Take by mouth.      amLODIPine (NORVASC) 2.5 MG tablet Take 1 tablet (2.5 mg total) by mouth 2 (two) times daily. 180 tablet 3    aspirin (ECOTRIN) 81 MG EC tablet Take 1 tablet (81 mg total) by mouth once daily. Resume 48 hours post-surgery  0    b complex vitamins tablet Take 1 tablet by mouth once daily.      calcium-vitamin D (CALCIUM WITH VITAMIN D) 600 mg(1,500mg) -400 unit Tab       diclofenac sodium (VOLTAREN) 1 % Gel APPLY 2.5 GRAMS TO THE AFFECTED AREA 3-4 TIMES DAILY STARTING 2 WEEKS POST SURGERY.  3    gabapentin (NEURONTIN) 300 MG capsule TAKE 1 CAPSULE BY MOUTH THREE TIMES A  capsule 1    magnesium 250 mg Tab       mupirocin (BACTROBAN) 2 % ointment APPLY 1/2 GRAM TO EACH NOSTRIL TWICE A DAY FOR UP TO 5 DAYS PRIOR TO SURGERY OR AS DIRECTED BY YOUR PHYSICIAN.  0    omega 3-dha-epa-fish oil (FISH OIL) 100-160-1,000 mg Cap       oxyCODONE-acetaminophen (PERCOCET) 5-325 mg per tablet Take 1 tablet by mouth every 24 hours as needed for Pain. 20 tablet 0    potassium 99 mg Tab       pravastatin (PRAVACHOL) 40 MG tablet Take 1 tablet (40  mg total) by mouth every evening. 90 tablet 3    pyridoxine (VITAMIN B-6) 100 MG Tab Take 100 mg by mouth once daily.      tiZANidine (ZANAFLEX) 4 MG tablet       FLUZONE HIGH-DOSE 2019-20, PF, 180 mcg/0.5 mL Syrg       lidocaine (XYLOCAINE) 5 % Oint ointment APPLY 2.5 GRAMS TO THE AFFECTED AREA 3-4 TIMES DAILY STARTING 2 WEEKS POST SURGERY. (Patient not taking: Reported on 1/13/2020) 1 Tube 11    oxyCODONE (OXY-IR) 5 mg Cap Take 1 capsule (5 mg total) by mouth every 12 (twelve) hours as needed for Pain. (Patient not taking: Reported on 1/13/2020) 40 capsule 0    SANARE ADV SCAR THERAPY BASE Gel APPLY 1/2 GRAM (1 PUMP) TO AFFECTED AREAS TWICE DAILY STARTING 4 WEEKS POST SURGERY.  3     Current Facility-Administered Medications on File Prior to Visit   Medication Dose Route Frequency Provider Last Rate Last Dose    gentamicin 80 mg/100ml NACL IVPB IVPB 80 mg  80 mg Intravenous 30 Min Pre-Op Wiliam Urias PA-C   80 mg at 07/31/19 0747    vancomycin in dextrose 5 % 1 gram/250 mL IVPB 1,000 mg  1,000 mg Intravenous 30 Min Pre-Op Wiliam Urias PA-C   1,000 mg at 07/31/19 0738       ALLERGIES:  Review of patient's allergies indicates:   Allergen Reactions    Pcn [penicillins] Hives and Nausea And Vomiting       REVIEW OF SYSTEMS:  Review of Systems   Constitutional: Negative for diaphoresis, fever and weight loss.   Respiratory: Negative for shortness of breath.    Cardiovascular: Negative for chest pain.   Gastrointestinal: Negative for blood in stool.   Genitourinary: Negative for hematuria.   Endo/Heme/Allergies: Does not bruise/bleed easily.   All other systems reviewed and are negative.        OBJECTIVE:    PHYSICAL EXAMINATION:   Vitals:    01/13/20 0818   BP: (!) 152/79   Pulse: 62       Physical Exam:  Vitals reviewed.    Constitutional: She appears well-developed and well-nourished.     Eyes: Pupils are equal, round, and reactive to light. Conjunctivae and EOM are normal.     Cardiovascular: Normal  distal pulses and no edema.     Abdominal: Soft.     Skin: Skin displays no rash on trunk and no rash on extremities. Skin displays no lesions on trunk and no lesions on extremities.     Psych/Behavior: She is alert. She is oriented to person, place, and time. She has a normal mood and affect.     Musculoskeletal:        Neck: Range of motion is full.     Neurological:        DTRs: Tricep reflexes are 2+ on the right side and 2+ on the left side. Bicep reflexes are 2+ on the right side and 2+ on the left side. Brachioradialis reflexes are 2+ on the right side and 2+ on the left side. Patellar reflexes are 2+ on the right side and 2+ on the left side. Achilles reflexes are 2+ on the right side and 2+ on the left side.       Right Hip Exam     Tenderness   The patient is experiencing tenderness in the greater trochanter.    Tests   Ever: positive      Left Hip Exam     Tenderness   The patient is experiencing tenderness in the greater trochanter.    Tests   Ever: positive      Back Exam     Tenderness   The patient is experiencing no tenderness.     Range of Motion   Extension: normal   Flexion: normal   Lateral bend right: normal   Lateral bend left: normal   Rotation right: normal   Rotation left: normal     Muscle Strength   Right Quadriceps:  5/5   Left Quadriceps:  5/5   Right Hamstrings:  5/5   Left Hamstrings:  5/5     Tests   Straight leg raise right: negative  Straight leg raise left: negative    Other   Toe walk: normal  Heel walk: normal            SI joint:   Palpation at the right and left SI joints not painful  CARMITA test is negative bilaterally  Gaenslen test is negative bilaterally  Thigh thrust test is negative bilaterally    Neurologic Exam     Mental Status   Oriented to person, place, and time.   Speech: speech is normal   Level of consciousness: alert    Cranial Nerves   Cranial nerves II through XII intact.     CN III, IV, VI   Pupils are equal, round, and reactive to light.  Extraocular motions  are normal.     Motor Exam   Muscle bulk: normal  Overall muscle tone: normal    Strength   Right deltoid: 5/5  Left deltoid: 5/5  Right biceps: 5/5  Left biceps: 5/5  Right triceps: 5/5  Left triceps: 5/5  Right wrist flexion: 5/5  Left wrist flexion: 5/5  Right wrist extension: 5/5  Left wrist extension: 5/5  Right interossei: 5/5  Left interossei: 5/5  Right iliopsoas: 5/5  Left iliopsoas: 5/5  Right quadriceps: 5/5  Left quadriceps: 5/5  Right hamstrin/5  Left hamstrin/5  Right anterior tibial: 5/5  Left anterior tibial: 5/5  Right posterior tibial: 5/5  Left posterior tibial: 5/5  Right peroneal: 5/5  Left peroneal: 5/5  Right gastroc: 5/5  Left gastroc: 5/5    Sensory Exam   Light touch normal.   Pinprick normal.     Gait, Coordination, and Reflexes     Gait  Gait: normal    Coordination   Finger to nose coordination: normal  Tandem walking coordination: normal    Reflexes   Right brachioradialis: 2+  Left brachioradialis: 2+  Right biceps: 2+  Left biceps: 2+  Right triceps: 2+  Left triceps: 2+  Right patellar: 2+  Left patellar: 2+  Right achilles: 2+  Left achilles: 2+  Right plantar: normal  Left plantar: normal  Right Shipman: absent  Left Shipman: absent  Right ankle clonus: absent  Left ankle clonus: absent        DIAGNOSTIC DATA:  I personally interpreted the following imaging:   SI joint x-ray today shows consolidation of bilateral SI joint fusion    ASSESMENT:  This is a 85 y.o. female with     Problem List Items Addressed This Visit     None      Visit Diagnoses     Trochanteric bursitis of both hips    -  Primary    Relevant Orders    Ambulatory Referral to Pain Clinic    Ambulatory consult to Physical Medicine Rehab            PLAN:  Referral in pain management for bilateral trochanteric bursa steroid injection  Starting Celebrex 200 mg twice daily  Consultation in physical medicine rehab for physical therapy plan and follow-up for chronic bilateral hip pain        Xavier Dasilva  MD  Cell:[phone]

## 2020-01-13 NOTE — TELEPHONE ENCOUNTER
----- Message from Codi Carter sent at 1/13/2020  1:52 PM CST -----  Contact: [phone]/yuas  Patient is requesting to speak with Brie regarding a personal matter. Please advise.

## 2020-01-15 ENCOUNTER — PATIENT MESSAGE (OUTPATIENT)
Dept: PAIN MEDICINE | Facility: CLINIC | Age: 85
End: 2020-01-15

## 2020-01-20 ENCOUNTER — PATIENT MESSAGE (OUTPATIENT)
Dept: PAIN MEDICINE | Facility: CLINIC | Age: 85
End: 2020-01-20

## 2020-01-24 NOTE — H&P (VIEW-ONLY)
[facility] Established Patient Evaluation    Referred by: Xavier Dasilva MD  Reason for referral: Trochanteric bursitis of both hips    CC:   Chief Complaint   Patient presents with    Hip Pain     Bilateral      Last 3 PDI Scores 1/27/2020 10/8/2018 8/7/2018   Pain Disability Index (PDI) 0 17 31       HPI:   Cheryl Ghosh is a 85 y.o. female who is well known to this clinic but new to me presents complaining of bilateral hip pain.  She was recently evaluated by her neurosurgeon after undergoing staggered, bilateral sacroiliac joint fusion.  He assessed for the presence of bilateral greater trochanter bursa irritation and has referred her for an injection of the bursa.  Patient endorses bilateral hip pain that is severe enough to limit her ability to lie on her side in bed at night.  She reports attending physical therapy but denies ever receiving a home exercise program related to hip stretching.  Patient demonstrates during the encounter today very poor range of motion with hip extension/flexion/abduction, piriformis stretch, hamstring stretch.  In addition to this primary complaint, she also reports pain radiating from the low back into the anterior portion of the left leg, not passing the knee.  This pain increases with the length of time she has been walking or standing and decreases with short rest.    Location: Bilateral Hips   Onset: Years ago  Current Pain Score: 0/10  Daily Pain of Range: 0-2/10  Quality: Deep  Radiation: Down left leg to knee  Worsened by: exercise, running, standing and walking for more than 2 minutes  Improved by: ice, laying down, medications and rest    Previous Therapies:  PT/OT: Denies  HEP: Denies  Interventions:   - 6/19/19 Left sacroiliac joint block and steroid injection   - 12/20/18 Right sacroiliac joint block and steroid injection   - 9/4/18   left L3-4-5 FACET MEDIAL BRANCH NERVE RADIOFREQUENCY NEUROTOMY (lumbar)   - 8/28/18  L3, L4, L5 FACET MEDIAL BRANCH NERVE  RADIOFREQUENCY NEUROTOMY (lumbar)  Surgery:    7/31/19 Left minimally invasive sacroiliac joint fusion using the SI-Bone   2/6/19  Right minimally invasive sacroiliac joint fusion using the SI-Bone   Medications:   - NSAIDS:   - MSK Relaxants:   - TCAs:   - SNRIs:   - Topicals:   - Anticonvulsants:  - Opioids:     Current Pain Medications:  1. Tylenol  2. Celebrex 200mg 1 cap BID   3. Voltaren Gel   4. Gabapentin 300mg 1 tablet TID  5. Oxycodone 5-325mg 1 tab daily  6. Tizanidine 4 mg      Review of Systems:  Review of Systems   Constitutional: Negative for chills and fever.   HENT: Negative for nosebleeds.    Eyes: Negative for blurred vision and pain.   Respiratory: Negative for hemoptysis.    Cardiovascular: Negative for chest pain and palpitations.   Gastrointestinal: Negative for heartburn, nausea and vomiting.   Genitourinary: Negative for dysuria and hematuria.   Musculoskeletal: Positive for joint pain and myalgias. Negative for falls.   Skin: Negative for rash.   Neurological: Positive for tingling (left anterior thigh) and weakness (heaviness in left leg/hip). Negative for seizures and loss of consciousness.   Endo/Heme/Allergies: Does not bruise/bleed easily.   Psychiatric/Behavioral: Negative for hallucinations.       History:    Current Outpatient Medications:     acetaminophen (TYLENOL 8 HOUR ORAL), Take by mouth., Disp: , Rfl:     amLODIPine (NORVASC) 2.5 MG tablet, Take 1 tablet (2.5 mg total) by mouth 2 (two) times daily., Disp: 180 tablet, Rfl: 3    aspirin (ECOTRIN) 81 MG EC tablet, Take 1 tablet (81 mg total) by mouth once daily. Resume 48 hours post-surgery, Disp: , Rfl: 0    b complex vitamins tablet, Take 1 tablet by mouth once daily., Disp: , Rfl:     calcium-vitamin D (CALCIUM WITH VITAMIN D) 600 mg(1,500mg) -400 unit Tab, , Disp: , Rfl:     celecoxib (CELEBREX) 200 MG capsule, Take 1 capsule (200 mg total) by mouth 2 (two) times daily., Disp: 180 capsule, Rfl: 3    diclofenac sodium  (VOLTAREN) 1 % Gel, APPLY 2.5 GRAMS TO THE AFFECTED AREA 3-4 TIMES DAILY STARTING 2 WEEKS POST SURGERY., Disp: , Rfl: 3    FLUZONE HIGH-DOSE 2019-20, PF, 180 mcg/0.5 mL Syrg, , Disp: , Rfl:     gabapentin (NEURONTIN) 300 MG capsule, TAKE 1 CAPSULE BY MOUTH THREE TIMES A DAY, Disp: 270 capsule, Rfl: 1    lidocaine (XYLOCAINE) 5 % Oint ointment, APPLY 2.5 GRAMS TO THE AFFECTED AREA 3-4 TIMES DAILY STARTING 2 WEEKS POST SURGERY., Disp: 1 Tube, Rfl: 11    magnesium 250 mg Tab, , Disp: , Rfl:     mupirocin (BACTROBAN) 2 % ointment, APPLY 1/2 GRAM TO EACH NOSTRIL TWICE A DAY FOR UP TO 5 DAYS PRIOR TO SURGERY OR AS DIRECTED BY YOUR PHYSICIAN., Disp: , Rfl: 0    omega 3-dha-epa-fish oil (FISH OIL) 100-160-1,000 mg Cap, , Disp: , Rfl:     oxyCODONE (OXY-IR) 5 mg Cap, Take 1 capsule (5 mg total) by mouth every 12 (twelve) hours as needed for Pain., Disp: 40 capsule, Rfl: 0    oxyCODONE-acetaminophen (PERCOCET) 5-325 mg per tablet, Take 1 tablet by mouth every 24 hours as needed for Pain., Disp: 20 tablet, Rfl: 0    potassium 99 mg Tab, , Disp: , Rfl:     pravastatin (PRAVACHOL) 40 MG tablet, Take 1 tablet (40 mg total) by mouth every evening., Disp: 90 tablet, Rfl: 3    pyridoxine (VITAMIN B-6) 100 MG Tab, Take 100 mg by mouth once daily., Disp: , Rfl:     SANARE ADV SCAR THERAPY BASE Gel, APPLY 1/2 GRAM (1 PUMP) TO AFFECTED AREAS TWICE DAILY STARTING 4 WEEKS POST SURGERY., Disp: , Rfl: 3    tiZANidine (ZANAFLEX) 4 MG tablet, , Disp: , Rfl:   No current facility-administered medications for this visit.     Facility-Administered Medications Ordered in Other Visits:     gentamicin 80 mg/100ml NACL IVPB IVPB 80 mg, 80 mg, Intravenous, 30 Min Pre-Op, Wiliam Urias PA-C, 80 mg at 07/31/19 0747    vancomycin in dextrose 5 % 1 gram/250 mL IVPB 1,000 mg, 1,000 mg, Intravenous, 30 Min Pre-Op, Wiliam Urias PA-C, 1,000 mg at 07/31/19 7144    Past Medical History:   Diagnosis Date    Arthritis     lumbar    Blood  transfusion     Chronic kidney disease, stage III (moderate) 8/2/2017    Degenerative disc disease     lumbar    Insulinoma     Pancreatic disease     Urinary incontinence 12/7/2015       Past Surgical History:   Procedure Laterality Date    ADENOIDECTOMY      APPENDECTOMY      CATARACT EXTRACTION, BILATERAL      EYE SURGERY      HYSTERECTOMY      SAMM/BSO    INJECTION OF STEROID Right 12/20/2018    Procedure: INJECTION, STEROID-- Right SI Joint Block and  Steroid Injection;  Surgeon: Xavier Dasilva MD;  Location: [facility] OR;  Service: Neurosurgery;  Laterality: Right;  INJECTION, STEROID-- Right SI Joint Block and  Steroid Injection  SURGERY TIME:1 HR  LOS: 0 DAYS  RADIOLOGY: C- arm  BED: Regular Bed  with Pillows  POSITION: Prone      INJECTION OF STEROID Left 6/19/2019    Procedure: INJECTION, STEROID Procedure:Left sacroiliac joint block / steroid injection Surgery Time: 30mins LOS: Anesthesia: MAC Radiology: C-arm Bed: Regular Bed Position: Prone;  Surgeon: Xavier Dasilva MD;  Location: [facility] OR;  Service: Neurosurgery;  Laterality: Left;    MINIMALLY INVASIVE FUSION OF SPINE Right 2/6/2019    Procedure: FUSION, SPINE, MINIMALLY INVASIVE Right Sacroiliac Joint Fusion -  I Fuse;  Surgeon: Xavier Dasilva MD;  Location: [facility] OR;  Service: Neurosurgery;  Laterality: Right;  Procedure: Right Sacroiliac Joint Fusion -  I Fuse  Surgery Time: 1.5 Hr  LOS: 0  Anesthesia: General  Radiology: C-Arm  Bed: Asher 4 Poster  Position: Prone  Equipment: I Fuse Codi [phone] (notified 1/24 EF)    OOPHORECTOMY      PANCREAS SURGERY      Insulanoma    RADIOFREQUENCY ABLATION OF LUMBAR MEDIAL BRANCH NERVE AT SINGLE LEVEL Right 8/28/2018    Procedure: RADIOFREQUENCY ABLATION, NERVE, MEDIAL BRANCH, LUMBAR, 1 LEVEL- Right L3,4,5 IV SEDATION;  Surgeon: Eddie Vega MD;  Location: [facility];  Service: Pain Management;  Laterality: Right;    RADIOFREQUENCY ABLATION OF LUMBAR MEDIAL BRANCH NERVE AT  SINGLE LEVEL Left 9/4/2018    Procedure: RADIOFREQUENCY ABLATION, NERVE, MEDIAL BRANCH, LUMBAR, 1 LEVEL - Left L3,4,5 IV SEADTION;  Surgeon: Eddie Vega MD;  Location: [facility];  Service: Pain Management;  Laterality: Left;  Patient takes ASA     REFRACTIVE SURGERY Bilateral     SPINAL FUSION Left 7/31/2019    Procedure: FUSION, SPINE SI Joint Fusion;  Surgeon: Xavier Dasilva MD;  Location: [facility] OR;  Service: Neurosurgery;  Laterality: Left;  Procedure: Left SI joint fusion  Surgery Time: 1.5hr  LOS:0  Anesthesia: General   Radiology: C-arm   Bed: [place] [id number] Poster  Position: Prone  Equipment: Codi Costellovo- [phone] (Codi notified)    TONSILLECTOMY         Family History   Problem Relation Age of Onset    Breast cancer Mother     Heart disease Father     Aneurysm Sister     Glaucoma Daughter     Heart attack Son     Breast cancer Other        Social History     Socioeconomic History    Marital status:      Spouse name: Not on file    Number of children: Not on file    Years of education: Not on file    Highest education level: Not on file   Occupational History    Not on file   Social Needs    Financial resource strain: Not hard at all    Food insecurity:     Worry: Never true     Inability: Never true    Transportation needs:     Medical: No     Non-medical: No   Tobacco Use    Smoking status: Never Smoker    Smokeless tobacco: Never Used   Substance and Sexual Activity    Alcohol use: Yes     Alcohol/week: 1.0 - 2.0 standard drinks     Types: 1 - 2 Glasses of wine per week     Frequency: Monthly or less     Drinks per session: 1 or 2     Binge frequency: Never    Drug use: No    Sexual activity: Yes     Partners: Male   Lifestyle    Physical activity:     Days per week: 0 days     Minutes per session: 0 min    Stress: Rather much   Relationships    Social connections:     Talks on phone: More than three times a week     Gets together: Once a week     Attends  Confucianism service: Not on file     Active member of club or organization: Yes     Attends meetings of clubs or organizations: More than 4 times per year     Relationship status:    Other Topics Concern    Not on file   Social History Narrative    Not on file       Review of patient's allergies indicates:   Allergen Reactions    Pcn [penicillins] Hives and Nausea And Vomiting       Physical Exam:  Vitals:    01/27/20 0916   BP: (!) 181/75   Pulse: 74   Weight: 60.6 kg (133 lb 9.6 oz)   PainSc: 0-No pain     General    Nursing note and vitals reviewed.  Constitutional: She is oriented to person, place, and time. She appears well-developed and well-nourished. No distress.   HENT:   Head: Normocephalic and atraumatic.   Nose: Nose normal.   Eyes: Conjunctivae and EOM are normal. Pupils are equal, round, and reactive to light. Right eye exhibits no discharge. Left eye exhibits no discharge. No scleral icterus.   Neck: No JVD present.   Cardiovascular: Intact distal pulses.    Pulmonary/Chest: Effort normal. No respiratory distress.   Abdominal: She exhibits no distension.   Neurological: She is alert and oriented to person, place, and time. Coordination normal.   Psychiatric: She has a normal mood and affect. Her behavior is normal. Judgment and thought content normal.               Imaging:  XR LUMBAR SPINE COMPLETE 5 VIEW    CLINICAL HISTORY:  Low back pain, >6wks conservative tx, persistent-progressive sx, surgical candidate;Sacroiliitis, not elsewhere classified    TECHNIQUE:  AP, lateral, spot and bilateral oblique views of the lumbar spine were performed.    COMPARISON:  None    FINDINGS:  There are 5 non rib-bearing lumbar vertebral bodies.  Vertebral body height is maintained.  There is degenerative disc disease throughout.  There is atherosclerotic disease of the aorta.  The adjacent soft tissues are unremarkable.   XR HIPS BILATERAL 2 VIEW INCL AP PELVIS    CLINICAL HISTORY:  Sacroiliitis, not  elsewhere classified    TECHNIQUE:  AP view of the pelvis and frogleg lateral views of both hips were performed.    COMPARISON:  None.    FINDINGS:  Both hips are located.  There is joint space narrowing of both hips.  The adjacent soft tissues are unremarkable.   MRI LUMBAR SPINE WITHOUT CONTRAST  T12-L1: No spinal canal stenosis or neural foraminal narrowing.  L1-L2: Circumferential disc bulge noted.  No spinal canal stenosis or neural foraminal narrowing.  L2-L3: Circumferential disc bulge and mild facet arthropathy noted.  No spinal canal stenosis or neural foraminal narrowing.  L3-L4: Circumferential disc bulge with right paracentral disc protrusion result in mild effacement of the right lateral recess.  L4-L5: Circumferential disc bulge and mild facet arthropathy result in mild right neural foraminal narrowing.  L5-S1: Circumferential disc bulge and moderate facet arthropathy result in mild bilateral neural foraminal narrowing.  Paraspinal muscles & soft tissues: Multiple stones layer within the gallbladder lumen.  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     XR SACROILIAC JOINTS COMPLETE  There are postsurgical changes from a right-sided sacroiliac joint fixation.  No evidence of acute fracture or subluxation.  No evidence of periprosthetic lucency or hardware fracture.  Mild bony demineralization is noted.   XR SACROILIAC JOINTS COMPLETE    CLINICAL HISTORY:  Arthrodesis status    TECHNIQUE:  AP and bilateral oblique radiographs of the SI joints is performed.    COMPARISON:  02/22/2019    FINDINGS:  There are postoperative changes from arthrodesis of the right SI joint with transarticular screws unchanged from the previous study.  Left SI joint demonstrate no significant abnormalities.    There are soft tissue calcifications overlying the right gluteal region, likely representing injection granulomas.   XR SACROILIAC JOINTS COMPLETE    CLINICAL HISTORY:  Arthrodesis status    TECHNIQUE:  Three  views    COMPARISON:  03/18/2019    FINDINGS:  3 right SI joint implants noted.  No change in alignment.  No evidence of hardware failure.  Left SI joint unremarkable.  No fractures.   XR SACROILIAC JOINTS COMPLETE    CLINICAL HISTORY:  Arthrodesis status    TECHNIQUE:  Routine three views    COMPARISON:  04/29/2019    FINDINGS:  Right SI joint implants noted, unchanged in position.  No evidence of loosening.  Lower lumbar spondylosis noted.  No acute fractures.  No marrow replacement process.   XR SACROILIAC JOINTS COMPLETE    CLINICAL HISTORY:  Ankylosis, unspecified joint    TECHNIQUE:  AP and sacroiliac joints views    COMPARISON:  06/10/2019 seen    FINDINGS:  Fixation of the bilateral sacroiliac joints, the hardware is intact.  The remainder of the visualized osseous structures and soft tissues appear normal.  Degenerative disc disease at the lower lumbar spine   XR SACROILIAC JOINTS COMPLETE    CLINICAL HISTORY:  Ankylosis, unspecified joint    TECHNIQUE:  Sacroiliac joints, three views    COMPARISON:  08/13/2019    FINDINGS:  Status post instrumented arthrodesis of the sacroiliac joints.  Sacral arcuate lines are maintained, without evidence for fracture, lytic or blastic lesion.  Degenerative changes are seen in the lower lumbar spine.        XR SACROILIAC JOINTS COMPLETE    CLINICAL HISTORY:  Ankylosis, unspecified joint    TECHNIQUE:  Sacroiliac joint radiograph AP and oblique views.    COMPARISON:  09/17/2019    FINDINGS:  Hardware related to instrumented arthrodesis of the SI joints appear maintained without evidence for loosening or failure.  Sacral arcuate lines appear maintained.  Lower lumbar spine DJD.  No new abnormality.          Labs:  BMP  Lab Results   Component Value Date     10/17/2019    K 4.6 10/17/2019     10/17/2019    CO2 27 10/17/2019    BUN 20 10/17/2019    CREATININE 1.1 10/17/2019    CALCIUM 9.2 10/17/2019    ANIONGAP 8 10/17/2019    ESTGFRAFRICA 53 (A) 10/17/2019     EGFRNONAA 46 (A) 10/17/2019     Lab Results   Component Value Date    ALT 15 10/17/2019    AST 21 10/17/2019    ALKPHOS 60 10/17/2019    BILITOT 0.5 10/17/2019       Assessment:  Problem List Items Addressed This Visit     Greater trochanteric bursitis of both hips - Primary    Left lumbar radiculopathy    Lumbar spondylosis    Decreased range of motion of both hips          1/27/2020 - Cheryl Ghosh is a 85 y.o. female with diffuse chronic pain related to lumbar arthritis, (likely) lumbar radiculopathy, and bilateral muscle dysfunction.  Muscle dysfunction in the low back and hips is leading to bursitis.  Patient was not able to demonstrate appropriate exercises for stretching the muscles of the hips which can lead to bursitis including the iliotibial band, gluteus medius, gluteus benjamin, gluteus minimus, and piriformis.  There is significant restriction in the range of motion in her hips and I have encouraged her to focus on stretching and strengthening these muscles as the primary method for alleviating her symptoms and producing long-term benefit.  Short-term benefit is certainly reasonable with a bilateral GTB injections; however, her symptoms will return if she does not actively pursue the stretches and exercises I have recommended to her today.  Patient has reported physical therapy in the past that did not provide her with appropriate home stretching exercises and I have encouraged her to seek a different PT provider if her plan is not adapted to include these exercises.    : Reviewed and consistent with medication use as prescribed.    Treatment Plan:   Procedures:    - s/f bilateral GTB injection   - consider lumbar FRANCISCO if radiating pain into left leg persisits  PT/OT/HEP: as discussed above.  Exercise packet given to patient today.  Medications: No changes recommended at this time.  Labs: reviewed and medications are appropriately dosed for current hepatorenal function.  Imaging: No additional  recommended at this time.    Follow Up: RTC 2 weeks    Arina Carrera Jr, MD  Interventional Pain Medicine / Anesthesiology    Disclaimer: This note was partly generated using dictation software which may occasionally result in transcription errors.

## 2020-01-24 NOTE — PROGRESS NOTES
[facility] Established Patient Evaluation    Referred by: Xavier Dasilva MD  Reason for referral: Trochanteric bursitis of both hips    CC:   Chief Complaint   Patient presents with    Hip Pain     Bilateral      Last 3 PDI Scores 1/27/2020 10/8/2018 8/7/2018   Pain Disability Index (PDI) 0 17 31       HPI:   Cheryl Ghosh is a 85 y.o. female who is well known to this clinic but new to me presents complaining of bilateral hip pain.  She was recently evaluated by her neurosurgeon after undergoing staggered, bilateral sacroiliac joint fusion.  He assessed for the presence of bilateral greater trochanter bursa irritation and has referred her for an injection of the bursa.  Patient endorses bilateral hip pain that is severe enough to limit her ability to lie on her side in bed at night.  She reports attending physical therapy but denies ever receiving a home exercise program related to hip stretching.  Patient demonstrates during the encounter today very poor range of motion with hip extension/flexion/abduction, piriformis stretch, hamstring stretch.  In addition to this primary complaint, she also reports pain radiating from the low back into the anterior portion of the left leg, not passing the knee.  This pain increases with the length of time she has been walking or standing and decreases with short rest.    Location: Bilateral Hips   Onset: Years ago  Current Pain Score: 0/10  Daily Pain of Range: 0-2/10  Quality: Deep  Radiation: Down left leg to knee  Worsened by: exercise, running, standing and walking for more than 2 minutes  Improved by: ice, laying down, medications and rest    Previous Therapies:  PT/OT: Denies  HEP: Denies  Interventions:   - 6/19/19 Left sacroiliac joint block and steroid injection   - 12/20/18 Right sacroiliac joint block and steroid injection   - 9/4/18   left L3-4-5 FACET MEDIAL BRANCH NERVE RADIOFREQUENCY NEUROTOMY (lumbar)   - 8/28/18  L3, L4, L5 FACET MEDIAL BRANCH NERVE  RADIOFREQUENCY NEUROTOMY (lumbar)  Surgery:    7/31/19 Left minimally invasive sacroiliac joint fusion using the SI-Bone   2/6/19  Right minimally invasive sacroiliac joint fusion using the SI-Bone   Medications:   - NSAIDS:   - MSK Relaxants:   - TCAs:   - SNRIs:   - Topicals:   - Anticonvulsants:  - Opioids:     Current Pain Medications:  1. Tylenol  2. Celebrex 200mg 1 cap BID   3. Voltaren Gel   4. Gabapentin 300mg 1 tablet TID  5. Oxycodone 5-325mg 1 tab daily  6. Tizanidine 4 mg      Review of Systems:  Review of Systems   Constitutional: Negative for chills and fever.   HENT: Negative for nosebleeds.    Eyes: Negative for blurred vision and pain.   Respiratory: Negative for hemoptysis.    Cardiovascular: Negative for chest pain and palpitations.   Gastrointestinal: Negative for heartburn, nausea and vomiting.   Genitourinary: Negative for dysuria and hematuria.   Musculoskeletal: Positive for joint pain and myalgias. Negative for falls.   Skin: Negative for rash.   Neurological: Positive for tingling (left anterior thigh) and weakness (heaviness in left leg/hip). Negative for seizures and loss of consciousness.   Endo/Heme/Allergies: Does not bruise/bleed easily.   Psychiatric/Behavioral: Negative for hallucinations.       History:    Current Outpatient Medications:     acetaminophen (TYLENOL 8 HOUR ORAL), Take by mouth., Disp: , Rfl:     amLODIPine (NORVASC) 2.5 MG tablet, Take 1 tablet (2.5 mg total) by mouth 2 (two) times daily., Disp: 180 tablet, Rfl: 3    aspirin (ECOTRIN) 81 MG EC tablet, Take 1 tablet (81 mg total) by mouth once daily. Resume 48 hours post-surgery, Disp: , Rfl: 0    b complex vitamins tablet, Take 1 tablet by mouth once daily., Disp: , Rfl:     calcium-vitamin D (CALCIUM WITH VITAMIN D) 600 mg(1,500mg) -400 unit Tab, , Disp: , Rfl:     celecoxib (CELEBREX) 200 MG capsule, Take 1 capsule (200 mg total) by mouth 2 (two) times daily., Disp: 180 capsule, Rfl: 3    diclofenac sodium  (VOLTAREN) 1 % Gel, APPLY 2.5 GRAMS TO THE AFFECTED AREA 3-4 TIMES DAILY STARTING 2 WEEKS POST SURGERY., Disp: , Rfl: 3    FLUZONE HIGH-DOSE 2019-20, PF, 180 mcg/0.5 mL Syrg, , Disp: , Rfl:     gabapentin (NEURONTIN) 300 MG capsule, TAKE 1 CAPSULE BY MOUTH THREE TIMES A DAY, Disp: 270 capsule, Rfl: 1    lidocaine (XYLOCAINE) 5 % Oint ointment, APPLY 2.5 GRAMS TO THE AFFECTED AREA 3-4 TIMES DAILY STARTING 2 WEEKS POST SURGERY., Disp: 1 Tube, Rfl: 11    magnesium 250 mg Tab, , Disp: , Rfl:     mupirocin (BACTROBAN) 2 % ointment, APPLY 1/2 GRAM TO EACH NOSTRIL TWICE A DAY FOR UP TO 5 DAYS PRIOR TO SURGERY OR AS DIRECTED BY YOUR PHYSICIAN., Disp: , Rfl: 0    omega 3-dha-epa-fish oil (FISH OIL) 100-160-1,000 mg Cap, , Disp: , Rfl:     oxyCODONE (OXY-IR) 5 mg Cap, Take 1 capsule (5 mg total) by mouth every 12 (twelve) hours as needed for Pain., Disp: 40 capsule, Rfl: 0    oxyCODONE-acetaminophen (PERCOCET) 5-325 mg per tablet, Take 1 tablet by mouth every 24 hours as needed for Pain., Disp: 20 tablet, Rfl: 0    potassium 99 mg Tab, , Disp: , Rfl:     pravastatin (PRAVACHOL) 40 MG tablet, Take 1 tablet (40 mg total) by mouth every evening., Disp: 90 tablet, Rfl: 3    pyridoxine (VITAMIN B-6) 100 MG Tab, Take 100 mg by mouth once daily., Disp: , Rfl:     SANARE ADV SCAR THERAPY BASE Gel, APPLY 1/2 GRAM (1 PUMP) TO AFFECTED AREAS TWICE DAILY STARTING 4 WEEKS POST SURGERY., Disp: , Rfl: 3    tiZANidine (ZANAFLEX) 4 MG tablet, , Disp: , Rfl:   No current facility-administered medications for this visit.     Facility-Administered Medications Ordered in Other Visits:     gentamicin 80 mg/100ml NACL IVPB IVPB 80 mg, 80 mg, Intravenous, 30 Min Pre-Op, Wiliam Urias PA-C, 80 mg at 07/31/19 0747    vancomycin in dextrose 5 % 1 gram/250 mL IVPB 1,000 mg, 1,000 mg, Intravenous, 30 Min Pre-Op, Wiliam Urias PA-C, 1,000 mg at 07/31/19 5740    Past Medical History:   Diagnosis Date    Arthritis     lumbar    Blood  transfusion     Chronic kidney disease, stage III (moderate) 8/2/2017    Degenerative disc disease     lumbar    Insulinoma     Pancreatic disease     Urinary incontinence 12/7/2015       Past Surgical History:   Procedure Laterality Date    ADENOIDECTOMY      APPENDECTOMY      CATARACT EXTRACTION, BILATERAL      EYE SURGERY      HYSTERECTOMY      SAMM/BSO    INJECTION OF STEROID Right 12/20/2018    Procedure: INJECTION, STEROID-- Right SI Joint Block and  Steroid Injection;  Surgeon: Xavier Dasilva MD;  Location: [facility] OR;  Service: Neurosurgery;  Laterality: Right;  INJECTION, STEROID-- Right SI Joint Block and  Steroid Injection  SURGERY TIME:1 HR  LOS: 0 DAYS  RADIOLOGY: C- arm  BED: Regular Bed  with Pillows  POSITION: Prone      INJECTION OF STEROID Left 6/19/2019    Procedure: INJECTION, STEROID Procedure:Left sacroiliac joint block / steroid injection Surgery Time: 30mins LOS: Anesthesia: MAC Radiology: C-arm Bed: Regular Bed Position: Prone;  Surgeon: Xavier Dasilva MD;  Location: [facility] OR;  Service: Neurosurgery;  Laterality: Left;    MINIMALLY INVASIVE FUSION OF SPINE Right 2/6/2019    Procedure: FUSION, SPINE, MINIMALLY INVASIVE Right Sacroiliac Joint Fusion -  I Fuse;  Surgeon: Xavier Dasilva MD;  Location: [facility] OR;  Service: Neurosurgery;  Laterality: Right;  Procedure: Right Sacroiliac Joint Fusion -  I Fuse  Surgery Time: 1.5 Hr  LOS: 0  Anesthesia: General  Radiology: C-Arm  Bed: [place] 4 Poster  Position: Prone  Equipment: I Fuse Codi [phone] (notified 1/24 EF)    OOPHORECTOMY      PANCREAS SURGERY      Insulanoma    RADIOFREQUENCY ABLATION OF LUMBAR MEDIAL BRANCH NERVE AT SINGLE LEVEL Right 8/28/2018    Procedure: RADIOFREQUENCY ABLATION, NERVE, MEDIAL BRANCH, LUMBAR, 1 LEVEL- Right L3,4,5 IV SEDATION;  Surgeon: Eddie Vega MD;  Location: [facility] PAIN MGT;  Service: Pain Management;  Laterality: Right;    RADIOFREQUENCY ABLATION OF LUMBAR MEDIAL BRANCH NERVE AT  SINGLE LEVEL Left 9/4/2018    Procedure: RADIOFREQUENCY ABLATION, NERVE, MEDIAL BRANCH, LUMBAR, 1 LEVEL - Left L3,4,5 IV SEADTION;  Surgeon: Edide Vega MD;  Location: [facility] PAIN [facility]GT;  Service: Pain Management;  Laterality: Left;  Patient takes ASA     REFRACTIVE SURGERY Bilateral     SPINAL FUSION Left 7/31/2019    Procedure: FUSION, SPINE SI Joint Fusion;  Surgeon: Xavier Dasilva MD;  Location: [facility] OR;  Service: Neurosurgery;  Laterality: Left;  Procedure: Left SI joint fusion  Surgery Time: 1.5hr  LOS:0  Anesthesia: General   Radiology: C-arm   Bed: [place] [address] Poster  Position: Prone  Equipment: Codi Costellovo- [phone] (Codi notified)    TONSILLECTOMY         Family History   Problem Relation Age of Onset    Breast cancer Mother     Heart disease Father     Aneurysm Sister     Glaucoma Daughter     Heart attack Son     Breast cancer Other        Social History     Socioeconomic History    Marital status:      Spouse name: Not on file    Number of children: Not on file    Years of education: Not on file    Highest education level: Not on file   Occupational History    Not on file   Social Needs    Financial resource strain: Not hard at all    Food insecurity:     Worry: Never true     Inability: Never true    Transportation needs:     Medical: No     Non-medical: No   Tobacco Use    Smoking status: Never Smoker    Smokeless tobacco: Never Used   Substance and Sexual Activity    Alcohol use: Yes     Alcohol/week: 1.0 - 2.0 standard drinks     Types: 1 - 2 Glasses of wine per week     Frequency: Monthly or less     Drinks per session: 1 or 2     Binge frequency: Never    Drug use: No    Sexual activity: Yes     Partners: Male   Lifestyle    Physical activity:     Days per week: 0 days     Minutes per session: 0 min    Stress: Rather much   Relationships    Social connections:     Talks on phone: More than three times a week     Gets together: Once a week     Attends  [employer] service: Not on file     Active member of club or organization: Yes     Attends meetings of clubs or organizations: More than 4 times per year     Relationship status:    Other Topics Concern    Not on file   Social History Narrative    Not on file       Review of patient's allergies indicates:   Allergen Reactions    Pcn [penicillins] Hives and Nausea And Vomiting       Physical Exam:  Vitals:    01/27/20 0916   BP: (!) 181/75   Pulse: 74   Weight: 60.6 kg (133 lb 9.6 oz)   PainSc: 0-No pain     General    Nursing note and vitals reviewed.  Constitutional: She is oriented to person, place, and time. She appears well-developed and well-nourished. No distress.   HENT:   Head: Normocephalic and atraumatic.   Nose: Nose normal.   Eyes: Conjunctivae and EOM are normal. Pupils are equal, round, and reactive to light. Right eye exhibits no discharge. Left eye exhibits no discharge. No scleral icterus.   Neck: No JVD present.   Cardiovascular: Intact distal pulses.    Pulmonary/Chest: Effort normal. No respiratory distress.   Abdominal: She exhibits no distension.   Neurological: She is alert and oriented to person, place, and time. Coordination normal.   Psychiatric: She has a normal mood and affect. Her behavior is normal. Judgment and thought content normal.               Imaging:  XR LUMBAR SPINE COMPLETE 5 VIEW    CLINICAL HISTORY:  Low back pain, >6wks conservative tx, persistent-progressive sx, surgical candidate;Sacroiliitis, not elsewhere classified    TECHNIQUE:  AP, lateral, spot and bilateral oblique views of the lumbar spine were performed.    COMPARISON:  None    FINDINGS:  There are 5 non rib-bearing lumbar vertebral bodies.  Vertebral body height is maintained.  There is degenerative disc disease throughout.  There is atherosclerotic disease of the aorta.  The adjacent soft tissues are unremarkable.   XR HIPS BILATERAL 2 VIEW INCL AP PELVIS    CLINICAL HISTORY:  Sacroiliitis, not  elsewhere classified    TECHNIQUE:  AP view of the pelvis and frogleg lateral views of both hips were performed.    COMPARISON:  None.    FINDINGS:  Both hips are located.  There is joint space narrowing of both hips.  The adjacent soft tissues are unremarkable.   MRI LUMBAR SPINE WITHOUT CONTRAST  T12-L1: No spinal canal stenosis or neural foraminal narrowing.  L1-L2: Circumferential disc bulge noted.  No spinal canal stenosis or neural foraminal narrowing.  L2-L3: Circumferential disc bulge and mild facet arthropathy noted.  No spinal canal stenosis or neural foraminal narrowing.  L3-L4: Circumferential disc bulge with right paracentral disc protrusion result in mild effacement of the right lateral recess.  L4-L5: Circumferential disc bulge and mild facet arthropathy result in mild right neural foraminal narrowing.  L5-S1: Circumferential disc bulge and moderate facet arthropathy result in mild bilateral neural foraminal narrowing.  Paraspinal muscles & soft tissues: Multiple stones layer within the gallbladder lumen.  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     XR SACROILIAC JOINTS COMPLETE  There are postsurgical changes from a right-sided sacroiliac joint fixation.  No evidence of acute fracture or subluxation.  No evidence of periprosthetic lucency or hardware fracture.  Mild bony demineralization is noted.   XR SACROILIAC JOINTS COMPLETE    CLINICAL HISTORY:  Arthrodesis status    TECHNIQUE:  AP and bilateral oblique radiographs of the SI joints is performed.    COMPARISON:  02/22/2019    FINDINGS:  There are postoperative changes from arthrodesis of the right SI joint with transarticular screws unchanged from the previous study.  Left SI joint demonstrate no significant abnormalities.    There are soft tissue calcifications overlying the right gluteal region, likely representing injection granulomas.   XR SACROILIAC JOINTS COMPLETE    CLINICAL HISTORY:  Arthrodesis status    TECHNIQUE:  Three  views    COMPARISON:  03/18/2019    FINDINGS:  3 right SI joint implants noted.  No change in alignment.  No evidence of hardware failure.  Left SI joint unremarkable.  No fractures.   XR SACROILIAC JOINTS COMPLETE    CLINICAL HISTORY:  Arthrodesis status    TECHNIQUE:  Routine three views    COMPARISON:  04/29/2019    FINDINGS:  Right SI joint implants noted, unchanged in position.  No evidence of loosening.  Lower lumbar spondylosis noted.  No acute fractures.  No marrow replacement process.   XR SACROILIAC JOINTS COMPLETE    CLINICAL HISTORY:  Ankylosis, unspecified joint    TECHNIQUE:  AP and sacroiliac joints views    COMPARISON:  06/10/2019 seen    FINDINGS:  Fixation of the bilateral sacroiliac joints, the hardware is intact.  The remainder of the visualized osseous structures and soft tissues appear normal.  Degenerative disc disease at the lower lumbar spine   XR SACROILIAC JOINTS COMPLETE    CLINICAL HISTORY:  Ankylosis, unspecified joint    TECHNIQUE:  Sacroiliac joints, three views    COMPARISON:  08/13/2019    FINDINGS:  Status post instrumented arthrodesis of the sacroiliac joints.  Sacral arcuate lines are maintained, without evidence for fracture, lytic or blastic lesion.  Degenerative changes are seen in the lower lumbar spine.        XR SACROILIAC JOINTS COMPLETE    CLINICAL HISTORY:  Ankylosis, unspecified joint    TECHNIQUE:  Sacroiliac joint radiograph AP and oblique views.    COMPARISON:  09/17/2019    FINDINGS:  Hardware related to instrumented arthrodesis of the SI joints appear maintained without evidence for loosening or failure.  Sacral arcuate lines appear maintained.  Lower lumbar spine DJD.  No new abnormality.          Labs:  BMP  Lab Results   Component Value Date     10/17/2019    K 4.6 10/17/2019     10/17/2019    CO2 27 10/17/2019    BUN 20 10/17/2019    CREATININE 1.1 10/17/2019    CALCIUM 9.2 10/17/2019    ANIONGAP 8 10/17/2019    ESTGFRAFRICA 53 (A) 10/17/2019     EGFRNONAA 46 (A) 10/17/2019     Lab Results   Component Value Date    ALT 15 10/17/2019    AST 21 10/17/2019    ALKPHOS 60 10/17/2019    BILITOT 0.5 10/17/2019       Assessment:  Problem List Items Addressed This Visit     Greater trochanteric bursitis of both hips - Primary    Left lumbar radiculopathy    Lumbar spondylosis    Decreased range of motion of both hips          1/27/2020 - Cheryl Ghosh is a 85 y.o. female with diffuse chronic pain related to lumbar arthritis, (likely) lumbar radiculopathy, and bilateral muscle dysfunction.  Muscle dysfunction in the low back and hips is leading to bursitis.  Patient was not able to demonstrate appropriate exercises for stretching the muscles of the hips which can lead to bursitis including the iliotibial band, gluteus medius, gluteus benjamin, gluteus minimus, and piriformis.  There is significant restriction in the range of motion in her hips and I have encouraged her to focus on stretching and strengthening these muscles as the primary method for alleviating her symptoms and producing long-term benefit.  Short-term benefit is certainly reasonable with a bilateral GTB injections; however, her symptoms will return if she does not actively pursue the stretches and exercises I have recommended to her today.  Patient has reported physical therapy in the past that did not provide her with appropriate home stretching exercises and I have encouraged her to seek a different PT provider if her plan is not adapted to include these exercises.    : Reviewed and consistent with medication use as prescribed.    Treatment Plan:   Procedures:    - s/f bilateral GTB injection   - consider lumbar FRANCISCO if radiating pain into left leg persisits  PT/OT/HEP: as discussed above.  Exercise packet given to patient today.  Medications: No changes recommended at this time.  Labs: reviewed and medications are appropriately dosed for current hepatorenal function.  Imaging: No additional  recommended at this time.    Follow Up: RTC 2 weeks    Arina Carrera Jr, MD  Interventional Pain Medicine / Anesthesiology    Disclaimer: This note was partly generated using dictation software which may occasionally result in transcription errors.

## 2020-01-27 ENCOUNTER — OFFICE VISIT (OUTPATIENT)
Dept: PAIN MEDICINE | Facility: CLINIC | Age: 85
End: 2020-01-27
Payer: MEDICARE

## 2020-01-27 ENCOUNTER — TELEPHONE (OUTPATIENT)
Dept: PAIN MEDICINE | Facility: CLINIC | Age: 85
End: 2020-01-27

## 2020-01-27 VITALS
SYSTOLIC BLOOD PRESSURE: 181 MMHG | BODY MASS INDEX: 22.93 KG/M2 | DIASTOLIC BLOOD PRESSURE: 75 MMHG | HEART RATE: 74 BPM | WEIGHT: 133.63 LBS

## 2020-01-27 DIAGNOSIS — M70.62 GREATER TROCHANTERIC BURSITIS OF BOTH HIPS: Primary | ICD-10-CM

## 2020-01-27 DIAGNOSIS — M25.652 DECREASED RANGE OF MOTION OF BOTH HIPS: ICD-10-CM

## 2020-01-27 DIAGNOSIS — M25.651 DECREASED RANGE OF MOTION OF BOTH HIPS: ICD-10-CM

## 2020-01-27 DIAGNOSIS — M70.61 GREATER TROCHANTERIC BURSITIS OF BOTH HIPS: Primary | ICD-10-CM

## 2020-01-27 DIAGNOSIS — M54.16 LEFT LUMBAR RADICULOPATHY: ICD-10-CM

## 2020-01-27 DIAGNOSIS — M25.551 BILATERAL HIP PAIN: ICD-10-CM

## 2020-01-27 DIAGNOSIS — M25.552 BILATERAL HIP PAIN: ICD-10-CM

## 2020-01-27 DIAGNOSIS — M47.816 LUMBAR SPONDYLOSIS: ICD-10-CM

## 2020-01-27 PROCEDURE — 1101F PR PT FALLS ASSESS DOC 0-1 FALLS W/OUT INJ PAST YR: ICD-10-PCS | Mod: HCNC,CPTII,S$GLB, | Performed by: PAIN MEDICINE

## 2020-01-27 PROCEDURE — 1126F PR PAIN SEVERITY QUANTIFIED, NO PAIN PRESENT: ICD-10-PCS | Mod: HCNC,S$GLB,, | Performed by: PAIN MEDICINE

## 2020-01-27 PROCEDURE — 3078F PR MOST RECENT DIASTOLIC BLOOD PRESSURE < 80 MM HG: ICD-10-PCS | Mod: HCNC,CPTII,S$GLB, | Performed by: PAIN MEDICINE

## 2020-01-27 PROCEDURE — 99999 PR PBB SHADOW E&M-EST. PATIENT-LVL III: ICD-10-PCS | Mod: PBBFAC,HCNC,, | Performed by: PAIN MEDICINE

## 2020-01-27 PROCEDURE — 3077F PR MOST RECENT SYSTOLIC BLOOD PRESSURE >= 140 MM HG: ICD-10-PCS | Mod: HCNC,CPTII,S$GLB, | Performed by: PAIN MEDICINE

## 2020-01-27 PROCEDURE — 1101F PT FALLS ASSESS-DOCD LE1/YR: CPT | Mod: HCNC,CPTII,S$GLB, | Performed by: PAIN MEDICINE

## 2020-01-27 PROCEDURE — 3077F SYST BP >= 140 MM HG: CPT | Mod: HCNC,CPTII,S$GLB, | Performed by: PAIN MEDICINE

## 2020-01-27 PROCEDURE — 1159F PR MEDICATION LIST DOCUMENTED IN MEDICAL RECORD: ICD-10-PCS | Mod: HCNC,S$GLB,, | Performed by: PAIN MEDICINE

## 2020-01-27 PROCEDURE — 99214 OFFICE O/P EST MOD 30 MIN: CPT | Mod: HCNC,S$GLB,, | Performed by: PAIN MEDICINE

## 2020-01-27 PROCEDURE — 99999 PR PBB SHADOW E&M-EST. PATIENT-LVL III: CPT | Mod: PBBFAC,HCNC,, | Performed by: PAIN MEDICINE

## 2020-01-27 PROCEDURE — 3078F DIAST BP <80 MM HG: CPT | Mod: HCNC,CPTII,S$GLB, | Performed by: PAIN MEDICINE

## 2020-01-27 PROCEDURE — 1159F MED LIST DOCD IN RCRD: CPT | Mod: HCNC,S$GLB,, | Performed by: PAIN MEDICINE

## 2020-01-27 PROCEDURE — 99214 PR OFFICE/OUTPT VISIT, EST, LEVL IV, 30-39 MIN: ICD-10-PCS | Mod: HCNC,S$GLB,, | Performed by: PAIN MEDICINE

## 2020-01-27 PROCEDURE — 1126F AMNT PAIN NOTED NONE PRSNT: CPT | Mod: HCNC,S$GLB,, | Performed by: PAIN MEDICINE

## 2020-01-27 NOTE — LETTER
January 27, 2020      Xavier Dasilva MD  [address]  [address]  [facility] [address]           [facility]  [address]  [facility] [address]  Phone: [phone]          Patient: Cheryl Ghosh   MR Number: [id number]   YOB: 1934   Date of Visit: 1/27/2020       Dear Dr. Xavier Dasilva:    Thank you for referring Cheryl Ghosh to me for evaluation. Attached you will find relevant portions of my assessment and plan of care.    If you have questions, please do not hesitate to call me. I look forward to following Cheryl Ghosh along with you.    Sincerely,    Arina Carrera Jr., MD    Enclosure  CC:  No Recipients    If you would like to receive this communication electronically, please contact [email or web address] or [phone] to request more information on [email or web address] Link access.    For providers and/or their staff who would like to refer a patient to [facility], please contact us through our one-stop-shop provider referral line, [facility] [place], at [phone].    If you feel you have received this communication in error or would no longer like to receive these types of communications, please e-mail [email or web address]

## 2020-01-27 NOTE — TELEPHONE ENCOUNTER
Pt scheduled for 2/6/20 at 1015 for Bilateral GTB Injectin. Pt aware to check in at registration desk on the first floor of the hospital for 0915 am. Pt is taking ASA and aware she can continue taking it for this procedure.

## 2020-02-04 ENCOUNTER — HOSPITAL ENCOUNTER (OUTPATIENT)
Facility: HOSPITAL | Age: 85
Discharge: HOME | End: 2020-02-04
Attending: PAIN MEDICINE | Admitting: PAIN MEDICINE
Payer: MEDICARE

## 2020-02-04 VITALS
OXYGEN SATURATION: 97 % | SYSTOLIC BLOOD PRESSURE: 144 MMHG | WEIGHT: 131 LBS | HEIGHT: 64 IN | HEART RATE: 58 BPM | BODY MASS INDEX: 22.36 KG/M2 | RESPIRATION RATE: 16 BRPM | DIASTOLIC BLOOD PRESSURE: 65 MMHG | TEMPERATURE: 98 F

## 2020-02-04 DIAGNOSIS — M70.62 GREATER TROCHANTERIC BURSITIS OF BOTH HIPS: Primary | ICD-10-CM

## 2020-02-04 DIAGNOSIS — G89.29 CHRONIC PAIN: ICD-10-CM

## 2020-02-04 DIAGNOSIS — M70.61 GREATER TROCHANTERIC BURSITIS OF BOTH HIPS: Primary | ICD-10-CM

## 2020-02-04 DIAGNOSIS — M25.551 PAIN OF BOTH HIP JOINTS: ICD-10-CM

## 2020-02-04 DIAGNOSIS — M25.552 PAIN OF BOTH HIP JOINTS: ICD-10-CM

## 2020-02-04 PROCEDURE — 77002 NEEDLE LOCALIZATION BY XRAY: CPT | Mod: 26,HCNC,, | Performed by: PAIN MEDICINE

## 2020-02-04 PROCEDURE — 63600175 PHARM REV CODE 636 W HCPCS: Mod: HCNC | Performed by: PAIN MEDICINE

## 2020-02-04 PROCEDURE — 25500020 PHARM REV CODE 255: Mod: HCNC | Performed by: PAIN MEDICINE

## 2020-02-04 PROCEDURE — 77002 PR FLUOROSCOPIC GUIDANCE NEEDLE PLACEMENT: ICD-10-PCS | Mod: 26,HCNC,, | Performed by: PAIN MEDICINE

## 2020-02-04 PROCEDURE — 20610 DRAIN/INJ JOINT/BURSA W/O US: CPT | Mod: 50,HCNC,, | Performed by: PAIN MEDICINE

## 2020-02-04 PROCEDURE — 25000003 PHARM REV CODE 250: Mod: HCNC | Performed by: PAIN MEDICINE

## 2020-02-04 PROCEDURE — 20610 PR DRAIN/INJECT LARGE JOINT/BURSA: ICD-10-PCS | Mod: 50,HCNC,, | Performed by: PAIN MEDICINE

## 2020-02-04 PROCEDURE — 20610 DRAIN/INJ JOINT/BURSA W/O US: CPT | Mod: 50,HCNC | Performed by: PAIN MEDICINE

## 2020-02-04 RX ORDER — ALPRAZOLAM 0.5 MG/1
0.5 TABLET, ORALLY DISINTEGRATING ORAL ONCE AS NEEDED
Status: COMPLETED | OUTPATIENT
Start: 2020-02-04 | End: 2020-02-04

## 2020-02-04 RX ORDER — SODIUM BICARBONATE 1 MEQ/ML
SYRINGE (ML) INTRAVENOUS
Status: DISCONTINUED | OUTPATIENT
Start: 2020-02-04 | End: 2020-02-04 | Stop reason: HOSPADM

## 2020-02-04 RX ORDER — METHYLPREDNISOLONE ACETATE 40 MG/ML
INJECTION, SUSPENSION INTRA-ARTICULAR; INTRALESIONAL; INTRAMUSCULAR; SOFT TISSUE
Status: DISCONTINUED | OUTPATIENT
Start: 2020-02-04 | End: 2020-02-04 | Stop reason: HOSPADM

## 2020-02-04 RX ORDER — LIDOCAINE HYDROCHLORIDE 10 MG/ML
INJECTION INFILTRATION; PERINEURAL
Status: DISCONTINUED | OUTPATIENT
Start: 2020-02-04 | End: 2020-02-04 | Stop reason: HOSPADM

## 2020-02-04 RX ORDER — BUPIVACAINE HYDROCHLORIDE 2.5 MG/ML
INJECTION, SOLUTION EPIDURAL; INFILTRATION; INTRACAUDAL
Status: DISCONTINUED | OUTPATIENT
Start: 2020-02-04 | End: 2020-02-04 | Stop reason: HOSPADM

## 2020-02-04 RX ADMIN — ALPRAZOLAM 0.5 MG: 0.5 TABLET, ORALLY DISINTEGRATING ORAL at 01:02

## 2020-02-04 NOTE — DISCHARGE SUMMARY
[facility]  Discharge Note  Short Stay     Admit Date: 2/4/2020    Discharge Date: 2/4/2020     Attending Physician: Arina Carrera Jr, MD    Diagnoses:  Active Hospital Problems    Diagnosis  POA    Chronic pain [G89.29]  Yes      Resolved Hospital Problems   No resolved problems to display.     Discharged Condition: Good     Hospital Course: Patient was admitted for an outpatient interventional pain management procedure and tolerated the procedure well with no complications.     Final Diagnoses: Same as principal problem.     Disposition: Home or Self Care     Follow up/Patient Instructions:    Follow-up Information     Arina Carrera Jr, MD. Go in 2 weeks.    Specialty:  Pain Medicine  Why:  Post-procedural Follow Up As Scheduled  Contact information:  [address]  [address]  [place] [address] [phone]                   Reconciled Medications:     Medication List      CONTINUE taking these medications    amLODIPine 2.5 MG tablet  Commonly known as:  NORVASC  Take 1 tablet (2.5 mg total) by mouth 2 (two) times daily.     aspirin 81 MG EC tablet  Commonly known as:  ECOTRIN  Take 1 tablet (81 mg total) by mouth once daily. Resume 48 hours post-surgery     b complex vitamins tablet  Take 1 tablet by mouth once daily.     Calcium with Vitamin D 600 mg(1,500mg) -400 unit Tab  Generic drug:  calcium-vitamin D     celecoxib 200 MG capsule  Commonly known as:  CeleBREX  Take 1 capsule (200 mg total) by mouth 2 (two) times daily.     diclofenac sodium 1 % Gel  Commonly known as:  VOLTAREN  APPLY 2.5 GRAMS TO THE AFFECTED AREA 3-4 TIMES DAILY STARTING 2 WEEKS POST SURGERY.     Fish Oil 100-160-1,000 mg Cap  Generic drug:  omega 3-dha-epa-fish oil     Fluzone High-Dose 2019-20 (PF) 180 mcg/0.5 mL Syrg  Generic drug:  flu vacc [id number](65yr up)PF     gabapentin 300 MG capsule  Commonly known as:  NEURONTIN  TAKE 1 CAPSULE BY MOUTH THREE TIMES A DAY     lidocaine 5 % Oint ointment  Commonly known  as:  XYLOCAINE  APPLY 2.5 GRAMS TO THE AFFECTED AREA 3-4 TIMES DAILY STARTING 2 WEEKS POST SURGERY.     magnesium 250 mg Tab     mupirocin 2 % ointment  Commonly known as:  BACTROBAN  APPLY 1/2 GRAM TO EACH NOSTRIL TWICE A DAY FOR UP TO 5 DAYS PRIOR TO SURGERY OR AS DIRECTED BY YOUR PHYSICIAN.     oxyCODONE 5 mg Cap  Commonly known as:  OXY-IR  Take 1 capsule (5 mg total) by mouth every 12 (twelve) hours as needed for Pain.     oxyCODONE-acetaminophen 5-325 mg per tablet  Commonly known as:  PERCOCET  Take 1 tablet by mouth every 24 hours as needed for Pain.     potassium 99 mg Tab     pravastatin 40 MG tablet  Commonly known as:  PRAVACHOL  Take 1 tablet (40 mg total) by mouth every evening.     Sanare Adv Scar Therapy Base Gel  Generic drug:  gel base no.184 (bulk)  APPLY 1/2 GRAM (1 PUMP) TO AFFECTED AREAS TWICE DAILY STARTING 4 WEEKS POST SURGERY.     tiZANidine 4 MG tablet  Commonly known as:  ZANAFLEX     TYLENOL 8 HOUR ORAL  Take by mouth.     Vitamin B-6 100 MG Tab  Generic drug:  pyridoxine (vitamin B6)  Take 100 mg by mouth once daily.           Discharge Procedure Orders (must include Diet, Follow-up, Activity)   Call MD for:  temperature >100.4     Call MD for:  severe uncontrolled pain     Call MD for:  redness, tenderness, or signs of infection (pain, swelling, redness, odor or green/yellow discharge around incision site)     Call MD for:  difficulty breathing or increased cough     Call MD for:  severe persistent headache     Call MD for:  worsening rash     Remove dressing in 24 hours       Arina Carrera Jr, MD  Interventional Pain Medicine / Anesthesiology

## 2020-02-04 NOTE — DISCHARGE INSTRUCTIONS
Home Care Instructions Pain Management:    1.  DIET:    You may resume your normal diet today.    2.  BATHING:    You may shower with luke warm water.    3.  DRESSING:    You may remove your bandage today.    4.  ACTIVITY LEVEL:      You may resume your normal activities 24 hours after your procedure.    5.  MEDICATIONS:    You may resume your normal medications today.    6.  SPECIAL INSTRUCTIONS:    No heat to the injection site for 24 hours including bath or shower, heating pad, moist heat or hot tubs.    Use an ice pack to the injection site for any pain or discomfort.  Apply ice packs for 20 minute intervals as needed.    If you have received any sedatives by mouth today, you can not drive for 12 hours.    If you have received sedation through an IV, you can not drive for 24 hours.    PLEASE CALL YOUR DOCTOR FOR THE FOLLOWIN.  Redness or swelling around the injection site.  2.  Fever of 101 degrees.  3.  Drainage (pus) from the injection site.  4.  For any continuous bleeding (some dried blood over the incision is normal.)    FOR EMERGENCIES:    If any unusual problems or difficulties occur during clinic hours, call [phone] or dial [phone].    Follow up with with your physician in 2-3 weeks.

## 2020-02-04 NOTE — OP NOTE
Greater Trochanter Bursa Injection     Pre-operative diagnosis: Greater Trochanter Bursitis, Bilateral  Post-operative diagnosis: Greater Trochanter Bursitis, Bilateral  Procedure Date: 02/04/2020  Procedure:  (1) Bilateral Greater Trochanter Bursa Injection  (2) Intraoperative Fluoroscopy    Anesthesia: Local    Indication for procedure: Patient has a history of greater trochanter bursitis. The patient is here today for bilateral greater trochanter bursa injection for diagnostic and therapeutic purposes. The patient was deemed an appropriate candidate to undergo the planned procedure. Consent for procedure was obtained.     Findings: Final needle placement consistent with technically sucsessful procedure; demonstration of normal bursagram; without vascular uptake.     Complications: None     Description of procedure in detail: Informed consent obtained after explaining the procedure and potential complications including local discomfort, infection, headache, temporary or permanent weakness and/or numbness of one or both legs, temporary or permanent paraplegia, heart attack and stroke. Patient was brought back to procedure room and placed in a prone position and head resting comfortably in head ring. Prior to the initiation of the procedure, the patient's identity, the site, and the nature of the procedure were verified.     The skin was prepped with chloroprep and sterile drapes were applied. The Left greater trochanter bursa was identified by fluoroscopy in an AP view. Lidocaine 1% 2 mL was used to anesthetize the skin at the skin entry point and subcutaneous tissue with 25g 1 1/2 in needle. A 22G 3.5 inch spinal needle was advanced under intermittent fluoroscopy until os was encountered. There was no paresthesia with needle placement, but there was reproduction of pain. Aspiration was negative for blood. Omnipaque 0.5 mL was injected confirming appropriate position and spread into the bursa without intravascualr  uptake. Next, 4 mL containing 20 mg Depomedrol with 2 mL of 0.25% Bupivacaine was injected without difficulty or resistance. Needle was removed with flush and bandaged placed over site of needle insertion.     The same procedure was repeated on the opposite side.    Estimated blood loss: None     Disposition: The patient tolerated the procedure without complaint and was transported to the recovery room in stable condition.     Follow-up: RTC as scheduled    Arina Carrera Jr, MD  Interventional Pain Medicine / Anesthesiology

## 2020-02-04 NOTE — PLAN OF CARE
Discharge instructions reviewed with patient. Questions answered. Verbalized understanding, no further questions at this time.  Procedure site is DSI. VSS. No acute distress noted. Staff at bedside to help pt change. Wheeled to DC area by staff. Home with family in private vehicle.

## 2020-02-18 ENCOUNTER — PATIENT MESSAGE (OUTPATIENT)
Dept: FAMILY MEDICINE | Facility: CLINIC | Age: 85
End: 2020-02-18

## 2020-02-18 DIAGNOSIS — E78.2 MIXED HYPERLIPIDEMIA: ICD-10-CM

## 2020-02-18 DIAGNOSIS — E78.5 HYPERLIPIDEMIA, UNSPECIFIED HYPERLIPIDEMIA TYPE: ICD-10-CM

## 2020-02-18 DIAGNOSIS — I70.0 AORTIC ATHEROSCLEROSIS: ICD-10-CM

## 2020-02-19 RX ORDER — PRAVASTATIN SODIUM 40 MG/1
40 TABLET ORAL NIGHTLY
Qty: 90 TABLET | Refills: 1 | Status: SHIPPED | OUTPATIENT
Start: 2020-02-19 | End: 2020-04-16 | Stop reason: SDUPTHER

## 2020-02-22 DIAGNOSIS — G57.93 NEUROPATHIC PAIN OF BOTH LEGS: ICD-10-CM

## 2020-02-24 RX ORDER — GABAPENTIN 300 MG/1
CAPSULE ORAL
Qty: 270 CAPSULE | Refills: 1 | Status: SHIPPED | OUTPATIENT
Start: 2020-02-24 | End: 2020-04-17 | Stop reason: SDUPTHER

## 2020-03-02 ENCOUNTER — OFFICE VISIT (OUTPATIENT)
Dept: PAIN MEDICINE | Facility: CLINIC | Age: 85
End: 2020-03-02
Payer: MEDICARE

## 2020-03-02 VITALS — WEIGHT: 130.94 LBS | BODY MASS INDEX: 22.48 KG/M2

## 2020-03-02 DIAGNOSIS — M47.816 FACET ARTHRITIS OF LUMBAR REGION: ICD-10-CM

## 2020-03-02 DIAGNOSIS — M25.551 PAIN OF BOTH HIP JOINTS: ICD-10-CM

## 2020-03-02 DIAGNOSIS — M25.652 DECREASED RANGE OF MOTION OF BOTH HIPS: ICD-10-CM

## 2020-03-02 DIAGNOSIS — M25.552 PAIN OF BOTH HIP JOINTS: ICD-10-CM

## 2020-03-02 DIAGNOSIS — M54.16 LEFT LUMBAR RADICULOPATHY: ICD-10-CM

## 2020-03-02 DIAGNOSIS — M47.816 LUMBAR SPONDYLOSIS: ICD-10-CM

## 2020-03-02 DIAGNOSIS — M25.551 BILATERAL HIP PAIN: Primary | ICD-10-CM

## 2020-03-02 DIAGNOSIS — M47.9 OSTEOARTHRITIS OF SPINE, UNSPECIFIED SPINAL OSTEOARTHRITIS COMPLICATION STATUS, UNSPECIFIED SPINAL REGION: ICD-10-CM

## 2020-03-02 DIAGNOSIS — G89.4 CHRONIC PAIN SYNDROME: ICD-10-CM

## 2020-03-02 DIAGNOSIS — M25.651 DECREASED RANGE OF MOTION OF BOTH HIPS: ICD-10-CM

## 2020-03-02 DIAGNOSIS — M25.552 BILATERAL HIP PAIN: Primary | ICD-10-CM

## 2020-03-02 PROCEDURE — 99499 RISK ADDL DX/OHS AUDIT: ICD-10-PCS | Mod: HCNC,S$GLB,, | Performed by: NURSE PRACTITIONER

## 2020-03-02 PROCEDURE — 99499 UNLISTED E&M SERVICE: CPT | Mod: HCNC,S$GLB,, | Performed by: NURSE PRACTITIONER

## 2020-03-02 PROCEDURE — 99214 OFFICE O/P EST MOD 30 MIN: CPT | Mod: HCNC,S$GLB,, | Performed by: NURSE PRACTITIONER

## 2020-03-02 PROCEDURE — 1159F MED LIST DOCD IN RCRD: CPT | Mod: HCNC,S$GLB,, | Performed by: NURSE PRACTITIONER

## 2020-03-02 PROCEDURE — 99214 PR OFFICE/OUTPT VISIT, EST, LEVL IV, 30-39 MIN: ICD-10-PCS | Mod: HCNC,S$GLB,, | Performed by: NURSE PRACTITIONER

## 2020-03-02 PROCEDURE — 1125F AMNT PAIN NOTED PAIN PRSNT: CPT | Mod: HCNC,S$GLB,, | Performed by: NURSE PRACTITIONER

## 2020-03-02 PROCEDURE — 99999 PR PBB SHADOW E&M-EST. PATIENT-LVL IV: CPT | Mod: PBBFAC,HCNC,, | Performed by: NURSE PRACTITIONER

## 2020-03-02 PROCEDURE — 1101F PR PT FALLS ASSESS DOC 0-1 FALLS W/OUT INJ PAST YR: ICD-10-PCS | Mod: HCNC,CPTII,S$GLB, | Performed by: NURSE PRACTITIONER

## 2020-03-02 PROCEDURE — 1159F PR MEDICATION LIST DOCUMENTED IN MEDICAL RECORD: ICD-10-PCS | Mod: HCNC,S$GLB,, | Performed by: NURSE PRACTITIONER

## 2020-03-02 PROCEDURE — 99999 PR PBB SHADOW E&M-EST. PATIENT-LVL IV: ICD-10-PCS | Mod: PBBFAC,HCNC,, | Performed by: NURSE PRACTITIONER

## 2020-03-02 PROCEDURE — 1101F PT FALLS ASSESS-DOCD LE1/YR: CPT | Mod: HCNC,CPTII,S$GLB, | Performed by: NURSE PRACTITIONER

## 2020-03-02 PROCEDURE — 1125F PR PAIN SEVERITY QUANTIFIED, PAIN PRESENT: ICD-10-PCS | Mod: HCNC,S$GLB,, | Performed by: NURSE PRACTITIONER

## 2020-03-02 RX ORDER — TIZANIDINE 4 MG/1
4 TABLET ORAL NIGHTLY PRN
Qty: 30 TABLET | Refills: 1 | Status: SHIPPED | OUTPATIENT
Start: 2020-03-02 | End: 2021-01-08 | Stop reason: SDUPTHER

## 2020-03-02 NOTE — PROGRESS NOTES
[facility] Established Patient Evaluation    Referred by: Xavier Dasilva MD  Reason for referral: Trochanteric bursitis of both hips    CC:   Chief Complaint   Patient presents with    Hip Pain     Last 3 PDI Scores 3/2/2020 1/27/2020 10/8/2018   Pain Disability Index (PDI) 18 0 17     03/02/2020 - Ms. Ghosh returns to clinic for follow up visit reporting improved hip pain.  Patient is s/p Greater Trochanter Bursitis, Bilateral on 2/4/20 with 80% relief.  Pain intensity is currently 3/10. Patient is reporting some continued left hip pain that is tolerable, she also reports intermittent left leg pain mostly at night, she states she takes pain medication ( percocet 5 per Dr. Dasilva from surgery)  and muscle relaxer's which help. She is reporting continued home exercises that are helping, her overall pain is much better.    HPI:   Cheryl Ghosh is a 85 y.o. female who is well known to this clinic but new to me presents complaining of bilateral hip pain.  She was recently evaluated by her neurosurgeon after undergoing staggered, bilateral sacroiliac joint fusion.  He assessed for the presence of bilateral greater trochanter bursa irritation and has referred her for an injection of the bursa.  Patient endorses bilateral hip pain that is severe enough to limit her ability to lie on her side in bed at night.  She reports attending physical therapy but denies ever receiving a home exercise program related to hip stretching.  Patient demonstrates during the encounter today very poor range of motion with hip extension/flexion/abduction, piriformis stretch, hamstring stretch.  In addition to this primary complaint, she also reports pain radiating from the low back into the anterior portion of the left leg, not passing the knee.  This pain increases with the length of time she has been walking or standing and decreases with short rest.    Location: Bilateral Hips   Onset: Years ago  Current Pain Score: 0/10  Daily  Pain of Range: 0-2/10  Quality: Deep  Radiation: Down left leg to knee  Worsened by: exercise, running, standing and walking for more than 2 minutes  Improved by: ice, laying down, medications and rest    Previous Therapies:  PT/OT: Denies  HEP: Denies  Interventions:   - 6/19/19 Left sacroiliac joint block and steroid injection   - 12/20/18 Right sacroiliac joint block and steroid injection   - 9/4/18   left L3-4-5 FACET MEDIAL BRANCH NERVE RADIOFREQUENCY NEUROTOMY (lumbar)   - 8/28/18  L3, L4, L5 FACET MEDIAL BRANCH NERVE RADIOFREQUENCY NEUROTOMY (lumbar)  Surgery:    7/31/19 Left minimally invasive sacroiliac joint fusion using the SI-Bone   2/6/19  Right minimally invasive sacroiliac joint fusion using the SI-Bone   Medications:   - NSAIDS:   - MSK Relaxants:   - TCAs:   - SNRIs:   - Topicals:   - Anticonvulsants:  - Opioids:     Current Pain Medications:  1. Tylenol  2. Celebrex 200mg 1 cap BID   3. Voltaren Gel   4. Gabapentin 300mg 1 tablet TID  5. Oxycodone 5-325mg 1 tab daily  6. Tizanidine 4 mg      Review of Systems:  Review of Systems   Constitutional: Negative for chills and fever.   HENT: Negative for nosebleeds.    Eyes: Negative for blurred vision and pain.   Respiratory: Negative for hemoptysis.    Cardiovascular: Negative for chest pain and palpitations.   Gastrointestinal: Negative for heartburn, nausea and vomiting.   Genitourinary: Negative for dysuria and hematuria.   Musculoskeletal: Positive for joint pain and myalgias. Negative for falls.   Skin: Negative for rash.   Neurological: Positive for tingling (left anterior thigh) and weakness (heaviness in left leg/hip). Negative for seizures and loss of consciousness.   Endo/Heme/Allergies: Does not bruise/bleed easily.   Psychiatric/Behavioral: Negative for hallucinations.       History:    Current Outpatient Medications:     acetaminophen (TYLENOL 8 HOUR ORAL), Take by mouth., Disp: , Rfl:     amLODIPine (NORVASC) 2.5 MG tablet, Take 1 tablet  (2.5 mg total) by mouth 2 (two) times daily., Disp: 180 tablet, Rfl: 3    aspirin (ECOTRIN) 81 MG EC tablet, Take 1 tablet (81 mg total) by mouth once daily. Resume 48 hours post-surgery, Disp: , Rfl: 0    b complex vitamins tablet, Take 1 tablet by mouth once daily., Disp: , Rfl:     calcium-vitamin D (CALCIUM WITH VITAMIN D) 600 mg(1,500mg) -400 unit Tab, , Disp: , Rfl:     celecoxib (CELEBREX) 200 MG capsule, Take 1 capsule (200 mg total) by mouth 2 (two) times daily., Disp: 180 capsule, Rfl: 3    diclofenac sodium (VOLTAREN) 1 % Gel, APPLY 2.5 GRAMS TO THE AFFECTED AREA 3-4 TIMES DAILY STARTING 2 WEEKS POST SURGERY., Disp: , Rfl: 3    FLUZONE HIGH-DOSE 2019-20, PF, 180 mcg/0.5 mL Syrg, , Disp: , Rfl:     gabapentin (NEURONTIN) 300 MG capsule, TAKE 1 CAPSULE BY MOUTH THREE TIMES A DAY, Disp: 270 capsule, Rfl: 1    lidocaine (XYLOCAINE) 5 % Oint ointment, APPLY 2.5 GRAMS TO THE AFFECTED AREA 3-4 TIMES DAILY STARTING 2 WEEKS POST SURGERY., Disp: 1 Tube, Rfl: 11    magnesium 250 mg Tab, , Disp: , Rfl:     mupirocin (BACTROBAN) 2 % ointment, APPLY 1/2 GRAM TO EACH NOSTRIL TWICE A DAY FOR UP TO 5 DAYS PRIOR TO SURGERY OR AS DIRECTED BY YOUR PHYSICIAN., Disp: , Rfl: 0    omega 3-dha-epa-fish oil (FISH OIL) 100-160-1,000 mg Cap, , Disp: , Rfl:     oxyCODONE (OXY-IR) 5 mg Cap, Take 1 capsule (5 mg total) by mouth every 12 (twelve) hours as needed for Pain., Disp: 40 capsule, Rfl: 0    oxyCODONE-acetaminophen (PERCOCET) 5-325 mg per tablet, Take 1 tablet by mouth every 24 hours as needed for Pain., Disp: 20 tablet, Rfl: 0    potassium 99 mg Tab, , Disp: , Rfl:     pravastatin (PRAVACHOL) 40 MG tablet, Take 1 tablet (40 mg total) by mouth every evening., Disp: 90 tablet, Rfl: 1    pyridoxine (VITAMIN B-6) 100 MG Tab, Take 100 mg by mouth once daily., Disp: , Rfl:     SANARE ADV SCAR THERAPY BASE Gel, APPLY 1/2 GRAM (1 PUMP) TO AFFECTED AREAS TWICE DAILY STARTING 4 WEEKS POST SURGERY., Disp: , Rfl: 3     tiZANidine (ZANAFLEX) 4 MG tablet, , Disp: , Rfl:   No current facility-administered medications for this visit.     Facility-Administered Medications Ordered in Other Visits:     gentamicin 80 mg/100ml NACL IVPB IVPB 80 mg, 80 mg, Intravenous, 30 Min Pre-Op, Wiliam Urias PA-C, 80 mg at 07/31/19 0747    vancomycin in dextrose 5 % 1 gram/250 mL IVPB 1,000 mg, 1,000 mg, Intravenous, 30 Min Pre-Op, Wiliam Urias PA-C, 1,000 mg at 07/31/19 0738    Past Medical History:   Diagnosis Date    Arthritis     lumbar    Blood transfusion     Chronic kidney disease, stage III (moderate) 8/2/2017    Degenerative disc disease     lumbar    Insulinoma     Pancreatic disease     Urinary incontinence 12/7/2015       Past Surgical History:   Procedure Laterality Date    ADENOIDECTOMY      APPENDECTOMY      CATARACT EXTRACTION, BILATERAL      EYE SURGERY      HYSTERECTOMY      SAMM/BSO    INJECTION OF JOINT Bilateral 2/4/2020    Procedure: Injection, Joint--Bilateral GTB;  Surgeon: Arina Carrera Jr., MD;  Location: [facility];  Service: Pain Management;  Laterality: Bilateral;  Oral sedation    INJECTION OF STEROID Right 12/20/2018    Procedure: INJECTION, STEROID-- Right SI Joint Block and  Steroid Injection;  Surgeon: Xavier Dasilva MD;  Location: [facility] OR;  Service: Neurosurgery;  Laterality: Right;  INJECTION, STEROID-- Right SI Joint Block and  Steroid Injection  SURGERY TIME:1 HR  LOS: 0 DAYS  RADIOLOGY: C- arm  BED: Regular Bed  with Pillows  POSITION: Prone      INJECTION OF STEROID Left 6/19/2019    Procedure: INJECTION, STEROID Procedure:Left sacroiliac joint block / steroid injection Surgery Time: 30mins LOS: Anesthesia: MAC Radiology: C-arm Bed: Regular Bed Position: Prone;  Surgeon: Xavier Dasilva MD;  Location: [facility] OR;  Service: Neurosurgery;  Laterality: Left;    MINIMALLY INVASIVE FUSION OF SPINE Right 2/6/2019    Procedure: FUSION, SPINE, MINIMALLY INVASIVE Right Sacroiliac Joint  Fusion -  I Fuse;  Surgeon: Xavier Dasilva MD;  Location: [facility] OR;  Service: Neurosurgery;  Laterality: Right;  Procedure: Right Sacroiliac Joint Fusion -  I Fuse  Surgery Time: 1.5 Hr  LOS: 0  Anesthesia: General  Radiology: C-Arm  Bed: Zaki 4 Poster  Position: Prone  Equipment: TAY Kincaid [phone] (notified 1/24 EF)    OOPHORECTOMY      PANCREAS SURGERY      Insulanoma    RADIOFREQUENCY ABLATION OF LUMBAR MEDIAL BRANCH NERVE AT SINGLE LEVEL Right 8/28/2018    Procedure: RADIOFREQUENCY ABLATION, NERVE, MEDIAL BRANCH, LUMBAR, 1 LEVEL- Right L3,4,5 IV SEDATION;  Surgeon: Eddie Vega MD;  Location: [facility];  Service: Pain Management;  Laterality: Right;    RADIOFREQUENCY ABLATION OF LUMBAR MEDIAL BRANCH NERVE AT SINGLE LEVEL Left 9/4/2018    Procedure: RADIOFREQUENCY ABLATION, NERVE, MEDIAL BRANCH, LUMBAR, 1 LEVEL - Left L3,4,5 IV SEADTION;  Surgeon: Eddie Vega MD;  Location: [facility];  Service: Pain Management;  Laterality: Left;  Patient takes ASA     REFRACTIVE SURGERY Bilateral     SPINAL FUSION Left 7/31/2019    Procedure: FUSION, SPINE SI Joint Fusion;  Surgeon: Xavier Dasilva MD;  Location: [facility] OR;  Service: Neurosurgery;  Laterality: Left;  Procedure: Left SI joint fusion  Surgery Time: 1.5hr  LOS:0  Anesthesia: General   Radiology: C-arm   Bed: Zaki 4 Poster  Position: Prone  Equipment: Codi Mabry [phone] (Codi notified)    TONSILLECTOMY         Family History   Problem Relation Age of Onset    Breast cancer Mother     Heart disease Father     Aneurysm Sister     Glaucoma Daughter     Heart attack Son     Breast cancer Other        Social History     Socioeconomic History    Marital status:      Spouse name: Not on file    Number of children: Not on file    Years of education: Not on file    Highest education level: Not on file   Occupational History    Not on file   Social Needs    Financial resource strain: Not hard at all     Food insecurity:     Worry: Never true     Inability: Never true    Transportation needs:     Medical: No     Non-medical: No   Tobacco Use    Smoking status: Never Smoker    Smokeless tobacco: Never Used   Substance and Sexual Activity    Alcohol use: Yes     Alcohol/week: 1.0 - 2.0 standard drinks     Types: 1 - 2 Glasses of wine per week     Frequency: Monthly or less     Drinks per session: 1 or 2     Binge frequency: Never    Drug use: No    Sexual activity: Yes     Partners: Male   Lifestyle    Physical activity:     Days per week: 0 days     Minutes per session: 0 min    Stress: Rather much   Relationships    Social connections:     Talks on phone: More than three times a week     Gets together: Once a week     Attends [employer] service: Not on file     Active member of club or organization: Yes     Attends meetings of clubs or organizations: More than 4 times per year     Relationship status:    Other Topics Concern    Not on file   Social History Narrative    Not on file       Review of patient's allergies indicates:   Allergen Reactions    Pcn [penicillins] Hives and Nausea And Vomiting       Physical Exam:  Vitals:    03/02/20 1029   Weight: 59.4 kg (130 lb 15.3 oz)   PainSc:   3     General    Nursing note and vitals reviewed.  Constitutional: She is oriented to person, place, and time. She appears well-developed and well-nourished. No distress.   HENT:   Head: Normocephalic and atraumatic.   Nose: Nose normal.   Eyes: Conjunctivae and EOM are normal. Pupils are equal, round, and reactive to light. Right eye exhibits no discharge. Left eye exhibits no discharge. No scleral icterus.   Neck: No JVD present.   Cardiovascular: Intact distal pulses.    Pulmonary/Chest: Effort normal. No respiratory distress.   Abdominal: She exhibits no distension.   Neurological: She is alert and oriented to person, place, and time. Coordination normal.   Psychiatric: She has a normal mood and affect. Her  behavior is normal. Judgment and thought content normal.               Imaging:  XR LUMBAR SPINE COMPLETE 5 VIEW    CLINICAL HISTORY:  Low back pain, >6wks conservative tx, persistent-progressive sx, surgical candidate;Sacroiliitis, not elsewhere classified    TECHNIQUE:  AP, lateral, spot and bilateral oblique views of the lumbar spine were performed.    COMPARISON:  None    FINDINGS:  There are 5 non rib-bearing lumbar vertebral bodies.  Vertebral body height is maintained.  There is degenerative disc disease throughout.  There is atherosclerotic disease of the aorta.  The adjacent soft tissues are unremarkable.   XR HIPS BILATERAL 2 VIEW INCL AP PELVIS    CLINICAL HISTORY:  Sacroiliitis, not elsewhere classified    TECHNIQUE:  AP view of the pelvis and frogleg lateral views of both hips were performed.    COMPARISON:  None.    FINDINGS:  Both hips are located.  There is joint space narrowing of both hips.  The adjacent soft tissues are unremarkable.   MRI LUMBAR SPINE WITHOUT CONTRAST  T12-L1: No spinal canal stenosis or neural foraminal narrowing.  L1-L2: Circumferential disc bulge noted.  No spinal canal stenosis or neural foraminal narrowing.  L2-L3: Circumferential disc bulge and mild facet arthropathy noted.  No spinal canal stenosis or neural foraminal narrowing.  L3-L4: Circumferential disc bulge with right paracentral disc protrusion result in mild effacement of the right lateral recess.  L4-L5: Circumferential disc bulge and mild facet arthropathy result in mild right neural foraminal narrowing.  L5-S1: Circumferential disc bulge and moderate facet arthropathy result in mild bilateral neural foraminal narrowing.  Paraspinal muscles & soft tissues: Multiple stones layer within the gallbladder lumen.  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     XR SACROILIAC JOINTS COMPLETE  There are postsurgical changes from a right-sided sacroiliac joint fixation.  No evidence of acute fracture or subluxation.  No evidence of  periprosthetic lucency or hardware fracture.  Mild bony demineralization is noted.   XR SACROILIAC JOINTS COMPLETE    CLINICAL HISTORY:  Arthrodesis status    TECHNIQUE:  AP and bilateral oblique radiographs of the SI joints is performed.    COMPARISON:  02/22/2019    FINDINGS:  There are postoperative changes from arthrodesis of the right SI joint with transarticular screws unchanged from the previous study.  Left SI joint demonstrate no significant abnormalities.    There are soft tissue calcifications overlying the right gluteal region, likely representing injection granulomas.   XR SACROILIAC JOINTS COMPLETE    CLINICAL HISTORY:  Arthrodesis status    TECHNIQUE:  Three views    COMPARISON:  03/18/2019    FINDINGS:  3 right SI joint implants noted.  No change in alignment.  No evidence of hardware failure.  Left SI joint unremarkable.  No fractures.   XR SACROILIAC JOINTS COMPLETE    CLINICAL HISTORY:  Arthrodesis status    TECHNIQUE:  Routine three views    COMPARISON:  04/29/2019    FINDINGS:  Right SI joint implants noted, unchanged in position.  No evidence of loosening.  Lower lumbar spondylosis noted.  No acute fractures.  No marrow replacement process.   XR SACROILIAC JOINTS COMPLETE    CLINICAL HISTORY:  Ankylosis, unspecified joint    TECHNIQUE:  AP and sacroiliac joints views    COMPARISON:  06/10/2019 seen    FINDINGS:  Fixation of the bilateral sacroiliac joints, the hardware is intact.  The remainder of the visualized osseous structures and soft tissues appear normal.  Degenerative disc disease at the lower lumbar spine   XR SACROILIAC JOINTS COMPLETE    CLINICAL HISTORY:  Ankylosis, unspecified joint    TECHNIQUE:  Sacroiliac joints, three views    COMPARISON:  08/13/2019    FINDINGS:  Status post instrumented arthrodesis of the sacroiliac joints.  Sacral arcuate lines are maintained, without evidence for fracture, lytic or blastic lesion.  Degenerative changes are seen in the lower lumbar spine.         XR SACROILIAC JOINTS COMPLETE    CLINICAL HISTORY:  Ankylosis, unspecified joint    TECHNIQUE:  Sacroiliac joint radiograph AP and oblique views.    COMPARISON:  09/17/2019    FINDINGS:  Hardware related to instrumented arthrodesis of the SI joints appear maintained without evidence for loosening or failure.  Sacral arcuate lines appear maintained.  Lower lumbar spine DJD.  No new abnormality.          Labs:  BMP  Lab Results   Component Value Date     10/17/2019    K 4.6 10/17/2019     10/17/2019    CO2 27 10/17/2019    BUN 20 10/17/2019    CREATININE 1.1 10/17/2019    CALCIUM 9.2 10/17/2019    ANIONGAP 8 10/17/2019    ESTGFRAFRICA 53 (A) 10/17/2019    EGFRNONAA 46 (A) 10/17/2019     Lab Results   Component Value Date    ALT 15 10/17/2019    AST 21 10/17/2019    ALKPHOS 60 10/17/2019    BILITOT 0.5 10/17/2019       Assessment:  Problem List Items Addressed This Visit        Neuro    Left lumbar radiculopathy    Lumbar spondylosis    Chronic pain       Orthopedic    Facet arthritis of lumbar region       Other    Decreased range of motion of both hips      Other Visit Diagnoses     Bilateral hip pain    -  Primary    Pain of both hip joints        Osteoarthritis of spine, unspecified spinal osteoarthritis complication status, unspecified spinal region              1/27/2020 - Cheryl Ghosh is a 85 y.o. female with diffuse chronic pain related to lumbar arthritis, (likely) lumbar radiculopathy, and bilateral muscle dysfunction.  Muscle dysfunction in the low back and hips is leading to bursitis.  Patient was not able to demonstrate appropriate exercises for stretching the muscles of the hips which can lead to bursitis including the iliotibial band, gluteus medius, gluteus benjamin, gluteus minimus, and piriformis.  There is significant restriction in the range of motion in her hips and I have encouraged her to focus on stretching and strengthening these muscles as the primary method for alleviating  her symptoms and producing long-term benefit.  Short-term benefit is certainly reasonable with a bilateral GTB injections; however, her symptoms will return if she does not actively pursue the stretches and exercises I have recommended to her today.  Patient has reported physical therapy in the past that did not provide her with appropriate home stretching exercises and I have encouraged her to seek a different PT provider if her plan is not adapted to include these exercises.    3/2/2020- 84 y/o female s/p bilateral GTB injection with reported 80% continued relief. She continues to perform home exercises that involve stretching and strentghing, she is overall better but she reports continued pain in her left hip and leg intermittently. Discussed that we may repeat injections prior to her trip to [place] in May. Reccommended continuing HE program, pt verbalized understanding and agreed.     : Reviewed and consistent with medication use as prescribed.    Treatment Plan:   Procedures:    - none at this time.    - consider lumbar FRANCISCO if radiating pain into left leg persisits  PT/OT/HEP: I encouraged the patient to maintain a home exercise regimen that includes daily, moderate cardiovascular exercise lasting at least 30 minutes.  This may include yoga, germain chi, walking, swimming, aqua aerobics, or other exercises that maintain a heart rate of 50-70% of the calculated maximum heart rate.  I also encouraged light, daily stretching focused on the target area.  Medications: Refilled Tizanidine 4 mg nightly prn.   Labs: reviewed and medications are appropriately dosed for current hepatorenal function.  Imaging: No additional recommended at this time.    Follow Up: RTC PRN    TREVOR Kim  Interventional Pain Management    Disclaimer: This note was partly generated using dictation software which may occasionally result in transcription errors.

## 2020-03-14 DIAGNOSIS — I70.0 AORTIC ATHEROSCLEROSIS: ICD-10-CM

## 2020-03-14 DIAGNOSIS — E78.5 HYPERLIPIDEMIA, UNSPECIFIED HYPERLIPIDEMIA TYPE: ICD-10-CM

## 2020-03-14 DIAGNOSIS — E78.2 MIXED HYPERLIPIDEMIA: ICD-10-CM

## 2020-03-14 RX ORDER — PRAVASTATIN SODIUM 10 MG/1
TABLET ORAL
Qty: 90 TABLET | Refills: 3 | Status: SHIPPED | OUTPATIENT
Start: 2020-03-14 | End: 2021-02-02

## 2020-04-04 ENCOUNTER — PATIENT MESSAGE (OUTPATIENT)
Dept: ADMINISTRATIVE | Facility: OTHER | Age: 85
End: 2020-04-04

## 2020-04-16 ENCOUNTER — TELEPHONE (OUTPATIENT)
Dept: NEUROSURGERY | Facility: CLINIC | Age: 85
End: 2020-04-16

## 2020-04-16 DIAGNOSIS — E78.5 HYPERLIPIDEMIA, UNSPECIFIED HYPERLIPIDEMIA TYPE: ICD-10-CM

## 2020-04-16 DIAGNOSIS — E78.2 MIXED HYPERLIPIDEMIA: ICD-10-CM

## 2020-04-16 DIAGNOSIS — I70.0 AORTIC ATHEROSCLEROSIS: ICD-10-CM

## 2020-04-16 RX ORDER — PRAVASTATIN SODIUM 40 MG/1
40 TABLET ORAL NIGHTLY
Qty: 90 TABLET | Refills: 1 | Status: SHIPPED | OUTPATIENT
Start: 2020-04-16 | End: 2020-08-28 | Stop reason: SDUPTHER

## 2020-04-16 RX ORDER — AMLODIPINE BESYLATE 2.5 MG/1
2.5 TABLET ORAL 2 TIMES DAILY
Qty: 180 TABLET | Refills: 3 | Status: SHIPPED | OUTPATIENT
Start: 2020-04-16 | End: 2021-03-09

## 2020-04-16 NOTE — TELEPHONE ENCOUNTER
Cheryl Ghosh would like a refill of the following medications:         gabapentin (NEURONTIN) 300 MG capsule [Xavier Dasilva MD]     Preferred pharmacy: [facility] - [facility] [address]   Delivery method: Mail

## 2020-04-17 DIAGNOSIS — G57.93 NEUROPATHIC PAIN OF BOTH LEGS: ICD-10-CM

## 2020-04-17 RX ORDER — GABAPENTIN 300 MG/1
300 CAPSULE ORAL 3 TIMES DAILY
Qty: 270 CAPSULE | Refills: 3 | Status: SHIPPED | OUTPATIENT
Start: 2020-04-17 | End: 2020-05-12 | Stop reason: SDUPTHER

## 2020-05-12 DIAGNOSIS — G57.93 NEUROPATHIC PAIN OF BOTH LEGS: ICD-10-CM

## 2020-05-12 RX ORDER — GABAPENTIN 300 MG/1
300 CAPSULE ORAL 3 TIMES DAILY
Qty: 270 CAPSULE | Refills: 3 | Status: SHIPPED | OUTPATIENT
Start: 2020-05-12 | End: 2021-10-14

## 2020-07-02 ENCOUNTER — OFFICE VISIT (OUTPATIENT)
Dept: PAIN MEDICINE | Facility: CLINIC | Age: 85
End: 2020-07-02
Payer: MEDICARE

## 2020-07-02 VITALS — BODY MASS INDEX: 22.48 KG/M2 | WEIGHT: 130.94 LBS

## 2020-07-02 DIAGNOSIS — M25.651 DECREASED RANGE OF MOTION OF BOTH HIPS: ICD-10-CM

## 2020-07-02 DIAGNOSIS — M25.551 BILATERAL HIP PAIN: ICD-10-CM

## 2020-07-02 DIAGNOSIS — M47.816 LUMBAR SPONDYLOSIS: ICD-10-CM

## 2020-07-02 DIAGNOSIS — M70.61 GREATER TROCHANTERIC BURSITIS OF BOTH HIPS: ICD-10-CM

## 2020-07-02 DIAGNOSIS — M25.652 DECREASED RANGE OF MOTION OF BOTH HIPS: ICD-10-CM

## 2020-07-02 DIAGNOSIS — M25.552 BILATERAL HIP PAIN: ICD-10-CM

## 2020-07-02 DIAGNOSIS — M54.16 LEFT LUMBAR RADICULOPATHY: Primary | ICD-10-CM

## 2020-07-02 DIAGNOSIS — G89.4 CHRONIC PAIN SYNDROME: ICD-10-CM

## 2020-07-02 DIAGNOSIS — M47.816 FACET ARTHRITIS OF LUMBAR REGION: ICD-10-CM

## 2020-07-02 DIAGNOSIS — M70.62 GREATER TROCHANTERIC BURSITIS OF BOTH HIPS: ICD-10-CM

## 2020-07-02 PROCEDURE — 1125F PR PAIN SEVERITY QUANTIFIED, PAIN PRESENT: ICD-10-PCS | Mod: HCNC,S$GLB,, | Performed by: NURSE PRACTITIONER

## 2020-07-02 PROCEDURE — 99499 RISK ADDL DX/OHS AUDIT: ICD-10-PCS | Mod: HCNC,S$GLB,, | Performed by: NURSE PRACTITIONER

## 2020-07-02 PROCEDURE — 1159F PR MEDICATION LIST DOCUMENTED IN MEDICAL RECORD: ICD-10-PCS | Mod: HCNC,S$GLB,, | Performed by: NURSE PRACTITIONER

## 2020-07-02 PROCEDURE — 1125F AMNT PAIN NOTED PAIN PRSNT: CPT | Mod: HCNC,S$GLB,, | Performed by: NURSE PRACTITIONER

## 2020-07-02 PROCEDURE — 99999 PR PBB SHADOW E&M-EST. PATIENT-LVL IV: ICD-10-PCS | Mod: PBBFAC,HCNC,, | Performed by: NURSE PRACTITIONER

## 2020-07-02 PROCEDURE — 99213 OFFICE O/P EST LOW 20 MIN: CPT | Mod: HCNC,S$GLB,, | Performed by: NURSE PRACTITIONER

## 2020-07-02 PROCEDURE — 1101F PT FALLS ASSESS-DOCD LE1/YR: CPT | Mod: HCNC,CPTII,S$GLB, | Performed by: NURSE PRACTITIONER

## 2020-07-02 PROCEDURE — 99499 UNLISTED E&M SERVICE: CPT | Mod: HCNC,S$GLB,, | Performed by: NURSE PRACTITIONER

## 2020-07-02 PROCEDURE — 99999 PR PBB SHADOW E&M-EST. PATIENT-LVL IV: CPT | Mod: PBBFAC,HCNC,, | Performed by: NURSE PRACTITIONER

## 2020-07-02 PROCEDURE — 99213 PR OFFICE/OUTPT VISIT, EST, LEVL III, 20-29 MIN: ICD-10-PCS | Mod: HCNC,S$GLB,, | Performed by: NURSE PRACTITIONER

## 2020-07-02 PROCEDURE — 1159F MED LIST DOCD IN RCRD: CPT | Mod: HCNC,S$GLB,, | Performed by: NURSE PRACTITIONER

## 2020-07-02 PROCEDURE — 1101F PR PT FALLS ASSESS DOC 0-1 FALLS W/OUT INJ PAST YR: ICD-10-PCS | Mod: HCNC,CPTII,S$GLB, | Performed by: NURSE PRACTITIONER

## 2020-07-02 NOTE — PROGRESS NOTES
[facility] Established Patient Evaluation    Referred by: Xavier Dasilva MD  Reason for referral: Trochanteric bursitis of both hips    CC:   Chief Complaint   Patient presents with    Hip Pain    Knee Pain     left      Last 3 PDI Scores 7/2/2020 3/2/2020 1/27/2020   Pain Disability Index (PDI) 14 18 0       Interval update      7/2/20 Pt returns for f/u CV reporting mild pain in low back and left lateral hip and left knee. Patient reports continue PT exercises at home, she reports buying an adjustable bed that has helped her pain.     03/02/2020 - Ms. Ghosh returns to clinic for follow up visit reporting improved hip pain.  Patient is s/p Greater Trochanter Bursitis, Bilateral on 2/4/20 with 80% relief.  Pain intensity is currently 3/10. Patient is reporting some continued left hip pain that is tolerable, she also reports intermittent left leg pain mostly at night, she states she takes pain medication ( percocet 5 per Dr. Dasilva from surgery)  and muscle relaxer's which help. She is reporting continued home exercises that are helping, her overall pain is much better.    HPI:   Cheryl Ghosh is a 85 y.o. female who is well known to this clinic but new to me presents complaining of bilateral hip pain.  She was recently evaluated by her neurosurgeon after undergoing staggered, bilateral sacroiliac joint fusion.  He assessed for the presence of bilateral greater trochanter bursa irritation and has referred her for an injection of the bursa.  Patient endorses bilateral hip pain that is severe enough to limit her ability to lie on her side in bed at night.  She reports attending physical therapy but denies ever receiving a home exercise program related to hip stretching.  Patient demonstrates during the encounter today very poor range of motion with hip extension/flexion/abduction, piriformis stretch, hamstring stretch.  In addition to this primary complaint, she also reports pain radiating from the low  back into the anterior portion of the left leg, not passing the knee.  This pain increases with the length of time she has been walking or standing and decreases with short rest.    Location: Bilateral Hips   Onset: Years ago  Current Pain Score: 0/10  Daily Pain of Range: 0-2/10  Quality: Deep  Radiation: Down left leg to knee  Worsened by: exercise, running, standing and walking for more than 2 minutes  Improved by: ice, laying down, medications and rest    Previous Therapies:  PT/OT: Denies  HEP: Denies  Interventions:   - 6/19/19 Left sacroiliac joint block and steroid injection   - 12/20/18 Right sacroiliac joint block and steroid injection   - 9/4/18   left L3-4-5 FACET MEDIAL BRANCH NERVE RADIOFREQUENCY NEUROTOMY (lumbar)   - 8/28/18  L3, L4, L5 FACET MEDIAL BRANCH NERVE RADIOFREQUENCY NEUROTOMY (lumbar)  Surgery:    7/31/19 Left minimally invasive sacroiliac joint fusion using the SI-Bone   2/6/19  Right minimally invasive sacroiliac joint fusion using the SI-Bone   Medications:   - NSAIDS:   - MSK Relaxants:   - TCAs:   - SNRIs:   - Topicals:   - Anticonvulsants:  - Opioids:     Current Pain Medications:  1. Tylenol  2. Celebrex 200mg 1 cap BID   3. Voltaren Gel   4. Gabapentin 300mg 1 tablet TID  5. Oxycodone 5-325mg 1 tab daily  6. Tizanidine 4 mg      Review of Systems:  Review of Systems   Constitutional: Negative for chills and fever.   HENT: Negative for nosebleeds.    Eyes: Negative for blurred vision and pain.   Respiratory: Negative for hemoptysis.    Cardiovascular: Negative for chest pain and palpitations.   Gastrointestinal: Negative for heartburn, nausea and vomiting.   Genitourinary: Negative for dysuria and hematuria.   Musculoskeletal: Positive for joint pain and myalgias. Negative for falls.   Skin: Negative for rash.   Neurological: Positive for tingling (left anterior thigh) and weakness (heaviness in left leg/hip). Negative for seizures and loss of consciousness.   Endo/Heme/Allergies:  Does not bruise/bleed easily.   Psychiatric/Behavioral: Negative for hallucinations.       History:    Current Outpatient Medications:     acetaminophen (TYLENOL 8 HOUR ORAL), Take by mouth., Disp: , Rfl:     amLODIPine (NORVASC) 2.5 MG tablet, Take 1 tablet (2.5 mg total) by mouth 2 (two) times daily., Disp: 180 tablet, Rfl: 3    aspirin (ECOTRIN) 81 MG EC tablet, Take 1 tablet (81 mg total) by mouth once daily. Resume 48 hours post-surgery, Disp: , Rfl: 0    b complex vitamins tablet, Take 1 tablet by mouth once daily., Disp: , Rfl:     calcium-vitamin D (CALCIUM WITH VITAMIN D) 600 mg(1,500mg) -400 unit Tab, , Disp: , Rfl:     celecoxib (CELEBREX) 200 MG capsule, Take 1 capsule (200 mg total) by mouth 2 (two) times daily., Disp: 180 capsule, Rfl: 3    FLUZONE HIGH-DOSE 2019-20, PF, 180 mcg/0.5 mL Syrg, , Disp: , Rfl:     gabapentin (NEURONTIN) 300 MG capsule, Take 1 capsule (300 mg total) by mouth 3 (three) times daily., Disp: 270 capsule, Rfl: 3    magnesium 250 mg Tab, , Disp: , Rfl:     omega 3-dha-epa-fish oil (FISH OIL) 100-160-1,000 mg Cap, , Disp: , Rfl:     oxyCODONE (OXY-IR) 5 mg Cap, Take 1 capsule (5 mg total) by mouth every 12 (twelve) hours as needed for Pain., Disp: 40 capsule, Rfl: 0    potassium 99 mg Tab, , Disp: , Rfl:     pravastatin (PRAVACHOL) 40 MG tablet, Take 1 tablet (40 mg total) by mouth every evening., Disp: 90 tablet, Rfl: 1    pyridoxine (VITAMIN B-6) 100 MG Tab, Take 100 mg by mouth once daily., Disp: , Rfl:     tiZANidine (ZANAFLEX) 4 MG tablet, Take 1 tablet (4 mg total) by mouth nightly as needed., Disp: 30 tablet, Rfl: 1    diclofenac sodium (VOLTAREN) 1 % Gel, APPLY 2.5 GRAMS TO THE AFFECTED AREA 3-4 TIMES DAILY STARTING 2 WEEKS POST SURGERY., Disp: , Rfl: 3    lidocaine (XYLOCAINE) 5 % Oint ointment, APPLY 2.5 GRAMS TO THE AFFECTED AREA 3-4 TIMES DAILY STARTING 2 WEEKS POST SURGERY., Disp: 1 Tube, Rfl: 11    mupirocin (BACTROBAN) 2 % ointment, APPLY 1/2 GRAM  TO EACH NOSTRIL TWICE A DAY FOR UP TO 5 DAYS PRIOR TO SURGERY OR AS DIRECTED BY YOUR PHYSICIAN., Disp: , Rfl: 0    oxyCODONE-acetaminophen (PERCOCET) 5-325 mg per tablet, Take 1 tablet by mouth every 24 hours as needed for Pain. (Patient not taking: Reported on 7/2/2020), Disp: 20 tablet, Rfl: 0    pravastatin (PRAVACHOL) 10 MG tablet, TAKE 1 TABLET BY MOUTH EVERY DAY, Disp: 90 tablet, Rfl: 3    SANARE ADV SCAR THERAPY BASE Gel, APPLY 1/2 GRAM (1 PUMP) TO AFFECTED AREAS TWICE DAILY STARTING 4 WEEKS POST SURGERY., Disp: , Rfl: 3  No current facility-administered medications for this visit.     Facility-Administered Medications Ordered in Other Visits:     gentamicin 80 mg/100ml NACL IVPB IVPB 80 mg, 80 mg, Intravenous, 30 Min Pre-Op, Wiliam Urias PA-C, 80 mg at 07/31/19 0747    vancomycin in dextrose 5 % 1 gram/250 mL IVPB 1,000 mg, 1,000 mg, Intravenous, 30 Min Pre-Op, Wiliam Urias PA-C, 1,000 mg at 07/31/19 0738    Past Medical History:   Diagnosis Date    Arthritis     lumbar    Blood transfusion     Chronic kidney disease, stage III (moderate) 8/2/2017    Degenerative disc disease     lumbar    Insulinoma     Pancreatic disease     Urinary incontinence 12/7/2015       Past Surgical History:   Procedure Laterality Date    ADENOIDECTOMY      APPENDECTOMY      CATARACT EXTRACTION, BILATERAL      EYE SURGERY      HYSTERECTOMY      SAMM/BSO    INJECTION OF JOINT Bilateral 2/4/2020    Procedure: Injection, Joint--Bilateral GTB;  Surgeon: Arina Carrera Jr., MD;  Location: [facility] PAIN MGT;  Service: Pain Management;  Laterality: Bilateral;  Oral sedation    INJECTION OF STEROID Right 12/20/2018    Procedure: INJECTION, STEROID-- Right SI Joint Block and  Steroid Injection;  Surgeon: Xavier Dasilva MD;  Location: [facility] OR;  Service: Neurosurgery;  Laterality: Right;  INJECTION, STEROID-- Right SI Joint Block and  Steroid Injection  SURGERY TIME:1 HR  LOS: 0 DAYS  RADIOLOGY: C- arm  BED:  Regular Bed  with Pillows  POSITION: Prone      INJECTION OF STEROID Left 6/19/2019    Procedure: INJECTION, STEROID Procedure:Left sacroiliac joint block / steroid injection Surgery Time: 30mins LOS: Anesthesia: MAC Radiology: C-arm Bed: Regular Bed Position: Prone;  Surgeon: Xavier aDsilva MD;  Location: [facility] OR;  Service: Neurosurgery;  Laterality: Left;    MINIMALLY INVASIVE FUSION OF SPINE Right 2/6/2019    Procedure: FUSION, SPINE, MINIMALLY INVASIVE Right Sacroiliac Joint Fusion -  I Fuse;  Surgeon: Xavier Dasilva MD;  Location: [facility] OR;  Service: Neurosurgery;  Laterality: Right;  Procedure: Right Sacroiliac Joint Fusion -  I Fuse  Surgery Time: 1.5 Hr  LOS: 0  Anesthesia: General  Radiology: C-Arm  Bed: Nice 4 Poster  Position: Prone  Equipment: TAY Kincaid [phone] (notified 1/24 EF)    OOPHORECTOMY      PANCREAS SURGERY      Insulanoma    RADIOFREQUENCY ABLATION OF LUMBAR MEDIAL BRANCH NERVE AT SINGLE LEVEL Right 8/28/2018    Procedure: RADIOFREQUENCY ABLATION, NERVE, MEDIAL BRANCH, LUMBAR, 1 LEVEL- Right L3,4,5 IV SEDATION;  Surgeon: Eddie Vega MD;  Location: [facility] PAIN [facility]GT;  Service: Pain Management;  Laterality: Right;    RADIOFREQUENCY ABLATION OF LUMBAR MEDIAL BRANCH NERVE AT SINGLE LEVEL Left 9/4/2018    Procedure: RADIOFREQUENCY ABLATION, NERVE, MEDIAL BRANCH, LUMBAR, 1 LEVEL - Left L3,4,5 IV SEADTION;  Surgeon: Eddie Vega MD;  Location: [facility] PAIN [facility]GT;  Service: Pain Management;  Laterality: Left;  Patient takes ASA     REFRACTIVE SURGERY Bilateral     SPINAL FUSION Left 7/31/2019    Procedure: FUSION, SPINE SI Joint Fusion;  Surgeon: Xavier Dasilva MD;  Location: [facility] OR;  Service: Neurosurgery;  Laterality: Left;  Procedure: Left SI joint fusion  Surgery Time: 1.5hr  LOS:0  Anesthesia: General   Radiology: C-arm   Bed: Nice 4 Poster  Position: Prone  Equipment: Codi Mabry [phone] (Codi notified)    TONSILLECTOMY         Family History   Problem  Relation Age of Onset    Breast cancer Mother     Heart disease Father     Aneurysm Sister     Glaucoma Daughter     Heart attack Son     Breast cancer Other        Social History     Socioeconomic History    Marital status:      Spouse name: Not on file    Number of children: Not on file    Years of education: Not on file    Highest education level: Not on file   Occupational History    Not on file   Social Needs    Financial resource strain: Not hard at all    Food insecurity     Worry: Never true     Inability: Never true    Transportation needs     Medical: No     Non-medical: No   Tobacco Use    Smoking status: Never Smoker    Smokeless tobacco: Never Used   Substance and Sexual Activity    Alcohol use: Yes     Alcohol/week: 1.0 - 2.0 standard drinks     Types: 1 - 2 Glasses of wine per week     Frequency: Monthly or less     Drinks per session: 1 or 2     Binge frequency: Never    Drug use: No    Sexual activity: Yes     Partners: Male   Lifestyle    Physical activity     Days per week: 0 days     Minutes per session: 0 min    Stress: Rather much   Relationships    Social connections     Talks on phone: More than three times a week     Gets together: Once a week     Attends [employer] service: Not on file     Active member of club or organization: Yes     Attends meetings of clubs or organizations: More than 4 times per year     Relationship status:    Other Topics Concern    Not on file   Social History Narrative    Not on file       Review of patient's allergies indicates:   Allergen Reactions    Pcn [penicillins] Hives and Nausea And Vomiting       Physical Exam:  Vitals:    07/02/20 1006   Weight: 59.4 kg (130 lb 15.3 oz)   PainSc:   2     General    Nursing note and vitals reviewed.  Constitutional: She is oriented to person, place, and time. She appears well-developed and well-nourished. No distress.   HENT:   Head: Normocephalic and atraumatic.   Nose: Nose normal.    Eyes: Conjunctivae and EOM are normal. Pupils are equal, round, and reactive to light. Right eye exhibits no discharge. Left eye exhibits no discharge. No scleral icterus.   Neck: No JVD present.   Cardiovascular: Intact distal pulses.    Pulmonary/Chest: Effort normal. No respiratory distress.   Abdominal: She exhibits no distension.   Neurological: She is alert and oriented to person, place, and time. Coordination normal.   Psychiatric: She has a normal mood and affect. Her behavior is normal. Judgment and thought content normal.               Imaging:  XR LUMBAR SPINE COMPLETE 5 VIEW    CLINICAL HISTORY:  Low back pain, >6wks conservative tx, persistent-progressive sx, surgical candidate;Sacroiliitis, not elsewhere classified    TECHNIQUE:  AP, lateral, spot and bilateral oblique views of the lumbar spine were performed.    COMPARISON:  None    FINDINGS:  There are 5 non rib-bearing lumbar vertebral bodies.  Vertebral body height is maintained.  There is degenerative disc disease throughout.  There is atherosclerotic disease of the aorta.  The adjacent soft tissues are unremarkable.   XR HIPS BILATERAL 2 VIEW INCL AP PELVIS    CLINICAL HISTORY:  Sacroiliitis, not elsewhere classified    TECHNIQUE:  AP view of the pelvis and frogleg lateral views of both hips were performed.    COMPARISON:  None.    FINDINGS:  Both hips are located.  There is joint space narrowing of both hips.  The adjacent soft tissues are unremarkable.   MRI LUMBAR SPINE WITHOUT CONTRAST  T12-L1: No spinal canal stenosis or neural foraminal narrowing.  L1-L2: Circumferential disc bulge noted.  No spinal canal stenosis or neural foraminal narrowing.  L2-L3: Circumferential disc bulge and mild facet arthropathy noted.  No spinal canal stenosis or neural foraminal narrowing.  L3-L4: Circumferential disc bulge with right paracentral disc protrusion result in mild effacement of the right lateral recess.  L4-L5: Circumferential disc bulge and mild  facet arthropathy result in mild right neural foraminal narrowing.  L5-S1: Circumferential disc bulge and moderate facet arthropathy result in mild bilateral neural foraminal narrowing.  Paraspinal muscles & soft tissues: Multiple stones layer within the gallbladder lumen.  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     XR SACROILIAC JOINTS COMPLETE  There are postsurgical changes from a right-sided sacroiliac joint fixation.  No evidence of acute fracture or subluxation.  No evidence of periprosthetic lucency or hardware fracture.  Mild bony demineralization is noted.   XR SACROILIAC JOINTS COMPLETE    CLINICAL HISTORY:  Arthrodesis status    TECHNIQUE:  AP and bilateral oblique radiographs of the SI joints is performed.    COMPARISON:  02/22/2019    FINDINGS:  There are postoperative changes from arthrodesis of the right SI joint with transarticular screws unchanged from the previous study.  Left SI joint demonstrate no significant abnormalities.    There are soft tissue calcifications overlying the right gluteal region, likely representing injection granulomas.   XR SACROILIAC JOINTS COMPLETE    CLINICAL HISTORY:  Arthrodesis status    TECHNIQUE:  Three views    COMPARISON:  03/18/2019    FINDINGS:  3 right SI joint implants noted.  No change in alignment.  No evidence of hardware failure.  Left SI joint unremarkable.  No fractures.   XR SACROILIAC JOINTS COMPLETE    CLINICAL HISTORY:  Arthrodesis status    TECHNIQUE:  Routine three views    COMPARISON:  04/29/2019    FINDINGS:  Right SI joint implants noted, unchanged in position.  No evidence of loosening.  Lower lumbar spondylosis noted.  No acute fractures.  No marrow replacement process.   XR SACROILIAC JOINTS COMPLETE    CLINICAL HISTORY:  Ankylosis, unspecified joint    TECHNIQUE:  AP and sacroiliac joints views    COMPARISON:  06/10/2019 seen    FINDINGS:  Fixation of the bilateral sacroiliac joints, the hardware is intact.  The remainder of the visualized osseous  structures and soft tissues appear normal.  Degenerative disc disease at the lower lumbar spine   XR SACROILIAC JOINTS COMPLETE    CLINICAL HISTORY:  Ankylosis, unspecified joint    TECHNIQUE:  Sacroiliac joints, three views    COMPARISON:  08/13/2019    FINDINGS:  Status post instrumented arthrodesis of the sacroiliac joints.  Sacral arcuate lines are maintained, without evidence for fracture, lytic or blastic lesion.  Degenerative changes are seen in the lower lumbar spine.        XR SACROILIAC JOINTS COMPLETE    CLINICAL HISTORY:  Ankylosis, unspecified joint    TECHNIQUE:  Sacroiliac joint radiograph AP and oblique views.    COMPARISON:  09/17/2019    FINDINGS:  Hardware related to instrumented arthrodesis of the SI joints appear maintained without evidence for loosening or failure.  Sacral arcuate lines appear maintained.  Lower lumbar spine DJD.  No new abnormality.          Labs:  BMP  Lab Results   Component Value Date     10/17/2019    K 4.6 10/17/2019     10/17/2019    CO2 27 10/17/2019    BUN 20 10/17/2019    CREATININE 1.1 10/17/2019    CALCIUM 9.2 10/17/2019    ANIONGAP 8 10/17/2019    ESTGFRAFRICA 53 (A) 10/17/2019    EGFRNONAA 46 (A) 10/17/2019     Lab Results   Component Value Date    ALT 15 10/17/2019    AST 21 10/17/2019    ALKPHOS 60 10/17/2019    BILITOT 0.5 10/17/2019       Assessment:  Problem List Items Addressed This Visit        Neuro    Left lumbar radiculopathy - Primary    Lumbar spondylosis    Chronic pain       Orthopedic    Facet arthritis of lumbar region    Greater trochanteric bursitis of both hips       Other    Decreased range of motion of both hips      Other Visit Diagnoses     Bilateral hip pain              1/27/2020 - Cheryl PERICO Ghosh is a 85 y.o. female with diffuse chronic pain related to lumbar arthritis, (likely) lumbar radiculopathy, and bilateral muscle dysfunction.  Muscle dysfunction in the low back and hips is leading to bursitis.  Patient was not able to  demonstrate appropriate exercises for stretching the muscles of the hips which can lead to bursitis including the iliotibial band, gluteus medius, gluteus benjamin, gluteus minimus, and piriformis.  There is significant restriction in the range of motion in her hips and I have encouraged her to focus on stretching and strengthening these muscles as the primary method for alleviating her symptoms and producing long-term benefit.  Short-term benefit is certainly reasonable with a bilateral GTB injections; however, her symptoms will return if she does not actively pursue the stretches and exercises I have recommended to her today.  Patient has reported physical therapy in the past that did not provide her with appropriate home stretching exercises and I have encouraged her to seek a different PT provider if her plan is not adapted to include these exercises.    3/2/2020- 84 y/o female s/p bilateral GTB injection with reported 80% continued relief. She continues to perform home exercises that involve stretching and strentghing, she is overall better but she reports continued pain in her left hip and leg intermittently. Discussed that we may repeat injections prior to her trip to [place] in May. Reccommended continuing HE program, pt verbalized understanding and agreed.     7/2/2020- 84 y/o female presents with chronic low back, left hip and lateral left leg pain stopping just above her left knee. She is reporting cotinuing PT at home that is helping, she endorses improvement with her pian with the use of a adjustable bed that she recently purchased over the last 1-2 weeks she states this providing her with better sleep, and less pain.  Recommended a compound cream today to use prior to home exercise.  Patient endorse feeling much better and she will contact our office for follow-up if pain returns.    : Reviewed and consistent with medication use as prescribed.    Treatment Plan:   Procedures:    - none at this time.     - consider lumbar FRANCISCO if radiating pain into left leg persisits  PT/OT/HEP: I encouraged the patient to maintain a home exercise regimen that includes daily, moderate cardiovascular exercise lasting at least 30 minutes.  This may include yoga, germain chi, walking, swimming, aqua aerobics, or other exercises that maintain a heart rate of 50-70% of the calculated maximum heart rate.  I also encouraged light, daily stretching focused on the target area.  Medications:  Rx of Compound Cream today   Labs: reviewed and medications are appropriately dosed for current hepatorenal function.  Imaging: No additional recommended at this time.    Follow Up: RTC PRN patient states she would like to make her own follow up via my chart if her pain returns.    Pedrito Scott, DIANA-C  [facility]    Disclaimer: This note was partly generated using dictation software which may occasionally result in transcription errors.

## 2020-07-06 ENCOUNTER — TELEPHONE (OUTPATIENT)
Dept: OTOLARYNGOLOGY | Facility: CLINIC | Age: 85
End: 2020-07-06

## 2020-07-06 NOTE — TELEPHONE ENCOUNTER
Spoke with the pt. She stated that she had a hearing test at [facility] a couple months ago when she had her hearing aids adjusted. The audiologist there told her that she had a wax build up. Pt asked to keep her appointment where it is and she will bring a copy of her recent audiogram.

## 2020-07-08 ENCOUNTER — OFFICE VISIT (OUTPATIENT)
Dept: OTOLARYNGOLOGY | Facility: CLINIC | Age: 85
End: 2020-07-08
Payer: MEDICARE

## 2020-07-08 VITALS
WEIGHT: 133.94 LBS | BODY MASS INDEX: 22.86 KG/M2 | DIASTOLIC BLOOD PRESSURE: 71 MMHG | SYSTOLIC BLOOD PRESSURE: 126 MMHG | HEART RATE: 63 BPM | HEIGHT: 64 IN

## 2020-07-08 DIAGNOSIS — H90.3 SENSORINEURAL HEARING LOSS (SNHL) OF BOTH EARS: ICD-10-CM

## 2020-07-08 DIAGNOSIS — H93.13 TINNITUS OF BOTH EARS: ICD-10-CM

## 2020-07-08 DIAGNOSIS — H61.23 BILATERAL IMPACTED CERUMEN: Primary | ICD-10-CM

## 2020-07-08 PROCEDURE — 1101F PT FALLS ASSESS-DOCD LE1/YR: CPT | Mod: HCNC,CPTII,S$GLB, | Performed by: OTOLARYNGOLOGY

## 2020-07-08 PROCEDURE — 1159F MED LIST DOCD IN RCRD: CPT | Mod: HCNC,S$GLB,, | Performed by: OTOLARYNGOLOGY

## 2020-07-08 PROCEDURE — 3074F PR MOST RECENT SYSTOLIC BLOOD PRESSURE < 130 MM HG: ICD-10-PCS | Mod: HCNC,CPTII,S$GLB, | Performed by: OTOLARYNGOLOGY

## 2020-07-08 PROCEDURE — 1159F PR MEDICATION LIST DOCUMENTED IN MEDICAL RECORD: ICD-10-PCS | Mod: HCNC,S$GLB,, | Performed by: OTOLARYNGOLOGY

## 2020-07-08 PROCEDURE — 1126F AMNT PAIN NOTED NONE PRSNT: CPT | Mod: HCNC,S$GLB,, | Performed by: OTOLARYNGOLOGY

## 2020-07-08 PROCEDURE — 99203 PR OFFICE/OUTPT VISIT, NEW, LEVL III, 30-44 MIN: ICD-10-PCS | Mod: 25,HCNC,S$GLB, | Performed by: OTOLARYNGOLOGY

## 2020-07-08 PROCEDURE — 1101F PR PT FALLS ASSESS DOC 0-1 FALLS W/OUT INJ PAST YR: ICD-10-PCS | Mod: HCNC,CPTII,S$GLB, | Performed by: OTOLARYNGOLOGY

## 2020-07-08 PROCEDURE — 3074F SYST BP LT 130 MM HG: CPT | Mod: HCNC,CPTII,S$GLB, | Performed by: OTOLARYNGOLOGY

## 2020-07-08 PROCEDURE — 99999 PR PBB SHADOW E&M-EST. PATIENT-LVL IV: CPT | Mod: PBBFAC,HCNC,, | Performed by: OTOLARYNGOLOGY

## 2020-07-08 PROCEDURE — 69210 REMOVE IMPACTED EAR WAX UNI: CPT | Mod: HCNC,S$GLB,, | Performed by: OTOLARYNGOLOGY

## 2020-07-08 PROCEDURE — 99203 OFFICE O/P NEW LOW 30 MIN: CPT | Mod: 25,HCNC,S$GLB, | Performed by: OTOLARYNGOLOGY

## 2020-07-08 PROCEDURE — 69210 PR REMOVAL IMPACTED CERUMEN REQUIRING INSTRUMENTATION, UNILATERAL: ICD-10-PCS | Mod: HCNC,S$GLB,, | Performed by: OTOLARYNGOLOGY

## 2020-07-08 PROCEDURE — 3078F DIAST BP <80 MM HG: CPT | Mod: HCNC,CPTII,S$GLB, | Performed by: OTOLARYNGOLOGY

## 2020-07-08 PROCEDURE — 1126F PR PAIN SEVERITY QUANTIFIED, NO PAIN PRESENT: ICD-10-PCS | Mod: HCNC,S$GLB,, | Performed by: OTOLARYNGOLOGY

## 2020-07-08 PROCEDURE — 3078F PR MOST RECENT DIASTOLIC BLOOD PRESSURE < 80 MM HG: ICD-10-PCS | Mod: HCNC,CPTII,S$GLB, | Performed by: OTOLARYNGOLOGY

## 2020-07-08 PROCEDURE — 99999 PR PBB SHADOW E&M-EST. PATIENT-LVL IV: ICD-10-PCS | Mod: PBBFAC,HCNC,, | Performed by: OTOLARYNGOLOGY

## 2020-07-08 NOTE — PROCEDURES
Procedure: Cerumen Removal    Indications: Cerumen impaction obstructing view of tympanic membrane    Anesthesia: None    Complications: None    Examination of the bilateral ear canals reveals occlusive cerumen which prevents adequate visualization of the tympanic membrane.    Under microscopic magnification, removal of the cerumen was performed using a combination of curette, forceps, and/or suction. The tympanic membrane was adequately visible after the cleaning which was intact without perforation or effusion. Patient tolerated the procedure well

## 2020-07-08 NOTE — PROGRESS NOTES
"Otolaryngology Clinic Note    Cheryl Ghosh  Encounter Date: 7/8/2020   YOB: 1934  Referring Physician: Aaareferral Self  No address on file   PCP: Joey Mueller MD    Chief Complaint:   Chief Complaint   Patient presents with    Ear Fullness     bilateral    Tinnitus     bilateral. pt explains sound as "rushing water"       HPI: Cheryl Ghosh is a 85 y.o. female here for ear fullness, hearing loss and tinnitus. Patient with long history of hearing loss. Had audiogram at [facility] and got hearing aids about a year ago. Has had some increased aural fullness. Denies otalgia, otorrhea, vertigo. Does have bilateral nonpulsatile tinnitus most of the time but it will vary in intensity.    Noise exposure: no  Ear protection: not applicable  Prior ear surgeries: no  Prior trauma: no  History of ear infections: no  Family history of hearing loss: no  Hearing aids: yes      Review of Systems   Constitutional: Negative for chills, fever and weight loss.   HENT: Positive for hearing loss and tinnitus. Negative for ear discharge and ear pain.    Eyes: Negative for blurred vision and double vision.   Respiratory: Negative for cough and shortness of breath.    Cardiovascular: Negative for chest pain and palpitations.   Gastrointestinal: Negative for nausea and vomiting.   Musculoskeletal: Negative for joint pain and neck pain.   Skin: Negative for rash.   Neurological: Negative for dizziness, focal weakness and headaches.   Psychiatric/Behavioral: Negative for depression. The patient is not nervous/anxious.         Review of patient's allergies indicates:   Allergen Reactions    Pcn [penicillins] Hives and Nausea And Vomiting       Past Medical History:   Diagnosis Date    Arthritis     lumbar    Blood transfusion     Chronic kidney disease, stage III (moderate) 8/2/2017    Degenerative disc disease     lumbar    Insulinoma     Pancreatic disease     Urinary incontinence 12/7/2015       Past Surgical " History:   Procedure Laterality Date    ADENOIDECTOMY      APPENDECTOMY      CATARACT EXTRACTION, BILATERAL      EYE SURGERY      HYSTERECTOMY      SAMM/BSO    INJECTION OF JOINT Bilateral 2/4/2020    Procedure: Injection, Joint--Bilateral GTB;  Surgeon: Arina Carrera Jr., MD;  Location: [facility]IN [facility]GT;  Service: Pain Management;  Laterality: Bilateral;  Oral sedation    INJECTION OF STEROID Right 12/20/2018    Procedure: INJECTION, STEROID-- Right SI Joint Block and  Steroid Injection;  Surgeon: Xavier Dasilva MD;  Location: [facility] OR;  Service: Neurosurgery;  Laterality: Right;  INJECTION, STEROID-- Right SI Joint Block and  Steroid Injection  SURGERY TIME:1 HR  LOS: 0 DAYS  RADIOLOGY: C- arm  BED: Regular Bed  with Pillows  POSITION: Prone      INJECTION OF STEROID Left 6/19/2019    Procedure: INJECTION, STEROID Procedure:Left sacroiliac joint block / steroid injection Surgery Time: 30mins LOS: Anesthesia: MAC Radiology: C-arm Bed: Regular Bed Position: Prone;  Surgeon: Xavier Dasilva MD;  Location: [facility] OR;  Service: Neurosurgery;  Laterality: Left;    MINIMALLY INVASIVE FUSION OF SPINE Right 2/6/2019    Procedure: FUSION, SPINE, MINIMALLY INVASIVE Right Sacroiliac Joint Fusion -  I Fuse;  Surgeon: Xavier Dasilva MD;  Location: [facility] OR;  Service: Neurosurgery;  Laterality: Right;  Procedure: Right Sacroiliac Joint Fusion -  I Fuse  Surgery Time: 1.5 Hr  LOS: 0  Anesthesia: General  Radiology: C-Arm  Bed: [place] [id number] Poster  Position: Prone  Equipment: I [employer] [phone] (notified 1/24 EF)    OOPHORECTOMY      PANCREAS SURGERY      Insulanoma    RADIOFREQUENCY ABLATION OF LUMBAR MEDIAL BRANCH NERVE AT SINGLE LEVEL Right 8/28/2018    Procedure: RADIOFREQUENCY ABLATION, NERVE, MEDIAL BRANCH, LUMBAR, 1 LEVEL- Right L3,4,5 IV SEDATION;  Surgeon: Eddie Vega MD;  Location: [facility];  Service: Pain Management;  Laterality: Right;    RADIOFREQUENCY ABLATION OF LUMBAR MEDIAL BRANCH  NERVE AT SINGLE LEVEL Left 9/4/2018    Procedure: RADIOFREQUENCY ABLATION, NERVE, MEDIAL BRANCH, LUMBAR, 1 LEVEL - Left L3,4,5 IV SEADTION;  Surgeon: Eddie Vega MD;  Location: [facility];  Service: Pain Management;  Laterality: Left;  Patient takes ASA     REFRACTIVE SURGERY Bilateral     SPINAL FUSION Left 7/31/2019    Procedure: FUSION, SPINE SI Joint Fusion;  Surgeon: Xavier Dasilva MD;  Location: [facility] OR;  Service: Neurosurgery;  Laterality: Left;  Procedure: Left SI joint fusion  Surgery Time: 1.5hr  LOS:0  Anesthesia: General   Radiology: C-arm   Bed: [place] [id number] Poster  Position: Prone  Equipment: [employer] Lukealvo- [phone] ([employer] notified)    TONSILLECTOMY         Social History     Socioeconomic History    Marital status:      Spouse name: Not on file    Number of children: Not on file    Years of education: Not on file    Highest education level: Not on file   Occupational History    Not on file   Social Needs    Financial resource strain: Not hard at all    Food insecurity     Worry: Never true     Inability: Never true    Transportation needs     Medical: No     Non-medical: No   Tobacco Use    Smoking status: Never Smoker    Smokeless tobacco: Never Used   Substance and Sexual Activity    Alcohol use: Yes     Alcohol/week: 1.0 - 2.0 standard drinks     Types: 1 - 2 Glasses of wine per week     Frequency: Monthly or less     Drinks per session: 1 or 2     Binge frequency: Never    Drug use: No    Sexual activity: Yes     Partners: Male   Lifestyle    Physical activity     Days per week: 0 days     Minutes per session: 0 min    Stress: Rather much   Relationships    Social connections     Talks on phone: More than three times a week     Gets together: Once a week     Attends Worship service: Not on file     Active member of club or organization: Yes     Attends meetings of clubs or organizations: More than 4 times per year     Relationship status:    Other  Topics Concern    Not on file   Social History Narrative    Not on file       Family History   Problem Relation Age of Onset    Breast cancer Mother     Heart disease Father     Aneurysm Sister     Glaucoma Daughter     Heart attack Son     Breast cancer Other        Outpatient Encounter Medications as of 7/8/2020   Medication Sig Dispense Refill    amLODIPine (NORVASC) 2.5 MG tablet Take 1 tablet (2.5 mg total) by mouth 2 (two) times daily. 180 tablet 3    aspirin (ECOTRIN) 81 MG EC tablet Take 1 tablet (81 mg total) by mouth once daily. Resume 48 hours post-surgery  0    b complex vitamins tablet Take 1 tablet by mouth once daily.      calcium-vitamin D (CALCIUM WITH VITAMIN D) 600 mg(1,500mg) -400 unit Tab       celecoxib (CELEBREX) 200 MG capsule Take 1 capsule (200 mg total) by mouth 2 (two) times daily. 180 capsule 3    gabapentin (NEURONTIN) 300 MG capsule Take 1 capsule (300 mg total) by mouth 3 (three) times daily. 270 capsule 3    magnesium 250 mg Tab       potassium 99 mg Tab       pravastatin (PRAVACHOL) 40 MG tablet Take 1 tablet (40 mg total) by mouth every evening. 90 tablet 1    pyridoxine (VITAMIN B-6) 100 MG Tab Take 100 mg by mouth once daily.      acetaminophen (TYLENOL 8 HOUR ORAL) Take by mouth.      diclofenac sodium (VOLTAREN) 1 % Gel APPLY 2.5 GRAMS TO THE AFFECTED AREA 3-4 TIMES DAILY STARTING 2 WEEKS POST SURGERY.  3    FLUZONE HIGH-DOSE 2019-20, PF, 180 mcg/0.5 mL Syrg       lidocaine (XYLOCAINE) 5 % Oint ointment APPLY 2.5 GRAMS TO THE AFFECTED AREA 3-4 TIMES DAILY STARTING 2 WEEKS POST SURGERY. 1 Tube 11    mupirocin (BACTROBAN) 2 % ointment APPLY 1/2 GRAM TO EACH NOSTRIL TWICE A DAY FOR UP TO 5 DAYS PRIOR TO SURGERY OR AS DIRECTED BY YOUR PHYSICIAN.  0    omega 3-dha-epa-fish oil (FISH OIL) 100-160-1,000 mg Cap       oxyCODONE (OXY-IR) 5 mg Cap Take 1 capsule (5 mg total) by mouth every 12 (twelve) hours as needed for Pain. (Patient not taking: Reported on  7/8/2020) 40 capsule 0    oxyCODONE-acetaminophen (PERCOCET) 5-325 mg per tablet Take 1 tablet by mouth every 24 hours as needed for Pain. (Patient not taking: Reported on 7/8/2020) 20 tablet 0    pravastatin (PRAVACHOL) 10 MG tablet TAKE 1 TABLET BY MOUTH EVERY DAY 90 tablet 3    SANARE ADV SCAR THERAPY BASE Gel APPLY 1/2 GRAM (1 PUMP) TO AFFECTED AREAS TWICE DAILY STARTING 4 WEEKS POST SURGERY.  3    tiZANidine (ZANAFLEX) 4 MG tablet Take 1 tablet (4 mg total) by mouth nightly as needed. (Patient not taking: Reported on 7/8/2020) 30 tablet 1     Facility-Administered Encounter Medications as of 7/8/2020   Medication Dose Route Frequency Provider Last Rate Last Dose    gentamicin 80 mg/100ml NACL IVPB IVPB 80 mg  80 mg Intravenous 30 Min Pre-Op Wiliam Urias PA-C   80 mg at 07/31/19 0747    vancomycin in dextrose 5 % 1 gram/250 mL IVPB 1,000 mg  1,000 mg Intravenous 30 Min Pre-Op Wiliam Urias PA-C   1,000 mg at 07/31/19 0738       Physical Exam:    Vitals:    07/08/20 0820   BP: 126/71   Pulse: 63       Constitutional  General Appearance: well nourished, well-developed, alert, oriented, in no acute distress  Communication: ability, understanding, voice quality normal  Head and Face  Inspection: normocephalic, atraumatic, no scars, lesions or masses    Palpation: no stepoffs, sinus tenderness or masses  Parotid glands: no masses, stones, swelling or tenderness  Eyes  Ocular Motility / Alignment: normal alignment, motility, no proptosis, enophthalmus or nystagmus  Conjunctiva: not injected  Eyelids: no entropion or ectropion, no edema  Ears  Hearing: speech reception thresholds grossly normal  External Ears: no auricle lesions, non-tender, mobile to palpation  Otoscopy:  Right Ear: cerumen obstructing view of tympanic membrane - see procedure note  Left Ear: cerumen obstructing view of tympanic membrane - see procedure note    Nose  External Nose: no lesions, tenderness, trauma or deformity  Oral Cavity  / Oropharynx  Lips: upper and lower lips pink and moist  Gingiva: healthy  Oral Mucosa: moist, no mucosal lesions  Tongue: moist, normal mobility, no lesions  Oropharynx: tonsils and walls without erythema, exudate, lesions, fullness or obstruction  Neck  Inspection and Palpation: no erythema, induration, emphysema, tenderness or masses  Larynx and Trachea: normal position and mobility   Thyroid: no tenderness, enlargement or nodules  Submandibular Glands: no masses or tenderness  Lymphatic:  Anterior, Posterior, Submandibular, Submental, Supraclavicular: no lymphadenopathy present  Chest / Respiratory  Chest: no stridor or retractions, normal effort and expansion  Cardiovascular:  Pulses: 2+ radial pulses, regular rhythm and rate  Auscultation: deferred  Neurological  Cranial Nerves: grossly intact  General: no focal deficits  Psychiatric  Orientation: oriented to time, place and person  Mood and Affect: no depression, anxiety or agitation  Extremities  Inspection: moves all extremities well    Procedures:  Procedure: Cerumen Removal    Indications: Cerumen impaction obstructing view of tympanic membrane    Anesthesia: None    Complications: None    Examination of the bilateral ear canals reveals occlusive cerumen which prevents adequate visualization of the tympanic membrane.    Under microscopic magnification, removal of the cerumen was performed using a combination of curette, forceps, and/or suction. The tympanic membrane was adequately visible after the cleaning which was intact without perforation or effusion. Patient tolerated the procedure well    Pertinent Data:  ? LABS:   ? AUDIO:    ? PATH:  ? CULTURE:    I personally reviewed the following pertinent data at today's visit: Audiogram. Interpretation by me - mild to severe SNHL bilaterally, no tymps      Imaging:   ? XRAY:  ? Ultrasound:  ? CT Scan:  ? MRI Scan:  ? PET/CT Scan:    I personally reviewed the following images:    Miscellaneous:     [place]  Sleepiness Scale-     Reflux Symptom Index (RSI) -       Summary of Outside Records:      Assessment and Plan:  Cheryl Ghosh is a 85 y.o. female with     Bilateral impacted cerumen    Sensorineural hearing loss (SNHL) of both ears    Tinnitus of both ears       Cerumen removed today. Advised to avoid the use of q-tips. May use an OTC removal aid such as Debrox as needed to prevent buildup. I have reviewed the patient's audiogram with them. I would recommend an annual audiogram. Recommend hearing conservation. Continue hearing aids.          KARIME Mares MD  [facility]

## 2020-07-08 NOTE — PATIENT INSTRUCTIONS
Cerumen Impaction    Special glands in your ear make secretions that combine with dead skin cells to form earwax. The earwax travels with slowly growing cells of your skin. Over time, the earwax moves from the inner part of your ear canal to the entrance of the canal. Jaw movement also helps the earwax move through the canal.    Some things can cause problems with this normal process. Any type of blockage in the canal can cause a problem. Some people also may make too much earwax. This causes it to build up in the ear canal. The earwax may not totally block your ear canal. Most people have just a little buildup of earwax, which doesnt cause any symptoms at all.    Impacted earwax is very common. As you get older, earwax tends to become harder and less mobile. Older adults are more likely to have problems with too much earwax. It can cause symptoms such hearing loss.    What to do?    1. Avoid the use of qtips of any other object inserted into ear canal (can worsen cerumen buildup)  2. Use over the counter drops, such as Debrox, to prevent cerumen (wax) buildup as needed.     3. You may schedule a regular appointment for cerumen removal every 6-12 months or sooner if needed.  4. If returning for repeat cerumen removal, use debrox drops daily for 7-10 days prior to your next visit.

## 2020-07-13 ENCOUNTER — TELEPHONE (OUTPATIENT)
Dept: FAMILY MEDICINE | Facility: CLINIC | Age: 85
End: 2020-07-13

## 2020-07-13 DIAGNOSIS — Z12.31 ENCOUNTER FOR SCREENING MAMMOGRAM FOR BREAST CANCER: Primary | ICD-10-CM

## 2020-07-13 NOTE — TELEPHONE ENCOUNTER
----- Message from Juan R Rubio sent at 7/11/2020 11:54 AM CDT -----  Type:  Needs Medical Advice    Who Called: patient  Reason:  She would like to schedule mammogram at [place].  Would the patient rather a call back or a response via [facility]? call  Best Call Back Number: [phone]  Additional Information: none

## 2020-07-23 ENCOUNTER — HOSPITAL ENCOUNTER (OUTPATIENT)
Dept: RADIOLOGY | Facility: HOSPITAL | Age: 85
Discharge: HOME | End: 2020-07-23
Attending: FAMILY MEDICINE
Payer: MEDICARE

## 2020-07-23 DIAGNOSIS — Z12.31 ENCOUNTER FOR SCREENING MAMMOGRAM FOR BREAST CANCER: ICD-10-CM

## 2020-07-23 PROCEDURE — 77067 SCR MAMMO BI INCL CAD: CPT | Mod: 26,HCNC,, | Performed by: RADIOLOGY

## 2020-07-23 PROCEDURE — 77063 MAMMO DIGITAL SCREENING BILAT WITH TOMOSYNTHESIS_CAD: ICD-10-PCS | Mod: 26,HCNC,, | Performed by: RADIOLOGY

## 2020-07-23 PROCEDURE — 77067 MAMMO DIGITAL SCREENING BILAT WITH TOMOSYNTHESIS_CAD: ICD-10-PCS | Mod: 26,HCNC,, | Performed by: RADIOLOGY

## 2020-07-23 PROCEDURE — 77067 SCR MAMMO BI INCL CAD: CPT | Mod: TC,HCNC

## 2020-07-23 PROCEDURE — 77063 BREAST TOMOSYNTHESIS BI: CPT | Mod: 26,HCNC,, | Performed by: RADIOLOGY

## 2020-10-29 ENCOUNTER — TELEPHONE (OUTPATIENT)
Dept: FAMILY MEDICINE | Facility: CLINIC | Age: 85
End: 2020-10-29

## 2020-10-29 NOTE — TELEPHONE ENCOUNTER
----- Message from Serafin Azevedo sent at 10/29/2020  4:12 PM CDT -----  Type:  Needs Medical Advice    Who Called: self  Reason:patient has been suffering with shivers/ being cold all week. She has no fever but just got back from [place] 2 weeks   Would the patient rather a call back or a response via [facility]? call  Best Call Back Number: [phone]  Additional Information: none

## 2020-10-29 NOTE — TELEPHONE ENCOUNTER
Spoke to pt and stated she has body aches, chills, fatigue, cold, cough and no fever. Pt was in [place] for 2 wks. Pt is unsure if she came into contact with someone that had COVID. Pls advise.

## 2020-10-30 ENCOUNTER — TELEPHONE (OUTPATIENT)
Dept: FAMILY MEDICINE | Facility: CLINIC | Age: 85
End: 2020-10-30

## 2020-10-30 NOTE — TELEPHONE ENCOUNTER
----- Message from Rosamaria Childs sent at 10/30/2020 11:55 AM CDT -----  Type:  Patient Returning Call    Who Called:Patient  Who Left Message for Patient:Office  Does the patient know what this is regarding?: office returning patient  call   Would the patient rather a call back or a response via [facility]? call  Best Call Back Number: [phone]  Additional Information:

## 2020-10-30 NOTE — TELEPHONE ENCOUNTER
----- Message from Codi Carter sent at 10/30/2020 12:04 PM CDT -----  Contact: [phone]/Self  Type: Requesting to speak with nurse    Who Called: Pt  Regarding: notify office she is doing better    Would the patient rather a call back or a response via [facility]? Call back  Best Call Back Number: [phone]  Additional Information:

## 2020-10-30 NOTE — TELEPHONE ENCOUNTER
Informed pt that she can go nearby [facility] for checking out. Pt verbalized understanding.

## 2020-11-05 ENCOUNTER — PATIENT MESSAGE (OUTPATIENT)
Dept: FAMILY MEDICINE | Facility: CLINIC | Age: 85
End: 2020-11-05

## 2020-11-05 ENCOUNTER — TELEPHONE (OUTPATIENT)
Dept: ORTHOPEDICS | Facility: CLINIC | Age: 85
End: 2020-11-05

## 2020-11-05 NOTE — TELEPHONE ENCOUNTER
Called and spoke with pt in regards to appt request. Informed pt Italo is a NP for trauma. Pt verbally understood and said appt request was wrongfully sent. Pt  needed an annual exam for her eyes.

## 2020-11-11 ENCOUNTER — TELEPHONE (OUTPATIENT)
Dept: FAMILY MEDICINE | Facility: CLINIC | Age: 85
End: 2020-11-11

## 2020-11-11 NOTE — TELEPHONE ENCOUNTER
----- Message from Neha Ricks sent at 11/11/2020  2:16 PM CST -----  Regarding: new rx  Contact: [email or web address]/[phone]  [facility] - [facility] [address]  Type:  RX Refill Request    Who Called:    Refill or New Rx:NEW  RX Name and Strength: Diabetes meter and  testing supplies  How is the patient currently taking it? (ex. 1XDay):   Is this a 30 day or 90 day RX:   Preferred Pharmacy with phone number: [facility]   [facility] or Mail Order: [place]  Ordering Provider:   Would the patient rather a call back or a response via [facility]?  call  Best Call Back Number: [email or web address]/[phone]  Additional Information:

## 2020-11-17 ENCOUNTER — TELEPHONE (OUTPATIENT)
Dept: FAMILY MEDICINE | Facility: CLINIC | Age: 85
End: 2020-11-17

## 2020-11-17 ENCOUNTER — PATIENT MESSAGE (OUTPATIENT)
Dept: FAMILY MEDICINE | Facility: CLINIC | Age: 85
End: 2020-11-17

## 2020-11-17 NOTE — TELEPHONE ENCOUNTER
Informed pt that she is not a diabetic for a glucometer. Pt stated that she is not a diabetic and does not need a meter. Pt stated that she was told that she needed one by [employer]. Pt stated to disregard the message.

## 2020-11-17 NOTE — TELEPHONE ENCOUNTER
----- Message from Terese Cagle sent at 11/17/2020 10:24 AM CST -----  Regarding: call back  Contact: [phone]  Patient Returning Your Phone Call

## 2020-12-03 ENCOUNTER — OFFICE VISIT (OUTPATIENT)
Dept: OTOLARYNGOLOGY | Facility: CLINIC | Age: 85
End: 2020-12-03
Payer: MEDICARE

## 2020-12-03 VITALS
BODY MASS INDEX: 22.47 KG/M2 | HEIGHT: 64 IN | HEART RATE: 59 BPM | DIASTOLIC BLOOD PRESSURE: 74 MMHG | SYSTOLIC BLOOD PRESSURE: 145 MMHG | WEIGHT: 131.63 LBS

## 2020-12-03 DIAGNOSIS — H90.3 SENSORINEURAL HEARING LOSS (SNHL) OF BOTH EARS: ICD-10-CM

## 2020-12-03 DIAGNOSIS — H93.13 TINNITUS OF BOTH EARS: ICD-10-CM

## 2020-12-03 DIAGNOSIS — H61.23 BILATERAL IMPACTED CERUMEN: Primary | ICD-10-CM

## 2020-12-03 PROCEDURE — 3078F DIAST BP <80 MM HG: CPT | Mod: HCNC,CPTII,S$GLB, | Performed by: OTOLARYNGOLOGY

## 2020-12-03 PROCEDURE — 1159F MED LIST DOCD IN RCRD: CPT | Mod: HCNC,S$GLB,, | Performed by: OTOLARYNGOLOGY

## 2020-12-03 PROCEDURE — 99213 PR OFFICE/OUTPT VISIT, EST, LEVL III, 20-29 MIN: ICD-10-PCS | Mod: 25,HCNC,S$GLB, | Performed by: OTOLARYNGOLOGY

## 2020-12-03 PROCEDURE — 99213 OFFICE O/P EST LOW 20 MIN: CPT | Mod: 25,HCNC,S$GLB, | Performed by: OTOLARYNGOLOGY

## 2020-12-03 PROCEDURE — 3077F PR MOST RECENT SYSTOLIC BLOOD PRESSURE >= 140 MM HG: ICD-10-PCS | Mod: HCNC,CPTII,S$GLB, | Performed by: OTOLARYNGOLOGY

## 2020-12-03 PROCEDURE — 99999 PR PBB SHADOW E&M-EST. PATIENT-LVL V: CPT | Mod: PBBFAC,HCNC,, | Performed by: OTOLARYNGOLOGY

## 2020-12-03 PROCEDURE — 3078F PR MOST RECENT DIASTOLIC BLOOD PRESSURE < 80 MM HG: ICD-10-PCS | Mod: HCNC,CPTII,S$GLB, | Performed by: OTOLARYNGOLOGY

## 2020-12-03 PROCEDURE — 69210 REMOVE IMPACTED EAR WAX UNI: CPT | Mod: HCNC,S$GLB,, | Performed by: OTOLARYNGOLOGY

## 2020-12-03 PROCEDURE — 3077F SYST BP >= 140 MM HG: CPT | Mod: HCNC,CPTII,S$GLB, | Performed by: OTOLARYNGOLOGY

## 2020-12-03 PROCEDURE — 1126F AMNT PAIN NOTED NONE PRSNT: CPT | Mod: HCNC,S$GLB,, | Performed by: OTOLARYNGOLOGY

## 2020-12-03 PROCEDURE — 1159F PR MEDICATION LIST DOCUMENTED IN MEDICAL RECORD: ICD-10-PCS | Mod: HCNC,S$GLB,, | Performed by: OTOLARYNGOLOGY

## 2020-12-03 PROCEDURE — 1101F PT FALLS ASSESS-DOCD LE1/YR: CPT | Mod: HCNC,CPTII,S$GLB, | Performed by: OTOLARYNGOLOGY

## 2020-12-03 PROCEDURE — 3288F FALL RISK ASSESSMENT DOCD: CPT | Mod: HCNC,CPTII,S$GLB, | Performed by: OTOLARYNGOLOGY

## 2020-12-03 PROCEDURE — 1126F PR PAIN SEVERITY QUANTIFIED, NO PAIN PRESENT: ICD-10-PCS | Mod: HCNC,S$GLB,, | Performed by: OTOLARYNGOLOGY

## 2020-12-03 PROCEDURE — 99999 PR PBB SHADOW E&M-EST. PATIENT-LVL V: ICD-10-PCS | Mod: PBBFAC,HCNC,, | Performed by: OTOLARYNGOLOGY

## 2020-12-03 PROCEDURE — 1101F PR PT FALLS ASSESS DOC 0-1 FALLS W/OUT INJ PAST YR: ICD-10-PCS | Mod: HCNC,CPTII,S$GLB, | Performed by: OTOLARYNGOLOGY

## 2020-12-03 PROCEDURE — 69210 PR REMOVAL IMPACTED CERUMEN REQUIRING INSTRUMENTATION, UNILATERAL: ICD-10-PCS | Mod: HCNC,S$GLB,, | Performed by: OTOLARYNGOLOGY

## 2020-12-03 PROCEDURE — 3288F PR FALLS RISK ASSESSMENT DOCUMENTED: ICD-10-PCS | Mod: HCNC,CPTII,S$GLB, | Performed by: OTOLARYNGOLOGY

## 2020-12-03 NOTE — PROGRESS NOTES
Otolaryngology Clinic Note    Cheryl Ghosh  Encounter Date: 12/3/2020   YOB: 1934  Referring Physician: Aaareferral Self  No address on file   PCP: Joey Mueller MD    Chief Complaint:   Chief Complaint   Patient presents with    Ear Fullness     has fullness in both ears for about 3 weeks, has tried OTC things with no relief       HPI: Cheryl Ghosh is a 85 y.o. female here for follow up of ear fullness, hearing loss and tinnitus. Patient with long history of hearing loss. Had audiogram at [facility] and got hearing aids about a year ago and a half ago. Has had some increased aural fullness over last few weeks. Is due for her annual audiogram but audiologist told her she had a lot of cerumen. Denies otalgia, otorrhea, vertigo. Does still have bilateral nonpulsatile tinnitus most of the time but it will vary in intensity.    Noise exposure: no  Ear protection: not applicable  Prior ear surgeries: no  Prior trauma: no  History of ear infections: no  Family history of hearing loss: no  Hearing aids: yes      Review of Systems   Constitutional: Negative for chills, fever and weight loss.   HENT: Positive for hearing loss and tinnitus. Negative for ear discharge and ear pain.    Eyes: Negative for blurred vision and double vision.   Respiratory: Negative for cough and shortness of breath.    Cardiovascular: Negative for chest pain and palpitations.   Gastrointestinal: Negative for nausea and vomiting.   Musculoskeletal: Negative for joint pain and neck pain.   Skin: Negative for rash.   Neurological: Negative for dizziness, focal weakness and headaches.   Psychiatric/Behavioral: Negative for depression. The patient is not nervous/anxious.         Review of patient's allergies indicates:   Allergen Reactions    Pcn [penicillins] Hives and Nausea And Vomiting       Past Medical History:   Diagnosis Date    Arthritis     lumbar    Blood transfusion     Chronic kidney disease, stage III (moderate)  8/2/2017    Degenerative disc disease     lumbar    Insulinoma     Pancreatic disease     Urinary incontinence 12/7/2015       Past Surgical History:   Procedure Laterality Date    ADENOIDECTOMY      APPENDECTOMY      CATARACT EXTRACTION, BILATERAL      EYE SURGERY      HYSTERECTOMY      SAMM/BSO    INJECTION OF JOINT Bilateral 2/4/2020    Procedure: Injection, Joint--Bilateral GTB;  Surgeon: Arina Carrera Jr., MD;  Location: [facility];  Service: Pain Management;  Laterality: Bilateral;  Oral sedation    INJECTION OF STEROID Right 12/20/2018    Procedure: INJECTION, STEROID-- Right SI Joint Block and  Steroid Injection;  Surgeon: Xavier Dasilva MD;  Location: [facility] OR;  Service: Neurosurgery;  Laterality: Right;  INJECTION, STEROID-- Right SI Joint Block and  Steroid Injection  SURGERY TIME:1 HR  LOS: 0 DAYS  RADIOLOGY: C- arm  BED: Regular Bed  with Pillows  POSITION: Prone      INJECTION OF STEROID Left 6/19/2019    Procedure: INJECTION, STEROID Procedure:Left sacroiliac joint block / steroid injection Surgery Time: 30mins LOS: Anesthesia: MAC Radiology: C-arm Bed: Regular Bed Position: Prone;  Surgeon: Xavier Dasilva MD;  Location: [facility] OR;  Service: Neurosurgery;  Laterality: Left;    MINIMALLY INVASIVE FUSION OF SPINE Right 2/6/2019    Procedure: FUSION, SPINE, MINIMALLY INVASIVE Right Sacroiliac Joint Fusion -  I Fuse;  Surgeon: Xavier Dasilva MD;  Location: [facility] OR;  Service: Neurosurgery;  Laterality: Right;  Procedure: Right Sacroiliac Joint Fusion -  I Fuse  Surgery Time: 1.5 Hr  LOS: 0  Anesthesia: General  Radiology: C-Arm  Bed: [place] [id number] Poster  Position: Prone  Equipment: I Fuse Codi [id number] (notified 1/24 EF)    OOPHORECTOMY      PANCREAS SURGERY      Insulanoma    RADIOFREQUENCY ABLATION OF LUMBAR MEDIAL BRANCH NERVE AT SINGLE LEVEL Right 8/28/2018    Procedure: RADIOFREQUENCY ABLATION, NERVE, MEDIAL BRANCH, LUMBAR, 1 LEVEL- Right L3,4,5 IV SEDATION;  Surgeon: Eddie  ALENA Vega MD;  Location: [facility];  Service: Pain Management;  Laterality: Right;    RADIOFREQUENCY ABLATION OF LUMBAR MEDIAL BRANCH NERVE AT SINGLE LEVEL Left 9/4/2018    Procedure: RADIOFREQUENCY ABLATION, NERVE, MEDIAL BRANCH, LUMBAR, 1 LEVEL - Left L3,4,5 IV SEADTION;  Surgeon: Eddie Vega MD;  Location: [facility];  Service: Pain Management;  Laterality: Left;  Patient takes ASA     REFRACTIVE SURGERY Bilateral     SPINAL FUSION Left 7/31/2019    Procedure: FUSION, SPINE SI Joint Fusion;  Surgeon: Xavier Dasilva MD;  Location: [facility] OR;  Service: Neurosurgery;  Laterality: Left;  Procedure: Left SI joint fusion  Surgery Time: 1.5hr  LOS:0  Anesthesia: General   Radiology: C-arm   Bed: [place] [id number] Poster  Position: Prone  Equipment: Codi Diaz- [id number] (Codi notified)    TONSILLECTOMY         Social History     Socioeconomic History    Marital status:      Spouse name: Not on file    Number of children: Not on file    Years of education: Not on file    Highest education level: Not on file   Occupational History    Not on file   Social Needs    Financial resource strain: Not hard at all    Food insecurity     Worry: Never true     Inability: Never true    Transportation needs     Medical: No     Non-medical: No   Tobacco Use    Smoking status: Never Smoker    Smokeless tobacco: Never Used   Substance and Sexual Activity    Alcohol use: Yes     Alcohol/week: 1.0 - 2.0 standard drinks     Types: 1 - 2 Glasses of wine per week     Frequency: Monthly or less     Drinks per session: 1 or 2     Binge frequency: Never    Drug use: No    Sexual activity: Yes     Partners: Male   Lifestyle    Physical activity     Days per week: 0 days     Minutes per session: 0 min    Stress: Rather much   Relationships    Social connections     Talks on phone: More than three times a week     Gets together: Once a week     Attends Sabianist service: Not on file     Active member of  club or organization: Yes     Attends meetings of clubs or organizations: More than 4 times per year     Relationship status:    Other Topics Concern    Not on file   Social History Narrative    Not on file       Family History   Problem Relation Age of Onset    Breast cancer Mother     Heart disease Father     Aneurysm Sister     Glaucoma Daughter     Heart attack Son     Breast cancer Maternal Grandmother     Breast cancer Other        Outpatient Encounter Medications as of 12/3/2020   Medication Sig Dispense Refill    acetaminophen (TYLENOL 8 HOUR ORAL) Take by mouth.      amLODIPine (NORVASC) 2.5 MG tablet Take 1 tablet (2.5 mg total) by mouth 2 (two) times daily. 180 tablet 3    aspirin (ECOTRIN) 81 MG EC tablet Take 1 tablet (81 mg total) by mouth once daily. Resume 48 hours post-surgery  0    b complex vitamins tablet Take 1 tablet by mouth once daily.      calcium-vitamin D (CALCIUM WITH VITAMIN D) 600 mg(1,500mg) -400 unit Tab       celecoxib (CELEBREX) 200 MG capsule Take 1 capsule (200 mg total) by mouth 2 (two) times daily. 180 capsule 3    FLUZONE HIGH-DOSE 2019-20, PF, 180 mcg/0.5 mL Syrg       gabapentin (NEURONTIN) 300 MG capsule Take 1 capsule (300 mg total) by mouth 3 (three) times daily. 270 capsule 3    magnesium 250 mg Tab       omega 3-dha-epa-fish oil (FISH OIL) 100-160-1,000 mg Cap       potassium 99 mg Tab       pravastatin (PRAVACHOL) 10 MG tablet TAKE 1 TABLET BY MOUTH EVERY DAY 90 tablet 3    pravastatin (PRAVACHOL) 40 MG tablet TAKE 1 TABLET EVERY EVENING. 90 tablet 1    pyridoxine (VITAMIN B-6) 100 MG Tab Take 100 mg by mouth once daily.      tiZANidine (ZANAFLEX) 4 MG tablet Take 1 tablet (4 mg total) by mouth nightly as needed. 30 tablet 1    diclofenac sodium (VOLTAREN) 1 % Gel APPLY 2.5 GRAMS TO THE AFFECTED AREA 3-4 TIMES DAILY STARTING 2 WEEKS POST SURGERY.  3    lidocaine (XYLOCAINE) 5 % Oint ointment APPLY 2.5 GRAMS TO THE AFFECTED AREA 3-4 TIMES  DAILY STARTING 2 WEEKS POST SURGERY. 1 Tube 11    mupirocin (BACTROBAN) 2 % ointment APPLY 1/2 GRAM TO EACH NOSTRIL TWICE A DAY FOR UP TO 5 DAYS PRIOR TO SURGERY OR AS DIRECTED BY YOUR PHYSICIAN.  0    oxyCODONE (OXY-IR) 5 mg Cap Take 1 capsule (5 mg total) by mouth every 12 (twelve) hours as needed for Pain. (Patient not taking: Reported on 7/8/2020) 40 capsule 0    oxyCODONE-acetaminophen (PERCOCET) 5-325 mg per tablet Take 1 tablet by mouth every 24 hours as needed for Pain. (Patient not taking: Reported on 7/8/2020) 20 tablet 0    SANARE ADV SCAR THERAPY BASE Gel APPLY 1/2 GRAM (1 PUMP) TO AFFECTED AREAS TWICE DAILY STARTING 4 WEEKS POST SURGERY.  3    [DISCONTINUED] pravastatin (PRAVACHOL) 40 MG tablet Take 1 tablet (40 mg total) by mouth every evening. 90 tablet 1     Facility-Administered Encounter Medications as of 12/3/2020   Medication Dose Route Frequency Provider Last Rate Last Dose    gentamicin 80 mg/100ml NACL IVPB IVPB 80 mg  80 mg Intravenous 30 Min Pre-Op Wiliam Urias PA-C   80 mg at 07/31/19 0747    vancomycin in dextrose 5 % 1 gram/250 mL IVPB 1,000 mg  1,000 mg Intravenous 30 Min Pre-Op Wiliam Urias PA-C   1,000 mg at 07/31/19 0738       Physical Exam:    Vitals:    12/03/20 0818   BP: (!) 145/74   Pulse: (!) 59       Constitutional  General Appearance: well nourished, well-developed, alert, oriented, in no acute distress  Communication: ability, understanding, voice quality normal  Head and Face  Inspection: normocephalic, atraumatic, no scars, lesions or masses    Palpation: no stepoffs, sinus tenderness or masses  Parotid glands: no masses, stones, swelling or tenderness  Eyes  Ocular Motility / Alignment: normal alignment, motility, no proptosis, enophthalmus or nystagmus  Conjunctiva: not injected  Eyelids: no entropion or ectropion, no edema  Ears  Hearing: speech reception thresholds grossly normal  External Ears: no auricle lesions, non-tender, mobile to  palpation  Otoscopy:  Right Ear: cerumen obstructing view of tympanic membrane - see procedure note  Left Ear: cerumen obstructing view of tympanic membrane - see procedure note    Nose  External Nose: no lesions, tenderness, trauma or deformity  Neck  Inspection and Palpation: no erythema, induration, emphysema, tenderness or masses  Lymphatic:  Anterior, Posterior, Submandibular, Submental, Supraclavicular: no lymphadenopathy present  Chest / Respiratory  Chest: no stridor or retractions, normal effort and expansion  Cardiovascular:  Pulses: 2+ radial pulses, regular rhythm and rate  Auscultation: deferred  Neurological  Cranial Nerves: grossly intact  General: no focal deficits  Psychiatric  Orientation: oriented to time, place and person  Mood and Affect: no depression, anxiety or agitation  Extremities  Inspection: moves all extremities well    Procedures:  Procedure: Cerumen Removal    Indications: Cerumen impaction obstructing view of tympanic membrane    Anesthesia: None    Complications: None    Examination of the bilateral ear canals reveals occlusive cerumen which prevents adequate visualization of the tympanic membrane.    Under microscopic magnification, removal of the cerumen was performed using a combination of curette, forceps, and/or suction. Right had more than left. Neither was that impacted but cerumen in such a way that it was obscuring view of the TM. The tympanic membrane was adequately visible after the cleaning which was intact without perforation or effusion. Patient tolerated the procedure well    Pertinent Data:  ? LABS:   ? AUDIO:    ? PATH:  ? CULTURE:    I personally reviewed the following pertinent data at today's visit: Audiogram. Interpretation by me - mild to severe SNHL bilaterally, no tymps      Imaging:   ? XRAY:  ? Ultrasound:  ? CT Scan:  ? MRI Scan:  ? PET/CT Scan:    I personally reviewed the following images:    Miscellaneous:     [place] Sleepiness Scale-     Reflux  Symptom Index (RSI) -       Summary of Outside Records:      Assessment and Plan:  Cheryl Ghosh is a 85 y.o. female with     Bilateral impacted cerumen    Sensorineural hearing loss (SNHL) of both ears    Tinnitus of both ears       Cerumen removed today. Advised to avoid the use of q-tips. May use an OTC removal aid such as Debrox as needed to prevent buildup. I would recommend an annual audiogram - instructed to have rechecked at [facility] if that is where she prefers because cerumen was likely not enough to be causing a significant difference in hearing. Recommend hearing conservation. Continue hearing aids.          KARIME Mares MD  [facility]

## 2020-12-03 NOTE — PROCEDURES
Procedure: Cerumen Removal    Indications: Cerumen impaction obstructing view of tympanic membrane    Anesthesia: None    Complications: None    Examination of the bilateral ear canals reveals occlusive cerumen which prevents adequate visualization of the tympanic membrane.    Under microscopic magnification, removal of the cerumen was performed using a combination of curette, forceps, and/or suction. Right had more than left. Neither was that impacted but cerumen in such a way that it was obscuring view of the TM. The tympanic membrane was adequately visible after the cleaning which was intact without perforation or effusion. Patient tolerated the procedure well

## 2020-12-11 ENCOUNTER — OFFICE VISIT (OUTPATIENT)
Dept: OPTOMETRY | Facility: CLINIC | Age: 85
End: 2020-12-11
Payer: MEDICARE

## 2020-12-11 DIAGNOSIS — H52.4 HYPEROPIA OF BOTH EYES WITH REGULAR ASTIGMATISM AND PRESBYOPIA: ICD-10-CM

## 2020-12-11 DIAGNOSIS — H10.45 CHRONIC ALLERGIC CONJUNCTIVITIS: Primary | ICD-10-CM

## 2020-12-11 DIAGNOSIS — H01.00B BLEPHARITIS OF UPPER AND LOWER EYELIDS OF BOTH EYES, UNSPECIFIED TYPE: ICD-10-CM

## 2020-12-11 DIAGNOSIS — H52.03 HYPEROPIA OF BOTH EYES WITH REGULAR ASTIGMATISM AND PRESBYOPIA: ICD-10-CM

## 2020-12-11 DIAGNOSIS — Z98.890 H/O LASER ASSISTED IN SITU KERATOMILEUSIS: ICD-10-CM

## 2020-12-11 DIAGNOSIS — H04.123 DRY EYE SYNDROME OF BILATERAL LACRIMAL GLANDS: ICD-10-CM

## 2020-12-11 DIAGNOSIS — H02.883 MEIBOMIAN GLAND DYSFUNCTION (MGD) OF BOTH EYES: ICD-10-CM

## 2020-12-11 DIAGNOSIS — H52.223 HYPEROPIA OF BOTH EYES WITH REGULAR ASTIGMATISM AND PRESBYOPIA: ICD-10-CM

## 2020-12-11 DIAGNOSIS — H01.00A BLEPHARITIS OF UPPER AND LOWER EYELIDS OF BOTH EYES, UNSPECIFIED TYPE: ICD-10-CM

## 2020-12-11 DIAGNOSIS — H02.886 MEIBOMIAN GLAND DYSFUNCTION (MGD) OF BOTH EYES: ICD-10-CM

## 2020-12-11 DIAGNOSIS — Z96.1 PSEUDOPHAKIA: ICD-10-CM

## 2020-12-11 PROCEDURE — 92015 PR REFRACTION: ICD-10-PCS | Mod: HCNC,S$GLB,, | Performed by: OPTOMETRIST

## 2020-12-11 PROCEDURE — 92004 COMPRE OPH EXAM NEW PT 1/>: CPT | Mod: HCNC,S$GLB,, | Performed by: OPTOMETRIST

## 2020-12-11 PROCEDURE — 99999 PR PBB SHADOW E&M-EST. PATIENT-LVL III: ICD-10-PCS | Mod: PBBFAC,HCNC,, | Performed by: OPTOMETRIST

## 2020-12-11 PROCEDURE — 99999 PR PBB SHADOW E&M-EST. PATIENT-LVL III: CPT | Mod: PBBFAC,HCNC,, | Performed by: OPTOMETRIST

## 2020-12-11 PROCEDURE — 92004 PR EYE EXAM, NEW PATIENT,COMPREHESV: ICD-10-PCS | Mod: HCNC,S$GLB,, | Performed by: OPTOMETRIST

## 2020-12-11 PROCEDURE — 92015 DETERMINE REFRACTIVE STATE: CPT | Mod: HCNC,S$GLB,, | Performed by: OPTOMETRIST

## 2020-12-11 NOTE — PROGRESS NOTES
HPI     COLLEEN: 1 year ago - no issues report  Was rx'd driving glasses - reports they work - did not bring today.   Using OTC readers for up close.     Pt c/o variably blurry vision, burning, and irritation. Worst when   watching TV.   Using Refresh PRN and unk 'itching drop'    Surgical history:  Cataract sx - a long time  LASIK - 1996, does not know previous prescription    Last edited by Yin Hodgson, OD on 12/11/2020  2:28 PM. (History)         Assessment & Plan:   1. Chronic allergic conjunctivitis  2. Meibomian gland dysfunction (MGD) of both eyes  3. Dry eye syndrome of bilateral lacrimal glands  4. Blepharitis of upper and lower eyelids of both eyes, unspecified type  Meibomian gland dysfunction, Blepharitis, Aqueous deficiency, Ocular rosacea, Dry eye disease and Chronic ocular allergies as primary cause of dryness, watering, itching, irritation, discomfort and redness complaints  Pt instructed to:  use gel/ointments at bedtime (recommended Celluvisc, Blink/Refresh/Systane GEL)  use OTC Artificial tears as needed (recommended Blink, Systane, or Refresh)  use warm compresses daily (recommended David mask, Thermalon, TheraPearl)  start daily lid cleaning (recommended Occusoft lid scrub, Systane lid scrubs, Cliradex or EyeEco TeaTree Oil foaming cleanser)  Use over the counter anti-histamine drops (recommended Alaway, Zaditor, Pataday)  Instructed pt to discontinue Visine/Clear eyes, start Lumify if necessary  Monitor yearly      5. Hyperopia of both eyes with regular astigmatism and presbyopia  Updated glasses prescription given: Single Vision Glasses (distance). Monitor yearly.   Eyeglass Final Rx     Eyeglass Final Rx       Sphere Cylinder Axis    Right -0.50 +0.25 166    Left +0.50 +0.50 150    Type: SVL    Expiration Date: 12/12/2021                   6. H/O laser assisted in situ keratomileusis  LASIK in ~1996, previous refraction unk.   Stable, monitor    7. Pseudophakia  Posterior chamber IOL, well  centered. Post-YAG cap OU. Monitor yearly.

## 2020-12-11 NOTE — PATIENT INSTRUCTIONS
What is Allergic Conjunctivitis or Ocular Allergies?     Just as allergens can irritate your lungs and nose, they can irritate the surface of your eyes. Ocular allergies can occur in conjunction with systemic allergies or on their own. Just like systemic allergies, ocular allergies are a chronic problem requiring regular maintenance and treatment. Symptoms of ocular allergies can include: Redness, Itchiness, Burning, Watering and Swollen eyelids.     Recommended Treatment      Eye Drops: Over the Counter  o These are topical anti-histamines, similar to the one you take by mouth to treat allergies. It is best to avoid Visine, Clear eyes, and any other drop which contains Tetrahydrozoline HCl or Naphazoline as these drops may provide temporary relief without treating the underlying problem. If you have a severe round of allergic conjunctivitis, your doctor may prescribe a stronger eye drop not listed here to help control it.                           Lubricating drops or Artificial Tears  o Using over the counter Artificial Tears or Lubricating drops can also help by washing away allergens and preventing dry eye symptoms. For over the counter drops, name brands like Systane, Blink and Refresh typically work best.   Cool Compresses  o If your lids are swollen and itchy, a cool compress can provide temporary relief.    Remove allergens  o Just like with systemic allergens, frequent washing of pillow cases and use of air filters can help reduce ocular allergies.     Further Reading:   Allergic Conjuctivitis      What is Dry Eye and Meibomian Gland Deficiency?     The surface of our eyes are covered with a layer of tears. This tear film is composed of three layers: an oil (lipid) layer, a water layer, and a mucous layer. Dryness occurs when something goes wrong with any of these layers.                        If the first layer, the oil (lipid) layer, is affected the most common cause is meibomian gland deficiency.  The meibomian glands are located in our eye lids and secrete the oil layer of the tear film. These glands secrete their contents most effectively each time we make a full blink. Every time we sit at a computer, or use a phone, we do not blink fully. These partial blinks do not allow the meibomian glands to secrete their oily contents. If they don't release their contents, the meibomian glands become clogged. Over long periods of time, this blockage can lead to atrophy and meibomian gland dropout. If the meibomian glands are clogged, there is no oil layer on the tear film. Without the oil layer, the water layer of the tears evaporates more quickly than usual. This rapid evaporation triggers reflex tearing, or watering, as the eye tries to quickly replace the lost tears.      If the second layer, the water layer, is affected the most common cause if an issue with the lacrimal gland. The lacrimal gland secretes the water portion of our tears. As we age, the lacrimal glands produces fewer and fewer tears. Other things, like autoimmune conditions, can also reduce the production of tears by the lacrimal gland.   If either our meibomian glands or our lacrimal gland are not working at their full potential, our eyes can often feel dry and scratchy or irritated. Severe dryness can also cause blurry vision and redness. Dry eye is a chronic condition, which waxes and wanes throughout life. Constant management is needed to control symptoms.     Recommended Treatment   Treatment for Dry Eye differs for each patient. These are general recommendations which your doctor may discuss with you during your visit.      Artifical Tears  o Artifical tears, also called re-wetting or lubricating drops, are not prescribed medications and can be found over the counter. Feel free to use Artificial tears as much as you feel necessary. Frequent use of Artifical Tears rarely causes any problems. Any person with dry eye would benefit from using  artificial tears at least three to four times a day. If using tears more than four times a day, please use a preservative-free artifical tear.     o Using a drop when first sitting down at the computer is a particularly good idea to prevent any discomfort. In general, using a drop before your eyes feel dry can also prevent the feelings of dryness and irritation. When looking for eye drops, name brands like Systane, Refresh, and Blink are always preferred. These brands contain fewer harmful preservatives and have been proven to be effective in treating Dry Eye.     o General Artifical Tear Recommendations (for use less than four times a day)            o Preservative-Free Artificial Tears (for use more than four times a day and for severe dry eye)              Warm Compresses  o Warm compresses placed on closed eyes for 5-10 minutes can help clear blocked meibomian glands and prevent meibomian gland dropout. After using a warm compress, you may notice your vision is slightly blurry. This is from the now melted and cleared debris, and should resolve quickly. Warm compresses work best when they are regular part of your daily routine, like brushing your teeth.   o Recommended brands:  David, Thermalon, TheraPearl           Gel Tears   o If you are waking up with dryness or irritation, it could be because your lids do not fully close at night. This small opening, called lagophthalmos, can lead to evaporation and dryness overnight. The best way to manage this dryness is to instill a gel or ointment drop at night. This drop is very thick and can leave some crustiness on your lids in the morning, but it will reduce morning discomfort and protect your eye over night.                Further Reading:   All About Vision: Dry Eye Syndrome   American Optometric Association: Dry Eye   Dry Eye [place] (photo source)      What is Blepharitis?    Blepharitis is an inflammation of the eyelids which is usually related to an  over abundance of bacteria in the eye lashes. In addition to swollen eyelids, symptoms of blepharitis can include thick, yellow, dandruff-like scales that stick to the eyelid. There may be oily patches on the eyelid. The eyelashes may be crusted (with dandruff-like scales) when you wake up from sleeping. The irritated area may itch. The eyelids may be red. The eyes can be red and burn or sting. The eyes may tear a lot, or be dry. You can become sensitive to light or have blurred vision.   Blepharitis is a chronic condition which requires regular treatment.     Blepharitis is associated with dry eye, meibomian gland dysfunction, dandruff, acne rosacea and ocular rosacea, and eyelash mites.     Routine Treatment and Home Care:     Warm Compresses  o Warm compresses placed on closed eyes for 5-10 minutes can help clear blocked meibomian glands and prevent meibomian gland dropout. After using a warm compress, you may notice your vision is slightly blurry. This is from the now melted and cleared debris, and should resolve quickly. Warm compresses work best when they are regular part of your daily routine, like brushing your teeth.   o Recommended brands:  David, Thermalon, TheraPearl        · Lid scrubs and cleaning  · After using a warm compress, wipe away any debris/ scales/ crust from the eye lids.   · Gently scrub the base of the eyelashes for around 15 seconds per eyelid. To do this, close your eyes and use a moist eyelid cleansing wipe or clean washcloth.   · Recommended lid cleansing scrubs:  · Lid wipes: Systane and OcuSoft  · Foaming cleanser: EyeEco TeaTree Cleanser, OcuSoft, Cliradex, Zocular                       · General tips  · Make sure your hands are washed and clean any time you are touching near your eyes  · When you use makeup, make sure you completely remove the makeup debris at night.   · Do no wear your contact lenses or make-up if your eyes are especially irritated, red, or stinging. This  increases your risk for a bacterial infection.     If you have a severe flare, your doctor may prescribe an antibiotic eye drops or ointment and/or steroid eye drops in addition to the treatment listed above.        Further Reading:   American Optometric Association: Blepharitis   All About Vision: Blepharitis

## 2020-12-23 ENCOUNTER — PATIENT MESSAGE (OUTPATIENT)
Dept: FAMILY MEDICINE | Facility: CLINIC | Age: 85
End: 2020-12-23

## 2020-12-30 ENCOUNTER — PATIENT MESSAGE (OUTPATIENT)
Dept: ADMINISTRATIVE | Facility: OTHER | Age: 85
End: 2020-12-30

## 2021-01-04 ENCOUNTER — PATIENT MESSAGE (OUTPATIENT)
Dept: ADMINISTRATIVE | Facility: HOSPITAL | Age: 86
End: 2021-01-04

## 2021-01-04 ENCOUNTER — TELEPHONE (OUTPATIENT)
Dept: FAMILY MEDICINE | Facility: CLINIC | Age: 86
End: 2021-01-04

## 2021-01-04 ENCOUNTER — PATIENT MESSAGE (OUTPATIENT)
Dept: ADMINISTRATIVE | Facility: OTHER | Age: 86
End: 2021-01-04

## 2021-01-08 ENCOUNTER — PATIENT MESSAGE (OUTPATIENT)
Dept: FAMILY MEDICINE | Facility: CLINIC | Age: 86
End: 2021-01-08

## 2021-01-08 DIAGNOSIS — G47.00 INSOMNIA, UNSPECIFIED TYPE: Primary | ICD-10-CM

## 2021-01-08 RX ORDER — TIZANIDINE 4 MG/1
4 TABLET ORAL NIGHTLY PRN
Qty: 30 TABLET | Refills: 1 | Status: SHIPPED | OUTPATIENT
Start: 2021-01-08 | End: 2021-02-08

## 2021-01-11 RX ORDER — TEMAZEPAM 7.5 MG/1
7.5 CAPSULE ORAL NIGHTLY PRN
Qty: 30 CAPSULE | Refills: 2 | Status: SHIPPED | OUTPATIENT
Start: 2021-01-11 | End: 2021-02-10

## 2021-01-27 ENCOUNTER — TELEPHONE (OUTPATIENT)
Dept: FAMILY MEDICINE | Facility: CLINIC | Age: 86
End: 2021-01-27

## 2021-01-27 ENCOUNTER — PATIENT MESSAGE (OUTPATIENT)
Dept: FAMILY MEDICINE | Facility: CLINIC | Age: 86
End: 2021-01-27

## 2021-01-27 DIAGNOSIS — I50.32 CHRONIC DIASTOLIC HEART FAILURE: ICD-10-CM

## 2021-01-27 DIAGNOSIS — E78.2 MIXED HYPERLIPIDEMIA: ICD-10-CM

## 2021-01-27 DIAGNOSIS — N18.31 STAGE 3A CHRONIC KIDNEY DISEASE: ICD-10-CM

## 2021-01-27 DIAGNOSIS — Z00.00 ROUTINE GENERAL MEDICAL EXAMINATION AT HEALTH CARE FACILITY: Primary | ICD-10-CM

## 2021-01-27 DIAGNOSIS — I10 HTN (HYPERTENSION), BENIGN: ICD-10-CM

## 2021-01-28 ENCOUNTER — LAB VISIT (OUTPATIENT)
Dept: LAB | Facility: HOSPITAL | Age: 86
End: 2021-01-28
Attending: FAMILY MEDICINE
Payer: MEDICARE

## 2021-01-28 DIAGNOSIS — Z00.00 ROUTINE GENERAL MEDICAL EXAMINATION AT HEALTH CARE FACILITY: ICD-10-CM

## 2021-01-28 DIAGNOSIS — N18.31 STAGE 3A CHRONIC KIDNEY DISEASE: ICD-10-CM

## 2021-01-28 DIAGNOSIS — E78.2 MIXED HYPERLIPIDEMIA: ICD-10-CM

## 2021-01-28 DIAGNOSIS — I10 HTN (HYPERTENSION), BENIGN: ICD-10-CM

## 2021-01-28 LAB
ALBUMIN SERPL BCP-MCNC: 3.8 G/DL (ref 3.5–5.2)
ALP SERPL-CCNC: 63 U/L (ref 55–135)
ALT SERPL W/O P-5'-P-CCNC: 12 U/L (ref 10–44)
ANION GAP SERPL CALC-SCNC: 8 MMOL/L (ref 8–16)
AST SERPL-CCNC: 22 U/L (ref 10–40)
BASOPHILS # BLD AUTO: 0.04 K/UL (ref 0–0.2)
BASOPHILS NFR BLD: 0.6 % (ref 0–1.9)
BILIRUB SERPL-MCNC: 0.5 MG/DL (ref 0.1–1)
BUN SERPL-MCNC: 28 MG/DL (ref 8–23)
CALCIUM SERPL-MCNC: 8.6 MG/DL (ref 8.7–10.5)
CHLORIDE SERPL-SCNC: 110 MMOL/L (ref 95–110)
CHOLEST SERPL-MCNC: 157 MG/DL (ref 120–199)
CHOLEST/HDLC SERPL: 3.7 {RATIO} (ref 2–5)
CO2 SERPL-SCNC: 25 MMOL/L (ref 23–29)
CREAT SERPL-MCNC: 1.4 MG/DL (ref 0.5–1.4)
DIFFERENTIAL METHOD: NORMAL
EOSINOPHIL # BLD AUTO: 0.2 K/UL (ref 0–0.5)
EOSINOPHIL NFR BLD: 2.9 % (ref 0–8)
ERYTHROCYTE [DISTWIDTH] IN BLOOD BY AUTOMATED COUNT: 13.7 % (ref 11.5–14.5)
EST. GFR  (AFRICAN AMERICAN): 39 ML/MIN/1.73 M^2
EST. GFR  (NON AFRICAN AMERICAN): 34 ML/MIN/1.73 M^2
GLUCOSE SERPL-MCNC: 108 MG/DL (ref 70–110)
HCT VFR BLD AUTO: 39.2 % (ref 37–48.5)
HDLC SERPL-MCNC: 43 MG/DL (ref 40–75)
HDLC SERPL: 27.4 % (ref 20–50)
HGB BLD-MCNC: 13 G/DL (ref 12–16)
IMM GRANULOCYTES # BLD AUTO: 0.01 K/UL (ref 0–0.04)
IMM GRANULOCYTES NFR BLD AUTO: 0.1 % (ref 0–0.5)
LDLC SERPL CALC-MCNC: 89.4 MG/DL (ref 63–159)
LYMPHOCYTES # BLD AUTO: 3.3 K/UL (ref 1–4.8)
LYMPHOCYTES NFR BLD: 47.3 % (ref 18–48)
MCH RBC QN AUTO: 31 PG (ref 27–31)
MCHC RBC AUTO-ENTMCNC: 33.2 G/DL (ref 32–36)
MCV RBC AUTO: 93 FL (ref 82–98)
MONOCYTES # BLD AUTO: 0.6 K/UL (ref 0.3–1)
MONOCYTES NFR BLD: 8.9 % (ref 4–15)
NEUTROPHILS # BLD AUTO: 2.8 K/UL (ref 1.8–7.7)
NEUTROPHILS NFR BLD: 40.2 % (ref 38–73)
NONHDLC SERPL-MCNC: 114 MG/DL
NRBC BLD-RTO: 0 /100 WBC
PLATELET # BLD AUTO: 174 K/UL (ref 150–350)
PMV BLD AUTO: 10.9 FL (ref 9.2–12.9)
POTASSIUM SERPL-SCNC: 4.3 MMOL/L (ref 3.5–5.1)
PROT SERPL-MCNC: 6.9 G/DL (ref 6–8.4)
PTH-INTACT SERPL-MCNC: 148.5 PG/ML (ref 9–77)
RBC # BLD AUTO: 4.2 M/UL (ref 4–5.4)
SODIUM SERPL-SCNC: 143 MMOL/L (ref 136–145)
TRIGL SERPL-MCNC: 123 MG/DL (ref 30–150)
WBC # BLD AUTO: 6.98 K/UL (ref 3.9–12.7)

## 2021-01-28 PROCEDURE — 80053 COMPREHEN METABOLIC PANEL: CPT | Mod: HCNC

## 2021-01-28 PROCEDURE — 36415 COLL VENOUS BLD VENIPUNCTURE: CPT | Mod: HCNC

## 2021-01-28 PROCEDURE — 80061 LIPID PANEL: CPT | Mod: HCNC

## 2021-01-28 PROCEDURE — 83970 ASSAY OF PARATHORMONE: CPT | Mod: HCNC

## 2021-01-28 PROCEDURE — 85025 COMPLETE CBC W/AUTO DIFF WBC: CPT | Mod: HCNC

## 2021-02-02 ENCOUNTER — OFFICE VISIT (OUTPATIENT)
Dept: FAMILY MEDICINE | Facility: CLINIC | Age: 86
End: 2021-02-02
Attending: FAMILY MEDICINE
Payer: MEDICARE

## 2021-02-02 VITALS
WEIGHT: 132.69 LBS | DIASTOLIC BLOOD PRESSURE: 76 MMHG | HEART RATE: 74 BPM | BODY MASS INDEX: 22.65 KG/M2 | HEIGHT: 64 IN | SYSTOLIC BLOOD PRESSURE: 139 MMHG | TEMPERATURE: 99 F | OXYGEN SATURATION: 99 %

## 2021-02-02 DIAGNOSIS — I50.32 CHRONIC DIASTOLIC HEART FAILURE: ICD-10-CM

## 2021-02-02 DIAGNOSIS — M46.1 SACROILIITIS, NOT ELSEWHERE CLASSIFIED: ICD-10-CM

## 2021-02-02 DIAGNOSIS — I10 HTN (HYPERTENSION), BENIGN: Primary | ICD-10-CM

## 2021-02-02 DIAGNOSIS — N18.31 STAGE 3A CHRONIC KIDNEY DISEASE: ICD-10-CM

## 2021-02-02 DIAGNOSIS — D69.2 OTHER NONTHROMBOCYTOPENIC PURPURA: ICD-10-CM

## 2021-02-02 DIAGNOSIS — M17.0 PRIMARY OSTEOARTHRITIS OF BOTH KNEES: ICD-10-CM

## 2021-02-02 DIAGNOSIS — R21 FACIAL RASH: ICD-10-CM

## 2021-02-02 DIAGNOSIS — I70.0 ATHEROSCLEROSIS OF AORTA: ICD-10-CM

## 2021-02-02 DIAGNOSIS — N25.81 SECONDARY HYPERPARATHYROIDISM OF RENAL ORIGIN: ICD-10-CM

## 2021-02-02 LAB
BILIRUB UR QL STRIP: NEGATIVE
CLARITY UR REFRACT.AUTO: CLEAR
COLOR UR AUTO: YELLOW
GLUCOSE UR QL STRIP: NEGATIVE
HGB UR QL STRIP: NEGATIVE
KETONES UR QL STRIP: NEGATIVE
LEUKOCYTE ESTERASE UR QL STRIP: NEGATIVE
NITRITE UR QL STRIP: NEGATIVE
PH UR STRIP: 6 [PH] (ref 5–8)
PROT UR QL STRIP: NEGATIVE
SP GR UR STRIP: 1.01 (ref 1–1.03)
URN SPEC COLLECT METH UR: NORMAL
UROBILINOGEN UR STRIP-ACNC: NEGATIVE EU/DL

## 2021-02-02 PROCEDURE — 99499 RISK ADDL DX/OHS AUDIT: ICD-10-PCS | Mod: HCNC,S$GLB,, | Performed by: FAMILY MEDICINE

## 2021-02-02 PROCEDURE — 3288F PR FALLS RISK ASSESSMENT DOCUMENTED: ICD-10-PCS | Mod: HCNC,CPTII,S$GLB, | Performed by: FAMILY MEDICINE

## 2021-02-02 PROCEDURE — 1159F MED LIST DOCD IN RCRD: CPT | Mod: HCNC,S$GLB,, | Performed by: FAMILY MEDICINE

## 2021-02-02 PROCEDURE — 1159F PR MEDICATION LIST DOCUMENTED IN MEDICAL RECORD: ICD-10-PCS | Mod: HCNC,S$GLB,, | Performed by: FAMILY MEDICINE

## 2021-02-02 PROCEDURE — 99999 PR PBB SHADOW E&M-EST. PATIENT-LVL V: CPT | Mod: PBBFAC,HCNC,, | Performed by: FAMILY MEDICINE

## 2021-02-02 PROCEDURE — 99214 OFFICE O/P EST MOD 30 MIN: CPT | Mod: HCNC,S$GLB,, | Performed by: FAMILY MEDICINE

## 2021-02-02 PROCEDURE — 1101F PR PT FALLS ASSESS DOC 0-1 FALLS W/OUT INJ PAST YR: ICD-10-PCS | Mod: HCNC,CPTII,S$GLB, | Performed by: FAMILY MEDICINE

## 2021-02-02 PROCEDURE — 1101F PT FALLS ASSESS-DOCD LE1/YR: CPT | Mod: HCNC,CPTII,S$GLB, | Performed by: FAMILY MEDICINE

## 2021-02-02 PROCEDURE — 3288F FALL RISK ASSESSMENT DOCD: CPT | Mod: HCNC,CPTII,S$GLB, | Performed by: FAMILY MEDICINE

## 2021-02-02 PROCEDURE — 99214 PR OFFICE/OUTPT VISIT, EST, LEVL IV, 30-39 MIN: ICD-10-PCS | Mod: HCNC,S$GLB,, | Performed by: FAMILY MEDICINE

## 2021-02-02 PROCEDURE — 99999 PR PBB SHADOW E&M-EST. PATIENT-LVL V: ICD-10-PCS | Mod: PBBFAC,HCNC,, | Performed by: FAMILY MEDICINE

## 2021-02-02 PROCEDURE — 99499 UNLISTED E&M SERVICE: CPT | Mod: HCNC,S$GLB,, | Performed by: FAMILY MEDICINE

## 2021-02-02 RX ORDER — HYDROCORTISONE 25 MG/G
CREAM TOPICAL 2 TIMES DAILY
Qty: 28 G | Refills: 1 | Status: SHIPPED | OUTPATIENT
Start: 2021-02-02 | End: 2021-02-14 | Stop reason: ALTCHOICE

## 2021-02-08 ENCOUNTER — OFFICE VISIT (OUTPATIENT)
Dept: NEPHROLOGY | Facility: CLINIC | Age: 86
End: 2021-02-08
Payer: MEDICARE

## 2021-02-08 ENCOUNTER — LAB VISIT (OUTPATIENT)
Dept: LAB | Facility: HOSPITAL | Age: 86
End: 2021-02-08
Attending: INTERNAL MEDICINE
Payer: MEDICARE

## 2021-02-08 VITALS
WEIGHT: 130.75 LBS | BODY MASS INDEX: 22.32 KG/M2 | HEART RATE: 63 BPM | DIASTOLIC BLOOD PRESSURE: 62 MMHG | HEIGHT: 64 IN | SYSTOLIC BLOOD PRESSURE: 126 MMHG | OXYGEN SATURATION: 99 %

## 2021-02-08 DIAGNOSIS — R25.2 LEG CRAMPING: ICD-10-CM

## 2021-02-08 DIAGNOSIS — N25.81 SECONDARY HYPERPARATHYROIDISM OF RENAL ORIGIN: ICD-10-CM

## 2021-02-08 DIAGNOSIS — N18.32 ANEMIA OF CHRONIC RENAL FAILURE, STAGE 3B: ICD-10-CM

## 2021-02-08 DIAGNOSIS — D63.1 ANEMIA OF CHRONIC RENAL FAILURE, STAGE 3B: ICD-10-CM

## 2021-02-08 DIAGNOSIS — I10 HTN (HYPERTENSION), BENIGN: ICD-10-CM

## 2021-02-08 DIAGNOSIS — N18.31 STAGE 3A CHRONIC KIDNEY DISEASE: ICD-10-CM

## 2021-02-08 DIAGNOSIS — R25.2 LEG CRAMPING: Primary | ICD-10-CM

## 2021-02-08 LAB
25(OH)D3+25(OH)D2 SERPL-MCNC: 46 NG/ML (ref 30–96)
ALBUMIN SERPL BCP-MCNC: 4 G/DL (ref 3.5–5.2)
ANION GAP SERPL CALC-SCNC: 9 MMOL/L (ref 8–16)
BUN SERPL-MCNC: 20 MG/DL (ref 8–23)
CALCIUM SERPL-MCNC: 9.1 MG/DL (ref 8.7–10.5)
CHLORIDE SERPL-SCNC: 109 MMOL/L (ref 95–110)
CK SERPL-CCNC: 57 U/L (ref 20–180)
CO2 SERPL-SCNC: 25 MMOL/L (ref 23–29)
CREAT SERPL-MCNC: 1.1 MG/DL (ref 0.5–1.4)
EST. GFR  (AFRICAN AMERICAN): 52.5 ML/MIN/1.73 M^2
EST. GFR  (NON AFRICAN AMERICAN): 45.6 ML/MIN/1.73 M^2
FERRITIN SERPL-MCNC: 53 NG/ML (ref 20–300)
GLUCOSE SERPL-MCNC: 91 MG/DL (ref 70–110)
IRON SERPL-MCNC: 104 UG/DL (ref 30–160)
PHOSPHATE SERPL-MCNC: 3.5 MG/DL (ref 2.7–4.5)
POTASSIUM SERPL-SCNC: 4.3 MMOL/L (ref 3.5–5.1)
SATURATED IRON: 26 % (ref 20–50)
SODIUM SERPL-SCNC: 143 MMOL/L (ref 136–145)
TOTAL IRON BINDING CAPACITY: 407 UG/DL (ref 250–450)
TRANSFERRIN SERPL-MCNC: 275 MG/DL (ref 200–375)

## 2021-02-08 PROCEDURE — 82550 ASSAY OF CK (CPK): CPT

## 2021-02-08 PROCEDURE — 99204 PR OFFICE/OUTPT VISIT, NEW, LEVL IV, 45-59 MIN: ICD-10-PCS | Mod: S$GLB,,, | Performed by: INTERNAL MEDICINE

## 2021-02-08 PROCEDURE — 99999 PR PBB SHADOW E&M-EST. PATIENT-LVL V: ICD-10-PCS | Mod: PBBFAC,,, | Performed by: INTERNAL MEDICINE

## 2021-02-08 PROCEDURE — 99999 PR PBB SHADOW E&M-EST. PATIENT-LVL V: CPT | Mod: PBBFAC,,, | Performed by: INTERNAL MEDICINE

## 2021-02-08 PROCEDURE — 1159F MED LIST DOCD IN RCRD: CPT | Mod: S$GLB,,, | Performed by: INTERNAL MEDICINE

## 2021-02-08 PROCEDURE — 1126F AMNT PAIN NOTED NONE PRSNT: CPT | Mod: S$GLB,,, | Performed by: INTERNAL MEDICINE

## 2021-02-08 PROCEDURE — 99204 OFFICE O/P NEW MOD 45 MIN: CPT | Mod: S$GLB,,, | Performed by: INTERNAL MEDICINE

## 2021-02-08 PROCEDURE — 3288F FALL RISK ASSESSMENT DOCD: CPT | Mod: CPTII,S$GLB,, | Performed by: INTERNAL MEDICINE

## 2021-02-08 PROCEDURE — 1159F PR MEDICATION LIST DOCUMENTED IN MEDICAL RECORD: ICD-10-PCS | Mod: S$GLB,,, | Performed by: INTERNAL MEDICINE

## 2021-02-08 PROCEDURE — 80069 RENAL FUNCTION PANEL: CPT

## 2021-02-08 PROCEDURE — 82610 CYSTATIN C: CPT

## 2021-02-08 PROCEDURE — 1101F PT FALLS ASSESS-DOCD LE1/YR: CPT | Mod: CPTII,S$GLB,, | Performed by: INTERNAL MEDICINE

## 2021-02-08 PROCEDURE — 83540 ASSAY OF IRON: CPT

## 2021-02-08 PROCEDURE — 82306 VITAMIN D 25 HYDROXY: CPT

## 2021-02-08 PROCEDURE — 1126F PR PAIN SEVERITY QUANTIFIED, NO PAIN PRESENT: ICD-10-PCS | Mod: S$GLB,,, | Performed by: INTERNAL MEDICINE

## 2021-02-08 PROCEDURE — 82728 ASSAY OF FERRITIN: CPT

## 2021-02-08 PROCEDURE — 1101F PR PT FALLS ASSESS DOC 0-1 FALLS W/OUT INJ PAST YR: ICD-10-PCS | Mod: CPTII,S$GLB,, | Performed by: INTERNAL MEDICINE

## 2021-02-08 PROCEDURE — 3288F PR FALLS RISK ASSESSMENT DOCUMENTED: ICD-10-PCS | Mod: CPTII,S$GLB,, | Performed by: INTERNAL MEDICINE

## 2021-02-09 LAB
CYSTATIN C SERPL-MCNC: 1.26 MG/L (ref 0.7–1.45)
GFR/BSA.PRED SERPLBLD CYS-BASED-ARV: 48 ML/MIN/BSA

## 2021-02-15 ENCOUNTER — PES CALL (OUTPATIENT)
Dept: ADMINISTRATIVE | Facility: CLINIC | Age: 86
End: 2021-02-15

## 2021-03-09 ENCOUNTER — OFFICE VISIT (OUTPATIENT)
Dept: CARDIOLOGY | Facility: CLINIC | Age: 86
End: 2021-03-09
Payer: MEDICARE

## 2021-03-09 VITALS
BODY MASS INDEX: 22.49 KG/M2 | DIASTOLIC BLOOD PRESSURE: 76 MMHG | HEART RATE: 57 BPM | SYSTOLIC BLOOD PRESSURE: 171 MMHG | OXYGEN SATURATION: 95 % | HEIGHT: 64 IN | WEIGHT: 131.75 LBS

## 2021-03-09 DIAGNOSIS — R55 SYNCOPE AND COLLAPSE: ICD-10-CM

## 2021-03-09 DIAGNOSIS — I25.10 CORONARY ARTERY DISEASE INVOLVING NATIVE CORONARY ARTERY OF NATIVE HEART WITHOUT ANGINA PECTORIS: ICD-10-CM

## 2021-03-09 DIAGNOSIS — I70.0 AORTIC ATHEROSCLEROSIS: ICD-10-CM

## 2021-03-09 DIAGNOSIS — N18.31 STAGE 3A CHRONIC KIDNEY DISEASE: ICD-10-CM

## 2021-03-09 DIAGNOSIS — R06.02 SOB (SHORTNESS OF BREATH): ICD-10-CM

## 2021-03-09 DIAGNOSIS — I10 HTN (HYPERTENSION), BENIGN: Primary | ICD-10-CM

## 2021-03-09 DIAGNOSIS — E78.00 PURE HYPERCHOLESTEROLEMIA: ICD-10-CM

## 2021-03-09 PROCEDURE — 93000 ELECTROCARDIOGRAM COMPLETE: CPT | Mod: S$GLB,,, | Performed by: INTERNAL MEDICINE

## 2021-03-09 PROCEDURE — 99999 PR PBB SHADOW E&M-EST. PATIENT-LVL III: CPT | Mod: PBBFAC,,, | Performed by: INTERNAL MEDICINE

## 2021-03-09 PROCEDURE — 99999 PR PBB SHADOW E&M-EST. PATIENT-LVL III: ICD-10-PCS | Mod: PBBFAC,,, | Performed by: INTERNAL MEDICINE

## 2021-03-09 PROCEDURE — 99499 UNLISTED E&M SERVICE: CPT | Mod: S$GLB,,, | Performed by: INTERNAL MEDICINE

## 2021-03-09 PROCEDURE — 99499 RISK ADDL DX/OHS AUDIT: ICD-10-PCS | Mod: S$GLB,,, | Performed by: INTERNAL MEDICINE

## 2021-03-09 PROCEDURE — 1126F AMNT PAIN NOTED NONE PRSNT: CPT | Mod: S$GLB,,, | Performed by: INTERNAL MEDICINE

## 2021-03-09 PROCEDURE — 99214 PR OFFICE/OUTPT VISIT, EST, LEVL IV, 30-39 MIN: ICD-10-PCS | Mod: S$GLB,,, | Performed by: INTERNAL MEDICINE

## 2021-03-09 PROCEDURE — 93000 EKG 12-LEAD: ICD-10-PCS | Mod: S$GLB,,, | Performed by: INTERNAL MEDICINE

## 2021-03-09 PROCEDURE — 99214 OFFICE O/P EST MOD 30 MIN: CPT | Mod: S$GLB,,, | Performed by: INTERNAL MEDICINE

## 2021-03-09 PROCEDURE — 1126F PR PAIN SEVERITY QUANTIFIED, NO PAIN PRESENT: ICD-10-PCS | Mod: S$GLB,,, | Performed by: INTERNAL MEDICINE

## 2021-03-09 PROCEDURE — 1101F PT FALLS ASSESS-DOCD LE1/YR: CPT | Mod: CPTII,S$GLB,, | Performed by: INTERNAL MEDICINE

## 2021-03-09 PROCEDURE — 1159F PR MEDICATION LIST DOCUMENTED IN MEDICAL RECORD: ICD-10-PCS | Mod: S$GLB,,, | Performed by: INTERNAL MEDICINE

## 2021-03-09 PROCEDURE — 3288F PR FALLS RISK ASSESSMENT DOCUMENTED: ICD-10-PCS | Mod: CPTII,S$GLB,, | Performed by: INTERNAL MEDICINE

## 2021-03-09 PROCEDURE — 1159F MED LIST DOCD IN RCRD: CPT | Mod: S$GLB,,, | Performed by: INTERNAL MEDICINE

## 2021-03-09 PROCEDURE — 1101F PR PT FALLS ASSESS DOC 0-1 FALLS W/OUT INJ PAST YR: ICD-10-PCS | Mod: CPTII,S$GLB,, | Performed by: INTERNAL MEDICINE

## 2021-03-09 PROCEDURE — 3288F FALL RISK ASSESSMENT DOCD: CPT | Mod: CPTII,S$GLB,, | Performed by: INTERNAL MEDICINE

## 2021-03-09 RX ORDER — LOSARTAN POTASSIUM 25 MG/1
25 TABLET ORAL DAILY
Qty: 90 TABLET | Refills: 4 | Status: SHIPPED | OUTPATIENT
Start: 2021-03-09 | End: 2021-07-21

## 2021-03-18 ENCOUNTER — PATIENT MESSAGE (OUTPATIENT)
Dept: CARDIOLOGY | Facility: CLINIC | Age: 86
End: 2021-03-18

## 2021-03-24 ENCOUNTER — OFFICE VISIT (OUTPATIENT)
Dept: CARDIOLOGY | Facility: CLINIC | Age: 86
End: 2021-03-24
Payer: MEDICARE

## 2021-03-24 VITALS
SYSTOLIC BLOOD PRESSURE: 160 MMHG | BODY MASS INDEX: 22.82 KG/M2 | OXYGEN SATURATION: 99 % | HEART RATE: 60 BPM | WEIGHT: 133.69 LBS | DIASTOLIC BLOOD PRESSURE: 80 MMHG | HEIGHT: 64 IN

## 2021-03-24 DIAGNOSIS — R55 VASOVAGAL NEAR SYNCOPE: ICD-10-CM

## 2021-03-24 DIAGNOSIS — E78.00 PURE HYPERCHOLESTEROLEMIA: ICD-10-CM

## 2021-03-24 DIAGNOSIS — I10 HTN (HYPERTENSION), BENIGN: ICD-10-CM

## 2021-03-24 DIAGNOSIS — I25.10 CORONARY ARTERY DISEASE INVOLVING NATIVE CORONARY ARTERY OF NATIVE HEART WITHOUT ANGINA PECTORIS: ICD-10-CM

## 2021-03-24 DIAGNOSIS — I70.0 AORTIC ATHEROSCLEROSIS: ICD-10-CM

## 2021-03-24 DIAGNOSIS — N18.31 STAGE 3A CHRONIC KIDNEY DISEASE: ICD-10-CM

## 2021-03-24 PROCEDURE — 1159F MED LIST DOCD IN RCRD: CPT | Mod: S$GLB,,, | Performed by: INTERNAL MEDICINE

## 2021-03-24 PROCEDURE — 1126F AMNT PAIN NOTED NONE PRSNT: CPT | Mod: S$GLB,,, | Performed by: INTERNAL MEDICINE

## 2021-03-24 PROCEDURE — 1126F PR PAIN SEVERITY QUANTIFIED, NO PAIN PRESENT: ICD-10-PCS | Mod: S$GLB,,, | Performed by: INTERNAL MEDICINE

## 2021-03-24 PROCEDURE — 99214 OFFICE O/P EST MOD 30 MIN: CPT | Mod: S$GLB,,, | Performed by: INTERNAL MEDICINE

## 2021-03-24 PROCEDURE — 99214 PR OFFICE/OUTPT VISIT, EST, LEVL IV, 30-39 MIN: ICD-10-PCS | Mod: S$GLB,,, | Performed by: INTERNAL MEDICINE

## 2021-03-24 PROCEDURE — 1101F PT FALLS ASSESS-DOCD LE1/YR: CPT | Mod: CPTII,S$GLB,, | Performed by: INTERNAL MEDICINE

## 2021-03-24 PROCEDURE — 99999 PR PBB SHADOW E&M-EST. PATIENT-LVL III: CPT | Mod: PBBFAC,,, | Performed by: INTERNAL MEDICINE

## 2021-03-24 PROCEDURE — 99999 PR PBB SHADOW E&M-EST. PATIENT-LVL III: ICD-10-PCS | Mod: PBBFAC,,, | Performed by: INTERNAL MEDICINE

## 2021-03-24 PROCEDURE — 99499 RISK ADDL DX/OHS AUDIT: ICD-10-PCS | Mod: S$GLB,,, | Performed by: INTERNAL MEDICINE

## 2021-03-24 PROCEDURE — 1159F PR MEDICATION LIST DOCUMENTED IN MEDICAL RECORD: ICD-10-PCS | Mod: S$GLB,,, | Performed by: INTERNAL MEDICINE

## 2021-03-24 PROCEDURE — 1101F PR PT FALLS ASSESS DOC 0-1 FALLS W/OUT INJ PAST YR: ICD-10-PCS | Mod: CPTII,S$GLB,, | Performed by: INTERNAL MEDICINE

## 2021-03-24 PROCEDURE — 99499 UNLISTED E&M SERVICE: CPT | Mod: S$GLB,,, | Performed by: INTERNAL MEDICINE

## 2021-03-24 PROCEDURE — 3288F FALL RISK ASSESSMENT DOCD: CPT | Mod: CPTII,S$GLB,, | Performed by: INTERNAL MEDICINE

## 2021-03-24 PROCEDURE — 3288F PR FALLS RISK ASSESSMENT DOCUMENTED: ICD-10-PCS | Mod: CPTII,S$GLB,, | Performed by: INTERNAL MEDICINE

## 2021-05-04 ENCOUNTER — PATIENT MESSAGE (OUTPATIENT)
Dept: RESEARCH | Facility: HOSPITAL | Age: 86
End: 2021-05-04

## 2021-05-04 ENCOUNTER — PES CALL (OUTPATIENT)
Dept: ADMINISTRATIVE | Facility: CLINIC | Age: 86
End: 2021-05-04

## 2021-05-04 NOTE — TELEPHONE ENCOUNTER
"Spoke with Mrs. Luna, she states " I don't think I need to go to ED. "     Patient states, pain has subside some , as well as the swelling. On pain scale - pain is about a 2. Ms. Luna says she has no redness and no longer limping. Able to move around without the cane.     Patient was advised if pain / swelling comes back, recommend Dr. Marin to proceed to ED. Patient verbally understood and agreed.     " show

## 2021-05-10 ENCOUNTER — PATIENT MESSAGE (OUTPATIENT)
Dept: RESEARCH | Facility: HOSPITAL | Age: 86
End: 2021-05-10

## 2021-06-16 DIAGNOSIS — E78.5 HYPERLIPIDEMIA, UNSPECIFIED HYPERLIPIDEMIA TYPE: ICD-10-CM

## 2021-06-16 DIAGNOSIS — E78.2 MIXED HYPERLIPIDEMIA: ICD-10-CM

## 2021-06-16 DIAGNOSIS — I70.0 AORTIC ATHEROSCLEROSIS: ICD-10-CM

## 2021-06-16 RX ORDER — PRAVASTATIN SODIUM 40 MG/1
40 TABLET ORAL NIGHTLY
Qty: 90 TABLET | Refills: 1 | Status: SHIPPED | OUTPATIENT
Start: 2021-06-16 | End: 2021-12-17

## 2021-06-20 ENCOUNTER — PATIENT MESSAGE (OUTPATIENT)
Dept: FAMILY MEDICINE | Facility: CLINIC | Age: 86
End: 2021-06-20

## 2021-06-20 DIAGNOSIS — A49.9 BACTERIAL UTI: Primary | ICD-10-CM

## 2021-06-20 DIAGNOSIS — N39.0 BACTERIAL UTI: Primary | ICD-10-CM

## 2021-06-21 ENCOUNTER — PATIENT MESSAGE (OUTPATIENT)
Dept: FAMILY MEDICINE | Facility: CLINIC | Age: 86
End: 2021-06-21

## 2021-06-21 RX ORDER — CIPROFLOXACIN 500 MG/1
500 TABLET ORAL 2 TIMES DAILY
Qty: 20 TABLET | Refills: 0 | Status: SHIPPED | OUTPATIENT
Start: 2021-06-21 | End: 2021-10-21

## 2021-06-23 ENCOUNTER — OFFICE VISIT (OUTPATIENT)
Dept: OTOLARYNGOLOGY | Facility: CLINIC | Age: 86
End: 2021-06-23
Payer: MEDICARE

## 2021-06-23 VITALS
SYSTOLIC BLOOD PRESSURE: 139 MMHG | WEIGHT: 136 LBS | BODY MASS INDEX: 23.22 KG/M2 | DIASTOLIC BLOOD PRESSURE: 64 MMHG | HEIGHT: 64 IN | HEART RATE: 60 BPM

## 2021-06-23 DIAGNOSIS — H90.3 SENSORINEURAL HEARING LOSS (SNHL) OF BOTH EARS: Chronic | ICD-10-CM

## 2021-06-23 DIAGNOSIS — H61.23 BILATERAL IMPACTED CERUMEN: Primary | ICD-10-CM

## 2021-06-23 DIAGNOSIS — H93.13 TINNITUS OF BOTH EARS: Chronic | ICD-10-CM

## 2021-06-23 PROCEDURE — 1126F AMNT PAIN NOTED NONE PRSNT: CPT | Mod: S$GLB,,, | Performed by: OTOLARYNGOLOGY

## 2021-06-23 PROCEDURE — 3288F PR FALLS RISK ASSESSMENT DOCUMENTED: ICD-10-PCS | Mod: CPTII,S$GLB,, | Performed by: OTOLARYNGOLOGY

## 2021-06-23 PROCEDURE — 1126F PR PAIN SEVERITY QUANTIFIED, NO PAIN PRESENT: ICD-10-PCS | Mod: S$GLB,,, | Performed by: OTOLARYNGOLOGY

## 2021-06-23 PROCEDURE — 3288F FALL RISK ASSESSMENT DOCD: CPT | Mod: CPTII,S$GLB,, | Performed by: OTOLARYNGOLOGY

## 2021-06-23 PROCEDURE — 99999 PR PBB SHADOW E&M-EST. PATIENT-LVL III: CPT | Mod: PBBFAC,,, | Performed by: OTOLARYNGOLOGY

## 2021-06-23 PROCEDURE — 1101F PT FALLS ASSESS-DOCD LE1/YR: CPT | Mod: CPTII,S$GLB,, | Performed by: OTOLARYNGOLOGY

## 2021-06-23 PROCEDURE — 69210 PR REMOVAL IMPACTED CERUMEN REQUIRING INSTRUMENTATION, UNILATERAL: ICD-10-PCS | Mod: S$GLB,,, | Performed by: OTOLARYNGOLOGY

## 2021-06-23 PROCEDURE — 99214 PR OFFICE/OUTPT VISIT, EST, LEVL IV, 30-39 MIN: ICD-10-PCS | Mod: 25,S$GLB,, | Performed by: OTOLARYNGOLOGY

## 2021-06-23 PROCEDURE — 69210 REMOVE IMPACTED EAR WAX UNI: CPT | Mod: S$GLB,,, | Performed by: OTOLARYNGOLOGY

## 2021-06-23 PROCEDURE — 99214 OFFICE O/P EST MOD 30 MIN: CPT | Mod: 25,S$GLB,, | Performed by: OTOLARYNGOLOGY

## 2021-06-23 PROCEDURE — 1159F MED LIST DOCD IN RCRD: CPT | Mod: S$GLB,,, | Performed by: OTOLARYNGOLOGY

## 2021-06-23 PROCEDURE — 1159F PR MEDICATION LIST DOCUMENTED IN MEDICAL RECORD: ICD-10-PCS | Mod: S$GLB,,, | Performed by: OTOLARYNGOLOGY

## 2021-06-23 PROCEDURE — 99999 PR PBB SHADOW E&M-EST. PATIENT-LVL III: ICD-10-PCS | Mod: PBBFAC,,, | Performed by: OTOLARYNGOLOGY

## 2021-06-23 PROCEDURE — 1101F PR PT FALLS ASSESS DOC 0-1 FALLS W/OUT INJ PAST YR: ICD-10-PCS | Mod: CPTII,S$GLB,, | Performed by: OTOLARYNGOLOGY

## 2021-07-12 ENCOUNTER — TELEPHONE (OUTPATIENT)
Dept: FAMILY MEDICINE | Facility: CLINIC | Age: 86
End: 2021-07-12

## 2021-07-12 DIAGNOSIS — Z12.31 ENCOUNTER FOR SCREENING MAMMOGRAM FOR BREAST CANCER: Primary | ICD-10-CM

## 2021-07-19 ENCOUNTER — PATIENT OUTREACH (OUTPATIENT)
Dept: ADMINISTRATIVE | Facility: OTHER | Age: 86
End: 2021-07-19

## 2021-07-21 ENCOUNTER — OFFICE VISIT (OUTPATIENT)
Dept: CARDIOLOGY | Facility: CLINIC | Age: 86
End: 2021-07-21
Payer: MEDICARE

## 2021-07-21 VITALS
WEIGHT: 132 LBS | HEIGHT: 64 IN | BODY MASS INDEX: 22.53 KG/M2 | DIASTOLIC BLOOD PRESSURE: 74 MMHG | OXYGEN SATURATION: 98 % | SYSTOLIC BLOOD PRESSURE: 145 MMHG | HEART RATE: 68 BPM

## 2021-07-21 DIAGNOSIS — I70.0 AORTIC ATHEROSCLEROSIS: ICD-10-CM

## 2021-07-21 DIAGNOSIS — R55 PRE-SYNCOPE: Chronic | ICD-10-CM

## 2021-07-21 DIAGNOSIS — G45.9 TIA (TRANSIENT ISCHEMIC ATTACK): ICD-10-CM

## 2021-07-21 DIAGNOSIS — I50.32 CHRONIC DIASTOLIC HEART FAILURE: ICD-10-CM

## 2021-07-21 DIAGNOSIS — R00.1 BRADYCARDIA: Primary | ICD-10-CM

## 2021-07-21 DIAGNOSIS — E78.00 PURE HYPERCHOLESTEROLEMIA: ICD-10-CM

## 2021-07-21 DIAGNOSIS — I10 ESSENTIAL HYPERTENSION: ICD-10-CM

## 2021-07-21 DIAGNOSIS — I10 HTN (HYPERTENSION), BENIGN: ICD-10-CM

## 2021-07-21 PROCEDURE — 1159F PR MEDICATION LIST DOCUMENTED IN MEDICAL RECORD: ICD-10-PCS | Mod: CPTII,S$GLB,, | Performed by: INTERNAL MEDICINE

## 2021-07-21 PROCEDURE — 1126F PR PAIN SEVERITY QUANTIFIED, NO PAIN PRESENT: ICD-10-PCS | Mod: CPTII,S$GLB,, | Performed by: INTERNAL MEDICINE

## 2021-07-21 PROCEDURE — 3288F PR FALLS RISK ASSESSMENT DOCUMENTED: ICD-10-PCS | Mod: CPTII,S$GLB,, | Performed by: INTERNAL MEDICINE

## 2021-07-21 PROCEDURE — 1126F AMNT PAIN NOTED NONE PRSNT: CPT | Mod: CPTII,S$GLB,, | Performed by: INTERNAL MEDICINE

## 2021-07-21 PROCEDURE — 1101F PR PT FALLS ASSESS DOC 0-1 FALLS W/OUT INJ PAST YR: ICD-10-PCS | Mod: CPTII,S$GLB,, | Performed by: INTERNAL MEDICINE

## 2021-07-21 PROCEDURE — 1101F PT FALLS ASSESS-DOCD LE1/YR: CPT | Mod: CPTII,S$GLB,, | Performed by: INTERNAL MEDICINE

## 2021-07-21 PROCEDURE — 99499 UNLISTED E&M SERVICE: CPT | Mod: HCNC,S$GLB,, | Performed by: INTERNAL MEDICINE

## 2021-07-21 PROCEDURE — 99214 PR OFFICE/OUTPT VISIT, EST, LEVL IV, 30-39 MIN: ICD-10-PCS | Mod: S$GLB,,, | Performed by: INTERNAL MEDICINE

## 2021-07-21 PROCEDURE — 99499 RISK ADDL DX/OHS AUDIT: ICD-10-PCS | Mod: HCNC,S$GLB,, | Performed by: INTERNAL MEDICINE

## 2021-07-21 PROCEDURE — 3288F FALL RISK ASSESSMENT DOCD: CPT | Mod: CPTII,S$GLB,, | Performed by: INTERNAL MEDICINE

## 2021-07-21 PROCEDURE — 99999 PR PBB SHADOW E&M-EST. PATIENT-LVL IV: ICD-10-PCS | Mod: PBBFAC,,, | Performed by: INTERNAL MEDICINE

## 2021-07-21 PROCEDURE — 99214 OFFICE O/P EST MOD 30 MIN: CPT | Mod: S$GLB,,, | Performed by: INTERNAL MEDICINE

## 2021-07-21 PROCEDURE — 1159F MED LIST DOCD IN RCRD: CPT | Mod: CPTII,S$GLB,, | Performed by: INTERNAL MEDICINE

## 2021-07-21 PROCEDURE — 99999 PR PBB SHADOW E&M-EST. PATIENT-LVL IV: CPT | Mod: PBBFAC,,, | Performed by: INTERNAL MEDICINE

## 2021-07-21 RX ORDER — AMLODIPINE BESYLATE 2.5 MG/1
2.5 TABLET ORAL DAILY
Qty: 30 TABLET | Refills: 11 | Status: SHIPPED | OUTPATIENT
Start: 2021-07-21 | End: 2022-08-11

## 2021-09-10 NOTE — TELEPHONE ENCOUNTER
Can we order digital hypertension program?   18-Jul-2021 02:55:38 Chest pain x 1 week, (+) COVID 19 last week, had MAP on tuesday, still have SOB.

## 2021-10-04 ENCOUNTER — PATIENT MESSAGE (OUTPATIENT)
Dept: FAMILY MEDICINE | Facility: CLINIC | Age: 86
End: 2021-10-04

## 2021-10-07 ENCOUNTER — IMMUNIZATION (OUTPATIENT)
Dept: INTERNAL MEDICINE | Facility: CLINIC | Age: 86
End: 2021-10-07
Payer: MEDICARE

## 2021-10-07 DIAGNOSIS — Z23 NEED FOR VACCINATION: Primary | ICD-10-CM

## 2021-10-07 PROCEDURE — 0003A COVID-19, MRNA, LNP-S, PF, 30 MCG/0.3 ML DOSE VACCINE: CPT | Mod: HCNC,PBBFAC | Performed by: FAMILY MEDICINE

## 2021-10-07 PROCEDURE — 91300 COVID-19, MRNA, LNP-S, PF, 30 MCG/0.3 ML DOSE VACCINE: CPT | Mod: HCNC,PBBFAC | Performed by: FAMILY MEDICINE

## 2021-10-21 ENCOUNTER — OFFICE VISIT (OUTPATIENT)
Dept: CARDIOLOGY | Facility: CLINIC | Age: 86
End: 2021-10-21
Payer: MEDICARE

## 2021-10-21 VITALS
BODY MASS INDEX: 22.35 KG/M2 | HEART RATE: 98 BPM | WEIGHT: 130.94 LBS | HEIGHT: 64 IN | SYSTOLIC BLOOD PRESSURE: 144 MMHG | DIASTOLIC BLOOD PRESSURE: 70 MMHG | OXYGEN SATURATION: 98 %

## 2021-10-21 DIAGNOSIS — I50.32 CHRONIC DIASTOLIC HEART FAILURE: ICD-10-CM

## 2021-10-21 DIAGNOSIS — I10 HTN (HYPERTENSION), BENIGN: ICD-10-CM

## 2021-10-21 DIAGNOSIS — E78.00 PURE HYPERCHOLESTEROLEMIA: ICD-10-CM

## 2021-10-21 DIAGNOSIS — I70.0 AORTIC ATHEROSCLEROSIS: Primary | ICD-10-CM

## 2021-10-21 DIAGNOSIS — G45.9 TIA (TRANSIENT ISCHEMIC ATTACK): ICD-10-CM

## 2021-10-21 PROCEDURE — 99214 PR OFFICE/OUTPT VISIT, EST, LEVL IV, 30-39 MIN: ICD-10-PCS | Mod: HCNC,S$GLB,, | Performed by: INTERNAL MEDICINE

## 2021-10-21 PROCEDURE — 99499 RISK ADDL DX/OHS AUDIT: ICD-10-PCS | Mod: HCNC,S$GLB,, | Performed by: INTERNAL MEDICINE

## 2021-10-21 PROCEDURE — 99214 OFFICE O/P EST MOD 30 MIN: CPT | Mod: HCNC,S$GLB,, | Performed by: INTERNAL MEDICINE

## 2021-10-21 PROCEDURE — 99999 PR PBB SHADOW E&M-EST. PATIENT-LVL IV: CPT | Mod: PBBFAC,HCNC,, | Performed by: INTERNAL MEDICINE

## 2021-10-21 PROCEDURE — 1159F MED LIST DOCD IN RCRD: CPT | Mod: HCNC,CPTII,S$GLB, | Performed by: INTERNAL MEDICINE

## 2021-10-21 PROCEDURE — 1159F PR MEDICATION LIST DOCUMENTED IN MEDICAL RECORD: ICD-10-PCS | Mod: HCNC,CPTII,S$GLB, | Performed by: INTERNAL MEDICINE

## 2021-10-21 PROCEDURE — 99999 PR PBB SHADOW E&M-EST. PATIENT-LVL IV: ICD-10-PCS | Mod: PBBFAC,HCNC,, | Performed by: INTERNAL MEDICINE

## 2021-10-21 PROCEDURE — 99499 UNLISTED E&M SERVICE: CPT | Mod: HCNC,S$GLB,, | Performed by: INTERNAL MEDICINE

## 2021-11-09 ENCOUNTER — TELEPHONE (OUTPATIENT)
Dept: FAMILY MEDICINE | Facility: CLINIC | Age: 86
End: 2021-11-09
Payer: MEDICARE

## 2021-11-09 DIAGNOSIS — I25.10 CORONARY ARTERY DISEASE INVOLVING NATIVE CORONARY ARTERY OF NATIVE HEART WITHOUT ANGINA PECTORIS: ICD-10-CM

## 2021-11-09 DIAGNOSIS — I10 HTN (HYPERTENSION), BENIGN: ICD-10-CM

## 2021-11-09 DIAGNOSIS — Z00.00 ROUTINE GENERAL MEDICAL EXAMINATION AT HEALTH CARE FACILITY: Primary | ICD-10-CM

## 2021-11-09 DIAGNOSIS — E78.00 PURE HYPERCHOLESTEROLEMIA: ICD-10-CM

## 2021-11-10 ENCOUNTER — PATIENT MESSAGE (OUTPATIENT)
Dept: FAMILY MEDICINE | Facility: CLINIC | Age: 86
End: 2021-11-10
Payer: MEDICARE

## 2021-11-11 ENCOUNTER — PATIENT MESSAGE (OUTPATIENT)
Dept: FAMILY MEDICINE | Facility: CLINIC | Age: 86
End: 2021-11-11
Payer: MEDICARE

## 2021-11-16 ENCOUNTER — LAB VISIT (OUTPATIENT)
Dept: LAB | Facility: HOSPITAL | Age: 86
End: 2021-11-16
Attending: FAMILY MEDICINE
Payer: MEDICARE

## 2021-11-16 DIAGNOSIS — Z00.00 ROUTINE GENERAL MEDICAL EXAMINATION AT HEALTH CARE FACILITY: ICD-10-CM

## 2021-11-16 DIAGNOSIS — I25.10 CORONARY ARTERY DISEASE INVOLVING NATIVE CORONARY ARTERY OF NATIVE HEART WITHOUT ANGINA PECTORIS: ICD-10-CM

## 2021-11-16 DIAGNOSIS — E78.00 PURE HYPERCHOLESTEROLEMIA: ICD-10-CM

## 2021-11-16 DIAGNOSIS — I10 HTN (HYPERTENSION), BENIGN: ICD-10-CM

## 2021-11-16 LAB
ALBUMIN SERPL BCP-MCNC: 3.8 G/DL (ref 3.5–5.2)
ALP SERPL-CCNC: 58 U/L (ref 55–135)
ALT SERPL W/O P-5'-P-CCNC: 17 U/L (ref 10–44)
ANION GAP SERPL CALC-SCNC: 9 MMOL/L (ref 8–16)
AST SERPL-CCNC: 27 U/L (ref 10–40)
BASOPHILS # BLD AUTO: 0.04 K/UL (ref 0–0.2)
BASOPHILS NFR BLD: 0.6 % (ref 0–1.9)
BILIRUB SERPL-MCNC: 0.8 MG/DL (ref 0.1–1)
BUN SERPL-MCNC: 20 MG/DL (ref 8–23)
CALCIUM SERPL-MCNC: 9.3 MG/DL (ref 8.7–10.5)
CHLORIDE SERPL-SCNC: 112 MMOL/L (ref 95–110)
CHOLEST SERPL-MCNC: 140 MG/DL (ref 120–199)
CHOLEST/HDLC SERPL: 3 {RATIO} (ref 2–5)
CO2 SERPL-SCNC: 23 MMOL/L (ref 23–29)
CREAT SERPL-MCNC: 1.1 MG/DL (ref 0.5–1.4)
DIFFERENTIAL METHOD: NORMAL
EOSINOPHIL # BLD AUTO: 0.2 K/UL (ref 0–0.5)
EOSINOPHIL NFR BLD: 2.5 % (ref 0–8)
ERYTHROCYTE [DISTWIDTH] IN BLOOD BY AUTOMATED COUNT: 13.1 % (ref 11.5–14.5)
EST. GFR  (AFRICAN AMERICAN): 53 ML/MIN/1.73 M^2
EST. GFR  (NON AFRICAN AMERICAN): 46 ML/MIN/1.73 M^2
GLUCOSE SERPL-MCNC: 97 MG/DL (ref 70–110)
HCT VFR BLD AUTO: 38.8 % (ref 37–48.5)
HDLC SERPL-MCNC: 47 MG/DL (ref 40–75)
HDLC SERPL: 33.6 % (ref 20–50)
HGB BLD-MCNC: 13 G/DL (ref 12–16)
IMM GRANULOCYTES # BLD AUTO: 0 K/UL (ref 0–0.04)
IMM GRANULOCYTES NFR BLD AUTO: 0 % (ref 0–0.5)
LDLC SERPL CALC-MCNC: 75.2 MG/DL (ref 63–159)
LYMPHOCYTES # BLD AUTO: 2.8 K/UL (ref 1–4.8)
LYMPHOCYTES NFR BLD: 44.3 % (ref 18–48)
MCH RBC QN AUTO: 31 PG (ref 27–31)
MCHC RBC AUTO-ENTMCNC: 33.5 G/DL (ref 32–36)
MCV RBC AUTO: 92 FL (ref 82–98)
MONOCYTES # BLD AUTO: 0.6 K/UL (ref 0.3–1)
MONOCYTES NFR BLD: 9.9 % (ref 4–15)
NEUTROPHILS # BLD AUTO: 2.7 K/UL (ref 1.8–7.7)
NEUTROPHILS NFR BLD: 42.7 % (ref 38–73)
NONHDLC SERPL-MCNC: 93 MG/DL
NRBC BLD-RTO: 0 /100 WBC
PLATELET # BLD AUTO: 172 K/UL (ref 150–450)
PMV BLD AUTO: 11 FL (ref 9.2–12.9)
POTASSIUM SERPL-SCNC: 4.1 MMOL/L (ref 3.5–5.1)
PROT SERPL-MCNC: 7 G/DL (ref 6–8.4)
RBC # BLD AUTO: 4.2 M/UL (ref 4–5.4)
SODIUM SERPL-SCNC: 144 MMOL/L (ref 136–145)
TRIGL SERPL-MCNC: 89 MG/DL (ref 30–150)
WBC # BLD AUTO: 6.35 K/UL (ref 3.9–12.7)

## 2021-11-16 PROCEDURE — 80061 LIPID PANEL: CPT | Mod: HCNC | Performed by: FAMILY MEDICINE

## 2021-11-16 PROCEDURE — 85025 COMPLETE CBC W/AUTO DIFF WBC: CPT | Mod: HCNC | Performed by: FAMILY MEDICINE

## 2021-11-16 PROCEDURE — 80053 COMPREHEN METABOLIC PANEL: CPT | Mod: HCNC | Performed by: FAMILY MEDICINE

## 2021-11-16 PROCEDURE — 36415 COLL VENOUS BLD VENIPUNCTURE: CPT | Mod: HCNC | Performed by: FAMILY MEDICINE

## 2021-11-16 NOTE — PROGRESS NOTES
Chronic patient Established Note (Follow up visit)      SUBJECTIVE:    Cheryl Ghosh presents to the clinic for a follow-up appointment for low back and right hip pain. She is S/P left L3-4-5 FACET MEDIAL BRANCH NERVE RADIOFREQUENCY NEUROTOMY on 18 and L3, L4, L5 FACET MEDIAL BRANCH NERVE RADIOFREQUENCY NEUROTOMY on 18 with 0% relief.   Patient presents today with continued axial low back pain, no radicular symptoms noted today.  She has benefit from a lumbar RFA in the past, apparently she has gotten no relief with recent injection. Discussed I will prescribe a Medrol Dosepak 4 mg help her with this acute pain. I will also order an updated MRI , so that i can  better understand her pain generators.     Since the last visit, Cheryl Ghosh states the pain has been persistant. Current pain intensity is 8/10.    Pain Disability Index Review:  Last 3 PDI Scores 2018   Pain Disability Index (PDI) 31 20 13          Pain Medications:     - Opioids: None  - Adjuvant Medications: Mobic (Meloxicam) and Neurontin (Gabapentin)  - Anti-Coagulants: Aspirin  - Others: See med list     Opioid Contract: no      report:  Reviewed and consistent with medication use as prescribed.     Pain Procedures: 18 left L3-4-5 FACET MEDIAL BRANCH NERVE RADIOFREQUENCY NEUROTOMY  18 L3, L4, L5 FACET MEDIAL BRANCH NERVE RADIOFREQUENCY NEUROTOMY   18  right Sacroiliac Joint injection under ultrasound guidance   RIGHT KNEE INTRAARTICULAR STEROID INJECTION 1/05/15  bilateral T7,8,9 INTERCOSTAL NERVE BLOCK,   Left L3,4,5 Facet MB RFA (13)   Right L3,4,5 Facet MB RFA (10/6/13)     Physical Therapy/Home Exercise: no     Imagin/13/15 Lumbar Spine MRI        Narrative      MRI OF THE LUMBAR SPINE WITHOUT CONTRAST.     Technique: Sagittal T1, sagittal T2, sagittal STIR, axial T1 and axial T2 weighted images of the lumbar spine obtained without contrast.     Comparison: None.      Findings:    Lumbar spine alignment is within normal limits. The vertebral body heights are well maintained, with no fracture. No marrow signal abnormality suspicious for an infiltrative process.     The conus is normal in appearance, and terminates at the L1 level. The adjacent soft tissue structures show no significant abnormalities. 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    There are findings of multi-level lumbar spondylosis, as below.    L1/2 through L4/5 demonstrates disc desiccation and minimal posterior disc bulge producing mild neuroforaminal narrowing without significant canal stenosis. More pronounced disc space narrowing is seen at L4/5 and L5/S1. At L5/S1 there is moderate   bilateral neuroforaminal narrowing without significant canal stenosis.. Moderate facet arthropathy is also noted within the lower lumbar levels    L5-S1: There is no focal disc herniation. No significant central canal or neural foraminal narrowing.       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    Moderate bilateral neuroforaminal narrowing at L5/S1.        Electronically signed by: LUIS FERNANDO RUBIN MD  Date: 08/18/15  Time: 14:55                  Lumbar MRI 9/25/2012        MRI lumbar spine without contrast:    No comparative studies. No fracture or subluxation. Marrow space, spinal cord normal. Neural sheath ectasia at T11 and T12 neural foramen, right S2 level, later 1.7-CM. Left kidney smaller than right, 3.6 x 4.2 CM, compared with 4.3 x 5.4 cm.    Degenerative disk disease with Modic chronic endplate change particular L4 L5.     L1-L2 mild circumflex, bulge disk, no significant spinal or foraminal stenosis. L2-L3 mild circumferential bulge disk, mild spinal stenosis, facet joint arthropathy detected on right.    L3-L4 limited circumferential bulge, facet joint arthropathy, mild spinal stenosis.    L4-L5 mild circumferential bulge, spondylosis, mild spinal and foraminal stenosis, facet  joint arthropathy.    L5-S1 facet joint arthropathy, linear fissure right foraminal annulus.        Result Impression      1. Degenerative disk spondylosis at the L4-5. No significant spinal or foraminal stenosis, disk prolapse.         X-Ray cervical, thoracic, and lumbar spine 2/10/14        Result Narrative      COMPARISON: None    FINDINGS:     C-spine: Generalized osteopenia. Mild dextrocurvature of the cervical spine which may be positional. Cervical lordosis appears maintained. There is a 3-mm retrolisthesis of C4 on 5 and 2-mm anterolisthesis of C5 on 6 without radiolucency identified   and likely secondary to degenerative change. No displaced fracture or dislocation seen. Vertebral body heights appear maintained. Mild degenerative change at the atlanto-odontoid interval. Otherwise the dens and lateral masses appear intact. There   is mild to moderate degenerative disk disease with uncovertebral and facet arthrosis most prominent at C4-5. No prevertebral soft tissue swelling. Cervical soft tissues appear within normal limits. No subcutaneous emphysema or radiodense foreign body.   Visualized lung apices are clear.    T-spine: Generalized osteopenia. Mild levocurvature of the thoracic spine which may be positional. No displaced fracture, dislocation or significant listhesis seen. Vertebral body heights appear maintained. Vertebral body and disk space heights   appear maintained. Age-appropriate mild multilevel degenerative change. No prevertebral soft tissue swelling.    L-spine: Generalized osteopenia. There are 5 non-rib bearing lumbar type vertebral bodies. Mild levocurvature of the lumbar spine which may be positional. No displaced fracture, dislocation or significant listhesis seen. Vertebral body heights appear  maintained. Mild-to-moderate multilevel degenerative disk disease most prominent at L4-5 and age-appropriate mild to moderate multilevel degenerative change. No prevertebral soft tissue  swelling. Vascular calcifications noted. Nonspecific bowel gas   pattern.      Please note that subtle ligamentous injuries may not be detected by radiography and CT or MRI of the spine can be obtained if indicated.        Result Impression          No acute process seen.      Electronically signed by: MARLON ARREOLA MD, MD  Date: 02/10/14  Time: 18:33                XRAY KNEE 7/8/13        Result Narrative      Comparison is made with May 28, 2012. Bilateral erect sunrise and right lateral views were obtained. There is slight medial joint space narrowing is noted. Minor osteophytic spurs are emanating from the intercondylar prominence of the tibia and the   posterior superior aspect of the right patella. This is unchanged since 2012        Result Impression          Minor degenerative changes      Electronically signed by: Jorge Lagunas MD  Date: 07/08/13  Time: 13:58          Allergies:   Review of patient's allergies indicates:   Allergen Reactions    Pcn [penicillins] Hives and Nausea And Vomiting       Current Medications:   Current Outpatient Medications   Medication Sig Dispense Refill    gabapentin (NEURONTIN) 300 MG capsule Take 1 capsule (300 mg total) by mouth every evening. 90 capsule 3    meloxicam (MOBIC) 15 MG tablet Take 1 tablet (15 mg total) by mouth daily as needed for Pain. 90 tablet 0    aspirin 81 MG Chew Take 1 tablet (81 mg total) by mouth once daily. 90 tablet 3    b complex vitamins tablet Take 1 tablet by mouth once daily.      estradiol (ESTRACE) 0.01 % (0.1 mg/gram) vaginal cream Place 1 g vaginally once daily. Use 1 gram of estrogen cream in vagina nightly x 2 weeks, then twice a week thereafter. . 42.5 g 1    lisinopril (PRINIVIL,ZESTRIL) 20 MG tablet Take 1 tablet (20 mg total) by mouth 2 (two) times daily. 60 tablet 2    methylPREDNISolone (MEDROL DOSEPACK) 4 mg tablet use as directed 1 Package 0    omega-3 acid ethyl esters (LOVAZA) 1 gram capsule Take 2 g by mouth 2 (two) times  daily.      pravastatin (PRAVACHOL) 10 MG tablet Take 1 tablet (10 mg total) by mouth once daily. 90 tablet 3    pyridoxine (VITAMIN B-6) 100 MG Tab Take 100 mg by mouth once daily.       No current facility-administered medications for this visit.        REVIEW OF SYSTEMS:    GENERAL:  No weight loss, malaise or fevers.  HEENT:  Negative for frequent or significant headaches.  NECK:  Negative for lumps, goiter, pain and significant neck swelling.  RESPIRATORY:  Negative for cough, wheezing or shortness of breath.  CARDIOVASCULAR:  Negative for chest pain, leg swelling or palpitations.  GI:  Negative for abdominal discomfort, blood in stools or black stools or change in bowel habits.  MUSCULOSKELETAL:  See HPI.  SKIN:  Negative for lesions, rash, and itching.  PSYCH:  +  for sleep disturbance, mood disorder and recent psychosocial stressors.  HEMATOLOGY/LYMPHOLOGY:  Negative for prolonged bleeding, bruising easily or swollen nodes.  NEURO:   No history of headaches, syncope, paralysis, seizures or tremors.  All other reviewed and negative other than HPI.    Past Medical History:  Past Medical History:   Diagnosis Date    Arthritis     lumbar    Blood transfusion     Chronic kidney disease, stage III (moderate) 8/2/2017    Degenerative disc disease     lumbar    Insulinoma     Pancreatic disease     Urinary incontinence 12/7/2015       Past Surgical History:  Past Surgical History:   Procedure Laterality Date    ADENOIDECTOMY      APPENDECTOMY      CATARACT EXTRACTION, BILATERAL      EYE SURGERY      HYSTERECTOMY      SAMM/BSO    OOPHORECTOMY      PANCREAS SURGERY      Insulanoma    RADIOFREQUENCY ABLATION OF LUMBAR MEDIAL BRANCH NERVE AT SINGLE LEVEL Right 8/28/2018    Procedure: RADIOFREQUENCY ABLATION, NERVE, MEDIAL BRANCH, LUMBAR, 1 LEVEL- Right L3,4,5 IV SEDATION;  Surgeon: Eddie Vega MD;  Location: [facility];  Service: Pain Management;  Laterality: Right;    RADIOFREQUENCY ABLATION  "OF LUMBAR MEDIAL BRANCH NERVE AT SINGLE LEVEL Left 9/4/2018    Procedure: RADIOFREQUENCY ABLATION, NERVE, MEDIAL BRANCH, LUMBAR, 1 LEVEL - Left L3,4,5 IV SEADTION;  Surgeon: Eddie Vega MD;  Location: [facility];  Service: Pain Management;  Laterality: Left;  Patient takes ASA     RADIOFREQUENCY ABLATION, NERVE, MEDIAL BRANCH, LUMBAR, 1 LEVEL - Left L3,4,5 IV SEADTION Left 9/4/2018    Performed by Eddie Vega MD at [facility]    RADIOFREQUENCY ABLATION, NERVE, MEDIAL BRANCH, LUMBAR, 1 LEVEL- Right L3,4,5 IV SEDATION Right 8/28/2018    Performed by [facility] at [facility]    REFRACTIVE SURGERY Bilateral     TONSILLECTOMY         Family History:  Family History   Problem Relation Age of Onset    Breast cancer Mother     Heart disease Father     Aneurysm Sister     Glaucoma Daughter     Heart attack Son     Breast cancer Other        Social History:  Social History     Socioeconomic History    Marital status:      Spouse name: None    Number of children: None    Years of education: None    Highest education level: None   Social Needs    Financial resource strain: None    Food insecurity - worry: None    Food insecurity - inability: None    Transportation needs - medical: None    Transportation needs - non-medical: None   Occupational History    None   Tobacco Use    Smoking status: Never Smoker    Smokeless tobacco: Never Used   Substance and Sexual Activity    Alcohol use: Yes     Alcohol/week: 0.6 - 1.2 oz     Types: 1 - 2 Glasses of wine per week    Drug use: No    Sexual activity: Yes     Partners: Male   Other Topics Concern    None   Social History Narrative    None       OBJECTIVE:    BP (!) 185/72   Pulse 67   Ht 5' 4" (1.626 m)   Wt 64.7 kg (142 lb 8.4 oz)   LMP  (LMP Unknown)   BMI 24.46 kg/m²     PHYSICAL EXAMINATION:    General appearance: Well appearing, in no acute distress, alert and oriented x3.  Psych:  Mood and affect " appropriate.  Skin: Skin color, texture, turgor normal, no rashes or lesions, in both upper and lower body.  Head/face:  Atraumatic, normocephalic. No palpable lymph nodes  Neck: No pain to palpation over the cervical paraspinous muscles. Spurling Negative. No pain with neck flexion, extension, or lateral flexion. .  Cor: RRR  Pulm: CTA  GI: Abdomen soft and non-tender.  Back: Straight leg raising in the sitting and supine positions is negative to radicular pain.  + pain to palpation over the L-spine or costovertebral angles. Normal range of motion with pain reproduction. + Facet Loading Bilaterally.  Extremities: Peripheral joint ROM is full and pain free without obvious instability or laxity in all four extremities. No deformities, edema, or skin discoloration. Good capillary refill.  Musculoskeletal: Shoulder, hip, sacroiliac and knee provocative maneuvers are negative. Bilateral upper and lower extremity strength is normal and symmetric.  No atrophy or tone abnormalities are noted.  Neuro: Bilateral upper and lower extremity coordination and muscle stretch reflexes are physiologic and symmetric.  Plantar response are downgoing. No loss of sensation is noted.  Gait: Normal.       ASSESSMENT: 83 y.o. year old female with axial low back  pain, consistent with      Diagnosis:    1. Osteoarthritis of spine, unspecified spinal osteoarthritis complication status, unspecified spinal region  MRI Lumbar Spine Without Contrast   2. Spondylosis without myelopathy  MRI Lumbar Spine Without Contrast   3. Chronic pain syndrome  MRI Lumbar Spine Without Contrast   4. Facet arthritis of lumbar region  MRI Lumbar Spine Without Contrast   5. Sacroiliitis           PLAN:     - I have stressed the importance of physical activity and a home exercise plan to help with pain and improve health.  - Patient can continue with medications for now since they are providing benefits, using them appropriately, and without side effects.  -  Counseled patient regarding the importance of activity modification, constant sleeping habits and physical therapy.  -Rx of Medrol Dose pack 4 mg   -MRI ordered today for further assessment of her pain  -RTC in following MRI  -The above plan and management options were discussed at length with patient. Patient is in agreement with the above and verbalized understanding. Dr. Vega   was consulted on this patient  and agrees with this plan.    TRACY KimC  Interventional Pain Management    09/20/2018     Disclaimer: This note was partly generated using dictation software which may occasionally result in transcription errors.       Purse String (Simple) Text: Given the location of the defect and the characteristics of the surrounding skin a purse string closure was deemed most appropriate.  Undermining was performed circumfirentially around the surgical defect.  A purse string suture was then placed and tightened.

## 2021-11-17 ENCOUNTER — OFFICE VISIT (OUTPATIENT)
Dept: FAMILY MEDICINE | Facility: CLINIC | Age: 86
End: 2021-11-17
Attending: FAMILY MEDICINE
Payer: MEDICARE

## 2021-11-17 VITALS
DIASTOLIC BLOOD PRESSURE: 72 MMHG | OXYGEN SATURATION: 98 % | SYSTOLIC BLOOD PRESSURE: 139 MMHG | HEIGHT: 64 IN | WEIGHT: 130.5 LBS | BODY MASS INDEX: 22.28 KG/M2 | HEART RATE: 62 BPM

## 2021-11-17 DIAGNOSIS — E78.00 PURE HYPERCHOLESTEROLEMIA: ICD-10-CM

## 2021-11-17 DIAGNOSIS — N18.31 STAGE 3A CHRONIC KIDNEY DISEASE: ICD-10-CM

## 2021-11-17 DIAGNOSIS — I10 HTN (HYPERTENSION), BENIGN: Primary | ICD-10-CM

## 2021-11-17 DIAGNOSIS — I50.32 CHRONIC DIASTOLIC HEART FAILURE: ICD-10-CM

## 2021-11-17 PROCEDURE — 3288F PR FALLS RISK ASSESSMENT DOCUMENTED: ICD-10-PCS | Mod: HCNC,CPTII,S$GLB, | Performed by: FAMILY MEDICINE

## 2021-11-17 PROCEDURE — 1126F PR PAIN SEVERITY QUANTIFIED, NO PAIN PRESENT: ICD-10-PCS | Mod: HCNC,CPTII,S$GLB, | Performed by: FAMILY MEDICINE

## 2021-11-17 PROCEDURE — 3288F FALL RISK ASSESSMENT DOCD: CPT | Mod: HCNC,CPTII,S$GLB, | Performed by: FAMILY MEDICINE

## 2021-11-17 PROCEDURE — 1159F PR MEDICATION LIST DOCUMENTED IN MEDICAL RECORD: ICD-10-PCS | Mod: HCNC,CPTII,S$GLB, | Performed by: FAMILY MEDICINE

## 2021-11-17 PROCEDURE — 99214 PR OFFICE/OUTPT VISIT, EST, LEVL IV, 30-39 MIN: ICD-10-PCS | Mod: HCNC,S$GLB,, | Performed by: FAMILY MEDICINE

## 2021-11-17 PROCEDURE — 1101F PR PT FALLS ASSESS DOC 0-1 FALLS W/OUT INJ PAST YR: ICD-10-PCS | Mod: HCNC,CPTII,S$GLB, | Performed by: FAMILY MEDICINE

## 2021-11-17 PROCEDURE — 1126F AMNT PAIN NOTED NONE PRSNT: CPT | Mod: HCNC,CPTII,S$GLB, | Performed by: FAMILY MEDICINE

## 2021-11-17 PROCEDURE — 99999 PR PBB SHADOW E&M-EST. PATIENT-LVL IV: ICD-10-PCS | Mod: PBBFAC,HCNC,, | Performed by: FAMILY MEDICINE

## 2021-11-17 PROCEDURE — 1160F PR REVIEW ALL MEDS BY PRESCRIBER/CLIN PHARMACIST DOCUMENTED: ICD-10-PCS | Mod: HCNC,CPTII,S$GLB, | Performed by: FAMILY MEDICINE

## 2021-11-17 PROCEDURE — 99214 OFFICE O/P EST MOD 30 MIN: CPT | Mod: HCNC,S$GLB,, | Performed by: FAMILY MEDICINE

## 2021-11-17 PROCEDURE — 1159F MED LIST DOCD IN RCRD: CPT | Mod: HCNC,CPTII,S$GLB, | Performed by: FAMILY MEDICINE

## 2021-11-17 PROCEDURE — 1101F PT FALLS ASSESS-DOCD LE1/YR: CPT | Mod: HCNC,CPTII,S$GLB, | Performed by: FAMILY MEDICINE

## 2021-11-17 PROCEDURE — 1160F RVW MEDS BY RX/DR IN RCRD: CPT | Mod: HCNC,CPTII,S$GLB, | Performed by: FAMILY MEDICINE

## 2021-11-17 PROCEDURE — 99999 PR PBB SHADOW E&M-EST. PATIENT-LVL IV: CPT | Mod: PBBFAC,HCNC,, | Performed by: FAMILY MEDICINE

## 2021-12-14 DIAGNOSIS — E78.5 HYPERLIPIDEMIA, UNSPECIFIED HYPERLIPIDEMIA TYPE: ICD-10-CM

## 2021-12-14 DIAGNOSIS — I70.0 AORTIC ATHEROSCLEROSIS: ICD-10-CM

## 2021-12-14 DIAGNOSIS — E78.2 MIXED HYPERLIPIDEMIA: ICD-10-CM

## 2021-12-17 RX ORDER — PRAVASTATIN SODIUM 40 MG/1
40 TABLET ORAL NIGHTLY
Qty: 90 TABLET | Refills: 3 | Status: SHIPPED | OUTPATIENT
Start: 2021-12-17 | End: 2022-11-15 | Stop reason: SDUPTHER

## 2021-12-29 ENCOUNTER — PATIENT MESSAGE (OUTPATIENT)
Dept: FAMILY MEDICINE | Facility: CLINIC | Age: 86
End: 2021-12-29
Payer: MEDICARE

## 2021-12-29 DIAGNOSIS — D49.2 GROWTH OF EYELID: Primary | ICD-10-CM

## 2022-01-17 NOTE — TELEPHONE ENCOUNTER
No new care gaps identified.  Powered by [employer]. Reference number: [id number].   1/17/2022 5:56:01 PM CST

## 2022-01-17 NOTE — TELEPHONE ENCOUNTER
No new care gaps identified.  Powered by [employer]. Reference number: [id number].   1/17/2022 5:54:12 PM CST

## 2022-01-18 ENCOUNTER — LAB VISIT (OUTPATIENT)
Dept: PRIMARY CARE CLINIC | Facility: CLINIC | Age: 87
End: 2022-01-18
Payer: MEDICARE

## 2022-01-18 DIAGNOSIS — Z20.822 CONTACT WITH AND (SUSPECTED) EXPOSURE TO COVID-19: ICD-10-CM

## 2022-01-18 LAB
CTP QC/QA: YES
SARS-COV-2 AG RESP QL IA.RAPID: NEGATIVE

## 2022-01-18 PROCEDURE — 87811 SARS-COV-2 COVID19 W/OPTIC: CPT | Mod: HCNC

## 2022-01-18 RX ORDER — HYDROCORTISONE 25 MG/G
CREAM TOPICAL
Qty: 84 G | Refills: 0 | OUTPATIENT
Start: 2022-01-18

## 2022-01-18 NOTE — TELEPHONE ENCOUNTER
[facility] Note  Quick DC. Inappropriate Request   Refill request requires further review by MD: NO   Medication Therapy Plan: Pharmacy is requesting new script(s) for the following medications without required information, (sig/ frequency/qty/etc)     [facility] action(s):  Quick Discontinue      Duplicate Pended Encounter(s)/ Last Prescribed Details:    Pharmacies have been requesting medications for patients without required information, (sig, frequency, qty, etc.). In addition, requests are sent for medication(s) pt. are currently not taking, and medications patients have never taken.    We have spoken to the pharmacies about these request types and advised their teams previously that we are unable to assess these New Script requests and require all details for these requests. This is a known issue and has been reported.        Medication related problems are not assessed for QDC.   Medication Reconciliation Completed? NO Were there pending details that required adjustment? NO     Automatic Epic Generated Protocol Data Below:   Requested Prescriptions   Pending Prescriptions Disp Refills    hydrocortisone 2.5 % cream [Pharmacy Med Name: HYDROCORTISONE 2.5% CRE 20GM] 84 g 0              Appointments      Date Provider   Last Visit   11/17/2021 Joey Mueller MD   Next Visit   1/17/2022 Joey Mueller MD        Note composed:7:09 AM 01/18/2022

## 2022-01-18 NOTE — TELEPHONE ENCOUNTER
[facility] Note  Quick DC. Inappropriate Request   Refill request requires further review by MD: NO   Medication Therapy Plan: Pharmacy is requesting new script(s) for the following medications without required information, (sig/ frequency/qty/etc)     [facility] action(s):  Quick Discontinue      Duplicate Pended Encounter(s)/ Last Prescribed Details:    Pharmacies have been requesting medications for patients without required information, (sig, frequency, qty, etc.). In addition, requests are sent for medication(s) pt. are currently not taking, and medications patients have never taken.    We have spoken to the pharmacies about these request types and advised their teams previously that we are unable to assess these New Script requests and require all details for these requests. This is a known issue and has been reported.        Medication related problems are not assessed for QDC.   Medication Reconciliation Completed? NO Were there pending details that required adjustment? NO     Automatic Epic Generated Protocol Data Below:   Requested Prescriptions   Pending Prescriptions Disp Refills    hydrocortisone 2.5 % cream [Pharmacy Med Name: HYDROCORTISONE 2.5% CRE 20GM] 84 g 0              Appointments      Date Provider   Last Visit   11/17/2021 Joey Mueller MD   Next Visit   4/19/2022 Joey Mueller MD        Note composed:7:09 AM 01/18/2022

## 2022-02-09 ENCOUNTER — PATIENT OUTREACH (OUTPATIENT)
Dept: ADMINISTRATIVE | Facility: OTHER | Age: 87
End: 2022-02-09
Payer: MEDICARE

## 2022-02-09 NOTE — PROGRESS NOTES
Health Maintenance Due   Topic Date Due    Shingles Vaccine (1 of 2) Never done     Updates were requested from care everywhere.  Chart was reviewed for overdue Proactive [facility] Encounters ([facility]ART[facility]) topics (CRS, Breast Cancer Screening, Eye exam)  Health Maintenance has been updated.  LINKS immunization registry triggered.  Immunizations were reconciled.

## 2022-02-10 ENCOUNTER — OFFICE VISIT (OUTPATIENT)
Dept: OTOLARYNGOLOGY | Facility: CLINIC | Age: 87
End: 2022-02-10
Payer: MEDICARE

## 2022-02-10 VITALS
BODY MASS INDEX: 22.25 KG/M2 | HEART RATE: 59 BPM | SYSTOLIC BLOOD PRESSURE: 145 MMHG | HEIGHT: 64 IN | DIASTOLIC BLOOD PRESSURE: 71 MMHG | WEIGHT: 130.31 LBS

## 2022-02-10 DIAGNOSIS — H61.23 BILATERAL IMPACTED CERUMEN: Primary | ICD-10-CM

## 2022-02-10 DIAGNOSIS — H90.3 SENSORINEURAL HEARING LOSS (SNHL) OF BOTH EARS: Chronic | ICD-10-CM

## 2022-02-10 DIAGNOSIS — H93.13 TINNITUS OF BOTH EARS: ICD-10-CM

## 2022-02-10 DIAGNOSIS — H60.63 CHRONIC OTITIS EXTERNA OF BOTH EARS, UNSPECIFIED TYPE: Chronic | ICD-10-CM

## 2022-02-10 PROCEDURE — 3288F FALL RISK ASSESSMENT DOCD: CPT | Mod: HCNC,CPTII,S$GLB, | Performed by: OTOLARYNGOLOGY

## 2022-02-10 PROCEDURE — 99999 PR PBB SHADOW E&M-EST. PATIENT-LVL III: ICD-10-PCS | Mod: PBBFAC,HCNC,, | Performed by: OTOLARYNGOLOGY

## 2022-02-10 PROCEDURE — 1160F RVW MEDS BY RX/DR IN RCRD: CPT | Mod: HCNC,CPTII,S$GLB, | Performed by: OTOLARYNGOLOGY

## 2022-02-10 PROCEDURE — 1126F PR PAIN SEVERITY QUANTIFIED, NO PAIN PRESENT: ICD-10-PCS | Mod: HCNC,CPTII,S$GLB, | Performed by: OTOLARYNGOLOGY

## 2022-02-10 PROCEDURE — 69210 EAR CERUMEN REMOVAL: ICD-10-PCS | Mod: HCNC,S$GLB,, | Performed by: OTOLARYNGOLOGY

## 2022-02-10 PROCEDURE — 99214 PR OFFICE/OUTPT VISIT, EST, LEVL IV, 30-39 MIN: ICD-10-PCS | Mod: 25,HCNC,S$GLB, | Performed by: OTOLARYNGOLOGY

## 2022-02-10 PROCEDURE — 99214 OFFICE O/P EST MOD 30 MIN: CPT | Mod: 25,HCNC,S$GLB, | Performed by: OTOLARYNGOLOGY

## 2022-02-10 PROCEDURE — 1101F PR PT FALLS ASSESS DOC 0-1 FALLS W/OUT INJ PAST YR: ICD-10-PCS | Mod: HCNC,CPTII,S$GLB, | Performed by: OTOLARYNGOLOGY

## 2022-02-10 PROCEDURE — 1126F AMNT PAIN NOTED NONE PRSNT: CPT | Mod: HCNC,CPTII,S$GLB, | Performed by: OTOLARYNGOLOGY

## 2022-02-10 PROCEDURE — 1159F PR MEDICATION LIST DOCUMENTED IN MEDICAL RECORD: ICD-10-PCS | Mod: HCNC,CPTII,S$GLB, | Performed by: OTOLARYNGOLOGY

## 2022-02-10 PROCEDURE — 69210 REMOVE IMPACTED EAR WAX UNI: CPT | Mod: HCNC,S$GLB,, | Performed by: OTOLARYNGOLOGY

## 2022-02-10 PROCEDURE — 1101F PT FALLS ASSESS-DOCD LE1/YR: CPT | Mod: HCNC,CPTII,S$GLB, | Performed by: OTOLARYNGOLOGY

## 2022-02-10 PROCEDURE — 3288F PR FALLS RISK ASSESSMENT DOCUMENTED: ICD-10-PCS | Mod: HCNC,CPTII,S$GLB, | Performed by: OTOLARYNGOLOGY

## 2022-02-10 PROCEDURE — 1159F MED LIST DOCD IN RCRD: CPT | Mod: HCNC,CPTII,S$GLB, | Performed by: OTOLARYNGOLOGY

## 2022-02-10 PROCEDURE — 1160F PR REVIEW ALL MEDS BY PRESCRIBER/CLIN PHARMACIST DOCUMENTED: ICD-10-PCS | Mod: HCNC,CPTII,S$GLB, | Performed by: OTOLARYNGOLOGY

## 2022-02-10 PROCEDURE — 99999 PR PBB SHADOW E&M-EST. PATIENT-LVL III: CPT | Mod: PBBFAC,HCNC,, | Performed by: OTOLARYNGOLOGY

## 2022-02-10 RX ORDER — FLUOCINOLONE ACETONIDE 0.11 MG/ML
3 OIL AURICULAR (OTIC) 2 TIMES DAILY
Qty: 20 ML | Refills: 5 | Status: SHIPPED | OUTPATIENT
Start: 2022-02-10 | End: 2022-02-22

## 2022-02-10 NOTE — PATIENT INSTRUCTIONS
I would recommend the use of a wax softening drop, either over the counter Debrox, baby oil, or mineral oil, on a weekly basis.  I also instructed the patient to avoid Qtips.    Can use the fluocinolone ear drops daily or weekly for itching and to prevent wax buildup.  Wait at least a few hours before putting hearing aids in the ears after using dermotic drops.      Patient will try to send me a copy of her recent hearing test.

## 2022-02-10 NOTE — PROGRESS NOTES
Patient ID: Cheryl Ghosh is a 87 y.o. y.o. female    Chief Complaint:   Chief Complaint   Patient presents with    Ear Fullness     bilateral       Patient is here to see me today for evaluation of a possible wax impaction in bilateral ears.  she has complaints of hearing loss in the affected ears, but denies pain or drainage.  This has been an issue in the past.  The patient has been using any sort of ear drop to soften the wax.  She does complain of some itching in bilateral ears.  Ears feel like they are blocked and have wax impaction.  Had ears cleaned 6/21. Has Hearing aids serviced at [facility] and had audio a few months ago, patient will try to send via patient portal.         Review of Systems   Constitutional: Negative for fever, chills, fatigue and unexpected weight change.   HENT: Positive for ear blockage. Negative for hearing loss, nosebleeds, congestion, sore throat, facial swelling, rhinorrhea, sneezing, trouble swallowing, dental problem, voice change, postnasal drip, sinus pressure, tinnitus and ear discharge.    Eyes: Negative for redness, itching and visual disturbance.   Respiratory: Negative for cough, choking and wheezing.    Cardiovascular: Negative for chest pain and palpitations.   Gastrointestinal: Negative for abdominal pain.        No reflux.   Musculoskeletal: Negative for gait problem.   Skin: Negative for rash.   Neurological: Negative for dizziness, light-headedness and headaches.     Past Medical History:   Diagnosis Date    Arthritis     lumbar    Blood transfusion     Chronic kidney disease, stage III (moderate) 8/2/2017    Coronary artery disease involving native coronary artery without angina pectoris 3/9/2021    Degenerative disc disease     lumbar    Insulinoma     Pancreatic disease     Urinary incontinence 12/7/2015     Past Surgical History:   Procedure Laterality Date    ADENOIDECTOMY      APPENDECTOMY      CATARACT EXTRACTION, BILATERAL      EYE SURGERY       HYSTERECTOMY      SAMM/BSO    INJECTION OF JOINT Bilateral 2/4/2020    Procedure: Injection, Joint--Bilateral GTB;  Surgeon: Arina Carrera Jr., MD;  Location: [facility] PAIN MGT;  Service: Pain Management;  Laterality: Bilateral;  Oral sedation    INJECTION OF STEROID Right 12/20/2018    Procedure: INJECTION, STEROID-- Right SI Joint Block and  Steroid Injection;  Surgeon: Xavier Dasilva MD;  Location: [facility] OR;  Service: Neurosurgery;  Laterality: Right;  INJECTION, STEROID-- Right SI Joint Block and  Steroid Injection  SURGERY TIME:1 HR  LOS: 0 DAYS  RADIOLOGY: C- arm  BED: Regular Bed  with Pillows  POSITION: Prone      INJECTION OF STEROID Left 6/19/2019    Procedure: INJECTION, STEROID Procedure:Left sacroiliac joint block / steroid injection Surgery Time: 30mins LOS: Anesthesia: MAC Radiology: C-arm Bed: Regular Bed Position: Prone;  Surgeon: Xavier Dasilva MD;  Location: [facility] OR;  Service: Neurosurgery;  Laterality: Left;    MINIMALLY INVASIVE FUSION OF SPINE Right 2/6/2019    Procedure: FUSION, SPINE, MINIMALLY INVASIVE Right Sacroiliac Joint Fusion -  I Fuse;  Surgeon: Xavier Dasilva MD;  Location: [facility] OR;  Service: Neurosurgery;  Laterality: Right;  Procedure: Right Sacroiliac Joint Fusion -  I Fuse  Surgery Time: 1.5 Hr  LOS: 0  Anesthesia: General  Radiology: C-Arm  Bed: [place] 4 Poster  Position: Prone  Equipment: I Fuse Codi [phone] (notified 1/24 EF)    OOPHORECTOMY      PANCREAS SURGERY      Insulanoma    RADIOFREQUENCY ABLATION OF LUMBAR MEDIAL BRANCH NERVE AT SINGLE LEVEL Right 8/28/2018    Procedure: RADIOFREQUENCY ABLATION, NERVE, MEDIAL BRANCH, LUMBAR, 1 LEVEL- Right L3,4,5 IV SEDATION;  Surgeon: Eddie Vega MD;  Location: [facility] PAIN [facility]GT;  Service: Pain Management;  Laterality: Right;    RADIOFREQUENCY ABLATION OF LUMBAR MEDIAL BRANCH NERVE AT SINGLE LEVEL Left 9/4/2018    Procedure: RADIOFREQUENCY ABLATION, NERVE, MEDIAL BRANCH, LUMBAR, 1 LEVEL - Left L3,4,5 IV  GENEVIEVE;  Surgeon: Eddie Vega MD;  Location: [facility] MGT;  Service: Pain Management;  Laterality: Left;  Patient takes ASA     REFRACTIVE SURGERY Bilateral     SPINAL FUSION Left 7/31/2019    Procedure: FUSION, SPINE SI Joint Fusion;  Surgeon: Xavier Dasilva MD;  Location: [facility] OR;  Service: Neurosurgery;  Laterality: Left;  Procedure: Left SI joint fusion  Surgery Time: 1.5hr  LOS:0  Anesthesia: General   Radiology: C-arm   Bed: [place] [id number] Poster  Position: Prone  Equipment: Codi [place]ovo- [phone] (Codi notified)    TONSILLECTOMY       Social History     Socioeconomic History    Marital status:    Tobacco Use    Smoking status: Never Smoker    Smokeless tobacco: Never Used   Substance and Sexual Activity    Alcohol use: Yes     Alcohol/week: 1.0 - 2.0 standard drink     Types: 1 - 2 Glasses of wine per week    Drug use: No    Sexual activity: Yes     Partners: Male     Social Determinants of Health     Financial Resource Strain: Low Risk     Difficulty of Paying Living Expenses: Not hard at all   Food Insecurity: No Food Insecurity    Worried About Running Out of Food in the Last Year: Never true    Ran Out of Food in the Last Year: Never true   Transportation Needs: No Transportation Needs    Lack of Transportation (Medical): No    Lack of Transportation (Non-Medical): No   Physical Activity: Insufficiently Active    Days of Exercise per Week: 2 days    Minutes of Exercise per Session: 20 min   Stress: Stress Concern Present    Feeling of Stress : To some extent   Social Connections: Unknown    Frequency of Communication with Friends and Family: Twice a week    Frequency of Social Gatherings with Friends and Family: Once a week    Active Member of Clubs or Organizations: Yes    Attends Club or Organization Meetings: More than 4 times per year    Marital Status:    Housing Stability: Low Risk     Unable to Pay for Housing in the Last Year: No    Number of  Places Lived in the Last Year: 1    Unstable Housing in the Last Year: No     Family History   Problem Relation Age of Onset    Breast cancer Mother     Heart disease Father     Aneurysm Sister     Glaucoma Daughter     Heart attack Son     Breast cancer Maternal Grandmother     Breast cancer Other        Objective:      Vitals:    02/10/22 1421   BP: (!) 145/71   Pulse: (!) 59       Physical Exam   HENT:   NC: Septum midline, turbinates 3+, no lesions, mucosa normal.  OC/OP: Mucosa normal and without lesions.  Tongue and FOM soft, normal, no lesions.  Uvula midline. Soft palate and tonsillar area normal.    Neck: no adenopathy or masses.   Right Ear: Tympanic membrane, external ear and ear canal normal.  EAC has impacted cerumen. Decreased hearing is noted secondary to CI. Dry EAC skin.  Left Ear: Tympanic membrane, external ear and ear canal normal.  EAC has impacted cerumen. Decreased hearing is noted secondary to CI.dry EAC skin.        See separate note for cerumen removal procedure.         Assessment:         ICD-10-CM ICD-9-CM    1. Chronic otitis externa of both ears, unspecified type  H60.63 380.23 fluocinolone acetonide oiL (DERMOTIC OIL) 0.01 % Drop        Plan:       1.   Cerumen impaction:  Removed today without difficulty.  I would recommend the use of a wax softening drop, either over the counter Debrox or mineral oil, on a weekly basis.  I also instructed the patient to avoid Qtips.  2. Continue annual audiograms.    3.  Dermotic ear drops orderd for MANUELITO.     Estee Hernandez MD  [facility]       None

## 2022-02-11 NOTE — PROCEDURES
Ear Cerumen Removal    Date/Time: 2/10/2022 2:20 PM  Performed by: Estee Hernandez MD  Authorized by: Estee Hernandez MD     Consent Done?:  Yes (Verbal)    Local anesthetic:  None  Location details:  Both ears  Procedure type: curette    Cerumen  Removal Results:  Cerumen completely removed  Patient tolerance:  Patient tolerated the procedure well with no immediate complications     Skin of EAC bilaterally dry and mild irritation/erythema.     Estee Hernandez MD  [facility]

## 2022-02-22 DIAGNOSIS — H60.63 CHRONIC OTITIS EXTERNA OF BOTH EARS, UNSPECIFIED TYPE: Chronic | ICD-10-CM

## 2022-02-22 DIAGNOSIS — H60.63 CHRONIC OTITIS EXTERNA OF BOTH EARS, UNSPECIFIED TYPE: Primary | ICD-10-CM

## 2022-02-22 RX ORDER — FLUOCINOLONE ACETONIDE 0.11 MG/ML
3 OIL AURICULAR (OTIC) 2 TIMES DAILY
Qty: 20 ML | Refills: 5 | Status: SHIPPED | OUTPATIENT
Start: 2022-02-22 | End: 2023-03-29

## 2022-02-22 RX ORDER — FLUOCINOLONE ACETONIDE 0.11 MG/ML
3 OIL AURICULAR (OTIC) 2 TIMES DAILY
Qty: 20 ML | Refills: 5 | OUTPATIENT
Start: 2022-02-22

## 2022-02-22 RX ORDER — FLUOCINOLONE ACETONIDE 0.11 MG/ML
3 OIL AURICULAR (OTIC) 2 TIMES DAILY
Qty: 20 ML | Refills: 5 | Status: CANCELLED | OUTPATIENT
Start: 2022-02-22

## 2022-02-25 ENCOUNTER — PATIENT MESSAGE (OUTPATIENT)
Dept: OTOLARYNGOLOGY | Facility: CLINIC | Age: 87
End: 2022-02-25
Payer: MEDICARE

## 2022-04-07 ENCOUNTER — PATIENT MESSAGE (OUTPATIENT)
Dept: FAMILY MEDICINE | Facility: CLINIC | Age: 87
End: 2022-04-07
Payer: MEDICARE

## 2022-04-07 DIAGNOSIS — G47.00 INSOMNIA, UNSPECIFIED TYPE: ICD-10-CM

## 2022-04-07 DIAGNOSIS — G47.00 INSOMNIA, UNSPECIFIED TYPE: Primary | ICD-10-CM

## 2022-04-07 RX ORDER — TRAZODONE HYDROCHLORIDE 50 MG/1
25 TABLET ORAL NIGHTLY PRN
Qty: 15 TABLET | Refills: 2 | Status: SHIPPED | OUTPATIENT
Start: 2022-04-07 | End: 2022-04-08 | Stop reason: SDUPTHER

## 2022-04-07 NOTE — TELEPHONE ENCOUNTER
Sent trazodone to marisol younger at [facility] - she wanted [facility] pharmacy - plz need more details

## 2022-04-08 RX ORDER — TRAZODONE HYDROCHLORIDE 50 MG/1
25 TABLET ORAL NIGHTLY PRN
Qty: 15 TABLET | Refills: 2 | Status: SHIPPED | OUTPATIENT
Start: 2022-04-08 | End: 2022-08-11 | Stop reason: SDUPTHER

## 2022-04-19 ENCOUNTER — OFFICE VISIT (OUTPATIENT)
Dept: FAMILY MEDICINE | Facility: CLINIC | Age: 87
End: 2022-04-19
Attending: FAMILY MEDICINE
Payer: MEDICARE

## 2022-04-19 VITALS
SYSTOLIC BLOOD PRESSURE: 132 MMHG | HEART RATE: 60 BPM | BODY MASS INDEX: 23.68 KG/M2 | HEIGHT: 64 IN | OXYGEN SATURATION: 98 % | DIASTOLIC BLOOD PRESSURE: 78 MMHG | WEIGHT: 138.69 LBS

## 2022-04-19 DIAGNOSIS — I70.0 AORTIC ATHEROSCLEROSIS: ICD-10-CM

## 2022-04-19 DIAGNOSIS — E78.00 PURE HYPERCHOLESTEROLEMIA: ICD-10-CM

## 2022-04-19 DIAGNOSIS — I10 HTN (HYPERTENSION), BENIGN: Primary | ICD-10-CM

## 2022-04-19 DIAGNOSIS — D69.2 OTHER NONTHROMBOCYTOPENIC PURPURA: ICD-10-CM

## 2022-04-19 DIAGNOSIS — N25.81 SECONDARY HYPERPARATHYROIDISM OF RENAL ORIGIN: ICD-10-CM

## 2022-04-19 DIAGNOSIS — I25.10 CORONARY ARTERY DISEASE INVOLVING NATIVE CORONARY ARTERY OF NATIVE HEART WITHOUT ANGINA PECTORIS: ICD-10-CM

## 2022-04-19 DIAGNOSIS — M46.1 SACROILIITIS: ICD-10-CM

## 2022-04-19 DIAGNOSIS — M62.838 MUSCLE SPASM: ICD-10-CM

## 2022-04-19 DIAGNOSIS — N18.31 STAGE 3A CHRONIC KIDNEY DISEASE: ICD-10-CM

## 2022-04-19 DIAGNOSIS — I50.32 CHRONIC DIASTOLIC HEART FAILURE: ICD-10-CM

## 2022-04-19 DIAGNOSIS — N39.42 URINARY INCONTINENCE WITHOUT SENSORY AWARENESS: ICD-10-CM

## 2022-04-19 DIAGNOSIS — E78.2 MIXED HYPERLIPIDEMIA: ICD-10-CM

## 2022-04-19 PROCEDURE — 1101F PR PT FALLS ASSESS DOC 0-1 FALLS W/OUT INJ PAST YR: ICD-10-PCS | Mod: CPTII,S$GLB,, | Performed by: FAMILY MEDICINE

## 2022-04-19 PROCEDURE — 1126F AMNT PAIN NOTED NONE PRSNT: CPT | Mod: CPTII,S$GLB,, | Performed by: FAMILY MEDICINE

## 2022-04-19 PROCEDURE — 3288F FALL RISK ASSESSMENT DOCD: CPT | Mod: CPTII,S$GLB,, | Performed by: FAMILY MEDICINE

## 2022-04-19 PROCEDURE — 99214 OFFICE O/P EST MOD 30 MIN: CPT | Mod: S$GLB,,, | Performed by: FAMILY MEDICINE

## 2022-04-19 PROCEDURE — 1159F MED LIST DOCD IN RCRD: CPT | Mod: CPTII,S$GLB,, | Performed by: FAMILY MEDICINE

## 2022-04-19 PROCEDURE — 99499 UNLISTED E&M SERVICE: CPT | Mod: S$GLB,,, | Performed by: FAMILY MEDICINE

## 2022-04-19 PROCEDURE — 99999 PR PBB SHADOW E&M-EST. PATIENT-LVL III: ICD-10-PCS | Mod: PBBFAC,,, | Performed by: FAMILY MEDICINE

## 2022-04-19 PROCEDURE — 1126F PR PAIN SEVERITY QUANTIFIED, NO PAIN PRESENT: ICD-10-PCS | Mod: CPTII,S$GLB,, | Performed by: FAMILY MEDICINE

## 2022-04-19 PROCEDURE — 99999 PR PBB SHADOW E&M-EST. PATIENT-LVL III: CPT | Mod: PBBFAC,,, | Performed by: FAMILY MEDICINE

## 2022-04-19 PROCEDURE — 1101F PT FALLS ASSESS-DOCD LE1/YR: CPT | Mod: CPTII,S$GLB,, | Performed by: FAMILY MEDICINE

## 2022-04-19 PROCEDURE — 3288F PR FALLS RISK ASSESSMENT DOCUMENTED: ICD-10-PCS | Mod: CPTII,S$GLB,, | Performed by: FAMILY MEDICINE

## 2022-04-19 PROCEDURE — 99214 PR OFFICE/OUTPT VISIT, EST, LEVL IV, 30-39 MIN: ICD-10-PCS | Mod: S$GLB,,, | Performed by: FAMILY MEDICINE

## 2022-04-19 PROCEDURE — 99499 RISK ADDL DX/OHS AUDIT: ICD-10-PCS | Mod: S$GLB,,, | Performed by: FAMILY MEDICINE

## 2022-04-19 PROCEDURE — 1159F PR MEDICATION LIST DOCUMENTED IN MEDICAL RECORD: ICD-10-PCS | Mod: CPTII,S$GLB,, | Performed by: FAMILY MEDICINE

## 2022-04-19 RX ORDER — BACLOFEN 10 MG/1
10 TABLET ORAL 3 TIMES DAILY
Qty: 90 TABLET | Refills: 2 | Status: SHIPPED | OUTPATIENT
Start: 2022-04-19 | End: 2022-07-22

## 2022-04-19 NOTE — PROGRESS NOTES
Subjective:       Patient ID: Cheryl Ghosh is a 87 y.o. female.    Chief Complaint: Back Pain, Muscle Relaxant (Baclofen), and Hip Pain    86 yr old pleasant white female with HTN, OA, osteopenia hip, hyperlipidemia, chronic back pain, BPPV, presents today for her 3 month check, lab work review. No new complaints today.      HTN - controlled - low salt diet - having issues with medications lisinopril n losartan      HLD - slightly worsening since stopped statin x 30 days           - she was asked to start statin but due to her fall and issues with her ribs and back, she was never able to start it.    Back pain - she follows pain clinic for injections - c/o neuropathic pain in legs - was on neurontin in the past and she started taking it and working    Osteopenia hip - stable - had dexa in 12/15 and she is talking fosamax since many years and stopped last year- she is due for dexa in 12/17 - no side effects    HISTORY as below - reviewed    Health maintenance  -labs UTD  -mammo UTD  -colon screen UTD  -vaccines UTD    Back Pain  Associated symptoms include headaches. Pertinent negatives include no chest pain, dysuria or leg pain.   Hip Pain     Follow-up  Associated symptoms include headaches and myalgias. Pertinent negatives include no arthralgias, chest pain, congestion, diaphoresis, nausea, neck pain or sore throat.   Hypertension  This is a chronic problem. The current episode started more than 1 year ago. The problem has been waxing and waning since onset. The problem is resistant. Associated symptoms include blurred vision, headaches and malaise/fatigue. Pertinent negatives include no anxiety, chest pain, neck pain, orthopnea, palpitations, peripheral edema, PND, shortness of breath or sweats. Agents associated with hypertension include NSAIDs. There are no known risk factors for coronary artery disease. Past treatments include nothing. The current treatment provides significant improvement. Compliance  problems include exercise.  There is no history of chronic renal disease.   Hyperlipidemia  This is a chronic problem. The current episode started more than 1 year ago. The problem is uncontrolled. Recent lipid tests were reviewed and are high. She has no history of chronic renal disease, diabetes, hypothyroidism, liver disease, obesity or nephrotic syndrome. There are no known factors aggravating her hyperlipidemia. Associated symptoms include myalgias. Pertinent negatives include no chest pain, focal sensory loss, leg pain or shortness of breath. Current antihyperlipidemic treatment includes diet change. The current treatment provides mild improvement of lipids. There are no compliance problems.  Risk factors for coronary artery disease include dyslipidemia.     Review of Systems   Constitutional: Positive for malaise/fatigue. Negative for activity change, diaphoresis and unexpected weight change.   HENT: Negative.  Negative for nasal congestion, ear discharge, hearing loss, rhinorrhea, sore throat and voice change.    Eyes: Positive for blurred vision. Negative for pain, discharge and visual disturbance.   Respiratory: Negative.  Negative for chest tightness, shortness of breath and wheezing.    Cardiovascular: Negative.  Negative for chest pain, palpitations, orthopnea and PND.   Gastrointestinal: Negative.  Negative for abdominal distention, anal bleeding, constipation and nausea.   Endocrine: Negative.  Negative for cold intolerance, polydipsia and polyuria.   Genitourinary: Negative.  Negative for decreased urine volume, difficulty urinating, dysuria, frequency, menstrual problem and vaginal pain.   Musculoskeletal: Positive for back pain and myalgias. Negative for arthralgias, gait problem, leg pain and neck pain.   Integumentary:  Negative for color change, pallor and wound. Negative.   Allergic/Immunologic: Negative.  Negative for environmental allergies and immunocompromised state.   Neurological:  Positive for headaches. Negative for dizziness, tremors, seizures and speech difficulty.   Hematological: Negative.  Negative for adenopathy. Does not bruise/bleed easily.   Psychiatric/Behavioral: Negative.  Negative for agitation, confusion, decreased concentration, hallucinations, self-injury and suicidal ideas. The patient is not nervous/anxious.          PMH/PSH/FH/SH/MED/ALLERGY reviewed    Objective:       Vitals:    04/19/22 1040   BP: 132/78   Pulse: 60       Physical Exam  Constitutional:       General: She is not in acute distress.     Appearance: She is well-developed. She is not diaphoretic.   HENT:      Head: Normocephalic and atraumatic.      Right Ear: External ear normal.      Left Ear: External ear normal.      Nose: Nose normal.      Mouth/Throat:      Pharynx: No oropharyngeal exudate.   Eyes:      General: No scleral icterus.        Right eye: No discharge.         Left eye: No discharge.      Conjunctiva/sclera: Conjunctivae normal.      Pupils: Pupils are equal, round, and reactive to light.   Neck:      Thyroid: No thyromegaly.      Vascular: No JVD.      Trachea: No tracheal deviation.   Cardiovascular:      Rate and Rhythm: Normal rate and regular rhythm.      Heart sounds: Normal heart sounds. No murmur heard.    No friction rub. No gallop.   Pulmonary:      Effort: Pulmonary effort is normal.      Breath sounds: Normal breath sounds. No stridor. No wheezing or rales.   Chest:      Chest wall: No tenderness.   Abdominal:      General: Bowel sounds are normal. There is no distension.      Palpations: Abdomen is soft. There is no mass.      Tenderness: There is no abdominal tenderness. There is no guarding or rebound.      Hernia: No hernia is present.   Musculoskeletal:         General: No tenderness. Normal range of motion.      Cervical back: Normal range of motion and neck supple.   Lymphadenopathy:      Cervical: No cervical adenopathy.   Skin:     General: Skin is warm and dry.       Coloration: Skin is not pale.      Findings: No erythema or rash.   Neurological:      Mental Status: She is alert and oriented to person, place, and time.      Cranial Nerves: No cranial nerve deficit.      Motor: No abnormal muscle tone.      Coordination: Coordination normal.      Deep Tendon Reflexes: Reflexes are normal and symmetric. Reflexes normal.   Psychiatric:         Behavior: Behavior normal.         Thought Content: Thought content normal.         Judgment: Judgment normal.         Assessment:       Problem List Items Addressed This Visit     Urinary incontinence    Secondary hyperparathyroidism of renal origin    Sacroiliitis    Relevant Medications    baclofen (LIORESAL) 10 MG tablet    Other nonthrombocytopenic purpura    Hyperlipidemia    HTN (hypertension), benign - Primary    Coronary artery disease involving native coronary artery without angina pectoris    Chronic kidney disease, stage III (moderate)    Chronic diastolic heart failure    Aortic atherosclerosis      Other Visit Diagnoses     Muscle spasm        Relevant Medications    baclofen (LIORESAL) 10 MG tablet          Plan:           Cheryl was seen today for back pain, muscle relaxant and hip pain.    Diagnoses and all orders for this visit:    HTN (hypertension), benign    Pure hypercholesterolemia    Coronary artery disease involving native coronary artery of native heart without angina pectoris    Urinary incontinence without sensory awareness    Sacroiliitis  -     baclofen (LIORESAL) 10 MG tablet; Take 1 tablet (10 mg total) by mouth 3 (three) times daily.    Secondary hyperparathyroidism of renal origin    Other nonthrombocytopenic purpura    Chronic diastolic heart failure    Aortic atherosclerosis    Stage 3a chronic kidney disease    Mixed hyperlipidemia    Muscle spasm  -     baclofen (LIORESAL) 10 MG tablet; Take 1 tablet (10 mg total) by mouth 3 (three) times daily.      HTN  -controlled    TIA  -follow neurology - DC plavix  per neuro recommendations and just stay on ASA      Insomnia  -try benadryl only if needed    HLD  -diet controlled and statin    Urine incontinence  -lifestyle changes - intolerant to meds    Neuropathic pain  -continue neurontin    OA knee B/L  -mobic prn    CKD III  -lab and urine analysis  -adequate hydration  -follow nephrology    Muscle spasm  -baclofen prn      Spent adequate time in obtaining history and explaining differentials    25 minutes spent during this visit of which greater than 50% devoted to face-face counseling and coordination of care regarding diagnosis and management plan    RTC 3 months

## 2022-06-28 ENCOUNTER — OFFICE VISIT (OUTPATIENT)
Dept: OTOLARYNGOLOGY | Facility: CLINIC | Age: 87
End: 2022-06-28
Payer: MEDICARE

## 2022-06-28 VITALS
DIASTOLIC BLOOD PRESSURE: 79 MMHG | BODY MASS INDEX: 21.68 KG/M2 | HEIGHT: 64 IN | HEART RATE: 59 BPM | WEIGHT: 127 LBS | SYSTOLIC BLOOD PRESSURE: 161 MMHG

## 2022-06-28 DIAGNOSIS — H61.21 IMPACTED CERUMEN OF RIGHT EAR: ICD-10-CM

## 2022-06-28 DIAGNOSIS — H69.90 DYSFUNCTION OF EUSTACHIAN TUBE, UNSPECIFIED LATERALITY: Primary | Chronic | ICD-10-CM

## 2022-06-28 DIAGNOSIS — H90.3 SENSORINEURAL HEARING LOSS (SNHL) OF BOTH EARS: ICD-10-CM

## 2022-06-28 DIAGNOSIS — H60.63 CHRONIC OTITIS EXTERNA OF BOTH EARS, UNSPECIFIED TYPE: Chronic | ICD-10-CM

## 2022-06-28 PROCEDURE — 69210 REMOVE IMPACTED EAR WAX UNI: CPT | Mod: S$GLB,,, | Performed by: OTOLARYNGOLOGY

## 2022-06-28 PROCEDURE — 1126F PR PAIN SEVERITY QUANTIFIED, NO PAIN PRESENT: ICD-10-PCS | Mod: CPTII,S$GLB,, | Performed by: OTOLARYNGOLOGY

## 2022-06-28 PROCEDURE — 99999 PR PBB SHADOW E&M-EST. PATIENT-LVL III: CPT | Mod: PBBFAC,,, | Performed by: OTOLARYNGOLOGY

## 2022-06-28 PROCEDURE — 3288F PR FALLS RISK ASSESSMENT DOCUMENTED: ICD-10-PCS | Mod: CPTII,S$GLB,, | Performed by: OTOLARYNGOLOGY

## 2022-06-28 PROCEDURE — 99999 PR PBB SHADOW E&M-EST. PATIENT-LVL III: ICD-10-PCS | Mod: PBBFAC,,, | Performed by: OTOLARYNGOLOGY

## 2022-06-28 PROCEDURE — 1159F PR MEDICATION LIST DOCUMENTED IN MEDICAL RECORD: ICD-10-PCS | Mod: CPTII,S$GLB,, | Performed by: OTOLARYNGOLOGY

## 2022-06-28 PROCEDURE — 1159F MED LIST DOCD IN RCRD: CPT | Mod: CPTII,S$GLB,, | Performed by: OTOLARYNGOLOGY

## 2022-06-28 PROCEDURE — 1101F PR PT FALLS ASSESS DOC 0-1 FALLS W/OUT INJ PAST YR: ICD-10-PCS | Mod: CPTII,S$GLB,, | Performed by: OTOLARYNGOLOGY

## 2022-06-28 PROCEDURE — 99214 PR OFFICE/OUTPT VISIT, EST, LEVL IV, 30-39 MIN: ICD-10-PCS | Mod: 25,S$GLB,, | Performed by: OTOLARYNGOLOGY

## 2022-06-28 PROCEDURE — 3288F FALL RISK ASSESSMENT DOCD: CPT | Mod: CPTII,S$GLB,, | Performed by: OTOLARYNGOLOGY

## 2022-06-28 PROCEDURE — 1126F AMNT PAIN NOTED NONE PRSNT: CPT | Mod: CPTII,S$GLB,, | Performed by: OTOLARYNGOLOGY

## 2022-06-28 PROCEDURE — 69210 EAR CERUMEN REMOVAL: ICD-10-PCS | Mod: S$GLB,,, | Performed by: OTOLARYNGOLOGY

## 2022-06-28 PROCEDURE — 99214 OFFICE O/P EST MOD 30 MIN: CPT | Mod: 25,S$GLB,, | Performed by: OTOLARYNGOLOGY

## 2022-06-28 PROCEDURE — 1101F PT FALLS ASSESS-DOCD LE1/YR: CPT | Mod: CPTII,S$GLB,, | Performed by: OTOLARYNGOLOGY

## 2022-06-28 RX ORDER — AZELASTINE 1 MG/ML
1 SPRAY, METERED NASAL 2 TIMES DAILY
Qty: 30 ML | Refills: 11 | Status: SHIPPED | OUTPATIENT
Start: 2022-06-28 | End: 2023-03-29

## 2022-06-28 NOTE — PROGRESS NOTES
Patient ID: Cheryl Ghosh is a 87 y.o. y.o. female    Chief Complaint:   Chief Complaint   Patient presents with    Ear Fullness     Left ear fullness, possible cerumen or fluid        Patient presents today with left ear fullness and possible wax versus fluid in the ear.  She has previously had issues with cerumen impaction and has had audiograms at outside facility, [facility].  She notes that a few weeks ago she had fullness feeling in the left ear and feeling of decreased hearing and assumed it was wax.  She was seen at urgent care and noted to have fluid in the ear.  She had difficulty popping and decompressing the ear and has noticed some overall decreased hearing more on the left than the right.  Urgent care placed her on Flonase and Claritin which has helped minimally.  She recently had audiogram at [facility] and will get those results to us for review.      Review of Systems   Constitutional: Negative for fever, chills, fatigue and unexpected weight change.   HENT: Positive for ear blockage. Negative for nosebleeds, congestion, sore throat, facial swelling, rhinorrhea, sneezing, trouble swallowing, dental problem, voice change, postnasal drip, sinus pressure, tinnitus and ear discharge.    Eyes: Negative for redness, itching and visual disturbance.   Respiratory: Negative for cough, choking and wheezing.    Cardiovascular: Negative for chest pain and palpitations.   Gastrointestinal: Negative for abdominal pain.        No reflux.   Musculoskeletal: Negative for gait problem.   Skin: Negative for rash.   Neurological: Negative for dizziness, light-headedness and headaches.     Past Medical History:   Diagnosis Date    Arthritis     lumbar    Blood transfusion     Chronic kidney disease, stage III (moderate) 8/2/2017    Coronary artery disease involving native coronary artery without angina pectoris 3/9/2021    Degenerative disc disease     lumbar    Insulinoma     Pancreatic disease     Urinary  incontinence 12/7/2015     Past Surgical History:   Procedure Laterality Date    ADENOIDECTOMY      APPENDECTOMY      CATARACT EXTRACTION, BILATERAL      EYE SURGERY      HYSTERECTOMY      SAMM/BSO    INJECTION OF JOINT Bilateral 2/4/2020    Procedure: Injection, Joint--Bilateral GTB;  Surgeon: Arina Carrera Jr., MD;  Location: [facility] PAIN [facility]GT;  Service: Pain Management;  Laterality: Bilateral;  Oral sedation    INJECTION OF STEROID Right 12/20/2018    Procedure: INJECTION, STEROID-- Right SI Joint Block and  Steroid Injection;  Surgeon: Xavier Dasilva MD;  Location: [facility] OR;  Service: Neurosurgery;  Laterality: Right;  INJECTION, STEROID-- Right SI Joint Block and  Steroid Injection  SURGERY TIME:1 HR  LOS: 0 DAYS  RADIOLOGY: C- arm  BED: Regular Bed  with Pillows  POSITION: Prone      INJECTION OF STEROID Left 6/19/2019    Procedure: INJECTION, STEROID Procedure:Left sacroiliac joint block / steroid injection Surgery Time: 30mins LOS: Anesthesia: MAC Radiology: C-arm Bed: Regular Bed Position: Prone;  Surgeon: Xavier Dasilva MD;  Location: [facility] OR;  Service: Neurosurgery;  Laterality: Left;    MINIMALLY INVASIVE FUSION OF SPINE Right 2/6/2019    Procedure: FUSION, SPINE, MINIMALLY INVASIVE Right Sacroiliac Joint Fusion -  I Fuse;  Surgeon: Xavier Dasilva MD;  Location: [facility];  Service: Neurosurgery;  Laterality: Right;  Procedure: Right Sacroiliac Joint Fusion -  I Fuse  Surgery Time: 1.5 Hr  LOS: 0  Anesthesia: General  Radiology: C-Arm  Bed: [place] [address] Poster  Position: Prone  Equipment: I Fuse Codi [phone] (notified 1/24 EF)    OOPHORECTOMY      PANCREAS SURGERY      Insulanoma    RADIOFREQUENCY ABLATION OF LUMBAR MEDIAL BRANCH NERVE AT SINGLE LEVEL Right 8/28/2018    Procedure: RADIOFREQUENCY ABLATION, NERVE, MEDIAL BRANCH, LUMBAR, 1 LEVEL- Right L3,4,5 IV SEDATION;  Surgeon: Eddie Vega MD;  Location: [facility] PAIN [facility]GT;  Service: Pain Management;  Laterality: Right;     RADIOFREQUENCY ABLATION OF LUMBAR MEDIAL BRANCH NERVE AT SINGLE LEVEL Left 9/4/2018    Procedure: RADIOFREQUENCY ABLATION, NERVE, MEDIAL BRANCH, LUMBAR, 1 LEVEL - Left L3,4,5 IV SEADTION;  Surgeon: Eddie Vega MD;  Location: [facility]IN [facility];  Service: Pain Management;  Laterality: Left;  Patient takes ASA     REFRACTIVE SURGERY Bilateral     SPINAL FUSION Left 7/31/2019    Procedure: FUSION, SPINE SI Joint Fusion;  Surgeon: Xavier Dasilva MD;  Location: [facility] OR;  Service: Neurosurgery;  Laterality: Left;  Procedure: Left SI joint fusion  Surgery Time: 1.5hr  LOS:0  Anesthesia: General   Radiology: C-arm   Bed: [place] [address] Poster  Position: Prone  Equipment: Codi Diaz- [phone] (Codi notified)    TONSILLECTOMY       Social History     Socioeconomic History    Marital status:    Tobacco Use    Smoking status: Never Smoker    Smokeless tobacco: Never Used   Substance and Sexual Activity    Alcohol use: Yes     Alcohol/week: 1.0 - 2.0 standard drink     Types: 1 - 2 Glasses of wine per week    Drug use: No    Sexual activity: Yes     Partners: Male     Social Determinants of Health     Financial Resource Strain: Low Risk     Difficulty of Paying Living Expenses: Not hard at all   Food Insecurity: No Food Insecurity    Worried About Running Out of Food in the Last Year: Never true    Ran Out of Food in the Last Year: Never true   Transportation Needs: No Transportation Needs    Lack of Transportation (Medical): No    Lack of Transportation (Non-Medical): No   Physical Activity: Insufficiently Active    Days of Exercise per Week: 1 day    Minutes of Exercise per Session: 20 min   Stress: Stress Concern Present    Feeling of Stress : To some extent   Social Connections: Unknown    Frequency of Communication with Friends and Family: Three times a week    Frequency of Social Gatherings with Friends and Family: Once a week    Active Member of Clubs or Organizations: Yes    Attends  Club or Organization Meetings: 1 to 4 times per year    Marital Status:    Housing Stability: Low Risk     Unable to Pay for Housing in the Last Year: No    Number of Places Lived in the Last Year: 1    Unstable Housing in the Last Year: No     Family History   Problem Relation Age of Onset    Breast cancer Mother     Heart disease Father     Aneurysm Sister     Glaucoma Daughter     Heart attack Son     Breast cancer Maternal Grandmother     Breast cancer Other        Objective:      Vitals:    06/28/22 0940   BP: (!) 161/79   Pulse: (!) 59       Physical Exam   HENT:   NC: Septum midline, turbinates 2+, no lesions, mucosa normal.  OC/OP: Mucosa normal and without lesions.  Tongue and FOM soft, normal, no lesions.  Uvula midline. Soft palate and tonsillar area normal.    Neck: no adenopathy or masses.   Right Ear: Tympanic membrane with decreased movement and minimal movement with Valsalva, external ear and ear canal normal.  EAC has impacted cerumen.  Left Ear: Tympanic membrane, external ear and ear canal normal.  TM with decreased movement and not much mobility with otologic Valsalva.  EAC has impacted cerumen.        See separate procedure note for binocular microscopy and cerumen removal.             Assessment:         ICD-10-CM ICD-9-CM    1. Dysfunction of Eustachian tube, unspecified laterality  H69.80 381.81    2. Impacted cerumen of right ear  H61.21 380.4    3. Sensorineural hearing loss (SNHL) of both ears  H90.3 389.18    4. Chronic otitis externa of both ears, unspecified type  H60.63 380.23         Plan:       1.   Cerumen impaction:  Removed today without difficulty.  I would recommend the use of a wax softening drop, either over the counter Debrox or mineral oil, on a weekly basis.  I also instructed the patient to avoid Qtips.  2. Patient will send her recent audiogram from [facility] to us for review so that I can assess for any asymmetries or eustachian tube dysfunction noted  on tympanograms.  3. We had a long discussion regarding the anatomy and function of the eustachian tube.  We discussed that the eustachian tube acts as a pump to keep the appropriate amount of pressure behind the ear drum.  I gave the patient a prescription for a nasal steroid spray to be used on a daily basis, and we discussed that it will take 2-3 weeks of daily use to achieve maximal effectiveness.  We also discussed otologic valsalva daily for gently depressurizing the middle ear.      Follow-up in 3 months to reassess eustachian tube issues.    Estee Hernandez MD  [facility]

## 2022-06-28 NOTE — PROCEDURES
Ear Cerumen Removal    Date/Time: 6/28/2022 9:40 AM  Performed by: Estee Hernandez MD  Authorized by: Estee Hernandez MD     Consent Done?:  Yes (Verbal)    Local anesthetic:  None  Location details:  Right ear  Procedure type: curette    Cerumen  Removal Results:  Cerumen completely removed  Patient tolerance:  Patient tolerated the procedure well with no immediate complications     Dry skin and skin irritation bilateral ear canals, consistent with chronic otitis externa.      Estee Hernandez MD  [facility]

## 2022-06-28 NOTE — PATIENT INSTRUCTIONS
We had a long discussion regarding the anatomy and function of the eustachian tube.  We discussed that the eustachian tube acts as a pump to keep the appropriate amount of pressure behind the ear drum.  I gave the patient a prescription for a nasal steroid spray to be used on a daily basis, and we discussed that it will take 2-3 weeks of daily use to achieve maximal effectiveness.  We also discussed otologic valsalva daily for gently depressurizing the middle ear.

## 2022-07-06 ENCOUNTER — PATIENT MESSAGE (OUTPATIENT)
Dept: FAMILY MEDICINE | Facility: CLINIC | Age: 87
End: 2022-07-06
Payer: MEDICARE

## 2022-07-06 ENCOUNTER — TELEPHONE (OUTPATIENT)
Dept: FAMILY MEDICINE | Facility: CLINIC | Age: 87
End: 2022-07-06
Payer: MEDICARE

## 2022-07-06 NOTE — TELEPHONE ENCOUNTER
Spoke to Kavya at [facility] and stated December 2021. Kavya needs the referral to be refaxed to [phone]

## 2022-07-06 NOTE — TELEPHONE ENCOUNTER
----- Message from Margarito Rosales sent at 7/6/2022 12:27 PM CDT -----  Contact: [facility]  .Type:  Patient Requesting Referral    Who Called:[facility] specilaist  Does the patient already have the specialty appointment scheduled?:Yes  If yes, what is the date of that appointment?:07/06/2022  Referral to What Specialty:Eye Specialist  Reason for Referral: she has [employer] insurance and they request a referral  Does the patient want the referral with a specific physician?: Dr. Braswell  Is the specialist an [facility]  Patient Requesting a Response?:  Would the patient rather a call back or a response via [employer]? Call back  Best Call Back Number:[phone]  Additional Information: Fax the referral to [phone]

## 2022-07-20 ENCOUNTER — PATIENT MESSAGE (OUTPATIENT)
Dept: FAMILY MEDICINE | Facility: CLINIC | Age: 87
End: 2022-07-20
Payer: MEDICARE

## 2022-07-21 ENCOUNTER — TELEPHONE (OUTPATIENT)
Dept: FAMILY MEDICINE | Facility: CLINIC | Age: 87
End: 2022-07-21
Payer: MEDICARE

## 2022-07-21 DIAGNOSIS — Z12.31 ENCOUNTER FOR SCREENING MAMMOGRAM FOR BREAST CANCER: Primary | ICD-10-CM

## 2022-07-21 NOTE — TELEPHONE ENCOUNTER
----- Message from Terese Cagle sent at 7/21/2022  9:32 AM CDT -----  Regarding: call back  Contact: [phone]  Type:  Mammogram    Caller is requesting to schedule their annual mammogram appointment.  Order is not listed in EPIC.  Please enter order and contact patient to schedule.  Name of Caller: PT   Where would they like the mammogram performed? [facility]   Would the patient rather a call back or a response via [facility]? Call back   Best Call Back Number: [phone]  Additional Information: Mammo orders

## 2022-07-28 ENCOUNTER — IMMUNIZATION (OUTPATIENT)
Dept: INTERNAL MEDICINE | Facility: CLINIC | Age: 87
End: 2022-07-28
Payer: MEDICARE

## 2022-07-28 DIAGNOSIS — Z23 NEED FOR VACCINATION: Primary | ICD-10-CM

## 2022-07-28 PROCEDURE — 0054A COVID-19, MRNA, LNP-S, PF, 30 MCG/0.3 ML DOSE VACCINE (PFIZER): CPT | Mod: PBBFAC | Performed by: FAMILY MEDICINE

## 2022-08-11 DIAGNOSIS — G47.00 INSOMNIA, UNSPECIFIED TYPE: ICD-10-CM

## 2022-08-11 NOTE — TELEPHONE ENCOUNTER
No new care gaps identified.  [facility] Embedded Care Gaps. Reference number: [id number]. 8/11/2022   3:51:12 PM CDT

## 2022-08-11 NOTE — TELEPHONE ENCOUNTER
----- Message from Loulou Mckenzie sent at 8/11/2022  2:16 PM CDT -----  Regarding: rx  Type:  Patient Returning Call    Who Called:[facility]     Who Left Message for Patient:n/a    Does the patient know what this is regarding?:update on refill request for traZODone (DESYREL) 50 MG tablet    Would the patient rather a call back or a response via [employer] [facility]? Call back    Best Call Back Number:[phone]  Additional Information: n/a

## 2022-08-12 RX ORDER — TRAZODONE HYDROCHLORIDE 50 MG/1
25 TABLET ORAL NIGHTLY PRN
Qty: 45 TABLET | Refills: 1 | Status: SHIPPED | OUTPATIENT
Start: 2022-08-12 | End: 2022-11-15 | Stop reason: SDUPTHER

## 2022-08-28 ENCOUNTER — PATIENT MESSAGE (OUTPATIENT)
Dept: ADMINISTRATIVE | Facility: HOSPITAL | Age: 87
End: 2022-08-28
Payer: MEDICARE

## 2022-08-31 ENCOUNTER — TELEPHONE (OUTPATIENT)
Dept: FAMILY MEDICINE | Facility: CLINIC | Age: 87
End: 2022-08-31
Payer: MEDICARE

## 2022-08-31 VITALS — SYSTOLIC BLOOD PRESSURE: 130 MMHG | DIASTOLIC BLOOD PRESSURE: 79 MMHG

## 2022-08-31 DIAGNOSIS — Z78.0 ASYMPTOMATIC MENOPAUSAL STATE: Primary | ICD-10-CM

## 2022-09-06 ENCOUNTER — TELEPHONE (OUTPATIENT)
Dept: FAMILY MEDICINE | Facility: CLINIC | Age: 87
End: 2022-09-06
Payer: MEDICARE

## 2022-09-06 DIAGNOSIS — Z78.0 ASYMPTOMATIC MENOPAUSAL STATE: Primary | ICD-10-CM

## 2022-09-06 NOTE — TELEPHONE ENCOUNTER
----- Message from Vanessa Middleton sent at 9/5/2022 12:25 PM CDT -----  Contact: self  Patient has a question about her bone density scan. Please call her back bc she needs to be rsch and needs new orders. [phone].

## 2022-09-12 NOTE — PROGRESS NOTES
Subjective:   @Patient ID:  Cheryl Ghosh is a 87 y.o. female who presents for evaluation of episode of hypotension       HPI:   Here for f/u   She has been doing ok   Occasionally BP get to low 10s and she gets symptoms. When her BP in 130s/140s she feels better  Very rare dizzy spells. No pre-syncope  She is back to work with no issues  Had episode when she had pre-sycope, but she had diarrhea at that time. The following day she felt better.       Historically:    She saw Dr. Rojo and Dr. Arce in the past   She also saw cardiology at [facility] 3-4 years ago   She has been getting intermittent episode of pre-syncope. That was associated with hypotensive bp readings. Symptoms has been present for many years it seems have gotten worse lately  The symptoms mostly happens when she is standing. She doesn't recall much symptoms she was sitting . Symptoms got worse when amlodipine switched to losartan     When her BP in 140s she feels better    Reportes to have TIA in the past and was evaluated at [facility] and had mild carotid artery disease for which she is on statin and aspirin. Also had stress test at [facility] that was reported to be ok per the patient            Prior cardiovascular  Hx  --------------------------------      - ECHO 11/2017   CONCLUSIONS     1 - Normal left ventricular systolic function (EF 60-65%).     2 - Impaired LV relaxation, normal LAP (grade 1 diastolic dysfunction).     3 - No wall motion abnormalities.     4 - Normal right ventricular systolic function .     5 - The estimated PA systolic pressure is 23 mmHg.     - EKG 3/9/2021 Sinus bradycardia            Patient Active Problem List    Diagnosis Date Noted    Pre-syncope 07/21/2021    Vasovagal near syncope 03/24/2021    Syncope and collapse 03/09/2021    Coronary artery disease involving native coronary artery without angina pectoris 03/09/2021    Other nonthrombocytopenic purpura 02/02/2021    Decreased range of motion of both hips 01/27/2020     Bilateral impacted cerumen 10/17/2019    Sacroiliac joint dysfunction of left side 2019    Pain of left sacroiliac joint 2019    Sacroiliac joint dysfunction of right side 2019    Chronic SI joint pain 2018    Secondary hyperparathyroidism of renal origin 10/12/2018    Chronic pain 2018    Sacroiliitis 2018    HTN (hypertension), benign 2018    Chronic diastolic heart failure 2017    TIA (transient ischemic attack) 2017    Hyperlipidemia 2017    Chronic kidney disease, stage III (moderate) 2017    SOB (shortness of breath) 2017    Neuropathy 2016    Female bladder prolapse 2016    Urinary incontinence 2015    Left lumbar radiculopathy 2015    Osteopenia 2015    OA (osteoarthritis) of knee 2015    Intercostal neuralgia 2014    Aortic atherosclerosis 2014     Seen on chest CT dated 14      Greater trochanteric bursitis of both hips 2014    Spondylosis without myelopathy 2013    Facet arthritis of lumbar region 10/02/2012    Lumbar spondylosis 2012     Seen on lumbar MRI dated 12             Right Arm BP - Sittin/70  Left Arm BP - Sittin/68        LAST HbA1c  Lab Results   Component Value Date    HGBA1C 5.2 2017       Lipid panel  Lab Results   Component Value Date    CHOL 140 2021    CHOL 157 2021    CHOL 192 2019     Lab Results   Component Value Date    HDL 47 2021    HDL 43 2021    HDL 48 2019     Lab Results   Component Value Date    LDLCALC 75.2 2021    LDLCALC 89.4 2021    LDLCALC 119.8 2019     Lab Results   Component Value Date    TRIG 89 2021    TRIG 123 2021    TRIG 121 2019     Lab Results   Component Value Date    CHOLHDL 33.6 2021    CHOLHDL 27.4 2021    CHOLHDL 25.0 2019            Review of Systems   Constitutional: Negative for chills and fever.    HENT:  Negative for hearing loss and nosebleeds.    Cardiovascular:  Negative for leg swelling.        As in HPI    Respiratory:  Negative for hemoptysis.    Hematologic/Lymphatic: Negative for bleeding problem.   Skin:         Bruising    Musculoskeletal:         As in HPI    Gastrointestinal:  Negative for abdominal pain and hematochezia.   Genitourinary:  Negative for hematuria.   Neurological:  Positive for dizziness. Negative for loss of balance.        As in HPI    Psychiatric/Behavioral:  Negative for altered mental status and depression.      Objective:   Physical Exam  Constitutional:       Appearance: She is well-developed.   HENT:      Head: Normocephalic and atraumatic.   Eyes:      Conjunctiva/sclera: Conjunctivae normal.   Neck:      Vascular: No carotid bruit or JVD.   Cardiovascular:      Rate and Rhythm: Normal rate and regular rhythm.      Pulses:           Carotid pulses are 2+ on the right side and 2+ on the left side.       Radial pulses are 2+ on the right side and 2+ on the left side.      Heart sounds: Normal heart sounds. No murmur heard.    No friction rub. No gallop.   Pulmonary:      Effort: Pulmonary effort is normal. No respiratory distress.      Breath sounds: Normal breath sounds. No stridor. No wheezing.   Musculoskeletal:      Cervical back: Neck supple.   Skin:     General: Skin is warm and dry.   Neurological:      Mental Status: She is alert and oriented to person, place, and time.   Psychiatric:         Behavior: Behavior normal.       Assessment:     1. Aortic atherosclerosis    2. Pure hypercholesterolemia    3. Chronic diastolic heart failure    4. HTN (hypertension), benign    5. Coronary artery disease involving native coronary artery of native heart without angina pectoris        Plan:     - Pre syncopal episodes associated with episodic hypotension   - Will continue amlodipine. Hold with BP < 120 systolic    - She becomes very symptomatic with BP in  low 100s. Liberate  BP  Goal will be accepted. SBP in 140s given her symptoms     - Echo     - Discussed about avoiding sudden standing up from seated position. Leg exercise before standing. Compression stocking       - Continue ASA/Statin     Pertinent cardiac images and EKG reviewed independently.    Continue with current medical plan and lifestyle changes.  Return sooner for concerns or questions. If symptoms persist go to the ED  I have reviewed all pertinent data including patient's medical history in detail and updated the computerized patient record.     Orders Placed This Encounter   Procedures    Echo     Standing Status:   Future     Standing Expiration Date:   9/13/2023     Order Specific Question:   Release to patient     Answer:   Immediate         Follow up as scheduled.     She expressed verbal understanding and agreed with the plan    Patient's Medications   New Prescriptions    No medications on file   Previous Medications    ACETAMINOPHEN (TYLENOL 8 HOUR ORAL)    Take by mouth.    AMLODIPINE (NORVASC) 2.5 MG TABLET    TAKE 1 TABLET EVERY DAY    ASPIRIN (ECOTRIN) 81 MG EC TABLET    Take 1 tablet (81 mg total) by mouth once daily. Resume 48 hours post-surgery    AZELASTINE (ASTELIN) 137 MCG (0.1 %) NASAL SPRAY    1 spray (137 mcg total) by Nasal route 2 (two) times daily.    B COMPLEX VITAMINS TABLET    Take 1 tablet by mouth once daily.    BACLOFEN (LIORESAL) 10 MG TABLET    Take 1 tablet (10 mg total) by mouth 3 (three) times daily.    CALCIUM-VITAMIN D 600 MG(1,500MG) -400 UNIT TAB    once daily.     FLUOCINOLONE ACETONIDE OIL (DERMOTIC OIL) 0.01 % DROP    Place 3 drops in ear(s) 2 (two) times daily.    GABAPENTIN (NEURONTIN) 300 MG CAPSULE    TAKE 1 CAPSULE THREE TIMES DAILY    OMEGA 3-DHA-EPA-FISH -160-1,000 MG CAP        PRAVASTATIN (PRAVACHOL) 40 MG TABLET    TAKE 1 TABLET (40 MG TOTAL) BY MOUTH EVERY EVENING.    PYRIDOXINE, VITAMIN B6, (B-6) 100 MG TAB    Take 100 mg by mouth once daily.    TRAZODONE  (DESYREL) 50 MG TABLET    Take 0.5 tablets (25 mg total) by mouth nightly as needed for Insomnia.   Modified Medications    No medications on file   Discontinued Medications    No medications on file

## 2022-09-13 ENCOUNTER — OFFICE VISIT (OUTPATIENT)
Dept: CARDIOLOGY | Facility: CLINIC | Age: 87
End: 2022-09-13
Payer: MEDICARE

## 2022-09-13 VITALS
HEART RATE: 56 BPM | SYSTOLIC BLOOD PRESSURE: 142 MMHG | OXYGEN SATURATION: 97 % | HEIGHT: 64 IN | BODY MASS INDEX: 22.43 KG/M2 | WEIGHT: 131.38 LBS | DIASTOLIC BLOOD PRESSURE: 68 MMHG

## 2022-09-13 DIAGNOSIS — I70.0 AORTIC ATHEROSCLEROSIS: Primary | ICD-10-CM

## 2022-09-13 DIAGNOSIS — I25.10 CORONARY ARTERY DISEASE INVOLVING NATIVE CORONARY ARTERY OF NATIVE HEART WITHOUT ANGINA PECTORIS: ICD-10-CM

## 2022-09-13 DIAGNOSIS — I10 HTN (HYPERTENSION), BENIGN: ICD-10-CM

## 2022-09-13 DIAGNOSIS — I50.32 CHRONIC DIASTOLIC HEART FAILURE: ICD-10-CM

## 2022-09-13 DIAGNOSIS — E78.00 PURE HYPERCHOLESTEROLEMIA: ICD-10-CM

## 2022-09-13 PROCEDURE — 1125F PR PAIN SEVERITY QUANTIFIED, PAIN PRESENT: ICD-10-PCS | Mod: CPTII,S$GLB,, | Performed by: INTERNAL MEDICINE

## 2022-09-13 PROCEDURE — 1159F PR MEDICATION LIST DOCUMENTED IN MEDICAL RECORD: ICD-10-PCS | Mod: CPTII,S$GLB,, | Performed by: INTERNAL MEDICINE

## 2022-09-13 PROCEDURE — 99214 OFFICE O/P EST MOD 30 MIN: CPT | Mod: S$GLB,,, | Performed by: INTERNAL MEDICINE

## 2022-09-13 PROCEDURE — 1125F AMNT PAIN NOTED PAIN PRSNT: CPT | Mod: CPTII,S$GLB,, | Performed by: INTERNAL MEDICINE

## 2022-09-13 PROCEDURE — 1159F MED LIST DOCD IN RCRD: CPT | Mod: CPTII,S$GLB,, | Performed by: INTERNAL MEDICINE

## 2022-09-13 PROCEDURE — 99999 PR PBB SHADOW E&M-EST. PATIENT-LVL III: CPT | Mod: PBBFAC,,, | Performed by: INTERNAL MEDICINE

## 2022-09-13 PROCEDURE — 99214 PR OFFICE/OUTPT VISIT, EST, LEVL IV, 30-39 MIN: ICD-10-PCS | Mod: S$GLB,,, | Performed by: INTERNAL MEDICINE

## 2022-09-13 PROCEDURE — 99999 PR PBB SHADOW E&M-EST. PATIENT-LVL III: ICD-10-PCS | Mod: PBBFAC,,, | Performed by: INTERNAL MEDICINE

## 2022-09-15 ENCOUNTER — HOSPITAL ENCOUNTER (OUTPATIENT)
Dept: RADIOLOGY | Facility: HOSPITAL | Age: 87
Discharge: HOME | End: 2022-09-15
Attending: FAMILY MEDICINE
Payer: MEDICARE

## 2022-09-15 DIAGNOSIS — Z78.0 ASYMPTOMATIC MENOPAUSAL STATE: ICD-10-CM

## 2022-09-15 PROCEDURE — 77080 DXA BONE DENSITY AXIAL: CPT | Mod: TC

## 2022-09-15 PROCEDURE — 77080 DXA BONE DENSITY AXIAL: CPT | Mod: 26,,, | Performed by: RADIOLOGY

## 2022-09-15 PROCEDURE — 77080 DEXA BONE DENSITY SPINE HIP: ICD-10-PCS | Mod: 26,,, | Performed by: RADIOLOGY

## 2022-09-23 ENCOUNTER — PATIENT MESSAGE (OUTPATIENT)
Dept: FAMILY MEDICINE | Facility: CLINIC | Age: 87
End: 2022-09-23
Payer: MEDICARE

## 2022-09-23 NOTE — TELEPHONE ENCOUNTER
"Called patient to inform her of the message below from Dr. Mueller. Patient said she is interested in the treatment and would like to know what are some treatment options. Please advise.     "Please call and inform - bone density scan showed osteoporosis and need treatment - let me know if interested - it is significant bone loss"    "

## 2022-09-23 NOTE — TELEPHONE ENCOUNTER
Please call and inform - bone density scan showed osteoporosis and need treatment - let me know if interested - it is significant bone loss

## 2022-09-26 ENCOUNTER — PES CALL (OUTPATIENT)
Dept: ADMINISTRATIVE | Facility: CLINIC | Age: 87
End: 2022-09-26
Payer: MEDICARE

## 2022-09-27 ENCOUNTER — HOSPITAL ENCOUNTER (OUTPATIENT)
Dept: CARDIOLOGY | Facility: HOSPITAL | Age: 87
Discharge: HOME | End: 2022-09-27
Attending: INTERNAL MEDICINE
Payer: MEDICARE

## 2022-09-27 VITALS — WEIGHT: 131 LBS | BODY MASS INDEX: 22.36 KG/M2 | HEIGHT: 64 IN

## 2022-09-27 DIAGNOSIS — I50.32 CHRONIC DIASTOLIC HEART FAILURE: ICD-10-CM

## 2022-09-27 DIAGNOSIS — I10 HTN (HYPERTENSION), BENIGN: ICD-10-CM

## 2022-09-27 DIAGNOSIS — I70.0 AORTIC ATHEROSCLEROSIS: ICD-10-CM

## 2022-09-27 PROCEDURE — 93306 ECHO (CUPID ONLY): ICD-10-PCS | Mod: 26,,, | Performed by: INTERNAL MEDICINE

## 2022-09-27 PROCEDURE — 93306 TTE W/DOPPLER COMPLETE: CPT

## 2022-09-27 PROCEDURE — 93306 TTE W/DOPPLER COMPLETE: CPT | Mod: 26,,, | Performed by: INTERNAL MEDICINE

## 2022-09-28 LAB
AORTIC ROOT ANNULUS: 3.3 CM
AORTIC VALVE CUSP SEPERATION: 1.49 CM
AV INDEX (PROSTH): 0.78
AV MEAN GRADIENT: 3 MMHG
AV PEAK GRADIENT: 6 MMHG
AV VALVE AREA: 3.06 CM2
AV VELOCITY RATIO: 0.67
BSA FOR ECHO PROCEDURE: 1.64 M2
CV ECHO LV RWT: 0.49 CM
DOP CALC AO PEAK VEL: 1.25 M/S
DOP CALC AO VTI: 31.4 CM
DOP CALC LVOT AREA: 3.9 CM2
DOP CALC LVOT DIAMETER: 2.23 CM
DOP CALC LVOT PEAK VEL: 0.84 M/S
DOP CALC LVOT STROKE VOLUME: 96.03 CM3
DOP CALC MV VTI: 37.6 CM
DOP CALCLVOT PEAK VEL VTI: 24.6 CM
E WAVE DECELERATION TIME: 269.16 MSEC
E/A RATIO: 0.7
E/E' RATIO: 14.55 M/S
ECHO LV POSTERIOR WALL: 1.02 CM (ref 0.6–1.1)
EJECTION FRACTION: 60 %
FRACTIONAL SHORTENING: 33 % (ref 28–44)
INTERVENTRICULAR SEPTUM: 1.06 CM (ref 0.6–1.1)
IVRT: 94.2 MSEC
LA MAJOR: 3.81 CM
LA MINOR: 4.26 CM
LA WIDTH: 3.6 CM
LEFT ATRIUM SIZE: 3.07 CM
LEFT ATRIUM VOLUME INDEX MOD: 14.6 ML/M2
LEFT ATRIUM VOLUME INDEX: 23.2 ML/M2
LEFT ATRIUM VOLUME MOD: 23.73 CM3
LEFT ATRIUM VOLUME: 37.79 CM3
LEFT INTERNAL DIMENSION IN SYSTOLE: 2.82 CM (ref 2.1–4)
LEFT VENTRICLE DIASTOLIC VOLUME INDEX: 47.75 ML/M2
LEFT VENTRICLE DIASTOLIC VOLUME: 77.84 ML
LEFT VENTRICLE MASS INDEX: 88 G/M2
LEFT VENTRICLE SYSTOLIC VOLUME INDEX: 18.4 ML/M2
LEFT VENTRICLE SYSTOLIC VOLUME: 29.97 ML
LEFT VENTRICULAR INTERNAL DIMENSION IN DIASTOLE: 4.18 CM (ref 3.5–6)
LEFT VENTRICULAR MASS: 143.94 G
LV LATERAL E/E' RATIO: 13.33 M/S
LV SEPTAL E/E' RATIO: 16 M/S
LVOT MG: 1.51 MMHG
LVOT MV: 0.58 CM/S
MV MEAN GRADIENT: 2 MMHG
MV PEAK A VEL: 1.14 M/S
MV PEAK E VEL: 0.8 M/S
MV PEAK GRADIENT: 8 MMHG
MV STENOSIS PRESSURE HALF TIME: 78.06 MS
MV VALVE AREA BY CONTINUITY EQUATION: 2.55 CM2
MV VALVE AREA P 1/2 METHOD: 2.82 CM2
PISA TR MAX VEL: 2.63 M/S
PULM VEIN S/D RATIO: 1.64
PV MV: 0.59 M/S
PV PEAK D VEL: 0.33 M/S
PV PEAK S VEL: 0.54 M/S
PV PEAK VELOCITY: 0.78 CM/S
RA MAJOR: 3.83 CM
RA PRESSURE: 8 MMHG
RA WIDTH: 2.8 CM
RIGHT VENTRICULAR END-DIASTOLIC DIMENSION: 2.16 CM
TDI LATERAL: 0.06 M/S
TDI SEPTAL: 0.05 M/S
TDI: 0.06 M/S
TR MAX PG: 28 MMHG
TV REST PULMONARY ARTERY PRESSURE: 36 MMHG

## 2022-09-29 NOTE — PROGRESS NOTES
Please let the Ms. Ghosh  knows that I have reviewed the Echocardiogram results. It showed normal pumping function of the heart and no major findings,  just mild leaky valves nothing to worry about and no interventions needed. . Follow up with me as scheduled.    Sincerely,  Zion Sewell MD.   Interventional Cardiologist  [facility] [place]

## 2022-09-30 ENCOUNTER — PATIENT MESSAGE (OUTPATIENT)
Dept: CARDIOLOGY | Facility: CLINIC | Age: 87
End: 2022-09-30
Payer: MEDICARE

## 2022-10-10 ENCOUNTER — TELEPHONE (OUTPATIENT)
Dept: FAMILY MEDICINE | Facility: CLINIC | Age: 87
End: 2022-10-10
Payer: MEDICARE

## 2022-10-11 ENCOUNTER — PATIENT MESSAGE (OUTPATIENT)
Dept: FAMILY MEDICINE | Facility: CLINIC | Age: 87
End: 2022-10-11
Payer: MEDICARE

## 2022-10-11 DIAGNOSIS — M81.0 OSTEOPOROSIS, UNSPECIFIED OSTEOPOROSIS TYPE, UNSPECIFIED PATHOLOGICAL FRACTURE PRESENCE: Primary | ICD-10-CM

## 2022-10-12 ENCOUNTER — PATIENT MESSAGE (OUTPATIENT)
Dept: FAMILY MEDICINE | Facility: CLINIC | Age: 87
End: 2022-10-12
Payer: MEDICARE

## 2022-10-12 DIAGNOSIS — M81.0 OSTEOPOROSIS, UNSPECIFIED OSTEOPOROSIS TYPE, UNSPECIFIED PATHOLOGICAL FRACTURE PRESENCE: Primary | ICD-10-CM

## 2022-10-12 RX ORDER — ROMOSOZUMAB-AQQG 105 MG/1.17ML
105 INJECTION, SOLUTION SUBCUTANEOUS
Qty: 3 EACH | Refills: 4 | Status: SHIPPED | OUTPATIENT
Start: 2022-10-12 | End: 2022-10-13

## 2022-10-13 ENCOUNTER — TELEPHONE (OUTPATIENT)
Dept: FAMILY MEDICINE | Facility: CLINIC | Age: 87
End: 2022-10-13
Payer: MEDICARE

## 2022-10-13 ENCOUNTER — PATIENT MESSAGE (OUTPATIENT)
Dept: FAMILY MEDICINE | Facility: CLINIC | Age: 87
End: 2022-10-13
Payer: MEDICARE

## 2022-10-13 DIAGNOSIS — M81.0 OSTEOPOROSIS, UNSPECIFIED OSTEOPOROSIS TYPE, UNSPECIFIED PATHOLOGICAL FRACTURE PRESENCE: ICD-10-CM

## 2022-10-13 DIAGNOSIS — I25.10 CORONARY ARTERY DISEASE INVOLVING NATIVE CORONARY ARTERY OF NATIVE HEART WITHOUT ANGINA PECTORIS: ICD-10-CM

## 2022-10-13 DIAGNOSIS — N18.31 STAGE 3A CHRONIC KIDNEY DISEASE: ICD-10-CM

## 2022-10-13 DIAGNOSIS — Z00.00 ROUTINE GENERAL MEDICAL EXAMINATION AT HEALTH CARE FACILITY: Primary | ICD-10-CM

## 2022-10-13 DIAGNOSIS — E78.00 PURE HYPERCHOLESTEROLEMIA: ICD-10-CM

## 2022-10-13 DIAGNOSIS — I10 HTN (HYPERTENSION), BENIGN: ICD-10-CM

## 2022-10-13 DIAGNOSIS — E78.2 MIXED HYPERLIPIDEMIA: ICD-10-CM

## 2022-10-13 RX ORDER — ALENDRONATE SODIUM 70 MG/1
70 TABLET ORAL
Qty: 4 TABLET | Refills: 11 | Status: SHIPPED | OUTPATIENT
Start: 2022-10-13 | End: 2023-08-23 | Stop reason: SDUPTHER

## 2022-10-13 NOTE — TELEPHONE ENCOUNTER
----- Message from Margarito Rosales sent at 10/13/2022 10:06 AM CDT -----  Contact: pt  .Type:  Sooner Apoointment Request    Caller is requesting a sooner appointment.  Caller declined first available appointment listed below.  Caller will not accept being placed on the waitlist and is requesting a message be sent to doctor.  Name of Caller:pt  When is the first available appointment?12/01/2022  Symptoms:3 month f/u  Would the patient rather a call back or a response via [facility]? Call back  Best Call Back Number: [phone]  Additional Information:  Pt. Is rescheduling from 11/17/2022.  Also she would like blood work order.

## 2022-10-14 VITALS — DIASTOLIC BLOOD PRESSURE: 68 MMHG | SYSTOLIC BLOOD PRESSURE: 130 MMHG

## 2022-10-14 PROBLEM — E78.2 MIXED HYPERLIPIDEMIA: Status: ACTIVE | Noted: 2022-10-14

## 2022-11-03 ENCOUNTER — PATIENT MESSAGE (OUTPATIENT)
Dept: FAMILY MEDICINE | Facility: CLINIC | Age: 87
End: 2022-11-03
Payer: MEDICARE

## 2022-11-15 ENCOUNTER — OFFICE VISIT (OUTPATIENT)
Dept: FAMILY MEDICINE | Facility: CLINIC | Age: 87
End: 2022-11-15
Attending: FAMILY MEDICINE
Payer: MEDICARE

## 2022-11-15 VITALS
OXYGEN SATURATION: 98 % | TEMPERATURE: 98 F | BODY MASS INDEX: 22.65 KG/M2 | SYSTOLIC BLOOD PRESSURE: 139 MMHG | HEIGHT: 64 IN | WEIGHT: 132.69 LBS | DIASTOLIC BLOOD PRESSURE: 75 MMHG | HEART RATE: 63 BPM

## 2022-11-15 DIAGNOSIS — E78.00 PURE HYPERCHOLESTEROLEMIA: ICD-10-CM

## 2022-11-15 DIAGNOSIS — G47.00 INSOMNIA, UNSPECIFIED TYPE: ICD-10-CM

## 2022-11-15 DIAGNOSIS — I50.32 CHRONIC DIASTOLIC HEART FAILURE: ICD-10-CM

## 2022-11-15 DIAGNOSIS — M81.0 OSTEOPOROSIS, UNSPECIFIED OSTEOPOROSIS TYPE, UNSPECIFIED PATHOLOGICAL FRACTURE PRESENCE: ICD-10-CM

## 2022-11-15 DIAGNOSIS — E78.2 MIXED HYPERLIPIDEMIA: ICD-10-CM

## 2022-11-15 DIAGNOSIS — E78.5 HYPERLIPIDEMIA, UNSPECIFIED HYPERLIPIDEMIA TYPE: ICD-10-CM

## 2022-11-15 DIAGNOSIS — N18.31 STAGE 3A CHRONIC KIDNEY DISEASE: ICD-10-CM

## 2022-11-15 DIAGNOSIS — I10 HTN (HYPERTENSION), BENIGN: Primary | ICD-10-CM

## 2022-11-15 DIAGNOSIS — I70.0 AORTIC ATHEROSCLEROSIS: ICD-10-CM

## 2022-11-15 PROCEDURE — 1159F MED LIST DOCD IN RCRD: CPT | Mod: CPTII,S$GLB,, | Performed by: FAMILY MEDICINE

## 2022-11-15 PROCEDURE — 1160F RVW MEDS BY RX/DR IN RCRD: CPT | Mod: CPTII,S$GLB,, | Performed by: FAMILY MEDICINE

## 2022-11-15 PROCEDURE — 1160F PR REVIEW ALL MEDS BY PRESCRIBER/CLIN PHARMACIST DOCUMENTED: ICD-10-PCS | Mod: CPTII,S$GLB,, | Performed by: FAMILY MEDICINE

## 2022-11-15 PROCEDURE — 99499 RISK ADDL DX/OHS AUDIT: ICD-10-PCS | Mod: HCNC,S$GLB,, | Performed by: FAMILY MEDICINE

## 2022-11-15 PROCEDURE — 99499 UNLISTED E&M SERVICE: CPT | Mod: HCNC,S$GLB,, | Performed by: FAMILY MEDICINE

## 2022-11-15 PROCEDURE — 99999 PR PBB SHADOW E&M-EST. PATIENT-LVL III: CPT | Mod: PBBFAC,,, | Performed by: FAMILY MEDICINE

## 2022-11-15 PROCEDURE — 1159F PR MEDICATION LIST DOCUMENTED IN MEDICAL RECORD: ICD-10-PCS | Mod: CPTII,S$GLB,, | Performed by: FAMILY MEDICINE

## 2022-11-15 PROCEDURE — 99999 PR PBB SHADOW E&M-EST. PATIENT-LVL III: ICD-10-PCS | Mod: PBBFAC,,, | Performed by: FAMILY MEDICINE

## 2022-11-15 PROCEDURE — 99214 OFFICE O/P EST MOD 30 MIN: CPT | Mod: S$GLB,,, | Performed by: FAMILY MEDICINE

## 2022-11-15 PROCEDURE — 99214 PR OFFICE/OUTPT VISIT, EST, LEVL IV, 30-39 MIN: ICD-10-PCS | Mod: S$GLB,,, | Performed by: FAMILY MEDICINE

## 2022-11-15 RX ORDER — PRAVASTATIN SODIUM 40 MG/1
40 TABLET ORAL NIGHTLY
Qty: 90 TABLET | Refills: 3 | Status: SHIPPED | OUTPATIENT
Start: 2022-11-15 | End: 2023-08-23 | Stop reason: SDUPTHER

## 2022-11-15 RX ORDER — TRAZODONE HYDROCHLORIDE 50 MG/1
25 TABLET ORAL NIGHTLY PRN
Qty: 45 TABLET | Refills: 3 | Status: SHIPPED | OUTPATIENT
Start: 2022-11-15 | End: 2023-03-29

## 2022-11-15 NOTE — PROGRESS NOTES
Subjective:       Patient ID: Cheryl Ghosh is a 87 y.o. female.    Chief Complaint: No chief complaint on file.    87 yr old pleasant white female with HTN, OA, osteopenia hip, hyperlipidemia, chronic back pain, BPPV, presents today for her 3 month check, lab work review. No new complaints today.      HTN - controlled - low salt diet - having issues with medications lisinopril n losartan      HLD - slightly worsening since stopped statin x 30 days           - she was asked to start statin but due to her fall and issues with her ribs and back, she was never able to start it.    Back pain - she follows pain clinic for injections - c/o neuropathic pain in legs - was on neurontin in the past and she started taking it and working    Osteoporosis - started back on fosamax - doing well - next dexa in a year    HISTORY as below - reviewed    Health maintenance  -labs UTD  -mammo UTD  -colon screen UTD  -vaccines UTD    Back Pain  Associated symptoms include headaches. Pertinent negatives include no chest pain, dysuria or leg pain.   Hip Pain     Follow-up  Associated symptoms include headaches and myalgias. Pertinent negatives include no arthralgias, chest pain, congestion, diaphoresis, nausea, neck pain or sore throat.   Hypertension  This is a chronic problem. The current episode started more than 1 year ago. The problem has been waxing and waning since onset. The problem is resistant. Associated symptoms include blurred vision, headaches and malaise/fatigue. Pertinent negatives include no anxiety, chest pain, neck pain, orthopnea, palpitations, peripheral edema, PND, shortness of breath or sweats. Agents associated with hypertension include NSAIDs. There are no known risk factors for coronary artery disease. Past treatments include nothing. The current treatment provides significant improvement. Compliance problems include exercise.  There is no history of chronic renal disease.   Hyperlipidemia  This is a chronic  problem. The current episode started more than 1 year ago. The problem is uncontrolled. Recent lipid tests were reviewed and are high. She has no history of chronic renal disease, diabetes, hypothyroidism, liver disease, obesity or nephrotic syndrome. There are no known factors aggravating her hyperlipidemia. Associated symptoms include myalgias. Pertinent negatives include no chest pain, focal sensory loss, leg pain or shortness of breath. Current antihyperlipidemic treatment includes diet change. The current treatment provides mild improvement of lipids. There are no compliance problems.  Risk factors for coronary artery disease include dyslipidemia.   Review of Systems   Constitutional:  Positive for malaise/fatigue. Negative for activity change, diaphoresis and unexpected weight change.   HENT: Negative.  Negative for nasal congestion, ear discharge, hearing loss, rhinorrhea, sore throat and voice change.    Eyes:  Positive for blurred vision. Negative for pain, discharge and visual disturbance.   Respiratory: Negative.  Negative for chest tightness, shortness of breath and wheezing.    Cardiovascular: Negative.  Negative for chest pain, palpitations, orthopnea and PND.   Gastrointestinal: Negative.  Negative for abdominal distention, anal bleeding, constipation and nausea.   Endocrine: Negative.  Negative for cold intolerance, polydipsia and polyuria.   Genitourinary: Negative.  Negative for decreased urine volume, difficulty urinating, dysuria, frequency, menstrual problem and vaginal pain.   Musculoskeletal:  Positive for back pain and myalgias. Negative for arthralgias, gait problem, leg pain and neck pain.   Integumentary:  Negative for color change, pallor and wound. Negative.   Allergic/Immunologic: Negative.  Negative for environmental allergies and immunocompromised state.   Neurological:  Positive for headaches. Negative for dizziness, tremors, seizures and speech difficulty.   Hematological: Negative.   Negative for adenopathy. Does not bruise/bleed easily.   Psychiatric/Behavioral: Negative.  Negative for agitation, confusion, decreased concentration, hallucinations, self-injury and suicidal ideas. The patient is not nervous/anxious.        PMH/PSH/FH/SH/MED/ALLERGY reviewed    Objective:       Vitals:    11/15/22 0959   BP: 139/75   Pulse: 63   Temp: 98 °F (36.7 °C)       Physical Exam  Constitutional:       General: She is not in acute distress.     Appearance: She is well-developed. She is not diaphoretic.   HENT:      Head: Normocephalic and atraumatic.      Right Ear: External ear normal.      Left Ear: External ear normal.      Nose: Nose normal.      Mouth/Throat:      Pharynx: No oropharyngeal exudate.   Eyes:      General: No scleral icterus.        Right eye: No discharge.         Left eye: No discharge.      Conjunctiva/sclera: Conjunctivae normal.      Pupils: Pupils are equal, round, and reactive to light.   Neck:      Thyroid: No thyromegaly.      Vascular: No JVD.      Trachea: No tracheal deviation.   Cardiovascular:      Rate and Rhythm: Normal rate and regular rhythm.      Heart sounds: Normal heart sounds. No murmur heard.    No friction rub. No gallop.   Pulmonary:      Effort: Pulmonary effort is normal.      Breath sounds: Normal breath sounds. No stridor. No wheezing or rales.   Chest:      Chest wall: No tenderness.   Abdominal:      General: Bowel sounds are normal. There is no distension.      Palpations: Abdomen is soft. There is no mass.      Tenderness: There is no abdominal tenderness. There is no guarding or rebound.      Hernia: No hernia is present.   Musculoskeletal:         General: No tenderness. Normal range of motion.      Cervical back: Normal range of motion and neck supple.   Lymphadenopathy:      Cervical: No cervical adenopathy.   Skin:     General: Skin is warm and dry.      Coloration: Skin is not pale.      Findings: No erythema or rash.   Neurological:      Mental  Status: She is alert and oriented to person, place, and time.      Cranial Nerves: No cranial nerve deficit.      Motor: No abnormal muscle tone.      Coordination: Coordination normal.      Deep Tendon Reflexes: Reflexes are normal and symmetric. Reflexes normal.   Psychiatric:         Behavior: Behavior normal.         Thought Content: Thought content normal.         Judgment: Judgment normal.       Assessment:       Problem List Items Addressed This Visit       Osteoporosis    Mixed hyperlipidemia    Relevant Medications    pravastatin (PRAVACHOL) 40 MG tablet    Insomnia    Relevant Medications    traZODone (DESYREL) 50 MG tablet    Hyperlipidemia    Relevant Medications    pravastatin (PRAVACHOL) 40 MG tablet    HTN (hypertension), benign - Primary    Chronic kidney disease, stage III (moderate)    Chronic diastolic heart failure    Aortic atherosclerosis    Relevant Medications    pravastatin (PRAVACHOL) 40 MG tablet       Plan:           Diagnoses and all orders for this visit:    HTN (hypertension), benign    Chronic diastolic heart failure    Pure hypercholesterolemia    Stage 3a chronic kidney disease    Hyperlipidemia, unspecified hyperlipidemia type  -     pravastatin (PRAVACHOL) 40 MG tablet; Take 1 tablet (40 mg total) by mouth every evening.    Mixed hyperlipidemia  -     pravastatin (PRAVACHOL) 40 MG tablet; Take 1 tablet (40 mg total) by mouth every evening.    Aortic atherosclerosis  -     pravastatin (PRAVACHOL) 40 MG tablet; Take 1 tablet (40 mg total) by mouth every evening.    Insomnia, unspecified type  -     traZODone (DESYREL) 50 MG tablet; Take 0.5 tablets (25 mg total) by mouth nightly as needed for Insomnia.    Osteoporosis, unspecified osteoporosis type, unspecified pathological fracture presence    HTN  -controlled    TIA  -follow neurology - DC plavix per neuro recommendations and just stay on ASA      Insomnia  -try benadryl only if needed    HLD  -diet controlled and statin    Urine  incontinence  -lifestyle changes - intolerant to meds    Neuropathic pain  -continue neurontin    OA knee B/L  -mobic prn    CKD III  -lab and urine analysis  -adequate hydration  -follow nephrology    Muscle spasm  -baclofen prn    Osteoporosis  -continue fosamax      Spent adequate time in obtaining history and explaining differentials    25 minutes spent during this visit of which greater than 50% devoted to face-face counseling and coordination of care regarding diagnosis and management plan    RTC 3 months

## 2022-11-20 ENCOUNTER — HOSPITAL ENCOUNTER (EMERGENCY)
Facility: HOSPITAL | Age: 87
Discharge: HOME | End: 2022-11-20
Attending: EMERGENCY MEDICINE
Payer: MEDICARE

## 2022-11-20 ENCOUNTER — NURSE TRIAGE (OUTPATIENT)
Dept: ADMINISTRATIVE | Facility: CLINIC | Age: 87
End: 2022-11-20
Payer: MEDICARE

## 2022-11-20 VITALS
HEART RATE: 60 BPM | OXYGEN SATURATION: 99 % | TEMPERATURE: 98 F | HEIGHT: 64 IN | SYSTOLIC BLOOD PRESSURE: 178 MMHG | BODY MASS INDEX: 22.53 KG/M2 | WEIGHT: 132 LBS | RESPIRATION RATE: 18 BRPM | DIASTOLIC BLOOD PRESSURE: 80 MMHG

## 2022-11-20 DIAGNOSIS — U07.1 COVID-19: ICD-10-CM

## 2022-11-20 DIAGNOSIS — I10 PRIMARY HYPERTENSION: Primary | ICD-10-CM

## 2022-11-20 PROCEDURE — 99281 EMR DPT VST MAYX REQ PHY/QHP: CPT

## 2022-11-20 PROCEDURE — 93010 ELECTROCARDIOGRAM REPORT: CPT | Mod: ,,, | Performed by: INTERNAL MEDICINE

## 2022-11-20 PROCEDURE — 93005 ELECTROCARDIOGRAM TRACING: CPT

## 2022-11-20 PROCEDURE — 93010 EKG 12-LEAD: ICD-10-PCS | Mod: ,,, | Performed by: INTERNAL MEDICINE

## 2022-11-20 NOTE — TELEPHONE ENCOUNTER
Saw PCP 11/15, now developed cold-like symptoms x 5 days with sneezing, coughing and fatigue. Home COVID test positive.     Advised per protocol.  Discuss with PCP within 24 hours. If pt wants to start antiviral, go to [facility] for eval and they can potentially prescribe. ED/[facility] precautions given   Reason for Disposition   [1] HIGH RISK for severe COVID complications (e.g., weak immune system, age > 64 years, obesity with BMI 30 or higher, pregnant, chronic lung disease or other chronic medical condition) AND [2] COVID symptoms (e.g., cough, fever)  (Exceptions: Already seen by PCP and no new or worsening symptoms.)    Additional Information   Negative: SEVERE difficulty breathing (e.g., struggling for each breath, speaks in single words)   Negative: Difficult to awaken or acting confused (e.g., disoriented, slurred speech)   Negative: Bluish (or gray) lips or face now   Negative: Shock suspected (e.g., cold/pale/clammy skin, too weak to stand, low BP, rapid pulse)   Negative: Sounds like a life-threatening emergency to the triager   Negative: [1] Headache AND [2] stiff neck (can't touch chin to chest)   Negative: MODERATE difficulty breathing (e.g., speaks in phrases, SOB even at rest, pulse 100-120)   Negative: SEVERE or constant chest pain or pressure  (Exception: Mild central chest pain, present only when coughing.)   Negative: Chest pain or pressure  (Exception: MILD central chest pain, present only when coughing)   Negative: Patient sounds very sick or weak to the triager   Negative: MILD difficulty breathing (e.g., minimal/no SOB at rest, SOB with walking, pulse <100)   Negative: Fever > 103 F (39.4 C)   Negative: [1] Fever > 101 F (38.3 C) AND [2] age > 60 years   Negative: [1] Fever > 100.0 F (37.8 C) AND [2] bedridden (e.g., CVA, chronic illness, recovering from surgery)    Protocols used: Coronavirus (COVID-19) Diagnosed or [id number]

## 2022-11-21 ENCOUNTER — PATIENT MESSAGE (OUTPATIENT)
Dept: FAMILY MEDICINE | Facility: CLINIC | Age: 87
End: 2022-11-21
Payer: MEDICARE

## 2022-11-21 NOTE — DISCHARGE INSTRUCTIONS
Do not take any cold and flu remedies over-the-counter unless they are cleared for patients with hypertension.  Take Tylenol or ibuprofen as needed for the fever and the aches.  Return to the emergency department as needed for any worsening symptoms.

## 2022-11-21 NOTE — TELEPHONE ENCOUNTER
Pt calling stating that she has COVID and her b/p is elevated. Reports her readings of 176/95, 178/89 then took her amlodipine about 1 hour ago and now is getting 193/103. Reports HA. Per protocol advised to ED NOW. verbalized understanding. Denies any further questions or concerns at this time, advised to call back if they have any that come up. Advised pt to call back with any other concerns or worsening symptoms. Verbalized understanding and will route message to provider.     Reason for Disposition   [1] Systolic BP  >= 160 OR Diastolic >= 100 AND [2] cardiac or neurologic symptoms (e.g., chest pain, difficulty breathing, unsteady gait, blurred vision)    Additional Information   Negative: Difficult to awaken or acting confused (e.g., disoriented, slurred speech)   Negative: SEVERE difficulty breathing (e.g., struggling for each breath, speaks in single words)   Negative: [1] Weakness of the face, arm or leg on one side of the body AND [2] new-onset   Negative: [1] Numbness (i.e., loss of sensation) of the face, arm or leg on one side of the body AND [2] new-onset   Negative: [1] Chest pain lasts > 5 minutes AND [2] history of heart disease (i.e., heart attack, bypass surgery, angina, angioplasty, CHF)   Negative: [1] Chest pain AND [2] took nitrogylcerin AND [3] pain was not relieved   Negative: Sounds like a life-threatening emergency to the triager    Protocols used: Blood Pressure - High-A-

## 2022-11-21 NOTE — ED PROVIDER NOTES
Encounter Date: 11/20/2022    SCRIBE #1 NOTE: I, Brant Rowe, am scribing for, and in the presence of,  Yane Ricks MD. I have scribed the following portions of the note - Other sections scribed: HPI, ROS, Physical Exam.     History     Chief Complaint   Patient presents with    Hypertension     Pt c/o hypertension x 1 day. Pt reports cold x 5 days, reports high BP readings today beginning at 1600 today. Reports intermittent headache related to cold x 1 week. BP readings at home: 1620 = 176/92; 17:30: 178/89; 1725: 193/103. Pt takes amlodipine as needed, last pill taken at 1730p today. Pt taking Cold & Flu Relief, last dose at 1530 today. Reports taking a second type of Cold and Flu relief for high BP, also taking one that is not good for BP.     Cheryl Ghosh is a 87 y.o. female who presents to the ED with hypertension. The patient reports tested positive for COVID using a home test Wed and again today. She reports cold-like symptoms x5 days including sneezing, coughing, and fatigue. She reports that she has been taking OTC Cold and Flu Relief medication, with a last dose at 15:30 today. She has a history of hypertension and took cold and flu meds that were not recommended if patient has HTN. Her last dose was at 3:30 pm, she realized this and took an extra amlodipine at 17:30. She reports that her home blood pressure readings have been high today, ranging from 171/89 at 08:00 to 193/103 at 17:25. She denies any headaches, change in vision, altered mental status, chest pain or shortness of breath.    The history is provided by the patient. No  was used.   Review of patient's allergies indicates:   Allergen Reactions    Pcn [penicillins] Hives and Nausea And Vomiting     Past Medical History:   Diagnosis Date    Arthritis     lumbar    Blood transfusion     Chronic kidney disease, stage III (moderate) 8/2/2017    Coronary artery disease involving native coronary artery without angina  pectoris 3/9/2021    Degenerative disc disease     lumbar    Insomnia 11/15/2022    Insulinoma     Pancreatic disease     Urinary incontinence 12/7/2015     Past Surgical History:   Procedure Laterality Date    ADENOIDECTOMY      APPENDECTOMY      CATARACT EXTRACTION, BILATERAL      EYE SURGERY      HYSTERECTOMY      SAMM/BSO    INJECTION OF JOINT Bilateral 2/4/2020    Procedure: Injection, Joint--Bilateral GTB;  Surgeon: Arina Carrera Jr., MD;  Location: [facility];  Service: Pain Management;  Laterality: Bilateral;  Oral sedation    INJECTION OF STEROID Right 12/20/2018    Procedure: INJECTION, STEROID-- Right SI Joint Block and  Steroid Injection;  Surgeon: Xavier Dasilva MD;  Location: [facility];  Service: Neurosurgery;  Laterality: Right;  INJECTION, STEROID-- Right SI Joint Block and  Steroid Injection  SURGERY TIME:1 HR  LOS: 0 DAYS  RADIOLOGY: C- arm  BED: Regular Bed  with Pillows  POSITION: Prone      INJECTION OF STEROID Left 6/19/2019    Procedure: INJECTION, STEROID Procedure:Left sacroiliac joint block / steroid injection Surgery Time: 30mins LOS: Anesthesia: MAC Radiology: C-arm Bed: Regular Bed Position: Prone;  Surgeon: Xavier Dasilva MD;  Location: [facility];  Service: Neurosurgery;  Laterality: Left;    MINIMALLY INVASIVE FUSION OF SPINE Right 2/6/2019    Procedure: FUSION, SPINE, MINIMALLY INVASIVE Right Sacroiliac Joint Fusion -  I Fuse;  Surgeon: Xavier Dasilva MD;  Location: [facility];  Service: Neurosurgery;  Laterality: Right;  Procedure: Right Sacroiliac Joint Fusion -  I Fuse  Surgery Time: 1.5 Hr  LOS: 0  Anesthesia: General  Radiology: C-Arm  Bed: [place] 4 Poster  Position: Prone  Equipment: I Fuse [place] [id number] (notified 1/24 EF)    OOPHORECTOMY      PANCREAS SURGERY      Insulanoma    RADIOFREQUENCY ABLATION OF LUMBAR MEDIAL BRANCH NERVE AT SINGLE LEVEL Right 8/28/2018    Procedure: RADIOFREQUENCY ABLATION, NERVE, MEDIAL BRANCH, LUMBAR, 1 LEVEL- Right L3,4,5 IV SEDATION;   Surgeon: Eddie Vega MD;  Location: [facility];  Service: Pain Management;  Laterality: Right;    RADIOFREQUENCY ABLATION OF LUMBAR MEDIAL BRANCH NERVE AT SINGLE LEVEL Left 9/4/2018    Procedure: RADIOFREQUENCY ABLATION, NERVE, MEDIAL BRANCH, LUMBAR, 1 LEVEL - Left L3,4,5 IV SEADTION;  Surgeon: Eddie Vega MD;  Location: [facility];  Service: Pain Management;  Laterality: Left;  Patient takes ASA     REFRACTIVE SURGERY Bilateral     SPINAL FUSION Left 7/31/2019    Procedure: FUSION, SPINE SI Joint Fusion;  Surgeon: Xavier Dasilva MD;  Location: [facility] OR;  Service: Neurosurgery;  Laterality: Left;  Procedure: Left SI joint fusion  Surgery Time: 1.5hr  LOS:0  Anesthesia: General   Radiology: C-arm   Bed: [place] [address] Poster  Position: Prone  Equipment: Codi Diaz- [id number] (Codi notified)    TONSILLECTOMY       Family History   Problem Relation Age of Onset    Breast cancer Mother     Heart disease Father     Aneurysm Sister     Glaucoma Daughter     Heart attack Son     Breast cancer Maternal Grandmother     Breast cancer Other      Social History     Tobacco Use    Smoking status: Never    Smokeless tobacco: Never   Substance Use Topics    Alcohol use: Yes     Alcohol/week: 1.0 - 2.0 standard drink     Types: 1 - 2 Glasses of wine per week    Drug use: No     Review of Systems   Constitutional:  Positive for fatigue. Negative for fever.   HENT:  Positive for sneezing. Negative for sore throat.    Respiratory:  Positive for cough. Negative for shortness of breath.    Cardiovascular:  Negative for chest pain.        +hypertension   Gastrointestinal:  Negative for nausea.   Genitourinary:  Negative for dysuria.   Musculoskeletal:  Negative for back pain.   Skin:  Negative for rash.   Neurological:  Negative for weakness.   Hematological:  Does not bruise/bleed easily.     Physical Exam     Initial Vitals [11/20/22 1938]   BP Pulse Resp Temp SpO2   (!) 197/92 63 20 98.6 °F (37 °C) 100 %       MAP       --         Physical Exam    Nursing note and vitals reviewed.  Constitutional: She appears well-developed and well-nourished.   HENT:   Head: Normocephalic and atraumatic.   Mouth/Throat: Oropharynx is clear and moist.   Eyes: Conjunctivae and EOM are normal. Pupils are equal, round, and reactive to light.   Neck: Neck supple.   Normal range of motion.  Cardiovascular:  Normal rate, regular rhythm, normal heart sounds and intact distal pulses.     Exam reveals no gallop and no friction rub.       No murmur heard.  Pulmonary/Chest: Breath sounds normal.   Abdominal: Abdomen is soft. Bowel sounds are normal. She exhibits no distension. There is no abdominal tenderness. There is no rebound and no guarding.   Musculoskeletal:         General: No tenderness or edema. Normal range of motion.      Cervical back: Normal range of motion and neck supple.     Lymphadenopathy:     She has no cervical adenopathy.   Neurological: She is alert and oriented to person, place, and time. She has normal strength and normal reflexes.   Skin: Skin is warm and dry.   Psychiatric: She has a normal mood and affect. Her behavior is normal. Judgment and thought content normal.       ED Course   Procedures  Labs Reviewed - No data to display  EKG Readings: (Independently Interpreted)   Initial: 2013. Rhythm: Normal Sinus Rhythm. Heart Rate: 55. Ectopy: No Ectopy. Conduction: Normal. ST Segments: Normal ST Segments. T Waves: Normal. Clinical Impression: Normal Sinus Rhythm     Imaging Results    None          Medications - No data to display  Medical Decision Making:   ED Management:  The patient is an 87-year-old female who has had COVID symptoms for the past 5 days.  She has had all of her COVID vaccinations and COVID in the past.  She states the worst of her symptoms were 4-5 days ago but she has been taking over-the-counter cold and flu medications for her nasal congestion and occasional cough.  She realized today she was taking  a formula that was not recommended for hypertensive patient's, she then took her blood pressure and it was elevated.  She took an extra amlodipine.  When this did not bring her blood pressure down she came to the emergency department.  Her blood pressure is 197/86.  I feel that if she goes home and goes to sleep, the amlodipine will work, she will be less anxious and she agrees to not take any further over-the-counter cold and flu remedies but just Tylenol and ibuprofen for the fever and aches.  She feels better being reassured.  I did offer that she could stay in the emergency department as long as she liked but she would like to go home at this point.        Scribe Attestation:   Scribe #1: I performed the above scribed service and the documentation accurately describes the services I performed. I attest to the accuracy of the note.                   Clinical Impression:   Final diagnoses:  [I10] Primary hypertension (Primary)  [U07.1] COVID-19      ED Disposition Condition    Discharge Stable          ED Prescriptions    None       Follow-up Information       Follow up With Specialties Details Why Contact Info    Joey Mueller MD Internal Medicine   [address]  [address]  [facility] [address]  [phone]              I, Yane Ricks, personally performed the services described in this documentation. All medical record entries made by the scribe were at my direction and in my presence.  I have reviewed the chart and agree that the record reflects my personal performance and is accurate and complete. Yane Ricks M.D. 9:30 PM11/20/2022      Yane Ricks MD  11/20/22 2134

## 2022-11-21 NOTE — TELEPHONE ENCOUNTER
Spoke to pt and stated that she's feeling better and that her BP was 144/82 and was advised by the ER to take Tylenol. Pt will continue to take Tylenol and rest.

## 2022-11-30 DIAGNOSIS — I10 BENIGN HYPERTENSION: ICD-10-CM

## 2022-11-30 DIAGNOSIS — I10 HTN (HYPERTENSION): ICD-10-CM

## 2022-12-06 ENCOUNTER — PATIENT MESSAGE (OUTPATIENT)
Dept: FAMILY MEDICINE | Facility: CLINIC | Age: 87
End: 2022-12-06
Payer: MEDICARE

## 2022-12-15 ENCOUNTER — IMMUNIZATION (OUTPATIENT)
Dept: INTERNAL MEDICINE | Facility: CLINIC | Age: 87
End: 2022-12-15
Payer: MEDICARE

## 2022-12-15 DIAGNOSIS — Z23 NEED FOR VACCINATION: Primary | ICD-10-CM

## 2022-12-15 PROCEDURE — 91312 COVID-19, MRNA, LNP-S, BIVALENT BOOSTER, PF, 30 MCG/0.3 ML DOSE: CPT | Mod: HCNC,S$GLB,, | Performed by: FAMILY MEDICINE

## 2022-12-15 PROCEDURE — 0124A COVID-19, MRNA, LNP-S, BIVALENT BOOSTER, PF, 30 MCG/0.3 ML DOSE: CPT | Mod: HCNC,PBBFAC | Performed by: FAMILY MEDICINE

## 2022-12-15 PROCEDURE — 91312 COVID-19, MRNA, LNP-S, BIVALENT BOOSTER, PF, 30 MCG/0.3 ML DOSE: ICD-10-PCS | Mod: HCNC,S$GLB,, | Performed by: FAMILY MEDICINE

## 2023-01-12 ENCOUNTER — PATIENT MESSAGE (OUTPATIENT)
Dept: FAMILY MEDICINE | Facility: CLINIC | Age: 88
End: 2023-01-12
Payer: MEDICARE

## 2023-01-13 ENCOUNTER — OFFICE VISIT (OUTPATIENT)
Dept: FAMILY MEDICINE | Facility: CLINIC | Age: 88
End: 2023-01-13
Attending: FAMILY MEDICINE
Payer: MEDICARE

## 2023-01-13 VITALS
BODY MASS INDEX: 21.83 KG/M2 | OXYGEN SATURATION: 99 % | HEIGHT: 64 IN | WEIGHT: 127.88 LBS | SYSTOLIC BLOOD PRESSURE: 128 MMHG | HEART RATE: 61 BPM | DIASTOLIC BLOOD PRESSURE: 76 MMHG

## 2023-01-13 DIAGNOSIS — M46.1 SACROILIITIS: ICD-10-CM

## 2023-01-13 DIAGNOSIS — N18.31 STAGE 3A CHRONIC KIDNEY DISEASE: ICD-10-CM

## 2023-01-13 DIAGNOSIS — I50.32 CHRONIC DIASTOLIC HEART FAILURE: ICD-10-CM

## 2023-01-13 DIAGNOSIS — I70.0 AORTIC ATHEROSCLEROSIS: ICD-10-CM

## 2023-01-13 DIAGNOSIS — N64.4 BREAST PAIN, RIGHT: Primary | ICD-10-CM

## 2023-01-13 DIAGNOSIS — D69.2 OTHER NONTHROMBOCYTOPENIC PURPURA: ICD-10-CM

## 2023-01-13 DIAGNOSIS — N25.81 SECONDARY HYPERPARATHYROIDISM OF RENAL ORIGIN: ICD-10-CM

## 2023-01-13 PROCEDURE — 1160F RVW MEDS BY RX/DR IN RCRD: CPT | Mod: HCNC,CPTII,S$GLB, | Performed by: FAMILY MEDICINE

## 2023-01-13 PROCEDURE — 1159F MED LIST DOCD IN RCRD: CPT | Mod: HCNC,CPTII,S$GLB, | Performed by: FAMILY MEDICINE

## 2023-01-13 PROCEDURE — 1101F PT FALLS ASSESS-DOCD LE1/YR: CPT | Mod: HCNC,CPTII,S$GLB, | Performed by: FAMILY MEDICINE

## 2023-01-13 PROCEDURE — 99214 OFFICE O/P EST MOD 30 MIN: CPT | Mod: HCNC,S$GLB,, | Performed by: FAMILY MEDICINE

## 2023-01-13 PROCEDURE — 3288F PR FALLS RISK ASSESSMENT DOCUMENTED: ICD-10-PCS | Mod: HCNC,CPTII,S$GLB, | Performed by: FAMILY MEDICINE

## 2023-01-13 PROCEDURE — 99214 PR OFFICE/OUTPT VISIT, EST, LEVL IV, 30-39 MIN: ICD-10-PCS | Mod: HCNC,S$GLB,, | Performed by: FAMILY MEDICINE

## 2023-01-13 PROCEDURE — 1160F PR REVIEW ALL MEDS BY PRESCRIBER/CLIN PHARMACIST DOCUMENTED: ICD-10-PCS | Mod: HCNC,CPTII,S$GLB, | Performed by: FAMILY MEDICINE

## 2023-01-13 PROCEDURE — 1125F AMNT PAIN NOTED PAIN PRSNT: CPT | Mod: HCNC,CPTII,S$GLB, | Performed by: FAMILY MEDICINE

## 2023-01-13 PROCEDURE — 99999 PR PBB SHADOW E&M-EST. PATIENT-LVL IV: ICD-10-PCS | Mod: PBBFAC,HCNC,, | Performed by: FAMILY MEDICINE

## 2023-01-13 PROCEDURE — 99999 PR PBB SHADOW E&M-EST. PATIENT-LVL IV: CPT | Mod: PBBFAC,HCNC,, | Performed by: FAMILY MEDICINE

## 2023-01-13 PROCEDURE — 1125F PR PAIN SEVERITY QUANTIFIED, PAIN PRESENT: ICD-10-PCS | Mod: HCNC,CPTII,S$GLB, | Performed by: FAMILY MEDICINE

## 2023-01-13 PROCEDURE — 3288F FALL RISK ASSESSMENT DOCD: CPT | Mod: HCNC,CPTII,S$GLB, | Performed by: FAMILY MEDICINE

## 2023-01-13 PROCEDURE — 1159F PR MEDICATION LIST DOCUMENTED IN MEDICAL RECORD: ICD-10-PCS | Mod: HCNC,CPTII,S$GLB, | Performed by: FAMILY MEDICINE

## 2023-01-13 PROCEDURE — 1101F PR PT FALLS ASSESS DOC 0-1 FALLS W/OUT INJ PAST YR: ICD-10-PCS | Mod: HCNC,CPTII,S$GLB, | Performed by: FAMILY MEDICINE

## 2023-01-13 NOTE — PROGRESS NOTES
Subjective:       Patient ID: Cheryl Ghosh is a 88 y.o. female.    Chief Complaint: Breast Pain (Right side )    87 yr old pleasant white female with HTN, OA, osteopenia hip, hyperlipidemia, chronic back pain, BPPV, presents today for FOLLOW UP AND EVALUATION OF RIGHT NIPPLE AND BREAST PAIN. ONSET A WEEK AGO AND WORSENING. EVEN THE TOUCH OF CLOTH IS HURTING HER. NEVER HAD ISSUES WITH BREAST. IT IS VERY PAINFUL.       HTN - controlled - low salt diet - having issues with medications lisinopril n losartan      HLD - slightly worsening since stopped statin x 30 days           - she was asked to start statin but due to her fall and issues with her ribs and back, she was never able to start it.    Back pain - she follows pain clinic for injections - c/o neuropathic pain in legs - was on neurontin in the past and she started taking it and working    Osteoporosis - started back on fosamax - doing well - next dexa in a year    HISTORY as below - reviewed    Health maintenance  -labs UTD  -mammo UTD  -colon screen UTD  -vaccines UTD    Hip Pain     Follow-up  Pertinent negatives include no congestion, diaphoresis, nausea or sore throat.   Hypertension  This is a chronic problem. The current episode started more than 1 year ago. The problem has been waxing and waning since onset. The problem is resistant. Associated symptoms include blurred vision and malaise/fatigue. Pertinent negatives include no anxiety, orthopnea, palpitations, peripheral edema, PND, shortness of breath or sweats. Agents associated with hypertension include NSAIDs. There are no known risk factors for coronary artery disease. Past treatments include nothing. The current treatment provides significant improvement. Compliance problems include exercise.  There is no history of chronic renal disease.   Hyperlipidemia  This is a chronic problem. The current episode started more than 1 year ago. The problem is uncontrolled. Recent lipid tests were reviewed  and are high. She has no history of chronic renal disease, diabetes, hypothyroidism, liver disease, obesity or nephrotic syndrome. There are no known factors aggravating her hyperlipidemia. Pertinent negatives include no focal sensory loss or shortness of breath. Current antihyperlipidemic treatment includes diet change. The current treatment provides mild improvement of lipids. There are no compliance problems.  Risk factors for coronary artery disease include dyslipidemia.   Review of Systems   Constitutional:  Positive for malaise/fatigue. Negative for activity change, diaphoresis and unexpected weight change.   HENT: Negative.  Negative for nasal congestion, ear discharge, hearing loss, rhinorrhea, sore throat and voice change.    Eyes:  Positive for blurred vision. Negative for pain, discharge and visual disturbance.   Respiratory: Negative.  Negative for chest tightness, shortness of breath and wheezing.    Cardiovascular: Negative.  Negative for palpitations, orthopnea and PND.   Gastrointestinal: Negative.  Negative for abdominal distention, anal bleeding, constipation and nausea.   Endocrine: Negative.  Negative for cold intolerance, polydipsia and polyuria.   Genitourinary: Negative.  Negative for decreased urine volume, difficulty urinating, frequency, menstrual problem and vaginal pain.   Integumentary:  Positive for breast tenderness. Negative for color change, pallor, wound and breast mass. Negative.   Allergic/Immunologic: Negative.  Negative for environmental allergies and immunocompromised state.   Neurological:  Negative for dizziness, tremors, seizures and speech difficulty.   Hematological: Negative.  Negative for adenopathy. Does not bruise/bleed easily.   Psychiatric/Behavioral: Negative.  Negative for agitation, confusion, decreased concentration, hallucinations, self-injury and suicidal ideas. The patient is not nervous/anxious.    Breast: Positive for tenderness.Negative for mass       PMH/PSH/FH/SH/MED/ALLERGY reviewed    Objective:       Vitals:    01/13/23 0819   BP: 128/76   Pulse: 61       Physical Exam  Constitutional:       General: She is not in acute distress.     Appearance: She is well-developed. She is not diaphoretic.   HENT:      Head: Normocephalic and atraumatic.      Right Ear: External ear normal.      Left Ear: External ear normal.      Nose: Nose normal.      Mouth/Throat:      Pharynx: No oropharyngeal exudate.   Eyes:      General: No scleral icterus.        Right eye: No discharge.         Left eye: No discharge.      Conjunctiva/sclera: Conjunctivae normal.      Pupils: Pupils are equal, round, and reactive to light.   Neck:      Thyroid: No thyromegaly.      Vascular: No JVD.      Trachea: No tracheal deviation.   Cardiovascular:      Rate and Rhythm: Normal rate and regular rhythm.      Heart sounds: Normal heart sounds. No murmur heard.    No friction rub. No gallop.   Pulmonary:      Effort: Pulmonary effort is normal.      Breath sounds: Normal breath sounds. No stridor. No wheezing or rales.   Chest:      Chest wall: No tenderness.   Breasts:     Right: Tenderness present.       Abdominal:      General: Bowel sounds are normal. There is no distension.      Palpations: Abdomen is soft. There is no mass.      Tenderness: There is no abdominal tenderness. There is no guarding or rebound.      Hernia: No hernia is present.   Musculoskeletal:         General: No tenderness. Normal range of motion.      Cervical back: Normal range of motion and neck supple.   Lymphadenopathy:      Cervical: No cervical adenopathy.   Skin:     General: Skin is warm and dry.      Coloration: Skin is not pale.      Findings: No erythema or rash.   Neurological:      Mental Status: She is alert and oriented to person, place, and time.      Cranial Nerves: No cranial nerve deficit.      Motor: No abnormal muscle tone.      Coordination: Coordination normal.      Deep Tendon Reflexes: Reflexes  are normal and symmetric. Reflexes normal.   Psychiatric:         Behavior: Behavior normal.         Thought Content: Thought content normal.         Judgment: Judgment normal.       Assessment:       Problem List Items Addressed This Visit       Secondary hyperparathyroidism of renal origin    Sacroiliitis    Other nonthrombocytopenic purpura    Chronic kidney disease, stage III (moderate)    Chronic diastolic heart failure    Aortic atherosclerosis     Other Visit Diagnoses       Breast pain, right    -  Primary    Relevant Orders    Mammo Digital Diagnostic Bilat with Lalit    US Breast Right Limited            Plan:           Cheryl was seen today for breast pain.    Diagnoses and all orders for this visit:    Breast pain, right  -     Mammo Digital Diagnostic Bilat with Lalit; Future  -     US Breast Right Limited; Future    Secondary hyperparathyroidism of renal origin    Sacroiliitis    Other nonthrombocytopenic purpura    Chronic diastolic heart failure    Aortic atherosclerosis    Stage 3a chronic kidney disease    BREAST PAIN RIGHT  -DIAGN MAMMO AND USG  -BREAST SURGERY REFERRAL PRECAUTIONS GIVEN      HTN  -controlled    TIA  -follow neurology - DC plavix per neuro recommendations and just stay on ASA      Insomnia  -try benadryl only if needed    HLD  -diet controlled and statin    Urine incontinence  -lifestyle changes - intolerant to meds    Neuropathic pain  -continue neurontin    OA knee B/L  -mobic prn    CKD III  -lab and urine analysis  -adequate hydration  -follow nephrology    Muscle spasm  -baclofen prn    Osteoporosis  -continue fosamax      Spent adequate time in obtaining history and explaining differentials    25 minutes spent during this visit of which greater than 50% devoted to face-face counseling and coordination of care regarding diagnosis and management plan    Follow up in about 1 month (around 2/15/2023), or if symptoms worsen or fail to improve.

## 2023-01-19 ENCOUNTER — HOSPITAL ENCOUNTER (OUTPATIENT)
Dept: RADIOLOGY | Facility: HOSPITAL | Age: 88
Discharge: HOME | End: 2023-01-19
Attending: FAMILY MEDICINE
Payer: MEDICARE

## 2023-01-19 DIAGNOSIS — N64.4 BREAST PAIN, RIGHT: ICD-10-CM

## 2023-01-19 PROCEDURE — 77066 MAMMO DIGITAL DIAGNOSTIC BILAT WITH TOMO: ICD-10-PCS | Mod: 26,HCNC,, | Performed by: RADIOLOGY

## 2023-01-19 PROCEDURE — 76642 ULTRASOUND BREAST LIMITED: CPT | Mod: TC,HCNC,RT

## 2023-01-19 PROCEDURE — 76642 ULTRASOUND BREAST LIMITED: CPT | Mod: 26,HCNC,RT, | Performed by: RADIOLOGY

## 2023-01-19 PROCEDURE — 77066 DX MAMMO INCL CAD BI: CPT | Mod: TC,HCNC

## 2023-01-19 PROCEDURE — 76642 US BREAST RIGHT LIMITED: ICD-10-PCS | Mod: 26,HCNC,RT, | Performed by: RADIOLOGY

## 2023-01-19 PROCEDURE — 77062 MAMMO DIGITAL DIAGNOSTIC BILAT WITH TOMO: ICD-10-PCS | Mod: 26,HCNC,, | Performed by: RADIOLOGY

## 2023-01-19 PROCEDURE — 77066 DX MAMMO INCL CAD BI: CPT | Mod: 26,HCNC,, | Performed by: RADIOLOGY

## 2023-01-19 PROCEDURE — 77062 BREAST TOMOSYNTHESIS BI: CPT | Mod: 26,HCNC,, | Performed by: RADIOLOGY

## 2023-02-09 DIAGNOSIS — Z00.00 ENCOUNTER FOR MEDICARE ANNUAL WELLNESS EXAM: ICD-10-CM

## 2023-02-20 NOTE — TELEPHONE ENCOUNTER
----- Message from Nancie Thomas sent at 4/7/2018  7:49 AM CDT -----  Contact: self, [phone]  Patient called in returning your call. Please advise.     today

## 2023-02-22 ENCOUNTER — OFFICE VISIT (OUTPATIENT)
Dept: FAMILY MEDICINE | Facility: CLINIC | Age: 88
End: 2023-02-22
Attending: FAMILY MEDICINE
Payer: MEDICARE

## 2023-02-22 ENCOUNTER — LAB VISIT (OUTPATIENT)
Dept: LAB | Facility: HOSPITAL | Age: 88
End: 2023-02-22
Attending: FAMILY MEDICINE
Payer: MEDICARE

## 2023-02-22 VITALS
OXYGEN SATURATION: 96 % | SYSTOLIC BLOOD PRESSURE: 129 MMHG | TEMPERATURE: 98 F | BODY MASS INDEX: 22.17 KG/M2 | HEART RATE: 64 BPM | HEIGHT: 64 IN | DIASTOLIC BLOOD PRESSURE: 79 MMHG | WEIGHT: 129.88 LBS

## 2023-02-22 DIAGNOSIS — G47.00 INSOMNIA, UNSPECIFIED TYPE: ICD-10-CM

## 2023-02-22 DIAGNOSIS — I10 HTN (HYPERTENSION), BENIGN: Primary | ICD-10-CM

## 2023-02-22 DIAGNOSIS — E78.2 MIXED HYPERLIPIDEMIA: ICD-10-CM

## 2023-02-22 DIAGNOSIS — E78.00 PURE HYPERCHOLESTEROLEMIA: ICD-10-CM

## 2023-02-22 DIAGNOSIS — I50.32 CHRONIC DIASTOLIC HEART FAILURE: ICD-10-CM

## 2023-02-22 DIAGNOSIS — M81.0 OSTEOPOROSIS, UNSPECIFIED OSTEOPOROSIS TYPE, UNSPECIFIED PATHOLOGICAL FRACTURE PRESENCE: ICD-10-CM

## 2023-02-22 DIAGNOSIS — N18.31 STAGE 3A CHRONIC KIDNEY DISEASE: ICD-10-CM

## 2023-02-22 DIAGNOSIS — M62.838 MUSCLE SPASM: ICD-10-CM

## 2023-02-22 DIAGNOSIS — I25.10 CORONARY ARTERY DISEASE INVOLVING NATIVE CORONARY ARTERY OF NATIVE HEART WITHOUT ANGINA PECTORIS: ICD-10-CM

## 2023-02-22 DIAGNOSIS — M46.1 SACROILIITIS: ICD-10-CM

## 2023-02-22 DIAGNOSIS — I10 HTN (HYPERTENSION), BENIGN: ICD-10-CM

## 2023-02-22 LAB
ANION GAP SERPL CALC-SCNC: 11 MMOL/L (ref 8–16)
BACTERIA #/AREA URNS HPF: ABNORMAL /HPF
BILIRUB UR QL STRIP: NEGATIVE
BUN SERPL-MCNC: 32 MG/DL (ref 8–23)
CALCIUM SERPL-MCNC: 9.2 MG/DL (ref 8.7–10.5)
CHLORIDE SERPL-SCNC: 108 MMOL/L (ref 95–110)
CLARITY UR: CLEAR
CO2 SERPL-SCNC: 23 MMOL/L (ref 23–29)
COLOR UR: YELLOW
CREAT SERPL-MCNC: 1.2 MG/DL (ref 0.5–1.4)
EST. GFR  (NO RACE VARIABLE): 44 ML/MIN/1.73 M^2
GLUCOSE SERPL-MCNC: 93 MG/DL (ref 70–110)
GLUCOSE UR QL STRIP: NEGATIVE
HGB UR QL STRIP: NEGATIVE
HYALINE CASTS #/AREA URNS LPF: 0 /LPF
KETONES UR QL STRIP: NEGATIVE
LEUKOCYTE ESTERASE UR QL STRIP: ABNORMAL
MICROSCOPIC COMMENT: ABNORMAL
NITRITE UR QL STRIP: NEGATIVE
PH UR STRIP: 5 [PH] (ref 5–8)
POTASSIUM SERPL-SCNC: 4.6 MMOL/L (ref 3.5–5.1)
PROT UR QL STRIP: ABNORMAL
RBC #/AREA URNS HPF: 2 /HPF (ref 0–4)
SODIUM SERPL-SCNC: 142 MMOL/L (ref 136–145)
SP GR UR STRIP: 1.01 (ref 1–1.03)
SQUAMOUS #/AREA URNS HPF: 6 /HPF
URN SPEC COLLECT METH UR: ABNORMAL
UROBILINOGEN UR STRIP-ACNC: NEGATIVE EU/DL
WBC #/AREA URNS HPF: 14 /HPF (ref 0–5)

## 2023-02-22 PROCEDURE — 80048 BASIC METABOLIC PNL TOTAL CA: CPT | Mod: HCNC | Performed by: FAMILY MEDICINE

## 2023-02-22 PROCEDURE — 81000 URINALYSIS NONAUTO W/SCOPE: CPT | Mod: HCNC | Performed by: FAMILY MEDICINE

## 2023-02-22 PROCEDURE — 1160F RVW MEDS BY RX/DR IN RCRD: CPT | Mod: HCNC,CPTII,S$GLB, | Performed by: FAMILY MEDICINE

## 2023-02-22 PROCEDURE — 36415 COLL VENOUS BLD VENIPUNCTURE: CPT | Mod: HCNC | Performed by: FAMILY MEDICINE

## 2023-02-22 PROCEDURE — 99999 PR PBB SHADOW E&M-EST. PATIENT-LVL III: ICD-10-PCS | Mod: PBBFAC,HCNC,, | Performed by: FAMILY MEDICINE

## 2023-02-22 PROCEDURE — 99214 PR OFFICE/OUTPT VISIT, EST, LEVL IV, 30-39 MIN: ICD-10-PCS | Mod: HCNC,S$GLB,, | Performed by: FAMILY MEDICINE

## 2023-02-22 PROCEDURE — 1160F PR REVIEW ALL MEDS BY PRESCRIBER/CLIN PHARMACIST DOCUMENTED: ICD-10-PCS | Mod: HCNC,CPTII,S$GLB, | Performed by: FAMILY MEDICINE

## 2023-02-22 PROCEDURE — 1159F PR MEDICATION LIST DOCUMENTED IN MEDICAL RECORD: ICD-10-PCS | Mod: HCNC,CPTII,S$GLB, | Performed by: FAMILY MEDICINE

## 2023-02-22 PROCEDURE — 99214 OFFICE O/P EST MOD 30 MIN: CPT | Mod: HCNC,S$GLB,, | Performed by: FAMILY MEDICINE

## 2023-02-22 PROCEDURE — 99999 PR PBB SHADOW E&M-EST. PATIENT-LVL III: CPT | Mod: PBBFAC,HCNC,, | Performed by: FAMILY MEDICINE

## 2023-02-22 PROCEDURE — 1159F MED LIST DOCD IN RCRD: CPT | Mod: HCNC,CPTII,S$GLB, | Performed by: FAMILY MEDICINE

## 2023-02-22 RX ORDER — BACLOFEN 10 MG/1
10 TABLET ORAL 3 TIMES DAILY
Qty: 270 TABLET | Refills: 4 | Status: SHIPPED | OUTPATIENT
Start: 2023-02-22 | End: 2023-07-28 | Stop reason: SDUPTHER

## 2023-02-22 NOTE — PROGRESS NOTES
Subjective:       Patient ID: Cheryl Ghosh is a 88 y.o. female.    Chief Complaint: Follow-up    87 yr old pleasant white female with HTN, OA, osteopenia hip, hyperlipidemia, chronic back pain, BPPV, presents today for her 3 month check, lab work review. No new complaints today.      HTN - controlled - low salt diet - having issues with medications lisinopril n losartan      HLD - slightly worsening since stopped statin x 30 days           - she was asked to start statin but due to her fall and issues with her ribs and back, she was never able to start it.    Back pain - she follows pain clinic for injections - c/o neuropathic pain in legs - was on neurontin in the past and she started taking it and working    Osteoporosis - started back on fosamax - doing well - next dexa in a year    HISTORY as below - reviewed    Health maintenance  -labs UTD  -mammo UTD  -colon screen UTD  -vaccines UTD    Follow-up  Associated symptoms include headaches and myalgias. Pertinent negatives include no arthralgias, chest pain, congestion, diaphoresis, nausea, neck pain or sore throat.   Back Pain  Associated symptoms include headaches. Pertinent negatives include no chest pain, dysuria or leg pain.   Hip Pain     Hypertension  This is a chronic problem. The current episode started more than 1 year ago. The problem has been waxing and waning since onset. The problem is resistant. Associated symptoms include blurred vision, headaches and malaise/fatigue. Pertinent negatives include no anxiety, chest pain, neck pain, orthopnea, palpitations, peripheral edema, PND, shortness of breath or sweats. Agents associated with hypertension include NSAIDs. There are no known risk factors for coronary artery disease. Past treatments include nothing. The current treatment provides significant improvement. Compliance problems include exercise.  There is no history of chronic renal disease.   Hyperlipidemia  This is a chronic problem. The current  episode started more than 1 year ago. The problem is uncontrolled. Recent lipid tests were reviewed and are high. She has no history of chronic renal disease, diabetes, hypothyroidism, liver disease, obesity or nephrotic syndrome. There are no known factors aggravating her hyperlipidemia. Associated symptoms include myalgias. Pertinent negatives include no chest pain, focal sensory loss, leg pain or shortness of breath. Current antihyperlipidemic treatment includes diet change. The current treatment provides mild improvement of lipids. There are no compliance problems.  Risk factors for coronary artery disease include dyslipidemia.   Review of Systems   Constitutional:  Positive for malaise/fatigue. Negative for activity change, diaphoresis and unexpected weight change.   HENT: Negative.  Negative for nasal congestion, ear discharge, hearing loss, rhinorrhea, sore throat and voice change.    Eyes:  Positive for blurred vision. Negative for pain, discharge and visual disturbance.   Respiratory: Negative.  Negative for chest tightness, shortness of breath and wheezing.    Cardiovascular: Negative.  Negative for chest pain, palpitations, orthopnea and PND.   Gastrointestinal: Negative.  Negative for abdominal distention, anal bleeding, constipation and nausea.   Endocrine: Negative.  Negative for cold intolerance, polydipsia and polyuria.   Genitourinary: Negative.  Negative for decreased urine volume, difficulty urinating, dysuria, frequency, menstrual problem and vaginal pain.   Musculoskeletal:  Positive for back pain and myalgias. Negative for arthralgias, gait problem, leg pain and neck pain.   Integumentary:  Negative for color change, pallor and wound. Negative.   Allergic/Immunologic: Negative.  Negative for environmental allergies and immunocompromised state.   Neurological:  Positive for headaches. Negative for dizziness, tremors, seizures and speech difficulty.   Hematological: Negative.  Negative for  adenopathy. Does not bruise/bleed easily.   Psychiatric/Behavioral: Negative.  Negative for agitation, confusion, decreased concentration, hallucinations, self-injury and suicidal ideas. The patient is not nervous/anxious.        PMH/PSH/FH/SH/MED/ALLERGY reviewed    Objective:       Vitals:    02/22/23 1100   BP: 129/79   Pulse: 64   Temp: 98 °F (36.7 °C)       Physical Exam  Constitutional:       General: She is not in acute distress.     Appearance: She is well-developed. She is not diaphoretic.   HENT:      Head: Normocephalic and atraumatic.      Right Ear: External ear normal.      Left Ear: External ear normal.      Nose: Nose normal.      Mouth/Throat:      Pharynx: No oropharyngeal exudate.   Eyes:      General: No scleral icterus.        Right eye: No discharge.         Left eye: No discharge.      Conjunctiva/sclera: Conjunctivae normal.      Pupils: Pupils are equal, round, and reactive to light.   Neck:      Thyroid: No thyromegaly.      Vascular: No JVD.      Trachea: No tracheal deviation.   Cardiovascular:      Rate and Rhythm: Normal rate and regular rhythm.      Heart sounds: Normal heart sounds. No murmur heard.    No friction rub. No gallop.   Pulmonary:      Effort: Pulmonary effort is normal.      Breath sounds: Normal breath sounds. No stridor. No wheezing or rales.   Chest:      Chest wall: No tenderness.   Abdominal:      General: Bowel sounds are normal. There is no distension.      Palpations: Abdomen is soft. There is no mass.      Tenderness: There is no abdominal tenderness. There is no guarding or rebound.      Hernia: No hernia is present.   Musculoskeletal:         General: No tenderness. Normal range of motion.      Cervical back: Normal range of motion and neck supple.   Lymphadenopathy:      Cervical: No cervical adenopathy.   Skin:     General: Skin is warm and dry.      Coloration: Skin is not pale.      Findings: No erythema or rash.   Neurological:      Mental Status: She is  alert and oriented to person, place, and time.      Cranial Nerves: No cranial nerve deficit.      Motor: No abnormal muscle tone.      Coordination: Coordination normal.      Deep Tendon Reflexes: Reflexes are normal and symmetric. Reflexes normal.   Psychiatric:         Behavior: Behavior normal.         Thought Content: Thought content normal.         Judgment: Judgment normal.       Assessment:       Problem List Items Addressed This Visit       Sacroiliitis    Relevant Medications    baclofen (LIORESAL) 10 MG tablet    Osteoporosis    Mixed hyperlipidemia    Insomnia    Hyperlipidemia    HTN (hypertension), benign - Primary    Relevant Orders    BASIC METABOLIC PANEL    Urinalysis    Coronary artery disease involving native coronary artery without angina pectoris    Chronic kidney disease, stage III (moderate)    Relevant Orders    BASIC METABOLIC PANEL    Urinalysis    Chronic diastolic heart failure     Other Visit Diagnoses       Muscle spasm        Relevant Medications    baclofen (LIORESAL) 10 MG tablet            Plan:           Cheryl was seen today for follow-up.    Diagnoses and all orders for this visit:    HTN (hypertension), benign  -     BASIC METABOLIC PANEL; Future  -     Urinalysis; Future    Pure hypercholesterolemia    Mixed hyperlipidemia    Insomnia, unspecified type    Osteoporosis, unspecified osteoporosis type, unspecified pathological fracture presence    Coronary artery disease involving native coronary artery of native heart without angina pectoris    Stage 3a chronic kidney disease  -     BASIC METABOLIC PANEL; Future  -     Urinalysis; Future    Chronic diastolic heart failure    Sacroiliitis  -     baclofen (LIORESAL) 10 MG tablet; Take 1 tablet (10 mg total) by mouth 3 (three) times daily.    Muscle spasm  -     baclofen (LIORESAL) 10 MG tablet; Take 1 tablet (10 mg total) by mouth 3 (three) times daily.    HTN  -controlled    TIA  -follow neurology - DC plavix per neuro  recommendations and just stay on ASA      Insomnia  -try benadryl only if needed    HLD  -diet controlled and statin    Urine incontinence  -lifestyle changes - intolerant to meds    Neuropathic pain  -continue neurontin    OA knee B/L  -mobic prn    CKD III  -lab and urine analysis  -adequate hydration  -follow nephrology    Muscle spasm  -baclofen prn    Osteoporosis  -continue fosamax      Spent adequate time in obtaining history and explaining differentials    25 minutes spent during this visit of which greater than 50% devoted to face-face counseling and coordination of care regarding diagnosis and management plan    RTC 3 months

## 2023-03-29 ENCOUNTER — OFFICE VISIT (OUTPATIENT)
Dept: FAMILY MEDICINE | Facility: CLINIC | Age: 88
End: 2023-03-29
Attending: FAMILY MEDICINE
Payer: MEDICARE

## 2023-03-29 VITALS
DIASTOLIC BLOOD PRESSURE: 78 MMHG | BODY MASS INDEX: 22.7 KG/M2 | HEIGHT: 64 IN | OXYGEN SATURATION: 95 % | TEMPERATURE: 98 F | WEIGHT: 132.94 LBS | SYSTOLIC BLOOD PRESSURE: 120 MMHG | HEART RATE: 53 BPM

## 2023-03-29 DIAGNOSIS — E78.00 PURE HYPERCHOLESTEROLEMIA: ICD-10-CM

## 2023-03-29 DIAGNOSIS — M81.0 OSTEOPOROSIS, UNSPECIFIED OSTEOPOROSIS TYPE, UNSPECIFIED PATHOLOGICAL FRACTURE PRESENCE: ICD-10-CM

## 2023-03-29 DIAGNOSIS — N18.31 STAGE 3A CHRONIC KIDNEY DISEASE: ICD-10-CM

## 2023-03-29 DIAGNOSIS — E78.2 MIXED HYPERLIPIDEMIA: ICD-10-CM

## 2023-03-29 DIAGNOSIS — M47.819 SPONDYLOSIS WITHOUT MYELOPATHY: ICD-10-CM

## 2023-03-29 DIAGNOSIS — M46.1 SACROILIITIS: ICD-10-CM

## 2023-03-29 DIAGNOSIS — H10.45 OTHER CHRONIC ALLERGIC CONJUNCTIVITIS OF BOTH EYES: ICD-10-CM

## 2023-03-29 DIAGNOSIS — G47.00 INSOMNIA, UNSPECIFIED TYPE: ICD-10-CM

## 2023-03-29 DIAGNOSIS — I25.10 CORONARY ARTERY DISEASE INVOLVING NATIVE CORONARY ARTERY OF NATIVE HEART WITHOUT ANGINA PECTORIS: ICD-10-CM

## 2023-03-29 DIAGNOSIS — I50.32 CHRONIC DIASTOLIC HEART FAILURE: ICD-10-CM

## 2023-03-29 DIAGNOSIS — N64.4 BREAST PAIN, RIGHT: ICD-10-CM

## 2023-03-29 DIAGNOSIS — I10 HTN (HYPERTENSION), BENIGN: Primary | ICD-10-CM

## 2023-03-29 PROCEDURE — 99999 PR PBB SHADOW E&M-EST. PATIENT-LVL III: ICD-10-PCS | Mod: PBBFAC,HCNC,, | Performed by: FAMILY MEDICINE

## 2023-03-29 PROCEDURE — 1126F AMNT PAIN NOTED NONE PRSNT: CPT | Mod: HCNC,CPTII,S$GLB, | Performed by: FAMILY MEDICINE

## 2023-03-29 PROCEDURE — 1101F PR PT FALLS ASSESS DOC 0-1 FALLS W/OUT INJ PAST YR: ICD-10-PCS | Mod: HCNC,CPTII,S$GLB, | Performed by: FAMILY MEDICINE

## 2023-03-29 PROCEDURE — 1159F PR MEDICATION LIST DOCUMENTED IN MEDICAL RECORD: ICD-10-PCS | Mod: HCNC,CPTII,S$GLB, | Performed by: FAMILY MEDICINE

## 2023-03-29 PROCEDURE — 99214 OFFICE O/P EST MOD 30 MIN: CPT | Mod: HCNC,S$GLB,, | Performed by: FAMILY MEDICINE

## 2023-03-29 PROCEDURE — 3288F FALL RISK ASSESSMENT DOCD: CPT | Mod: HCNC,CPTII,S$GLB, | Performed by: FAMILY MEDICINE

## 2023-03-29 PROCEDURE — 99999 PR PBB SHADOW E&M-EST. PATIENT-LVL III: CPT | Mod: PBBFAC,HCNC,, | Performed by: FAMILY MEDICINE

## 2023-03-29 PROCEDURE — 1101F PT FALLS ASSESS-DOCD LE1/YR: CPT | Mod: HCNC,CPTII,S$GLB, | Performed by: FAMILY MEDICINE

## 2023-03-29 PROCEDURE — 99214 PR OFFICE/OUTPT VISIT, EST, LEVL IV, 30-39 MIN: ICD-10-PCS | Mod: HCNC,S$GLB,, | Performed by: FAMILY MEDICINE

## 2023-03-29 PROCEDURE — 1160F RVW MEDS BY RX/DR IN RCRD: CPT | Mod: HCNC,CPTII,S$GLB, | Performed by: FAMILY MEDICINE

## 2023-03-29 PROCEDURE — 1126F PR PAIN SEVERITY QUANTIFIED, NO PAIN PRESENT: ICD-10-PCS | Mod: HCNC,CPTII,S$GLB, | Performed by: FAMILY MEDICINE

## 2023-03-29 PROCEDURE — 3288F PR FALLS RISK ASSESSMENT DOCUMENTED: ICD-10-PCS | Mod: HCNC,CPTII,S$GLB, | Performed by: FAMILY MEDICINE

## 2023-03-29 PROCEDURE — 1159F MED LIST DOCD IN RCRD: CPT | Mod: HCNC,CPTII,S$GLB, | Performed by: FAMILY MEDICINE

## 2023-03-29 PROCEDURE — 1160F PR REVIEW ALL MEDS BY PRESCRIBER/CLIN PHARMACIST DOCUMENTED: ICD-10-PCS | Mod: HCNC,CPTII,S$GLB, | Performed by: FAMILY MEDICINE

## 2023-03-29 RX ORDER — TRAZODONE HYDROCHLORIDE 100 MG/1
100 TABLET ORAL NIGHTLY PRN
Qty: 30 TABLET | Refills: 3 | Status: SHIPPED | OUTPATIENT
Start: 2023-03-29 | End: 2023-05-02 | Stop reason: SDUPTHER

## 2023-03-29 RX ORDER — OXYCODONE AND ACETAMINOPHEN 5; 325 MG/1; MG/1
1 TABLET ORAL EVERY 8 HOURS PRN
Qty: 21 TABLET | Refills: 0 | Status: SHIPPED | OUTPATIENT
Start: 2023-03-29 | End: 2023-05-23 | Stop reason: SDUPTHER

## 2023-03-29 RX ORDER — PREDNISOLONE ACETATE 10 MG/ML
1 SUSPENSION/ DROPS OPHTHALMIC 4 TIMES DAILY
Qty: 5 ML | Refills: 1 | Status: SHIPPED | OUTPATIENT
Start: 2023-03-29 | End: 2023-04-08

## 2023-03-29 NOTE — PROGRESS NOTES
Subjective:       Patient ID: Cheryl Ghosh is a 88 y.o. female.    Chief Complaint: Insomnia, Hoarse, Allergies, Breast Pain (Both breast), and Pain Medication Request    87 yr old pleasant white female with HTN, OA, osteopenia hip, hyperlipidemia, chronic back pain, BPPV, presents today for her 3 month check. C/o breast pain and insomnia. It is improving but it comes and goes. She already had testing few months ago.      HTN - controlled - low salt diet - having issues with medications lisinopril n losartan      HLD - slightly worsening since stopped statin x 30 days           - she was asked to start statin but due to her fall and issues with her ribs and back, she was never able to start it.    Back pain - she follows pain clinic for injections - c/o neuropathic pain in legs - was on neurontin in the past and she started taking it and working    Osteoporosis - started back on fosamax - doing well - next dexa in a year    HISTORY as below - reviewed    Health maintenance  -labs UTD  -mammo UTD  -colon screen UTD  -vaccines UTD    Follow-up  Associated symptoms include arthralgias, chest pain, myalgias, vomiting and weakness. Pertinent negatives include no congestion, diaphoresis, headaches, joint swelling, nausea, neck pain or sore throat.   Back Pain  Associated symptoms include chest pain and weakness. Pertinent negatives include no dysuria, headaches or leg pain.   Hip Pain     Hypertension  This is a chronic problem. The current episode started more than 1 year ago. The problem has been waxing and waning since onset. The problem is resistant. Associated symptoms include blurred vision, chest pain and malaise/fatigue. Pertinent negatives include no anxiety, headaches, neck pain, orthopnea, palpitations, peripheral edema, PND, shortness of breath or sweats. Agents associated with hypertension include NSAIDs. There are no known risk factors for coronary artery disease. Past treatments include nothing. The  current treatment provides significant improvement. Compliance problems include exercise.  There is no history of chronic renal disease.   Hyperlipidemia  This is a chronic problem. The current episode started more than 1 year ago. The problem is uncontrolled. Recent lipid tests were reviewed and are high. She has no history of chronic renal disease, diabetes, hypothyroidism, liver disease, obesity or nephrotic syndrome. There are no known factors aggravating her hyperlipidemia. Associated symptoms include chest pain and myalgias. Pertinent negatives include no focal sensory loss, leg pain or shortness of breath. Current antihyperlipidemic treatment includes diet change. The current treatment provides mild improvement of lipids. There are no compliance problems.  Risk factors for coronary artery disease include dyslipidemia.   Review of Systems   Constitutional:  Positive for activity change and malaise/fatigue. Negative for diaphoresis and unexpected weight change.   HENT:  Positive for hearing loss. Negative for nasal congestion, ear discharge, rhinorrhea, sore throat, trouble swallowing and voice change.    Eyes:  Positive for blurred vision. Negative for pain, discharge and visual disturbance.   Respiratory: Negative.  Negative for chest tightness, shortness of breath and wheezing.    Cardiovascular:  Positive for chest pain. Negative for palpitations, orthopnea and PND.   Gastrointestinal:  Positive for vomiting. Negative for abdominal distention, anal bleeding, blood in stool, constipation, diarrhea and nausea.   Endocrine: Negative.  Negative for cold intolerance, polydipsia and polyuria.   Genitourinary: Negative.  Negative for decreased urine volume, difficulty urinating, dysuria, frequency, hematuria, menstrual problem and vaginal pain.   Musculoskeletal:  Positive for arthralgias, back pain and myalgias. Negative for gait problem, joint swelling, leg pain and neck pain.   Integumentary:  Negative for  color change, pallor and wound. Negative.   Allergic/Immunologic: Negative.  Negative for environmental allergies and immunocompromised state.   Neurological:  Positive for weakness. Negative for dizziness, tremors, seizures, speech difficulty and headaches.   Hematological: Negative.  Negative for adenopathy. Does not bruise/bleed easily.   Psychiatric/Behavioral: Negative.  Negative for agitation, confusion, decreased concentration, dysphoric mood, hallucinations, self-injury and suicidal ideas. The patient is not nervous/anxious.        PMH/PSH/FH/SH/MED/ALLERGY reviewed    Objective:       Vitals:    03/29/23 1300   BP: 120/78   Pulse: (!) 53   Temp: 98 °F (36.7 °C)       Physical Exam  Constitutional:       General: She is not in acute distress.     Appearance: She is well-developed. She is not diaphoretic.   HENT:      Head: Normocephalic and atraumatic.      Right Ear: External ear normal.      Left Ear: External ear normal.      Nose: Nose normal.      Mouth/Throat:      Pharynx: No oropharyngeal exudate.   Eyes:      General: No scleral icterus.        Right eye: No discharge.         Left eye: No discharge.      Conjunctiva/sclera: Conjunctivae normal.      Pupils: Pupils are equal, round, and reactive to light.   Neck:      Thyroid: No thyromegaly.      Vascular: No JVD.      Trachea: No tracheal deviation.   Cardiovascular:      Rate and Rhythm: Normal rate and regular rhythm.      Heart sounds: Normal heart sounds. No murmur heard.    No friction rub. No gallop.   Pulmonary:      Effort: Pulmonary effort is normal.      Breath sounds: Normal breath sounds. No stridor. No wheezing or rales.   Chest:      Chest wall: No tenderness.   Abdominal:      General: Bowel sounds are normal. There is no distension.      Palpations: Abdomen is soft. There is no mass.      Tenderness: There is no abdominal tenderness. There is no guarding or rebound.      Hernia: No hernia is present.   Musculoskeletal:          General: No tenderness. Normal range of motion.      Cervical back: Normal range of motion and neck supple.   Lymphadenopathy:      Cervical: No cervical adenopathy.   Skin:     General: Skin is warm and dry.      Coloration: Skin is not pale.      Findings: No erythema or rash.   Neurological:      Mental Status: She is alert and oriented to person, place, and time.      Cranial Nerves: No cranial nerve deficit.      Motor: No abnormal muscle tone.      Coordination: Coordination normal.      Deep Tendon Reflexes: Reflexes are normal and symmetric. Reflexes normal.   Psychiatric:         Behavior: Behavior normal.         Thought Content: Thought content normal.         Judgment: Judgment normal.       Assessment:       Problem List Items Addressed This Visit       Spondylosis without myelopathy    Relevant Medications    oxyCODONE-acetaminophen (PERCOCET) 5-325 mg per tablet    Sacroiliitis    Relevant Medications    oxyCODONE-acetaminophen (PERCOCET) 5-325 mg per tablet    Osteoporosis    Mixed hyperlipidemia    Insomnia    Relevant Medications    traZODone (DESYREL) 100 MG tablet    Hyperlipidemia    HTN (hypertension), benign - Primary    Coronary artery disease involving native coronary artery without angina pectoris    Chronic kidney disease, stage III (moderate)    Chronic diastolic heart failure     Other Visit Diagnoses       Breast pain, right        Relevant Medications    oxyCODONE-acetaminophen (PERCOCET) 5-325 mg per tablet    Other chronic allergic conjunctivitis of both eyes        Relevant Medications    prednisoLONE acetate (PRED FORTE) 1 % DrpS            Plan:           Cheryl was seen today for insomnia, hoarse, allergies, breast pain and pain medication request.    Diagnoses and all orders for this visit:    HTN (hypertension), benign    Osteoporosis, unspecified osteoporosis type, unspecified pathological fracture presence    Insomnia, unspecified type  -     traZODone (DESYREL) 100 MG  tablet; Take 1 tablet (100 mg total) by mouth nightly as needed for Insomnia.    Stage 3a chronic kidney disease    Coronary artery disease involving native coronary artery of native heart without angina pectoris    Mixed hyperlipidemia    Chronic diastolic heart failure    Pure hypercholesterolemia    Breast pain, right  -     oxyCODONE-acetaminophen (PERCOCET) 5-325 mg per tablet; Take 1 tablet by mouth every 8 (eight) hours as needed for Pain.    Sacroiliitis  -     oxyCODONE-acetaminophen (PERCOCET) 5-325 mg per tablet; Take 1 tablet by mouth every 8 (eight) hours as needed for Pain.    Spondylosis without myelopathy  -     oxyCODONE-acetaminophen (PERCOCET) 5-325 mg per tablet; Take 1 tablet by mouth every 8 (eight) hours as needed for Pain.    Other chronic allergic conjunctivitis of both eyes  -     prednisoLONE acetate (PRED FORTE) 1 % DrpS; Place 1 drop into both eyes 4 (four) times daily. for 10 days      HTN  -controlled    TIA  -follow neurology - DC plavix per neuro recommendations and just stay on ASA      Insomnia  -try benadryl only if needed    HLD  -diet controlled and statin    Urine incontinence  -lifestyle changes - intolerant to meds    Neuropathic pain  -continue neurontin    OA knee B/L  -mobic prn    CKD III  -lab and urine analysis  -adequate hydration  -follow nephrology    Muscle spasm  -baclofen  and percocet prn    Osteoporosis  -continue fosamax    Insomnia  -increase trazodone to 100      Spent adequate time in obtaining history and explaining differentials    25 minutes spent during this visit of which greater than 50% devoted to face-face counseling and coordination of care regarding diagnosis and management plan    RTC 3 months

## 2023-04-26 ENCOUNTER — OFFICE VISIT (OUTPATIENT)
Dept: CARDIOLOGY | Facility: CLINIC | Age: 88
End: 2023-04-26
Payer: MEDICARE

## 2023-04-26 VITALS
SYSTOLIC BLOOD PRESSURE: 170 MMHG | HEART RATE: 61 BPM | BODY MASS INDEX: 22.55 KG/M2 | DIASTOLIC BLOOD PRESSURE: 80 MMHG | OXYGEN SATURATION: 99 % | HEIGHT: 64 IN | WEIGHT: 132.06 LBS

## 2023-04-26 DIAGNOSIS — E78.2 MIXED HYPERLIPIDEMIA: ICD-10-CM

## 2023-04-26 DIAGNOSIS — R55 VASOVAGAL NEAR SYNCOPE: ICD-10-CM

## 2023-04-26 DIAGNOSIS — E78.00 PURE HYPERCHOLESTEROLEMIA: ICD-10-CM

## 2023-04-26 DIAGNOSIS — I10 HTN (HYPERTENSION), BENIGN: ICD-10-CM

## 2023-04-26 DIAGNOSIS — I25.10 CORONARY ARTERY DISEASE INVOLVING NATIVE CORONARY ARTERY OF NATIVE HEART WITHOUT ANGINA PECTORIS: ICD-10-CM

## 2023-04-26 DIAGNOSIS — I70.0 AORTIC ATHEROSCLEROSIS: Primary | ICD-10-CM

## 2023-04-26 DIAGNOSIS — R55 SYNCOPE AND COLLAPSE: ICD-10-CM

## 2023-04-26 DIAGNOSIS — I50.32 CHRONIC DIASTOLIC HEART FAILURE: ICD-10-CM

## 2023-04-26 PROCEDURE — 99999 PR PBB SHADOW E&M-EST. PATIENT-LVL III: CPT | Mod: PBBFAC,HCNC,, | Performed by: INTERNAL MEDICINE

## 2023-04-26 PROCEDURE — 1160F RVW MEDS BY RX/DR IN RCRD: CPT | Mod: HCNC,CPTII,S$GLB, | Performed by: INTERNAL MEDICINE

## 2023-04-26 PROCEDURE — 99999 PR PBB SHADOW E&M-EST. PATIENT-LVL III: ICD-10-PCS | Mod: PBBFAC,HCNC,, | Performed by: INTERNAL MEDICINE

## 2023-04-26 PROCEDURE — 3288F PR FALLS RISK ASSESSMENT DOCUMENTED: ICD-10-PCS | Mod: HCNC,CPTII,S$GLB, | Performed by: INTERNAL MEDICINE

## 2023-04-26 PROCEDURE — 99214 PR OFFICE/OUTPT VISIT, EST, LEVL IV, 30-39 MIN: ICD-10-PCS | Mod: HCNC,S$GLB,, | Performed by: INTERNAL MEDICINE

## 2023-04-26 PROCEDURE — 1159F PR MEDICATION LIST DOCUMENTED IN MEDICAL RECORD: ICD-10-PCS | Mod: HCNC,CPTII,S$GLB, | Performed by: INTERNAL MEDICINE

## 2023-04-26 PROCEDURE — 1159F MED LIST DOCD IN RCRD: CPT | Mod: HCNC,CPTII,S$GLB, | Performed by: INTERNAL MEDICINE

## 2023-04-26 PROCEDURE — 3288F FALL RISK ASSESSMENT DOCD: CPT | Mod: HCNC,CPTII,S$GLB, | Performed by: INTERNAL MEDICINE

## 2023-04-26 PROCEDURE — 1101F PR PT FALLS ASSESS DOC 0-1 FALLS W/OUT INJ PAST YR: ICD-10-PCS | Mod: HCNC,CPTII,S$GLB, | Performed by: INTERNAL MEDICINE

## 2023-04-26 PROCEDURE — 1101F PT FALLS ASSESS-DOCD LE1/YR: CPT | Mod: HCNC,CPTII,S$GLB, | Performed by: INTERNAL MEDICINE

## 2023-04-26 PROCEDURE — 99214 OFFICE O/P EST MOD 30 MIN: CPT | Mod: HCNC,S$GLB,, | Performed by: INTERNAL MEDICINE

## 2023-04-26 PROCEDURE — 1160F PR REVIEW ALL MEDS BY PRESCRIBER/CLIN PHARMACIST DOCUMENTED: ICD-10-PCS | Mod: HCNC,CPTII,S$GLB, | Performed by: INTERNAL MEDICINE

## 2023-04-26 NOTE — PROGRESS NOTES
Subjective:   @Patient ID:  Cheryl Ghosh is a 88 y.o. female who presents for evaluation of episode of hypotension       HPI:   Here for f/u   She is accompanied by her   Since last visit she had few episodes of syncope  She describes the events as sudden onset of dizziness, nausea, vomiting. She lost consciousness for few minutes.  It was associated with loss of bowel control.  No reported seizure activity.  The episodes are not orthostatic.  Last time she had it was in March 2023( bp was normal).  Prior to that it was in  9/2022 ( it was associated with low bp readings).  No associated chest pain or palpitation  Blood pressure has running high.  She does take the Norvasc 2.5 mg with a higher readings.  Previously she could not tolerated taking every day because was making her feel bad blood pressure in the 120s. When her BP in 130s/140s she feels better      Historically:    She saw Dr. Rojo and Dr. Arce in the past   She also saw cardiology at [facility] 3-4 years ago   She has been getting intermittent episode of pre-syncope. That was associated with hypotensive bp readings. Symptoms has been present for many years it seems have gotten worse lately  The symptoms mostly happens when she is standing. She doesn't recall much symptoms she was sitting . Symptoms got worse when amlodipine switched to losartan     When her BP in 140s she feels better    Reportes to have TIA in the past and was evaluated at [facility] and had mild carotid artery disease for which she is on statin and aspirin. Also had stress test at [facility] that was reported to be ok per the patient            Prior cardiovascular  Hx  --------------------------------  - Echo 9/2022  The left ventricle is normal in size with normal systolic function.  The estimated ejection fraction is 60%.  Grade I left ventricular diastolic dysfunction.  Mild mitral regurgitation.  Mild aortic regurgitation.  Mild to moderate tricuspid regurgitation.  Mild  pulmonic regurgitation.  Normal right ventricular size with normal right ventricular systolic function.  There is no pulmonary hypertension.       - ECHO 11/2017   CONCLUSIONS     1 - Normal left ventricular systolic function (EF 60-65%).     2 - Impaired LV relaxation, normal LAP (grade 1 diastolic dysfunction).     3 - No wall motion abnormalities.     4 - Normal right ventricular systolic function .     5 - The estimated PA systolic pressure is 23 mmHg.     - EKG 3/9/2021 Sinus bradycardia            Patient Active Problem List    Diagnosis Date Noted    Osteoporosis 11/15/2022    Insomnia 11/15/2022    Mixed hyperlipidemia 10/14/2022    Pre-syncope 07/21/2021    Vasovagal near syncope 03/24/2021    Syncope and collapse 03/09/2021    Coronary artery disease involving native coronary artery without angina pectoris 03/09/2021    Other nonthrombocytopenic purpura 02/02/2021    Decreased range of motion of both hips 01/27/2020    Bilateral impacted cerumen 10/17/2019    Sacroiliac joint dysfunction of left side 07/31/2019    Pain of left sacroiliac joint 06/19/2019    Sacroiliac joint dysfunction of right side 02/06/2019    Chronic SI joint pain 12/20/2018    Secondary hyperparathyroidism of renal origin 10/12/2018    Chronic pain 08/28/2018    Sacroiliitis 07/24/2018    HTN (hypertension), benign 04/11/2018    Chronic diastolic heart failure 11/25/2017    TIA (transient ischemic attack) 11/24/2017    Hyperlipidemia 08/02/2017    Chronic kidney disease, stage III (moderate) 08/02/2017    SOB (shortness of breath) 05/26/2017    Neuropathy 12/02/2016    Female bladder prolapse 07/22/2016    Urinary incontinence 12/07/2015    Left lumbar radiculopathy 08/13/2015    Osteopenia 02/26/2015    OA (osteoarthritis) of knee 01/05/2015    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    Aortic atherosclerosis 04/24/2014     Seen on chest CT dated 04/24/14        Greater trochanteric bursitis of both hips 01/27/2014    Spondylosis without  myelopathy 2013    Facet arthritis of lumbar region 10/02/2012    Lumbar spondylosis 2012     Seen on lumbar MRI dated 12               Right Arm BP - Sittin/72  Left Arm BP - Sittin/80        LAST HbA1c  Lab Results   Component Value Date    HGBA1C 5.2 2017       Lipid panel  Lab Results   Component Value Date    CHOL 160 2022    CHOL 140 2021    CHOL 157 2021     Lab Results   Component Value Date    HDL 49 2022    HDL 47 2021    HDL 43 2021     Lab Results   Component Value Date    LDLCALC 82.4 2022    LDLCALC 75.2 2021    LDLCALC 89.4 2021     Lab Results   Component Value Date    TRIG 143 2022    TRIG 89 2021    TRIG 123 2021     Lab Results   Component Value Date    CHOLHDL 30.6 2022    CHOLHDL 33.6 2021    CHOLHDL 27.4 2021            Review of Systems   Constitutional: Negative for chills and fever.   HENT:  Negative for hearing loss and nosebleeds.    Cardiovascular:  Negative for leg swelling.        As in HPI    Respiratory:  Negative for hemoptysis.    Hematologic/Lymphatic: Negative for bleeding problem.   Skin:         Bruising    Musculoskeletal:         As in HPI    Gastrointestinal:  Negative for abdominal pain and hematochezia.   Genitourinary:  Negative for hematuria.   Neurological:         As in HPI    Psychiatric/Behavioral:  Negative for altered mental status and depression.      Objective:   Physical Exam  Constitutional:       Appearance: She is well-developed.   HENT:      Head: Normocephalic and atraumatic.   Eyes:      Conjunctiva/sclera: Conjunctivae normal.   Neck:      Vascular: No carotid bruit or JVD.   Cardiovascular:      Rate and Rhythm: Normal rate and regular rhythm.      Pulses:           Carotid pulses are 2+ on the right side and 2+ on the left side.       Radial pulses are 2+ on the right side and 2+ on the left side.      Heart sounds: Normal heart  sounds. No murmur heard.    No friction rub. No gallop.   Pulmonary:      Effort: Pulmonary effort is normal. No respiratory distress.      Breath sounds: Normal breath sounds. No stridor. No wheezing.   Musculoskeletal:      Cervical back: Neck supple.   Skin:     General: Skin is warm and dry.   Neurological:      Mental Status: She is alert and oriented to person, place, and time.   Psychiatric:         Behavior: Behavior normal.       Assessment:     1. Aortic atherosclerosis    2. Pure hypercholesterolemia    3. Chronic diastolic heart failure    4. HTN (hypertension), benign    5. Coronary artery disease involving native coronary artery of native heart without angina pectoris    6. Mixed hyperlipidemia    7. Vasovagal near syncope    8. Syncope and collapse          Plan:     - I am Concerned if this syncopal events related to arrhythmias.  Her baseline heart rate is toward the lower side.  Given the events is not very frequent we will start with 30 days event monitor.  If the event monitor negative and she did not have recurrent syncope during the monitoring.  Then may consider ILR      - Will continue amlodipine. Hold with BP < 120 systolic    - She becomes very symptomatic with BP in  low 100s. Liberate BP  Goal will be accepted. SBP in 140s given her symptoms    - Discussed about avoiding sudden standing up from seated position. Leg exercise before standing. Compression stocking       - Continue ASA/Statin     Pertinent cardiac images and EKG reviewed independently.    Continue with current medical plan and lifestyle changes.  Return sooner for concerns or questions. If symptoms persist go to the ED  I have reviewed all pertinent data including patient's medical history in detail and updated the computerized patient record.     Orders Placed This Encounter   Procedures    Cardiac event monitor     Standing Status:   Future     Standing Expiration Date:   4/26/2024     Order Specific Question:   Cardiac  Event Monitor     Answer:   Auto Trigger     Order Specific Question:   Release to patient     Answer:   Immediate         Follow up as scheduled.     She expressed verbal understanding and agreed with the plan    Patient's Medications   New Prescriptions    No medications on file   Previous Medications    ACETAMINOPHEN (TYLENOL 8 HOUR ORAL)    Take by mouth.    ALENDRONATE (FOSAMAX) 70 MG TABLET    Take 1 tablet (70 mg total) by mouth every 7 days.    AMLODIPINE (NORVASC) 2.5 MG TABLET    TAKE 1 TABLET EVERY DAY    ASPIRIN (ECOTRIN) 81 MG EC TABLET    Take 1 tablet (81 mg total) by mouth once daily. Resume 48 hours post-surgery    B COMPLEX VITAMINS TABLET    Take 1 tablet by mouth once daily.    BACLOFEN (LIORESAL) 10 MG TABLET    Take 1 tablet (10 mg total) by mouth 3 (three) times daily.    CALCIUM-VITAMIN D 600 MG(1,500MG) -400 UNIT TAB    once daily.     GABAPENTIN (NEURONTIN) 300 MG CAPSULE    TAKE 1 CAPSULE THREE TIMES DAILY    OXYCODONE-ACETAMINOPHEN (PERCOCET) 5-325 MG PER TABLET    Take 1 tablet by mouth every 8 (eight) hours as needed for Pain.    PRAVASTATIN (PRAVACHOL) 40 MG TABLET    Take 1 tablet (40 mg total) by mouth every evening.    PYRIDOXINE, VITAMIN B6, (B-6) 100 MG TAB    Take 100 mg by mouth once daily.    TRAZODONE (DESYREL) 100 MG TABLET    Take 1 tablet (100 mg total) by mouth nightly as needed for Insomnia.   Modified Medications    No medications on file   Discontinued Medications    No medications on file

## 2023-05-02 DIAGNOSIS — G47.00 INSOMNIA, UNSPECIFIED TYPE: ICD-10-CM

## 2023-05-02 RX ORDER — TRAZODONE HYDROCHLORIDE 100 MG/1
100 TABLET ORAL NIGHTLY PRN
Qty: 30 TABLET | Refills: 2 | Status: SHIPPED | OUTPATIENT
Start: 2023-05-02 | End: 2023-06-02

## 2023-05-02 NOTE — TELEPHONE ENCOUNTER
No care due was identified.  [facility] Embedded Care Due Messages. Reference number: [id number].   5/02/2023 9:31:04 AM CDT

## 2023-05-17 ENCOUNTER — CLINICAL SUPPORT (OUTPATIENT)
Dept: CARDIOLOGY | Facility: HOSPITAL | Age: 88
End: 2023-05-17
Attending: INTERNAL MEDICINE
Payer: MEDICARE

## 2023-05-17 DIAGNOSIS — R55 SYNCOPE AND COLLAPSE: ICD-10-CM

## 2023-05-17 PROCEDURE — 93272 CARDIAC EVENT MONITOR (CUPID ONLY): ICD-10-PCS | Mod: ,,, | Performed by: INTERNAL MEDICINE

## 2023-05-17 PROCEDURE — 93270 REMOTE 30 DAY ECG REV/REPORT: CPT

## 2023-05-17 PROCEDURE — 93272 ECG/REVIEW INTERPRET ONLY: CPT | Mod: ,,, | Performed by: INTERNAL MEDICINE

## 2023-05-23 ENCOUNTER — OFFICE VISIT (OUTPATIENT)
Dept: FAMILY MEDICINE | Facility: CLINIC | Age: 88
End: 2023-05-23
Attending: FAMILY MEDICINE
Payer: MEDICARE

## 2023-05-23 VITALS
BODY MASS INDEX: 22.92 KG/M2 | OXYGEN SATURATION: 98 % | HEIGHT: 64 IN | HEART RATE: 54 BPM | WEIGHT: 134.25 LBS | DIASTOLIC BLOOD PRESSURE: 66 MMHG | SYSTOLIC BLOOD PRESSURE: 126 MMHG

## 2023-05-23 DIAGNOSIS — M46.1 SACROILIITIS: ICD-10-CM

## 2023-05-23 DIAGNOSIS — I50.32 CHRONIC DIASTOLIC HEART FAILURE: ICD-10-CM

## 2023-05-23 DIAGNOSIS — I10 HTN (HYPERTENSION), BENIGN: Primary | ICD-10-CM

## 2023-05-23 DIAGNOSIS — E78.2 MIXED HYPERLIPIDEMIA: ICD-10-CM

## 2023-05-23 DIAGNOSIS — N64.4 BREAST PAIN, RIGHT: ICD-10-CM

## 2023-05-23 DIAGNOSIS — M47.819 SPONDYLOSIS WITHOUT MYELOPATHY: ICD-10-CM

## 2023-05-23 DIAGNOSIS — G47.00 INSOMNIA, UNSPECIFIED TYPE: ICD-10-CM

## 2023-05-23 DIAGNOSIS — M81.0 OSTEOPOROSIS, UNSPECIFIED OSTEOPOROSIS TYPE, UNSPECIFIED PATHOLOGICAL FRACTURE PRESENCE: ICD-10-CM

## 2023-05-23 DIAGNOSIS — N18.31 STAGE 3A CHRONIC KIDNEY DISEASE: ICD-10-CM

## 2023-05-23 DIAGNOSIS — I25.10 CORONARY ARTERY DISEASE INVOLVING NATIVE CORONARY ARTERY OF NATIVE HEART WITHOUT ANGINA PECTORIS: ICD-10-CM

## 2023-05-23 DIAGNOSIS — E78.00 PURE HYPERCHOLESTEROLEMIA: ICD-10-CM

## 2023-05-23 PROCEDURE — 1160F PR REVIEW ALL MEDS BY PRESCRIBER/CLIN PHARMACIST DOCUMENTED: ICD-10-PCS | Mod: CPTII,S$GLB,, | Performed by: FAMILY MEDICINE

## 2023-05-23 PROCEDURE — 1159F MED LIST DOCD IN RCRD: CPT | Mod: CPTII,S$GLB,, | Performed by: FAMILY MEDICINE

## 2023-05-23 PROCEDURE — 1101F PR PT FALLS ASSESS DOC 0-1 FALLS W/OUT INJ PAST YR: ICD-10-PCS | Mod: CPTII,S$GLB,, | Performed by: FAMILY MEDICINE

## 2023-05-23 PROCEDURE — 1101F PT FALLS ASSESS-DOCD LE1/YR: CPT | Mod: CPTII,S$GLB,, | Performed by: FAMILY MEDICINE

## 2023-05-23 PROCEDURE — 3288F PR FALLS RISK ASSESSMENT DOCUMENTED: ICD-10-PCS | Mod: CPTII,S$GLB,, | Performed by: FAMILY MEDICINE

## 2023-05-23 PROCEDURE — 99999 PR PBB SHADOW E&M-EST. PATIENT-LVL IV: ICD-10-PCS | Mod: PBBFAC,,, | Performed by: FAMILY MEDICINE

## 2023-05-23 PROCEDURE — 1126F PR PAIN SEVERITY QUANTIFIED, NO PAIN PRESENT: ICD-10-PCS | Mod: CPTII,S$GLB,, | Performed by: FAMILY MEDICINE

## 2023-05-23 PROCEDURE — 1126F AMNT PAIN NOTED NONE PRSNT: CPT | Mod: CPTII,S$GLB,, | Performed by: FAMILY MEDICINE

## 2023-05-23 PROCEDURE — 1159F PR MEDICATION LIST DOCUMENTED IN MEDICAL RECORD: ICD-10-PCS | Mod: CPTII,S$GLB,, | Performed by: FAMILY MEDICINE

## 2023-05-23 PROCEDURE — 3288F FALL RISK ASSESSMENT DOCD: CPT | Mod: CPTII,S$GLB,, | Performed by: FAMILY MEDICINE

## 2023-05-23 PROCEDURE — 99999 PR PBB SHADOW E&M-EST. PATIENT-LVL IV: CPT | Mod: PBBFAC,,, | Performed by: FAMILY MEDICINE

## 2023-05-23 PROCEDURE — 1160F RVW MEDS BY RX/DR IN RCRD: CPT | Mod: CPTII,S$GLB,, | Performed by: FAMILY MEDICINE

## 2023-05-23 PROCEDURE — 99214 PR OFFICE/OUTPT VISIT, EST, LEVL IV, 30-39 MIN: ICD-10-PCS | Mod: S$GLB,,, | Performed by: FAMILY MEDICINE

## 2023-05-23 PROCEDURE — 99214 OFFICE O/P EST MOD 30 MIN: CPT | Mod: S$GLB,,, | Performed by: FAMILY MEDICINE

## 2023-05-23 RX ORDER — OXYCODONE AND ACETAMINOPHEN 5; 325 MG/1; MG/1
1 TABLET ORAL EVERY 8 HOURS PRN
Qty: 21 TABLET | Refills: 0 | Status: SHIPPED | OUTPATIENT
Start: 2023-05-23

## 2023-05-23 NOTE — PROGRESS NOTES
Subjective:       Patient ID: Cheryl Ghosh is a 88 y.o. female.    Chief Complaint: Follow-up, Digital HTN, Insomnia, and Knee Pain    87 yr old pleasant white female with HTN, OA, osteopenia hip, hyperlipidemia, chronic back pain, BPPV, presents today for her 3 month check. C/o breast pain and insomnia. It is improving but it comes and goes. She already had testing few months ago.      HTN - controlled - low salt diet - having issues with medications lisinopril n losartan      HLD - slightly worsening since stopped statin x 30 days           - she was asked to start statin but due to her fall and issues with her ribs and back, she was never able to start it.    Back pain - she follows pain clinic for injections - c/o neuropathic pain in legs - was on neurontin in the past and she started taking it and working    Osteoporosis - started back on fosamax - doing well - next dexa in a year    HISTORY as below - reviewed    Health maintenance  -labs UTD  -mammo UTD  -colon screen UTD  -vaccines UTD    Follow-up  Associated symptoms include arthralgias, chest pain, myalgias, vomiting and weakness. Pertinent negatives include no congestion, diaphoresis, headaches, joint swelling, nausea, neck pain or sore throat.   Knee Pain     Back Pain  Associated symptoms include chest pain and weakness. Pertinent negatives include no dysuria, headaches or leg pain.   Hip Pain     Hypertension  This is a chronic problem. The current episode started more than 1 year ago. The problem has been waxing and waning since onset. The problem is resistant. Associated symptoms include blurred vision, chest pain and malaise/fatigue. Pertinent negatives include no anxiety, headaches, neck pain, orthopnea, palpitations, peripheral edema, PND, shortness of breath or sweats. Agents associated with hypertension include NSAIDs. There are no known risk factors for coronary artery disease. Past treatments include nothing. The current treatment  provides significant improvement. Compliance problems include exercise.  There is no history of chronic renal disease.   Hyperlipidemia  This is a chronic problem. The current episode started more than 1 year ago. The problem is uncontrolled. Recent lipid tests were reviewed and are high. She has no history of chronic renal disease, diabetes, hypothyroidism, liver disease, obesity or nephrotic syndrome. There are no known factors aggravating her hyperlipidemia. Associated symptoms include chest pain and myalgias. Pertinent negatives include no focal sensory loss, leg pain or shortness of breath. Current antihyperlipidemic treatment includes diet change. The current treatment provides mild improvement of lipids. There are no compliance problems.  Risk factors for coronary artery disease include dyslipidemia.   Review of Systems   Constitutional:  Positive for activity change and malaise/fatigue. Negative for diaphoresis and unexpected weight change.   HENT:  Positive for hearing loss. Negative for nasal congestion, ear discharge, rhinorrhea, sore throat, trouble swallowing and voice change.    Eyes:  Positive for blurred vision. Negative for pain, discharge and visual disturbance.   Respiratory: Negative.  Negative for chest tightness, shortness of breath and wheezing.    Cardiovascular:  Positive for chest pain. Negative for palpitations, orthopnea and PND.   Gastrointestinal:  Positive for vomiting. Negative for abdominal distention, anal bleeding, blood in stool, constipation, diarrhea and nausea.   Endocrine: Negative.  Negative for cold intolerance, polydipsia and polyuria.   Genitourinary: Negative.  Negative for decreased urine volume, difficulty urinating, dysuria, frequency, hematuria, menstrual problem and vaginal pain.   Musculoskeletal:  Positive for arthralgias, back pain and myalgias. Negative for gait problem, joint swelling, leg pain and neck pain.   Integumentary:  Negative for color change, pallor  and wound. Negative.   Allergic/Immunologic: Negative.  Negative for environmental allergies and immunocompromised state.   Neurological:  Positive for weakness. Negative for dizziness, tremors, seizures, speech difficulty and headaches.   Hematological: Negative.  Negative for adenopathy. Does not bruise/bleed easily.   Psychiatric/Behavioral: Negative.  Negative for agitation, confusion, decreased concentration, dysphoric mood, hallucinations, self-injury and suicidal ideas. The patient is not nervous/anxious.        PMH/PSH/FH/SH/MED/ALLERGY reviewed    Past Medical History:   Diagnosis Date    Arthritis     lumbar    Blood transfusion     Chronic kidney disease, stage III (moderate) 8/2/2017    Coronary artery disease involving native coronary artery without angina pectoris 3/9/2021    Degenerative disc disease     lumbar    Insomnia 11/15/2022    Insulinoma     Pancreatic disease     Urinary incontinence 12/7/2015       Past Surgical History:   Procedure Laterality Date    ADENOIDECTOMY      APPENDECTOMY      CATARACT EXTRACTION, BILATERAL      EYE SURGERY      HYSTERECTOMY      SAMM/BSO    INJECTION OF JOINT Bilateral 2/4/2020    Procedure: Injection, Joint--Bilateral GTB;  Surgeon: Arina Carrera Jr., MD;  Location: [facility] PAIN [facility]GT;  Service: Pain Management;  Laterality: Bilateral;  Oral sedation    INJECTION OF STEROID Right 12/20/2018    Procedure: INJECTION, STEROID-- Right SI Joint Block and  Steroid Injection;  Surgeon: Xavier Dasilva MD;  Location: [facility] OR;  Service: Neurosurgery;  Laterality: Right;  INJECTION, STEROID-- Right SI Joint Block and  Steroid Injection  SURGERY TIME:1 HR  LOS: 0 DAYS  RADIOLOGY: C- arm  BED: Regular Bed  with Pillows  POSITION: Prone      INJECTION OF STEROID Left 6/19/2019    Procedure: INJECTION, STEROID Procedure:Left sacroiliac joint block / steroid injection Surgery Time: 30mins LOS: Anesthesia: MAC Radiology: C-arm Bed: Regular Bed Position: Prone;  Surgeon: Xavier  ADAM Dasilva MD;  Location: [facility] OR;  Service: Neurosurgery;  Laterality: Left;    MINIMALLY INVASIVE FUSION OF SPINE Right 2/6/2019    Procedure: FUSION, SPINE, MINIMALLY INVASIVE Right Sacroiliac Joint Fusion -  I Fuse;  Surgeon: Xavier Dasilva MD;  Location: [facility] OR;  Service: Neurosurgery;  Laterality: Right;  Procedure: Right Sacroiliac Joint Fusion -  I Fuse  Surgery Time: 1.5 Hr  LOS: 0  Anesthesia: General  Radiology: C-Arm  Bed: Zaki 4 Poster  Position: Prone  Equipment: I Randy Kincaid [phone] (notified 1/24 )    OOPHORECTOMY      PANCREAS SURGERY      Insulanoma    RADIOFREQUENCY ABLATION OF LUMBAR MEDIAL BRANCH NERVE AT SINGLE LEVEL Right 8/28/2018    Procedure: RADIOFREQUENCY ABLATION, NERVE, MEDIAL BRANCH, LUMBAR, 1 LEVEL- Right L3,4,5 IV SEDATION;  Surgeon: Eddie Vega MD;  Location: [facility] PAIN [facility]GT;  Service: Pain Management;  Laterality: Right;    RADIOFREQUENCY ABLATION OF LUMBAR MEDIAL BRANCH NERVE AT SINGLE LEVEL Left 9/4/2018    Procedure: RADIOFREQUENCY ABLATION, NERVE, MEDIAL BRANCH, LUMBAR, 1 LEVEL - Left L3,4,5 IV SEADTION;  Surgeon: Eddie Vega MD;  Location: [facility] PAIN [facility]GT;  Service: Pain Management;  Laterality: Left;  Patient takes ASA     REFRACTIVE SURGERY Bilateral     SPINAL FUSION Left 7/31/2019    Procedure: FUSION, SPINE SI Joint Fusion;  Surgeon: Xavier Dasilva MD;  Location: [facility] OR;  Service: Neurosurgery;  Laterality: Left;  Procedure: Left SI joint fusion  Surgery Time: 1.5hr  LOS:0  Anesthesia: General   Radiology: C-arm   Bed: Zaki 4 Poster  Position: Prone  Equipment: Codi Mabry [phone] (Codi notified)    TONSILLECTOMY         Family History   Problem Relation Age of Onset    Breast cancer Mother     Heart disease Father     Aneurysm Sister     Glaucoma Daughter     Heart attack Son     Breast cancer Maternal Grandmother     Breast cancer Other        Social History     Socioeconomic History    Marital status:    Tobacco Use     Smoking status: Never    Smokeless tobacco: Never   Substance and Sexual Activity    Alcohol use: Yes     Alcohol/week: 1.0 - 2.0 standard drink     Types: 1 - 2 Glasses of wine per week    Drug use: No    Sexual activity: Yes     Partners: Male     Social Determinants of Health     Financial Resource Strain: Low Risk     Difficulty of Paying Living Expenses: Not hard at all   Food Insecurity: No Food Insecurity    Worried About Running Out of Food in the Last Year: Never true    Ran Out of Food in the Last Year: Never true   Transportation Needs: No Transportation Needs    Lack of Transportation (Medical): No    Lack of Transportation (Non-Medical): No   Physical Activity: Insufficiently Active    Days of Exercise per Week: 2 days    Minutes of Exercise per Session: 10 min   Stress: Stress Concern Present    Feeling of Stress : To some extent   Social Connections: Unknown    Frequency of Communication with Friends and Family: Once a week    Frequency of Social Gatherings with Friends and Family: Twice a week    Active Member of Clubs or Organizations: Yes    Attends Club or Organization Meetings: More than 4 times per year    Marital Status:    Housing Stability: Low Risk     Unable to Pay for Housing in the Last Year: No    Number of Places Lived in the Last Year: 1    Unstable Housing in the Last Year: No       Current Outpatient Medications   Medication Sig Dispense Refill    acetaminophen (TYLENOL 8 HOUR ORAL) Take by mouth.      alendronate (FOSAMAX) 70 MG tablet Take 1 tablet (70 mg total) by mouth every 7 days. 4 tablet 11    amLODIPine (NORVASC) 2.5 MG tablet TAKE 1 TABLET EVERY DAY 90 tablet 3    aspirin (ECOTRIN) 81 MG EC tablet Take 1 tablet (81 mg total) by mouth once daily. Resume 48 hours post-surgery  0    b complex vitamins tablet Take 1 tablet by mouth once daily.      baclofen (LIORESAL) 10 MG tablet Take 1 tablet (10 mg total) by mouth 3 (three) times daily. 270 tablet 4    calcium-vitamin  D 600 mg(1,500mg) -400 unit Tab once daily.       gabapentin (NEURONTIN) 300 MG capsule TAKE 1 CAPSULE THREE TIMES DAILY 270 capsule 3    pravastatin (PRAVACHOL) 40 MG tablet Take 1 tablet (40 mg total) by mouth every evening. 90 tablet 3    pyridoxine, vitamin B6, (B-6) 100 MG Tab Take 100 mg by mouth once daily.      traZODone (DESYREL) 100 MG tablet Take 1 tablet (100 mg total) by mouth nightly as needed for Insomnia. 30 tablet 2    oxyCODONE-acetaminophen (PERCOCET) 5-325 mg per tablet Take 1 tablet by mouth every 8 (eight) hours as needed for Pain. 21 tablet 0     No current facility-administered medications for this visit.     Facility-Administered Medications Ordered in Other Visits   Medication Dose Route Frequency Provider Last Rate Last Admin    gentamicin 80 mg/100ml NACL IVPB IVPB 80 mg  80 mg Intravenous 30 Min Pre-Op Wiliam Urias PA-C   80 mg at 07/31/19 0747    vancomycin in dextrose 5 % 1 gram/250 mL IVPB 1,000 mg  1,000 mg Intravenous 30 Min Pre-Op Wiliam Urias PA-C   1,000 mg at 07/31/19 0738       Review of patient's allergies indicates:   Allergen Reactions    Pcn [penicillins] Hives and Nausea And Vomiting       Objective:       Vitals:    05/23/23 0933   BP: 126/66   Pulse: (!) 54       Physical Exam  Constitutional:       General: She is not in acute distress.     Appearance: She is well-developed. She is not diaphoretic.   HENT:      Head: Normocephalic and atraumatic.      Right Ear: External ear normal.      Left Ear: External ear normal.      Nose: Nose normal.      Mouth/Throat:      Pharynx: No oropharyngeal exudate.   Eyes:      General: No scleral icterus.        Right eye: No discharge.         Left eye: No discharge.      Conjunctiva/sclera: Conjunctivae normal.      Pupils: Pupils are equal, round, and reactive to light.   Neck:      Thyroid: No thyromegaly.      Vascular: No JVD.      Trachea: No tracheal deviation.   Cardiovascular:      Rate and Rhythm: Normal rate and  regular rhythm.      Heart sounds: Normal heart sounds. No murmur heard.    No friction rub. No gallop.   Pulmonary:      Effort: Pulmonary effort is normal.      Breath sounds: Normal breath sounds. No stridor. No wheezing or rales.   Chest:      Chest wall: No tenderness.   Abdominal:      General: Bowel sounds are normal. There is no distension.      Palpations: Abdomen is soft. There is no mass.      Tenderness: There is no abdominal tenderness. There is no guarding or rebound.      Hernia: No hernia is present.   Musculoskeletal:         General: No tenderness. Normal range of motion.      Cervical back: Normal range of motion and neck supple.   Lymphadenopathy:      Cervical: No cervical adenopathy.   Skin:     General: Skin is warm and dry.      Coloration: Skin is not pale.      Findings: No erythema or rash.   Neurological:      Mental Status: She is alert and oriented to person, place, and time.      Cranial Nerves: No cranial nerve deficit.      Motor: No abnormal muscle tone.      Coordination: Coordination normal.      Deep Tendon Reflexes: Reflexes are normal and symmetric. Reflexes normal.   Psychiatric:         Behavior: Behavior normal.         Thought Content: Thought content normal.         Judgment: Judgment normal.       Assessment:       Problem List Items Addressed This Visit       Spondylosis without myelopathy    Relevant Medications    oxyCODONE-acetaminophen (PERCOCET) 5-325 mg per tablet    Sacroiliitis    Relevant Medications    oxyCODONE-acetaminophen (PERCOCET) 5-325 mg per tablet    Osteoporosis    Mixed hyperlipidemia    Insomnia    Hyperlipidemia    HTN (hypertension), benign - Primary    Coronary artery disease involving native coronary artery without angina pectoris    Chronic kidney disease, stage III (moderate)    Chronic diastolic heart failure     Other Visit Diagnoses       Breast pain, right        Relevant Medications    oxyCODONE-acetaminophen (PERCOCET) 5-325 mg per  tablet            Plan:           Cheryl was seen today for follow-up, digital htn, insomnia and knee pain.    Diagnoses and all orders for this visit:    HTN (hypertension), benign    Stage 3a chronic kidney disease    Osteoporosis, unspecified osteoporosis type, unspecified pathological fracture presence    Coronary artery disease involving native coronary artery of native heart without angina pectoris    Insomnia, unspecified type    Mixed hyperlipidemia    Chronic diastolic heart failure    Pure hypercholesterolemia    Breast pain, right  -     oxyCODONE-acetaminophen (PERCOCET) 5-325 mg per tablet; Take 1 tablet by mouth every 8 (eight) hours as needed for Pain.    Sacroiliitis  -     oxyCODONE-acetaminophen (PERCOCET) 5-325 mg per tablet; Take 1 tablet by mouth every 8 (eight) hours as needed for Pain.    Spondylosis without myelopathy  -     oxyCODONE-acetaminophen (PERCOCET) 5-325 mg per tablet; Take 1 tablet by mouth every 8 (eight) hours as needed for Pain.      HTN  -controlled    TIA  -follow neurology - DC plavix per neuro recommendations and just stay on ASA      Insomnia  -try benadryl only if needed    HLD  -diet controlled and statin    Urine incontinence  -lifestyle changes - intolerant to meds    Neuropathic pain  -continue neurontin    OA knee B/L  -mobic prn    CKD III  -lab and urine analysis  -adequate hydration  -follow nephrology    Muscle spasm  -baclofen  and percocet prn    Osteoporosis  -continue fosamax    Insomnia  -increase trazodone to 150      Spent adequate time in obtaining history and explaining differentials    30  minutes spent during this visit of which greater than 50% devoted to face-face counseling and coordination of care regarding diagnosis and management plan    RTC 3 months

## 2023-05-24 ENCOUNTER — TELEPHONE (OUTPATIENT)
Dept: CARDIOLOGY | Facility: CLINIC | Age: 88
End: 2023-05-24
Payer: MEDICARE

## 2023-05-24 NOTE — TELEPHONE ENCOUNTER
----- Message from Johana Aldana sent at 5/24/2023  9:33 AM CDT -----  Type: Holter monitor     Who Called:pt  Does the patient know what this is regarding?:received Holter monitor today and don't know how to hook it up  Would like to know if someone in the office can show her in person  Would the patient rather a call back or a response via [employer]? Call   Best Call Back Number: [phone]  Additional Information:

## 2023-06-01 ENCOUNTER — PATIENT MESSAGE (OUTPATIENT)
Dept: FAMILY MEDICINE | Facility: CLINIC | Age: 88
End: 2023-06-01
Payer: MEDICARE

## 2023-06-02 DIAGNOSIS — G47.00 INSOMNIA, UNSPECIFIED TYPE: ICD-10-CM

## 2023-06-02 RX ORDER — TRAZODONE HYDROCHLORIDE 100 MG/1
150 TABLET ORAL NIGHTLY PRN
Qty: 135 TABLET | Refills: 2 | Status: SHIPPED | OUTPATIENT
Start: 2023-06-02 | End: 2024-01-08 | Stop reason: SDUPTHER

## 2023-07-06 ENCOUNTER — PATIENT MESSAGE (OUTPATIENT)
Dept: CARDIOLOGY | Facility: CLINIC | Age: 88
End: 2023-07-06
Payer: MEDICARE

## 2023-07-06 NOTE — PROGRESS NOTES
Ms. Ghosh, your event monitor results is normal. No significant arrhythmias     Sincerely,  Zion Sewell MD.   Interventional Cardiologist  [facility], [place]

## 2023-07-14 DIAGNOSIS — I10 HTN (HYPERTENSION), BENIGN: Primary | ICD-10-CM

## 2023-07-14 DIAGNOSIS — N18.31 STAGE 3A CHRONIC KIDNEY DISEASE: ICD-10-CM

## 2023-07-28 DIAGNOSIS — M62.838 MUSCLE SPASM: ICD-10-CM

## 2023-07-28 DIAGNOSIS — M46.1 SACROILIITIS: ICD-10-CM

## 2023-07-28 RX ORDER — BACLOFEN 10 MG/1
10 TABLET ORAL 3 TIMES DAILY
Qty: 270 TABLET | Refills: 4 | Status: SHIPPED | OUTPATIENT
Start: 2023-07-28

## 2023-08-23 ENCOUNTER — OFFICE VISIT (OUTPATIENT)
Dept: FAMILY MEDICINE | Facility: CLINIC | Age: 88
End: 2023-08-23
Attending: FAMILY MEDICINE
Payer: MEDICARE

## 2023-08-23 ENCOUNTER — PES CALL (OUTPATIENT)
Dept: ADMINISTRATIVE | Facility: CLINIC | Age: 88
End: 2023-08-23
Payer: MEDICARE

## 2023-08-23 VITALS
TEMPERATURE: 98 F | WEIGHT: 132.25 LBS | DIASTOLIC BLOOD PRESSURE: 70 MMHG | HEART RATE: 63 BPM | HEIGHT: 64 IN | OXYGEN SATURATION: 97 % | SYSTOLIC BLOOD PRESSURE: 128 MMHG | BODY MASS INDEX: 22.58 KG/M2

## 2023-08-23 DIAGNOSIS — I10 ESSENTIAL HYPERTENSION: ICD-10-CM

## 2023-08-23 DIAGNOSIS — M81.0 OSTEOPOROSIS, UNSPECIFIED OSTEOPOROSIS TYPE, UNSPECIFIED PATHOLOGICAL FRACTURE PRESENCE: ICD-10-CM

## 2023-08-23 DIAGNOSIS — E78.5 HYPERLIPIDEMIA, UNSPECIFIED HYPERLIPIDEMIA TYPE: ICD-10-CM

## 2023-08-23 DIAGNOSIS — E78.2 MIXED HYPERLIPIDEMIA: ICD-10-CM

## 2023-08-23 DIAGNOSIS — G57.93 NEUROPATHIC PAIN OF BOTH LEGS: ICD-10-CM

## 2023-08-23 DIAGNOSIS — I70.0 AORTIC ATHEROSCLEROSIS: ICD-10-CM

## 2023-08-23 PROCEDURE — 1160F RVW MEDS BY RX/DR IN RCRD: CPT | Mod: HCNC,CPTII,S$GLB, | Performed by: FAMILY MEDICINE

## 2023-08-23 PROCEDURE — 1160F PR REVIEW ALL MEDS BY PRESCRIBER/CLIN PHARMACIST DOCUMENTED: ICD-10-PCS | Mod: HCNC,CPTII,S$GLB, | Performed by: FAMILY MEDICINE

## 2023-08-23 PROCEDURE — 99214 OFFICE O/P EST MOD 30 MIN: CPT | Mod: HCNC,S$GLB,, | Performed by: FAMILY MEDICINE

## 2023-08-23 PROCEDURE — 1159F MED LIST DOCD IN RCRD: CPT | Mod: HCNC,CPTII,S$GLB, | Performed by: FAMILY MEDICINE

## 2023-08-23 PROCEDURE — 1159F PR MEDICATION LIST DOCUMENTED IN MEDICAL RECORD: ICD-10-PCS | Mod: HCNC,CPTII,S$GLB, | Performed by: FAMILY MEDICINE

## 2023-08-23 PROCEDURE — 99999 PR PBB SHADOW E&M-EST. PATIENT-LVL III: CPT | Mod: PBBFAC,HCNC,, | Performed by: FAMILY MEDICINE

## 2023-08-23 PROCEDURE — 99999 PR PBB SHADOW E&M-EST. PATIENT-LVL III: ICD-10-PCS | Mod: PBBFAC,HCNC,, | Performed by: FAMILY MEDICINE

## 2023-08-23 PROCEDURE — 99214 PR OFFICE/OUTPT VISIT, EST, LEVL IV, 30-39 MIN: ICD-10-PCS | Mod: HCNC,S$GLB,, | Performed by: FAMILY MEDICINE

## 2023-08-23 RX ORDER — AMLODIPINE BESYLATE 2.5 MG/1
2.5 TABLET ORAL DAILY
Qty: 90 TABLET | Refills: 3 | Status: SHIPPED | OUTPATIENT
Start: 2023-08-23

## 2023-08-23 RX ORDER — GABAPENTIN 300 MG/1
CAPSULE ORAL
Qty: 270 CAPSULE | Refills: 3 | Status: SHIPPED | OUTPATIENT
Start: 2023-08-23

## 2023-08-23 RX ORDER — ALENDRONATE SODIUM 70 MG/1
70 TABLET ORAL
Qty: 4 TABLET | Refills: 11 | Status: SHIPPED | OUTPATIENT
Start: 2023-08-23 | End: 2024-08-22

## 2023-08-23 RX ORDER — PRAVASTATIN SODIUM 40 MG/1
40 TABLET ORAL NIGHTLY
Qty: 90 TABLET | Refills: 3 | Status: SHIPPED | OUTPATIENT
Start: 2023-08-23

## 2023-08-23 NOTE — PROGRESS NOTES
Subjective:       Patient ID: Cheryl Ghosh is a 88 y.o. female.    Chief Complaint: Follow-up    88 yr old pleasant white female with HTN, CKD III, OA, osteopenia hip, hyperlipidemia, chronic back pain, BPPV, presents today for her 3 month check. No new complaints today      HTN - controlled - low salt diet - having issues with medications lisinopril n losartan      CKD III - stable - improving       HLD - slightly worsening since stopped statin x 30 days           - she was asked to start statin but due to her fall and issues with her ribs and back, she was never able to start it.    Back pain - she follows pain clinic for injections - c/o neuropathic pain in legs - was on neurontin in the past and she started taking it and working    Osteoporosis - started back on fosamax - doing well - next dexa in a year    HISTORY as below - reviewed    Health maintenance  -labs UTD  -mammo UTD  -colon screen UTD  -vaccines UTD    Follow-up  Associated symptoms include arthralgias, chest pain, myalgias, vomiting and weakness. Pertinent negatives include no congestion, diaphoresis, headaches, joint swelling, nausea, neck pain or sore throat.   Knee Pain     Back Pain  Associated symptoms include chest pain and weakness. Pertinent negatives include no dysuria, headaches or leg pain.   Hip Pain     Hypertension  This is a chronic problem. The current episode started more than 1 year ago. The problem has been waxing and waning since onset. The problem is resistant. Associated symptoms include blurred vision, chest pain and malaise/fatigue. Pertinent negatives include no anxiety, headaches, neck pain, orthopnea, palpitations, peripheral edema, PND, shortness of breath or sweats. Agents associated with hypertension include NSAIDs. There are no known risk factors for coronary artery disease. Past treatments include nothing. The current treatment provides significant improvement. Compliance problems include exercise.  There is no  history of chronic renal disease.   Hyperlipidemia  This is a chronic problem. The current episode started more than 1 year ago. The problem is uncontrolled. Recent lipid tests were reviewed and are high. She has no history of chronic renal disease, diabetes, hypothyroidism, liver disease, obesity or nephrotic syndrome. There are no known factors aggravating her hyperlipidemia. Associated symptoms include chest pain and myalgias. Pertinent negatives include no focal sensory loss, leg pain or shortness of breath. Current antihyperlipidemic treatment includes diet change. The current treatment provides mild improvement of lipids. There are no compliance problems.  Risk factors for coronary artery disease include dyslipidemia.     Review of Systems   Constitutional:  Positive for activity change and malaise/fatigue. Negative for diaphoresis and unexpected weight change.   HENT:  Positive for hearing loss. Negative for nasal congestion, ear discharge, rhinorrhea, sore throat, trouble swallowing and voice change.    Eyes:  Positive for blurred vision. Negative for pain, discharge and visual disturbance.   Respiratory: Negative.  Negative for chest tightness, shortness of breath and wheezing.    Cardiovascular:  Positive for chest pain. Negative for palpitations, orthopnea and PND.   Gastrointestinal:  Positive for vomiting. Negative for abdominal distention, anal bleeding, blood in stool, constipation, diarrhea and nausea.   Endocrine: Negative.  Negative for cold intolerance, polydipsia and polyuria.   Genitourinary: Negative.  Negative for decreased urine volume, difficulty urinating, dysuria, frequency, hematuria, menstrual problem and vaginal pain.   Musculoskeletal:  Positive for arthralgias, back pain and myalgias. Negative for gait problem, joint swelling, leg pain and neck pain.   Integumentary:  Negative for color change, pallor and wound. Negative.   Allergic/Immunologic: Negative.  Negative for environmental  allergies and immunocompromised state.   Neurological:  Positive for weakness. Negative for dizziness, tremors, seizures, speech difficulty and headaches.   Hematological: Negative.  Negative for adenopathy. Does not bruise/bleed easily.   Psychiatric/Behavioral: Negative.  Negative for agitation, confusion, decreased concentration, dysphoric mood, hallucinations, self-injury and suicidal ideas. The patient is not nervous/anxious.          PMH/PSH/FH/SH/MED/ALLERGY reviewed    Past Medical History:   Diagnosis Date    Arthritis     lumbar    Blood transfusion     Chronic kidney disease, stage III (moderate) 8/2/2017    Coronary artery disease involving native coronary artery without angina pectoris 3/9/2021    Degenerative disc disease     lumbar    Insomnia 11/15/2022    Insulinoma     Pancreatic disease     Urinary incontinence 12/7/2015       Past Surgical History:   Procedure Laterality Date    ADENOIDECTOMY      APPENDECTOMY      CATARACT EXTRACTION, BILATERAL      EYE SURGERY      HYSTERECTOMY      SAMM/BSO    INJECTION OF JOINT Bilateral 2/4/2020    Procedure: Injection, Joint--Bilateral GTB;  Surgeon: Arina Carrera Jr., MD;  Location: [facility] PAIN MGT;  Service: Pain Management;  Laterality: Bilateral;  Oral sedation    INJECTION OF STEROID Right 12/20/2018    Procedure: INJECTION, STEROID-- Right SI Joint Block and  Steroid Injection;  Surgeon: Xavier Dasilva MD;  Location: [facility] OR;  Service: Neurosurgery;  Laterality: Right;  INJECTION, STEROID-- Right SI Joint Block and  Steroid Injection  SURGERY TIME:1 HR  LOS: 0 DAYS  RADIOLOGY: C- arm  BED: Regular Bed  with Pillows  POSITION: Prone      INJECTION OF STEROID Left 6/19/2019    Procedure: INJECTION, STEROID Procedure:Left sacroiliac joint block / steroid injection Surgery Time: 30mins LOS: Anesthesia: MAC Radiology: C-arm Bed: Regular Bed Position: Prone;  Surgeon: Xavier Dasilva MD;  Location: [facility] OR;  Service: Neurosurgery;  Laterality: Left;     MINIMALLY INVASIVE FUSION OF SPINE Right 2/6/2019    Procedure: FUSION, SPINE, MINIMALLY INVASIVE Right Sacroiliac Joint Fusion -  I Fuse;  Surgeon: Xavier Dasilva MD;  Location: [facility] OR;  Service: Neurosurgery;  Laterality: Right;  Procedure: Right Sacroiliac Joint Fusion -  I Fuse  Surgery Time: 1.5 Hr  LOS: 0  Anesthesia: General  Radiology: C-Arm  Bed: Anderson 4 Poster  Position: Prone  Equipment: TAY Kincaid [phone] (notified 1/24 )    OOPHORECTOMY      PANCREAS SURGERY      Insulanoma    RADIOFREQUENCY ABLATION OF LUMBAR MEDIAL BRANCH NERVE AT SINGLE LEVEL Right 8/28/2018    Procedure: RADIOFREQUENCY ABLATION, NERVE, MEDIAL BRANCH, LUMBAR, 1 LEVEL- Right L3,4,5 IV SEDATION;  Surgeon: Eddie Vega MD;  Location: [facility] PAIN MGT;  Service: Pain Management;  Laterality: Right;    RADIOFREQUENCY ABLATION OF LUMBAR MEDIAL BRANCH NERVE AT SINGLE LEVEL Left 9/4/2018    Procedure: RADIOFREQUENCY ABLATION, NERVE, MEDIAL BRANCH, LUMBAR, 1 LEVEL - Left L3,4,5 IV SEADTION;  Surgeon: Eddie Vega MD;  Location: [facility] PAIN [facility]GT;  Service: Pain Management;  Laterality: Left;  Patient takes ASA     REFRACTIVE SURGERY Bilateral     SPINAL FUSION Left 7/31/2019    Procedure: FUSION, SPINE SI Joint Fusion;  Surgeon: Xavier Dasilva MD;  Location: [facility] OR;  Service: Neurosurgery;  Laterality: Left;  Procedure: Left SI joint fusion  Surgery Time: 1.5hr  LOS:0  Anesthesia: General   Radiology: C-arm   Bed: Anderson 4 Poster  Position: Prone  Equipment: Codi Mabry [phone] (Codi notified)    TONSILLECTOMY         Family History   Problem Relation Age of Onset    Breast cancer Mother     Heart disease Father     Aneurysm Sister     Glaucoma Daughter     Heart attack Son     Breast cancer Maternal Grandmother     Breast cancer Other        Social History     Socioeconomic History    Marital status:    Tobacco Use    Smoking status: Never     Passive exposure: Never    Smokeless tobacco: Never    Substance and Sexual Activity    Alcohol use: Yes     Alcohol/week: 1.0 - 2.0 standard drink of alcohol     Types: 1 - 2 Glasses of wine per week    Drug use: No    Sexual activity: Yes     Partners: Male     Social Determinants of Health     Financial Resource Strain: Low Risk  (8/22/2023)    Overall Financial Resource Strain (CARDIA)     Difficulty of Paying Living Expenses: Not hard at all   Food Insecurity: No Food Insecurity (8/22/2023)    Hunger Vital Sign     Worried About Running Out of Food in the Last Year: Never true     Ran Out of Food in the Last Year: Never true   Transportation Needs: No Transportation Needs (8/22/2023)    PRAPARE - Transportation     Lack of Transportation (Medical): No     Lack of Transportation (Non-Medical): No   Physical Activity: Insufficiently Active (8/22/2023)    Exercise Vital Sign     Days of Exercise per Week: 1 day     Minutes of Exercise per Session: 20 min   Stress: Stress Concern Present (8/22/2023)    [employer] [place] [employer] - Occupational Stress Questionnaire     Feeling of Stress : To some extent   Social Connections: Unknown (8/22/2023)    Social Connection and Isolation Panel [NHANES]     Frequency of Communication with Friends and Family: Twice a week     Frequency of Social Gatherings with Friends and Family: Once a week     Active Member of Clubs or Organizations: Yes     Attends Club or Organization Meetings: More than 4 times per year     Marital Status:    Housing Stability: Low Risk  (8/22/2023)    Housing Stability Vital Sign     Unable to Pay for Housing in the Last Year: No     Number of Places Lived in the Last Year: 1     Unstable Housing in the Last Year: No       Current Outpatient Medications   Medication Sig Dispense Refill    traZODone (DESYREL) 100 MG tablet Take 1.5 tablets (150 mg total) by mouth nightly as needed for Insomnia. 135 tablet 2    acetaminophen (TYLENOL 8 HOUR ORAL) Take by mouth.      alendronate  (FOSAMAX) 70 MG tablet Take 1 tablet (70 mg total) by mouth every 7 days. 4 tablet 11    amLODIPine (NORVASC) 2.5 MG tablet Take 1 tablet (2.5 mg total) by mouth once daily. 90 tablet 3    aspirin (ECOTRIN) 81 MG EC tablet Take 1 tablet (81 mg total) by mouth once daily. Resume 48 hours post-surgery  0    b complex vitamins tablet Take 1 tablet by mouth once daily.      baclofen (LIORESAL) 10 MG tablet Take 1 tablet (10 mg total) by mouth 3 (three) times daily. 270 tablet 4    calcium-vitamin D 600 mg(1,500mg) -400 unit Tab once daily.       gabapentin (NEURONTIN) 300 MG capsule TAKE 1 CAPSULE THREE TIMES DAILY 270 capsule 3    oxyCODONE-acetaminophen (PERCOCET) 5-325 mg per tablet Take 1 tablet by mouth every 8 (eight) hours as needed for Pain. 21 tablet 0    pravastatin (PRAVACHOL) 40 MG tablet Take 1 tablet (40 mg total) by mouth every evening. 90 tablet 3    pyridoxine, vitamin B6, (B-6) 100 MG Tab Take 100 mg by mouth once daily.       No current facility-administered medications for this visit.     Facility-Administered Medications Ordered in Other Visits   Medication Dose Route Frequency Provider Last Rate Last Admin    gentamicin 80 mg/100ml NACL IVPB IVPB 80 mg  80 mg Intravenous 30 Min Pre-Op Wiliam Urias PA-C   80 mg at 07/31/19 0747    vancomycin in dextrose 5 % 1 gram/250 mL IVPB 1,000 mg  1,000 mg Intravenous 30 Min Pre-Op Wiliam Urias PA-C   1,000 mg at 07/31/19 0738       Review of patient's allergies indicates:   Allergen Reactions    Pcn [penicillins] Hives and Nausea And Vomiting       Objective:       Vitals:    08/23/23 0944   BP: 128/70   Pulse: 63   Temp: 98 °F (36.7 °C)       Physical Exam  Constitutional:       General: She is not in acute distress.     Appearance: She is well-developed. She is not diaphoretic.   HENT:      Head: Normocephalic and atraumatic.      Right Ear: External ear normal.      Left Ear: External ear normal.      Nose: Nose normal.      Mouth/Throat:       Pharynx: No oropharyngeal exudate.   Eyes:      General: No scleral icterus.        Right eye: No discharge.         Left eye: No discharge.      Conjunctiva/sclera: Conjunctivae normal.      Pupils: Pupils are equal, round, and reactive to light.   Neck:      Thyroid: No thyromegaly.      Vascular: No JVD.      Trachea: No tracheal deviation.   Cardiovascular:      Rate and Rhythm: Normal rate and regular rhythm.      Heart sounds: Normal heart sounds. No murmur heard.     No friction rub. No gallop.   Pulmonary:      Effort: Pulmonary effort is normal.      Breath sounds: Normal breath sounds. No stridor. No wheezing or rales.   Chest:      Chest wall: No tenderness.   Abdominal:      General: Bowel sounds are normal. There is no distension.      Palpations: Abdomen is soft. There is no mass.      Tenderness: There is no abdominal tenderness. There is no guarding or rebound.      Hernia: No hernia is present.   Musculoskeletal:         General: No tenderness. Normal range of motion.      Cervical back: Normal range of motion and neck supple.   Lymphadenopathy:      Cervical: No cervical adenopathy.   Skin:     General: Skin is warm and dry.      Coloration: Skin is not pale.      Findings: No erythema or rash.   Neurological:      Mental Status: She is alert and oriented to person, place, and time.      Cranial Nerves: No cranial nerve deficit.      Motor: No abnormal muscle tone.      Coordination: Coordination normal.      Deep Tendon Reflexes: Reflexes are normal and symmetric. Reflexes normal.   Psychiatric:         Behavior: Behavior normal.         Thought Content: Thought content normal.         Judgment: Judgment normal.         Assessment:       Problem List Items Addressed This Visit       Osteoporosis    Relevant Medications    alendronate (FOSAMAX) 70 MG tablet    Hyperlipidemia    Relevant Medications    pravastatin (PRAVACHOL) 40 MG tablet    Aortic atherosclerosis    Relevant Medications     pravastatin (PRAVACHOL) 40 MG tablet     Other Visit Diagnoses       Neuropathic pain of both legs        Relevant Medications    gabapentin (NEURONTIN) 300 MG capsule    Essential hypertension        Relevant Medications    amLODIPine (NORVASC) 2.5 MG tablet            Plan:           Cheryl was seen today for follow-up.    Diagnoses and all orders for this visit:    Hyperlipidemia, unspecified hyperlipidemia type  -     pravastatin (PRAVACHOL) 40 MG tablet; Take 1 tablet (40 mg total) by mouth every evening.    Mixed hyperlipidemia  -     pravastatin (PRAVACHOL) 40 MG tablet; Take 1 tablet (40 mg total) by mouth every evening.    Aortic atherosclerosis  -     pravastatin (PRAVACHOL) 40 MG tablet; Take 1 tablet (40 mg total) by mouth every evening.    Neuropathic pain of both legs  -     gabapentin (NEURONTIN) 300 MG capsule; TAKE 1 CAPSULE THREE TIMES DAILY    Essential hypertension  -     amLODIPine (NORVASC) 2.5 MG tablet; Take 1 tablet (2.5 mg total) by mouth once daily.    Osteoporosis, unspecified osteoporosis type, unspecified pathological fracture presence  -     alendronate (FOSAMAX) 70 MG tablet; Take 1 tablet (70 mg total) by mouth every 7 days.      HTN  -controlled    TIA  -follow neurology - DC plavix per neuro recommendations and just stay on ASA      Insomnia  -try benadryl only if needed    HLD  -diet controlled and statin    Urine incontinence  -lifestyle changes - intolerant to meds    Neuropathic pain  -continue neurontin    OA knee B/L  -mobic prn    CKD III  -lab and urine analysis  -adequate hydration  -follow nephrology    Muscle spasm  -baclofen  and percocet prn    Osteoporosis  -continue fosamax    Insomnia  -increase trazodone to 150    CKD III  -stable      Spent adequate time in obtaining history and explaining differentials    30  minutes spent during this visit of which greater than 50% devoted to face-face counseling and coordination of care regarding diagnosis and management  plan    RTC 3 months

## 2023-11-13 ENCOUNTER — PATIENT MESSAGE (OUTPATIENT)
Dept: FAMILY MEDICINE | Facility: CLINIC | Age: 88
End: 2023-11-13
Payer: MEDICARE

## 2023-11-15 ENCOUNTER — NURSE TRIAGE (OUTPATIENT)
Dept: ADMINISTRATIVE | Facility: CLINIC | Age: 88
End: 2023-11-15
Payer: MEDICARE

## 2023-11-15 ENCOUNTER — E-VISIT (OUTPATIENT)
Dept: FAMILY MEDICINE | Facility: CLINIC | Age: 88
End: 2023-11-15
Payer: MEDICARE

## 2023-11-15 DIAGNOSIS — N39.0 URINARY TRACT INFECTION WITHOUT HEMATURIA, SITE UNSPECIFIED: Primary | ICD-10-CM

## 2023-11-15 PROCEDURE — 99421 OL DIG E/M SVC 5-10 MIN: CPT | Mod: ,,, | Performed by: STUDENT IN AN ORGANIZED HEALTH CARE EDUCATION/TRAINING PROGRAM

## 2023-11-15 PROCEDURE — 99421 PR E&M, ONLINE DIGIT, EST, < 7 DAYS, 5-10 MINS: ICD-10-PCS | Mod: ,,, | Performed by: STUDENT IN AN ORGANIZED HEALTH CARE EDUCATION/TRAINING PROGRAM

## 2023-11-15 RX ORDER — SULFAMETHOXAZOLE AND TRIMETHOPRIM 800; 160 MG/1; MG/1
1 TABLET ORAL 2 TIMES DAILY
Qty: 6 TABLET | Refills: 0 | Status: SHIPPED | OUTPATIENT
Start: 2023-11-15 | End: 2023-11-28

## 2023-11-15 NOTE — TELEPHONE ENCOUNTER
Reason for Disposition   Bad or foul-smelling urine    Additional Information   Negative: Shock suspected (e.g., cold/pale/clammy skin, too weak to stand, low BP, rapid pulse)   Negative: Sounds like a life-threatening emergency to the triager   Negative: Unable to urinate (or only a few drops) > 4 hours and bladder feels very full (e.g., palpable bladder or strong urge to urinate)   Negative: Decreased urination and drinking very little and dehydration suspected (e.g., dark urine, no urine > 12 hours, very dry mouth, very lightheaded)   Negative: Patient sounds very sick or weak to the triager   Negative: Fever > 100.4 F  (38.0 C)   Negative: Side (flank) or lower back pain present    Protocols used: Urinary Symptoms-A-OH  Spoke with pt who reports that she suspects to have UTI. Reports having odor to urine. Advised to be seen in office. No appointments today. Ed/UC advised. Verbalized understanding.

## 2023-11-15 NOTE — PROGRESS NOTES
Patient ID: Cheryl Ghosh is a 88 y.o. female.    Chief Complaint: Urinary Tract Infection (Entered automatically based on patient selection in Patient Portal.)          274}  The patient initiated a request through [facility] on 11/15/2023 for evaluation and management with a chief complaint of Urinary Tract Infection (Entered automatically based on patient selection in Patient Portal.)     I evaluated the questionnaire submission on 11/15/2023 .    [facility] P[facility] Evisit Uti Questionnaire    11/15/2023 11:27 AM CST - Filed by Patient   Do you agree to participate in an E-Visit? Yes   If you have any of the following problems, please present to your local ER or call 911:  I acknowledge   What is the main issue that you would like for your doctor to address today? foul order of urine   Are you able to take your vital signs? Yes   Systolic Blood Pressure: 112   Diastolic Blood Pressure: 60   Weight: 130   Height: 60   Pulse:    Temperature:    Respiration rate:    Pulse Oxygen:    What symptoms do you currently have? Change in urine appearance or smell   When did your symptoms first appear? 11/13/2023   List what you have done or taken to help your symptoms. increase water   Please indicate whether you have had the following symptoms during the past 24 hours     Urgent urination (a sudden and uncontrollable urge to urinate) None   Frequent urination of small amounts of urine (going to the toilet very often) Mild   Burning pain when urinating None   Incomplete bladder emptying (still feel like you need to urinate again after urination) Mild   Pain not associated with urination in the lower abdomen below the belly button) None   What does your urine look like? Clear   Blood seen in the urine None   Flank pain (pain in one or both sides of the lower back) None   Abnormal Vaginal Discharge (abnormal amount, color and/or odor) None   Discharge from the urethra (urinary opening) without urination None   High body  temperature/fever? None-<99.5   Please rate how much discomfort you have experience because of the symptoms in the past 24 hours: None   Please indicate how the symptoms have interfered with your every day activities/work in the past 24 hours: None   Please indicate how these symptoms have interfered with your social activities (visiting people, meeting with friends, etc.) in the past 24 hours? None   Are you a diabetic? No   Please indicate whether you have the following at the time of completion of this questionnaire: None of the above   Provide any information you feel is important to your history not asked above Only had this problem a couple of times in the past 10 years.l Antibiobic cured it.   Please attach any relevant images or files (if you have performed a home test for UTI, please submit a photo of results)           Active Problem List with Overview Notes    Diagnosis Date Noted    Osteoporosis 11/15/2022    Insomnia 11/15/2022    Mixed hyperlipidemia 10/14/2022    Pre-syncope 07/21/2021    Vasovagal near syncope 03/24/2021    Syncope and collapse 03/09/2021    Coronary artery disease involving native coronary artery without angina pectoris 03/09/2021    Other nonthrombocytopenic purpura 02/02/2021    Decreased range of motion of both hips 01/27/2020    Bilateral impacted cerumen 10/17/2019    Sacroiliac joint dysfunction of left side 07/31/2019    Pain of left sacroiliac joint 06/19/2019    Sacroiliac joint dysfunction of right side 02/06/2019    Chronic SI joint pain 12/20/2018    Secondary hyperparathyroidism of renal origin 10/12/2018    Chronic pain 08/28/2018    Sacroiliitis 07/24/2018    HTN (hypertension), benign 04/11/2018    Chronic diastolic heart failure 11/25/2017    TIA (transient ischemic attack) 11/24/2017    Hyperlipidemia 08/02/2017    Chronic kidney disease, stage III (moderate) 08/02/2017    SOB (shortness of breath) 05/26/2017    Neuropathy 12/02/2016    Female bladder prolapse  07/22/2016    Urinary incontinence 12/07/2015    Left lumbar radiculopathy 08/13/2015    Osteopenia 02/26/2015    OA (osteoarthritis) of knee 01/05/2015    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   Latest known visit with results is:   Lab Visit on 08/18/2023   Component Date Value Ref Range Status    WBC 08/18/2023 6.49  3.90 - 12.70 K/uL Final    RBC 08/18/2023 4.24  4.00 - 5.40 M/uL Final    Hemoglobin 08/18/2023 13.2  12.0 - 16.0 g/dL Final    Hematocrit 08/18/2023 39.2  37.0 - 48.5 % Final    MCV 08/18/2023 93  82 - 98 fL Final    MCH 08/18/2023 31.1 (H)  27.0 - 31.0 pg Final    MCHC 08/18/2023 33.7  32.0 - 36.0 g/dL Final    RDW 08/18/2023 13.1  11.5 - 14.5 % Final    Platelets 08/18/2023 170  150 - 450 K/uL Final    MPV 08/18/2023 10.4  9.2 - 12.9 fL Final    Immature Granulocytes 08/18/2023 0.2  0.0 - 0.5 % Final    Gran # (ANC) 08/18/2023 3.0  1.8 - 7.7 K/uL Final    Immature Grans (Abs) 08/18/2023 0.01  0.00 - 0.04 K/uL Final    Comment: Mild elevation in immature granulocytes is non specific and   can be seen in a variety of conditions including stress response,   acute inflammation, trauma and pregnancy. Correlation with other   laboratory and clinical findings is essential.      Lymph # 08/18/2023 2.8  1.0 - 4.8 K/uL Final    Mono # 08/18/2023 0.5  0.3 - 1.0 K/uL Final    Eos # 08/18/2023 0.1  0.0 - 0.5 K/uL Final    Baso # 08/18/2023 0.04  0.00 - 0.20 K/uL Final    nRBC 08/18/2023 0  0 /100 WBC Final    Gran % 08/18/2023 46.5  38.0 - 73.0 % Final    Lymph % 08/18/2023 42.5  18.0 - 48.0 % Final    Mono % 08/18/2023 8.2  4.0 - 15.0 %  Final    Eosinophil % 08/18/2023 2.0  0.0 - 8.0 % Final    Basophil % 08/18/2023 0.6  0.0 - 1.9 % Final    Differential Method 08/18/2023 Automated   Final    Sodium 08/18/2023 143  136 - 145 mmol/L Final    Potassium 08/18/2023 4.5  3.5 - 5.1 mmol/L Final    Chloride 08/18/2023 109  95 - 110 mmol/L Final    CO2 08/18/2023 26  23 - 29 mmol/L Final    Glucose 08/18/2023 98  70 - 110 mg/dL Final    BUN 08/18/2023 25 (H)  7 - 17 mg/dL Final    Creatinine 08/18/2023 1.14  0.50 - 1.40 mg/dL Final    Calcium 08/18/2023 8.7  8.7 - 10.5 mg/dL Final    Total Protein 08/18/2023 7.0  6.0 - 8.4 g/dL Final    Albumin 08/18/2023 4.0  3.5 - 5.2 g/dL Final    Total Bilirubin 08/18/2023 0.6  0.1 - 1.0 mg/dL Final    Comment: For infants and newborns, interpretation of results should be based  on gestational age, weight and in agreement with clinical  observations.    Premature Infant recommended reference ranges:  Up to 24 hours.............<8.0 mg/dL  Up to 48 hours............<12.0 mg/dL  3-5 days..................<15.0 mg/dL  6-29 days.................<15.0 mg/dL      Alkaline Phosphatase 08/18/2023 57  38 - 126 U/L Final    AST 08/18/2023 28  15 - 46 U/L Final    ALT 08/18/2023 20  10 - 44 U/L Final    Anion Gap 08/18/2023 8  8 - 16 mmol/L Final    eGFR 08/18/2023 46.3 (A)  >60 mL/min/1.73 m^2 Final    Specimen UA 08/18/2023 Urine, Clean Catch   Final    Color, UA 08/18/2023 Yellow  Yellow, Straw, Martha Final    Appearance, UA 08/18/2023 Clear  Clear Final    pH, UA 08/18/2023 7.0  5.0 - 8.0 Final    Specific Gravity, UA 08/18/2023 1.015  1.005 - 1.030 Final    Protein, UA 08/18/2023 Negative  Negative Final    Comment: Recommend a 24 hour urine protein or a urine   protein/creatinine ratio if globulin induced proteinuria is  clinically suspected.      Glucose, UA 08/18/2023 Negative  Negative Final    Ketones, UA 08/18/2023 Negative  Negative Final    Bilirubin (UA) 08/18/2023 Negative  Negative Final    Occult Blood UA  08/18/2023 Negative  Negative Final    Nitrite, UA 08/18/2023 Negative  Negative Final    Urobilinogen, UA 08/18/2023 Negative  <2.0 EU/dL Final    Leukocytes, UA 08/18/2023 Negative  Negative Final       Encounter Diagnosis   Name Primary?    Urinary tract infection without hematuria, site unspecified Yes        Orders Placed This Encounter   Procedures    Urinalysis, Reflex to Urine Culture Urine, Clean Catch     Standing Status:   Future     Standing Expiration Date:   1/13/2025     Order Specific Question:   Preferred Collection Type     Answer:   Urine, Clean Catch     Order Specific Question:   Specimen Source     Answer:   Urine      Medications Ordered This Encounter   Medications    sulfamethoxazole-trimethoprim 800-160mg (BACTRIM DS) 800-160 mg Tab     Sig: Take 1 tablet by mouth 2 (two) times daily. for 3 days     Dispense:  6 tablet     Refill:  0        E-Visit Time Tracking:    Day 1 Time (in minutes): 6     Total Time (in minutes): 6       274}

## 2023-11-18 ENCOUNTER — PATIENT MESSAGE (OUTPATIENT)
Dept: FAMILY MEDICINE | Facility: CLINIC | Age: 88
End: 2023-11-18
Payer: MEDICARE

## 2023-11-18 DIAGNOSIS — N39.0 URINARY TRACT INFECTION WITHOUT HEMATURIA, SITE UNSPECIFIED: Primary | ICD-10-CM

## 2023-11-18 RX ORDER — NITROFURANTOIN 25; 75 MG/1; MG/1
100 CAPSULE ORAL 2 TIMES DAILY
Qty: 10 CAPSULE | Refills: 0 | Status: SHIPPED | OUTPATIENT
Start: 2023-11-18 | End: 2023-11-28

## 2023-11-28 ENCOUNTER — OFFICE VISIT (OUTPATIENT)
Dept: FAMILY MEDICINE | Facility: CLINIC | Age: 88
End: 2023-11-28
Attending: FAMILY MEDICINE
Payer: MEDICARE

## 2023-11-28 VITALS
TEMPERATURE: 98 F | HEIGHT: 64 IN | SYSTOLIC BLOOD PRESSURE: 129 MMHG | OXYGEN SATURATION: 98 % | HEART RATE: 60 BPM | BODY MASS INDEX: 22.81 KG/M2 | WEIGHT: 133.63 LBS | DIASTOLIC BLOOD PRESSURE: 79 MMHG

## 2023-11-28 DIAGNOSIS — G47.00 INSOMNIA, UNSPECIFIED TYPE: ICD-10-CM

## 2023-11-28 DIAGNOSIS — I25.10 CORONARY ARTERY DISEASE INVOLVING NATIVE CORONARY ARTERY OF NATIVE HEART WITHOUT ANGINA PECTORIS: ICD-10-CM

## 2023-11-28 DIAGNOSIS — I10 ESSENTIAL HYPERTENSION: ICD-10-CM

## 2023-11-28 DIAGNOSIS — N18.31 STAGE 3A CHRONIC KIDNEY DISEASE: ICD-10-CM

## 2023-11-28 DIAGNOSIS — M81.0 OSTEOPOROSIS, UNSPECIFIED OSTEOPOROSIS TYPE, UNSPECIFIED PATHOLOGICAL FRACTURE PRESENCE: ICD-10-CM

## 2023-11-28 DIAGNOSIS — E78.5 HYPERLIPIDEMIA, UNSPECIFIED HYPERLIPIDEMIA TYPE: ICD-10-CM

## 2023-11-28 DIAGNOSIS — I10 HTN (HYPERTENSION), BENIGN: Primary | ICD-10-CM

## 2023-11-28 PROCEDURE — 1160F PR REVIEW ALL MEDS BY PRESCRIBER/CLIN PHARMACIST DOCUMENTED: ICD-10-PCS | Mod: HCNC,CPTII,S$GLB, | Performed by: FAMILY MEDICINE

## 2023-11-28 PROCEDURE — 99999 PR PBB SHADOW E&M-EST. PATIENT-LVL III: CPT | Mod: PBBFAC,HCNC,, | Performed by: FAMILY MEDICINE

## 2023-11-28 PROCEDURE — 1159F PR MEDICATION LIST DOCUMENTED IN MEDICAL RECORD: ICD-10-PCS | Mod: HCNC,CPTII,S$GLB, | Performed by: FAMILY MEDICINE

## 2023-11-28 PROCEDURE — 1160F RVW MEDS BY RX/DR IN RCRD: CPT | Mod: HCNC,CPTII,S$GLB, | Performed by: FAMILY MEDICINE

## 2023-11-28 PROCEDURE — 99999 PR PBB SHADOW E&M-EST. PATIENT-LVL III: ICD-10-PCS | Mod: PBBFAC,HCNC,, | Performed by: FAMILY MEDICINE

## 2023-11-28 PROCEDURE — 1159F MED LIST DOCD IN RCRD: CPT | Mod: HCNC,CPTII,S$GLB, | Performed by: FAMILY MEDICINE

## 2023-11-28 PROCEDURE — 99214 PR OFFICE/OUTPT VISIT, EST, LEVL IV, 30-39 MIN: ICD-10-PCS | Mod: HCNC,S$GLB,, | Performed by: FAMILY MEDICINE

## 2023-11-28 PROCEDURE — 99214 OFFICE O/P EST MOD 30 MIN: CPT | Mod: HCNC,S$GLB,, | Performed by: FAMILY MEDICINE

## 2023-11-28 NOTE — PROGRESS NOTES
Subjective:       Patient ID: Cheryl Ghosh is a 88 y.o. female.    Chief Complaint: Follow-up    88 yr old pleasant white female with HTN, CKD III, OA, osteopenia hip, hyperlipidemia, chronic back pain, BPPV, presents today for her 3 month check. No new complaints today      HTN - controlled - low salt diet - having issues with medications lisinopril n losartan      CKD III - stable - improving       HLD - slightly worsening since stopped statin x 30 days           - she was asked to start statin but due to her fall and issues with her ribs and back, she was never able to start it.    Back pain - she follows pain clinic for injections - c/o neuropathic pain in legs - was on neurontin in the past and she started taking it and working    Osteoporosis - started back on fosamax - doing well - next dexa in a year    HISTORY as below - reviewed    Health maintenance  -labs UTD  -mammo UTD  -colon screen UTD  -vaccines UTD    Follow-up  Associated symptoms include arthralgias, chest pain, myalgias, vomiting and weakness. Pertinent negatives include no congestion, diaphoresis, headaches, joint swelling, nausea, neck pain or sore throat.   Knee Pain     Back Pain  Associated symptoms include chest pain and weakness. Pertinent negatives include no dysuria, headaches or leg pain.   Hip Pain     Hypertension  This is a chronic problem. The current episode started more than 1 year ago. The problem has been waxing and waning since onset. The problem is resistant. Associated symptoms include blurred vision, chest pain and malaise/fatigue. Pertinent negatives include no anxiety, headaches, neck pain, orthopnea, palpitations, peripheral edema, PND, shortness of breath or sweats. Agents associated with hypertension include NSAIDs. There are no known risk factors for coronary artery disease. Past treatments include nothing. The current treatment provides significant improvement. Compliance problems include exercise.  There is no  history of chronic renal disease.   Hyperlipidemia  This is a chronic problem. The current episode started more than 1 year ago. The problem is uncontrolled. Recent lipid tests were reviewed and are high. She has no history of chronic renal disease, diabetes, hypothyroidism, liver disease, obesity or nephrotic syndrome. There are no known factors aggravating her hyperlipidemia. Associated symptoms include chest pain and myalgias. Pertinent negatives include no focal sensory loss, leg pain or shortness of breath. Current antihyperlipidemic treatment includes diet change. The current treatment provides mild improvement of lipids. There are no compliance problems.  Risk factors for coronary artery disease include dyslipidemia.     Review of Systems   Constitutional:  Positive for activity change and malaise/fatigue. Negative for diaphoresis and unexpected weight change.   HENT:  Positive for hearing loss. Negative for nasal congestion, ear discharge, rhinorrhea, sore throat, trouble swallowing and voice change.    Eyes:  Positive for blurred vision. Negative for pain, discharge and visual disturbance.   Respiratory: Negative.  Negative for chest tightness, shortness of breath and wheezing.    Cardiovascular:  Positive for chest pain. Negative for palpitations, orthopnea and PND.   Gastrointestinal:  Positive for vomiting. Negative for abdominal distention, anal bleeding, blood in stool, constipation, diarrhea and nausea.   Endocrine: Negative.  Negative for cold intolerance, polydipsia and polyuria.   Genitourinary: Negative.  Negative for decreased urine volume, difficulty urinating, dysuria, frequency, hematuria, menstrual problem and vaginal pain.   Musculoskeletal:  Positive for arthralgias, back pain and myalgias. Negative for gait problem, joint swelling, leg pain and neck pain.   Integumentary:  Negative for color change, pallor and wound. Negative.   Allergic/Immunologic: Negative.  Negative for environmental  allergies and immunocompromised state.   Neurological:  Positive for weakness. Negative for dizziness, tremors, seizures, speech difficulty and headaches.   Hematological: Negative.  Negative for adenopathy. Does not bruise/bleed easily.   Psychiatric/Behavioral: Negative.  Negative for agitation, confusion, decreased concentration, dysphoric mood, hallucinations, self-injury and suicidal ideas. The patient is not nervous/anxious.          PMH/PSH/FH/SH/MED/ALLERGY reviewed    Past Medical History:   Diagnosis Date    Arthritis     lumbar    Blood transfusion     Chronic kidney disease, stage III (moderate) 8/2/2017    Coronary artery disease involving native coronary artery without angina pectoris 3/9/2021    Degenerative disc disease     lumbar    Insomnia 11/15/2022    Insulinoma     Pancreatic disease     Urinary incontinence 12/7/2015       Past Surgical History:   Procedure Laterality Date    ADENOIDECTOMY      APPENDECTOMY      CATARACT EXTRACTION, BILATERAL      EYE SURGERY      HYSTERECTOMY      SAMM/BSO    INJECTION OF JOINT Bilateral 2/4/2020    Procedure: Injection, Joint--Bilateral GTB;  Surgeon: Arina Carrera Jr., MD;  Location: [facility] PAIN MGT;  Service: Pain Management;  Laterality: Bilateral;  Oral sedation    INJECTION OF STEROID Right 12/20/2018    Procedure: INJECTION, STEROID-- Right SI Joint Block and  Steroid Injection;  Surgeon: Xavier Dasilva MD;  Location: [facility] OR;  Service: Neurosurgery;  Laterality: Right;  INJECTION, STEROID-- Right SI Joint Block and  Steroid Injection  SURGERY TIME:1 HR  LOS: 0 DAYS  RADIOLOGY: C- arm  BED: Regular Bed  with Pillows  POSITION: Prone      INJECTION OF STEROID Left 6/19/2019    Procedure: INJECTION, STEROID Procedure:Left sacroiliac joint block / steroid injection Surgery Time: 30mins LOS: Anesthesia: MAC Radiology: C-arm Bed: Regular Bed Position: Prone;  Surgeon: Xavier Dasilva MD;  Location: [facility] OR;  Service: Neurosurgery;  Laterality: Left;     MINIMALLY INVASIVE FUSION OF SPINE Right 2/6/2019    Procedure: FUSION, SPINE, MINIMALLY INVASIVE Right Sacroiliac Joint Fusion -  I Fuse;  Surgeon: Xavier Dasilva MD;  Location: [facility] OR;  Service: Neurosurgery;  Laterality: Right;  Procedure: Right Sacroiliac Joint Fusion -  I Fuse  Surgery Time: 1.5 Hr  LOS: 0  Anesthesia: General  Radiology: C-Arm  Bed: [place] 4 Poster  Position: Prone  Equipment: TAY Kincaid [phone] (notified 1/24 )    OOPHORECTOMY      PANCREAS SURGERY      Insulanoma    RADIOFREQUENCY ABLATION OF LUMBAR MEDIAL BRANCH NERVE AT SINGLE LEVEL Right 8/28/2018    Procedure: RADIOFREQUENCY ABLATION, NERVE, MEDIAL BRANCH, LUMBAR, 1 LEVEL- Right L3,4,5 IV SEDATION;  Surgeon: Eddie Vega MD;  Location: [facility] PAIN MGT;  Service: Pain Management;  Laterality: Right;    RADIOFREQUENCY ABLATION OF LUMBAR MEDIAL BRANCH NERVE AT SINGLE LEVEL Left 9/4/2018    Procedure: RADIOFREQUENCY ABLATION, NERVE, MEDIAL BRANCH, LUMBAR, 1 LEVEL - Left L3,4,5 IV SEADTION;  Surgeon: Eddie Vega MD;  Location: [facility] PAIN MGT;  Service: Pain Management;  Laterality: Left;  Patient takes ASA     REFRACTIVE SURGERY Bilateral     SPINAL FUSION Left 7/31/2019    Procedure: FUSION, SPINE SI Joint Fusion;  Surgeon: Xavier Dasilva MD;  Location: [facility] OR;  Service: Neurosurgery;  Laterality: Left;  Procedure: Left SI joint fusion  Surgery Time: 1.5hr  LOS:0  Anesthesia: General   Radiology: C-arm   Bed: [place] 4 Poster  Position: Prone  Equipment: Codi Mabry [phone] (Codi notified)    TONSILLECTOMY         Family History   Problem Relation Age of Onset    Breast cancer Mother     Heart disease Father     Aneurysm Sister     Glaucoma Daughter     Heart attack Son     Breast cancer Maternal Grandmother     Breast cancer Other        Social History     Socioeconomic History    Marital status:    Tobacco Use    Smoking status: Never     Passive exposure: Never    Smokeless tobacco: Never    Substance and Sexual Activity    Alcohol use: Yes     Alcohol/week: 1.0 - 2.0 standard drink of alcohol     Types: 1 - 2 Glasses of wine per week    Drug use: No    Sexual activity: Yes     Partners: Male     Social Determinants of Health     Financial Resource Strain: Low Risk  (8/22/2023)    Overall Financial Resource Strain (CARDIA)     Difficulty of Paying Living Expenses: Not hard at all   Food Insecurity: No Food Insecurity (8/22/2023)    Hunger Vital Sign     Worried About Running Out of Food in the Last Year: Never true     Ran Out of Food in the Last Year: Never true   Transportation Needs: No Transportation Needs (8/22/2023)    PRAPARE - Transportation     Lack of Transportation (Medical): No     Lack of Transportation (Non-Medical): No   Physical Activity: Insufficiently Active (8/22/2023)    Exercise Vital Sign     Days of Exercise per Week: 1 day     Minutes of Exercise per Session: 20 min   Stress: Stress Concern Present (8/22/2023)    [employer] - Occupational Stress Questionnaire     Feeling of Stress : To some extent   Social Connections: Unknown (8/22/2023)    Social Connection and Isolation Panel [NHANES]     Frequency of Communication with Friends and Family: Twice a week     Frequency of Social Gatherings with Friends and Family: Once a week     Active Member of Clubs or Organizations: Yes     Attends Club or Organization Meetings: More than 4 times per year     Marital Status:    Housing Stability: Low Risk  (8/22/2023)    Housing Stability Vital Sign     Unable to Pay for Housing in the Last Year: No     Number of Places Lived in the Last Year: 1     Unstable Housing in the Last Year: No       Current Outpatient Medications   Medication Sig Dispense Refill    acetaminophen (TYLENOL 8 HOUR ORAL) Take by mouth.      alendronate (FOSAMAX) 70 MG tablet Take 1 tablet (70 mg total) by mouth every 7 days. 4 tablet 11    amLODIPine (NORVASC) 2.5 MG tablet Take 1  tablet (2.5 mg total) by mouth once daily. 90 tablet 3    aspirin (ECOTRIN) 81 MG EC tablet Take 1 tablet (81 mg total) by mouth once daily. Resume 48 hours post-surgery  0    b complex vitamins tablet Take 1 tablet by mouth once daily.      baclofen (LIORESAL) 10 MG tablet Take 1 tablet (10 mg total) by mouth 3 (three) times daily. 270 tablet 4    calcium-vitamin D 600 mg(1,500mg) -400 unit Tab once daily.       gabapentin (NEURONTIN) 300 MG capsule TAKE 1 CAPSULE THREE TIMES DAILY 270 capsule 3    oxyCODONE-acetaminophen (PERCOCET) 5-325 mg per tablet Take 1 tablet by mouth every 8 (eight) hours as needed for Pain. 21 tablet 0    pravastatin (PRAVACHOL) 40 MG tablet Take 1 tablet (40 mg total) by mouth every evening. 90 tablet 3    pyridoxine, vitamin B6, (B-6) 100 MG Tab Take 100 mg by mouth once daily.      traZODone (DESYREL) 100 MG tablet Take 1.5 tablets (150 mg total) by mouth nightly as needed for Insomnia. 135 tablet 2     No current facility-administered medications for this visit.     Facility-Administered Medications Ordered in Other Visits   Medication Dose Route Frequency Provider Last Rate Last Admin    gentamicin 80 mg/100ml NACL IVPB IVPB 80 mg  80 mg Intravenous 30 Min Pre-Op Wiliam Urias PA-C   80 mg at 07/31/19 0747    vancomycin in dextrose 5 % 1 gram/250 mL IVPB 1,000 mg  1,000 mg Intravenous 30 Min Pre-Op Wiliam Urias PA-C   1,000 mg at 07/31/19 0738       Review of patient's allergies indicates:   Allergen Reactions    Pcn [penicillins] Hives and Nausea And Vomiting       Objective:       Vitals:    11/28/23 1031   BP: 129/79   Pulse: 60   Temp: 98 °F (36.7 °C)       Physical Exam  Constitutional:       General: She is not in acute distress.     Appearance: She is well-developed. She is not diaphoretic.   HENT:      Head: Normocephalic and atraumatic.      Right Ear: External ear normal.      Left Ear: External ear normal.      Nose: Nose normal.      Mouth/Throat:      Pharynx: No  oropharyngeal exudate.   Eyes:      General: No scleral icterus.        Right eye: No discharge.         Left eye: No discharge.      Conjunctiva/sclera: Conjunctivae normal.      Pupils: Pupils are equal, round, and reactive to light.   Neck:      Thyroid: No thyromegaly.      Vascular: No JVD.      Trachea: No tracheal deviation.   Cardiovascular:      Rate and Rhythm: Normal rate and regular rhythm.      Heart sounds: Normal heart sounds. No murmur heard.     No friction rub. No gallop.   Pulmonary:      Effort: Pulmonary effort is normal.      Breath sounds: Normal breath sounds. No stridor. No wheezing or rales.   Chest:      Chest wall: No tenderness.   Abdominal:      General: Bowel sounds are normal. There is no distension.      Palpations: Abdomen is soft. There is no mass.      Tenderness: There is no abdominal tenderness. There is no guarding or rebound.      Hernia: No hernia is present.   Musculoskeletal:         General: No tenderness. Normal range of motion.      Cervical back: Normal range of motion and neck supple.   Lymphadenopathy:      Cervical: No cervical adenopathy.   Skin:     General: Skin is warm and dry.      Coloration: Skin is not pale.      Findings: No erythema or rash.   Neurological:      Mental Status: She is alert and oriented to person, place, and time.      Cranial Nerves: No cranial nerve deficit.      Motor: No abnormal muscle tone.      Coordination: Coordination normal.      Deep Tendon Reflexes: Reflexes are normal and symmetric. Reflexes normal.   Psychiatric:         Behavior: Behavior normal.         Thought Content: Thought content normal.         Judgment: Judgment normal.         Assessment:       Problem List Items Addressed This Visit       Osteoporosis    Relevant Orders    CBC Auto Differential    Comprehensive Metabolic Panel    Lipid Panel    Urinalysis    Insomnia    Relevant Orders    CBC Auto Differential    Comprehensive Metabolic Panel    Lipid Panel     Urinalysis    Hyperlipidemia    Relevant Orders    CBC Auto Differential    Comprehensive Metabolic Panel    Lipid Panel    Urinalysis    HTN (hypertension), benign - Primary    Relevant Orders    CBC Auto Differential    Comprehensive Metabolic Panel    Lipid Panel    Urinalysis    Coronary artery disease involving native coronary artery without angina pectoris    Relevant Orders    CBC Auto Differential    Comprehensive Metabolic Panel    Lipid Panel    Urinalysis    Chronic kidney disease, stage III (moderate)    Relevant Orders    CBC Auto Differential    Comprehensive Metabolic Panel    Lipid Panel    Urinalysis     Other Visit Diagnoses       Essential hypertension        Relevant Orders    CBC Auto Differential    Comprehensive Metabolic Panel    Lipid Panel    Urinalysis            Plan:           Cheryl was seen today for follow-up.    Diagnoses and all orders for this visit:    HTN (hypertension), benign  -     CBC Auto Differential; Future  -     Comprehensive Metabolic Panel; Future  -     Lipid Panel; Future  -     Urinalysis; Future    Hyperlipidemia, unspecified hyperlipidemia type  -     CBC Auto Differential; Future  -     Comprehensive Metabolic Panel; Future  -     Lipid Panel; Future  -     Urinalysis; Future    Essential hypertension  -     CBC Auto Differential; Future  -     Comprehensive Metabolic Panel; Future  -     Lipid Panel; Future  -     Urinalysis; Future    Stage 3a chronic kidney disease  -     CBC Auto Differential; Future  -     Comprehensive Metabolic Panel; Future  -     Lipid Panel; Future  -     Urinalysis; Future    Osteoporosis, unspecified osteoporosis type, unspecified pathological fracture presence  -     CBC Auto Differential; Future  -     Comprehensive Metabolic Panel; Future  -     Lipid Panel; Future  -     Urinalysis; Future    Insomnia, unspecified type  -     CBC Auto Differential; Future  -     Comprehensive Metabolic Panel; Future  -     Lipid Panel; Future  -      Urinalysis; Future    Coronary artery disease involving native coronary artery of native heart without angina pectoris  -     CBC Auto Differential; Future  -     Comprehensive Metabolic Panel; Future  -     Lipid Panel; Future  -     Urinalysis; Future      HTN  -controlled    TIA  -follow neurology - DC plavix per neuro recommendations and just stay on ASA      Insomnia  -try benadryl only if needed    HLD  -diet controlled and statin    Urine incontinence  -lifestyle changes - intolerant to meds    Neuropathic pain  -continue neurontin    OA knee B/L  -mobic prn    CKD III  -lab and urine analysis  -adequate hydration  -follow nephrology    Muscle spasm  -baclofen  and percocet prn    Osteoporosis  -continue fosamax    Insomnia  -increase trazodone to 150    CKD III  -stable      Spent adequate time in obtaining history and explaining differentials    30  minutes spent during this visit of which greater than 50% devoted to face-face counseling and coordination of care regarding diagnosis and management plan    RTC 3 months

## 2023-12-05 ENCOUNTER — PATIENT MESSAGE (OUTPATIENT)
Dept: FAMILY MEDICINE | Facility: CLINIC | Age: 88
End: 2023-12-05
Payer: MEDICARE

## 2023-12-05 DIAGNOSIS — N39.0 URINARY TRACT INFECTION WITHOUT HEMATURIA, SITE UNSPECIFIED: Primary | ICD-10-CM

## 2023-12-06 ENCOUNTER — TELEPHONE (OUTPATIENT)
Dept: OBSTETRICS AND GYNECOLOGY | Facility: CLINIC | Age: 88
End: 2023-12-06
Payer: MEDICARE

## 2023-12-06 ENCOUNTER — PATIENT MESSAGE (OUTPATIENT)
Dept: FAMILY MEDICINE | Facility: CLINIC | Age: 88
End: 2023-12-06
Payer: MEDICARE

## 2023-12-06 DIAGNOSIS — N39.0 URINARY TRACT INFECTION WITHOUT HEMATURIA, SITE UNSPECIFIED: Primary | ICD-10-CM

## 2023-12-06 DIAGNOSIS — A49.9 UTI (URINARY TRACT INFECTION), BACTERIAL: ICD-10-CM

## 2023-12-06 DIAGNOSIS — N39.0 UTI (URINARY TRACT INFECTION), BACTERIAL: ICD-10-CM

## 2023-12-06 RX ORDER — NITROFURANTOIN 25; 75 MG/1; MG/1
100 CAPSULE ORAL 2 TIMES DAILY
Qty: 20 CAPSULE | Refills: 0 | Status: SHIPPED | OUTPATIENT
Start: 2023-12-06 | End: 2024-01-24

## 2023-12-06 NOTE — TELEPHONE ENCOUNTER
----- Message from Fransisca Goodrich sent at 12/6/2023 12:08 PM CST -----  Type:  Needs Medical Advice    Who Called: pt  Symptoms (please be specific): pt needs to be seen right away for the lining of the vagina  walls came out  was once a pt years ago   Would the patient rather a call back or a response via [facility]? call  Best Call Back Number: [phone]  Additional Information:

## 2023-12-06 NOTE — TELEPHONE ENCOUNTER
Called pt and she states that she wants to see a provider at the [place] location. Pt is needing to see her provider in order to get her pessary replaced as hers has been lost.   Sent message to [place] [facility] for someone to give them a call.

## 2023-12-07 ENCOUNTER — TELEPHONE (OUTPATIENT)
Dept: OBSTETRICS AND GYNECOLOGY | Facility: CLINIC | Age: 88
End: 2023-12-07
Payer: MEDICARE

## 2023-12-08 ENCOUNTER — TELEPHONE (OUTPATIENT)
Dept: OBSTETRICS AND GYNECOLOGY | Facility: CLINIC | Age: 88
End: 2023-12-08
Payer: MEDICARE

## 2023-12-08 ENCOUNTER — OFFICE VISIT (OUTPATIENT)
Dept: OBSTETRICS AND GYNECOLOGY | Facility: CLINIC | Age: 88
End: 2023-12-08
Payer: MEDICARE

## 2023-12-08 VITALS
HEART RATE: 59 BPM | BODY MASS INDEX: 23.01 KG/M2 | SYSTOLIC BLOOD PRESSURE: 126 MMHG | WEIGHT: 134.06 LBS | DIASTOLIC BLOOD PRESSURE: 62 MMHG

## 2023-12-08 DIAGNOSIS — N39.46 MIXED STRESS AND URGE URINARY INCONTINENCE: ICD-10-CM

## 2023-12-08 DIAGNOSIS — R15.1 FECAL SMEARING: Primary | ICD-10-CM

## 2023-12-08 DIAGNOSIS — N81.10 FEMALE BLADDER PROLAPSE: Primary | ICD-10-CM

## 2023-12-08 DIAGNOSIS — R15.1 FECAL SMEARING: ICD-10-CM

## 2023-12-08 PROCEDURE — 99999 PR PBB SHADOW E&M-EST. PATIENT-LVL III: ICD-10-PCS | Mod: PBBFAC,HCNC,, | Performed by: STUDENT IN AN ORGANIZED HEALTH CARE EDUCATION/TRAINING PROGRAM

## 2023-12-08 PROCEDURE — 1126F AMNT PAIN NOTED NONE PRSNT: CPT | Mod: HCNC,CPTII,S$GLB, | Performed by: STUDENT IN AN ORGANIZED HEALTH CARE EDUCATION/TRAINING PROGRAM

## 2023-12-08 PROCEDURE — 1126F PR PAIN SEVERITY QUANTIFIED, NO PAIN PRESENT: ICD-10-PCS | Mod: HCNC,CPTII,S$GLB, | Performed by: STUDENT IN AN ORGANIZED HEALTH CARE EDUCATION/TRAINING PROGRAM

## 2023-12-08 PROCEDURE — 99203 PR OFFICE/OUTPT VISIT, NEW, LEVL III, 30-44 MIN: ICD-10-PCS | Mod: HCNC,S$GLB,, | Performed by: STUDENT IN AN ORGANIZED HEALTH CARE EDUCATION/TRAINING PROGRAM

## 2023-12-08 PROCEDURE — 99203 OFFICE O/P NEW LOW 30 MIN: CPT | Mod: HCNC,S$GLB,, | Performed by: STUDENT IN AN ORGANIZED HEALTH CARE EDUCATION/TRAINING PROGRAM

## 2023-12-08 PROCEDURE — 1159F MED LIST DOCD IN RCRD: CPT | Mod: HCNC,CPTII,S$GLB, | Performed by: STUDENT IN AN ORGANIZED HEALTH CARE EDUCATION/TRAINING PROGRAM

## 2023-12-08 PROCEDURE — 99999 PR PBB SHADOW E&M-EST. PATIENT-LVL III: CPT | Mod: PBBFAC,HCNC,, | Performed by: STUDENT IN AN ORGANIZED HEALTH CARE EDUCATION/TRAINING PROGRAM

## 2023-12-08 PROCEDURE — 1159F PR MEDICATION LIST DOCUMENTED IN MEDICAL RECORD: ICD-10-PCS | Mod: HCNC,CPTII,S$GLB, | Performed by: STUDENT IN AN ORGANIZED HEALTH CARE EDUCATION/TRAINING PROGRAM

## 2023-12-08 NOTE — TELEPHONE ENCOUNTER
----- Message from Laurence Lake MD sent at 12/8/2023  3:33 PM CST -----  Regarding: pessary  Dr. Wiedemann also said he will fit pessary if Dr Yoder is not available, can you call to get this patient scheduled asap? Thanks!

## 2023-12-08 NOTE — TELEPHONE ENCOUNTER
The soonest appt we have is in February if he has something sooner then she can be rescheduled to him.

## 2023-12-08 NOTE — PROGRESS NOTES
CC: pessary lost, prolapse and urinary/fecal incontinence     HPI:  Cheryl Ghosh is a 88 y.o. female  presents with complaint of worsening prolapse, loss of pessary, urinary and fecal incontinence. Patient had pessary fitted on  with Dr. Alonzo, was using it until a few months ago she took it out, cleaned and stored it. Went to take it out of storage and place it again due to increased pressure/vaginal wall feeling like its falling out but she could not find it. Reports urinary loss daily since taking pessary out, has to wear a pad at all times and take extra when she leaves home/works. Feels both urge and stress. Also experiencing fecal incontinence for the past 3-4 months, worse in the last 3-4 days. Fecal incontinence is small amounts of smearing, does not have diarrhea stools are formed.     ROS:  GENERAL: No fever, chills, fatigability or weight loss.  VULVAR: No pain, no lesions and no itching.  VAGINAL: No itching, no discharge, no abnormal bleeding and no lesions.  ABDOMEN: No abdominal pain. Denies nausea. Denies vomiting. No diarrhea. No constipation  BREAST: Denies pain. No lumps. No discharge.  URINARY: No dysuria.  CARDIOVASCULAR: No chest pain. No shortness of breath. No leg cramps.  NEUROLOGICAL: No headaches. No vision changes.      Patient History:  Past Medical History:   Diagnosis Date    Arthritis     lumbar    Blood transfusion     Chronic kidney disease, stage III (moderate) 2017    Coronary artery disease involving native coronary artery without angina pectoris 3/9/2021    Degenerative disc disease     lumbar    Insomnia 11/15/2022    Insulinoma     Pancreatic disease     Urinary incontinence 2015     Past Surgical History:   Procedure Laterality Date    ADENOIDECTOMY      APPENDECTOMY      CATARACT EXTRACTION, BILATERAL      EYE SURGERY      HYSTERECTOMY      SAMM/BSO    INJECTION OF JOINT Bilateral 2020    Procedure: Injection, Joint--Bilateral GTB;  Surgeon:  Arina Carrera Jr., MD;  Location: [facility] PAIN [facility]GT;  Service: Pain Management;  Laterality: Bilateral;  Oral sedation    INJECTION OF STEROID Right 12/20/2018    Procedure: INJECTION, STEROID-- Right SI Joint Block and  Steroid Injection;  Surgeon: Xavier Dasilva MD;  Location: [facility] OR;  Service: Neurosurgery;  Laterality: Right;  INJECTION, STEROID-- Right SI Joint Block and  Steroid Injection  SURGERY TIME:1 HR  LOS: 0 DAYS  RADIOLOGY: C- arm  BED: Regular Bed  with Pillows  POSITION: Prone      INJECTION OF STEROID Left 6/19/2019    Procedure: INJECTION, STEROID Procedure:Left sacroiliac joint block / steroid injection Surgery Time: 30mins LOS: Anesthesia: MAC Radiology: C-arm Bed: Regular Bed Position: Prone;  Surgeon: Xavier Dasilva MD;  Location: [facility] OR;  Service: Neurosurgery;  Laterality: Left;    MINIMALLY INVASIVE FUSION OF SPINE Right 2/6/2019    Procedure: FUSION, SPINE, MINIMALLY INVASIVE Right Sacroiliac Joint Fusion -  I Fuse;  Surgeon: Xavier Dasilva MD;  Location: [facility] OR;  Service: Neurosurgery;  Laterality: Right;  Procedure: Right Sacroiliac Joint Fusion -  I Fuse  Surgery Time: 1.5 Hr  LOS: 0  Anesthesia: General  Radiology: C-Arm  Bed: [place] [id number] Poster  Position: Prone  Equipment: I Fuse Codi [phone] (notified 1/24 EF)    OOPHORECTOMY      PANCREAS SURGERY      Insulanoma    RADIOFREQUENCY ABLATION OF LUMBAR MEDIAL BRANCH NERVE AT SINGLE LEVEL Right 8/28/2018    Procedure: RADIOFREQUENCY ABLATION, NERVE, MEDIAL BRANCH, LUMBAR, 1 LEVEL- Right L3,4,5 IV SEDATION;  Surgeon: Eddie Vega MD;  Location: [facility] PAIN [facility]GT;  Service: Pain Management;  Laterality: Right;    RADIOFREQUENCY ABLATION OF LUMBAR MEDIAL BRANCH NERVE AT SINGLE LEVEL Left 9/4/2018    Procedure: RADIOFREQUENCY ABLATION, NERVE, MEDIAL BRANCH, LUMBAR, 1 LEVEL - Left L3,4,5 IV SEADTION;  Surgeon: Eddie Vega MD;  Location: [facility];  Service: Pain Management;  Laterality: Left;  Patient takes ASA      REFRACTIVE SURGERY Bilateral     SPINAL FUSION Left 2019    Procedure: FUSION, SPINE SI Joint Fusion;  Surgeon: Xavier Dasilva MD;  Location: [facility];  Service: Neurosurgery;  Laterality: Left;  Procedure: Left SI joint fusion  Surgery Time: 1.5hr  LOS:0  Anesthesia: General   Radiology: C-arm   Bed: [place] [id number] Poster  Position: Prone  Equipment: Codi Diaz- [phone] (Codi notified)    TONSILLECTOMY       Social History     Tobacco Use    Smoking status: Never     Passive exposure: Never    Smokeless tobacco: Never   Substance Use Topics    Alcohol use: Yes     Alcohol/week: 1.0 - 2.0 standard drink of alcohol     Types: 1 - 2 Glasses of wine per week    Drug use: No     Family History   Problem Relation Age of Onset    Breast cancer Mother     Heart disease Father     Aneurysm Sister     Glaucoma Daughter     Heart attack Son     Breast cancer Maternal Grandmother     Breast cancer Other      OB History    Para Term  AB Living   2 2 2     2   SAB IAB Ectopic Multiple Live Births           2      # Outcome Date GA Lbr Rhett/2nd Weight Sex Delivery Anes PTL Lv   2 Term      Vag-Spont  N AZUL   1 Term      Vag-Spont  N AZUL       Objective:   /62   Pulse (!) 59   Wt 60.8 kg (134 lb 0.6 oz)   LMP  (LMP Unknown)   BMI 23.01 kg/m²   No LMP recorded (lmp unknown). Patient is postmenopausal.      PHYSICAL EXAM:  APPEARANCE: Well nourished, well developed, in no acute distress.  AFFECT: WNL, alert and oriented x 3  Patient otherwise declined exam as I did not have pessary in office today to offer fitting       ASSESSMENT and PLAN:    ICD-10-CM ICD-9-CM    1. Female bladder prolapse  N81.10 618.01 Ambulatory referral/consult to Urogynecology      2. Mixed stress and urge urinary incontinence  N39.46 788.33       3. Fecal smearing  R15.1 787.62           Prolapse/incontinence  - offered to exam today if desired, patient declined  - no pessary available for fitting in office, reviewed  evaluation and fitting with urogyn or partner who completes fittings. Will assist to get scheduled   - discussed increased fiber intake/supplementation, pelvic floor PT (patient does not desire to do at this time). She is awaiting appointment scheduling with GI   - urogyn referral ordered       Follow up: as needed       Laurence Lake MD  OBGYN [facility]

## 2023-12-08 NOTE — TELEPHONE ENCOUNTER
----- Message from Laurence Lake MD sent at 12/8/2023  2:20 PM CST -----  Regarding: pessary fitting  Can you see if Dr. Yoder has any availability to fit this patient for a pessary? I do not do it myself, she said she does    Thanks!  Jaya

## 2023-12-08 NOTE — TELEPHONE ENCOUNTER
Spoke with pt. Pt wanted appointment with Dr. Mcneil. Tarun did not have anything until march and pt felt like this was urgent. Offered an appointment with Dr. Lake today and pt agreed to appointment.

## 2023-12-08 NOTE — TELEPHONE ENCOUNTER
Spoke with pt. She is going to keep her appointment with Dr. Yoder as Dr. Fulton does not have much available either.     Pt was wondering if Dr. Lake is able to send a referral for her for her rectal prolapse.

## 2023-12-12 ENCOUNTER — TELEPHONE (OUTPATIENT)
Dept: OBSTETRICS AND GYNECOLOGY | Facility: CLINIC | Age: 88
End: 2023-12-12
Payer: MEDICARE

## 2023-12-12 NOTE — PROGRESS NOTES
Established Patient - Audio Only Telehealth Visit     Reports she had surgery scheduled but cancelled it after getting a bad feeling-Self-manages pessary but accidentally threw it out.  Started experiencing accidental bowel leakage 4-5 years ago. Reports having no sensation in the rectum    Uses pads      Referring Provider:  Laurence Lake MD    Background:  Relevant History:  OB:  SAMM/BSO  POP  Abd/Sx:  Appendectomy  Pancreas surgery- insulinoma  GI/:  CKD3  Other:  Spinal fusion    CAD       Past Medical History:   Diagnosis Date    Arthritis     lumbar    Blood transfusion     Chronic kidney disease, stage III (moderate) 2017    Coronary artery disease involving native coronary artery without angina pectoris 3/9/2021    Degenerative disc disease     lumbar    Insomnia 11/15/2022    Insulinoma     Pancreatic disease     Urinary incontinence 2015      OB History    Para Term  AB Living   2 2 2 0 0 2   SAB IAB Ectopic Multiple Live Births   0 0 0 0 2        Previous Treatment:  Pessary: #1 ring w/support  PFPT

## 2023-12-12 NOTE — TELEPHONE ENCOUNTER
Lvm for pt informing her that Dr. Lake put in a referral for her. Informed pt that they should reach out to schedule her but gave her the number [phone] to schedule just incase they do not reach out.

## 2023-12-13 ENCOUNTER — CLINICAL SUPPORT (OUTPATIENT)
Dept: UROGYNECOLOGY | Facility: CLINIC | Age: 88
End: 2023-12-13
Payer: MEDICARE

## 2023-12-13 DIAGNOSIS — N81.10 FEMALE BLADDER PROLAPSE: ICD-10-CM

## 2023-12-27 ENCOUNTER — TELEPHONE (OUTPATIENT)
Dept: OBSTETRICS AND GYNECOLOGY | Facility: CLINIC | Age: 88
End: 2023-12-27
Payer: MEDICARE

## 2023-12-27 DIAGNOSIS — R30.0 DYSURIA: Primary | ICD-10-CM

## 2023-12-27 NOTE — TELEPHONE ENCOUNTER
If she took an antibiotic and it didn't work then we really need a urine sample to be done at the lab to see what bacteria is causing it and what might work against it. Order put in for her to do the lab

## 2023-12-27 NOTE — TELEPHONE ENCOUNTER
----- Message from Jenny Chavez sent at 12/27/2023  3:50 PM CST -----  Type:  RX Refill Request    Who Called: pt  Refill or New Rx:New  RX Name and Strength:nitrofurantoin, macrocrystal-monohydrate, (MACROBID) 100 MG capsule  Preferred Pharmacy with phone number:[facility]/pharmacy #[id number] - [place], [place] - [id number] [facility]  Local or Mail Order:local  Ordering Provider:evelyn  Would the patient rather a call back or a response via [facility]? call  Best Call Back Number: [phone]  Additional Information: Pt would like another medications because she said the one she had listed above does not work..

## 2023-12-27 NOTE — TELEPHONE ENCOUNTER
Called pt regarding urine culture order due to her medication not working. She is experiencing a foul smell but no burning during urination or frequent urination. She is going to have her urine sample taken at [facility]. Also explained how to perform a clean catch when during the culture.

## 2024-01-02 ENCOUNTER — PATIENT MESSAGE (OUTPATIENT)
Dept: OBSTETRICS AND GYNECOLOGY | Facility: HOSPITAL | Age: 89
End: 2024-01-02
Payer: MEDICARE

## 2024-01-02 RX ORDER — NITROFURANTOIN 25; 75 MG/1; MG/1
100 CAPSULE ORAL 2 TIMES DAILY
Qty: 14 CAPSULE | Refills: 0 | Status: SHIPPED | OUTPATIENT
Start: 2024-01-02 | End: 2024-01-09

## 2024-01-06 DIAGNOSIS — G47.00 INSOMNIA, UNSPECIFIED TYPE: ICD-10-CM

## 2024-01-06 NOTE — TELEPHONE ENCOUNTER
Care Due:                  Date            Visit Type   Department     Provider  --------------------------------------------------------------------------------                                EP -                              [facility]    Joey Marino  Last Visit: 11-      CARE ([facility])   MEDICINE       [facility]                               [facility]  Next Visit: 02-      CARE ([facility])   MEDICINE       Ami                                                            Last  Test          Frequency    Reason                     Performed    Due Date  --------------------------------------------------------------------------------    Mg Level....  12 months..  alendronate..............  Not Found    Overdue    Phosphate...  12 months..  alendronate..............  Not Found    Overdue    Vitamin D...  12 months..  alendronate..............  11-   11-    Health [facility] Embedded Care Due Messages. Reference number: [id number].   1/06/2024 2:10:00 AM CST

## 2024-01-08 RX ORDER — TRAZODONE HYDROCHLORIDE 100 MG/1
150 TABLET ORAL NIGHTLY PRN
Qty: 135 TABLET | Refills: 3 | Status: SHIPPED | OUTPATIENT
Start: 2024-01-08 | End: 2025-01-07

## 2024-01-08 NOTE — TELEPHONE ENCOUNTER
Refill Routing Note   Medication(s) are not appropriate for processing by [facility] for the following reason(s):        No active prescription written by provider    [facility] action(s):  Defer               Appointments  past 12m or future 3m with PCP    Date Provider   Last Visit   11/28/2023 Joey Mueller MD   Next Visit   2/27/2024 Joey Mueller MD   ED visits in past 90 days: 0        Note composed:1:36 AM 01/08/2024

## 2024-01-12 ENCOUNTER — OFFICE VISIT (OUTPATIENT)
Dept: FAMILY MEDICINE | Facility: CLINIC | Age: 89
End: 2024-01-12
Attending: FAMILY MEDICINE
Payer: MEDICARE

## 2024-01-12 VITALS
HEART RATE: 53 BPM | DIASTOLIC BLOOD PRESSURE: 60 MMHG | OXYGEN SATURATION: 99 % | BODY MASS INDEX: 22.5 KG/M2 | WEIGHT: 131.81 LBS | SYSTOLIC BLOOD PRESSURE: 138 MMHG | HEIGHT: 64 IN

## 2024-01-12 DIAGNOSIS — D63.1 ANEMIA OF CHRONIC RENAL FAILURE, STAGE 3B: ICD-10-CM

## 2024-01-12 DIAGNOSIS — L02.414 ABSCESS OF LEFT ARM: Primary | ICD-10-CM

## 2024-01-12 DIAGNOSIS — N18.32 ANEMIA OF CHRONIC RENAL FAILURE, STAGE 3B: ICD-10-CM

## 2024-01-12 DIAGNOSIS — I50.32 CHRONIC DIASTOLIC HEART FAILURE: ICD-10-CM

## 2024-01-12 DIAGNOSIS — M46.1 SACROILIITIS: ICD-10-CM

## 2024-01-12 DIAGNOSIS — N25.81 SECONDARY HYPERPARATHYROIDISM OF RENAL ORIGIN: ICD-10-CM
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
## 2024-01-12 DIAGNOSIS — D69.2 OTHER NONTHROMBOCYTOPENIC PURPURA: ICD-10-CM

## 2024-01-12 DIAGNOSIS — N18.31 STAGE 3A CHRONIC KIDNEY DISEASE: ICD-10-CM

## 2024-01-12 DIAGNOSIS — I70.0 AORTIC ATHEROSCLEROSIS: ICD-10-CM

## 2024-01-12 PROCEDURE — 99999 PR PBB SHADOW E&M-EST. PATIENT-LVL IV: CPT | Mod: PBBFAC,HCNC,, | Performed by: FAMILY MEDICINE

## 2024-01-12 PROCEDURE — 1160F RVW MEDS BY RX/DR IN RCRD: CPT | Mod: HCNC,CPTII,S$GLB, | Performed by: FAMILY MEDICINE

## 2024-01-12 PROCEDURE — 1126F AMNT PAIN NOTED NONE PRSNT: CPT | Mod: HCNC,CPTII,S$GLB, | Performed by: FAMILY MEDICINE

## 2024-01-12 PROCEDURE — 1101F PT FALLS ASSESS-DOCD LE1/YR: CPT | Mod: HCNC,CPTII,S$GLB, | Performed by: FAMILY MEDICINE

## 2024-01-12 PROCEDURE — 3288F FALL RISK ASSESSMENT DOCD: CPT | Mod: HCNC,CPTII,S$GLB, | Performed by: FAMILY MEDICINE

## 2024-01-12 PROCEDURE — 1159F MED LIST DOCD IN RCRD: CPT | Mod: HCNC,CPTII,S$GLB, | Performed by: FAMILY MEDICINE

## 2024-01-12 PROCEDURE — 99215 OFFICE O/P EST HI 40 MIN: CPT | Mod: HCNC,S$GLB,, | Performed by: FAMILY MEDICINE

## 2024-01-12 RX ORDER — DOXYCYCLINE 100 MG/1
100 CAPSULE ORAL EVERY 12 HOURS
Qty: 14 CAPSULE | Refills: 0 | Status: SHIPPED | OUTPATIENT
Start: 2024-01-12 | End: 2024-01-19

## 2024-01-12 RX ORDER — CLINDAMYCIN HYDROCHLORIDE 300 MG/1
300 CAPSULE ORAL EVERY 8 HOURS
Qty: 21 CAPSULE | Refills: 0 | Status: SHIPPED | OUTPATIENT
Start: 2024-01-12 | End: 2024-01-24

## 2024-01-12 RX ORDER — MUPIROCIN 20 MG/G
OINTMENT TOPICAL 3 TIMES DAILY
Qty: 15 G | Refills: 1 | Status: SHIPPED | OUTPATIENT
Start: 2024-01-12 | End: 2024-01-22

## 2024-01-12 NOTE — PROGRESS NOTES
Subjective:       Patient ID: Cheryl Ghosh is a 89 y.o. female.    Chief Complaint: Follow-up (6 month ) and Animal Bite (This incident happened Saturday.)    88 yr old pleasant white female with HTN, CKD III, OA, osteopenia hip, hyperlipidemia, chronic back pain, BPPV, presents today for her 3 month check.c/o infection and abscess left arm after she had cat bite few days ago. She went to urgent care and received antibiotics and tetanus vaccine. No fever or chills. She still has wound but it is better but has not resolved.       HTN - controlled - low salt diet - having issues with medications lisinopril n losartan      CKD III - stable - improving       HLD - slightly worsening since stopped statin x 30 days           - she was asked to start statin but due to her fall and issues with her ribs and back, she was never able to start it.    Back pain - she follows pain clinic for injections - c/o neuropathic pain in legs - was on neurontin in the past and she started taking it and working    Osteoporosis - started back on fosamax - doing well - next dexa in a year    HISTORY as below - reviewed    Health maintenance  -labs UTD  -mammo UTD  -colon screen UTD  -vaccines UTD    Follow-up  Associated symptoms include arthralgias, chest pain, myalgias, a rash, vomiting and weakness. Pertinent negatives include no congestion, diaphoresis, headaches, joint swelling, nausea, neck pain or sore throat.   Animal Bite   Associated symptoms include chest pain, vomiting, hearing loss and weakness. Pertinent negatives include no visual disturbance, no nausea, no headaches, no neck pain and no seizures.   Knee Pain     Back Pain  Associated symptoms include chest pain and weakness. Pertinent negatives include no dysuria, headaches or leg pain.   Hip Pain     Hypertension  This is a chronic problem. The current episode started more than 1 year ago. The problem has been waxing and waning since onset. The problem is resistant.  Associated symptoms include blurred vision, chest pain and malaise/fatigue. Pertinent negatives include no anxiety, headaches, neck pain, orthopnea, palpitations, peripheral edema, PND, shortness of breath or sweats. Agents associated with hypertension include NSAIDs. There are no known risk factors for coronary artery disease. Past treatments include nothing. The current treatment provides significant improvement. Compliance problems include exercise.  There is no history of chronic renal disease.   Hyperlipidemia  This is a chronic problem. The current episode started more than 1 year ago. The problem is uncontrolled. Recent lipid tests were reviewed and are high. She has no history of chronic renal disease, diabetes, hypothyroidism, liver disease, obesity or nephrotic syndrome. There are no known factors aggravating her hyperlipidemia. Associated symptoms include chest pain and myalgias. Pertinent negatives include no focal sensory loss, leg pain or shortness of breath. Current antihyperlipidemic treatment includes diet change. The current treatment provides mild improvement of lipids. There are no compliance problems.  Risk factors for coronary artery disease include dyslipidemia.     Review of Systems   Constitutional:  Positive for activity change and malaise/fatigue. Negative for diaphoresis and unexpected weight change.   HENT:  Positive for hearing loss. Negative for nasal congestion, ear discharge, rhinorrhea, sore throat, trouble swallowing and voice change.    Eyes:  Positive for blurred vision. Negative for pain, discharge and visual disturbance.   Respiratory: Negative.  Negative for chest tightness, shortness of breath and wheezing.    Cardiovascular:  Positive for chest pain. Negative for palpitations, orthopnea and PND.   Gastrointestinal:  Positive for vomiting. Negative for abdominal distention, anal bleeding, blood in stool, constipation, diarrhea and nausea.   Endocrine: Negative.  Negative for  cold intolerance, polydipsia and polyuria.   Genitourinary: Negative.  Negative for decreased urine volume, difficulty urinating, dysuria, frequency, hematuria, menstrual problem and vaginal pain.   Musculoskeletal:  Positive for arthralgias, back pain and myalgias. Negative for gait problem, joint swelling, leg pain and neck pain.   Integumentary:  Positive for rash and wound. Negative for color change and pallor.   Allergic/Immunologic: Negative.  Negative for environmental allergies and immunocompromised state.   Neurological:  Positive for weakness. Negative for dizziness, tremors, seizures, speech difficulty and headaches.   Hematological: Negative.  Negative for adenopathy. Does not bruise/bleed easily.   Psychiatric/Behavioral: Negative.  Negative for agitation, confusion, decreased concentration, dysphoric mood, hallucinations, self-injury and suicidal ideas. The patient is not nervous/anxious.          PMH/PSH/FH/SH/MED/ALLERGY reviewed    Past Medical History:   Diagnosis Date    Arthritis     lumbar    Blood transfusion     Chronic kidney disease, stage III (moderate) 8/2/2017    Coronary artery disease involving native coronary artery without angina pectoris 3/9/2021    Degenerative disc disease     lumbar    Insomnia 11/15/2022    Insulinoma     Pancreatic disease     Urinary incontinence 12/7/2015       Past Surgical History:   Procedure Laterality Date    ADENOIDECTOMY      APPENDECTOMY      CATARACT EXTRACTION, BILATERAL      EYE SURGERY      HYSTERECTOMY      SAMM/BSO    INJECTION OF JOINT Bilateral 2/4/2020    Procedure: Injection, Joint--Bilateral GTB;  Surgeon: Arina Carrera Jr., MD;  Location: [facility] PAIN MGT;  Service: Pain Management;  Laterality: Bilateral;  Oral sedation    INJECTION OF STEROID Right 12/20/2018    Procedure: INJECTION, STEROID-- Right SI Joint Block and  Steroid Injection;  Surgeon: Xavier Dasilva MD;  Location: [facility] OR;  Service: Neurosurgery;  Laterality: Right;   INJECTION, STEROID-- Right SI Joint Block and  Steroid Injection  SURGERY TIME:1 HR  LOS: 0 DAYS  RADIOLOGY: C- arm  BED: Regular Bed  with Pillows  POSITION: Prone      INJECTION OF STEROID Left 6/19/2019    Procedure: INJECTION, STEROID Procedure:Left sacroiliac joint block / steroid injection Surgery Time: 30mins LOS: Anesthesia: MAC Radiology: C-arm Bed: Regular Bed Position: Prone;  Surgeon: Xavier Dasilva MD;  Location: [facility] OR;  Service: Neurosurgery;  Laterality: Left;    MINIMALLY INVASIVE FUSION OF SPINE Right 2/6/2019    Procedure: FUSION, SPINE, MINIMALLY INVASIVE Right Sacroiliac Joint Fusion -  I Fuse;  Surgeon: Xavier Dasilva MD;  Location: [facility] OR;  Service: Neurosurgery;  Laterality: Right;  Procedure: Right Sacroiliac Joint Fusion -  I Fuse  Surgery Time: 1.5 Hr  LOS: 0  Anesthesia: General  Radiology: C-Arm  Bed: [place] [id number] Poster  Position: Prone  Equipment: I [employer] Codi [phone] (notified 1/24 EF)    OOPHORECTOMY      PANCREAS SURGERY      Insulanoma    RADIOFREQUENCY ABLATION OF LUMBAR MEDIAL BRANCH NERVE AT SINGLE LEVEL Right 8/28/2018    Procedure: RADIOFREQUENCY ABLATION, NERVE, MEDIAL BRANCH, LUMBAR, 1 LEVEL- Right L3,4,5 IV SEDATION;  Surgeon: Eddie Vega MD;  Location: [facility] PAIN MGT;  Service: Pain Management;  Laterality: Right;    RADIOFREQUENCY ABLATION OF LUMBAR MEDIAL BRANCH NERVE AT SINGLE LEVEL Left 9/4/2018    Procedure: RADIOFREQUENCY ABLATION, NERVE, MEDIAL BRANCH, LUMBAR, 1 LEVEL - Left L3,4,5 IV SEADTION;  Surgeon: Eddie Vega MD;  Location: [facility] PAIN MGT;  Service: Pain Management;  Laterality: Left;  Patient takes ASA     REFRACTIVE SURGERY Bilateral     SPINAL FUSION Left 7/31/2019    Procedure: FUSION, SPINE SI Joint Fusion;  Surgeon: Xavier Dasilva MD;  Location: [facility] OR;  Service: Neurosurgery;  Laterality: Left;  Procedure: Left SI joint fusion  Surgery Time: 1.5hr  LOS:0  Anesthesia: General   Radiology: C-arm   Bed: [place] 4 Poster  Position:  Prone  Equipment: Codi Diaz- [phone] (Codi notified)    TONSILLECTOMY         Family History   Problem Relation Age of Onset    Breast cancer Mother     Heart disease Father     Aneurysm Sister     Glaucoma Daughter     Heart attack Son     Breast cancer Maternal Grandmother     Breast cancer Other        Social History     Socioeconomic History    Marital status:    Tobacco Use    Smoking status: Never     Passive exposure: Never    Smokeless tobacco: Never   Substance and Sexual Activity    Alcohol use: Yes     Alcohol/week: 1.0 - 2.0 standard drink of alcohol     Types: 1 - 2 Glasses of wine per week    Drug use: No    Sexual activity: Yes     Partners: Male     Social Determinants of Health     Financial Resource Strain: Low Risk  (12/18/2023)    Overall Financial Resource Strain (CARDIA)     Difficulty of Paying Living Expenses: Not hard at all   Food Insecurity: No Food Insecurity (12/18/2023)    Hunger Vital Sign     Worried About Running Out of Food in the Last Year: Never true     Ran Out of Food in the Last Year: Never true   Transportation Needs: No Transportation Needs (12/18/2023)    PRAPARE - Transportation     Lack of Transportation (Medical): No     Lack of Transportation (Non-Medical): No   Physical Activity: Insufficiently Active (12/18/2023)    Exercise Vital Sign     Days of Exercise per Week: 2 days     Minutes of Exercise per Session: 20 min   Stress: No Stress Concern Present (12/18/2023)    [employer] - Occupational Stress Questionnaire     Feeling of Stress : Only a little   Social Connections: Unknown (12/18/2023)    Social Connection and Isolation Panel [NHANES]     Frequency of Communication with Friends and Family: Three times a week     Frequency of Social Gatherings with Friends and Family: Once a week     Active Member of Clubs or Organizations: Yes     Attends Club or Organization Meetings: More than 4 times per year     Marital Status:     Housing Stability: Low Risk  (12/18/2023)    Housing Stability Vital Sign     Unable to Pay for Housing in the Last Year: No     Number of Places Lived in the Last Year: 1     Unstable Housing in the Last Year: No       Current Outpatient Medications   Medication Sig Dispense Refill    acetaminophen (TYLENOL 8 HOUR ORAL) Take by mouth.      alendronate (FOSAMAX) 70 MG tablet Take 1 tablet (70 mg total) by mouth every 7 days. 4 tablet 11    amLODIPine (NORVASC) 2.5 MG tablet Take 1 tablet (2.5 mg total) by mouth once daily. 90 tablet 3    aspirin (ECOTRIN) 81 MG EC tablet Take 1 tablet (81 mg total) by mouth once daily. Resume 48 hours post-surgery  0    b complex vitamins tablet Take 1 tablet by mouth once daily.      baclofen (LIORESAL) 10 MG tablet Take 1 tablet (10 mg total) by mouth 3 (three) times daily. 270 tablet 4    calcium-vitamin D 600 mg(1,500mg) -400 unit Tab once daily.       gabapentin (NEURONTIN) 300 MG capsule TAKE 1 CAPSULE THREE TIMES DAILY 270 capsule 3    nitrofurantoin, macrocrystal-monohydrate, (MACROBID) 100 MG capsule Take 1 capsule (100 mg total) by mouth 2 (two) times daily. 20 capsule 0    oxyCODONE-acetaminophen (PERCOCET) 5-325 mg per tablet Take 1 tablet by mouth every 8 (eight) hours as needed for Pain. 21 tablet 0    pravastatin (PRAVACHOL) 40 MG tablet Take 1 tablet (40 mg total) by mouth every evening. 90 tablet 3    pyridoxine, vitamin B6, (B-6) 100 MG Tab Take 100 mg by mouth once daily.      traZODone (DESYREL) 100 MG tablet TAKE 1.5 TABLETS (150 MG TOTAL) BY MOUTH NIGHTLY AS NEEDED FOR INSOMNIA. 135 tablet 3    clindamycin (CLEOCIN) 300 MG capsule Take 1 capsule (300 mg total) by mouth every 8 (eight) hours. 21 capsule 0    doxycycline (VIBRAMYCIN) 100 MG Cap Take 1 capsule (100 mg total) by mouth every 12 (twelve) hours. for 7 days 14 capsule 0    mupirocin (BACTROBAN) 2 % ointment Apply topically 3 (three) times daily. for 10 days 15 g 1     No current  facility-administered medications for this visit.     Facility-Administered Medications Ordered in Other Visits   Medication Dose Route Frequency Provider Last Rate Last Admin    gentamicin 80 mg/100ml NACL IVPB IVPB 80 mg  80 mg Intravenous 30 Min Pre-Op Wiliam Urias PA-C   80 mg at 07/31/19 0747    vancomycin in dextrose 5 % 1 gram/250 mL IVPB 1,000 mg  1,000 mg Intravenous 30 Min Pre-Op Wiliam Urias PA-C   1,000 mg at 07/31/19 0738       Review of patient's allergies indicates:   Allergen Reactions    Pcn [penicillins] Hives and Nausea And Vomiting       Objective:       Vitals:    01/12/24 0911   BP: 138/60   Pulse: (!) 53       Physical Exam  Constitutional:       General: She is not in acute distress.     Appearance: She is well-developed. She is not diaphoretic.   HENT:      Head: Normocephalic and atraumatic.      Right Ear: External ear normal.      Left Ear: External ear normal.      Nose: Nose normal.      Mouth/Throat:      Pharynx: No oropharyngeal exudate.   Eyes:      General: No scleral icterus.        Right eye: No discharge.         Left eye: No discharge.      Conjunctiva/sclera: Conjunctivae normal.      Pupils: Pupils are equal, round, and reactive to light.   Neck:      Thyroid: No thyromegaly.      Vascular: No JVD.      Trachea: No tracheal deviation.   Cardiovascular:      Rate and Rhythm: Normal rate and regular rhythm.      Heart sounds: Normal heart sounds. No murmur heard.     No friction rub. No gallop.   Pulmonary:      Effort: Pulmonary effort is normal.      Breath sounds: Normal breath sounds. No stridor. No wheezing or rales.   Chest:      Chest wall: No tenderness.   Abdominal:      General: Bowel sounds are normal. There is no distension.      Palpations: Abdomen is soft. There is no mass.      Tenderness: There is no abdominal tenderness. There is no guarding or rebound.      Hernia: No hernia is present.   Musculoskeletal:         General: Tenderness present. Normal  range of motion.      Cervical back: Normal range of motion and neck supple.   Lymphadenopathy:      Cervical: No cervical adenopathy.   Skin:     General: Skin is warm and dry.      Coloration: Skin is not pale.      Findings: Erythema and lesion present. No rash.      Comments: Left forearm cat bite with abscess around   Neurological:      Mental Status: She is alert and oriented to person, place, and time.      Cranial Nerves: No cranial nerve deficit.      Motor: No abnormal muscle tone.      Coordination: Coordination normal.      Deep Tendon Reflexes: Reflexes are normal and symmetric. Reflexes normal.   Psychiatric:         Behavior: Behavior normal.         Thought Content: Thought content normal.         Judgment: Judgment normal.         Assessment:       Problem List Items Addressed This Visit       Stage 3a chronic kidney disease    Secondary hyperparathyroidism of renal origin    Sacroiliitis    Other nonthrombocytopenic purpura    Chronic diastolic heart failure    Aortic atherosclerosis    Anemia of chronic renal failure, stage 3b     Other Visit Diagnoses       Abscess of left arm    -  Primary    Relevant Medications    mupirocin (BACTROBAN) 2 % ointment    clindamycin (CLEOCIN) 300 MG capsule    doxycycline (VIBRAMYCIN) 100 MG Cap    Cat bite, initial encounter        Relevant Medications    mupirocin (BACTROBAN) 2 % ointment    clindamycin (CLEOCIN) 300 MG capsule    doxycycline (VIBRAMYCIN) 100 MG Cap            Plan:           Cheryl was seen today for follow-up and animal bite.    Diagnoses and all orders for this visit:    Abscess of left arm  -     mupirocin (BACTROBAN) 2 % ointment; Apply topically 3 (three) times daily. for 10 days  -     clindamycin (CLEOCIN) 300 MG capsule; Take 1 capsule (300 mg total) by mouth every 8 (eight) hours.  -     doxycycline (VIBRAMYCIN) 100 MG Cap; Take 1 capsule (100 mg total) by mouth every 12 (twelve) hours. for 7 days    Cat bite, initial encounter  -      mupirocin (BACTROBAN) 2 % ointment; Apply topically 3 (three) times daily. for 10 days  -     clindamycin (CLEOCIN) 300 MG capsule; Take 1 capsule (300 mg total) by mouth every 8 (eight) hours.  -     doxycycline (VIBRAMYCIN) 100 MG Cap; Take 1 capsule (100 mg total) by mouth every 12 (twelve) hours. for 7 days    Anemia of chronic renal failure, stage 3b    Chronic diastolic heart failure    Secondary hyperparathyroidism of renal origin    Sacroiliitis    Other nonthrombocytopenic purpura    Aortic atherosclerosis    Stage 3a chronic kidney disease      Abscess left forearm  -continue abx x 1 more week  -mupirocin as directed and hot compresses    HTN  -controlled    TIA  -follow neurology - DC plavix per neuro recommendations and just stay on ASA      Insomnia  -try benadryl only if needed    HLD  -diet controlled and statin    Urine incontinence  -lifestyle changes - intolerant to meds    Neuropathic pain  -continue neurontin    OA knee B/L  -mobic prn    CKD III  -lab and urine analysis  -adequate hydration  -follow nephrology    Muscle spasm  -baclofen  and percocet prn    Osteoporosis  -continue fosamax    Insomnia  -increase trazodone to 150    CKD III  -stable      Spent adequate time in obtaining history and explaining differentials    40  minutes spent during this visit of which greater than 50% devoted to face-face counseling and coordination of care regarding diagnosis and management plan    RTC 3 months

## 2024-01-23 ENCOUNTER — TELEPHONE (OUTPATIENT)
Dept: SURGERY | Facility: CLINIC | Age: 89
End: 2024-01-23
Payer: MEDICARE

## 2024-01-23 NOTE — PROGRESS NOTES
CRS Office Visit History and Physical    Referring Md:   Laurence Lake Md  [address]  [address]  [place] [address]    SUBJECTIVE:     Chief Complaint: Fecal incontinence    History of Present Illness:  The patient is new patient to this practice.   Course is as follows:  Patient is a 89 y.o. female presents with fecal incontinence x 2 years.  No sensation to leakage of stool.  Happens daily.  Either small pieces or full BM.  Taking fiber-con and metamucil, which has helped, but still has accidents.  Denies feeling of recta prolapse/bulge.  + vaginal bulge.  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)  Prior pelvic or anorectal surgery: Hemorrhoid bending  Family history of colon/rectal cancer: No  Family history of IBD: No  Steroid or other immunosuppression: No  Blood thinners: Aspirin  Current stool softeners or fiber supplement: Yes - as above  Active smoking: No  Prior deliveries: Yes - 2  complicated by tear: No    [place] (FI):  Leakage of solid stool:   Daily (4)  Leakage of liquid stool:   Daily (4)  Leakage of flatus:     Daily (4)  Wear a pad:       Daily (4)  Alter life:       Daily (4)  Total: 20    [place] (ODS):  How long on toilet:   <5min (0)   How many times/day: 2 (1)    Digitation:   Never (0)   Laxatives:   Never (0)   Enemas:    Never (0)  Incomplete stool: Weekly (2)   Strain:   <25% (1)   Total: 4    Stool consistency/[place]: 2 / 5  Bowel movements per month:   Few times per week   Leakage of urine: Yes   Trouble emptying your bladder: Yes   Feel something bulging through vagina? Yes     Review of patient's allergies indicates:   Allergen Reactions    Pcn [penicillins] Hives and Nausea And Vomiting       Past Medical History:   Diagnosis Date    Arthritis     lumbar    Blood transfusion     Chronic kidney disease, stage III (moderate) 8/2/2017    Coronary artery disease involving  native coronary artery without angina pectoris 3/9/2021    Degenerative disc disease     lumbar    Insomnia 11/15/2022    Insulinoma     Pancreatic disease     Urinary incontinence 12/7/2015     Past Surgical History:   Procedure Laterality Date    ADENOIDECTOMY      APPENDECTOMY      CATARACT EXTRACTION, BILATERAL      EYE SURGERY      HYSTERECTOMY      SAMM/BSO    INJECTION OF JOINT Bilateral 2/4/2020    Procedure: Injection, Joint--Bilateral GTB;  Surgeon: Arina Carrera Jr., MD;  Location: [facility] PAIN MGT;  Service: Pain Management;  Laterality: Bilateral;  Oral sedation    INJECTION OF STEROID Right 12/20/2018    Procedure: INJECTION, STEROID-- Right SI Joint Block and  Steroid Injection;  Surgeon: Xavier Dasilva MD;  Location: [facility] OR;  Service: Neurosurgery;  Laterality: Right;  INJECTION, STEROID-- Right SI Joint Block and  Steroid Injection  SURGERY TIME:1 HR  LOS: 0 DAYS  RADIOLOGY: C- arm  BED: Regular Bed  with Pillows  POSITION: Prone      INJECTION OF STEROID Left 6/19/2019    Procedure: INJECTION, STEROID Procedure:Left sacroiliac joint block / steroid injection Surgery Time: 30mins LOS: Anesthesia: MAC Radiology: C-arm Bed: Regular Bed Position: Prone;  Surgeon: Xavier Dasilva MD;  Location: [facility] OR;  Service: Neurosurgery;  Laterality: Left;    MINIMALLY INVASIVE FUSION OF SPINE Right 2/6/2019    Procedure: FUSION, SPINE, MINIMALLY INVASIVE Right Sacroiliac Joint Fusion -  I Fuse;  Surgeon: Xavier Dasilva MD;  Location: [facility] OR;  Service: Neurosurgery;  Laterality: Right;  Procedure: Right Sacroiliac Joint Fusion -  I Fuse  Surgery Time: 1.5 Hr  LOS: 0  Anesthesia: General  Radiology: C-Arm  Bed: [place] [id number] Poster  Position: Prone  Equipment: I Fuse Codi [phone] (notified 1/24 EF)    OOPHORECTOMY      PANCREAS SURGERY      Insulanoma    RADIOFREQUENCY ABLATION OF LUMBAR MEDIAL BRANCH NERVE AT SINGLE LEVEL Right 8/28/2018    Procedure: RADIOFREQUENCY ABLATION, NERVE, MEDIAL BRANCH, LUMBAR,  1 LEVEL- Right L3,4,5 IV SEDATION;  Surgeon: Eddie Vega MD;  Location: [facility] PAIN MGT;  Service: Pain Management;  Laterality: Right;    RADIOFREQUENCY ABLATION OF LUMBAR MEDIAL BRANCH NERVE AT SINGLE LEVEL Left 9/4/2018    Procedure: RADIOFREQUENCY ABLATION, NERVE, MEDIAL BRANCH, LUMBAR, 1 LEVEL - Left L3,4,5 IV SEADTION;  Surgeon: Eddie Vega MD;  Location: [facility] PAIN MGT;  Service: Pain Management;  Laterality: Left;  Patient takes ASA     REFRACTIVE SURGERY Bilateral     SPINAL FUSION Left 7/31/2019    Procedure: FUSION, SPINE SI Joint Fusion;  Surgeon: Xavier Dasilva MD;  Location: [facility] OR;  Service: Neurosurgery;  Laterality: Left;  Procedure: Left SI joint fusion  Surgery Time: 1.5hr  LOS:0  Anesthesia: General   Radiology: C-arm   Bed: [place] [id number] Poster  Position: Prone  Equipment: Codi Diaz- [phone] (Codi notified)    TONSILLECTOMY       Family History   Problem Relation Age of Onset    Breast cancer Mother     Heart disease Father     Aneurysm Sister     Glaucoma Daughter     Heart attack Son     Breast cancer Maternal Grandmother     Breast cancer Other      Social History     Tobacco Use    Smoking status: Never     Passive exposure: Never    Smokeless tobacco: Never   Substance Use Topics    Alcohol use: Yes     Alcohol/week: 1.0 - 2.0 standard drink of alcohol     Types: 1 - 2 Glasses of wine per week    Drug use: No        Review of Systems:  ROS    OBJECTIVE:     Vital Signs (Most Recent)  BP (!) 155/68   Pulse (!) 53   Wt 61.6 kg (135 lb 12.9 oz)   LMP  (LMP Unknown)   BMI 23.31 kg/m²     Physical Exam:  General: White female in no distress   Neuro: Alert and oriented x 4.  Moves all extremities.     HEENT: No icterus.  Trachea midline  Respiratory: Respirations are even and unlabored  Cardiac: Regular rate  Abdomen: Soft, non-distended, well-healed midline incision  Extremities: Warm dry and intact  Skin: No rashes     Patient was examined w/ a chaperone (Dr Gonzalez)  present.    Anorectal:   External exam: Perianal skin healthy, no anal wink reflex, no large skin tags, no rectal prolapse with valsalva  Digital exam revealed decreased tone. Soft stool in vault.  No tenderness.  No masses.  Small rectocele.    Patient also examined while straining on the commode, no rectal prolapse seen.      ASSESSMENT/PLAN:     89yo F w/ fecal incontinence and vaginal prolapse    Continue fiber  Pelvic Floor PT  Bowel diaries  Trial of Creon  Colonoscopy  Discussed Intersti, RTC 4-6 weeks    Fransisca Ojeda MD  Staff Surgeon, Colon and Rectal Surgery  [facility]

## 2024-01-24 ENCOUNTER — OFFICE VISIT (OUTPATIENT)
Dept: UROGYNECOLOGY | Facility: CLINIC | Age: 89
End: 2024-01-24
Payer: MEDICARE

## 2024-01-24 ENCOUNTER — OFFICE VISIT (OUTPATIENT)
Dept: SURGERY | Facility: CLINIC | Age: 89
End: 2024-01-24
Attending: COLON & RECTAL SURGERY
Payer: MEDICARE

## 2024-01-24 VITALS
BODY MASS INDEX: 23.31 KG/M2 | WEIGHT: 135.81 LBS | HEART RATE: 53 BPM | DIASTOLIC BLOOD PRESSURE: 68 MMHG | SYSTOLIC BLOOD PRESSURE: 155 MMHG

## 2024-01-24 VITALS
SYSTOLIC BLOOD PRESSURE: 155 MMHG | DIASTOLIC BLOOD PRESSURE: 68 MMHG | WEIGHT: 135.81 LBS | BODY MASS INDEX: 23.31 KG/M2

## 2024-01-24 DIAGNOSIS — R15.1 FECAL SMEARING: ICD-10-CM

## 2024-01-24 DIAGNOSIS — R15.9 INCONTINENCE OF FECES, UNSPECIFIED FECAL INCONTINENCE TYPE: Primary | ICD-10-CM

## 2024-01-24 DIAGNOSIS — N81.6 RECTOCELE, FEMALE: ICD-10-CM

## 2024-01-24 DIAGNOSIS — N39.46 URINARY INCONTINENCE, MIXED: ICD-10-CM

## 2024-01-24 DIAGNOSIS — Z87.440 HISTORY OF UTI: ICD-10-CM

## 2024-01-24 DIAGNOSIS — N81.11 CYSTOCELE, MIDLINE: ICD-10-CM

## 2024-01-24 DIAGNOSIS — N95.2 VAGINAL ATROPHY: Primary | ICD-10-CM

## 2024-01-24 DIAGNOSIS — N81.9 VAGINAL VAULT PROLAPSE: ICD-10-CM

## 2024-01-24 PROCEDURE — 3288F FALL RISK ASSESSMENT DOCD: CPT | Mod: CPTII,S$GLB,, | Performed by: COLON & RECTAL SURGERY

## 2024-01-24 PROCEDURE — 51701 INSERT BLADDER CATHETER: CPT | Mod: S$GLB,,, | Performed by: OBSTETRICS & GYNECOLOGY

## 2024-01-24 PROCEDURE — 1159F MED LIST DOCD IN RCRD: CPT | Mod: CPTII,S$GLB,, | Performed by: COLON & RECTAL SURGERY

## 2024-01-24 PROCEDURE — 99215 OFFICE O/P EST HI 40 MIN: CPT | Mod: 25,S$GLB,, | Performed by: OBSTETRICS & GYNECOLOGY

## 2024-01-24 PROCEDURE — 99999 PR PBB SHADOW E&M-EST. PATIENT-LVL IV: CPT | Mod: PBBFAC,HCNC,, | Performed by: OBSTETRICS & GYNECOLOGY

## 2024-01-24 PROCEDURE — 1159F MED LIST DOCD IN RCRD: CPT | Mod: CPTII,S$GLB,, | Performed by: OBSTETRICS & GYNECOLOGY

## 2024-01-24 PROCEDURE — 1101F PT FALLS ASSESS-DOCD LE1/YR: CPT | Mod: CPTII,S$GLB,, | Performed by: COLON & RECTAL SURGERY

## 2024-01-24 PROCEDURE — 1160F RVW MEDS BY RX/DR IN RCRD: CPT | Mod: CPTII,S$GLB,, | Performed by: OBSTETRICS & GYNECOLOGY

## 2024-01-24 PROCEDURE — 87086 URINE CULTURE/COLONY COUNT: CPT | Performed by: OBSTETRICS & GYNECOLOGY

## 2024-01-24 PROCEDURE — 99999 PR PBB SHADOW E&M-EST. PATIENT-LVL IV: CPT | Mod: PBBFAC,HCNC,, | Performed by: COLON & RECTAL SURGERY

## 2024-01-24 PROCEDURE — 1126F AMNT PAIN NOTED NONE PRSNT: CPT | Mod: CPTII,S$GLB,, | Performed by: OBSTETRICS & GYNECOLOGY

## 2024-01-24 PROCEDURE — 99204 OFFICE O/P NEW MOD 45 MIN: CPT | Mod: S$GLB,,, | Performed by: COLON & RECTAL SURGERY

## 2024-01-24 PROCEDURE — 1160F RVW MEDS BY RX/DR IN RCRD: CPT | Mod: CPTII,S$GLB,, | Performed by: COLON & RECTAL SURGERY

## 2024-01-24 RX ORDER — COVID-19 VACCINE, MRNA 50 UG/.5ML
INJECTION, SUSPENSION INTRAMUSCULAR
COMMUNITY
Start: 2023-09-22

## 2024-01-24 RX ORDER — PANCRELIPASE 30000; 6000; 19000 [USP'U]/1; [USP'U]/1; [USP'U]/1
1 CAPSULE, DELAYED RELEASE PELLETS ORAL
Qty: 90 CAPSULE | Refills: 11 | Status: SHIPPED | OUTPATIENT
Start: 2024-01-24 | End: 2024-05-23

## 2024-01-24 RX ORDER — INFLUENZA A VIRUS A/VICTORIA/4897/2022 IVR-238 (H1N1) ANTIGEN (FORMALDEHYDE INACTIVATED), INFLUENZA A VIRUS A/DARWIN/9/2021 SAN-010 (H3N2) ANTIGEN (FORMALDEHYDE INACTIVATED), INFLUENZA B VIRUS B/PHUKET/3073/2013 ANTIGEN (FORMALDEHYDE INACTIVATED), AND INFLUENZA B VIRUS B/MICHIGAN/01/2021 ANTIGEN (FORMALDEHYDE INACTIVATED) 60; 60; 60; 60 UG/.7ML; UG/.7ML; UG/.7ML; UG/.7ML
INJECTION, SUSPENSION INTRAMUSCULAR
COMMUNITY
Start: 2023-09-22

## 2024-01-24 RX ORDER — ESTRADIOL 0.1 MG/G
CREAM VAGINAL
Qty: 42.5 G | Refills: 11 | Status: SHIPPED | OUTPATIENT
Start: 2024-01-24

## 2024-01-24 RX ORDER — DOXYCYCLINE HYCLATE 100 MG
100 TABLET ORAL 2 TIMES DAILY
COMMUNITY
Start: 2024-01-07

## 2024-01-24 NOTE — PROGRESS NOTES
[facility] - UR[facility]YN 4TH FLOOR  [address]  [place] [address]    Cheryl Ghosh  [id number]  12/20/1934 January 24, 2024    Consulting Physician: Laurence Lake MD   GYN: MD Jaya  Primary M.D.: Joey Mueller MD    Chief Complaint   Patient presents with    Urinary Incontinence    Encopresis       HPI:     1)  UI:  (+) DELIA (bending) > (+) UUI (spontaneous)  X 4years.  (+) pads:2/day, 1st min and 2nd moderate wetness and 2/night usually severe wetness.  Daytime frequency: Q 2 hours.  Nocturia: Yes: 2/night.   (--) dysuria,  (--) hematuria,  ? frequent UTIs: C&S + 11/2023, 12/2023.  Typical symptoms: malodor. States she's completing Tx for UTI currently.   (--) complete bladder emptying.     --2017 UDS ([facility]):  The following are the results of the study:  1.  Uroflow       Q max:  Could not void       Voided volume:       Pattern of the curve:     2.  Amount in bladder:  350 ml     3.  CMG       Sensation:         First Desire:  63.8 ml         Normal Desire:  110.7 ml         Strong Desire:  476.2 ml         Urgency:  546.4 ml       Capacity:  605.1 ml       Abnormal Contractions:  none       Compliance:  normal     4.  Abdominal Leak Point Pressure:  Checked with a half speculum       Valsalva:  59.3 cm H2O at 111.4 ml       Cough:  92.7 cm H2O at 480.2 ml     5.  EMG:  Normal guarding reflex, relaxed with voiding     6.  Voiding phase  Placed a vaginal pack for the voiding phase       Q max:  18.8 ml/sec       P det at Q max:  13.9 cm H2O       Pattern of the curve:  continuous       Voided volume:  80.1 ml       PVR:  525 ml     7.  Analysis:  Normal sensation and capacity, stress incontinence noted with incomplete bladder emptying.      8.  Recommendations:       A.  Anterior colporrhapy with SSL and no sling.  Recommended seeing how she does and then consider either a bulking agent or sling       B.  We also discussed pessary, observation.       C.  She asked about laparoscopic surgeries,  most common is the lap robotic ASC which it sounds like a friend may have had but unable to tell for sure without additional information.  Warned that the incontinence could get worse after repair as the prolapse can compress the urethra.       The patient and her  expressed understanding.  She likes to swim in a pool, and the period post op for no swimming or sitting in water is 6 weeks.  Card given for Tadeo.    --was scheduled to have surgery but cancelled    2)  POP:  Present. below introitus.  Symptoms:(+) impedes urination.  (--) vaginal bleeding. (--) vaginal discharge. (--) sexually active.  (--) dyspareunia.  (--)  Vaginal dryness.  (--) vaginal estrogen use.   --used pessary in past--fell out/lost it; wasn't helping    3)  BM:  (--) constipation/straining.  (+) chronic diarrhea, couple of years. (--) hematochezia.  (+) fecal incontinence.  (+) fecal smearing/urgency.  (--) complete evacuation.  No s/sx rectal prolapse.    Past Medical History  Past Medical History:   Diagnosis Date    Arthritis     lumbar    Blood transfusion     Chronic kidney disease, stage III (moderate) 8/2/2017    Coronary artery disease involving native coronary artery without angina pectoris 3/9/2021    Degenerative disc disease     lumbar    Insomnia 11/15/2022    Insulinoma     Pancreatic disease     Urinary incontinence 12/7/2015   --blood transfusion: after first pregnancy, following the birth of son, anemia.  --CAD: Last cards visit 2022 (Yousef): Reportes to have TIA in the past (no residual issues) and was evaluated at [facility] and had mild carotid artery disease for which she is on statin and aspirin. Also had stress test at [facility] that was reported to be ok per the patient.  Also had presyncopal episodes, deemed benign.    --supposed to see yearly    --daily baby ASA  --pancreatic disease: patient denies; did have insulinoma removed  --CKD3: last nephrology visit 2021; 12/5/2023 GFR 50; followed by  PCP  --hyperparathyroidism: last PTH elevated 2021; no recent checks    Past Surgical History  Past Surgical History:   Procedure Laterality Date    ADENOIDECTOMY      APPENDECTOMY      CATARACT EXTRACTION, BILATERAL      EYE SURGERY      HYSTERECTOMY      SAMM/BSO    INJECTION OF JOINT Bilateral 2/4/2020    Procedure: Injection, Joint--Bilateral GTB;  Surgeon: Arina Carrera Jr., MD;  Location: [facility] PAIN MGT;  Service: Pain Management;  Laterality: Bilateral;  Oral sedation    INJECTION OF STEROID Right 12/20/2018    Procedure: INJECTION, STEROID-- Right SI Joint Block and  Steroid Injection;  Surgeon: Xavier Dasilva MD;  Location: [facility] OR;  Service: Neurosurgery;  Laterality: Right;  INJECTION, STEROID-- Right SI Joint Block and  Steroid Injection  SURGERY TIME:1 HR  LOS: 0 DAYS  RADIOLOGY: C- arm  BED: Regular Bed  with Pillows  POSITION: Prone      INJECTION OF STEROID Left 6/19/2019    Procedure: INJECTION, STEROID Procedure:Left sacroiliac joint block / steroid injection Surgery Time: 30mins LOS: Anesthesia: MAC Radiology: C-arm Bed: Regular Bed Position: Prone;  Surgeon: Xavier Dasilva MD;  Location: [facility] OR;  Service: Neurosurgery;  Laterality: Left;    MINIMALLY INVASIVE FUSION OF SPINE Right 2/6/2019    Procedure: FUSION, SPINE, MINIMALLY INVASIVE Right Sacroiliac Joint Fusion -  I Fuse;  Surgeon: Xavier Dasilva MD;  [facility];  Service: Neurosurgery;  Laterality: Right;  Procedure: Right Sacroiliac Joint Fusion -  I Fuse  Surgery Time: 1.5 Hr  LOS: 0  Anesthesia: General  Radiology: C-Arm  Bed: [place] [id number] Poster  Position: Prone  Equipment: I Fuse Codi [id number] (notified 1/24 EF)    OOPHORECTOMY      PANCREAS SURGERY      Insulanoma    RADIOFREQUENCY ABLATION OF LUMBAR MEDIAL BRANCH NERVE AT SINGLE LEVEL Right 8/28/2018    Procedure: RADIOFREQUENCY ABLATION, NERVE, MEDIAL BRANCH, LUMBAR, 1 LEVEL- Right L3,4,5 IV SEDATION;  Surgeon: Eddie Vega MD;  Location: [facility] PAIN T;   Service: Pain Management;  Laterality: Right;    RADIOFREQUENCY ABLATION OF LUMBAR MEDIAL BRANCH NERVE AT SINGLE LEVEL Left 2018    Procedure: RADIOFREQUENCY ABLATION, NERVE, MEDIAL BRANCH, LUMBAR, 1 LEVEL - Left L3,4,5 IV SEADTION;  Surgeon: Eddie Vega MD;  Location: [facility] PAIN MGT;  Service: Pain Management;  Laterality: Left;  Patient takes ASA     REFRACTIVE SURGERY Bilateral     SPINAL FUSION Left 2019    Procedure: FUSION, SPINE SI Joint Fusion;  Surgeon: Xavier Dasilva MD;  Location: [facility] OR;  Service: Neurosurgery;  Laterality: Left;  Procedure: Left SI joint fusion  Surgery Time: 1.5hr  LOS:0  Anesthesia: General   Radiology: C-arm   Bed: [place] [id number] Poster  Position: Prone  Equipment: Codi Diaz- [id number] (Codi notified)    TONSILLECTOMY     --route of appy: vertical lower abdominal incision  --insulinoma: xlap vert (xyphoid to SP)    Hysterectomy: Yes - vertical abdominal incision  Date: 50 years ago.  Indication: pre-cancerous uterine polyps.    Type: xlap  Cervix present: no  Ovaries present: unsure  Other procedures at time of hysterectomy:  no    Past Ob History     x 1.  C/s x 0.    Largest infant weight: 7 pounds  yes FAVD. no episiotomy.      Gynecologic History  LMP: No LMP recorded (lmp unknown). Patient is postmenopausal.  Age of menarche: 12 years  Age of menopause: 36 years, hysterectomy  Menstrual history: regular, normal  Pap test: post hyst.  History of abnormal paps: No.  History of STIs:  No  Mammogram: Date of last: 2023.  Result: Normal  Colonoscopy: Date of last: ? (Dev).  Result: normal.  Repeat due:  unsure.    DEXA:  Date of last: 2022.  Result:  osteoporosis. On fosamax.  Repeat due:  per PCP.     Family History  Family History   Problem Relation Age of Onset    Breast cancer Mother     Heart disease Father     Aneurysm Sister     Glaucoma Daughter     Heart attack Son     Breast cancer Maternal Grandmother     Breast cancer Other        Colon CA: No  Breast CA: Yes - mother 54, maternal grandmother, maternal sister, niece 40  GYN CA: No   CA: No    Social History  Social History     Tobacco Use   Smoking Status Never    Passive exposure: Never   Smokeless Tobacco Never     Social History     Substance and Sexual Activity   Alcohol Use Yes    Alcohol/week: 1.0 - 2.0 standard drink of alcohol    Types: 1 - 2 Glasses of wine per week   .    Social History     Substance and Sexual Activity   Drug Use No   .  The patient is .  Resides in [place] [address].  Employment status: currently employed  at [employer].     Allergies  Review of patient's allergies indicates:   Allergen Reactions    Pcn [penicillins] Hives and Nausea And Vomiting       Medications  Current Outpatient Medications on File Prior to Visit   Medication Sig Dispense Refill    acetaminophen (TYLENOL 8 HOUR ORAL) Take by mouth.      alendronate (FOSAMAX) 70 MG tablet Take 1 tablet (70 mg total) by mouth every 7 days. 4 tablet 11    amLODIPine (NORVASC) 2.5 MG tablet Take 1 tablet (2.5 mg total) by mouth once daily. 90 tablet 3    aspirin (ECOTRIN) 81 MG EC tablet Take 1 tablet (81 mg total) by mouth once daily. Resume 48 hours post-surgery  0    b complex vitamins tablet Take 1 tablet by mouth once daily.      baclofen (LIORESAL) 10 MG tablet Take 1 tablet (10 mg total) by mouth 3 (three) times daily. 270 tablet 4    calcium-vitamin D 600 mg(1,500mg) -400 unit Tab once daily.       FLUZONE HIGHDOSE QUAD 23-24  mcg/0.7 mL Syrg       gabapentin (NEURONTIN) 300 MG capsule TAKE 1 CAPSULE THREE TIMES DAILY 270 capsule 3    pravastatin (PRAVACHOL) 40 MG tablet Take 1 tablet (40 mg total) by mouth every evening. 90 tablet 3    pyridoxine, vitamin B6, (B-6) 100 MG Tab Take 100 mg by mouth once daily.      SPIKEVAX 6817-3513,12Y UP,,PF, 50 mcg/0.5 mL injection       traZODone (DESYREL) 100 MG tablet TAKE 1.5 TABLETS (150 MG TOTAL) BY MOUTH NIGHTLY  AS NEEDED FOR INSOMNIA. 135 tablet 3    doxycycline (VIBRA-TABS) 100 MG tablet Take 100 mg by mouth 2 (two) times daily.      oxyCODONE-acetaminophen (PERCOCET) 5-325 mg per tablet Take 1 tablet by mouth every 8 (eight) hours as needed for Pain. 21 tablet 0    [DISCONTINUED] clindamycin (CLEOCIN) 300 MG capsule Take 1 capsule (300 mg total) by mouth every 8 (eight) hours. 21 capsule 0    [DISCONTINUED] nitrofurantoin, macrocrystal-monohydrate, (MACROBID) 100 MG capsule Take 1 capsule (100 mg total) by mouth 2 (two) times daily. 20 capsule 0     Current Facility-Administered Medications on File Prior to Visit   Medication Dose Route Frequency Provider Last Rate Last Admin    gentamicin 80 mg/100ml NACL IVPB IVPB 80 mg  80 mg Intravenous 30 Min Pre-Op Wiliam Urias PA-THEA   80 mg at 07/31/19 0747    vancomycin in dextrose 5 % 1 gram/250 mL IVPB 1,000 mg  1,000 mg Intravenous 30 Min Pre-Op Wiliam Urias PA-C   1,000 mg at 07/31/19 0738       Review of Systems A 14 point ROS was reviewed with pertinent positives as noted above in the history of present illness.      Constitutional: negative  Eyes: negative  Endocrine: negative  Gastrointestinal: negative  Cardiovascular: negative  Respiratory: negative  Allergic/Immunologic: negative  Integumentary: negative  Psychiatric: negative  Musculoskeletal: negative   Ear/Nose/Throat: negative  Neurologic: negative  Genitourinary: SEE HPI  Hematologic/Lymphatic: negative   Breast: negative    Urogynecologic Exam  BP (!) 155/68 (BP Location: Left arm, Patient Position: Sitting, BP Method: Medium (Automatic))   Wt 61.6 kg (135 lb 12.9 oz)   LMP  (LMP Unknown)   BMI 23.31 kg/m²     GENERAL APPEARANCE:  The patient is well-developed, well-nourished.   Neck:  Supple with no thyromegaly, no carotid bruits.  Heart:  Regular rate and rhythm, no murmurs, rubs or gallops.  Lungs:  Clear.  No CVA tenderness.  Abdomen:  Soft, nontender, nondistended, no  hepatosplenomegaly.  Incisions:  Vert midline SP to xyphoid + several lateral LSC incisions well-healed    PELVIC:    External genitalia:  Normal Bartholins, Skenes and labia bilaterally.    Urethra:  No caruncle, diverticulum or masses.  (+) hypermobility.    Vagina:  Atrophy (+) , no bladder masses or tender, +yeast-like discharge. #2-3 ring + support pessary in place--patient was unaware. Removed and given to patient.   Cervix:  absent  Uterus: uterus absent  Adnexa: Not palpable.    POP-Q:  Aa +1; Ba +1; C -5; Ap -1; Bp -1.  Genital hiatus 3, perineal body 3, total vaginal length 10.    NEUROLOGIC:  Cranial nerves 2 through 12 intact.  Strength 5/5.  DTRs 2+ lower extremities.  S2 through 4 normal.  Sacral reflexes intact.    EXT: NGUYEN, 2+ pulses bilaterally, no C/C/E    COUGH STRESS TEST:  negative  KEGEL: 1 /5    RECTAL:    External:  Normal, (--) hemorrhoids, (--) dovetailing.   Internal:   per CRS; no rectal prolapse on toilet    PVR: 80 mL    Impression    1. Vaginal atrophy    2. Urinary incontinence, mixed    3. Fecal smearing    4. History of UTI    5. Cystocele, midline    6. Rectocele, female    7. Vaginal vault prolapse        Initial Plan  The patient was counseled regarding these issues. The patient was given a summary sheet containing each of these issues with possible options for evaluation and management. When appropriate, we also reviewed computer-generated diagrams specific to their diagnoses..  All questions were addressed to the patient's satisfaction.    1)  Frequent UTIs (bladder infections):  --urine C&S sent today  --If you feel like you have a UTI:     --During the work week: call PCP or go to nearest [facility]   --After hours or on weekends: go to nearest [facility]    --These facilities can check your urine for infection, send a urine culture to verify presence/absence of infection, and start treatment if needed.    --After you are evaluated, please send a message  through [facility] portal or call your urogynecology provider's office to let them know so that they can follow trends.  --follow UTI prevention tips (see attached)  --work on emptying bladder well:  --empty bladder every 2-3 hours.  Empty well: wait a minute, lean forward on toilet.    --prolapse present: YES   --PVR today: 80 mL  --control bowel movements/fecal cross-contamination  --treat vaginal atrophy (dryness)  --empty bladder before and after intercourse  --consider taking daily combination pill: cranberry + D-mannose (4879-6172 mg) + probiotic    --look on [employer] or in drug/grocery store for any brand that has these components   --examples of brands: Biophix, Now, AZO  --consider need for further evaluation ( tract imaging, cystoscopy) if issue persists  --last  tract imagin normal  --last cystoscopy: none    2)  Schedule mammogram.  Need to schedule.     3)  Stage 2 cystocele/rectocele, stage 1 vaginal vault prolapse:  --discussed  --trial of pessary--return to clinic for refitting   --if surgery: would try vaginal approach (h/o mult abd surgeries)   --if surgery:  has ED, not worried about penetration--consider partial colpocleisis vs SSLF/USS   --if surgery: needs UDS preop   --if surgery: clearance per PCP (Ami) + labs (CBC, CMP, T&S)/EKG; does she need to see Osvaldo Arellano preop?    4)  Mixed urinary incontinence, urge < stress:    --urine C&S  --Empty bladder every 3 hours.  Empty well: wait a minute, lean forward on toilet.    --Avoid dietary irritants (see sheet).  Keep diary x 3-5 days to determine your irritants.  --start pelvic floor PT. Call to make appt:  KATIE Gtz (Zunilda Chase or other): [address] [facility] [place], [address]   [address]. Patients can park in the [place] entrance and it is on the first floor. (p) [phone] (Gabbie Bennett, ). (f) [phone].    --URGE: consider medication in future.   Takes 2-4 weeks to see if will have effect.  For dry  mouth: get sour, sugar free lozenge or gum.    --STRESS:  Pessary vs. Sling.    --may need UI + POP pessary    5)  Fecal incontinence:  --per CRS  --keep stool diary  --colonoscopy  --fiber  --start creon  --PT  --consider SNS    6)  Vaginal atrophy (dryness):    --start Vaginal estrogen:  --Use 0.5 grams of estrogen cream in vagina with applicator or dime-sized amount with finger (as far as can reach internally) nightly x 2 weeks, then twice a week thereafter.  You can also apply a dime-sized amount with your finger around the vaginal opening and inner lips at same frequency.     --Vaginal estrogen may help to decrease pain related to dryness with intercourse and urinary symptoms (urgency/frequency/UTIs) around menopause.     7)  RTC for pessary fitting. Recheck PVR with pessary in place and let MD know.   --probably needs pessary for prolapse and DELIA    Approximately 60 min were spent in consult, 90 % in discussion.     Thank you for requesting consultation of your patient.  I look forward to participating in their care.    Virginia Gonzalez  Female Pelvic Medicine and Reconstructive Surgery  [facility] [place], [place]

## 2024-01-24 NOTE — PATIENT INSTRUCTIONS
1)  Frequent UTIs (bladder infections):  --urine C&S sent today  --If you feel like you have a UTI:     --During the work week: call PCP or go to nearest [facility]   --After hours or on weekends: go to nearest [facility]    --These facilities can check your urine for infection, send a urine culture to verify presence/absence of infection, and start treatment if needed.    --After you are evaluated, please send a message through [facility] portal or call your urogynecology provider's office to let them know so that they can follow trends.  --follow UTI prevention tips (see attached)  --work on emptying bladder well:  --empty bladder every 2-3 hours.  Empty well: wait a minute, lean forward on toilet.    --prolapse present: YES   --PVR today: 80 mL  --control bowel movements/fecal cross-contamination  --treat vaginal atrophy (dryness)  --empty bladder before and after intercourse  --consider taking daily combination pill: cranberry + D-mannose (3898-1162 mg) + probiotic    --look on [employer] or in drug/grocery store for any brand that has these components   --examples of brands: Biophix, Now, AZO  --consider need for further evaluation ( tract imaging, cystoscopy) if issue persists  --last  tract imagin normal  --last cystoscopy: none    2)  Schedule mammogram.  Need to schedule.     3)  Stage 2 cystocele/rectocele, stage 1 vaginal vault prolapse:  --discussed  --trial of pessary--return to clinic for refitting   --if surgery: would try vaginal approach (h/o mult abd surgeries)   --if surgery:  has ED, not worried about penetration--consider partial colpocleisis vs SSLF/USS    4)  Mixed urinary incontinence, urge < stress:    --urine C&S  --Empty bladder every 3 hours.  Empty well: wait a minute, lean forward on toilet.    --Avoid dietary irritants (see sheet).  Keep diary x 3-5 days to determine your irritants.  --start pelvic floor PT. Call to make appt:  KATIE Thomas  Rena or other): [facility], [address]   [address]. Patients can park in the [place] entrance and it is on the first floor. (p) [phone] (Gabbie Bennett, ). (f) [phone].    --URGE: consider medication in future.   Takes 2-4 weeks to see if will have effect.  For dry mouth: get sour, sugar free lozenge or gum.    --STRESS:  Pessary vs. Sling.    --may need UI + POP pessary    5)  Fecal incontinence:  --per CRS  --keep stool diary  --colonoscopy  --fiber  --start creon  --PT  --consider SNS    6)  Vaginal atrophy (dryness):    --start Vaginal estrogen:  --Use 0.5 grams of estrogen cream in vagina with applicator or dime-sized amount with finger (as far as can reach internally) nightly x 2 weeks, then twice a week thereafter.  You can also apply a dime-sized amount with your finger around the vaginal opening and inner lips at same frequency.     --Vaginal estrogen may help to decrease pain related to dryness with intercourse and urinary symptoms (urgency/frequency/UTIs) around menopause.     7)  RTC for pessary fitting. Recheck PVR with pessary in place and let MD know.   --probably needs pessary for prolapse and DELIA

## 2024-01-25 LAB — BACTERIA UR CULT: NO GROWTH

## 2024-01-26 ENCOUNTER — TELEPHONE (OUTPATIENT)
Dept: ENDOSCOPY | Facility: HOSPITAL | Age: 89
End: 2024-01-26
Payer: MEDICARE

## 2024-01-26 DIAGNOSIS — R19.7 DIARRHEA, UNSPECIFIED TYPE: Primary | ICD-10-CM

## 2024-01-26 NOTE — TELEPHONE ENCOUNTER
"----- Message from Tonja Hoskins sent at 2024  1:08 PM CST -----    ----- Message -----  From: Fransisca Ojeda MD  Sent: 2024  11:05 AM CST  To: [facility]    Procedure: Colonoscopy    Diagnosis: Diarrhea    Procedure Timin-12 weeks    #If within 4 weeks selected, please alex as high priority#    #If greater than 12 weeks, please select "5-12 weeks" and delay sending until 3 months prior to requested date#     Provider: Myself    Location: [address]    Additional Scheduling Information: No scheduling concerns    Prep Specifications:Standard prep    Is the patient taking a GLP-1 Agonist:no    Have you attached a patient to this message: yes       "

## 2024-01-27 ENCOUNTER — PATIENT MESSAGE (OUTPATIENT)
Dept: SURGERY | Facility: CLINIC | Age: 89
End: 2024-01-27
Payer: MEDICARE

## 2024-01-29 NOTE — PROGRESS NOTES
[facility]  [address] [address]    Cheryl Ghosh  [id number]  12/20/1934 January 30, 2024    Consulting Physician: No ref. provider found   GYN: MD Jaya  Primary M.D.: Joey Mueller MD    Chief Complaint   Patient presents with    pessary fitting    Here for pessary fitting.     HPI 01/24/2024:     1)  UI:  (+) DELIA (bending) > (+) UUI (spontaneous)  X 4years.  (+) pads:2/day, 1st min and 2nd moderate wetness and 2/night usually severe wetness.  Daytime frequency: Q 2 hours.  Nocturia: Yes: 2/night.   (--) dysuria,  (--) hematuria,  ? frequent UTIs: C&S + 11/2023, 12/2023.  Typical symptoms: malodor. States she's completing Tx for UTI currently.   (--) complete bladder emptying.     --2017 UDS ([facility]):  The following are the results of the study:  1.  Uroflow       Q max:  Could not void       Voided volume:       Pattern of the curve:     2.  Amount in bladder:  350 ml     3.  CMG       Sensation:         First Desire:  63.8 ml         Normal Desire:  110.7 ml         Strong Desire:  476.2 ml         Urgency:  546.4 ml       Capacity:  605.1 ml       Abnormal Contractions:  none       Compliance:  normal     4.  Abdominal Leak Point Pressure:  Checked with a half speculum       Valsalva:  59.3 cm H2O at 111.4 ml       Cough:  92.7 cm H2O at 480.2 ml     5.  EMG:  Normal guarding reflex, relaxed with voiding     6.  Voiding phase  Placed a vaginal pack for the voiding phase       Q max:  18.8 ml/sec       P det at Q max:  13.9 cm H2O       Pattern of the curve:  continuous       Voided volume:  80.1 ml       PVR:  525 ml     7.  Analysis:  Normal sensation and capacity, stress incontinence noted with incomplete bladder emptying.      8.  Recommendations:       A.  Anterior colporrhapy with SSL and no sling.  Recommended seeing how she does and then consider either a bulking agent or sling       B.  We also discussed pessary, observation.       C.  She asked about  laparoscopic surgeries, most common is the lap robotic ASC which it sounds like a friend may have had but unable to tell for sure without additional information.  Warned that the incontinence could get worse after repair as the prolapse can compress the urethra.       The patient and her  expressed understanding.  She likes to swim in a pool, and the period post op for no swimming or sitting in water is 6 weeks.  Card given for Tadeo.    --was scheduled to have surgery but cancelled    2)  POP:  Present. below introitus.  Symptoms:(+) impedes urination.  (--) vaginal bleeding. (--) vaginal discharge. (--) sexually active.  (--) dyspareunia.  (--)  Vaginal dryness.  (--) vaginal estrogen use.   --used pessary in past--fell out/lost it; wasn't helping    3)  BM:  (--) constipation/straining.  (+) chronic diarrhea, couple of years. (--) hematochezia.  (+) fecal incontinence.  (+) fecal smearing/urgency.  (--) complete evacuation.  No s/sx rectal prolapse.    Past Medical History  Past Medical History:   Diagnosis Date    Arthritis     lumbar    Blood transfusion     Chronic kidney disease, stage III (moderate) 8/2/2017    Coronary artery disease involving native coronary artery without angina pectoris 3/9/2021    Degenerative disc disease     lumbar    Insomnia 11/15/2022    Insulinoma     Pancreatic disease     Urinary incontinence 12/7/2015   --blood transfusion: after first pregnancy, following the birth of son, anemia.  --CAD: Last cards visit 2022 (Yousef): Reportes to have TIA in the past (no residual issues) and was evaluated at [facility] and had mild carotid artery disease for which she is on statin and aspirin. Also had stress test at [facility] that was reported to be ok per the patient.  Also had presyncopal episodes, deemed benign.    --supposed to see yearly    --daily baby ASA  --pancreatic disease: patient denies; did have insulinoma removed  --CKD3: last nephrology visit 2021; 12/5/2023 GFR 50; followed by  PCP  --hyperparathyroidism: last PTH elevated 2021; no recent checks    Past Surgical History  Past Surgical History:   Procedure Laterality Date    ADENOIDECTOMY      APPENDECTOMY      CATARACT EXTRACTION, BILATERAL      EYE SURGERY      HYSTERECTOMY      SAMM/BSO    INJECTION OF JOINT Bilateral 2/4/2020    Procedure: Injection, Joint--Bilateral GTB;  Surgeon: Arina Carrera Jr., MD;  Location: [facility] PAIN MGT;  Service: Pain Management;  Laterality: Bilateral;  Oral sedation    INJECTION OF STEROID Right 12/20/2018    Procedure: INJECTION, STEROID-- Right SI Joint Block and  Steroid Injection;  Surgeon: Xavier Dasilva MD;  Location: [facility] OR;  Service: Neurosurgery;  Laterality: Right;  INJECTION, STEROID-- Right SI Joint Block and  Steroid Injection  SURGERY TIME:1 HR  LOS: 0 DAYS  RADIOLOGY: C- arm  BED: Regular Bed  with Pillows  POSITION: Prone      INJECTION OF STEROID Left 6/19/2019    Procedure: INJECTION, STEROID Procedure:Left sacroiliac joint block / steroid injection Surgery Time: 30mins LOS: Anesthesia: MAC Radiology: C-arm Bed: Regular Bed Position: Prone;  Surgeon: Xavier Dasilva MD;  Location: [facility] OR;  Service: Neurosurgery;  Laterality: Left;    MINIMALLY INVASIVE FUSION OF SPINE Right 2/6/2019    Procedure: FUSION, SPINE, MINIMALLY INVASIVE Right Sacroiliac Joint Fusion -  I Fuse;  Surgeon: Xavier Dasilva MD;  Location: [facility];  Service: Neurosurgery;  Laterality: Right;  Procedure: Right Sacroiliac Joint Fusion -  I Fuse  Surgery Time: 1.5 Hr  LOS: 0  Anesthesia: General  Radiology: C-Arm  Bed: [place] [id number] Poster  Position: Prone  Equipment: I Fuse Codi [id number] (notified 1/24 EF)    OOPHORECTOMY      PANCREAS SURGERY      Insulanoma    RADIOFREQUENCY ABLATION OF LUMBAR MEDIAL BRANCH NERVE AT SINGLE LEVEL Right 8/28/2018    Procedure: RADIOFREQUENCY ABLATION, NERVE, MEDIAL BRANCH, LUMBAR, 1 LEVEL- Right L3,4,5 IV SEDATION;  Surgeon: Eddie Vega MD;  Location: [facility] PAIN T;   Service: Pain Management;  Laterality: Right;    RADIOFREQUENCY ABLATION OF LUMBAR MEDIAL BRANCH NERVE AT SINGLE LEVEL Left 2018    Procedure: RADIOFREQUENCY ABLATION, NERVE, MEDIAL BRANCH, LUMBAR, 1 LEVEL - Left L3,4,5 IV SEADTION;  Surgeon: Eddie Vega MD;  Location: [facility] PAIN MGT;  Service: Pain Management;  Laterality: Left;  Patient takes ASA     REFRACTIVE SURGERY Bilateral     SPINAL FUSION Left 2019    Procedure: FUSION, SPINE SI Joint Fusion;  Surgeon: Xavier Dasilva MD;  Location: [facility] OR;  Service: Neurosurgery;  Laterality: Left;  Procedure: Left SI joint fusion  Surgery Time: 1.5hr  LOS:0  Anesthesia: General   Radiology: C-arm   Bed: [place] [id number] Poster  Position: Prone  Equipment: Cdoi Daiz- [phone] (Codi notified)    TONSILLECTOMY     --route of appy: vertical lower abdominal incision  --insulinoma: xlap vert (xyphoid to SP)    Hysterectomy: Yes - vertical abdominal incision  Date: 50 years ago.  Indication: pre-cancerous uterine polyps.    Type: xlap  Cervix present: no  Ovaries present: unsure  Other procedures at time of hysterectomy:  no    Past Ob History     x 1.  C/s x 0.    Largest infant weight: 7 pounds  yes FAVD. no episiotomy.      Gynecologic History  LMP: No LMP recorded (lmp unknown). Patient is postmenopausal.  Age of menarche: 12 years  Age of menopause: 36 years, hysterectomy  Menstrual history: regular, normal  Pap test: post hyst.  History of abnormal paps: No.  History of STIs:  No  Mammogram: Date of last: 2023.  Result: Normal  Colonoscopy: Date of last: ? (Dev).  Result: normal.  Repeat due:  unsure.    DEXA:  Date of last: 2022.  Result:  osteoporosis. On fosamax.  Repeat due:  per PCP.     Family History  Family History   Problem Relation Age of Onset    Breast cancer Mother     Heart disease Father     Aneurysm Sister     Glaucoma Daughter     Heart attack Son     Breast cancer Maternal Grandmother     Breast cancer Other        Colon CA: No  Breast CA: Yes - mother 54, maternal grandmother, maternal sister, niece 40  GYN CA: No   CA: No    Social History  Social History     Tobacco Use   Smoking Status Never    Passive exposure: Never   Smokeless Tobacco Never     Social History     Substance and Sexual Activity   Alcohol Use Yes    Alcohol/week: 1.0 - 2.0 standard drink of alcohol    Types: 1 - 2 Glasses of wine per week   .    Social History     Substance and Sexual Activity   Drug Use No   .  The patient is .  Resides in [place] [address].  Employment status: currently employed  at [employer].     Allergies  Review of patient's allergies indicates:   Allergen Reactions    Pcn [penicillins] Hives and Nausea And Vomiting       Medications  Current Outpatient Medications on File Prior to Visit   Medication Sig Dispense Refill    acetaminophen (TYLENOL 8 HOUR ORAL) Take by mouth.      alendronate (FOSAMAX) 70 MG tablet Take 1 tablet (70 mg total) by mouth every 7 days. 4 tablet 11    amLODIPine (NORVASC) 2.5 MG tablet Take 1 tablet (2.5 mg total) by mouth once daily. 90 tablet 3    aspirin (ECOTRIN) 81 MG EC tablet Take 1 tablet (81 mg total) by mouth once daily. Resume 48 hours post-surgery  0    b complex vitamins tablet Take 1 tablet by mouth once daily.      baclofen (LIORESAL) 10 MG tablet Take 1 tablet (10 mg total) by mouth 3 (three) times daily. 270 tablet 4    calcium-vitamin D 600 mg(1,500mg) -400 unit Tab once daily.       doxycycline (VIBRA-TABS) 100 MG tablet Take 100 mg by mouth 2 (two) times daily.      estradioL (ESTRACE) 0.01 % (0.1 mg/gram) vaginal cream 0.5 grams with applicator or dime-sized amount with finger in vagina nightly x 2 weeks, then twice a week thereafter 42.5 g 11    FLUZONE HIGHDOSE QUAD 23-24  mcg/0.7 mL Syrg       gabapentin (NEURONTIN) 300 MG capsule TAKE 1 CAPSULE THREE TIMES DAILY 270 capsule 3    lipase-protease-amylase 6,000-19,000-30,000 units  "(CREON) 6,000-19,000 -30,000 unit capsule Take 1 capsule by mouth 3 (three) times daily with meals. 90 capsule 11    oxyCODONE-acetaminophen (PERCOCET) 5-325 mg per tablet Take 1 tablet by mouth every 8 (eight) hours as needed for Pain. 21 tablet 0    pravastatin (PRAVACHOL) 40 MG tablet Take 1 tablet (40 mg total) by mouth every evening. 90 tablet 3    pyridoxine, vitamin B6, (B-6) 100 MG Tab Take 100 mg by mouth once daily.      SPIKEVAX 7831-3262,12Y UP,,PF, 50 mcg/0.5 mL injection       traZODone (DESYREL) 100 MG tablet TAKE 1.5 TABLETS (150 MG TOTAL) BY MOUTH NIGHTLY AS NEEDED FOR INSOMNIA. 135 tablet 3     Current Facility-Administered Medications on File Prior to Visit   Medication Dose Route Frequency Provider Last Rate Last Admin    gentamicin 80 mg/100ml NACL IVPB IVPB 80 mg  80 mg Intravenous 30 Min Pre-Op Wiliam Urias PA-C   80 mg at 07/31/19 0747    vancomycin in dextrose 5 % 1 gram/250 mL IVPB 1,000 mg  1,000 mg Intravenous 30 Min Pre-Op Wiliam Urias PA-C   1,000 mg at 07/31/19 0738       Review of Systems A 14 point ROS was reviewed with pertinent positives as noted above in the history of present illness.      Constitutional: negative  Eyes: negative  Endocrine: negative  Gastrointestinal: negative  Cardiovascular: negative  Respiratory: negative  Allergic/Immunologic: negative  Integumentary: negative  Psychiatric: negative  Musculoskeletal: negative   Ear/Nose/Throat: negative  Neurologic: negative  Genitourinary: SEE HPI  Hematologic/Lymphatic: negative   Breast: negative    Urogynecologic Exam  /67 (BP Location: Left arm, Patient Position: Sitting, BP Method: Medium (Automatic))   Pulse 67   Ht 5' 4" (1.626 m)   Wt 60 kg (132 lb 4.4 oz)   LMP  (LMP Unknown)   BMI 22.71 kg/m²     GENERAL APPEARANCE:  The patient is well-developed, well-nourished.   Neck:  Supple with no thyromegaly, no carotid bruits.  Heart:  Regular rate and rhythm, no murmurs, rubs or gallops.  Lungs:  " Clear.  No CVA tenderness.  Abdomen:  Soft, nontender, nondistended, no hepatosplenomegaly.  Incisions:  Vert midline SP to xyphoid + several lateral LSC incisions well-healed    PELVIC:    External genitalia:  Normal Bartholins, Skenes and labia bilaterally.    Urethra:  No caruncle, diverticulum or masses.  (+) hypermobility.    Vagina:  Atrophy (+) , no bladder masses or tender, +yeast-like discharge. #2-3 ring + support pessary in place--patient was unaware. Removed and given to patient.   Cervix:  absent  Uterus: uterus absent  Adnexa: Not palpable.    The patient was fit with #4 ring with support pessary.  She was able to tolerate the device comfortabley with bending, squatting, valsalva.  She was not able to demonstrate independent removal and placement.   Pessary removed without difficulty.  Will order from [facility]. She tolerated the procedure well.       Failed #4 dish with support with knob-- came out with valsalva  #3 ring with support significant DELIA      PVR 40 mL    Impression    1. Urinary incontinence, mixed    2. Cystocele, midline    3. Rectocele, female    4. Vaginal vault prolapse    5. Vaginal atrophy    6. Incomplete bladder emptying          Initial Plan  The patient was counseled regarding these issues. The patient was given a summary sheet containing each of these issues with possible options for evaluation and management. When appropriate, we also reviewed computer-generated diagrams specific to their diagnoses..  All questions were addressed to the patient's satisfaction.    1)  Frequent UTIs (bladder infections):  --pvr 40 mL with pessary in place  --If you feel like you have a UTI:     --During the work week: call PCP or go to nearest [facility]   --After hours or on weekends: go to nearest [facility]    --These facilities can check your urine for infection, send a urine culture to verify presence/absence of infection, and start treatment if needed.    --After you are  evaluated, please send a message through [facility] portal or call your urogynecology provider's office to let them know so that they can follow trends.  --follow UTI prevention tips (see attached)  --work on emptying bladder well:  --empty bladder every 2-3 hours.  Empty well: wait a minute, lean forward on toilet.    --prolapse present: YES   --PVR without pessary: 80 mL  --control bowel movements/fecal cross-contamination  --treat vaginal atrophy (dryness)  --empty bladder before and after intercourse  --consider taking daily combination pill: cranberry + D-mannose (0677-7096 mg) + probiotic    --look on [employer] or in drug/grocery store for any brand that has these components   --examples of brands: Biophix, Now, AZO  --consider need for further evaluation ( tract imaging, cystoscopy) if issue persists  --last  tract imagin normal  --last cystoscopy: none    2)  Schedule mammogram.  Need to schedule.     3)  Stage 2 cystocele/rectocele, stage 1 vaginal vault prolapse:  --discussed  --trial of pessary--return to clinic for refitting   --if surgery: would try vaginal approach (h/o mult abd surgeries)   --if surgery:  has ED, not worried about penetration--consider partial colpocleisis vs SSLF/USS   --if surgery: needs UDS preop   --if surgery: clearance per PCP (Ami) + labs (CBC, CMP, T&S)/EKG; does she need to see Osvaldo Arellano preop?    4)  Mixed urinary incontinence, urge < stress:    --Empty bladder every 3 hours.  Empty well: wait a minute, lean forward on toilet.    --Avoid dietary irritants (see sheet).  Keep diary x 3-5 days to determine your irritants.  --start pelvic floor PT. Call to make appt:  KATIE Gtz (Zunilda Chase or other): [facility], [address]   [address]. Patients can park in the north entrance and it is on the first floor. (p) [phone] (Gabbie Bennett, ). (f) [phone].    --URGE: consider medication in future.   Takes 2-4 weeks to see if  will have effect.  For dry mouth: get sour, sugar free lozenge or gum.    --STRESS:  Pessary vs. Sling.   --fit with #4 ring with support pessary-- will order from [facility]    5)  Fecal incontinence:  --per CRS  --keep stool diary  --colonoscopy  --fiber  --start creon  --PT  --consider SNS    6)  Vaginal atrophy (dryness):    --start Vaginal estrogen:  --Use 0.5 grams of estrogen cream in vagina with applicator or dime-sized amount with finger (as far as can reach internally) nightly x 2 weeks, then twice a week thereafter.  You can also apply a dime-sized amount with your finger around the vaginal opening and inner lips at same frequency.     --Vaginal estrogen may help to decrease pain related to dryness with intercourse and urinary symptoms (urgency/frequency/UTIs) around menopause.     7)  RTC for pessary insertion-- prefers [facility]    Approximately 60 min were spent in consult, 90 % in discussion.     Thank you for requesting consultation of your patient.  I look forward to participating in their care.    Nina Leon  Female Pelvic Medicine and Reconstructive Surgery  [facility] [place], [place]

## 2024-01-30 ENCOUNTER — OFFICE VISIT (OUTPATIENT)
Dept: UROGYNECOLOGY | Facility: CLINIC | Age: 89
End: 2024-01-30
Payer: MEDICARE

## 2024-01-30 VITALS
BODY MASS INDEX: 22.58 KG/M2 | HEIGHT: 64 IN | DIASTOLIC BLOOD PRESSURE: 67 MMHG | SYSTOLIC BLOOD PRESSURE: 131 MMHG | HEART RATE: 67 BPM | WEIGHT: 132.25 LBS

## 2024-01-30 DIAGNOSIS — N81.6 RECTOCELE, FEMALE: ICD-10-CM

## 2024-01-30 DIAGNOSIS — N81.9 VAGINAL VAULT PROLAPSE: ICD-10-CM

## 2024-01-30 DIAGNOSIS — N81.11 CYSTOCELE, MIDLINE: ICD-10-CM

## 2024-01-30 DIAGNOSIS — R33.9 INCOMPLETE BLADDER EMPTYING: ICD-10-CM

## 2024-01-30 DIAGNOSIS — N95.2 VAGINAL ATROPHY: ICD-10-CM

## 2024-01-30 DIAGNOSIS — N39.46 URINARY INCONTINENCE, MIXED: Primary | ICD-10-CM

## 2024-01-30 PROCEDURE — 99999 PR PBB SHADOW E&M-EST. PATIENT-LVL IV: CPT | Mod: PBBFAC,,, | Performed by: NURSE PRACTITIONER

## 2024-01-30 PROCEDURE — 99499 UNLISTED E&M SERVICE: CPT | Mod: 25,S$GLB,, | Performed by: NURSE PRACTITIONER

## 2024-01-30 PROCEDURE — 57160 INSERT PESSARY/OTHER DEVICE: CPT | Mod: S$GLB,,, | Performed by: NURSE PRACTITIONER

## 2024-01-30 NOTE — PATIENT INSTRUCTIONS
1)  Frequent UTIs (bladder infections):  --pvr 40 mL with pessary in place  --If you feel like you have a UTI:     --During the work week: call PCP or go to nearest [facility]   --After hours or on weekends: go to nearest [facility]    --These facilities can check your urine for infection, send a urine culture to verify presence/absence of infection, and start treatment if needed.    --After you are evaluated, please send a message through [facility] portal or call your urogynecology provider's office to let them know so that they can follow trends.  --follow UTI prevention tips (see attached)  --work on emptying bladder well:  --empty bladder every 2-3 hours.  Empty well: wait a minute, lean forward on toilet.    --prolapse present: YES   --PVR without pessary: 80 mL  --control bowel movements/fecal cross-contamination  --treat vaginal atrophy (dryness)  --empty bladder before and after intercourse  --consider taking daily combination pill: cranberry + D-mannose (7806-0287 mg) + probiotic    --look on [employer] or in drug/grocery store for any brand that has these components   --examples of brands: Biophix, Now, AZO  --consider need for further evaluation ( tract imaging, cystoscopy) if issue persists  --last  tract imagin normal  --last cystoscopy: none    2)  Schedule mammogram.  Need to schedule.     3)  Stage 2 cystocele/rectocele, stage 1 vaginal vault prolapse:  --discussed  --trial of pessary--return to clinic for refitting   --if surgery: would try vaginal approach (h/o mult abd surgeries)   --if surgery:  has ED, not worried about penetration--consider partial colpocleisis vs SSLF/USS   --if surgery: needs UDS preop   --if surgery: clearance per PCP (Ami) + labs (CBC, CMP, T&S)/EKG; does she need to see Osvaldo Arellano preop?    4)  Mixed urinary incontinence, urge < stress:    --Empty bladder every 3 hours.  Empty well: wait a minute, lean forward on toilet.    --Avoid dietary  irritants (see sheet).  Keep diary x 3-5 days to determine your irritants.  --start pelvic floor PT. Call to make appt:  AKTIE Gtz (Zunilda Chase or other): [address] [facility], [facility] [address], [place]   [address]. Patients can park in the North East entrance and it is on the first floor. (p) [phone] (Gabbie Bennett, ). (f) [phone].    --URGE: consider medication in future.   Takes 2-4 weeks to see if will have effect.  For dry mouth: get sour, sugar free lozenge or gum.    --STRESS:  Pessary vs. Sling.   --fit with #4 ring with support pessary-- will order from [facility]    5)  Fecal incontinence:  --per CRS  --keep stool diary  --colonoscopy  --fiber  --start creon  --PT  --consider SNS    6)  Vaginal atrophy (dryness):    --start Vaginal estrogen:  --Use 0.5 grams of estrogen cream in vagina with applicator or dime-sized amount with finger (as far as can reach internally) nightly x 2 weeks, then twice a week thereafter.  You can also apply a dime-sized amount with your finger around the vaginal opening and inner lips at same frequency.     --Vaginal estrogen may help to decrease pain related to dryness with intercourse and urinary symptoms (urgency/frequency/UTIs) around menopause.     7)  RTC for pessary insertion-- prefers [facility]

## 2024-01-31 ENCOUNTER — PATIENT MESSAGE (OUTPATIENT)
Dept: OBSTETRICS AND GYNECOLOGY | Facility: CLINIC | Age: 89
End: 2024-01-31
Payer: MEDICARE

## 2024-02-01 ENCOUNTER — TELEPHONE (OUTPATIENT)
Dept: FAMILY MEDICINE | Facility: CLINIC | Age: 89
End: 2024-02-01
Payer: MEDICARE

## 2024-02-01 DIAGNOSIS — R30.0 DYSURIA: Primary | ICD-10-CM

## 2024-02-01 NOTE — TELEPHONE ENCOUNTER
----- Message from Rafal Archer sent at 2/1/2024  8:31 AM CST -----  Contact: pt  Type:  Needs Medical Advice    Who Called: pt  Symptoms (please be specific): still have bladder infection    How long has patient had these symptoms:  3 days   Pharmacy name and phone #:  [id number] - [place] - [address] [facility]  [address] [facility] [place] [address]  Phone: [phone] Fax: [phone]  Would the patient rather a call back or a response via [facility]? call  Best Call Back Number: [phone]  Additional Information:   Pt requesting a call from Rosamaria

## 2024-02-01 NOTE — TELEPHONE ENCOUNTER
Spoke to pt and states that she's still having symptoms of a UTI. Pt would like to have another U/A and culture done at [facility]. Pls advise.

## 2024-02-02 DIAGNOSIS — Z12.11 ENCOUNTER FOR SCREENING COLONOSCOPY: Primary | ICD-10-CM

## 2024-02-02 NOTE — TELEPHONE ENCOUNTER
Spoke to patient to schedule procedure(s) Colonoscopy       Physician to perform procedure(s) Dr. LINNETTE Ojeda  Date of Procedure (s) 05/09/2024  Arrival Time 10:30a M   Time of Procedure(s) 11:30 AM    Location of Procedure(s) [place] 4th Floor  Type of Rx Prep sent to patient: PEG  Instructions provided to patient via [facility]    Patient was informed on the following information and verbalized understanding. Screening questionnaire reviewed with patient and complete. If procedure requires anesthesia, a responsible adult needs to be present to accompany the patient home, patient cannot drive after receiving anesthesia. Appointment details are tentative, especially check-in time. Patient will receive a prep-op call 7 days prior to confirm check-in time for procedure. If applicable the patient should contact their pharmacy to verify Rx for procedure prep is ready for pick-up. Patient was advised to call the scheduling department at [phone] if pharmacy states no Rx is available. Patient was advised to call the endoscopy scheduling department if any questions or concerns arise.      [facility] Scheduling Department

## 2024-02-07 ENCOUNTER — TELEPHONE (OUTPATIENT)
Dept: FAMILY MEDICINE | Facility: CLINIC | Age: 89
End: 2024-02-07
Payer: MEDICARE

## 2024-02-07 DIAGNOSIS — Z12.31 ENCOUNTER FOR SCREENING MAMMOGRAM FOR BREAST CANCER: Primary | ICD-10-CM

## 2024-02-07 NOTE — TELEPHONE ENCOUNTER
----- Message from Fransisca Jurado sent at 2/7/2024  7:47 AM CST -----  Needs advice from nurse:      Who Called:pt  Regarding:would like an order for a mammo and discuss culture that was done on 2/2  Would the patient rather a call back or VIA [facility]?  Best Call Back number:[phone]  Additional Info:

## 2024-02-07 NOTE — PROGRESS NOTES
[facility]  [address] [address]    Cheryl Ghosh  [id number]  12/20/1934 February 28, 2024    Consulting Physician: No ref. provider found   GYN: MD Jaya  Primary M.D.: Joey Mueller MD    Here for pessary insertion    HPI 01/24/2024:     1)  UI:  (+) DELIA (bending) > (+) UUI (spontaneous)  X 4years.  (+) pads:2/day, 1st min and 2nd moderate wetness and 2/night usually severe wetness.  Daytime frequency: Q 2 hours.  Nocturia: Yes: 2/night.   (--) dysuria,  (--) hematuria,  ? frequent UTIs: C&S + 11/2023, 12/2023.  Typical symptoms: malodor. States she's completing Tx for UTI currently.   (--) complete bladder emptying.     --2017 UDS ([facility]):  The following are the results of the study:  1.  Uroflow       Q max:  Could not void       Voided volume:       Pattern of the curve:     2.  Amount in bladder:  350 ml     3.  CMG       Sensation:         First Desire:  63.8 ml         Normal Desire:  110.7 ml         Strong Desire:  476.2 ml         Urgency:  546.4 ml       Capacity:  605.1 ml       Abnormal Contractions:  none       Compliance:  normal     4.  Abdominal Leak Point Pressure:  Checked with a half speculum       Valsalva:  59.3 cm H2O at 111.4 ml       Cough:  92.7 cm H2O at 480.2 ml     5.  EMG:  Normal guarding reflex, relaxed with voiding     6.  Voiding phase  Placed a vaginal pack for the voiding phase       Q max:  18.8 ml/sec       P det at Q max:  13.9 cm H2O       Pattern of the curve:  continuous       Voided volume:  80.1 ml       PVR:  525 ml     7.  Analysis:  Normal sensation and capacity, stress incontinence noted with incomplete bladder emptying.      8.  Recommendations:       A.  Anterior colporrhapy with SSL and no sling.  Recommended seeing how she does and then consider either a bulking agent or sling       B.  We also discussed pessary, observation.       C.  She asked about laparoscopic surgeries, most common is the lap robotic ASC  which it sounds like a friend may have had but unable to tell for sure without additional information.  Warned that the incontinence could get worse after repair as the prolapse can compress the urethra.       The patient and her  expressed understanding.  She likes to swim in a pool, and the period post op for no swimming or sitting in water is 6 weeks.  Card given for Tadeo.    --was scheduled to have surgery but cancelled    2)  POP:  Present. below introitus.  Symptoms:(+) impedes urination.  (--) vaginal bleeding. (--) vaginal discharge. (--) sexually active.  (--) dyspareunia.  (--)  Vaginal dryness.  (--) vaginal estrogen use.   --used pessary in past--fell out/lost it; wasn't helping    3)  BM:  (--) constipation/straining.  (+) chronic diarrhea, couple of years. (--) hematochezia.  (+) fecal incontinence.  (+) fecal smearing/urgency.  (--) complete evacuation.  No s/sx rectal prolapse.      01/30/2024  Fit with #4 ring with support pessary    Past Medical History  Past Medical History:   Diagnosis Date    Arthritis     lumbar    Blood transfusion     Chronic kidney disease, stage III (moderate) 8/2/2017    Coronary artery disease involving native coronary artery without angina pectoris 3/9/2021    Degenerative disc disease     lumbar    Insomnia 11/15/2022    Insulinoma     Pancreatic disease     Urinary incontinence 12/7/2015   --blood transfusion: after first pregnancy, following the birth of son, anemia.  --CAD: Last cards visit 2022 (Yousef): Reportes to have TIA in the past (no residual issues) and was evaluated at [facility] and had mild carotid artery disease for which she is on statin and aspirin. Also had stress test at [facility] that was reported to be ok per the patient.  Also had presyncopal episodes, deemed benign.    --supposed to see yearly    --daily baby ASA  --pancreatic disease: patient denies; did have insulinoma removed  --CKD3: last nephrology visit 2021; 12/5/2023 GFR 50; followed by  PCP  --hyperparathyroidism: last PTH elevated 2021; no recent checks    Past Surgical History  Past Surgical History:   Procedure Laterality Date    ADENOIDECTOMY      APPENDECTOMY      CATARACT EXTRACTION, BILATERAL      EYE SURGERY      HYSTERECTOMY      SAMM/BSO    INJECTION OF JOINT Bilateral 2/4/2020    Procedure: Injection, Joint--Bilateral GTB;  Surgeon: Arina Carrera Jr., MD;  Location: [facility] PAIN MGT;  Service: Pain Management;  Laterality: Bilateral;  Oral sedation    INJECTION OF STEROID Right 12/20/2018    Procedure: INJECTION, STEROID-- Right SI Joint Block and  Steroid Injection;  Surgeon: Xavier Dasilva MD;  Location: [facility] OR;  Service: Neurosurgery;  Laterality: Right;  INJECTION, STEROID-- Right SI Joint Block and  Steroid Injection  SURGERY TIME:1 HR  LOS: 0 DAYS  RADIOLOGY: C- arm  BED: Regular Bed  with Pillows  POSITION: Prone      INJECTION OF STEROID Left 6/19/2019    Procedure: INJECTION, STEROID Procedure:Left sacroiliac joint block / steroid injection Surgery Time: 30mins LOS: Anesthesia: MAC Radiology: C-arm Bed: Regular Bed Position: Prone;  Surgeon: Xavier Dasilva MD;  Location: [facility] OR;  Service: Neurosurgery;  Laterality: Left;    MINIMALLY INVASIVE FUSION OF SPINE Right 2/6/2019    Procedure: FUSION, SPINE, MINIMALLY INVASIVE Right Sacroiliac Joint Fusion -  I Fuse;  Surgeon: Xavier Dasilva MD;  Location: [facility];  Service: Neurosurgery;  Laterality: Right;  Procedure: Right Sacroiliac Joint Fusion -  I Fuse  Surgery Time: 1.5 Hr  LOS: 0  Anesthesia: General  Radiology: C-Arm  Bed: [place] [address] Poster  Position: Prone  Equipment: I Fuse Codi [id number] (notified 1/24 EF)    OOPHORECTOMY      PANCREAS SURGERY      Insulanoma    RADIOFREQUENCY ABLATION OF LUMBAR MEDIAL BRANCH NERVE AT SINGLE LEVEL Right 8/28/2018    Procedure: RADIOFREQUENCY ABLATION, NERVE, MEDIAL BRANCH, LUMBAR, 1 LEVEL- Right L3,4,5 IV SEDATION;  Surgeon: Eddie Vega MD;  Location: [facility] PAIN T;   Service: Pain Management;  Laterality: Right;    RADIOFREQUENCY ABLATION OF LUMBAR MEDIAL BRANCH NERVE AT SINGLE LEVEL Left 2018    Procedure: RADIOFREQUENCY ABLATION, NERVE, MEDIAL BRANCH, LUMBAR, 1 LEVEL - Left L3,4,5 IV SEADTION;  Surgeon: Eddie Vega MD;  Location: [facility] PAIN MGT;  Service: Pain Management;  Laterality: Left;  Patient takes ASA     REFRACTIVE SURGERY Bilateral     SPINAL FUSION Left 2019    Procedure: FUSION, SPINE SI Joint Fusion;  Surgeon: Xavier Dasilva MD;  Location: [facility] OR;  Service: Neurosurgery;  Laterality: Left;  Procedure: Left SI joint fusion  Surgery Time: 1.5hr  LOS:0  Anesthesia: General   Radiology: C-arm   Bed: [place] [address] Poster  Position: Prone  Equipment: Codi Diaz- [id number] (Codi notified)    TONSILLECTOMY     --route of appy: vertical lower abdominal incision  --insulinoma: xlap vert (xyphoid to SP)    Hysterectomy: Yes - vertical abdominal incision  Date: 50 years ago.  Indication: pre-cancerous uterine polyps.    Type: xlap  Cervix present: no  Ovaries present: unsure  Other procedures at time of hysterectomy:  no    Past Ob History     x 1.  C/s x 0.    Largest infant weight: 7 pounds  yes FAVD. no episiotomy.      Gynecologic History  LMP: No LMP recorded (lmp unknown). Patient is postmenopausal.  Age of menarche: 12 years  Age of menopause: 36 years, hysterectomy  Menstrual history: regular, normal  Pap test: post hyst.  History of abnormal paps: No.  History of STIs:  No  Mammogram: Date of last: 2023.  Result: Normal  Colonoscopy: Date of last: ? (Dev).  Result: normal.  Repeat due:  unsure.    DEXA:  Date of last: 2022.  Result:  osteoporosis. On fosamax.  Repeat due:  per PCP.     Family History  Family History   Problem Relation Age of Onset    Breast cancer Mother     Heart disease Father     Aneurysm Sister     Glaucoma Daughter     Heart attack Son     Breast cancer Maternal Grandmother     Breast cancer Other        Colon CA: No  Breast CA: Yes - mother 54, maternal grandmother, maternal sister, niece 40  GYN CA: No   CA: No    Social History  Social History     Tobacco Use   Smoking Status Never    Passive exposure: Never   Smokeless Tobacco Never     Social History     Substance and Sexual Activity   Alcohol Use Yes    Alcohol/week: 1.0 - 2.0 standard drink of alcohol    Types: 1 - 2 Glasses of wine per week   .    Social History     Substance and Sexual Activity   Drug Use No   .  The patient is .  Resides in [place] [address].  Employment status: currently employed  at [employer].     Allergies  Review of patient's allergies indicates:   Allergen Reactions    Pcn [penicillins] Hives and Nausea And Vomiting       Medications  Current Outpatient Medications on File Prior to Visit   Medication Sig Dispense Refill    acetaminophen (TYLENOL 8 HOUR ORAL) Take by mouth.      alendronate (FOSAMAX) 70 MG tablet Take 1 tablet (70 mg total) by mouth every 7 days. 4 tablet 11    amLODIPine (NORVASC) 2.5 MG tablet Take 1 tablet (2.5 mg total) by mouth once daily. 90 tablet 3    aspirin (ECOTRIN) 81 MG EC tablet Take 1 tablet (81 mg total) by mouth once daily. Resume 48 hours post-surgery  0    b complex vitamins tablet Take 1 tablet by mouth once daily.      baclofen (LIORESAL) 10 MG tablet Take 1 tablet (10 mg total) by mouth 3 (three) times daily. 270 tablet 4    calcium-vitamin D 600 mg(1,500mg) -400 unit Tab once daily.       doxycycline (VIBRA-TABS) 100 MG tablet Take 100 mg by mouth 2 (two) times daily.      estradioL (ESTRACE) 0.01 % (0.1 mg/gram) vaginal cream 0.5 grams with applicator or dime-sized amount with finger in vagina nightly x 2 weeks, then twice a week thereafter 42.5 g 11    FLUZONE HIGHDOSE QUAD 23-24  mcg/0.7 mL Syrg       gabapentin (NEURONTIN) 300 MG capsule TAKE 1 CAPSULE THREE TIMES DAILY 270 capsule 3    lipase-protease-amylase 6,000-19,000-30,000 units  "(CREON) 6,000-19,000 -30,000 unit capsule Take 1 capsule by mouth 3 (three) times daily with meals. 90 capsule 11    oxyCODONE-acetaminophen (PERCOCET) 5-325 mg per tablet Take 1 tablet by mouth every 8 (eight) hours as needed for Pain. 21 tablet 0    pravastatin (PRAVACHOL) 40 MG tablet Take 1 tablet (40 mg total) by mouth every evening. 90 tablet 3    pyridoxine, vitamin B6, (B-6) 100 MG Tab Take 100 mg by mouth once daily.      SPIKEVAX 4325-8356,12Y UP,,PF, 50 mcg/0.5 mL injection       traZODone (DESYREL) 100 MG tablet TAKE 1.5 TABLETS (150 MG TOTAL) BY MOUTH NIGHTLY AS NEEDED FOR INSOMNIA. 135 tablet 3     Current Facility-Administered Medications on File Prior to Visit   Medication Dose Route Frequency Provider Last Rate Last Admin    gentamicin 80 mg/100ml NACL IVPB IVPB 80 mg  80 mg Intravenous 30 Min Pre-Op Wiliam Urias PA-C   80 mg at 07/31/19 0747    vancomycin in dextrose 5 % 1 gram/250 mL IVPB 1,000 mg  1,000 mg Intravenous 30 Min Pre-Op Wiliam Urias PA-C   1,000 mg at 07/31/19 0738       Review of Systems A 14 point ROS was reviewed with pertinent positives as noted above in the history of present illness.      Constitutional: negative  Eyes: negative  Endocrine: negative  Gastrointestinal: negative  Cardiovascular: negative  Respiratory: negative  Allergic/Immunologic: negative  Integumentary: negative  Psychiatric: negative  Musculoskeletal: negative   Ear/Nose/Throat: negative  Neurologic: negative  Genitourinary: SEE HPI  Hematologic/Lymphatic: negative   Breast: negative    Urogynecologic Exam  /61 (BP Location: Right arm, Patient Position: Sitting, BP Method: Medium (Automatic))   Pulse (!) 56   Ht 5' 4" (1.626 m)   Wt 60.8 kg (134 lb 0.6 oz)   LMP  (LMP Unknown)   BMI 23.01 kg/m²     GENERAL APPEARANCE:  The patient is well-developed, well-nourished.   Neck:  Supple with no thyromegaly, no carotid bruits.  Heart:  Regular rate and rhythm, no murmurs, rubs or " gallops.  Lungs:  Clear.  No CVA tenderness.  Abdomen:  Soft, nontender, nondistended, no hepatosplenomegaly.  Incisions:  Vert midline SP to xyphoid + several lateral LSC incisions well-healed    PELVIC:    External genitalia:  Normal Bartholins, Skenes and labia bilaterally.    Urethra:  No caruncle, diverticulum or masses.  (+) hypermobility.    Vagina:  Atrophy (+) , no bladder masses or tender, +yeast-like discharge. #2-3 ring + support pessary in place--patient was unaware. Removed and given to patient.   Cervix:  absent  Uterus: uterus absent  Adnexa: Not palpable.    #4 ring with support pessary inserted.       Failed #4 dish with support with knob-- came out with valsalva  #3 ring with support significant DELIA      PVR 40 mL    Impression    1. Urinary incontinence, mixed    2. Cystocele, midline    3. Rectocele, female    4. Vaginal vault prolapse    5. Vaginal atrophy    6. Incomplete bladder emptying            Initial Plan  The patient was counseled regarding these issues. The patient was given a summary sheet containing each of these issues with possible options for evaluation and management. When appropriate, we also reviewed computer-generated diagrams specific to their diagnoses..  All questions were addressed to the patient's satisfaction.    1)  Frequent UTIs (bladder infections):  --pvr 40 mL with pessary in place  --If you feel like you have a UTI:     --During the work week: call PCP or go to nearest [facility]   --After hours or on weekends: go to nearest [facility]    --These facilities can check your urine for infection, send a urine culture to verify presence/absence of infection, and start treatment if needed.    --After you are evaluated, please send a message through [facility] portal or call your urogynecology provider's office to let them know so that they can follow trends.  --follow UTI prevention tips (see attached)  --work on emptying bladder well:  --empty bladder  every 2-3 hours.  Empty well: wait a minute, lean forward on toilet.    --prolapse present: YES   --PVR without pessary: 80 mL  --control bowel movements/fecal cross-contamination  --treat vaginal atrophy (dryness)  --empty bladder before and after intercourse  --consider taking daily combination pill: cranberry + D-mannose (2320-8392 mg) + probiotic    --look on [employer] or in drug/grocery store for any brand that has these components   --examples of brands: Biophix, Now, AZO  --consider need for further evaluation ( tract imaging, cystoscopy) if issue persists  --last  tract imagin normal  --last cystoscopy: none    2)  Schedule mammogram.  Need to schedule.     3)  Stage 2 cystocele/rectocele, stage 1 vaginal vault prolapse:  --discussed  --trial of pessary--return to clinic for refitting   --if surgery: would try vaginal approach (h/o mult abd surgeries)   --if surgery:  has ED, not worried about penetration--consider partial colpocleisis vs SSLF/USS   --if surgery: needs UDS preop   --if surgery: clearance per PCP (Ami) + labs (CBC, CMP, T&S)/EKG; does she need to see Osvaldo Arellano preop?    4)  Mixed urinary incontinence, urge < stress:    --Empty bladder every 3 hours.  Empty well: wait a minute, lean forward on toilet.    --Avoid dietary irritants (see sheet).  Keep diary x 3-5 days to determine your irritants.  --start pelvic floor PT. Call to make appt:  KATIE [facility] (Zunilda Chase or other): [address] [facility], [address]   [address]. Patients can park in the [place] entrance and it is on the first floor. (p) [phone] (Gabbie Bennett, ). (f) [phone].    --URGE: consider medication in future.   Takes 2-4 weeks to see if will have effect.  For dry mouth: get sour, sugar free lozenge or gum.    --STRESS:  Pessary vs. Sling.   --fit with #4 ring with support pessary-- will order from [employer]    5)  Fecal incontinence:  --per CRS  --keep stool  diary  --colonoscopy  --fiber  --start creon  --PT  --consider SNS    6)  Vaginal atrophy (dryness):    --start Vaginal estrogen:  --Use 0.5 grams of estrogen cream in vagina with applicator or dime-sized amount with finger (as far as can reach internally) nightly x 2 weeks, then twice a week thereafter.  You can also apply a dime-sized amount with your finger around the vaginal opening and inner lips at same frequency.     --Vaginal estrogen may help to decrease pain related to dryness with intercourse and urinary symptoms (urgency/frequency/UTIs) around menopause.     7)  RTC for pessary check     Approximately 60 min were spent in consult, 90 % in discussion.     Thank you for requesting consultation of your patient.  I look forward to participating in their care.    Nina Leon  Female Pelvic Medicine and Reconstructive Surgery  [facility] [place], [place]

## 2024-02-08 ENCOUNTER — OFFICE VISIT (OUTPATIENT)
Dept: UROGYNECOLOGY | Facility: CLINIC | Age: 89
End: 2024-02-08
Payer: MEDICARE

## 2024-02-08 VITALS
HEIGHT: 64 IN | SYSTOLIC BLOOD PRESSURE: 132 MMHG | BODY MASS INDEX: 22.89 KG/M2 | WEIGHT: 134.06 LBS | DIASTOLIC BLOOD PRESSURE: 61 MMHG | HEART RATE: 56 BPM

## 2024-02-08 DIAGNOSIS — N39.46 URINARY INCONTINENCE, MIXED: Primary | ICD-10-CM

## 2024-02-08 DIAGNOSIS — R33.9 INCOMPLETE BLADDER EMPTYING: ICD-10-CM

## 2024-02-08 DIAGNOSIS — N81.11 CYSTOCELE, MIDLINE: ICD-10-CM

## 2024-02-08 DIAGNOSIS — N81.9 VAGINAL VAULT PROLAPSE: ICD-10-CM

## 2024-02-08 DIAGNOSIS — N81.6 RECTOCELE, FEMALE: ICD-10-CM

## 2024-02-08 DIAGNOSIS — N95.2 VAGINAL ATROPHY: ICD-10-CM

## 2024-02-08 PROCEDURE — 99999 PR PBB SHADOW E&M-EST. PATIENT-LVL IV: CPT | Mod: PBBFAC,HCNC,, | Performed by: NURSE PRACTITIONER

## 2024-02-08 PROCEDURE — 99499 UNLISTED E&M SERVICE: CPT | Mod: HCNC,S$GLB,, | Performed by: NURSE PRACTITIONER

## 2024-02-29 NOTE — PATIENT INSTRUCTIONS
1)  Frequent UTIs (bladder infections):  --pvr 40 mL with pessary in place  --If you feel like you have a UTI:     --During the work week: call PCP or go to nearest [facility]   --After hours or on weekends: go to nearest [facility]    --These facilities can check your urine for infection, send a urine culture to verify presence/absence of infection, and start treatment if needed.    --After you are evaluated, please send a message through [facility] portal or call your urogynecology provider's office to let them know so that they can follow trends.  --follow UTI prevention tips (see attached)  --work on emptying bladder well:  --empty bladder every 2-3 hours.  Empty well: wait a minute, lean forward on toilet.    --prolapse present: YES   --PVR without pessary: 80 mL  --control bowel movements/fecal cross-contamination  --treat vaginal atrophy (dryness)  --empty bladder before and after intercourse  --consider taking daily combination pill: cranberry + D-mannose (2752-6611 mg) + probiotic    --look on [employer] or in drug/grocery store for any brand that has these components   --examples of brands: Biophix, Now, AZO  --consider need for further evaluation ( tract imaging, cystoscopy) if issue persists  --last  tract imagin normal  --last cystoscopy: none    2)  Schedule mammogram.  Need to schedule.     3)  Stage 2 cystocele/rectocele, stage 1 vaginal vault prolapse:  --discussed  --trial of pessary--return to clinic for refitting   --if surgery: would try vaginal approach (h/o mult abd surgeries)   --if surgery:  has ED, not worried about penetration--consider partial colpocleisis vs SSLF/USS   --if surgery: needs UDS preop   --if surgery: clearance per PCP (Ami) + labs (CBC, CMP, T&S)/EKG; does she need to see Osvaldo Arellano preop?    4)  Mixed urinary incontinence, urge < stress:    --Empty bladder every 3 hours.  Empty well: wait a minute, lean forward on toilet.    --Avoid dietary  irritants (see sheet).  Keep diary x 3-5 days to determine your irritants.  --start pelvic floor PT. Call to make appt:  KATIE Gtz (Zunilda Chase or other): [address] [facility], [place] [address], [place]   [address]. Patients can park in the [place] entrance and it is on the first floor. (p) [phone] (Gabbie Bennett, ). (f) [phone].    --URGE: consider medication in future.   Takes 2-4 weeks to see if will have effect.  For dry mouth: get sour, sugar free lozenge or gum.    --STRESS:  Pessary vs. Sling.   --fit with #4 ring with support pessary-- will order from [employer]    5)  Fecal incontinence:  --per CRS  --keep stool diary  --colonoscopy  --fiber  --start creon  --PT  --consider SNS    6)  Vaginal atrophy (dryness):    --start Vaginal estrogen:  --Use 0.5 grams of estrogen cream in vagina with applicator or dime-sized amount with finger (as far as can reach internally) nightly x 2 weeks, then twice a week thereafter.  You can also apply a dime-sized amount with your finger around the vaginal opening and inner lips at same frequency.     --Vaginal estrogen may help to decrease pain related to dryness with intercourse and urinary symptoms (urgency/frequency/UTIs) around menopause.     7)  RTC for pessary check

## 2024-03-12 ENCOUNTER — PATIENT MESSAGE (OUTPATIENT)
Dept: SURGERY | Facility: CLINIC | Age: 89
End: 2024-03-12
Payer: MEDICARE

## 2024-03-13 ENCOUNTER — OFFICE VISIT (OUTPATIENT)
Dept: UROGYNECOLOGY | Facility: CLINIC | Age: 89
End: 2024-03-13
Payer: MEDICARE

## 2024-03-13 VITALS
HEIGHT: 64 IN | SYSTOLIC BLOOD PRESSURE: 162 MMHG | WEIGHT: 132.94 LBS | DIASTOLIC BLOOD PRESSURE: 80 MMHG | BODY MASS INDEX: 22.7 KG/M2

## 2024-03-13 DIAGNOSIS — N81.11 CYSTOCELE, MIDLINE: Primary | ICD-10-CM

## 2024-03-13 DIAGNOSIS — N39.46 URINARY INCONTINENCE, MIXED: ICD-10-CM

## 2024-03-13 DIAGNOSIS — N95.2 VAGINAL ATROPHY: ICD-10-CM

## 2024-03-13 DIAGNOSIS — Z46.89 PESSARY MAINTENANCE: ICD-10-CM

## 2024-03-13 DIAGNOSIS — N81.9 VAGINAL VAULT PROLAPSE: ICD-10-CM

## 2024-03-13 DIAGNOSIS — N81.6 RECTOCELE, FEMALE: ICD-10-CM

## 2024-03-13 PROCEDURE — 1101F PT FALLS ASSESS-DOCD LE1/YR: CPT | Mod: HCNC,CPTII,S$GLB, | Performed by: NURSE PRACTITIONER

## 2024-03-13 PROCEDURE — 1159F MED LIST DOCD IN RCRD: CPT | Mod: HCNC,CPTII,S$GLB, | Performed by: NURSE PRACTITIONER

## 2024-03-13 PROCEDURE — 99999 PR PBB SHADOW E&M-EST. PATIENT-LVL IV: CPT | Mod: PBBFAC,HCNC,, | Performed by: NURSE PRACTITIONER

## 2024-03-13 PROCEDURE — 1160F RVW MEDS BY RX/DR IN RCRD: CPT | Mod: HCNC,CPTII,S$GLB, | Performed by: NURSE PRACTITIONER

## 2024-03-13 PROCEDURE — 1126F AMNT PAIN NOTED NONE PRSNT: CPT | Mod: HCNC,CPTII,S$GLB, | Performed by: NURSE PRACTITIONER

## 2024-03-13 PROCEDURE — 3288F FALL RISK ASSESSMENT DOCD: CPT | Mod: HCNC,CPTII,S$GLB, | Performed by: NURSE PRACTITIONER

## 2024-03-13 PROCEDURE — 99213 OFFICE O/P EST LOW 20 MIN: CPT | Mod: HCNC,S$GLB,, | Performed by: NURSE PRACTITIONER

## 2024-03-13 NOTE — PROGRESS NOTES
[facility] [address] [facility] [address]  [address]  [facility] [id number]    Cheryl Ghosh  [id number]  12/20/1934 March 13, 2024    Consulting Physician: No ref. provider found   GYN: MD Jaya  Primary M.D.: Joey Mueller MD    Here for pessary check    HPI 01/24/2024:     1)  UI:  (+) DELIA (bending) > (+) UUI (spontaneous)  X 4years.  (+) pads:2/day, 1st min and 2nd moderate wetness and 2/night usually severe wetness.  Daytime frequency: Q 2 hours.  Nocturia: Yes: 2/night.   (--) dysuria,  (--) hematuria,  ? frequent UTIs: C&S + 11/2023, 12/2023.  Typical symptoms: malodor. States she's completing Tx for UTI currently.   (--) complete bladder emptying.     --2017 UDS ([facility]):  The following are the results of the study:  1.  Uroflow       Q max:  Could not void       Voided volume:       Pattern of the curve:     2.  Amount in bladder:  350 ml     3.  CMG       Sensation:         First Desire:  63.8 ml         Normal Desire:  110.7 ml         Strong Desire:  476.2 ml         Urgency:  546.4 ml       Capacity:  605.1 ml       Abnormal Contractions:  none       Compliance:  normal     4.  Abdominal Leak Point Pressure:  Checked with a half speculum       Valsalva:  59.3 cm H2O at 111.4 ml       Cough:  92.7 cm H2O at 480.2 ml     5.  EMG:  Normal guarding reflex, relaxed with voiding     6.  Voiding phase  Placed a vaginal pack for the voiding phase       Q max:  18.8 ml/sec       P det at Q max:  13.9 cm H2O       Pattern of the curve:  continuous       Voided volume:  80.1 ml       PVR:  525 ml     7.  Analysis:  Normal sensation and capacity, stress incontinence noted with incomplete bladder emptying.      8.  Recommendations:       A.  Anterior colporrhapy with SSL and no sling.  Recommended seeing how she does and then consider either a bulking agent or sling       B.  We also discussed pessary, observation.       C.  She asked about laparoscopic surgeries, most common is the lap robotic ASC which it sounds  like a friend may have had but unable to tell for sure without additional information.  Warned that the incontinence could get worse after repair as the prolapse can compress the urethra.       The patient and her  expressed understanding.  She likes to swim in a pool, and the period post op for no swimming or sitting in water is 6 weeks.  Card given for Tadeo.    --was scheduled to have surgery but cancelled    2)  POP:  Present. below introitus.  Symptoms:(+) impedes urination.  (--) vaginal bleeding. (--) vaginal discharge. (--) sexually active.  (--) dyspareunia.  (--)  Vaginal dryness.  (--) vaginal estrogen use.   --used pessary in past--fell out/lost it; wasn't helping    3)  BM:  (--) constipation/straining.  (+) chronic diarrhea, couple of years. (--) hematochezia.  (+) fecal incontinence.  (+) fecal smearing/urgency.  (--) complete evacuation.  No s/sx rectal prolapse.      01/30/2024  Fit with #4 ring with support pessary    Changes since last visit:  Doing well with #4 ring with support pessary  Denies bleeding or discharge  Voiding every 2-2 1/2 hours-- feels like she is emptying better  PVR 40 mL with pessary in place  Doing well with creon-- well formed      Past Medical History  Past Medical History:   Diagnosis Date    Arthritis     lumbar    Blood transfusion     Chronic kidney disease, stage III (moderate) 8/2/2017    Coronary artery disease involving native coronary artery without angina pectoris 3/9/2021    Degenerative disc disease     lumbar    Insomnia 11/15/2022    Insulinoma     Pancreatic disease     Urinary incontinence 12/7/2015   --blood transfusion: after first pregnancy, following the birth of son, anemia.  --CAD: Last cards visit 2022 (Yousef): Reportes to have TIA in the past (no residual issues) and was evaluated at [facility] and had mild carotid artery disease for which she is on statin and aspirin. Also had stress test at [facility] that was reported to be ok per the patient.  Also  had presyncopal episodes, deemed benign.    --supposed to see yearly    --daily baby ASA  --pancreatic disease: patient denies; did have insulinoma removed  --CKD3: last nephrology visit 2021; 12/5/2023 GFR 50; followed by PCP  --hyperparathyroidism: last PTH elevated 2021; no recent checks    Past Surgical History  Past Surgical History:   Procedure Laterality Date    ADENOIDECTOMY      APPENDECTOMY      CATARACT EXTRACTION, BILATERAL      EYE SURGERY      HYSTERECTOMY      SAMM/BSO    INJECTION OF JOINT Bilateral 2/4/2020    Procedure: Injection, Joint--Bilateral GTB;  Surgeon: Arina Carrera Jr., MD;  Location: [facility] PAIN MGT;  Service: Pain Management;  Laterality: Bilateral;  Oral sedation    INJECTION OF STEROID Right 12/20/2018    Procedure: INJECTION, STEROID-- Right SI Joint Block and  Steroid Injection;  Surgeon: Xavier Dasilva MD;  Location: [facility] OR;  Service: Neurosurgery;  Laterality: Right;  INJECTION, STEROID-- Right SI Joint Block and  Steroid Injection  SURGERY TIME:1 HR  LOS: 0 DAYS  RADIOLOGY: C- arm  BED: Regular Bed  with Pillows  POSITION: Prone      INJECTION OF STEROID Left 6/19/2019    Procedure: INJECTION, STEROID Procedure:Left sacroiliac joint block / steroid injection Surgery Time: 30mins LOS: Anesthesia: MAC Radiology: C-arm Bed: Regular Bed Position: Prone;  Surgeon: Xavier Dasilva MD;  Location: [facility] OR;  Service: Neurosurgery;  Laterality: Left;    MINIMALLY INVASIVE FUSION OF SPINE Right 2/6/2019    Procedure: FUSION, SPINE, MINIMALLY INVASIVE Right Sacroiliac Joint Fusion -  I Fuse;  Surgeon: Xavier Dasilva MD;  Location: [facility] OR;  Service: Neurosurgery;  Laterality: Right;  Procedure: Right Sacroiliac Joint Fusion -  I Fuse  Surgery Time: 1.5 Hr  LOS: 0  Anesthesia: General  Radiology: C-Arm  Bed: [place] 4 Poster  Position: Prone  Equipment: I Randy Kincaid [phone] (notified 1/24 EF)    OOPHORECTOMY      PANCREAS SURGERY      Insulanoma    RADIOFREQUENCY ABLATION OF  LUMBAR MEDIAL BRANCH NERVE AT SINGLE LEVEL Right 2018    Procedure: RADIOFREQUENCY ABLATION, NERVE, MEDIAL BRANCH, LUMBAR, 1 LEVEL- Right L3,4,5 IV SEDATION;  Surgeon: Eddie Vega MD;  Location: [facility] PAIN [facility];  Service: Pain Management;  Laterality: Right;    RADIOFREQUENCY ABLATION OF LUMBAR MEDIAL BRANCH NERVE AT SINGLE LEVEL Left 2018    Procedure: RADIOFREQUENCY ABLATION, NERVE, MEDIAL BRANCH, LUMBAR, 1 LEVEL - Left L3,4,5 IV SEADTION;  Surgeon: Eddie Vega MD;  Location: [facility] PAIN MGT;  Service: Pain Management;  Laterality: Left;  Patient takes ASA     REFRACTIVE SURGERY Bilateral     SPINAL FUSION Left 2019    Procedure: FUSION, SPINE SI Joint Fusion;  Surgeon: Xavier Dasilva MD;  Location: [facility] OR;  Service: Neurosurgery;  Laterality: Left;  Procedure: Left SI joint fusion  Surgery Time: 1.5hr  LOS:0  Anesthesia: General   Radiology: C-arm   Bed: [place] [address] Poster  Position: Prone  Equipment: Codi Diaz- [phone] (Codi notified)    TONSILLECTOMY     --route of appy: vertical lower abdominal incision  --insulinoma: xlap vert (xyphoid to SP)    Hysterectomy: Yes - vertical abdominal incision  Date: 50 years ago.  Indication: pre-cancerous uterine polyps.    Type: xlap  Cervix present: no  Ovaries present: unsure  Other procedures at time of hysterectomy:  no    Past Ob History     x 1.  C/s x 0.    Largest infant weight: 7 pounds  yes FAVD. no episiotomy.      Gynecologic History  LMP: No LMP recorded (lmp unknown). Patient is postmenopausal.  Age of menarche: 12 years  Age of menopause: 36 years, hysterectomy  Menstrual history: regular, normal  Pap test: post hyst.  History of abnormal paps: No.  History of STIs:  No  Mammogram: Date of last: 2023.  Result: Normal  Colonoscopy: Date of last: ? (Dev).  Result: normal.  Repeat due:  unsure.    DEXA:  Date of last: 2022.  Result:  osteoporosis. On fosamax.  Repeat due:  per PCP.     Family  History  Family History   Problem Relation Age of Onset    Breast cancer Mother     Heart disease Father     Aneurysm Sister     Glaucoma Daughter     Heart attack Son     Breast cancer Maternal Grandmother     Breast cancer Other       Colon CA: No  Breast CA: Yes - mother 54, maternal grandmother, maternal sister, niece 40  GYN CA: No   CA: No    Social History  Social History     Tobacco Use   Smoking Status Never    Passive exposure: Never   Smokeless Tobacco Never     Social History     Substance and Sexual Activity   Alcohol Use Yes    Alcohol/week: 1.0 - 2.0 standard drink of alcohol    Types: 1 - 2 Glasses of wine per week   .    Social History     Substance and Sexual Activity   Drug Use No   .  The patient is .  Resides in [place] [address].  Employment status: currently employed  at [employer].     Allergies  Review of patient's allergies indicates:   Allergen Reactions    Pcn [penicillins] Hives and Nausea And Vomiting       Medications  Current Outpatient Medications on File Prior to Visit   Medication Sig Dispense Refill    acetaminophen (TYLENOL 8 HOUR ORAL) Take by mouth.      alendronate (FOSAMAX) 70 MG tablet Take 1 tablet (70 mg total) by mouth every 7 days. 4 tablet 11    amLODIPine (NORVASC) 2.5 MG tablet Take 1 tablet (2.5 mg total) by mouth once daily. 90 tablet 3    aspirin (ECOTRIN) 81 MG EC tablet Take 1 tablet (81 mg total) by mouth once daily. Resume 48 hours post-surgery  0    b complex vitamins tablet Take 1 tablet by mouth once daily.      baclofen (LIORESAL) 10 MG tablet Take 1 tablet (10 mg total) by mouth 3 (three) times daily. 270 tablet 4    calcium-vitamin D 600 mg(1,500mg) -400 unit Tab once daily.       doxycycline (VIBRA-TABS) 100 MG tablet Take 100 mg by mouth 2 (two) times daily.      estradioL (ESTRACE) 0.01 % (0.1 mg/gram) vaginal cream 0.5 grams with applicator or dime-sized amount with finger in vagina nightly x 2 weeks, then  "twice a week thereafter 42.5 g 11    FLUZONE HIGHDOSE QUAD 23-24  mcg/0.7 mL Syrg       gabapentin (NEURONTIN) 300 MG capsule TAKE 1 CAPSULE THREE TIMES DAILY 270 capsule 3    oxyCODONE-acetaminophen (PERCOCET) 5-325 mg per tablet Take 1 tablet by mouth every 8 (eight) hours as needed for Pain. 21 tablet 0    pravastatin (PRAVACHOL) 40 MG tablet Take 1 tablet (40 mg total) by mouth every evening. 90 tablet 3    pyridoxine, vitamin B6, (B-6) 100 MG Tab Take 100 mg by mouth once daily.      SPIKEVAX 4452-4384,12Y UP,,PF, 50 mcg/0.5 mL injection       traZODone (DESYREL) 100 MG tablet TAKE 1.5 TABLETS (150 MG TOTAL) BY MOUTH NIGHTLY AS NEEDED FOR INSOMNIA. 135 tablet 3    lipase-protease-amylase 6,000-19,000-30,000 units (CREON) 6,000-19,000 -30,000 unit capsule Take 1 capsule by mouth 3 (three) times daily with meals. 90 capsule 11     Current Facility-Administered Medications on File Prior to Visit   Medication Dose Route Frequency Provider Last Rate Last Admin    gentamicin 80 mg/100ml NACL IVPB IVPB 80 mg  80 mg Intravenous 30 Min Pre-Op Wiliam Urias PA-THEA   80 mg at 07/31/19 0747    vancomycin in dextrose 5 % 1 gram/250 mL IVPB 1,000 mg  1,000 mg Intravenous 30 Min Pre-Op Wiliam Urias PA-C   1,000 mg at 07/31/19 0738       Review of Systems A 14 point ROS was reviewed with pertinent positives as noted above in the history of present illness.      Constitutional: negative  Eyes: negative  Endocrine: negative  Gastrointestinal: negative  Cardiovascular: negative  Respiratory: negative  Allergic/Immunologic: negative  Integumentary: negative  Psychiatric: negative  Musculoskeletal: negative   Ear/Nose/Throat: negative  Neurologic: negative  Genitourinary: SEE HPI  Hematologic/Lymphatic: negative   Breast: negative    Urogynecologic Exam  BP (!) 162/80 (BP Location: Left arm, Patient Position: Sitting, BP Method: Medium (Manual))   Ht 5' 4" (1.626 m)   Wt 60.3 kg (132 lb 15 oz)   LMP  (LMP Unknown)  "  BMI 22.82 kg/m²     GENERAL APPEARANCE:  The patient is well-developed, well-nourished.   Neck:  Supple with no thyromegaly, no carotid bruits.  Heart:  Regular rate and rhythm, no murmurs, rubs or gallops.  Lungs:  Clear.  No CVA tenderness.  Abdomen:  Soft, nontender, nondistended, no hepatosplenomegaly.  Incisions:  Vert midline SP to xyphoid + several lateral LSC incisions well-healed    PELVIC:    External genitalia:  Normal Bartholins, Skenes and labia bilaterally.    Urethra:  No caruncle, diverticulum or masses.  (+) hypermobility.    Vagina:  Atrophy (+) , no bladder masses or tender, +yeast-like discharge.  Cervix:  absent  Uterus: uterus absent  Adnexa: Not palpable.    #4 ring with support pessary inserted by patient independently    Failed #4 dish with support with knob-- came out with valsalva  #3 ring with support significant DELIA      PVR 40 mL    Impression    1. Cystocele, midline    2. Rectocele, female    3. Vaginal vault prolapse    4. Vaginal atrophy    5. Urinary incontinence, mixed    6. Pessary maintenance              Initial Plan  The patient was counseled regarding these issues. The patient was given a summary sheet containing each of these issues with possible options for evaluation and management. When appropriate, we also reviewed computer-generated diagrams specific to their diagnoses..  All questions were addressed to the patient's satisfaction.    1)  Frequent UTIs (bladder infections):  --pvr 40 mL with pessary in place  --If you feel like you have a UTI:     --During the work week: call PCP or go to nearest [facility]   --After hours or on weekends: go to nearest [facility]    --These facilities can check your urine for infection, send a urine culture to verify presence/absence of infection, and start treatment if needed.    --After you are evaluated, please send a message through [facility] portal or call your urogynecology provider's office to let them know so  that they can follow trends.  --follow UTI prevention tips (see attached)  --work on emptying bladder well:  --empty bladder every 2-3 hours.  Empty well: wait a minute, lean forward on toilet.    --prolapse present: YES   --PVR without pessary: 80 mL  --control bowel movements/fecal cross-contamination  --treat vaginal atrophy (dryness)  --empty bladder before and after intercourse  --consider taking daily combination pill: cranberry + D-mannose (5666-4880 mg) + probiotic    --look on [employer] or in drug/grocery store for any brand that has these components   --examples of brands: Biophix, Now, AZO  --consider need for further evaluation ( tract imaging, cystoscopy) if issue persists  --last  tract imagin normal  --last cystoscopy: none    2)  Schedule mammogram.  Need to schedule.     3)  Stage 2 cystocele/rectocele, stage 1 vaginal vault prolapse:  --discussed  --continue #4 ring with support pessary  --PESSARY CARE: Remove pessary every 3-4 weeks.    May need to remove before bowel movements if straining dislodges.   Wash with soap and water (no ).  Replace using small amount of water-based lubricant (like KY jelly) at end being introduced into vagina.      --if surgery: would try vaginal approach (h/o mult abd surgeries)   --if surgery:  has ED, not worried about penetration--consider partial colpocleisis vs SSLF/USS   --if surgery: needs UDS preop   --if surgery: clearance per PCP (Ami) + labs (CBC, CMP, T&S)/EKG; does she need to see Osvaldo Arellano preop?    4)  Mixed urinary incontinence, urge < stress:    --Empty bladder every 3 hours.  Empty well: wait a minute, lean forward on toilet.    --Avoid dietary irritants (see sheet).  Keep diary x 3-5 days to determine your irritants.  --start pelvic floor PT. Call to make appt:  KATIE Gtz (Zunilda Chase or other): [address] [place] [address], [address], [place]   [address]. Patients can park in the [place] entrance and it is on the first  floor. (p) [phone] (Gabbie Bennett, ). (f) [phone].    --URGE: consider medication in future.   Takes 2-4 weeks to see if will have effect.  For dry mouth: get sour, sugar free lozenge or gum.    --STRESS:  Pessary vs. Sling.   --fit with #4 ring with support pessary-    5)  Fecal incontinence:  --per CRS  --keep stool diary  --colonoscopy  --fiber  --start creon  --PT  --consider SNS    6)  Vaginal atrophy (dryness):    --continue vaginal estrogen cream estrogen:  --Use 0.5 grams of estrogen cream in vagina with applicator or dime-sized amount with finger (as far as can reach internally) nightly twice a week.  You can also apply a dime-sized amount with your finger around the vaginal opening and inner lips at same frequency.     --Vaginal estrogen may help to decrease pain related to dryness with intercourse and urinary symptoms (urgency/frequency/UTIs) around menopause.     7)  RTC for pessary check in 6 months or sooner if you have problems    Approximately 60 min were spent in consult, 90 % in discussion.     Thank you for requesting consultation of your patient.  I look forward to participating in their care.    Nina Leon  Female Pelvic Medicine and Reconstructive Surgery  [facility] [place], [place]

## 2024-03-13 NOTE — PATIENT INSTRUCTIONS
1)  Frequent UTIs (bladder infections):  --pvr 40 mL with pessary in place  --If you feel like you have a UTI:     --During the work week: call PCP or go to nearest [facility]   --After hours or on weekends: go to nearest [facility]    --These facilities can check your urine for infection, send a urine culture to verify presence/absence of infection, and start treatment if needed.    --After you are evaluated, please send a message through [facility] portal or call your urogynecology provider's office to let them know so that they can follow trends.  --follow UTI prevention tips (see attached)  --work on emptying bladder well:  --empty bladder every 2-3 hours.  Empty well: wait a minute, lean forward on toilet.    --prolapse present: YES   --PVR without pessary: 80 mL  --control bowel movements/fecal cross-contamination  --treat vaginal atrophy (dryness)  --empty bladder before and after intercourse  --consider taking daily combination pill: cranberry + D-mannose (1800-2525 mg) + probiotic    --look on [employer] or in drug/grocery store for any brand that has these components   --examples of brands: Biophix, Now, AZO  --consider need for further evaluation ( tract imaging, cystoscopy) if issue persists  --last  tract imagin normal  --last cystoscopy: none    2)  Schedule mammogram.  Need to schedule.     3)  Stage 2 cystocele/rectocele, stage 1 vaginal vault prolapse:  --discussed  --continue #4 ring with support pessary  --PESSARY CARE: Remove pessary every 3-4 weeks.    May need to remove before bowel movements if straining dislodges.   Wash with soap and water (no ).  Replace using small amount of water-based lubricant (like KY jelly) at end being introduced into vagina.      --if surgery: would try vaginal approach (h/o mult abd surgeries)   --if surgery:  has ED, not worried about penetration--consider partial colpocleisis vs SSLF/USS   --if surgery: needs UDS  preop   --if surgery: clearance per PCP (Ami) + labs (CBC, CMP, T&S)/EKG; does she need to see Osvaldo Arellano preop?    4)  Mixed urinary incontinence, urge < stress:    --Empty bladder every 3 hours.  Empty well: wait a minute, lean forward on toilet.    --Avoid dietary irritants (see sheet).  Keep diary x 3-5 days to determine your irritants.  --start pelvic floor PT. Call to make appt:  KATIE Gtz (Zunilda Chase or other): [address], [place] [address], [place]   [address]. Patients can park in the [place] entrance and it is on the first floor. (p) [phone] (Gabbie Bennett, ). (f) [phone].    --URGE: consider medication in future.   Takes 2-4 weeks to see if will have effect.  For dry mouth: get sour, sugar free lozenge or gum.    --STRESS:  Pessary vs. Sling.   --fit with #4 ring with support pessary-    5)  Fecal incontinence:  --per CRS  --keep stool diary  --colonoscopy  --fiber  --start creon  --PT  --consider SNS    6)  Vaginal atrophy (dryness):    --continue vaginal estrogen cream estrogen:  --Use 0.5 grams of estrogen cream in vagina with applicator or dime-sized amount with finger (as far as can reach internally) nightly twice a week.  You can also apply a dime-sized amount with your finger around the vaginal opening and inner lips at same frequency.     --Vaginal estrogen may help to decrease pain related to dryness with intercourse and urinary symptoms (urgency/frequency/UTIs) around menopause.     7)  RTC for pessary check in 6 months or sooner if you have problems

## 2024-04-17 ENCOUNTER — PATIENT MESSAGE (OUTPATIENT)
Dept: SURGERY | Facility: CLINIC | Age: 89
End: 2024-04-17
Payer: MEDICARE

## 2024-04-18 ENCOUNTER — TELEPHONE (OUTPATIENT)
Dept: ENDOSCOPY | Facility: HOSPITAL | Age: 89
End: 2024-04-18
Payer: MEDICARE

## 2024-04-18 ENCOUNTER — LAB VISIT (OUTPATIENT)
Dept: LAB | Facility: HOSPITAL | Age: 89
End: 2024-04-18
Attending: FAMILY MEDICINE
Payer: MEDICARE

## 2024-04-18 ENCOUNTER — OFFICE VISIT (OUTPATIENT)
Dept: FAMILY MEDICINE | Facility: CLINIC | Age: 89
End: 2024-04-18
Attending: FAMILY MEDICINE
Payer: MEDICARE

## 2024-04-18 ENCOUNTER — PATIENT MESSAGE (OUTPATIENT)
Dept: FAMILY MEDICINE | Facility: CLINIC | Age: 89
End: 2024-04-18

## 2024-04-18 VITALS
SYSTOLIC BLOOD PRESSURE: 120 MMHG | WEIGHT: 130.94 LBS | OXYGEN SATURATION: 98 % | HEIGHT: 64 IN | DIASTOLIC BLOOD PRESSURE: 79 MMHG | BODY MASS INDEX: 22.35 KG/M2 | HEART RATE: 51 BPM | TEMPERATURE: 98 F

## 2024-04-18 DIAGNOSIS — N25.81 SECONDARY HYPERPARATHYROIDISM OF RENAL ORIGIN: ICD-10-CM

## 2024-04-18 DIAGNOSIS — G47.00 INSOMNIA, UNSPECIFIED TYPE: ICD-10-CM

## 2024-04-18 DIAGNOSIS — I70.0 AORTIC ATHEROSCLEROSIS: ICD-10-CM

## 2024-04-18 DIAGNOSIS — N18.32 ANEMIA OF CHRONIC RENAL FAILURE, STAGE 3B: ICD-10-CM

## 2024-04-18 DIAGNOSIS — N18.31 STAGE 3A CHRONIC KIDNEY DISEASE: ICD-10-CM

## 2024-04-18 DIAGNOSIS — E78.2 MIXED HYPERLIPIDEMIA: ICD-10-CM

## 2024-04-18 DIAGNOSIS — D63.1 ANEMIA OF CHRONIC RENAL FAILURE, STAGE 3B: ICD-10-CM

## 2024-04-18 DIAGNOSIS — E78.2 MIXED HYPERLIPIDEMIA: Primary | ICD-10-CM

## 2024-04-18 LAB
ALBUMIN SERPL BCP-MCNC: 3.8 G/DL (ref 3.5–5.2)
ALP SERPL-CCNC: 47 U/L (ref 55–135)
ALT SERPL W/O P-5'-P-CCNC: 12 U/L (ref 10–44)
ANION GAP SERPL CALC-SCNC: 6 MMOL/L (ref 8–16)
AST SERPL-CCNC: 25 U/L (ref 10–40)
BILIRUB SERPL-MCNC: 0.5 MG/DL (ref 0.1–1)
BUN SERPL-MCNC: 26 MG/DL (ref 8–23)
CALCIUM SERPL-MCNC: 9.1 MG/DL (ref 8.7–10.5)
CHLORIDE SERPL-SCNC: 109 MMOL/L (ref 95–110)
CO2 SERPL-SCNC: 26 MMOL/L (ref 23–29)
CREAT SERPL-MCNC: 1.3 MG/DL (ref 0.5–1.4)
EST. GFR  (NO RACE VARIABLE): 39 ML/MIN/1.73 M^2
GLUCOSE SERPL-MCNC: 85 MG/DL (ref 70–110)
POTASSIUM SERPL-SCNC: 4.6 MMOL/L (ref 3.5–5.1)
PROT SERPL-MCNC: 6.8 G/DL (ref 6–8.4)
SODIUM SERPL-SCNC: 141 MMOL/L (ref 136–145)

## 2024-04-18 PROCEDURE — 99215 OFFICE O/P EST HI 40 MIN: CPT | Mod: S$GLB,,, | Performed by: FAMILY MEDICINE

## 2024-04-18 PROCEDURE — 1160F RVW MEDS BY RX/DR IN RCRD: CPT | Mod: CPTII,S$GLB,, | Performed by: FAMILY MEDICINE

## 2024-04-18 PROCEDURE — 1159F MED LIST DOCD IN RCRD: CPT | Mod: CPTII,S$GLB,, | Performed by: FAMILY MEDICINE

## 2024-04-18 PROCEDURE — 80053 COMPREHEN METABOLIC PANEL: CPT | Performed by: FAMILY MEDICINE

## 2024-04-18 PROCEDURE — 99999 PR PBB SHADOW E&M-EST. PATIENT-LVL III: CPT | Mod: PBBFAC,,, | Performed by: FAMILY MEDICINE

## 2024-04-18 PROCEDURE — 36415 COLL VENOUS BLD VENIPUNCTURE: CPT | Performed by: FAMILY MEDICINE

## 2024-04-18 RX ORDER — TRAZODONE HYDROCHLORIDE 100 MG/1
200 TABLET ORAL NIGHTLY PRN
Qty: 180 TABLET | Refills: 3 | Status: SHIPPED | OUTPATIENT
Start: 2024-04-18 | End: 2025-04-18

## 2024-04-18 NOTE — PROGRESS NOTES
Subjective:       Patient ID: Cheryl Ghosh is a 89 y.o. female.    Chief Complaint: Follow-up    88 yr old pleasant white female with HTN, CKD III, OA, osteopenia hip, hyperlipidemia, chronic back pain, BPPV, presents today for her 3 month check. No new complaints today      HTN - controlled - low salt diet - having issues with medications lisinopril n losartan      CKD III - stable - improving       HLD - slightly worsening since stopped statin x 30 days           - she was asked to start statin but due to her fall and issues with her ribs and back, she was never able to start it.    Back pain - she follows pain clinic for injections - c/o neuropathic pain in legs - was on neurontin in the past and she started taking it and working    Osteoporosis - started back on fosamax - doing well - next dexa in a year    HISTORY as below - reviewed    Health maintenance  -labs UTD  -mammo UTD  -colon screen UTD  -vaccines UTD    Follow-up  Associated symptoms include arthralgias, chest pain, myalgias, vomiting and weakness. Pertinent negatives include no congestion, diaphoresis, headaches, joint swelling, nausea, neck pain or sore throat.   Knee Pain     Back Pain  Associated symptoms include chest pain and weakness. Pertinent negatives include no dysuria, headaches or leg pain.   Hip Pain     Hypertension  This is a chronic problem. The current episode started more than 1 year ago. The problem has been waxing and waning since onset. The problem is resistant. Associated symptoms include blurred vision, chest pain and malaise/fatigue. Pertinent negatives include no anxiety, headaches, neck pain, orthopnea, palpitations, peripheral edema, PND, shortness of breath or sweats. Agents associated with hypertension include NSAIDs. There are no known risk factors for coronary artery disease. Past treatments include nothing. The current treatment provides significant improvement. Compliance problems include exercise.  There is no  history of chronic renal disease.   Hyperlipidemia  This is a chronic problem. The current episode started more than 1 year ago. The problem is uncontrolled. Recent lipid tests were reviewed and are high. She has no history of chronic renal disease, diabetes, hypothyroidism, liver disease, obesity or nephrotic syndrome. There are no known factors aggravating her hyperlipidemia. Associated symptoms include chest pain and myalgias. Pertinent negatives include no focal sensory loss, leg pain or shortness of breath. Current antihyperlipidemic treatment includes diet change. The current treatment provides mild improvement of lipids. There are no compliance problems.  Risk factors for coronary artery disease include dyslipidemia.     Review of Systems   Constitutional:  Positive for activity change and malaise/fatigue. Negative for diaphoresis and unexpected weight change.   HENT:  Positive for hearing loss. Negative for nasal congestion, ear discharge, rhinorrhea, sore throat, trouble swallowing and voice change.    Eyes:  Positive for blurred vision. Negative for pain, discharge and visual disturbance.   Respiratory: Negative.  Negative for chest tightness, shortness of breath and wheezing.    Cardiovascular:  Positive for chest pain. Negative for palpitations, orthopnea and PND.   Gastrointestinal:  Positive for vomiting. Negative for abdominal distention, anal bleeding, blood in stool, constipation, diarrhea and nausea.   Endocrine: Negative.  Negative for cold intolerance, polydipsia and polyuria.   Genitourinary: Negative.  Negative for decreased urine volume, difficulty urinating, dysuria, frequency, hematuria, menstrual problem and vaginal pain.   Musculoskeletal:  Positive for arthralgias, back pain and myalgias. Negative for gait problem, joint swelling, leg pain and neck pain.   Integumentary:  Negative for color change, pallor and wound. Negative.   Allergic/Immunologic: Negative.  Negative for environmental  allergies and immunocompromised state.   Neurological:  Positive for weakness. Negative for dizziness, tremors, seizures, speech difficulty and headaches.   Hematological: Negative.  Negative for adenopathy. Does not bruise/bleed easily.   Psychiatric/Behavioral: Negative.  Negative for agitation, confusion, decreased concentration, dysphoric mood, hallucinations, self-injury and suicidal ideas. The patient is not nervous/anxious.          PMH/PSH/FH/SH/MED/ALLERGY reviewed    Past Medical History:   Diagnosis Date    Arthritis     lumbar    Blood transfusion     Chronic kidney disease, stage III (moderate) 8/2/2017    Coronary artery disease involving native coronary artery without angina pectoris 3/9/2021    Degenerative disc disease     lumbar    Insomnia 11/15/2022    Insulinoma     Pancreatic disease     Urinary incontinence 12/7/2015       Past Surgical History:   Procedure Laterality Date    ADENOIDECTOMY      APPENDECTOMY      CATARACT EXTRACTION, BILATERAL      EYE SURGERY      HYSTERECTOMY      SAMM/BSO    INJECTION OF JOINT Bilateral 2/4/2020    Procedure: Injection, Joint--Bilateral GTB;  Surgeon: Arina Carrera Jr., MD;  Location: [facility] PAIN [facility]GT;  Service: Pain Management;  Laterality: Bilateral;  Oral sedation    INJECTION OF STEROID Right 12/20/2018    Procedure: INJECTION, STEROID-- Right SI Joint Block and  Steroid Injection;  Surgeon: Xavier Dasilva MD;  Location: [facility] OR;  Service: Neurosurgery;  Laterality: Right;  INJECTION, STEROID-- Right SI Joint Block and  Steroid Injection  SURGERY TIME:1 HR  LOS: 0 DAYS  RADIOLOGY: C- arm  BED: Regular Bed  with Pillows  POSITION: Prone      INJECTION OF STEROID Left 6/19/2019    Procedure: INJECTION, STEROID Procedure:Left sacroiliac joint block / steroid injection Surgery Time: 30mins LOS: Anesthesia: MAC Radiology: C-arm Bed: Regular Bed Position: Prone;  Surgeon: Xavier Dasilva MD;  Location: [facility] OR;  Service: Neurosurgery;  Laterality: Left;     MINIMALLY INVASIVE FUSION OF SPINE Right 2/6/2019    Procedure: FUSION, SPINE, MINIMALLY INVASIVE Right Sacroiliac Joint Fusion -  I Fuse;  Surgeon: Xavier Dasilva MD;  Location: [facility] OR;  Service: Neurosurgery;  Laterality: Right;  Procedure: Right Sacroiliac Joint Fusion -  I Fuse  Surgery Time: 1.5 Hr  LOS: 0  Anesthesia: General  Radiology: C-Arm  Bed: Hayden 4 Poster  Position: Prone  Equipment: TAY Kincaid [phone] (notified 1/24 )    OOPHORECTOMY      PANCREAS SURGERY      Insulanoma    RADIOFREQUENCY ABLATION OF LUMBAR MEDIAL BRANCH NERVE AT SINGLE LEVEL Right 8/28/2018    Procedure: RADIOFREQUENCY ABLATION, NERVE, MEDIAL BRANCH, LUMBAR, 1 LEVEL- Right L3,4,5 IV SEDATION;  Surgeon: Eddie Vega MD;  Location: [facility] PAIN MGT;  Service: Pain Management;  Laterality: Right;    RADIOFREQUENCY ABLATION OF LUMBAR MEDIAL BRANCH NERVE AT SINGLE LEVEL Left 9/4/2018    Procedure: RADIOFREQUENCY ABLATION, NERVE, MEDIAL BRANCH, LUMBAR, 1 LEVEL - Left L3,4,5 IV SEADTION;  Surgeon: Eddie Vega MD;  Location: [facility] PAIN [facility]GT;  Service: Pain Management;  Laterality: Left;  Patient takes ASA     REFRACTIVE SURGERY Bilateral     SPINAL FUSION Left 7/31/2019    Procedure: FUSION, SPINE SI Joint Fusion;  Surgeon: Xavier Dasilva MD;  Location: [facility] OR;  Service: Neurosurgery;  Laterality: Left;  Procedure: Left SI joint fusion  Surgery Time: 1.5hr  LOS:0  Anesthesia: General   Radiology: C-arm   Bed: Hayden 4 Poster  Position: Prone  Equipment: Codi Mabry [phone] (Codi notified)    TONSILLECTOMY         Family History   Problem Relation Name Age of Onset    Breast cancer Mother      Heart disease Father      Aneurysm Sister      Glaucoma Daughter      Heart attack Son      Breast cancer Maternal Grandmother      Breast cancer Other niece        Social History     Socioeconomic History    Marital status:    Tobacco Use    Smoking status: Never     Passive exposure: Never    Smokeless  tobacco: Never   Substance and Sexual Activity    Alcohol use: Yes     Alcohol/week: 1.0 - 2.0 standard drink of alcohol     Types: 1 - 2 Glasses of wine per week    Drug use: No    Sexual activity: Yes     Partners: Male     Social Determinants of Health     Financial Resource Strain: Low Risk  (1/23/2024)    Overall Financial Resource Strain (CARDIA)     Difficulty of Paying Living Expenses: Not hard at all   Food Insecurity: No Food Insecurity (1/23/2024)    Hunger Vital Sign     Worried About Running Out of Food in the Last Year: Never true     Ran Out of Food in the Last Year: Never true   Transportation Needs: No Transportation Needs (1/23/2024)    PRAPARE - Transportation     Lack of Transportation (Medical): No     Lack of Transportation (Non-Medical): No   Physical Activity: Insufficiently Active (1/23/2024)    Exercise Vital Sign     Days of Exercise per Week: 3 days     Minutes of Exercise per Session: 20 min   Stress: No Stress Concern Present (1/23/2024)    [employer] [place] [employer] - Occupational Stress Questionnaire     Feeling of Stress : Only a little   Social Connections: Unknown (1/23/2024)    Social Connection and Isolation Panel [NHANES]     Frequency of Communication with Friends and Family: Twice a week     Frequency of Social Gatherings with Friends and Family: Once a week     Active Member of Clubs or Organizations: Yes     Attends Club or Organization Meetings: More than 4 times per year     Marital Status:    Housing Stability: Low Risk  (1/23/2024)    Housing Stability Vital Sign     Unable to Pay for Housing in the Last Year: No     Number of Places Lived in the Last Year: 1     Unstable Housing in the Last Year: No       Current Outpatient Medications   Medication Sig Dispense Refill    alendronate (FOSAMAX) 70 MG tablet Take 1 tablet (70 mg total) by mouth every 7 days. 4 tablet 11    amLODIPine (NORVASC) 2.5 MG tablet Take 1 tablet (2.5 mg total) by mouth once  daily. 90 tablet 3    aspirin (ECOTRIN) 81 MG EC tablet Take 1 tablet (81 mg total) by mouth once daily. Resume 48 hours post-surgery  0    estradioL (ESTRACE) 0.01 % (0.1 mg/gram) vaginal cream 0.5 grams with applicator or dime-sized amount with finger in vagina nightly x 2 weeks, then twice a week thereafter 42.5 g 11    gabapentin (NEURONTIN) 300 MG capsule TAKE 1 CAPSULE THREE TIMES DAILY 270 capsule 3    oxyCODONE-acetaminophen (PERCOCET) 5-325 mg per tablet Take 1 tablet by mouth every 8 (eight) hours as needed for Pain. 21 tablet 0    pravastatin (PRAVACHOL) 40 MG tablet Take 1 tablet (40 mg total) by mouth every evening. 90 tablet 3    acetaminophen (TYLENOL 8 HOUR ORAL) Take by mouth.      b complex vitamins tablet Take 1 tablet by mouth once daily.      baclofen (LIORESAL) 10 MG tablet Take 1 tablet (10 mg total) by mouth 3 (three) times daily. 270 tablet 4    calcium-vitamin D 600 mg(1,500mg) -400 unit Tab once daily.       doxycycline (VIBRA-TABS) 100 MG tablet Take 100 mg by mouth 2 (two) times daily.      FLUZONE HIGHDOSE QUAD 23-24  mcg/0.7 mL Syrg       lipase-protease-amylase 6,000-19,000-30,000 units (CREON) 6,000-19,000 -30,000 unit capsule Take 1 capsule by mouth 3 (three) times daily with meals. 90 capsule 11    pyridoxine, vitamin B6, (B-6) 100 MG Tab Take 100 mg by mouth once daily.      SPIKEVAX 1348-6109,12Y UP,,PF, 50 mcg/0.5 mL injection       traZODone (DESYREL) 100 MG tablet Take 2 tablets (200 mg total) by mouth nightly as needed for Insomnia. 180 tablet 3     No current facility-administered medications for this visit.     Facility-Administered Medications Ordered in Other Visits   Medication Dose Route Frequency Provider Last Rate Last Admin    gentamicin 80 mg/100ml NACL IVPB IVPB 80 mg  80 mg Intravenous 30 Min Pre-Op Wiliam Urias PA-C   80 mg at 07/31/19 0747    vancomycin in dextrose 5 % 1 gram/250 mL IVPB 1,000 mg  1,000 mg Intravenous 30 Min Pre-Op Wiliam Urias,  PA-C   1,000 mg at 07/31/19 0738       Review of patient's allergies indicates:   Allergen Reactions    Pcn [penicillins] Hives and Nausea And Vomiting       Objective:       Vitals:    04/18/24 0941   BP: 120/79   Pulse: (!) 51   Temp: 98 °F (36.7 °C)       Physical Exam  Constitutional:       General: She is not in acute distress.     Appearance: She is well-developed. She is not diaphoretic.   HENT:      Head: Normocephalic and atraumatic.      Right Ear: External ear normal.      Left Ear: External ear normal.      Nose: Nose normal.      Mouth/Throat:      Pharynx: No oropharyngeal exudate.   Eyes:      General: No scleral icterus.        Right eye: No discharge.         Left eye: No discharge.      Conjunctiva/sclera: Conjunctivae normal.      Pupils: Pupils are equal, round, and reactive to light.   Neck:      Thyroid: No thyromegaly.      Vascular: No JVD.      Trachea: No tracheal deviation.   Cardiovascular:      Rate and Rhythm: Normal rate and regular rhythm.      Heart sounds: Normal heart sounds. No murmur heard.     No friction rub. No gallop.   Pulmonary:      Effort: Pulmonary effort is normal.      Breath sounds: Normal breath sounds. No stridor. No wheezing or rales.   Chest:      Chest wall: No tenderness.   Abdominal:      General: Bowel sounds are normal. There is no distension.      Palpations: Abdomen is soft. There is no mass.      Tenderness: There is no abdominal tenderness. There is no guarding or rebound.      Hernia: No hernia is present.   Musculoskeletal:         General: No tenderness. Normal range of motion.      Cervical back: Normal range of motion and neck supple.   Lymphadenopathy:      Cervical: No cervical adenopathy.   Skin:     General: Skin is warm and dry.      Coloration: Skin is not pale.      Findings: No erythema or rash.   Neurological:      Mental Status: She is alert and oriented to person, place, and time.      Cranial Nerves: No cranial nerve deficit.      Motor:  No abnormal muscle tone.      Coordination: Coordination normal.      Deep Tendon Reflexes: Reflexes are normal and symmetric. Reflexes normal.   Psychiatric:         Behavior: Behavior normal.         Thought Content: Thought content normal.         Judgment: Judgment normal.         Assessment:       Problem List Items Addressed This Visit       Stage 3a chronic kidney disease    Relevant Orders    Comprehensive Metabolic Panel    Secondary hyperparathyroidism of renal origin    Relevant Orders    Comprehensive Metabolic Panel    Mixed hyperlipidemia - Primary    Relevant Orders    Comprehensive Metabolic Panel    Insomnia    Relevant Medications    traZODone (DESYREL) 100 MG tablet    Aortic atherosclerosis    Anemia of chronic renal failure, stage 3b       Plan:           Cheryl was seen today for follow-up.    Diagnoses and all orders for this visit:    Mixed hyperlipidemia  -     Comprehensive Metabolic Panel; Future    Stage 3a chronic kidney disease  -     Comprehensive Metabolic Panel; Future    Anemia of chronic renal failure, stage 3b    Secondary hyperparathyroidism of renal origin  -     Comprehensive Metabolic Panel; Future    Aortic atherosclerosis    Insomnia, unspecified type  -     traZODone (DESYREL) 100 MG tablet; Take 2 tablets (200 mg total) by mouth nightly as needed for Insomnia.      HTN  -controlled    TIA  -follow neurology - DC plavix per neuro recommendations and just stay on ASA      Insomnia  -try benadryl only if needed    HLD  -diet controlled and statin    Urine incontinence  -lifestyle changes - intolerant to meds    Neuropathic pain  -continue neurontin    OA knee B/L  -mobic prn    CKD III  -lab and urine analysis  -adequate hydration  -follow nephrology    Muscle spasm  -baclofen  and percocet prn    Osteoporosis  -continue fosamax    Insomnia  -increase trazodone to 150    CKD III  -stable      Spent adequate time in obtaining history and explaining differentials    40  minutes  spent during this visit of which greater than 50% devoted to face-face counseling and coordination of care regarding diagnosis and management plan    RTC 3 months

## 2024-04-18 NOTE — TELEPHONE ENCOUNTER
"----- Message from Marion Can RN sent at 4/17/2024  4:32 PM CDT -----    ----- Message -----  From: Marcelle Metcalf RN  Sent: 4/17/2024   2:59 PM CDT  To: [facility] End[facility] Schedulers    Pt in need of canceling scheduled c-scope w/ Dr. Ojeda.   Ok'd w/ MD.    Thank you!    - Marcelle  ----- Message -----  From: Madhavi Gonzales  Sent: 4/17/2024   2:53 PM CDT  To: [facility] Staff    Consult/Advisory:      Name Of Caller: Self    Contact Preference:   [phone]     What is the nature of the call?: Pt stated that she  would like to CX procedure  [facility] ENDOSCOPY 4TH FLR. Please call       Additional Notes:  "Thank you for all that you do for our patients"  "

## 2024-04-18 NOTE — TELEPHONE ENCOUNTER
Contacted the patient to inform her of cancelling her upcoming procedure as requested and to see if pt would like to reschedule. The patient did not answer the phone. Left a voice message requesting a call back.

## 2024-04-29 PROBLEM — Z87.440 HISTORY OF UTI: Status: RESOLVED | Noted: 2024-01-24 | Resolved: 2024-04-29

## 2024-05-07 ENCOUNTER — PATIENT MESSAGE (OUTPATIENT)
Dept: UROGYNECOLOGY | Facility: CLINIC | Age: 89
End: 2024-05-07
Payer: MEDICARE

## 2024-05-23 ENCOUNTER — PATIENT MESSAGE (OUTPATIENT)
Dept: SURGERY | Facility: CLINIC | Age: 89
End: 2024-05-23
Payer: MEDICARE

## 2024-05-23 RX ORDER — PANCRELIPASE 30000; 6000; 19000 [USP'U]/1; [USP'U]/1; [USP'U]/1
2 CAPSULE, DELAYED RELEASE PELLETS ORAL
Qty: 300 CAPSULE | Refills: 3 | Status: SHIPPED | OUTPATIENT
Start: 2024-05-23

## 2024-05-29 DIAGNOSIS — M81.0 OSTEOPOROSIS, UNSPECIFIED OSTEOPOROSIS TYPE, UNSPECIFIED PATHOLOGICAL FRACTURE PRESENCE: ICD-10-CM

## 2024-05-29 RX ORDER — ALENDRONATE SODIUM 70 MG/1
TABLET ORAL
Qty: 12 TABLET | Refills: 1 | Status: SHIPPED | OUTPATIENT
Start: 2024-05-29

## 2024-05-29 NOTE — TELEPHONE ENCOUNTER
Care Due:                  Date            Visit Type   Department     Provider  --------------------------------------------------------------------------------                                EP -                              [facility]    Joey Marino  Last Visit: 04-      CARE ([facility]   MEDICINE       [facility]  Next Visit: 07-      CARE ([facility])   MEDICINE       Ami                                                            Last  Test          Frequency    Reason                     Performed    Due Date  --------------------------------------------------------------------------------    Mg Level....  12 months..  alendronate..............  Not Found    Overdue    Phosphate...  12 months..  alendronate..............  Not Found    Overdue    Vitamin D...  12 months..  alendronate..............  11-   11-    [facility] Embedded Care Due Messages. Reference number: [id number].   5/29/2024 11:56:43 AM CDT

## 2024-06-09 ENCOUNTER — PATIENT MESSAGE (OUTPATIENT)
Dept: SURGERY | Facility: CLINIC | Age: 89
End: 2024-06-09
Payer: MEDICARE

## 2024-06-18 ENCOUNTER — TELEPHONE (OUTPATIENT)
Dept: ENDOSCOPY | Facility: HOSPITAL | Age: 89
End: 2024-06-18
Payer: MEDICARE

## 2024-06-18 ENCOUNTER — OFFICE VISIT (OUTPATIENT)
Dept: PAIN MEDICINE | Facility: CLINIC | Age: 89
End: 2024-06-18
Payer: MEDICARE

## 2024-06-18 VITALS
DIASTOLIC BLOOD PRESSURE: 75 MMHG | SYSTOLIC BLOOD PRESSURE: 167 MMHG | BODY MASS INDEX: 22.42 KG/M2 | HEART RATE: 55 BPM | HEIGHT: 64 IN | WEIGHT: 131.31 LBS

## 2024-06-18 DIAGNOSIS — M54.9 DORSALGIA: ICD-10-CM

## 2024-06-18 DIAGNOSIS — M51.36 DDD (DEGENERATIVE DISC DISEASE), LUMBAR: ICD-10-CM

## 2024-06-18 DIAGNOSIS — M47.816 LUMBAR SPONDYLOSIS: ICD-10-CM

## 2024-06-18 DIAGNOSIS — M54.16 LUMBAR RADICULOPATHY: Primary | ICD-10-CM

## 2024-06-18 PROCEDURE — 99999 PR PBB SHADOW E&M-EST. PATIENT-LVL IV: CPT | Mod: PBBFAC,HCNC,, | Performed by: STUDENT IN AN ORGANIZED HEALTH CARE EDUCATION/TRAINING PROGRAM

## 2024-06-18 PROCEDURE — 1159F MED LIST DOCD IN RCRD: CPT | Mod: HCNC,CPTII,S$GLB, | Performed by: STUDENT IN AN ORGANIZED HEALTH CARE EDUCATION/TRAINING PROGRAM

## 2024-06-18 PROCEDURE — 1125F AMNT PAIN NOTED PAIN PRSNT: CPT | Mod: HCNC,CPTII,S$GLB, | Performed by: STUDENT IN AN ORGANIZED HEALTH CARE EDUCATION/TRAINING PROGRAM

## 2024-06-18 PROCEDURE — 1101F PT FALLS ASSESS-DOCD LE1/YR: CPT | Mod: HCNC,CPTII,S$GLB, | Performed by: STUDENT IN AN ORGANIZED HEALTH CARE EDUCATION/TRAINING PROGRAM

## 2024-06-18 PROCEDURE — 99204 OFFICE O/P NEW MOD 45 MIN: CPT | Mod: HCNC,S$GLB,, | Performed by: STUDENT IN AN ORGANIZED HEALTH CARE EDUCATION/TRAINING PROGRAM

## 2024-06-18 PROCEDURE — 3288F FALL RISK ASSESSMENT DOCD: CPT | Mod: HCNC,CPTII,S$GLB, | Performed by: STUDENT IN AN ORGANIZED HEALTH CARE EDUCATION/TRAINING PROGRAM

## 2024-06-18 RX ORDER — METHYLPREDNISOLONE 4 MG/1
TABLET ORAL
Qty: 21 EACH | Refills: 0 | Status: SHIPPED | OUTPATIENT
Start: 2024-06-18 | End: 2024-07-09

## 2024-06-18 NOTE — TELEPHONE ENCOUNTER
Received below message.  Telephoned pt to schedule colonoscopy with no answer.  Voicemail message with direct contact number for pt to return call.  Portal message also sent.

## 2024-06-19 ENCOUNTER — TELEPHONE (OUTPATIENT)
Dept: ENDOSCOPY | Facility: HOSPITAL | Age: 89
End: 2024-06-19
Payer: MEDICARE

## 2024-06-19 DIAGNOSIS — R19.7 DIARRHEA, UNSPECIFIED TYPE: Primary | ICD-10-CM

## 2024-06-19 NOTE — TELEPHONE ENCOUNTER
Spoke to pt to reschedule procedure(s) Colonoscopy       Physician to perform procedure(s) Dr. LINNETTE Ojeda  Date of Procedure (s) 8/2/24  Arrival Time 7:30 AM  Time of Procedure(s) 8:30 AM   Location of Procedure(s) [facility] [address] [facility]  Type of Rx Prep sent to patient: PEG  Instructions provided to patient via [facility]    Patient was informed on the following information and verbalized understanding. Screening questionnaire reviewed with patient and complete. If procedure requires anesthesia, a responsible adult needs to be present to accompany the patient home, patient cannot drive after receiving anesthesia. Appointment details are tentative, especially check-in time. Patient will receive a prep-op call 7 days prior to confirm check-in time for procedure. If applicable the patient should contact their pharmacy to verify Rx for procedure prep is ready for pick-up. Patient was advised to call the scheduling department at [phone] if pharmacy states no Rx is available. Patient was advised to call the endoscopy scheduling department if any questions or concerns arise.      [facility] Scheduling Department

## 2024-06-24 ENCOUNTER — TELEPHONE (OUTPATIENT)
Dept: OTOLARYNGOLOGY | Facility: CLINIC | Age: 89
End: 2024-06-24
Payer: MEDICARE

## 2024-06-24 NOTE — TELEPHONE ENCOUNTER
Spoke with patient and was able to schedule her for July 11th, at 2:40 pm also informed her that she will be placed on waitlist incase a sooner appt is available   ----- Message from Madhavi Rodrigez sent at 6/22/2024 10:44 AM CDT -----  Type:  Sooner Appointment Request    Caller is requesting a sooner appointment.  Caller declined first available appointment listed below.  Caller will not accept being placed on the waitlist and is requesting a message be sent to doctor.  Name of Caller: Pt   When is the first available appointment? 08/20  Symptoms: Losing voice, issues w/ vocal chords  Would the patient rather a call back or a response via [facility]? Call back   Best Call Back Number: [phone]

## 2024-07-01 PROBLEM — M54.16 LUMBAR RADICULOPATHY: Status: ACTIVE | Noted: 2024-07-01

## 2024-07-01 PROBLEM — M54.9 DORSALGIA: Status: ACTIVE | Noted: 2024-07-01

## 2024-07-01 PROBLEM — Z74.09 IMPAIRED MOBILITY AND ACTIVITIES OF DAILY LIVING: Status: ACTIVE | Noted: 2024-07-01

## 2024-07-01 PROBLEM — Z78.9 IMPAIRED MOBILITY AND ACTIVITIES OF DAILY LIVING: Status: ACTIVE | Noted: 2024-07-01

## 2024-07-02 ENCOUNTER — HOSPITAL ENCOUNTER (OUTPATIENT)
Dept: RADIOLOGY | Facility: HOSPITAL | Age: 89
Discharge: HOME | End: 2024-07-02
Attending: STUDENT IN AN ORGANIZED HEALTH CARE EDUCATION/TRAINING PROGRAM
Payer: MEDICARE

## 2024-07-02 DIAGNOSIS — M54.16 LUMBAR RADICULOPATHY: ICD-10-CM

## 2024-07-02 PROCEDURE — 72148 MRI LUMBAR SPINE W/O DYE: CPT | Mod: TC,HCNC

## 2024-07-02 PROCEDURE — 72148 MRI LUMBAR SPINE W/O DYE: CPT | Mod: 26,HCNC,, | Performed by: RADIOLOGY

## 2024-07-02 NOTE — PROGRESS NOTES
[facility] - Established  Clinic Patient Evaluation    Referred by: No ref. provider found   Reason for referral: * No diagnoses found *     CC:   Chief Complaint   Patient presents with    Follow-up     MRI Result     Low-back Pain    Hip Pain    Leg Pain     Bilateral          6/18/2024     8:07 AM 7/2/2020    10:07 AM 3/2/2020    10:29 AM   Last 3 PDI Scores   Pain Disability Index (PDI) 13 14 18       Interval Update 07/03/2024: Patient return to clinic for MRI result.  She is reporting 0/10 pain today she does report intermittent pain in the low back radiating into her hips right greater than left pain is described as aching throbbing at times in the low back radiating into the hips bilaterally.  She does also report bilateral intermittent leg pain.  She is scheduled to begin physical therapy on.  She is better since her last we will review the MRI in detail and determine a plan of care moving forward.  She denies any new pain denies any profound weakness denies any bowel or bladder dysfunction at this time.    Subjective 06/18/2024:   Cheryl Ghosh is a 89 y.o. female who presents complaining of lower back pain that radiates to the bilateral legs.  Pain started about 9 days ago while on vacation.  Patient reports she normally states with a pillow between her knees, but did not have her pillow from home, which aggravated her back pain.  She also reports a 4 hour car drive which also worsened her pain.  Patient notes her pain is slowly improving over the last few days.  Ice packs help.  She has a history of bilateral SI joint fusion.  No other lumbar or spinal surgeries.    Initial Pain Assessment:  Location: lower back  Onset: 9 Days  Current Pain Score: 10/10  Daily Pain of Range: 10-10/10  Quality: Burning, Grabbing, Deep, and Sharp  Radiation: down both legs to ankles.   Worsened by: walking for more than several minutes  Improved by: heat, ice, laying down, massage, medications,  and rest     Patient denies night fever/night sweats, urinary incontinence, bowel incontinence, significant weight loss, significant motor weakness, and loss of sensations.    Previous Interventions:  - 2/04/2020 - bilateral GTB injection  - 2018 - L3, L4, L5 bilateral RFA    Previous Therapies:  PT/OT: yes will start on Monday x2 per week   Chiropractor:   HEP:   Relevant Surgery: yes   7/31/2019 - bilateral SI joint fusion    Previous Medications:   - Tylenol or NSAIDS: Aspirin,Tylenol  - Muscle Relaxants: Baclofen   - TCAs:   - SNRIs:   - Topicals:   - Anticonvulsants: Gabapentin   - Opioids: Oxycodone  - Adjuvants:     Current Pain Medications:  Aspirin  Tylenol   Baclofen     Review of Systems:  ROS    GENERAL:  No weight loss, malaise or fevers.  HEENT:   No recent changes in vision or hearing  NECK:  No difficulty with swallowing. No stridor.   RESPIRATORY:  Negative for cough, wheezing or shortness of breath, patient denies any recent URI.  CARDIOVASCULAR:  Negative for chest pain, leg swelling or palpitations.  GI:  Negative for abdominal discomfort, blood in stools or black stools or change in bowel habits.  MUSCULOSKELETAL:  See HPI.  SKIN:  Negative for lesions, rash, and itching.  PSYCH:  No mood disorder or recent psychosocial stressors.    HEMATOLOGY/LYMPHOLOGY:  Negative for prolonged bleeding, bruising easily or swollen nodes.  Patient is not currently taking any anti-coagulants  NEURO:   No history of headaches, syncope, paralysis, seizures or tremors.  All other reviewed and negative other than HPI.    History:  Current medications, allergies, medical history, surgical history,   family history, and social history were reviewed in the chart as marked.    Full Medication List:    Current Outpatient Medications:     acetaminophen (TYLENOL 8 HOUR ORAL), Take by mouth., Disp: , Rfl:     alendronate (FOSAMAX) 70 MG tablet, TAKE 1 TABLET EVERY 7 DAYS, Disp: 12 tablet, Rfl: 1    amLODIPine (NORVASC) 2.5  MG tablet, Take 1 tablet (2.5 mg total) by mouth once daily., Disp: 90 tablet, Rfl: 3    aspirin (ECOTRIN) 81 MG EC tablet, Take 1 tablet (81 mg total) by mouth once daily. Resume 48 hours post-surgery, Disp: , Rfl: 0    b complex vitamins tablet, Take 1 tablet by mouth once daily., Disp: , Rfl:     baclofen (LIORESAL) 10 MG tablet, Take 1 tablet (10 mg total) by mouth 3 (three) times daily., Disp: 270 tablet, Rfl: 4    calcium-vitamin D 600 mg(1,500mg) -400 unit Tab, once daily. , Disp: , Rfl:     doxycycline (VIBRA-TABS) 100 MG tablet, Take 100 mg by mouth 2 (two) times daily. (Patient not taking: Reported on 6/18/2024), Disp: , Rfl:     estradioL (ESTRACE) 0.01 % (0.1 mg/gram) vaginal cream, 0.5 grams with applicator or dime-sized amount with finger in vagina nightly x 2 weeks, then twice a week thereafter, Disp: 42.5 g, Rfl: 11    FLUZONE HIGHDOSE QUAD 23-24  mcg/0.7 mL Syrg, , Disp: , Rfl:     gabapentin (NEURONTIN) 300 MG capsule, TAKE 1 CAPSULE THREE TIMES DAILY, Disp: 270 capsule, Rfl: 3    lipase-protease-amylase 6,000-19,000-30,000 units (CREON) 6,000-19,000 -30,000 unit capsule, Take 2 capsules by mouth 3 (three) times daily with meals., Disp: 300 capsule, Rfl: 3    methylPREDNISolone (MEDROL DOSEPACK) 4 mg tablet, use as directed, Disp: 21 each, Rfl: 0    oxyCODONE-acetaminophen (PERCOCET) 5-325 mg per tablet, Take 1 tablet by mouth every 8 (eight) hours as needed for Pain. (Patient not taking: Reported on 6/18/2024), Disp: 21 tablet, Rfl: 0    pravastatin (PRAVACHOL) 40 MG tablet, Take 1 tablet (40 mg total) by mouth every evening., Disp: 90 tablet, Rfl: 3    pyridoxine, vitamin B6, (B-6) 100 MG Tab, Take 100 mg by mouth once daily., Disp: , Rfl:     SPIKEVAX [id number],12Y UP,,PF, 50 mcg/0.5 mL injection, , Disp: , Rfl:     traZODone (DESYREL) 100 MG tablet, Take 2 tablets (200 mg total) by mouth nightly as needed for Insomnia., Disp: 180 tablet, Rfl: 3  No current facility-administered  medications for this visit.    Facility-Administered Medications Ordered in Other Visits:     gentamicin 80 mg/100ml NACL IVPB IVPB 80 mg, 80 mg, Intravenous, 30 Min Pre-Op, Wiliam Urias PA-C, 80 mg at 07/31/19 0747    vancomycin in dextrose 5 % 1 gram/250 mL IVPB 1,000 mg, 1,000 mg, Intravenous, 30 Min Pre-Op, Wiliam Urais PA-C, 1,000 mg at 07/31/19 0738     Allergies:  Pcn [penicillins]     Medical History:   has a past medical history of Arthritis, Blood transfusion, Chronic kidney disease, stage III (moderate) (8/2/2017), Coronary artery disease involving native coronary artery without angina pectoris (3/9/2021), Degenerative disc disease, Insomnia (11/15/2022), Insulinoma, Pancreatic disease, and Urinary incontinence (12/7/2015).    Surgical History:   has a past surgical history that includes Tonsillectomy; Appendectomy; Hysterectomy; Pancreas surgery; Adenoidectomy; Eye surgery; Refractive surgery (Bilateral); Cataract extraction, bilateral; Oophorectomy; Radiofrequency ablation of lumbar medial branch nerve at single level (Right, 8/28/2018); Radiofrequency ablation of lumbar medial branch nerve at single level (Left, 9/4/2018); Injection of steroid (Right, 12/20/2018); Minimally invasive fusion of spine (Right, 2/6/2019); Injection of steroid (Left, 6/19/2019); Spinal fusion (Left, 7/31/2019); and Injection of joint (Bilateral, 2/4/2020).    Family History:  family history includes Aneurysm in her sister; Breast cancer in her maternal grandmother, mother, and another family member; Glaucoma in her daughter; Heart attack in her son; Heart disease in her father.    Social History:   reports that she has never smoked. She has never been exposed to tobacco smoke. She has never used smokeless tobacco. She reports current alcohol use of about 1.0 - 2.0 standard drink of alcohol per week. She reports that she does not use drugs.    Physical Exam:  There were no vitals filed for this visit.      GENERAL:  Well appearing, in no acute distress, alert and oriented x3.  PSYCH:  Mood and affect appropriate.  SKIN: Skin color, texture, turgor normal, no rashes or lesions.  HEAD/FACE:  Normocephalic, atraumatic. Cranial nerves grossly intact.  NECK: Normal ROM. Supple.   CV: RRR with palpation of the radial artery.  PULM: No evidence of respiratory difficulty, symmetric chest rise.  GI:  Soft and non-distended.  MSK: Straight leg raising is negative to radicular pain. + pain to palpation over the facet joints of the lower lumbar spine. + pain with lumbar facet loading. No pain over the SI joints. Sacral Thrust is negative.  CARMITA test is negative. No pain over the GBT bilaterally.  Normal range of motion of the lumbar spine with pain reproduction in forward flexion and extension and lateral rotation.  Peripheral joint ROM is full and pain free without obvious instability or laxity in all four extremities. No obvious deformities, edema, or skin discoloration.  No atrophy or tone abnormalities are noted.   NEURO: Bilateral upper and lower extremity coordination and strength is symmetric.  No loss of sensation is noted.  MENTAL STATUS: A x O x 3, good concentration, speech is fluent and goal directed  MOTOR: 5/5 in all muscle groups  GAIT: Normal. Ambulates unassisted.    Imaging:  X-Ray Hips Bilateral 2 View Incl AP Pelvis  Order: [id number]  Status: Final result       Visible to patient: Yes (seen)       Next appt: 07/18/2024 at 10:40 AM in Family Medicine (Joey Mueller MD)       Dx: Pain of both hip joints    0 Result Notes  Details    Reading Physician Reading Date Result Priority   Alberto Blakely MD  [phone] 9/24/2019 Routine     Narrative & Impression  EXAMINATION:  XR HIPS BILATERAL 2 VIEW INCL AP PELVIS     CLINICAL HISTORY:  room 1;  Pain in right hip     TECHNIQUE:  AP view of the pelvis and frogleg lateral views of both hips were performed.     COMPARISON:  09/17/2019     FINDINGS:  Postoperative changes  noted with few screws fusing the sacroiliac joints.  Bones of the pelvis are intact.  Mild degenerative changes with narrowing of the left hip joint in particular can be seen no bony destruction.     Impression:     See above        Electronically signed by:Albreto Blakely MD  Date:                                            09/24/2019  Time:                                           16:25           Exam Ended: 09/24/19 16:09 CDT Last Resulted: 09/24/19 16:25 CDT           MRI Lumbar Spine Without Contrast  Order: [id number]  Status: Final result       Visible to patient: Yes (seen)       Next appt: 07/18/2024 at 10:40 AM in Family Medicine (Joey Mueller MD)       Dx: Chronic pain syndrome; Spondylosis wi...    0 Result Notes  Details    Reading Physician Reading Date Result Priority   Onel Adams MD  [phone] 10/2/2018      Narrative & Impression  EXAMINATION:  MRI LUMBAR SPINE WITHOUT CONTRAST     CLINICAL HISTORY:  chronic low back pain last mri was 2015; Spondylosis, unspecified     TECHNIQUE:  Multiplanar, multisequence MR images were acquired from the thoracolumbar junction to the sacrum without contrast.     COMPARISON:  08/18/2015     FINDINGS:  Alignment: Normal.     Vertebrae: Degenerative endplate changes are noted.  No evidence for fracture or marrow infiltrative process.     Discs: There is multilevel disc desiccation.  Moderate to severe disc height loss noted at L4-L5 and L5-S1.     Cord: Normal.  Conus terminates at L1.     Degenerative findings:     T12-L1: No spinal canal stenosis or neural foraminal narrowing.     L1-L2: Circumferential disc bulge noted.  No spinal canal stenosis or neural foraminal narrowing.     L2-L3: Circumferential disc bulge and mild facet arthropathy noted.  No spinal canal stenosis or neural foraminal narrowing.     L3-L4: Circumferential disc bulge with right paracentral disc protrusion result in mild effacement of the right lateral recess.     L4-L5:  Circumferential disc bulge and mild facet arthropathy result in mild right neural foraminal narrowing.     L5-S1: Circumferential disc bulge and moderate facet arthropathy result in mild bilateral neural foraminal narrowing.     Paraspinal muscles & soft tissues: Multiple stones layer within the gallbladder lumen.  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     IMPRESSION:      1. Mild degenerative changes of the lumbar spine as detailed above.  2. Cholelithiasis.        Electronically signed by:Onel Adams MD  Date:                                            10/02/2018  Time:                                           15:54           Exam Ended: 10/02/18 15:43 CDT Last Resulted: 10/02/18 15:54 C             Labs:  BMP  Lab Results   Component Value Date     04/18/2024    K 4.6 04/18/2024     04/18/2024    CO2 26 04/18/2024    BUN 26 (H) 04/18/2024    CREATININE 1.3 04/18/2024    CALCIUM 9.1 04/18/2024    ANIONGAP 6 (L) 04/18/2024    EGFRNORACEVR 39 (A) 04/18/2024     Lab Results   Component Value Date    ALT 12 04/18/2024    AST 25 04/18/2024    ALKPHOS 47 (L) 04/18/2024    BILITOT 0.5 04/18/2024     Lab Results   Component Value Date    WBC 5.44 12/05/2023    HGB 12.6 12/05/2023    HCT 37.6 12/05/2023    MCV 94 12/05/2023     12/05/2023           Assessment:  Problem List Items Addressed This Visit    None        06/18/2024 - Cheryl Ghosh is a 89 y.o. female who  has a past medical history of Arthritis, Blood transfusion, Chronic kidney disease, stage III (moderate) (8/2/2017), Coronary artery disease involving native coronary artery without angina pectoris (3/9/2021), Degenerative disc disease, Insomnia (11/15/2022), Insulinoma, Pancreatic disease, and Urinary incontinence (12/7/2015).  By history and examination this patient has acute low back pain with radiculopathy x 9 days.  MRI from 2018 was reviewed which showed some mild degenerative changes.  An updated MRI was ordered today.  We discussed the  underlying diagnoses and multiple treatment options including non-opioid medications, interventional procedures, physical therapy, home exercise, and core muscle enhancement.  Medrol Dosepak ordered.  Referral to physical therapy ordered.  If pain persists despite conservative measures, consider epidural steroid injection.  She may also benefit from repeating lumbar RFA, last performed in 2018.  The risks and benefits of each treatment option were discussed and all questions were answered.      7/3/2024-   89-year-old female with a history of chronic low back and bilateral leg pain are greater than left.  By history and exam she has low back pain with radiculopathy that has gotten better since her last clinic visit with Dr. Rocha.  Her lumbar MRI did show a diffuse disc bulge at L3-4 with moderate right facet joint hypertrophy, L4-5 mild right neural foraminal narrowing L5-S1 diffuse disc bulge extending into the bilateral inferior foramina.  No canal stenosis.  Overall her MRI was unremarkable she does have a history of bilateral sacroiliac joint fusion.  The patient and I discussed the risks and benefits of treatment options all questions were answered regarding her recent lumbar MRI see plan agreed upon below.         Treatment Plan:   Procedures:  If pain persists despite conservative measures, consider epidural steroid injection upon review of updated imaging.  In the future also consider repeating lumbar RFA.  PT/OT/HEP:   Continue and complete physical therapy.  I have stressed the importance of physical activity and a home exercise plan to help with pain and improve health.  Medications:   - Tylenol p.r.n.  Discussed a 1000 t.I.d. not to exceed 3000 mg in 24 hours.  - continue gabapentin   -  Reviewed and consistent with medication use as prescribed.  Imaging:    Reviewed and discussed in detail using spine model and images from chart.  Follow Up:  After PT  per patient she will make  appointment.    Pedrito Scott NP-C  [facility]    Disclaimer: This note was partly generated using dictation software which may occasionally result in transcription errors.

## 2024-07-03 ENCOUNTER — PATIENT MESSAGE (OUTPATIENT)
Dept: PAIN MEDICINE | Facility: CLINIC | Age: 89
End: 2024-07-03

## 2024-07-03 ENCOUNTER — OFFICE VISIT (OUTPATIENT)
Dept: PAIN MEDICINE | Facility: CLINIC | Age: 89
End: 2024-07-03
Payer: MEDICARE

## 2024-07-03 VITALS
HEART RATE: 55 BPM | HEIGHT: 64 IN | SYSTOLIC BLOOD PRESSURE: 145 MMHG | BODY MASS INDEX: 22.53 KG/M2 | DIASTOLIC BLOOD PRESSURE: 71 MMHG

## 2024-07-03 DIAGNOSIS — M51.36 DDD (DEGENERATIVE DISC DISEASE), LUMBAR: ICD-10-CM

## 2024-07-03 DIAGNOSIS — M54.16 LUMBAR RADICULOPATHY: ICD-10-CM

## 2024-07-03 DIAGNOSIS — M54.9 DORSALGIA: ICD-10-CM

## 2024-07-03 DIAGNOSIS — M47.816 LUMBAR SPONDYLOSIS: Primary | ICD-10-CM

## 2024-07-03 PROCEDURE — 99999 PR PBB SHADOW E&M-EST. PATIENT-LVL IV: CPT | Mod: PBBFAC,HCNC,, | Performed by: NURSE PRACTITIONER

## 2024-07-11 ENCOUNTER — OFFICE VISIT (OUTPATIENT)
Dept: OTOLARYNGOLOGY | Facility: CLINIC | Age: 89
End: 2024-07-11
Payer: MEDICARE

## 2024-07-11 VITALS
WEIGHT: 134.38 LBS | BODY MASS INDEX: 23.06 KG/M2 | HEART RATE: 59 BPM | DIASTOLIC BLOOD PRESSURE: 71 MMHG | SYSTOLIC BLOOD PRESSURE: 145 MMHG

## 2024-07-11 DIAGNOSIS — R49.0 HOARSENESS OF VOICE: Primary | Chronic | ICD-10-CM

## 2024-07-11 DIAGNOSIS — J37.0 CHRONIC LARYNGITIS: Chronic | ICD-10-CM

## 2024-07-11 DIAGNOSIS — K21.9 LARYNGOPHARYNGEAL REFLUX (LPR): Chronic | ICD-10-CM

## 2024-07-11 DIAGNOSIS — J31.0 CHRONIC RHINITIS: Chronic | ICD-10-CM

## 2024-07-11 PROCEDURE — 99999 PR PBB SHADOW E&M-EST. PATIENT-LVL V: CPT | Mod: PBBFAC,HCNC,, | Performed by: OTOLARYNGOLOGY

## 2024-07-11 PROCEDURE — 31575 DIAGNOSTIC LARYNGOSCOPY: CPT | Mod: HCNC,S$GLB,, | Performed by: OTOLARYNGOLOGY

## 2024-07-11 PROCEDURE — 1101F PT FALLS ASSESS-DOCD LE1/YR: CPT | Mod: HCNC,CPTII,S$GLB, | Performed by: OTOLARYNGOLOGY

## 2024-07-11 PROCEDURE — 1160F RVW MEDS BY RX/DR IN RCRD: CPT | Mod: HCNC,CPTII,S$GLB, | Performed by: OTOLARYNGOLOGY

## 2024-07-11 PROCEDURE — 1126F AMNT PAIN NOTED NONE PRSNT: CPT | Mod: HCNC,CPTII,S$GLB, | Performed by: OTOLARYNGOLOGY

## 2024-07-11 PROCEDURE — 1159F MED LIST DOCD IN RCRD: CPT | Mod: HCNC,CPTII,S$GLB, | Performed by: OTOLARYNGOLOGY

## 2024-07-11 PROCEDURE — 3288F FALL RISK ASSESSMENT DOCD: CPT | Mod: HCNC,CPTII,S$GLB, | Performed by: OTOLARYNGOLOGY

## 2024-07-11 PROCEDURE — 99214 OFFICE O/P EST MOD 30 MIN: CPT | Mod: 25,HCNC,S$GLB, | Performed by: OTOLARYNGOLOGY

## 2024-07-11 RX ORDER — FAMOTIDINE 40 MG/1
40 TABLET, FILM COATED ORAL NIGHTLY
Qty: 30 TABLET | Refills: 11 | Status: SHIPPED | OUTPATIENT
Start: 2024-07-11 | End: 2025-07-11

## 2024-07-11 NOTE — PATIENT INSTRUCTIONS
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     Instructions for Reflux:  You were provided with printed materials describing symptoms of reflux, as well as lifestyle modifications and diet changes to decrease reflux.    You were also prescribed antireflux medications.          VOCAL HYGIENE AND HYDRATION RECOMMENDATIONS     DO   Drink plenty of waterabout  oz a day! If your urine is pale, you should be well-hydrated.  Try some of these tips to increase hydration as well.  Eat wet snacks such as grapes, melon, cucumbers, apple slices   Reduce intake coffee and other caffeinated and carbonated beverages   Suck on pastille lozenges or sugar free candies (not mentholated lozenges)   Use a room or personal humidifier   Use sinus rinses/irrigations or nasal saline spray   Inhale steam for a few minutes before speaking or singing   Pay attention to how, and how much, you use your voice.   Give yourself vocal breaks throughout the day.   Breathe when talking, take frequent pauses for breath.   Use a microphone when speaking in front of a group of 15 or more to reduce voice strain.   Learn how to use your voice correctly to get loud with voice therapy techniques.  Stay healthy. Eat right and get plenty of rest. Follow a reflux precaution diet if indicated.   ____________________________________________________________________    REDUCE   Loud talking, yelling, cheering, or screaming. Voice injury can take place over a long time, or all at once.  If you need to talk loud or yell, be sure you are doing it properly.   Clearing your throat and forceful coughing. The vocal folds collect mucus when irritated or inflamed and this can cause the urge to clear your throat. The trauma of clearing the throat creates more inflammation and mucus. See tips above for keeping hydrated to assist with cutting back on throat clearing.  Instead of clearing your throat, try these behaviors until the sensation  passes:   Take a sip of water   Silently clear with a hard exhale making an hhh sound   Swallow hard   Drying agents such as anti-histamines or decongestant medications. You should always take medications as prescribed, but keep in mind these will dry you out, so increase your hydration!   ____________________________________________________________________    AVOID   Inhalant irritants such as smoke, strong fumes, and allergens. Take advantage of 1-800-QUIT-NOW if you are ready to stop smoking.   Unnecessary non-speech noises, such as grunting during exercise, loud noises, or excessively high- or low-pitched sounds. Save your voice for talking and singing!   Whispering. Whispering places your vocal folds in a tenser position than usual.            THROAT CLEARING     Why am I clearing my throat so often?   Irritation of the vocal folds and surrounding area can cause the urge to clear your throat. This irritation can be caused by acid reflux, allergies, or environmental factors, as well as from a cold or sore throat. Talk with your doctor about the treatment of possible underlying causes. However, throat clearing can turn into a cycle. You clear your throat after feeling an irritation, which causes more irritation, which causes you to continue clearing your throat, which causes more irritation.     What can I do about it?   Ask a friend or family member to help you keep track - you may not even realize how often you do it.   Replace the throat clearing with a different behavior.   Take a sip of water and then swallow. Repeat until the sensation subsides.  Swallow hard (even without water)   Use the silent throat clear method by making the h sound when you exhale  Breathe in deeply through your nose and exhale on shhh   Hum quietly   Keep yourself hydrated.   Drink plenty of water   Eat wet snacks (grapes, apples, melon, cucumber)   Use a humidifier at home or at work   Suck on hard candies, but avoid too  much sugar and avoid anything with mint, menthol, camphor, or eucaplyptus, as these will have a more irritating effect.

## 2024-07-11 NOTE — PROGRESS NOTES
"Chief Complaint   Patient presents with    Hoarse     Losing voice        HPI:  Patient is a 89 y.o. female who has previously seen me for MANUELITO, CI, hearing loss.      Since the last visit, the patient reports issues with hoarseness.  This has been ongoing for many years intermittently.  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   Her voice is progressively worsening over this time. There are pitch breaks or cracks. There is vocal fatigue. She denies dysphagia, odynophagia, throat pain, and otalgia.  There is no aspiration or choking.  There is no hemoptysis or hematemesis. She is breathing well.     She admits to throat clearing and cough. She denies heartburn and reflux.    She also wears hearing aids, but she feels that when she is straining to talk, she feels she's "in a barrel".    She does not have any significant nasal symptoms or history of allergy/sinus issues.      Active Ambulatory Problems     Diagnosis Date Noted    Facet arthritis of lumbar region 10/02/2012    Spondylosis without myelopathy 11/06/2013    Greater trochanteric bursitis of both hips 01/27/2014    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    OA (osteoarthritis) of knee 01/05/2015    Osteopenia 02/26/2015    Left lumbar radiculopathy 08/13/2015    Urinary incontinence 12/07/2015    Cystocele, midline 07/22/2016    Neuropathy 12/02/2016    SOB (shortness of breath) 05/26/2017    Aortic atherosclerosis 04/24/2014    Lumbar spondylosis 09/27/2012    Hyperlipidemia 08/02/2017    Stage 3a chronic kidney disease 08/02/2017    TIA (transient ischemic attack) 11/24/2017    Chronic diastolic heart failure 11/25/2017    HTN (hypertension), benign 04/11/2018    Sacroiliitis 07/24/2018    Chronic pain 08/28/2018    Secondary hyperparathyroidism of renal origin 10/12/2018    Chronic SI joint pain 12/20/2018    Sacroiliac joint dysfunction of right side 02/06/2019    Pain of left " sacroiliac joint 06/19/2019    Sacroiliac joint dysfunction of left side 07/31/2019    Bilateral impacted cerumen 10/17/2019    Decreased range of motion of both hips 01/27/2020    Other nonthrombocytopenic purpura 02/02/2021    Syncope and collapse 03/09/2021    Coronary artery disease involving native coronary artery without angina pectoris 03/09/2021    Vasovagal near syncope 03/24/2021    Pre-syncope 07/21/2021    Mixed hyperlipidemia 10/14/2022    Osteoporosis 11/15/2022    Insomnia 11/15/2022    Anemia of chronic renal failure, stage 3b 01/12/2024    Urinary incontinence, mixed 01/24/2024    Vaginal atrophy 01/24/2024    Fecal smearing 01/24/2024    Vaginal vault prolapse 01/24/2024    Rectocele, female 01/24/2024    Dorsalgia 07/01/2024    Lumbar radiculopathy 07/01/2024    Impaired mobility and activities of daily living 07/01/2024     Resolved Ambulatory Problems     Diagnosis Date Noted    Chest pain 05/01/2014    Cholecystolithiasis 05/01/2014    Chest wall pain 08/26/2014    BPPV (benign paroxysmal positional vertigo) 01/08/2015    Acute sinusitis 11/16/2015    Laryngitis 11/16/2015    Bronchitis 11/16/2015    BMI 22.0-22.9, adult 07/22/2016    Palpitations 05/26/2017    Hypertensive urgency 11/24/2017    History of UTI 01/24/2024     Past Medical History:   Diagnosis Date    Arthritis     Blood transfusion     Chronic kidney disease, stage III (moderate) 8/2/2017    Degenerative disc disease     Insulinoma     Pancreatic disease        Review of Systems  General: negative for chills, fever or weight loss  Psychological: negative for mood changes or depression  Ophthalmic: negative for blurry vision, photophobia or eye pain  ENT: see HPI  Respiratory: no cough, shortness of breath, or wheezing  Cardiovascular: no chest pain or dyspnea on exertion  Gastrointestinal: no abdominal pain, change in bowel habits, or black/ bloody stools  Musculoskeletal: negative for gait disturbance or muscular  weakness  Neurological: no syncope or seizures; no ataxia  Dermatological: negative for pruritis, rash and jaundice  Hematologic/lymphatic: no easy bruising, no new adenopathy    Physical Exam     Vitals:    07/11/24 1427   BP: (!) 145/71   Pulse: (!) 59         Constitutional:   She is oriented to person, place, and time. Vital signs are normal. She appears well-developed and well-nourished. She appears alert. She is cooperative. Normal speech.  Hoarse voice (gravelly sounding voice).      Head:  Normocephalic and atraumatic. Salivary glands normal.  Facial strength is normal.      Ears:    Right Ear: Tympanic membrane is not perforated and not erythematous. No middle ear effusion.   Left Ear: Tympanic membrane is not perforated and not erythematous.  No middle ear effusion.     Nose:  Mucosal edema present. No rhinorrhea, septal deviation or polyps. Turbinate hypertrophy (3+, boggy, congested mucosa).  Turbinates normal.  Right sinus exhibits no maxillary sinus tenderness and no frontal sinus tenderness. Left sinus exhibits no maxillary sinus tenderness and no frontal sinus tenderness.     Mouth/Throat  Oropharynx clear and moist without lesions or asymmetry, normal uvula midline, lips, teeth, and gums normal and oropharynx normal. No uvula swelling, oral lesions, trismus or mucous membrane lesions. No oropharyngeal exudate, posterior oropharyngeal edema or posterior oropharyngeal erythema. Tonsillar exudate.  Mirror exam not performed due to patient tolerance.  Mirror exam not performed due to patient tolerance.      Neck:  Neck normal without thyromegaly masses, asymmetry, normal tracheal structure, crepitus, and tenderness, thyroid normal, trachea normal, phonation normal, full range of motion with neck supple and no adenopathy. No JVD present. Carotid bruit is not present. No tracheal deviation present. No thyroid mass and no thyromegaly present.     She has no cervical adenopathy.   No tenderness over thyroid  cartilage     Cardiovascular:    Normal rate, regular rhythm and rate and rhythm, heart sounds, and pulses normal.              Pulmonary/Chest:   Effort and breath sounds normal.     Psychiatric:   She has a normal mood and affect. Her speech is normal and behavior is normal.     Neurological:   She is alert and oriented to person, place, and time. She has neurological normal, alert and oriented. No cranial nerve deficit.     Skin:   No abrasions, lacerations, lesions, or rashes.       Laryngoscopy    Date/Time: 7/11/2024 2:40 PM    Performed by: Estee Hernandez MD  Authorized by: Estee Hernandez MD    Consent Done?:  Yes (Verbal)  Anesthesia:     Local anesthetic:  Topical anesthetic    Patient tolerance:  Patient tolerated the procedure well with no immediate complications    Decongestion performed?: Yes    Laryngoscopy:     Areas examined:  Nasal cavities, vocal cords, nasopharynx, oropharynx, hypopharynx and larynx  Nose External:      No external nasal deformity  Nose Intranasal:      Mucosa no polyps     Mucosa ulcers not present     No mucosa lesions present     No septum gross deformity     Enlarged turbinates  Nasopharynx:      No mucosa lesions     Adenoids not present     Posterior choanae patent     Eustachian tube patent  Larynx/hypopharynx:      No epiglottis lesions     No epiglottis edema     No AE folds lesions     No vocal cord polyps     Equal and normal bilateral     No hypopharynx lesions     No piriform sinus pooling     No piriform sinus lesions     Post cricoid edema     No post cricoid erythema     Middle and superior meatus clear, sphenoethmoidal recess clear.  Atrophy bilateral vocal folds with incomplete glottic closure with maximal phonation.  Overactive false vocal folds with phonation.                    Assessment/Plan:      ICD-10-CM ICD-9-CM    1. Hoarseness of voice  R49.0 784.42 Ambulatory referral/consult to Speech Therapy      2. Laryngopharyngeal reflux (LPR)  K21.9 478.79  Ambulatory referral/consult to Speech Therapy      3. Chronic laryngitis  J37.0 476.0 Ambulatory referral/consult to Speech Therapy      4. Chronic rhinitis  J31.0 472.0           Speech therapy referral.    Gave instructions on vocal hygiene and reduction of throat clearing.    Start Pepcid q.h.s..    Gave handout on LPR and its treatments as well as lifestyle modifications.    Patient will follow-up with me in approximately 4 months or sooner if needed.    Estee Hernandez MD  [facility]

## 2024-07-12 ENCOUNTER — PATIENT MESSAGE (OUTPATIENT)
Dept: UROGYNECOLOGY | Facility: CLINIC | Age: 89
End: 2024-07-12
Payer: MEDICARE

## 2024-07-16 ENCOUNTER — TELEPHONE (OUTPATIENT)
Dept: SPEECH THERAPY | Facility: HOSPITAL | Age: 89
End: 2024-07-16
Payer: MEDICARE

## 2024-07-17 ENCOUNTER — TELEPHONE (OUTPATIENT)
Dept: SPEECH THERAPY | Facility: HOSPITAL | Age: 89
End: 2024-07-17
Payer: MEDICARE

## 2024-07-17 NOTE — TELEPHONE ENCOUNTER
Pt called in to schedule appt 7/23@10am.----- Message from Antionette Haines sent at 7/17/2024  9:10 AM CDT -----  Regarding: Appt  Contact: pt @ [phone]  Message is for Jody pt is calling to get appt. Pt has R49.0 (ICD-10-CM) - Hoarseness of voice  K21.9 (ICD-10-CM) - Laryngopharyngeal reflux (LPR)  J37.0 (ICD-10-CM) - Chronic laryngitis     Have a referral in system. Asking for a call back.

## 2024-07-18 ENCOUNTER — OFFICE VISIT (OUTPATIENT)
Dept: FAMILY MEDICINE | Facility: CLINIC | Age: 89
End: 2024-07-18
Attending: FAMILY MEDICINE
Payer: MEDICARE

## 2024-07-18 VITALS
BODY MASS INDEX: 22.58 KG/M2 | HEIGHT: 64 IN | TEMPERATURE: 98 F | OXYGEN SATURATION: 97 % | WEIGHT: 132.25 LBS | SYSTOLIC BLOOD PRESSURE: 129 MMHG | DIASTOLIC BLOOD PRESSURE: 79 MMHG | HEART RATE: 62 BPM

## 2024-07-18 DIAGNOSIS — M62.838 MUSCLE SPASM: ICD-10-CM

## 2024-07-18 DIAGNOSIS — E78.2 MIXED HYPERLIPIDEMIA: Primary | ICD-10-CM

## 2024-07-18 DIAGNOSIS — I10 ESSENTIAL HYPERTENSION: ICD-10-CM

## 2024-07-18 DIAGNOSIS — M47.819 SPONDYLOSIS WITHOUT MYELOPATHY: ICD-10-CM

## 2024-07-18 DIAGNOSIS — N18.31 STAGE 3A CHRONIC KIDNEY DISEASE: ICD-10-CM

## 2024-07-18 DIAGNOSIS — I10 HTN (HYPERTENSION), BENIGN: ICD-10-CM

## 2024-07-18 DIAGNOSIS — I70.0 AORTIC ATHEROSCLEROSIS: ICD-10-CM

## 2024-07-18 DIAGNOSIS — G47.00 INSOMNIA, UNSPECIFIED TYPE: ICD-10-CM

## 2024-07-18 DIAGNOSIS — D63.1 ANEMIA OF CHRONIC RENAL FAILURE, STAGE 3B: ICD-10-CM

## 2024-07-18 DIAGNOSIS — N64.4 BREAST PAIN, RIGHT: ICD-10-CM

## 2024-07-18 DIAGNOSIS — E78.5 HYPERLIPIDEMIA, UNSPECIFIED HYPERLIPIDEMIA TYPE: ICD-10-CM

## 2024-07-18 DIAGNOSIS — I25.10 CORONARY ARTERY DISEASE INVOLVING NATIVE CORONARY ARTERY OF NATIVE HEART WITHOUT ANGINA PECTORIS: ICD-10-CM

## 2024-07-18 DIAGNOSIS — N25.81 SECONDARY HYPERPARATHYROIDISM OF RENAL ORIGIN: ICD-10-CM

## 2024-07-18 DIAGNOSIS — G57.93 NEUROPATHIC PAIN OF BOTH LEGS: ICD-10-CM

## 2024-07-18 DIAGNOSIS — N18.32 ANEMIA OF CHRONIC RENAL FAILURE, STAGE 3B: ICD-10-CM

## 2024-07-18 DIAGNOSIS — M46.1 SACROILIITIS: ICD-10-CM

## 2024-07-18 DIAGNOSIS — M81.0 OSTEOPOROSIS, UNSPECIFIED OSTEOPOROSIS TYPE, UNSPECIFIED PATHOLOGICAL FRACTURE PRESENCE: ICD-10-CM

## 2024-07-18 PROCEDURE — 1160F RVW MEDS BY RX/DR IN RCRD: CPT | Mod: HCNC,CPTII,S$GLB, | Performed by: FAMILY MEDICINE

## 2024-07-18 PROCEDURE — 99215 OFFICE O/P EST HI 40 MIN: CPT | Mod: HCNC,S$GLB,, | Performed by: FAMILY MEDICINE

## 2024-07-18 PROCEDURE — 99999 PR PBB SHADOW E&M-EST. PATIENT-LVL III: CPT | Mod: PBBFAC,HCNC,, | Performed by: FAMILY MEDICINE

## 2024-07-18 PROCEDURE — 1159F MED LIST DOCD IN RCRD: CPT | Mod: HCNC,CPTII,S$GLB, | Performed by: FAMILY MEDICINE

## 2024-07-18 RX ORDER — TRAZODONE HYDROCHLORIDE 100 MG/1
200 TABLET ORAL NIGHTLY PRN
Qty: 180 TABLET | Refills: 3 | Status: SHIPPED | OUTPATIENT
Start: 2024-07-18 | End: 2025-07-18

## 2024-07-18 RX ORDER — PRAVASTATIN SODIUM 40 MG/1
40 TABLET ORAL NIGHTLY
Qty: 90 TABLET | Refills: 3 | Status: SHIPPED | OUTPATIENT
Start: 2024-07-18

## 2024-07-18 RX ORDER — AMLODIPINE BESYLATE 2.5 MG/1
2.5 TABLET ORAL DAILY
Qty: 90 TABLET | Refills: 3 | Status: SHIPPED | OUTPATIENT
Start: 2024-07-18

## 2024-07-18 RX ORDER — RALOXIFENE HYDROCHLORIDE 60 MG/1
60 TABLET, FILM COATED ORAL DAILY
Qty: 30 TABLET | Refills: 11 | Status: SHIPPED | OUTPATIENT
Start: 2024-07-18 | End: 2025-07-18

## 2024-07-18 RX ORDER — BACLOFEN 10 MG/1
10 TABLET ORAL 3 TIMES DAILY
Qty: 270 TABLET | Refills: 4 | Status: SHIPPED | OUTPATIENT
Start: 2024-07-18

## 2024-07-18 RX ORDER — GABAPENTIN 300 MG/1
CAPSULE ORAL
Qty: 270 CAPSULE | Refills: 3 | Status: SHIPPED | OUTPATIENT
Start: 2024-07-18

## 2024-07-18 RX ORDER — OXYCODONE AND ACETAMINOPHEN 5; 325 MG/1; MG/1
1 TABLET ORAL EVERY 8 HOURS PRN
Qty: 21 TABLET | Refills: 0 | Status: SHIPPED | OUTPATIENT
Start: 2024-07-18

## 2024-07-18 NOTE — PROGRESS NOTES
Subjective:       Patient ID: Cheryl Ghosh is a 89 y.o. female.    Chief Complaint: Follow-up    88 yr old pleasant white female with HTN, CKD III, OA, osteopenia hip, hyperlipidemia, chronic back pain, BPPV, presents today for her 3 month check. No new complaints today      HTN - controlled - low salt diet - having issues with medications lisinopril n losartan      CKD III - stable - improving       HLD - slightly worsening since stopped statin x 30 days           - she was asked to start statin but due to her fall and issues with her ribs and back, she was never able to start it.    Back pain - she follows pain clinic for injections - c/o neuropathic pain in legs - was on neurontin in the past and she started taking it and working    Osteoporosis - started back on fosamax - doing well - next dexa in a year    HISTORY as below - reviewed    Health maintenance  -labs UTD  -mammo UTD  -colon screen UTD  -vaccines UTD    Follow-up  Associated symptoms include arthralgias, chest pain, myalgias, vomiting and weakness. Pertinent negatives include no congestion, diaphoresis, headaches, joint swelling, nausea, neck pain or sore throat.   Back Pain  This is a chronic problem. The current episode started more than 1 year ago. The problem occurs intermittently. The problem has been gradually improving since onset. The pain is present in the lumbar spine. The quality of the pain is described as aching. The pain does not radiate. The pain is at a severity of 5/10. The pain is moderate. The symptoms are aggravated by position and sitting. Associated symptoms include chest pain and weakness. Pertinent negatives include no dysuria, headaches or leg pain. She has tried analgesics and ice for the symptoms. The treatment provided mild relief.   Hypertension  This is a chronic problem. The current episode started more than 1 year ago. The problem has been waxing and waning since onset. The problem is resistant. Associated symptoms  include blurred vision, chest pain and malaise/fatigue. Pertinent negatives include no anxiety, headaches, neck pain, orthopnea, palpitations, peripheral edema, PND, shortness of breath or sweats. Agents associated with hypertension include NSAIDs. There are no known risk factors for coronary artery disease. Past treatments include nothing. The current treatment provides significant improvement. Compliance problems include exercise.  There is no history of chronic renal disease.   Hyperlipidemia  This is a chronic problem. The current episode started more than 1 year ago. The problem is uncontrolled. Recent lipid tests were reviewed and are high. She has no history of chronic renal disease, diabetes, hypothyroidism, liver disease, obesity or nephrotic syndrome. There are no known factors aggravating her hyperlipidemia. Associated symptoms include chest pain and myalgias. Pertinent negatives include no focal sensory loss, leg pain or shortness of breath. Current antihyperlipidemic treatment includes diet change. The current treatment provides mild improvement of lipids. There are no compliance problems.  Risk factors for coronary artery disease include dyslipidemia.     Review of Systems   Constitutional:  Positive for activity change and malaise/fatigue. Negative for diaphoresis and unexpected weight change.   HENT:  Positive for hearing loss. Negative for nasal congestion, ear discharge, rhinorrhea, sore throat, trouble swallowing and voice change.    Eyes:  Positive for blurred vision. Negative for pain, discharge and visual disturbance.   Respiratory: Negative.  Negative for chest tightness, shortness of breath and wheezing.    Cardiovascular:  Positive for chest pain. Negative for palpitations, orthopnea and PND.   Gastrointestinal:  Positive for vomiting. Negative for abdominal distention, anal bleeding, blood in stool, constipation, diarrhea and nausea.   Endocrine: Negative.  Negative for cold intolerance,  polydipsia and polyuria.   Genitourinary: Negative.  Negative for decreased urine volume, difficulty urinating, dysuria, frequency, hematuria, menstrual problem and vaginal pain.   Musculoskeletal:  Positive for arthralgias, back pain and myalgias. Negative for gait problem, joint swelling, leg pain and neck pain.   Integumentary:  Negative for color change, pallor and wound. Negative.   Allergic/Immunologic: Negative.  Negative for environmental allergies and immunocompromised state.   Neurological:  Positive for weakness. Negative for dizziness, tremors, seizures, speech difficulty and headaches.   Hematological: Negative.  Negative for adenopathy. Does not bruise/bleed easily.   Psychiatric/Behavioral: Negative.  Negative for agitation, confusion, decreased concentration, dysphoric mood, hallucinations, self-injury and suicidal ideas. The patient is not nervous/anxious.          PMH/PSH/FH/SH/MED/ALLERGY reviewed    Past Medical History:   Diagnosis Date    Arthritis     lumbar    Blood transfusion     Chronic kidney disease, stage III (moderate) 8/2/2017    Coronary artery disease involving native coronary artery without angina pectoris 3/9/2021    Degenerative disc disease     lumbar    Insomnia 11/15/2022    Insulinoma     Pancreatic disease     Urinary incontinence 12/7/2015       Past Surgical History:   Procedure Laterality Date    ADENOIDECTOMY      APPENDECTOMY      CATARACT EXTRACTION, BILATERAL      EYE SURGERY      HYSTERECTOMY      SAMM/BSO    INJECTION OF JOINT Bilateral 2/4/2020    Procedure: Injection, Joint--Bilateral GTB;  Surgeon: Arina Carrera Jr., MD;  Location: [facility]IN [facility]GT;  Service: Pain Management;  Laterality: Bilateral;  Oral sedation    INJECTION OF STEROID Right 12/20/2018    Procedure: INJECTION, STEROID-- Right SI Joint Block and  Steroid Injection;  Surgeon: Xavier Dasilva MD;  Location: [facility] OR;  Service: Neurosurgery;  Laterality: Right;  INJECTION, STEROID-- Right SI Joint  Block and  Steroid Injection  SURGERY TIME:1 HR  LOS: 0 DAYS  RADIOLOGY: C- arm  BED: Regular Bed  with Pillows  POSITION: Prone      INJECTION OF STEROID Left 6/19/2019    Procedure: INJECTION, STEROID Procedure:Left sacroiliac joint block / steroid injection Surgery Time: 30mins LOS: Anesthesia: MAC Radiology: C-arm Bed: Regular Bed Position: Prone;  Surgeon: Xavier Dasilva MD;  Location: [facility] OR;  Service: Neurosurgery;  Laterality: Left;    MINIMALLY INVASIVE FUSION OF SPINE Right 2/6/2019    Procedure: FUSION, SPINE, MINIMALLY INVASIVE Right Sacroiliac Joint Fusion -  I Fuse;  Surgeon: Xavier Dasilva MD;  Location: [facility] OR;  Service: Neurosurgery;  Laterality: Right;  Procedure: Right Sacroiliac Joint Fusion -  I Fuse  Surgery Time: 1.5 Hr  LOS: 0  Anesthesia: General  Radiology: C-Arm  Bed: [place] 4 Poster  Position: Prone  Equipment: I Randy Kincaid [phone] (notified 1/24 EF)    OOPHORECTOMY      PANCREAS SURGERY      Insulanoma    RADIOFREQUENCY ABLATION OF LUMBAR MEDIAL BRANCH NERVE AT SINGLE LEVEL Right 8/28/2018    Procedure: RADIOFREQUENCY ABLATION, NERVE, MEDIAL BRANCH, LUMBAR, 1 LEVEL- Right L3,4,5 IV SEDATION;  Surgeon: Eddie Vega MD;  Location: [facility] PAIN [facility]GT;  Service: Pain Management;  Laterality: Right;    RADIOFREQUENCY ABLATION OF LUMBAR MEDIAL BRANCH NERVE AT SINGLE LEVEL Left 9/4/2018    Procedure: RADIOFREQUENCY ABLATION, NERVE, MEDIAL BRANCH, LUMBAR, 1 LEVEL - Left L3,4,5 IV SEADTION;  Surgeon: Eddie Vega MD;  Location: [facility] PAIN [facility]GT;  Service: Pain Management;  Laterality: Left;  Patient takes ASA     REFRACTIVE SURGERY Bilateral     SPINAL FUSION Left 7/31/2019    Procedure: FUSION, SPINE SI Joint Fusion;  Surgeon: Xavier Dasilva MD;  Location: [facility] OR;  Service: Neurosurgery;  Laterality: Left;  Procedure: Left SI joint fusion  Surgery Time: 1.5hr  LOS:0  Anesthesia: General   Radiology: C-arm   Bed: [place] 4 Poster  Position: Prone  Equipment: Codi Clotilde  [phone] (Codi notified)    TONSILLECTOMY         Family History   Problem Relation Name Age of Onset    Breast cancer Mother      Heart disease Father      Aneurysm Sister      Glaucoma Daughter      Heart attack Son      Breast cancer Maternal Grandmother      Breast cancer Other niece        Social History     Socioeconomic History    Marital status:    Tobacco Use    Smoking status: Never     Passive exposure: Never    Smokeless tobacco: Never   Substance and Sexual Activity    Alcohol use: Yes     Alcohol/week: 1.0 - 2.0 standard drink of alcohol     Types: 1 - 2 Glasses of wine per week    Drug use: No    Sexual activity: Yes     Partners: Male     Social Determinants of Health     Financial Resource Strain: Low Risk  (1/23/2024)    Overall Financial Resource Strain (CARDIA)     Difficulty of Paying Living Expenses: Not hard at all   Food Insecurity: No Food Insecurity (1/23/2024)    Hunger Vital Sign     Worried About Running Out of Food in the Last Year: Never true     Ran Out of Food in the Last Year: Never true   Transportation Needs: No Transportation Needs (1/23/2024)    PRAPARE - Transportation     Lack of Transportation (Medical): No     Lack of Transportation (Non-Medical): No   Physical Activity: Insufficiently Active (1/23/2024)    Exercise Vital Sign     Days of Exercise per Week: 3 days     Minutes of Exercise per Session: 20 min   Stress: No Stress Concern Present (1/23/2024)    [employer] - Occupational Stress Questionnaire     Feeling of Stress : Only a little   Housing Stability: Low Risk  (1/23/2024)    Housing Stability Vital Sign     Unable to Pay for Housing in the Last Year: No     Number of Places Lived in the Last Year: 1     Unstable Housing in the Last Year: No       Current Outpatient Medications   Medication Sig Dispense Refill    amLODIPine (NORVASC) 2.5 MG tablet Take 1 tablet (2.5 mg total) by mouth once daily. 90 tablet 3    aspirin  (ECOTRIN) 81 MG EC tablet Take 1 tablet (81 mg total) by mouth once daily. Resume 48 hours post-surgery  0    estradioL (ESTRACE) 0.01 % (0.1 mg/gram) vaginal cream 0.5 grams with applicator or dime-sized amount with finger in vagina nightly x 2 weeks, then twice a week thereafter 42.5 g 11    famotidine (PEPCID) 40 MG tablet Take 1 tablet (40 mg total) by mouth nightly. for 365 doses 30 tablet 11    gabapentin (NEURONTIN) 300 MG capsule TAKE 1 CAPSULE THREE TIMES DAILY 270 capsule 3    lipase-protease-amylase 6,000-19,000-30,000 units (CREON) 6,000-19,000 -30,000 unit capsule Take 2 capsules by mouth 3 (three) times daily with meals. 300 capsule 3    pravastatin (PRAVACHOL) 40 MG tablet Take 1 tablet (40 mg total) by mouth every evening. 90 tablet 3    traZODone (DESYREL) 100 MG tablet Take 2 tablets (200 mg total) by mouth nightly as needed for Insomnia. 180 tablet 3    acetaminophen (TYLENOL 8 HOUR ORAL) Take by mouth.      b complex vitamins tablet Take 1 tablet by mouth once daily.      baclofen (LIORESAL) 10 MG tablet Take 1 tablet (10 mg total) by mouth 3 (three) times daily. 270 tablet 4    calcium-vitamin D 600 mg(1,500mg) -400 unit Tab once daily.       FLUZONE HIGHDOSE QUAD 23-24  mcg/0.7 mL Syrg       oxyCODONE-acetaminophen (PERCOCET) 5-325 mg per tablet Take 1 tablet by mouth every 8 (eight) hours as needed for Pain. 21 tablet 0    pyridoxine, vitamin B6, (B-6) 100 MG Tab Take 100 mg by mouth once daily.      raloxifene (EVISTA) 60 mg tablet Take 1 tablet (60 mg total) by mouth once daily. 30 tablet 11    SPIKEVAX [id number],12Y UP,,PF, 50 mcg/0.5 mL injection        No current facility-administered medications for this visit.     Facility-Administered Medications Ordered in Other Visits   Medication Dose Route Frequency Provider Last Rate Last Admin    gentamicin 80 mg/100ml NACL IVPB IVPB 80 mg  80 mg Intravenous 30 Min Pre-Op Wiliam Urais PA-C   80 mg at 07/31/19 0747    vancomycin in  dextrose 5 % 1 gram/250 mL IVPB 1,000 mg  1,000 mg Intravenous 30 Min Pre-Op Wiliam Urias PA-C   1,000 mg at 07/31/19 0738       Review of patient's allergies indicates:   Allergen Reactions    Pcn [penicillins] Hives and Nausea And Vomiting       Objective:       Vitals:    07/18/24 1047   BP: 129/79   Pulse: 62   Temp: 98 °F (36.7 °C)       Physical Exam  Constitutional:       General: She is not in acute distress.     Appearance: She is well-developed. She is not diaphoretic.   HENT:      Head: Normocephalic and atraumatic.      Right Ear: External ear normal.      Left Ear: External ear normal.      Nose: Nose normal.      Mouth/Throat:      Pharynx: No oropharyngeal exudate.   Eyes:      General: No scleral icterus.        Right eye: No discharge.         Left eye: No discharge.      Conjunctiva/sclera: Conjunctivae normal.      Pupils: Pupils are equal, round, and reactive to light.   Neck:      Thyroid: No thyromegaly.      Vascular: No JVD.      Trachea: No tracheal deviation.   Cardiovascular:      Rate and Rhythm: Normal rate and regular rhythm.      Heart sounds: Normal heart sounds. No murmur heard.     No friction rub. No gallop.   Pulmonary:      Effort: Pulmonary effort is normal.      Breath sounds: Normal breath sounds. No stridor. No wheezing or rales.   Chest:      Chest wall: No tenderness.   Abdominal:      General: Bowel sounds are normal. There is no distension.      Palpations: Abdomen is soft. There is no mass.      Tenderness: There is no abdominal tenderness. There is no guarding or rebound.      Hernia: No hernia is present.   Musculoskeletal:         General: No tenderness. Normal range of motion.      Cervical back: Normal range of motion and neck supple.   Lymphadenopathy:      Cervical: No cervical adenopathy.   Skin:     General: Skin is warm and dry.      Coloration: Skin is not pale.      Findings: No erythema or rash.   Neurological:      Mental Status: She is alert and  oriented to person, place, and time.      Cranial Nerves: No cranial nerve deficit.      Motor: No abnormal muscle tone.      Coordination: Coordination normal.      Deep Tendon Reflexes: Reflexes are normal and symmetric. Reflexes normal.   Psychiatric:         Behavior: Behavior normal.         Thought Content: Thought content normal.         Judgment: Judgment normal.         Assessment:       Problem List Items Addressed This Visit       Stage 3a chronic kidney disease    Relevant Orders    CBC Auto Differential    Comprehensive Metabolic Panel    Lipid Panel    PTH, intact    Urinalysis    Microalbumin/Creatinine Ratio, Urine    Spondylosis without myelopathy    Relevant Medications    oxyCODONE-acetaminophen (PERCOCET) 5-325 mg per tablet    Other Relevant Orders    CBC Auto Differential    Comprehensive Metabolic Panel    Lipid Panel    PTH, intact    Urinalysis    Microalbumin/Creatinine Ratio, Urine    Secondary hyperparathyroidism of renal origin    Relevant Orders    CBC Auto Differential    Comprehensive Metabolic Panel    Lipid Panel    PTH, intact    Urinalysis    Microalbumin/Creatinine Ratio, Urine    Sacroiliitis    Relevant Medications    baclofen (LIORESAL) 10 MG tablet    oxyCODONE-acetaminophen (PERCOCET) 5-325 mg per tablet    Other Relevant Orders    CBC Auto Differential    Comprehensive Metabolic Panel    Lipid Panel    PTH, intact    Urinalysis    Microalbumin/Creatinine Ratio, Urine    Osteoporosis    Relevant Medications    raloxifene (EVISTA) 60 mg tablet    Other Relevant Orders    CBC Auto Differential    Comprehensive Metabolic Panel    Lipid Panel    PTH, intact    Urinalysis    Microalbumin/Creatinine Ratio, Urine    Mixed hyperlipidemia - Primary    Relevant Medications    pravastatin (PRAVACHOL) 40 MG tablet    Other Relevant Orders    CBC Auto Differential    Comprehensive Metabolic Panel    Lipid Panel    PTH, intact    Urinalysis    Microalbumin/Creatinine Ratio, Urine     Insomnia    Relevant Medications    traZODone (DESYREL) 100 MG tablet    Other Relevant Orders    CBC Auto Differential    Comprehensive Metabolic Panel    Lipid Panel    PTH, intact    Urinalysis    Microalbumin/Creatinine Ratio, Urine    Hyperlipidemia    Relevant Medications    pravastatin (PRAVACHOL) 40 MG tablet    Other Relevant Orders    CBC Auto Differential    Comprehensive Metabolic Panel    Lipid Panel    PTH, intact    Urinalysis    Microalbumin/Creatinine Ratio, Urine    CBC Auto Differential    Comprehensive Metabolic Panel    Lipid Panel    PTH, intact    Urinalysis    Microalbumin/Creatinine Ratio, Urine    HTN (hypertension), benign    Relevant Orders    Hypertension Digital Medicine (HDMP) Enrollment Order (Completed)    CBC Auto Differential    Comprehensive Metabolic Panel    Lipid Panel    PTH, intact    Urinalysis    Microalbumin/Creatinine Ratio, Urine    Coronary artery disease involving native coronary artery without angina pectoris    Relevant Orders    CBC Auto Differential    Comprehensive Metabolic Panel    Lipid Panel    PTH, intact    Urinalysis    Microalbumin/Creatinine Ratio, Urine    Aortic atherosclerosis    Relevant Medications    pravastatin (PRAVACHOL) 40 MG tablet    Other Relevant Orders    CBC Auto Differential    Comprehensive Metabolic Panel    Lipid Panel    PTH, intact    Urinalysis    Microalbumin/Creatinine Ratio, Urine    Anemia of chronic renal failure, stage 3b    Relevant Orders    CBC Auto Differential    Comprehensive Metabolic Panel    Lipid Panel    PTH, intact    Urinalysis    Microalbumin/Creatinine Ratio, Urine     Other Visit Diagnoses       Essential hypertension        Relevant Medications    amLODIPine (NORVASC) 2.5 MG tablet    Other Relevant Orders    CBC Auto Differential    Comprehensive Metabolic Panel    Lipid Panel    PTH, intact    Urinalysis    Microalbumin/Creatinine Ratio, Urine    Muscle spasm        Relevant Medications    baclofen  (LIORESAL) 10 MG tablet    Other Relevant Orders    CBC Auto Differential    Comprehensive Metabolic Panel    Lipid Panel    PTH, intact    Urinalysis    Microalbumin/Creatinine Ratio, Urine    Neuropathic pain of both legs        Relevant Medications    gabapentin (NEURONTIN) 300 MG capsule    Other Relevant Orders    CBC Auto Differential    Comprehensive Metabolic Panel    Lipid Panel    PTH, intact    Urinalysis    Microalbumin/Creatinine Ratio, Urine    Breast pain, right        Relevant Medications    oxyCODONE-acetaminophen (PERCOCET) 5-325 mg per tablet    Other Relevant Orders    CBC Auto Differential    Comprehensive Metabolic Panel    Lipid Panel    PTH, intact    Urinalysis    Microalbumin/Creatinine Ratio, Urine            Plan:           Cheryl was seen today for follow-up.    Diagnoses and all orders for this visit:    Mixed hyperlipidemia  -     pravastatin (PRAVACHOL) 40 MG tablet; Take 1 tablet (40 mg total) by mouth every evening.  -     CBC Auto Differential; Future  -     Comprehensive Metabolic Panel; Future  -     Lipid Panel; Future  -     PTH, intact; Future  -     Urinalysis; Future  -     Microalbumin/Creatinine Ratio, Urine; Future    Secondary hyperparathyroidism of renal origin  -     CBC Auto Differential; Future  -     Comprehensive Metabolic Panel; Future  -     Lipid Panel; Future  -     PTH, intact; Future  -     Urinalysis; Future  -     Microalbumin/Creatinine Ratio, Urine; Future    Insomnia, unspecified type  -     traZODone (DESYREL) 100 MG tablet; Take 2 tablets (200 mg total) by mouth nightly as needed for Insomnia.  -     CBC Auto Differential; Future  -     Comprehensive Metabolic Panel; Future  -     Lipid Panel; Future  -     PTH, intact; Future  -     Urinalysis; Future  -     Microalbumin/Creatinine Ratio, Urine; Future    Stage 3a chronic kidney disease  -     CBC Auto Differential; Future  -     Comprehensive Metabolic Panel; Future  -     Lipid Panel; Future  -      PTH, intact; Future  -     Urinalysis; Future  -     Microalbumin/Creatinine Ratio, Urine; Future    Osteoporosis, unspecified osteoporosis type, unspecified pathological fracture presence  -     CBC Auto Differential; Future  -     Comprehensive Metabolic Panel; Future  -     Lipid Panel; Future  -     PTH, intact; Future  -     Urinalysis; Future  -     Microalbumin/Creatinine Ratio, Urine; Future  -     raloxifene (EVISTA) 60 mg tablet; Take 1 tablet (60 mg total) by mouth once daily.    Aortic atherosclerosis  -     pravastatin (PRAVACHOL) 40 MG tablet; Take 1 tablet (40 mg total) by mouth every evening.  -     CBC Auto Differential; Future  -     Comprehensive Metabolic Panel; Future  -     Lipid Panel; Future  -     PTH, intact; Future  -     Urinalysis; Future  -     Microalbumin/Creatinine Ratio, Urine; Future    Anemia of chronic renal failure, stage 3b  -     CBC Auto Differential; Future  -     Comprehensive Metabolic Panel; Future  -     Lipid Panel; Future  -     PTH, intact; Future  -     Urinalysis; Future  -     Microalbumin/Creatinine Ratio, Urine; Future    HTN (hypertension), benign  -     Hypertension Digital Medicine (HDMP) Enrollment Order  -     CBC Auto Differential; Future  -     Comprehensive Metabolic Panel; Future  -     Lipid Panel; Future  -     PTH, intact; Future  -     Urinalysis; Future  -     Microalbumin/Creatinine Ratio, Urine; Future    Coronary artery disease involving native coronary artery of native heart without angina pectoris  -     CBC Auto Differential; Future  -     Comprehensive Metabolic Panel; Future  -     Lipid Panel; Future  -     PTH, intact; Future  -     Urinalysis; Future  -     Microalbumin/Creatinine Ratio, Urine; Future    Hyperlipidemia, unspecified hyperlipidemia type  -     pravastatin (PRAVACHOL) 40 MG tablet; Take 1 tablet (40 mg total) by mouth every evening.  -     CBC Auto Differential; Future  -     Comprehensive Metabolic Panel; Future  -      Lipid Panel; Future  -     PTH, intact; Future  -     Urinalysis; Future  -     Microalbumin/Creatinine Ratio, Urine; Future    Essential hypertension  -     amLODIPine (NORVASC) 2.5 MG tablet; Take 1 tablet (2.5 mg total) by mouth once daily.  -     CBC Auto Differential; Future  -     Comprehensive Metabolic Panel; Future  -     Lipid Panel; Future  -     PTH, intact; Future  -     Urinalysis; Future  -     Microalbumin/Creatinine Ratio, Urine; Future    Sacroiliitis  -     baclofen (LIORESAL) 10 MG tablet; Take 1 tablet (10 mg total) by mouth 3 (three) times daily.  -     oxyCODONE-acetaminophen (PERCOCET) 5-325 mg per tablet; Take 1 tablet by mouth every 8 (eight) hours as needed for Pain.  -     CBC Auto Differential; Future  -     Comprehensive Metabolic Panel; Future  -     Lipid Panel; Future  -     PTH, intact; Future  -     Urinalysis; Future  -     Microalbumin/Creatinine Ratio, Urine; Future    Muscle spasm  -     baclofen (LIORESAL) 10 MG tablet; Take 1 tablet (10 mg total) by mouth 3 (three) times daily.  -     CBC Auto Differential; Future  -     Comprehensive Metabolic Panel; Future  -     Lipid Panel; Future  -     PTH, intact; Future  -     Urinalysis; Future  -     Microalbumin/Creatinine Ratio, Urine; Future    Neuropathic pain of both legs  -     gabapentin (NEURONTIN) 300 MG capsule; TAKE 1 CAPSULE THREE TIMES DAILY  -     CBC Auto Differential; Future  -     Comprehensive Metabolic Panel; Future  -     Lipid Panel; Future  -     PTH, intact; Future  -     Urinalysis; Future  -     Microalbumin/Creatinine Ratio, Urine; Future    Breast pain, right  -     oxyCODONE-acetaminophen (PERCOCET) 5-325 mg per tablet; Take 1 tablet by mouth every 8 (eight) hours as needed for Pain.  -     CBC Auto Differential; Future  -     Comprehensive Metabolic Panel; Future  -     Lipid Panel; Future  -     PTH, intact; Future  -     Urinalysis; Future  -     Microalbumin/Creatinine Ratio, Urine;  Future    Spondylosis without myelopathy  -     oxyCODONE-acetaminophen (PERCOCET) 5-325 mg per tablet; Take 1 tablet by mouth every 8 (eight) hours as needed for Pain.  -     CBC Auto Differential; Future  -     Comprehensive Metabolic Panel; Future  -     Lipid Panel; Future  -     PTH, intact; Future  -     Urinalysis; Future  -     Microalbumin/Creatinine Ratio, Urine; Future      HTN  -controlled    TIA  -follow neurology - DC plavix per neuro recommendations and just stay on ASA      Insomnia  -try benadryl only if needed    HLD  -diet controlled and statin    Urine incontinence  -lifestyle changes - intolerant to meds    Neuropathic pain  -continue neurontin    OA knee B/L  -mobic prn    CKD III  -lab and urine analysis  -adequate hydration  -follow nephrology    Muscle spasm  -baclofen  and percocet prn    Osteoporosis  -trial of Evista.Side effects of medications have been discussed and patient agreed to proceed with treatment and understands the risks and benefits.      Insomnia  -increase trazodone to 150    CKD III  -stable      Spent adequate time in obtaining history and explaining differentials    40  minutes spent during this visit of which greater than 50% devoted to face-face counseling and coordination of care regarding diagnosis and management plan    RTC 3 months

## 2024-07-22 ENCOUNTER — PATIENT MESSAGE (OUTPATIENT)
Dept: FAMILY MEDICINE | Facility: CLINIC | Age: 89
End: 2024-07-22
Payer: MEDICARE

## 2024-07-22 DIAGNOSIS — N39.0 UTI (URINARY TRACT INFECTION), BACTERIAL: Primary | ICD-10-CM

## 2024-07-22 DIAGNOSIS — A49.9 UTI (URINARY TRACT INFECTION), BACTERIAL: Primary | ICD-10-CM

## 2024-07-22 RX ORDER — CIPROFLOXACIN 500 MG/1
500 TABLET ORAL 2 TIMES DAILY
Qty: 20 TABLET | Refills: 0 | Status: SHIPPED | OUTPATIENT
Start: 2024-07-22

## 2024-07-23 ENCOUNTER — CLINICAL SUPPORT (OUTPATIENT)
Dept: SPEECH THERAPY | Facility: HOSPITAL | Age: 89
End: 2024-07-23
Attending: OTOLARYNGOLOGY
Payer: MEDICARE

## 2024-07-23 DIAGNOSIS — J37.0 CHRONIC LARYNGITIS: Chronic | ICD-10-CM

## 2024-07-23 DIAGNOSIS — R49.0 HOARSENESS OF VOICE: Chronic | ICD-10-CM

## 2024-07-23 DIAGNOSIS — K21.9 LARYNGOPHARYNGEAL REFLUX (LPR): Chronic | ICD-10-CM

## 2024-07-23 PROCEDURE — 92524 BEHAVRAL QUALIT ANALYS VOICE: CPT | Mod: GN,HCNC | Performed by: SPEECH-LANGUAGE PATHOLOGIST

## 2024-07-23 NOTE — PROGRESS NOTES
Referring provider: Dr. Estee Hernandez  Reason for visit:  Behavioral and qualitative analysis of voice and resonance (CPT 66627)    Subjective / History    Cheryl Ghosh is a 89 y.o. female referred for voice evaluation (CPT 68092) by Dr. Hernandez.  She presents with complaints of hoarseness, voice loss which began several years ago.  The patient also reported the following complaints:  voice worse in afternoon/evening, fatigue with use, pain with use, tightness, and reduced volume.  She reports some pain in her throat associated with speaking which gives her a headache.  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  She can no longer work a full 7 hour day - her voice is limiting but she reports this is very tiring for her.       Swallowing: no c/o   Breathing:  no c/o     Smokin packs/day   Reflux:  rarely  Water: 24+ oz/day     Laryngoscopy findings (per Dr. Hernandez on 24)     No epiglottis lesions     No epiglottis edema     No AE folds lesions     No vocal cord polyps     Equal and normal bilateral     No hypopharynx lesions     No piriform sinus pooling     No piriform sinus lesions     Post cricoid edema     No post cricoid erythema  Atrophy bilateral vocal folds with incomplete glottic closure with maximal phonation.  Overactive false vocal folds with phonation.     Past Medical History:   Diagnosis Date    Arthritis     lumbar    Blood transfusion     Chronic kidney disease, stage III (moderate) 2017    Coronary artery disease involving native coronary artery without angina pectoris 3/9/2021    Degenerative disc disease     lumbar    Insomnia 11/15/2022    Insulinoma     Pancreatic disease     Urinary incontinence 2015     Current Outpatient Medications on File Prior to Visit   Medication Sig Dispense Refill    acetaminophen (TYLENOL 8 HOUR ORAL) Take by mouth.      amLODIPine (NORVASC) 2.5 MG tablet Take 1 tablet (2.5 mg total) by mouth once daily. 90 tablet  3    aspirin (ECOTRIN) 81 MG EC tablet Take 1 tablet (81 mg total) by mouth once daily. Resume 48 hours post-surgery  0    b complex vitamins tablet Take 1 tablet by mouth once daily.      baclofen (LIORESAL) 10 MG tablet Take 1 tablet (10 mg total) by mouth 3 (three) times daily. 270 tablet 4    calcium-vitamin D 600 mg(1,500mg) -400 unit Tab once daily.       ciprofloxacin HCl (CIPRO) 500 MG tablet Take 1 tablet (500 mg total) by mouth 2 (two) times daily. 20 tablet 0    estradioL (ESTRACE) 0.01 % (0.1 mg/gram) vaginal cream 0.5 grams with applicator or dime-sized amount with finger in vagina nightly x 2 weeks, then twice a week thereafter 42.5 g 11    famotidine (PEPCID) 40 MG tablet Take 1 tablet (40 mg total) by mouth nightly. for 365 doses 30 tablet 11    FLUZONE HIGHDOSE QUAD 23-24  mcg/0.7 mL Syrg       gabapentin (NEURONTIN) 300 MG capsule TAKE 1 CAPSULE THREE TIMES DAILY 270 capsule 3    lipase-protease-amylase 6,000-19,000-30,000 units (CREON) 6,000-19,000 -30,000 unit capsule Take 2 capsules by mouth 3 (three) times daily with meals. 300 capsule 3    oxyCODONE-acetaminophen (PERCOCET) 5-325 mg per tablet Take 1 tablet by mouth every 8 (eight) hours as needed for Pain. 21 tablet 0    pravastatin (PRAVACHOL) 40 MG tablet Take 1 tablet (40 mg total) by mouth every evening. 90 tablet 3    pyridoxine, vitamin B6, (B-6) 100 MG Tab Take 100 mg by mouth once daily.      raloxifene (EVISTA) 60 mg tablet Take 1 tablet (60 mg total) by mouth once daily. 30 tablet 11    SPIKEVAX [id number],12Y UP,,PF, 50 mcg/0.5 mL injection       traZODone (DESYREL) 100 MG tablet Take 2 tablets (200 mg total) by mouth nightly as needed for Insomnia. 180 tablet 3     Current Facility-Administered Medications on File Prior to Visit   Medication Dose Route Frequency Provider Last Rate Last Admin    gentamicin 80 mg/100ml NACL IVPB IVPB 80 mg  80 mg Intravenous 30 Min Pre-Op Wiliam Urias, JUAN   80 mg at 07/31/19 0747     vancomycin in dextrose 5 % 1 gram/250 mL IVPB 1,000 mg  1,000 mg Intravenous 30 Min Pre-Op Wiliam Urias PA-C   1,000 mg at 07/31/19 0738       Objective    Perceptual/behavioral assessment  -CAPE-V Overall Score: 44  -Quality: rough, strained, and breathy  -Volume: decreased projection  -Pitch: appropriate for age and gender identity  -Flexibility: diminished  -Habitual respiratory pattern: chest/clavicular    Education / Stimulability Trials  Discussed importance of vocal hygiene including: hydration.  Patient was stimulable for improved voice using SOVT/exuberant exercises.  She was quickly stimulable for a notably improved voice using SOVT straw phonation with cues to use a slightly more exuberant voice.  She was provided with exercises in isolation and into connected speech similar to those outlined in [facility] - straw phonation, sirens, plus functional phrases with a projected voice.  Encouraged practicing exercises several times daily in isolation and into short phrases to solidify muscle memory patterns and reduce extralaryngeal tension during speech tasks.  She was amenable to all suggestions.     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 Hernandez.  Prognosis for continued improvement is good.     Recommendations / POC    -Recommend 2-4 sessions of voice therapy over 4-12 weeks with a speech-language pathologist to optimize glottal postures for improved vocal function, vocal efficiency, and ease of phonation  -Continue exercises as discussed in session  -Contact clinician with any further questions     Functional goals  Length Status Goal   Long term Initiated  Patient will implement and adhere to vocal hygiene protocols on a daily basis, including the elimination of phonotraumatic behaviors.    Long term Initiated  Patient and clinician will facilitate changes in vocal function in order to restore functional use of voice for daily occupational,  social, and emotional demands.    Long term Initiated  Patient will re-establish phonation with adequate balance of airflow and resonance with decreased muscle tension.    Long term Initiated   Patient will improve coordination of respiration and phonation for efficient vocal production at a conversational level.    Short term Initiated  Patient will complete SOVT exercises and/or resonant-focused exercises 3-5x daily to strengthen and balance the intrinsic laryngeal musculature and maximize glottic closure without medial hyperfunction.   Short term Initiated  Patient will improve the quality, efficiency, and ease of phonation through resonant and/or airflow exercises from the syllable to conversation level with 80% accuracy.   Short term Initiated  Patient will discriminate between easy and strained phonation with 80% accuracy.    Short term Initiated   Patient will identify the sensations associated with muscle relaxation in the abdominal, thoracic, neck and facial areas during efficient phonation with minimal clinician cue.    Short term Initiated  Patient will demonstrate the ability to increase awareness of voicing behavior through self-monitoring to facilitate generalization in functional speaking situations with 80% accuracy.

## 2024-07-23 NOTE — PLAN OF CARE
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 Hernandez.  Prognosis for continued improvement is good.     Recommendations / POC    -Recommend 2-4 sessions of voice therapy over 4-12 weeks with a speech-language pathologist to optimize glottal postures for improved vocal function, vocal efficiency, and ease of phonation  -Continue exercises as discussed in session  -Contact clinician with any further questions     Functional goals  Length Status Goal   Long term Initiated  Patient will implement and adhere to vocal hygiene protocols on a daily basis, including the elimination of phonotraumatic behaviors.    Long term Initiated  Patient and clinician will facilitate changes in vocal function in order to restore functional use of voice for daily occupational, social, and emotional demands.    Long term Initiated  Patient will re-establish phonation with adequate balance of airflow and resonance with decreased muscle tension.    Long term Initiated   Patient will improve coordination of respiration and phonation for efficient vocal production at a conversational level.    Short term Initiated  Patient will complete SOVT exercises and/or resonant-focused exercises 3-5x daily to strengthen and balance the intrinsic laryngeal musculature and maximize glottic closure without medial hyperfunction.   Short term Initiated  Patient will improve the quality, efficiency, and ease of phonation through resonant and/or airflow exercises from the syllable to conversation level with 80% accuracy.   Short term Initiated  Patient will discriminate between easy and strained phonation with 80% accuracy.    Short term Initiated   Patient will identify the sensations associated with muscle relaxation in the abdominal, thoracic, neck and facial areas during efficient phonation with minimal clinician cue.    Short term Initiated  Patient will demonstrate the ability to increase awareness of  voicing behavior through self-monitoring to facilitate generalization in functional speaking situations with 80% accuracy.

## 2024-07-25 ENCOUNTER — PATIENT MESSAGE (OUTPATIENT)
Dept: ENDOSCOPY | Facility: HOSPITAL | Age: 89
End: 2024-07-25
Payer: MEDICARE

## 2024-07-25 ENCOUNTER — TELEPHONE (OUTPATIENT)
Dept: ENDOSCOPY | Facility: HOSPITAL | Age: 89
End: 2024-07-25
Payer: MEDICARE

## 2024-07-25 NOTE — TELEPHONE ENCOUNTER
Spoke to pt for precall to confirm scheduled procedure(s) Colonoscopy       Date of Procedure (s)  verified 8/2/24  Arrival Time 7:30 AM verified.  Location of Procedure(s) [facility] [place] [facility] Floor verified.    NPO status reinforced. Ok to continue clear liquids up until 4 hours prior to the Endoscopy procedure.   Pt confirmed receipt of Rx prep and prep instructions.  Instructions provided to patient via [facility]  Pt confirmed ride home after procedure.   If procedure requires anesthesia, a responsible adult needs to be present to accompany the patient home, patient cannot drive after receiving anesthesia.    Patient was informed on the following information and verbalized understanding. Precall Screening questionnaire reviewed  and completed with patient. Appointment details are tentative, including check-in time.  If you begin taking any blood thinning medications, injectable weight loss/diabetes medications (other than insulin) , or Adipex (Phentermine) please contact the endoscopy scheduling department listed below as soon as possible.  Patient was advised to call the endoscopy scheduling department if any questions or concerns arise. Pt verbalized understanding.      Endoscopy Scheduling Department

## 2024-07-27 ENCOUNTER — PATIENT MESSAGE (OUTPATIENT)
Dept: FAMILY MEDICINE | Facility: CLINIC | Age: 89
End: 2024-07-27
Payer: MEDICARE

## 2024-08-01 ENCOUNTER — TELEPHONE (OUTPATIENT)
Dept: ENDOSCOPY | Facility: HOSPITAL | Age: 89
End: 2024-08-01
Payer: MEDICARE

## 2024-08-01 NOTE — TELEPHONE ENCOUNTER
Received patient's call. Patient states she is worried about what will happen after procedure. Informed patient I will have a note to have some pads on the stretcher prior to going to recovery. Patient verbalized an understanding.

## 2024-08-01 NOTE — H&P
COLONOSCOPY HISTORY AND PHYSICAL EXAM    Procedure : Colonoscopy      INDICATIONS: asymptomatic screening exam, hx fecal incontinence x 2 years    Family Hx of CRC: none    Last Colonoscopy:  unknown  Findings: normal per patient       Past Medical History:   Diagnosis Date    Arthritis     lumbar    Blood transfusion     Chronic kidney disease, stage III (moderate) 8/2/2017    Coronary artery disease involving native coronary artery without angina pectoris 3/9/2021    Degenerative disc disease     lumbar    Insomnia 11/15/2022    Insulinoma     Pancreatic disease     Urinary incontinence 12/7/2015     Sedation Problems: NO  Family History   Problem Relation Name Age of Onset    Breast cancer Mother      Heart disease Father      Aneurysm Sister      Glaucoma Daughter      Heart attack Son      Breast cancer Maternal Grandmother      Breast cancer Other niece      Fam Hx of Sedation Problems: NO  Social History     Socioeconomic History    Marital status:    Tobacco Use    Smoking status: Never     Passive exposure: Never    Smokeless tobacco: Never   Substance and Sexual Activity    Alcohol use: Yes     Alcohol/week: 1.0 - 2.0 standard drink of alcohol     Types: 1 - 2 Glasses of wine per week    Drug use: No    Sexual activity: Yes     Partners: Male     Social Determinants of Health     Financial Resource Strain: Low Risk  (1/23/2024)    Overall Financial Resource Strain (CARDIA)     Difficulty of Paying Living Expenses: Not hard at all   Food Insecurity: No Food Insecurity (1/23/2024)    Hunger Vital Sign     Worried About Running Out of Food in the Last Year: Never true     Ran Out of Food in the Last Year: Never true   Transportation Needs: No Transportation Needs (1/23/2024)    PRAPARE - Transportation     Lack of Transportation (Medical): No     Lack of Transportation (Non-Medical): No   Physical Activity: Insufficiently Active (1/23/2024)    Exercise Vital Sign     Days of Exercise per Week: 3 days      Minutes of Exercise per Session: 20 min   Stress: No Stress Concern Present (1/23/2024)    [employer] [place] [place] [employer] - Occupational Stress Questionnaire     Feeling of Stress : Only a little   Housing Stability: Low Risk  (1/23/2024)    Housing Stability Vital Sign     Unable to Pay for Housing in the Last Year: No     Number of Places Lived in the Last Year: 1     Unstable Housing in the Last Year: No       Review of Systems - Negative except   Respiratory ROS: no dyspnea  Cardiovascular ROS: no exertional chest pain  Gastrointestinal ROS: NO abdominal discomfort,  NO rectal bleeding  Musculoskeletal ROS: no muscular pain  Neurological ROS: no recent stroke    Physical Exam:  LMP  (LMP Unknown)   General: no distress  Head: normocephalic  Mallampati Score   Neck: supple, symmetrical, trachea midline  Lungs:  clear to auscultation bilaterally and normal respiratory effort  Heart: regular rate and rhythm and no murmur  Abdomen: soft, non-tender non-distented; bowel sounds normal; no masses,  no organomegaly  Extremities: no cyanosis or edema, or clubbing    PLAN  COLONOSCOPY.    SedationPlan :MAC    The details of the procedure, the possible need for biopsy or polypectomy and the potential risks including bleeding, perforation, missed polyps were discussed in detail. Consent obtained.    Charis Ham DO  PGY-1

## 2024-08-02 ENCOUNTER — HOSPITAL ENCOUNTER (OUTPATIENT)
Facility: HOSPITAL | Age: 89
Discharge: HOME | End: 2024-08-02
Attending: COLON & RECTAL SURGERY | Admitting: COLON & RECTAL SURGERY
Payer: MEDICARE

## 2024-08-02 ENCOUNTER — ANESTHESIA (OUTPATIENT)
Dept: ENDOSCOPY | Facility: HOSPITAL | Age: 89
End: 2024-08-02
Payer: MEDICARE

## 2024-08-02 ENCOUNTER — ANESTHESIA EVENT (OUTPATIENT)
Dept: ENDOSCOPY | Facility: HOSPITAL | Age: 89
End: 2024-08-02
Payer: MEDICARE

## 2024-08-02 VITALS
SYSTOLIC BLOOD PRESSURE: 151 MMHG | RESPIRATION RATE: 18 BRPM | HEART RATE: 78 BPM | WEIGHT: 132 LBS | TEMPERATURE: 97 F | DIASTOLIC BLOOD PRESSURE: 65 MMHG | OXYGEN SATURATION: 97 % | HEIGHT: 64 IN | BODY MASS INDEX: 22.53 KG/M2

## 2024-08-02 DIAGNOSIS — I10 ESSENTIAL HYPERTENSION: ICD-10-CM

## 2024-08-02 DIAGNOSIS — Z12.11 ENCOUNTER FOR SCREENING COLONOSCOPY: ICD-10-CM

## 2024-08-02 PROCEDURE — 45381 COLONOSCOPY SUBMUCOUS NJX: CPT | Mod: PT,HCNC | Performed by: COLON & RECTAL SURGERY

## 2024-08-02 PROCEDURE — 88305 TISSUE EXAM BY PATHOLOGIST: CPT | Mod: 59,HCNC | Performed by: PATHOLOGY

## 2024-08-02 PROCEDURE — 63600175 PHARM REV CODE 636 W HCPCS: Mod: HCNC | Performed by: NURSE ANESTHETIST, CERTIFIED REGISTERED

## 2024-08-02 PROCEDURE — 45380 COLONOSCOPY AND BIOPSY: CPT | Mod: PT,59,HCNC, | Performed by: COLON & RECTAL SURGERY

## 2024-08-02 PROCEDURE — 25000003 PHARM REV CODE 250: Mod: HCNC | Performed by: COLON & RECTAL SURGERY

## 2024-08-02 PROCEDURE — 88305 TISSUE EXAM BY PATHOLOGIST: CPT | Mod: 26,HCNC,, | Performed by: PATHOLOGY

## 2024-08-02 PROCEDURE — 25000003 PHARM REV CODE 250: Mod: HCNC | Performed by: NURSE ANESTHETIST, CERTIFIED REGISTERED

## 2024-08-02 PROCEDURE — 45381 COLONOSCOPY SUBMUCOUS NJX: CPT | Mod: PT,51,HCNC, | Performed by: COLON & RECTAL SURGERY

## 2024-08-02 PROCEDURE — A4216 STERILE WATER/SALINE, 10 ML: HCPCS | Mod: HCNC | Performed by: COLON & RECTAL SURGERY

## 2024-08-02 PROCEDURE — 45380 COLONOSCOPY AND BIOPSY: CPT | Mod: PT,59,HCNC | Performed by: COLON & RECTAL SURGERY

## 2024-08-02 PROCEDURE — 27201028 HC NEEDLE, SCLERO: Mod: HCNC | Performed by: COLON & RECTAL SURGERY

## 2024-08-02 PROCEDURE — 27201012 HC FORCEPS, HOT/COLD, DISP: Mod: HCNC | Performed by: COLON & RECTAL SURGERY

## 2024-08-02 PROCEDURE — 37000008 HC ANESTHESIA 1ST 15 MINUTES: Mod: HCNC | Performed by: COLON & RECTAL SURGERY

## 2024-08-02 PROCEDURE — 45385 COLONOSCOPY W/LESION REMOVAL: CPT | Mod: PT,HCNC,, | Performed by: COLON & RECTAL SURGERY

## 2024-08-02 PROCEDURE — 37000009 HC ANESTHESIA EA ADD 15 MINS: Mod: HCNC | Performed by: COLON & RECTAL SURGERY

## 2024-08-02 PROCEDURE — 45385 COLONOSCOPY W/LESION REMOVAL: CPT | Mod: PT,HCNC | Performed by: COLON & RECTAL SURGERY

## 2024-08-02 PROCEDURE — 27201089 HC SNARE, DISP (ANY): Mod: HCNC | Performed by: COLON & RECTAL SURGERY

## 2024-08-02 RX ORDER — SODIUM CHLORIDE 9 MG/ML
INJECTION, SOLUTION INTRAVENOUS CONTINUOUS
Status: DISCONTINUED | OUTPATIENT
Start: 2024-08-02 | End: 2024-08-02 | Stop reason: HOSPADM

## 2024-08-02 RX ORDER — LIDOCAINE HYDROCHLORIDE 20 MG/ML
INJECTION INTRAVENOUS
Status: DISCONTINUED | OUTPATIENT
Start: 2024-08-02 | End: 2024-08-02

## 2024-08-02 RX ORDER — PROPOFOL 10 MG/ML
VIAL (ML) INTRAVENOUS
Status: DISCONTINUED | OUTPATIENT
Start: 2024-08-02 | End: 2024-08-02

## 2024-08-02 RX ORDER — EPHEDRINE SULFATE 50 MG/ML
INJECTION, SOLUTION INTRAVENOUS
Status: DISCONTINUED | OUTPATIENT
Start: 2024-08-02 | End: 2024-08-02

## 2024-08-02 RX ORDER — SODIUM CHLORIDE 0.9 % (FLUSH) 0.9 %
SYRINGE (ML) INJECTION
Status: COMPLETED | OUTPATIENT
Start: 2024-08-02 | End: 2024-08-02

## 2024-08-02 RX ORDER — ONDANSETRON HYDROCHLORIDE 2 MG/ML
INJECTION, SOLUTION INTRAVENOUS
Status: DISCONTINUED | OUTPATIENT
Start: 2024-08-02 | End: 2024-08-02

## 2024-08-02 RX ORDER — AMLODIPINE BESYLATE 2.5 MG/1
2.5 TABLET ORAL
Qty: 90 TABLET | Refills: 3 | Status: SHIPPED | OUTPATIENT
Start: 2024-08-02

## 2024-08-02 RX ADMIN — PROPOFOL 50 MG: 10 INJECTION, EMULSION INTRAVENOUS at 08:08

## 2024-08-02 RX ADMIN — EPHEDRINE SULFATE 10 MG: 50 INJECTION INTRAVENOUS at 09:08

## 2024-08-02 RX ADMIN — SODIUM CHLORIDE: 0.9 INJECTION, SOLUTION INTRAVENOUS at 07:08

## 2024-08-02 RX ADMIN — ONDANSETRON 4 MG: 2 INJECTION INTRAMUSCULAR; INTRAVENOUS at 08:08

## 2024-08-02 RX ADMIN — LIDOCAINE HYDROCHLORIDE 50 MG: 20 INJECTION INTRAVENOUS at 08:08

## 2024-08-02 RX ADMIN — GLYCOPYRROLATE 0.2 MG: 0.2 INJECTION, SOLUTION INTRAMUSCULAR; INTRAVENOUS at 08:08

## 2024-08-02 NOTE — ANESTHESIA PREPROCEDURE EVALUATION
08/02/2024  Cheryl Ghosh is a 89 y.o., female.    Past Medical History:   Diagnosis Date    Arthritis     lumbar    Blood transfusion     Chronic kidney disease, stage III (moderate) 8/2/2017    Coronary artery disease involving native coronary artery without angina pectoris 3/9/2021    Degenerative disc disease     lumbar    Insomnia 11/15/2022    Insulinoma     Pancreatic disease     Urinary incontinence 12/7/2015     Past Surgical History:   Procedure Laterality Date    ADENOIDECTOMY      APPENDECTOMY      CATARACT EXTRACTION, BILATERAL      EYE SURGERY      HYSTERECTOMY      SAMM/BSO    INJECTION OF JOINT Bilateral 2/4/2020    Procedure: Injection, Joint--Bilateral GTB;  Surgeon: Arina Carrera Jr., MD;  Location: [facility] PAIN MGT;  Service: Pain Management;  Laterality: Bilateral;  Oral sedation    INJECTION OF STEROID Right 12/20/2018    Procedure: INJECTION, STEROID-- Right SI Joint Block and  Steroid Injection;  Surgeon: Xavier Dasilva MD;  Location: [facility] OR;  Service: Neurosurgery;  Laterality: Right;  INJECTION, STEROID-- Right SI Joint Block and  Steroid Injection  SURGERY TIME:1 HR  LOS: 0 DAYS  RADIOLOGY: C- arm  BED: Regular Bed  with Pillows  POSITION: Prone      INJECTION OF STEROID Left 6/19/2019    Procedure: INJECTION, STEROID Procedure:Left sacroiliac joint block / steroid injection Surgery Time: 30mins LOS: Anesthesia: MAC Radiology: C-arm Bed: Regular Bed Position: Prone;  Surgeon: Xavier Dasilva MD;  Location: [facility] OR;  Service: Neurosurgery;  Laterality: Left;    MINIMALLY INVASIVE FUSION OF SPINE Right 2/6/2019    Procedure: FUSION, SPINE, MINIMALLY INVASIVE Right Sacroiliac Joint Fusion -  I Fuse;  Surgeon: Xavier Dasilva MD;  Location: [facility] OR;  Service: Neurosurgery;  Laterality: Right;  Procedure: Right Sacroiliac Joint Fusion -  I Fuse  Surgery Time: 1.5 Hr  LOS:  0  Anesthesia: General  Radiology: C-Arm  Bed: [place] [id number] Poster  Position: Prone  Equipment: TAY Kincaid [phone] (notified 1/24 EF)    OOPHORECTOMY      PANCREAS SURGERY      Insulanoma    RADIOFREQUENCY ABLATION OF LUMBAR MEDIAL BRANCH NERVE AT SINGLE LEVEL Right 8/28/2018    Procedure: RADIOFREQUENCY ABLATION, NERVE, MEDIAL BRANCH, LUMBAR, 1 LEVEL- Right L3,4,5 IV SEDATION;  Surgeon: Eddie Vega MD;  Location: [facility] PAIN MGT;  Service: Pain Management;  Laterality: Right;    RADIOFREQUENCY ABLATION OF LUMBAR MEDIAL BRANCH NERVE AT SINGLE LEVEL Left 9/4/2018    Procedure: RADIOFREQUENCY ABLATION, NERVE, MEDIAL BRANCH, LUMBAR, 1 LEVEL - Left L3,4,5 IV SEADTION;  Surgeon: Eddie Vega MD;  Location: [facility] PAIN MGT;  Service: Pain Management;  Laterality: Left;  Patient takes ASA     REFRACTIVE SURGERY Bilateral     SPINAL FUSION Left 7/31/2019    Procedure: FUSION, SPINE SI Joint Fusion;  Surgeon: Xavier Dasilva MD;  Location: [facility] OR;  Service: Neurosurgery;  Laterality: Left;  Procedure: Left SI joint fusion  Surgery Time: 1.5hr  LOS:0  Anesthesia: General   Radiology: C-arm   Bed: [place] [id number] Poster  Position: Prone  Equipment: Codi Mabry [phone] (Codi notified)    TONSILLECTOMY         Pre-op Assessment    I have reviewed the Patient Summary Reports.     I have reviewed the Nursing Notes. I have reviewed the NPO Status.   I have reviewed the Medications.     Review of Systems  Anesthesia Hx:  No problems with previous Anesthesia             Denies Family Hx of Anesthesia complications.    Denies Personal Hx of Anesthesia complications.                    Hematology/Oncology:  Hematology Normal                                     EENT/Dental:   Vocal cords atrophy in speech          Cardiovascular:     Hypertension   CAD                                        Pulmonary:      Shortness of breath                  Renal/:  Chronic Renal Disease                Hepatic/GI:  Bowel Prep.                 Musculoskeletal:  Arthritis               Neurological:  TIA,  Neuromuscular Disease,                                   Dermatological:  Skin Normal        Physical Exam  General: Well nourished    Airway:  Mallampati: II   Mouth Opening: Normal  TM Distance: Normal  Tongue: Normal  Neck ROM: Normal ROM    Dental:  Intact        Anesthesia Plan  Type of Anesthesia, risks & benefits discussed:    Anesthesia Type: Gen Natural Airway  Intra-op Monitoring Plan: Standard ASA Monitors  Informed Consent: Informed consent signed with the Patient and all parties understand the risks and agree with anesthesia plan.  All questions answered.   ASA Score: 3  Day of Surgery Review of History & Physical: H&P Update referred to the surgeon/provider.I have interviewed and examined the patient. I have reviewed the patient's H&P dated: There are no significant changes.     Ready For Surgery From Anesthesia Perspective.     .

## 2024-08-02 NOTE — TELEPHONE ENCOUNTER
Provider Staff:  Action required for this patient    Requires labs      Please see care gap opportunities below in Care Due Message.    Thanks!  [facility]     Appointments      Date Provider   Last Visit   7/18/2024 Joey Mueller MD   Next Visit   10/16/2024 Joey Mueller MD     Refill Decision Note   Cheryl Ghosh  is requesting a refill authorization.  Brief Assessment and Rationale for Refill:  Approve     Medication Therapy Plan:         Comments:     Note composed:11:01 AM 08/02/2024

## 2024-08-02 NOTE — PROVATION PATIENT INSTRUCTIONS
Discharge Summary/Instructions after an Endoscopic Procedure  Patient Name: Cheryl Ghosh  Patient MRN: [id number]  Patient YOB: 1934 Friday, August 2, 2024  Fransisca Ojeda MD  Dear patient,  As a result of recent federal legislation (The Federal Cures Act), you may   receive lab or pathology results from your procedure in your [facility]   account before your physician is able to contact you. Your physician or   their representative will relay the results to you with their   recommendations at their soonest availability.  Thank you,  RESTRICTIONS:  During your procedure today, you received medications for sedation.  These   medications may affect your judgment, balance and coordination.  Therefore,   for 24 hours, you have the following restrictions:   - DO NOT drive a car, operate machinery, make legal/financial decisions,   sign important papers or drink alcohol.    ACTIVITY:  Today: no heavy lifting, straining or running due to procedural   sedation/anesthesia.  The following day: return to full activity including work.  DIET:  Eat and drink normally unless instructed otherwise.     TREATMENT FOR COMMON SIDE EFFECTS:  - Mild abdominal pain, nausea, belching, bloating or excessive gas:  rest,   eat lightly and use a heating pad.  - Sore Throat: treat with throat lozenges and/or gargle with warm salt   water.  - Because air was used during the procedure, expelling large amounts of air   from your rectum or belching is normal.  - If a bowel prep was taken, you may not have a bowel movement for 1-3 days.    This is normal.  SYMPTOMS TO WATCH FOR AND REPORT TO YOUR PHYSICIAN:  1. Abdominal pain or bloating, other than gas cramps.  2. Chest pain.  3. Back pain.  4. Signs of infection such as: chills or fever occurring within 24 hours   after the procedure.  5. Rectal bleeding, which would show as bright red, maroon, or black stools.   (A tablespoon of blood from the rectum is not serious, especially  if   hemorrhoids are present.)  6. Vomiting.  7. Weakness or dizziness.  GO DIRECTLY TO THE NEAREST EMERGENCY ROOM IF YOU HAVE ANY OF THE FOLLOWING:      Difficulty breathing              Chills and/or fever over 101 F   Persistent vomiting and/or vomiting blood   Severe abdominal pain   Severe chest pain   Black, tarry stools   Bleeding- more than one tablespoon   Any other symptom or condition that you feel may need urgent attention  Your doctor recommends these additional instructions:  If any biopsies were taken, your doctors clinic will contact you in 1 to 2   weeks with any results.  - Discharge patient to home.   - Resume previous diet.   - Continue present medications.   - Await pathology results.   - Repeat colonoscopy is not recommended for screening purposes.   - Written discharge instructions were provided to the patient.   - The signs and symptoms of potential delayed complications were discussed   with the patient.   - Patient has a contact number available for emergencies.   - Return to normal activities tomorrow.  For questions, problems or results please call your physician - Fransisca Ojeda MD at Work:  [phone].  [facility], EMERGENCY ROOM PHONE NUMBER: [phone]  IF A COMPLICATION OR EMERGENCY SITUATION ARISES AND YOU ARE UNABLE TO REACH   YOUR PHYSICIAN - GO DIRECTLY TO THE EMERGENCY ROOM.  Fransisca Ojeda MD  8/2/2024 9:13:08 AM  This report has been verified and signed electronically.  Dear patient,  As a result of recent federal legislation (The Federal Cures Act), you may   receive lab or pathology results from your procedure in your [facility]   account before your physician is able to contact you. Your physician or   their representative will relay the results to you with their   recommendations at their soonest availability.  Thank you,  PROVATION

## 2024-08-02 NOTE — TELEPHONE ENCOUNTER
Care Due:                  Date            Visit Type   Department     Provider  --------------------------------------------------------------------------------                                EP -                              [facility]    Joey Marino  Last Visit: 07-      CARE ([facility])   MEDICINE       Ami                               [facility]                              [facility]  Next Visit: 10-      CARE ([facility])   MEDICINE       Ami                                                            Last  Test          Frequency    Reason                     Performed    Due Date  --------------------------------------------------------------------------------    Mg Level....  12 months..  raloxifene...............  Not Found    Overdue    Phosphate...  15 months..  raloxifene...............  Not Found    Overdue    Vitamin D...  15 months..  raloxifene...............  11-   02-    [facility] Embedded Care Due Messages. Reference number: [id number].   8/02/2024 1:21:25 AM CDT

## 2024-08-02 NOTE — ANESTHESIA POSTPROCEDURE EVALUATION
Anesthesia Post Evaluation    Patient: Cheryl Ghosh    Procedure(s) Performed: Procedure(s) (LRB):  COLONOSCOPY (N/A)    Final Anesthesia Type: general      Patient location during evaluation: PACU  Patient participation: Yes- Able to Participate  Level of consciousness: awake and alert  Post-procedure vital signs: reviewed and stable  Pain management: adequate  Airway patency: patent    PONV status at discharge: No PONV  Anesthetic complications: no      Cardiovascular status: blood pressure returned to baseline  Respiratory status: spontaneous ventilation and room air  Hydration status: euvolemic  Follow-up not needed.              Vitals Value Taken Time   /65 08/02/24 0947   Temp 36.2 °C (97.2 °F) 08/02/24 0917   Pulse 78 08/02/24 0947   Resp 18 08/02/24 0947   SpO2 97 % 08/02/24 0947         Event Time   Out of Recovery 10:04:31         Pain/Enid Score: Enid Score: 10 (8/2/2024  9:47 AM)

## 2024-08-02 NOTE — TRANSFER OF CARE
"Anesthesia Transfer of Care Note    Patient: Cheryl Ghosh    Procedure(s) Performed: Procedure(s) (LRB):  COLONOSCOPY (N/A)    Patient location: GI    Anesthesia Type: general    Transport from OR: Transported from OR on room air with adequate spontaneous ventilation    Post pain: adequate analgesia    Post assessment: no apparent anesthetic complications    Post vital signs: stable    Level of consciousness: awake    Nausea/Vomiting: no nausea/vomiting    Complications: none    Transfer of care protocol was followed      Last vitals: Visit Vitals  /67 (BP Location: Left arm, Patient Position: Lying)   Pulse (!) 58   Temp 36.7 °C (98.1 °F) (Temporal)   Resp 18   Ht 5' 4" (1.626 m)   Wt 59.9 kg (132 lb)   LMP  (LMP Unknown)   SpO2 98%   Breastfeeding No   BMI 22.66 kg/m²     "

## 2024-08-02 NOTE — PLAN OF CARE
Discharge instructions with Provation reviwed with patient and spouse. Verbalize understanding. PIV removed, cannula intact, dressing applied with instructions to remove once home. Patient lethargic from lack of sleep escorted to lobby via w/c NAD noted, no c/o pain or discomfort.

## 2024-08-05 ENCOUNTER — PATIENT MESSAGE (OUTPATIENT)
Dept: SURGERY | Facility: CLINIC | Age: 89
End: 2024-08-05
Payer: MEDICARE

## 2024-08-05 LAB
FINAL PATHOLOGIC DIAGNOSIS: NORMAL
GROSS: NORMAL
Lab: NORMAL

## 2024-08-07 ENCOUNTER — OFFICE VISIT (OUTPATIENT)
Dept: UROGYNECOLOGY | Facility: CLINIC | Age: 89
End: 2024-08-07
Payer: MEDICARE

## 2024-08-07 VITALS
SYSTOLIC BLOOD PRESSURE: 159 MMHG | HEIGHT: 62 IN | DIASTOLIC BLOOD PRESSURE: 63 MMHG | WEIGHT: 132.25 LBS | HEART RATE: 49 BPM | BODY MASS INDEX: 24.34 KG/M2

## 2024-08-07 DIAGNOSIS — Z46.89 PESSARY MAINTENANCE: ICD-10-CM

## 2024-08-07 DIAGNOSIS — R35.1 NOCTURIA: ICD-10-CM

## 2024-08-07 DIAGNOSIS — R15.1 FECAL SMEARING: ICD-10-CM

## 2024-08-07 DIAGNOSIS — N39.46 URINARY INCONTINENCE, MIXED: Primary | ICD-10-CM

## 2024-08-07 PROCEDURE — 1159F MED LIST DOCD IN RCRD: CPT | Mod: HCNC,CPTII,S$GLB, | Performed by: NURSE PRACTITIONER

## 2024-08-07 PROCEDURE — 1126F AMNT PAIN NOTED NONE PRSNT: CPT | Mod: HCNC,CPTII,S$GLB, | Performed by: NURSE PRACTITIONER

## 2024-08-07 PROCEDURE — 99213 OFFICE O/P EST LOW 20 MIN: CPT | Mod: HCNC,S$GLB,, | Performed by: NURSE PRACTITIONER

## 2024-08-07 PROCEDURE — 99999 PR PBB SHADOW E&M-EST. PATIENT-LVL V: CPT | Mod: PBBFAC,HCNC,, | Performed by: NURSE PRACTITIONER

## 2024-08-07 PROCEDURE — 1160F RVW MEDS BY RX/DR IN RCRD: CPT | Mod: HCNC,CPTII,S$GLB, | Performed by: NURSE PRACTITIONER

## 2024-08-07 RX ORDER — VIBEGRON 75 MG/1
75 TABLET, FILM COATED ORAL DAILY
Qty: 30 TABLET | Refills: 11 | Status: SHIPPED | OUTPATIENT
Start: 2024-08-07

## 2024-08-14 ENCOUNTER — CLINICAL SUPPORT (OUTPATIENT)
Dept: SPEECH THERAPY | Facility: HOSPITAL | Age: 89
End: 2024-08-14
Payer: MEDICARE

## 2024-08-14 DIAGNOSIS — R49.0 HOARSENESS OF VOICE: Primary | ICD-10-CM

## 2024-08-14 DIAGNOSIS — J37.0 CHRONIC LARYNGITIS: ICD-10-CM

## 2024-08-14 PROCEDURE — 92507 TX SP LANG VOICE COMM INDIV: CPT | Mod: GN,HCNC | Performed by: SPEECH-LANGUAGE PATHOLOGIST

## 2024-08-14 NOTE — PROGRESS NOTES
Referring provider: Dr. Estee Hernandez  Reason for visit:  Voice treatment (CPT 79320)  Session #1    History / Subjective   I had the pleasure of seeing Cheryl Ghosh for her first treatment session following complete voice evaluation on 7/23/24.  During that time, improvements were noted on SOVT/exuberant voice exercises.    08/14/2024:  Since evaluation, she is able to access an improved voice but only for a few minutes at a time.  Did have one tour that overall went very well, felt her voice was strong the whole time.   Overall feels continued difficulty with prolonged carryover.      Objective   The primary goal of todays session was to review HEP and discuss carryover.  This was targeted using SOVT/exuberant treatment modalities.    Perceptual/behavioral assessment  -CAPE-V Overall Score: 24  -Quality:  mildly rough, strained  -Volume: appropriate for age and gender identity  -Pitch: appropriate for age and gender identity  -Flexibility: diminished  -Habitual respiratory pattern: diaphragmatic    Treatment  Reviewed strategies for resetting pt's voice to more normal including straw phonation and using exuberance.  She continues to be able to access improved voice but reports some ongoing difficulty with keeping it going after a few minutes.  Reviewed strategies including resets, identifying improved voice and maintaining.  For home practice, clinician reviewed home exercises as practiced during the session with the patient.     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 Hernandez.  Prognosis for continued improvement is good.     Recommendations / POC    -Recommend 2-4 sessions of voice therapy over 4-12 weeks with a speech-language pathologist to optimize glottal postures for improved vocal function, vocal efficiency, and ease of phonation  -Continue exercises as discussed in session  -Contact clinician with any further questions   -Pending progress  next session may consider referral to Dr. Ortega for consideration of procedural intervention    Functional goals  Length Status Goal   Long term Ongoing  Patient will implement and adhere to vocal hygiene protocols on a daily basis, including the elimination of phonotraumatic behaviors.    Long term Ongoing  Patient and clinician will facilitate changes in vocal function in order to restore functional use of voice for daily occupational, social, and emotional demands.    Long term Ongoing  Patient will re-establish phonation with adequate balance of airflow and resonance with decreased muscle tension.    Long term Ongoing  Patient will improve coordination of respiration and phonation for efficient vocal production at a conversational level.    Short term Ongoing  Patient will complete SOVT exercises and/or resonant-focused exercises 3-5x daily to strengthen and balance the intrinsic laryngeal musculature and maximize glottic closure without medial hyperfunction.   Short term Ongoing  Patient will improve the quality, efficiency, and ease of phonation through resonant and/or airflow exercises from the syllable to conversation level with 80% accuracy.   Short term Ongoing  Patient will discriminate between easy and strained phonation with 80% accuracy.    Short term Ongoing  Patient will identify the sensations associated with muscle relaxation in the abdominal, thoracic, neck and facial areas during efficient phonation with minimal clinician cue.    Short term Ongoing  Patient will demonstrate the ability to increase awareness of voicing behavior through self-monitoring to facilitate generalization in functional speaking situations with 80% accuracy.

## 2024-08-15 ENCOUNTER — PATIENT MESSAGE (OUTPATIENT)
Dept: PAIN MEDICINE | Facility: CLINIC | Age: 89
End: 2024-08-15
Payer: MEDICARE

## 2024-08-22 ENCOUNTER — OFFICE VISIT (OUTPATIENT)
Dept: SURGERY | Facility: CLINIC | Age: 89
End: 2024-08-22
Attending: COLON & RECTAL SURGERY
Payer: MEDICARE

## 2024-08-22 VITALS
BODY MASS INDEX: 24.59 KG/M2 | DIASTOLIC BLOOD PRESSURE: 64 MMHG | WEIGHT: 133.63 LBS | SYSTOLIC BLOOD PRESSURE: 149 MMHG | HEART RATE: 57 BPM | HEIGHT: 62 IN

## 2024-08-22 DIAGNOSIS — R15.9 INCONTINENCE OF FECES, UNSPECIFIED FECAL INCONTINENCE TYPE: Primary | ICD-10-CM

## 2024-08-22 DIAGNOSIS — Z01.818 PREOP TESTING: ICD-10-CM

## 2024-08-22 PROCEDURE — 87081 CULTURE SCREEN ONLY: CPT | Mod: HCNC | Performed by: COLON & RECTAL SURGERY

## 2024-08-22 PROCEDURE — 99999 PR PBB SHADOW E&M-EST. PATIENT-LVL V: CPT | Mod: PBBFAC,HCNC,, | Performed by: COLON & RECTAL SURGERY

## 2024-08-22 NOTE — H&P (VIEW-ONLY)
CRS Office Visit History and Physical      SUBJECTIVE:     Chief Complaint: Fecal incontinence    History of Present Illness:  Patient is a 89 y.o. female presents to discuss surgery.    (1/24/2024)  Fecal incontinence x 2 years.  No sensation to leakage of stool.  Happens daily.  Either small pieces or full BM.  Taking fiber-con and metamucil, which has helped, but still has accidents.  Denies feeling of recta prolapse/bulge.  + vaginal bulge.  Prior colonoscopy: Yes- 8/2/2024, biopsies negative for microscopic colitis.  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)  Prior pelvic or anorectal surgery: Hemorrhoid bending  Family history of colon/rectal cancer: No  Family history of IBD: No  Steroid or other immunosuppression: No  Blood thinners: Aspirin  Current stool softeners or fiber supplement: Yes - as above  Active smoking: No  Prior deliveries: Yes - 2  complicated by tear: No    [place] (FI):  Leakage of solid stool:   Daily (4)  Leakage of liquid stool:   Daily (4)  Leakage of flatus:     Daily (4)  Wear a pad:       Daily (4)  Alter life:       Daily (4)  Total: 20    [place] (ODS):  How long on toilet:   <5min (0)   How many times/day: 2 (1)    Digitation:   Never (0)   Laxatives:   Never (0)   Enemas:    Never (0)  Incomplete stool: Weekly (2)   Strain:   <25% (1)   Total: 4    Stool consistency/[place]: 2 / 5  Bowel movements per month:   Few times per week   Leakage of urine: Yes   Trouble emptying your bladder: Yes   Feel something bulging through vagina? Yes     Review of patient's allergies indicates:   Allergen Reactions    Pcn [penicillins] Hives and Nausea And Vomiting       Past Medical History:   Diagnosis Date    Arthritis     lumbar    Blood transfusion     Chronic kidney disease, stage III (moderate) 8/2/2017    Coronary artery disease involving native coronary artery without angina pectoris 3/9/2021    Degenerative disc disease     lumbar    Insomnia 11/15/2022     Insulinoma     Pancreatic disease     Urinary incontinence 12/7/2015     Past Surgical History:   Procedure Laterality Date    ADENOIDECTOMY      APPENDECTOMY      CATARACT EXTRACTION, BILATERAL      COLONOSCOPY N/A 8/2/2024    Procedure: COLONOSCOPY;  Surgeon: Fransisca Ojeda MD;  Location: [facility] ENDO (4TH FLR);  Service: Endoscopy;  Laterality: N/A;  6/19/24-Dr. Ojeda pt, PEG, instr portal-DS  8/1 please place extra luci pads on bed before going to recovery-ml  8/1-pt notified of earlier arrival time (0645am)-[facility]    EYE SURGERY      HYSTERECTOMY      SAMM/BSO    INJECTION OF JOINT Bilateral 2/4/2020    Procedure: Injection, Joint--Bilateral GTB;  Surgeon: Arina Carrera Jr., MD;  Location: [facility] PAIN MGT;  Service: Pain Management;  Laterality: Bilateral;  Oral sedation    INJECTION OF STEROID Right 12/20/2018    Procedure: INJECTION, STEROID-- Right SI Joint Block and  Steroid Injection;  Surgeon: Xavier Dasilva MD;  Location: [facility] OR;  Service: Neurosurgery;  Laterality: Right;  INJECTION, STEROID-- Right SI Joint Block and  Steroid Injection  SURGERY TIME:1 HR  LOS: 0 DAYS  RADIOLOGY: C- arm  BED: Regular Bed  with Pillows  POSITION: Prone      INJECTION OF STEROID Left 6/19/2019    Procedure: INJECTION, STEROID Procedure:Left sacroiliac joint block / steroid injection Surgery Time: 30mins LOS: Anesthesia: MAC Radiology: C-arm Bed: Regular Bed Position: Prone;  Surgeon: Xavier Dasilva MD;  Location: [facility] OR;  Service: Neurosurgery;  Laterality: Left;    MINIMALLY INVASIVE FUSION OF SPINE Right 2/6/2019    Procedure: FUSION, SPINE, MINIMALLY INVASIVE Right Sacroiliac Joint Fusion -  I Fuse;  Surgeon: Xavier Dasilva MD;  Location: [facility] OR;  Service: Neurosurgery;  Laterality: Right;  Procedure: Right Sacroiliac Joint Fusion -  I Fuse  Surgery Time: 1.5 Hr  LOS: 0  Anesthesia: General  Radiology: C-Arm  Bed: [place] 4 Poster  Position: Prone  Equipment: I Fuse Codi [phone] (notified 1/24 EF)     "OOPHORECTOMY      PANCREAS SURGERY      Insulanoma    RADIOFREQUENCY ABLATION OF LUMBAR MEDIAL BRANCH NERVE AT SINGLE LEVEL Right 8/28/2018    Procedure: RADIOFREQUENCY ABLATION, NERVE, MEDIAL BRANCH, LUMBAR, 1 LEVEL- Right L3,4,5 IV SEDATION;  Surgeon: Eddie Vega MD;  Location: [facility] PAIN MGT;  Service: Pain Management;  Laterality: Right;    RADIOFREQUENCY ABLATION OF LUMBAR MEDIAL BRANCH NERVE AT SINGLE LEVEL Left 9/4/2018    Procedure: RADIOFREQUENCY ABLATION, NERVE, MEDIAL BRANCH, LUMBAR, 1 LEVEL - Left L3,4,5 IV SEADTION;  Surgeon: Eddie Vega MD;  Location: [facility] PAIN MGT;  Service: Pain Management;  Laterality: Left;  Patient takes ASA     REFRACTIVE SURGERY Bilateral     SPINAL FUSION Left 7/31/2019    Procedure: FUSION, SPINE SI Joint Fusion;  Surgeon: Xavier Dasilva MD;  Location: [facility] OR;  Service: Neurosurgery;  Laterality: Left;  Procedure: Left SI joint fusion  Surgery Time: 1.5hr  LOS:0  Anesthesia: General   Radiology: C-arm   Bed: [place] [id number] Poster  Position: Prone  Equipment: Codi Costellovo- [phone] (Codi notified)    TONSILLECTOMY       Family History   Problem Relation Name Age of Onset    Breast cancer Mother      Heart disease Father      Aneurysm Sister      Glaucoma Daughter      Heart attack Son      Breast cancer Maternal Grandmother      Breast cancer Other niece      Social History     Tobacco Use    Smoking status: Never     Passive exposure: Never    Smokeless tobacco: Never   Substance Use Topics    Alcohol use: Yes     Alcohol/week: 1.0 - 2.0 standard drink of alcohol     Types: 1 - 2 Glasses of wine per week    Drug use: No        Review of Systems:  ROS    OBJECTIVE:     Vital Signs (Most Recent)  BP (!) 149/64 (BP Location: Left arm, Patient Position: Sitting, BP Method: Medium (Automatic))   Pulse (!) 57   Ht 5' 2" (1.575 m)   Wt 60.6 kg (133 lb 9.6 oz)   LMP  (LMP Unknown)   BMI 24.44 kg/m²     Physical Exam:  General: White female in no distress "   Neuro: Alert and oriented x 4.  Moves all extremities.     HEENT: No icterus.  Trachea midline  Respiratory: Respirations are even and unlabored  Cardiac: Regular rate  Abdomen: Soft, non-distended, well-healed midline incision  Extremities: Warm dry and intact  Skin: No rashes      ASSESSMENT/PLAN:     89yo F w/ fecal incontinence and vaginal prolapse    We discussed sacral nerve stimulation.  We reviewed the relevant anatomy, and principle of the procedure.  We discussed that the 1st surgery would involve placement of the electrode under sedation, followed by a 2 week test period, with a 2nd surgery for implantation of the battery if the patient is satisfied with the device.  We reviewed anticipated efficacy, and I told them that approximately 70% of patients will have the number of episodes of incontinence cut in half.  We reviewed that 20-30% of patients are able to achieve complete continence.      We discussed that the current device is completely MRI compatible.  We discussed risks of surgery including bleeding, infection, need for revision in the future, need for ongoing medications to thickened stool or other therapies, need for explant of the device, and death.  Surgical consent was signed today in clinic for both procedures.  The patient was asked if they had any additional questions, and they have none at this time.      Fransisca Ojeda MD  Staff Surgeon, Colon and Rectal Surgery  [facility]

## 2024-08-22 NOTE — H&P (VIEW-ONLY)
CRS Office Visit History and Physical      SUBJECTIVE:     Chief Complaint: Fecal incontinence    History of Present Illness:  Patient is a 89 y.o. female presents to discuss surgery.    (1/24/2024)  Fecal incontinence x 2 years.  No sensation to leakage of stool.  Happens daily.  Either small pieces or full BM.  Taking fiber-con and metamucil, which has helped, but still has accidents.  Denies feeling of recta prolapse/bulge.  + vaginal bulge.  Prior colonoscopy: Yes- 8/2/2024, biopsies negative for microscopic colitis.  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)  Prior pelvic or anorectal surgery: Hemorrhoid bending  Family history of colon/rectal cancer: No  Family history of IBD: No  Steroid or other immunosuppression: No  Blood thinners: Aspirin  Current stool softeners or fiber supplement: Yes - as above  Active smoking: No  Prior deliveries: Yes - 2  complicated by tear: No    [place] (FI):  Leakage of solid stool:   Daily (4)  Leakage of liquid stool:   Daily (4)  Leakage of flatus:     Daily (4)  Wear a pad:       Daily (4)  Alter life:       Daily (4)  Total: 20    Altomare (ODS):  How long on toilet:   <5min (0)   How many times/day: 2 (1)    Digitation:   Never (0)   Laxatives:   Never (0)   Enemas:    Never (0)  Incomplete stool: Weekly (2)   Strain:   <25% (1)   Total: 4    Stool consistency/[place]: 2 / 5  Bowel movements per month:   Few times per week   Leakage of urine: Yes   Trouble emptying your bladder: Yes   Feel something bulging through vagina? Yes     Review of patient's allergies indicates:   Allergen Reactions    Pcn [penicillins] Hives and Nausea And Vomiting       Past Medical History:   Diagnosis Date    Arthritis     lumbar    Blood transfusion     Chronic kidney disease, stage III (moderate) 8/2/2017    Coronary artery disease involving native coronary artery without angina pectoris 3/9/2021    Degenerative disc disease     lumbar    Insomnia 11/15/2022     Insulinoma     Pancreatic disease     Urinary incontinence 12/7/2015     Past Surgical History:   Procedure Laterality Date    ADENOIDECTOMY      APPENDECTOMY      CATARACT EXTRACTION, BILATERAL      COLONOSCOPY N/A 8/2/2024    Procedure: COLONOSCOPY;  Surgeon: Fransisca Ojeda MD;  Location: [facility] ENDO (4TH FLR);  Service: Endoscopy;  Laterality: N/A;  6/19/24-Dr. Ojeda pt, PEG, instr portal-DS  8/1 please place extra luci pads on bed before going to recovery-ml  8/1-pt notified of earlier arrival time (0645am)-[facility]    EYE SURGERY      HYSTERECTOMY      SAMM/BSO    INJECTION OF JOINT Bilateral 2/4/2020    Procedure: Injection, Joint--Bilateral GTB;  Surgeon: Arina Carrera Jr., MD;  Location: [facility] PAIN MGT;  Service: Pain Management;  Laterality: Bilateral;  Oral sedation    INJECTION OF STEROID Right 12/20/2018    Procedure: INJECTION, STEROID-- Right SI Joint Block and  Steroid Injection;  Surgeon: Xavier Dasilva MD;  Location: [facility] OR;  Service: Neurosurgery;  Laterality: Right;  INJECTION, STEROID-- Right SI Joint Block and  Steroid Injection  SURGERY TIME:1 HR  LOS: 0 DAYS  RADIOLOGY: C- arm  BED: Regular Bed  with Pillows  POSITION: Prone      INJECTION OF STEROID Left 6/19/2019    Procedure: INJECTION, STEROID Procedure:Left sacroiliac joint block / steroid injection Surgery Time: 30mins LOS: Anesthesia: MAC Radiology: C-arm Bed: Regular Bed Position: Prone;  Surgeon: Xavier Dasilva MD;  Location: [facility] OR;  Service: Neurosurgery;  Laterality: Left;    MINIMALLY INVASIVE FUSION OF SPINE Right 2/6/2019    Procedure: FUSION, SPINE, MINIMALLY INVASIVE Right Sacroiliac Joint Fusion -  I Fuse;  Surgeon: Xavier Dasilva MD;  Location: [facility] OR;  Service: Neurosurgery;  Laterality: Right;  Procedure: Right Sacroiliac Joint Fusion -  I Fuse  Surgery Time: 1.5 Hr  LOS: 0  Anesthesia: General  Radiology: C-Arm  Bed: [place] 4 Poster  Position: Prone  Equipment: I Fuse Codi [id number] (notified 1/24 EF)     "OOPHORECTOMY      PANCREAS SURGERY      Insulanoma    RADIOFREQUENCY ABLATION OF LUMBAR MEDIAL BRANCH NERVE AT SINGLE LEVEL Right 8/28/2018    Procedure: RADIOFREQUENCY ABLATION, NERVE, MEDIAL BRANCH, LUMBAR, 1 LEVEL- Right L3,4,5 IV SEDATION;  Surgeon: Eddie Vega MD;  Location: [facility] PAIN MGT;  Service: Pain Management;  Laterality: Right;    RADIOFREQUENCY ABLATION OF LUMBAR MEDIAL BRANCH NERVE AT SINGLE LEVEL Left 9/4/2018    Procedure: RADIOFREQUENCY ABLATION, NERVE, MEDIAL BRANCH, LUMBAR, 1 LEVEL - Left L3,4,5 IV SEADTION;  Surgeon: Eddie Vega MD;  Location: [facility] PAIN MGT;  Service: Pain Management;  Laterality: Left;  Patient takes ASA     REFRACTIVE SURGERY Bilateral     SPINAL FUSION Left 7/31/2019    Procedure: FUSION, SPINE SI Joint Fusion;  Surgeon: Xavier Dasilva MD;  Location: [facility] OR;  Service: Neurosurgery;  Laterality: Left;  Procedure: Left SI joint fusion  Surgery Time: 1.5hr  LOS:0  Anesthesia: General   Radiology: C-arm   Bed: [place] [address] Poster  Position: Prone  Equipment: Codi Costellovo- [phone] (Codi notified)    TONSILLECTOMY       Family History   Problem Relation Name Age of Onset    Breast cancer Mother      Heart disease Father      Aneurysm Sister      Glaucoma Daughter      Heart attack Son      Breast cancer Maternal Grandmother      Breast cancer Other niece      Social History     Tobacco Use    Smoking status: Never     Passive exposure: Never    Smokeless tobacco: Never   Substance Use Topics    Alcohol use: Yes     Alcohol/week: 1.0 - 2.0 standard drink of alcohol     Types: 1 - 2 Glasses of wine per week    Drug use: No        Review of Systems:  ROS    OBJECTIVE:     Vital Signs (Most Recent)  BP (!) 149/64 (BP Location: Left arm, Patient Position: Sitting, BP Method: Medium (Automatic))   Pulse (!) 57   Ht 5' 2" (1.575 m)   Wt 60.6 kg (133 lb 9.6 oz)   LMP  (LMP Unknown)   BMI 24.44 kg/m²     Physical Exam:  General: White female in no distress "   Neuro: Alert and oriented x 4.  Moves all extremities.     HEENT: No icterus.  Trachea midline  Respiratory: Respirations are even and unlabored  Cardiac: Regular rate  Abdomen: Soft, non-distended, well-healed midline incision  Extremities: Warm dry and intact  Skin: No rashes      ASSESSMENT/PLAN:     88yo F w/ fecal incontinence and vaginal prolapse    We discussed sacral nerve stimulation.  We reviewed the relevant anatomy, and principle of the procedure.  We discussed that the 1st surgery would involve placement of the electrode under sedation, followed by a 2 week test period, with a 2nd surgery for implantation of the battery if the patient is satisfied with the device.  We reviewed anticipated efficacy, and I told them that approximately 70% of patients will have the number of episodes of incontinence cut in half.  We reviewed that 20-30% of patients are able to achieve complete continence.      We discussed that the current device is completely MRI compatible.  We discussed risks of surgery including bleeding, infection, need for revision in the future, need for ongoing medications to thickened stool or other therapies, need for explant of the device, and death.  Surgical consent was signed today in clinic for both procedures.  The patient was asked if they had any additional questions, and they have none at this time.      Fransisca Ojeda MD  Staff Surgeon, Colon and Rectal Surgery  [facility]

## 2024-08-22 NOTE — PROGRESS NOTES
CRS Office Visit History and Physical      SUBJECTIVE:     Chief Complaint: Fecal incontinence    History of Present Illness:  Patient is a 89 y.o. female presents to discuss surgery.    (1/24/2024)  Fecal incontinence x 2 years.  No sensation to leakage of stool.  Happens daily.  Either small pieces or full BM.  Taking fiber-con and metamucil, which has helped, but still has accidents.  Denies feeling of recta prolapse/bulge.  + vaginal bulge.  Prior colonoscopy: Yes- 8/2/2024, biopsies negative for microscopic colitis.  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)  Prior pelvic or anorectal surgery: Hemorrhoid bending  Family history of colon/rectal cancer: No  Family history of IBD: No  Steroid or other immunosuppression: No  Blood thinners: Aspirin  Current stool softeners or fiber supplement: Yes - as above  Active smoking: No  Prior deliveries: Yes - 2  complicated by tear: No    [place] ([place]):  Leakage of solid stool:   Daily (4)  Leakage of liquid stool:   Daily (4)  Leakage of flatus:     Daily (4)  Wear a pad:       Daily (4)  Alter life:       Daily (4)  Total: 20    Altomare (ODS):  How long on toilet:   <5min (0)   How many times/day: 2 (1)    Digitation:   Never (0)   Laxatives:   Never (0)   Enemas:    Never (0)  Incomplete stool: Weekly (2)   Strain:   <25% (1)   Total: 4    Stool consistency/[place]: 2 / 5  Bowel movements per month:   Few times per week   Leakage of urine: Yes   Trouble emptying your bladder: Yes   Feel something bulging through vagina? Yes     Review of patient's allergies indicates:   Allergen Reactions    Pcn [penicillins] Hives and Nausea And Vomiting       Past Medical History:   Diagnosis Date    Arthritis     lumbar    Blood transfusion     Chronic kidney disease, stage III (moderate) 8/2/2017    Coronary artery disease involving native coronary artery without angina pectoris 3/9/2021    Degenerative disc disease     lumbar    Insomnia 11/15/2022     Insulinoma     Pancreatic disease     Urinary incontinence 12/7/2015     Past Surgical History:   Procedure Laterality Date    ADENOIDECTOMY      APPENDECTOMY      CATARACT EXTRACTION, BILATERAL      COLONOSCOPY N/A 8/2/2024    Procedure: COLONOSCOPY;  Surgeon: Fransisca Ojeda MD;  Location: [facility] ENDO (4TH FLR);  Service: Endoscopy;  Laterality: N/A;  6/19/24-Dr. Ojeda pt, PEG, instr portal-DS  8/1 please place extra luci pads on bed before going to recovery-ml  8/1-pt notified of earlier arrival time (0645am)-[facility]    EYE SURGERY      HYSTERECTOMY      SAMM/BSO    INJECTION OF JOINT Bilateral 2/4/2020    Procedure: Injection, Joint--Bilateral GTB;  Surgeon: Arina Carrera Jr., MD;  Location: [facility] PAIN MGT;  Service: Pain Management;  Laterality: Bilateral;  Oral sedation    INJECTION OF STEROID Right 12/20/2018    Procedure: INJECTION, STEROID-- Right SI Joint Block and  Steroid Injection;  Surgeon: Xavier Dasilva MD;  Location: [facility] OR;  Service: Neurosurgery;  Laterality: Right;  INJECTION, STEROID-- Right SI Joint Block and  Steroid Injection  SURGERY TIME:1 HR  LOS: 0 DAYS  RADIOLOGY: C- arm  BED: Regular Bed  with Pillows  POSITION: Prone      INJECTION OF STEROID Left 6/19/2019    Procedure: INJECTION, STEROID Procedure:Left sacroiliac joint block / steroid injection Surgery Time: 30mins LOS: Anesthesia: MAC Radiology: C-arm Bed: Regular Bed Position: Prone;  Surgeon: Xavier Dasilva MD;  Location: [facility] OR;  Service: Neurosurgery;  Laterality: Left;    MINIMALLY INVASIVE FUSION OF SPINE Right 2/6/2019    Procedure: FUSION, SPINE, MINIMALLY INVASIVE Right Sacroiliac Joint Fusion -  I Fuse;  Surgeon: Xavier Dasilva MD;  Location: [facility] OR;  Service: Neurosurgery;  Laterality: Right;  Procedure: Right Sacroiliac Joint Fusion -  I Fuse  Surgery Time: 1.5 Hr  LOS: 0  Anesthesia: General  Radiology: C-Arm  Bed: [place] 4 Poster  Position: Prone  Equipment: I Fuse Codi [phone] (notified 1/24 EF)     "OOPHORECTOMY      PANCREAS SURGERY      Insulanoma    RADIOFREQUENCY ABLATION OF LUMBAR MEDIAL BRANCH NERVE AT SINGLE LEVEL Right 8/28/2018    Procedure: RADIOFREQUENCY ABLATION, NERVE, MEDIAL BRANCH, LUMBAR, 1 LEVEL- Right L3,4,5 IV SEDATION;  Surgeon: Eddie Vega MD;  Location: [facility] PAIN MGT;  Service: Pain Management;  Laterality: Right;    RADIOFREQUENCY ABLATION OF LUMBAR MEDIAL BRANCH NERVE AT SINGLE LEVEL Left 9/4/2018    Procedure: RADIOFREQUENCY ABLATION, NERVE, MEDIAL BRANCH, LUMBAR, 1 LEVEL - Left L3,4,5 IV SEADTION;  Surgeon: Eddie Vega MD;  Location: [facility] PAIN MGT;  Service: Pain Management;  Laterality: Left;  Patient takes ASA     REFRACTIVE SURGERY Bilateral     SPINAL FUSION Left 7/31/2019    Procedure: FUSION, SPINE SI Joint Fusion;  Surgeon: Xavier Dasilva MD;  Location: [facility] OR;  Service: Neurosurgery;  Laterality: Left;  Procedure: Left SI joint fusion  Surgery Time: 1.5hr  LOS:0  Anesthesia: General   Radiology: C-arm   Bed: [place] [address] Poster  Position: Prone  Equipment: Codi Costellovo- [phone] (Codi notified)    TONSILLECTOMY       Family History   Problem Relation Name Age of Onset    Breast cancer Mother      Heart disease Father      Aneurysm Sister      Glaucoma Daughter      Heart attack Son      Breast cancer Maternal Grandmother      Breast cancer Other niece      Social History     Tobacco Use    Smoking status: Never     Passive exposure: Never    Smokeless tobacco: Never   Substance Use Topics    Alcohol use: Yes     Alcohol/week: 1.0 - 2.0 standard drink of alcohol     Types: 1 - 2 Glasses of wine per week    Drug use: No        Review of Systems:  ROS    OBJECTIVE:     Vital Signs (Most Recent)  BP (!) 149/64 (BP Location: Left arm, Patient Position: Sitting, BP Method: Medium (Automatic))   Pulse (!) 57   Ht 5' 2" (1.575 m)   Wt 60.6 kg (133 lb 9.6 oz)   LMP  (LMP Unknown)   BMI 24.44 kg/m²     Physical Exam:  General: White female in no distress "   Neuro: Alert and oriented x 4.  Moves all extremities.     HEENT: No icterus.  Trachea midline  Respiratory: Respirations are even and unlabored  Cardiac: Regular rate  Abdomen: Soft, non-distended, well-healed midline incision  Extremities: Warm dry and intact  Skin: No rashes      ASSESSMENT/PLAN:     89yo F w/ fecal incontinence and vaginal prolapse    We discussed sacral nerve stimulation.  We reviewed the relevant anatomy, and principle of the procedure.  We discussed that the 1st surgery would involve placement of the electrode under sedation, followed by a 2 week test period, with a 2nd surgery for implantation of the battery if the patient is satisfied with the device.  We reviewed anticipated efficacy, and I told them that approximately 70% of patients will have the number of episodes of incontinence cut in half.  We reviewed that 20-30% of patients are able to achieve complete continence.      We discussed that the current device is completely MRI compatible.  We discussed risks of surgery including bleeding, infection, need for revision in the future, need for ongoing medications to thickened stool or other therapies, need for explant of the device, and death.  Surgical consent was signed today in clinic for both procedures.  The patient was asked if they had any additional questions, and they have none at this time.      Fransisca Ojeda MD  Staff Surgeon, Colon and Rectal Surgery  [facility]

## 2024-08-30 ENCOUNTER — TELEPHONE (OUTPATIENT)
Dept: SURGERY | Facility: CLINIC | Age: 89
End: 2024-08-30
Payer: MEDICARE

## 2024-09-02 ENCOUNTER — PATIENT MESSAGE (OUTPATIENT)
Dept: SURGERY | Facility: CLINIC | Age: 89
End: 2024-09-02
Payer: MEDICARE

## 2024-09-03 ENCOUNTER — ANESTHESIA EVENT (OUTPATIENT)
Dept: SURGERY | Facility: HOSPITAL | Age: 89
End: 2024-09-03
Payer: MEDICARE

## 2024-09-03 ENCOUNTER — ANESTHESIA (OUTPATIENT)
Dept: SURGERY | Facility: HOSPITAL | Age: 89
End: 2024-09-03
Payer: MEDICARE

## 2024-09-03 ENCOUNTER — HOSPITAL ENCOUNTER (OUTPATIENT)
Facility: HOSPITAL | Age: 89
Discharge: HOME | End: 2024-09-03
Attending: COLON & RECTAL SURGERY | Admitting: COLON & RECTAL SURGERY
Payer: MEDICARE

## 2024-09-03 VITALS — HEART RATE: 55 BPM | SYSTOLIC BLOOD PRESSURE: 167 MMHG | OXYGEN SATURATION: 99 % | DIASTOLIC BLOOD PRESSURE: 76 MMHG

## 2024-09-03 VITALS
SYSTOLIC BLOOD PRESSURE: 149 MMHG | HEART RATE: 51 BPM | BODY MASS INDEX: 24.29 KG/M2 | DIASTOLIC BLOOD PRESSURE: 67 MMHG | HEIGHT: 62 IN | TEMPERATURE: 98 F | OXYGEN SATURATION: 97 % | WEIGHT: 132 LBS | RESPIRATION RATE: 20 BRPM

## 2024-09-03 DIAGNOSIS — R15.9 FECAL INCONTINENCE: ICD-10-CM

## 2024-09-03 PROCEDURE — C1778 LEAD, NEUROSTIMULATOR: HCPCS | Mod: HCNC | Performed by: COLON & RECTAL SURGERY

## 2024-09-03 PROCEDURE — 25000003 PHARM REV CODE 250: Mod: HCNC | Performed by: NURSE PRACTITIONER

## 2024-09-03 PROCEDURE — 71000044 HC DOSC ROUTINE RECOVERY FIRST HOUR: Mod: HCNC | Performed by: COLON & RECTAL SURGERY

## 2024-09-03 PROCEDURE — 37000009 HC ANESTHESIA EA ADD 15 MINS: Mod: HCNC | Performed by: COLON & RECTAL SURGERY

## 2024-09-03 PROCEDURE — 25000003 PHARM REV CODE 250: Mod: HCNC | Performed by: COLON & RECTAL SURGERY

## 2024-09-03 PROCEDURE — 63600175 PHARM REV CODE 636 W HCPCS: Mod: HCNC | Performed by: NURSE PRACTITIONER

## 2024-09-03 PROCEDURE — 27201423 OPTIME MED/SURG SUP & DEVICES STERILE SUPPLY: Mod: HCNC | Performed by: COLON & RECTAL SURGERY

## 2024-09-03 PROCEDURE — 63600175 PHARM REV CODE 636 W HCPCS: Mod: HCNC | Performed by: NURSE ANESTHETIST, CERTIFIED REGISTERED

## 2024-09-03 PROCEDURE — 64561 IMPLANT NEUROELECTRODES: CPT | Mod: HCNC,LT,, | Performed by: COLON & RECTAL SURGERY

## 2024-09-03 PROCEDURE — 71000016 HC POSTOP RECOV ADDL HR: Mod: HCNC | Performed by: COLON & RECTAL SURGERY

## 2024-09-03 PROCEDURE — C1883 ADAPT/EXT, PACING/NEURO LEAD: HCPCS | Mod: HCNC | Performed by: COLON & RECTAL SURGERY

## 2024-09-03 PROCEDURE — 95971 ALYS SMPL SP/PN NPGT W/PRGRM: CPT | Mod: 51,59,HCNC, | Performed by: COLON & RECTAL SURGERY

## 2024-09-03 PROCEDURE — 25000003 PHARM REV CODE 250: Mod: HCNC | Performed by: NURSE ANESTHETIST, CERTIFIED REGISTERED

## 2024-09-03 PROCEDURE — 37000008 HC ANESTHESIA 1ST 15 MINUTES: Mod: HCNC | Performed by: COLON & RECTAL SURGERY

## 2024-09-03 PROCEDURE — 71000015 HC POSTOP RECOV 1ST HR: Mod: HCNC | Performed by: COLON & RECTAL SURGERY

## 2024-09-03 PROCEDURE — 64590 INS/RPL PRPH SAC/GSTR NPG/R: CPT | Mod: 51,HCNC,, | Performed by: COLON & RECTAL SURGERY

## 2024-09-03 PROCEDURE — 36000707: Mod: HCNC | Performed by: COLON & RECTAL SURGERY

## 2024-09-03 PROCEDURE — 36000706: Mod: HCNC | Performed by: COLON & RECTAL SURGERY

## 2024-09-03 DEVICE — LEAD INTERSTIM 2 SURESCAN 28CM: Type: IMPLANTABLE DEVICE | Site: SACRUM | Status: FUNCTIONAL

## 2024-09-03 RX ORDER — LIDOCAINE HYDROCHLORIDE 20 MG/ML
INJECTION INTRAVENOUS
Status: DISCONTINUED | OUTPATIENT
Start: 2024-09-03 | End: 2024-09-03

## 2024-09-03 RX ORDER — SODIUM CHLORIDE 9 MG/ML
INJECTION, SOLUTION INTRAVENOUS CONTINUOUS
Status: DISCONTINUED | OUTPATIENT
Start: 2024-09-03 | End: 2024-09-03 | Stop reason: HOSPADM

## 2024-09-03 RX ORDER — MUPIROCIN 20 MG/G
OINTMENT TOPICAL
Status: DISCONTINUED | OUTPATIENT
Start: 2024-09-03 | End: 2024-09-03

## 2024-09-03 RX ORDER — HYDROMORPHONE HYDROCHLORIDE 1 MG/ML
0.2 INJECTION, SOLUTION INTRAMUSCULAR; INTRAVENOUS; SUBCUTANEOUS EVERY 5 MIN PRN
Status: DISCONTINUED | OUTPATIENT
Start: 2024-09-03 | End: 2024-09-03 | Stop reason: HOSPADM

## 2024-09-03 RX ORDER — HALOPERIDOL 5 MG/ML
0.5 INJECTION INTRAMUSCULAR EVERY 10 MIN PRN
Status: DISCONTINUED | OUTPATIENT
Start: 2024-09-03 | End: 2024-09-03 | Stop reason: HOSPADM

## 2024-09-03 RX ORDER — DEXAMETHASONE SODIUM PHOSPHATE 4 MG/ML
INJECTION, SOLUTION INTRA-ARTICULAR; INTRALESIONAL; INTRAMUSCULAR; INTRAVENOUS; SOFT TISSUE
Status: DISCONTINUED | OUTPATIENT
Start: 2024-09-03 | End: 2024-09-03

## 2024-09-03 RX ORDER — PROPOFOL 10 MG/ML
VIAL (ML) INTRAVENOUS
Status: DISCONTINUED | OUTPATIENT
Start: 2024-09-03 | End: 2024-09-03

## 2024-09-03 RX ORDER — GLUCAGON 1 MG
1 KIT INJECTION
Status: DISCONTINUED | OUTPATIENT
Start: 2024-09-03 | End: 2024-09-03 | Stop reason: HOSPADM

## 2024-09-03 RX ORDER — ONDANSETRON HYDROCHLORIDE 2 MG/ML
4 INJECTION, SOLUTION INTRAVENOUS DAILY PRN
Status: DISCONTINUED | OUTPATIENT
Start: 2024-09-03 | End: 2024-09-03 | Stop reason: HOSPADM

## 2024-09-03 RX ORDER — LIDOCAINE HYDROCHLORIDE 10 MG/ML
INJECTION, SOLUTION INFILTRATION; PERINEURAL
Status: DISCONTINUED | OUTPATIENT
Start: 2024-09-03 | End: 2024-09-03 | Stop reason: HOSPADM

## 2024-09-03 RX ORDER — MUPIROCIN 20 MG/G
OINTMENT TOPICAL
Status: DISCONTINUED | OUTPATIENT
Start: 2024-09-03 | End: 2024-09-03 | Stop reason: HOSPADM

## 2024-09-03 RX ORDER — OXYCODONE HYDROCHLORIDE 5 MG/1
5 TABLET ORAL
Status: DISCONTINUED | OUTPATIENT
Start: 2024-09-03 | End: 2024-09-03 | Stop reason: HOSPADM

## 2024-09-03 RX ORDER — BUPIVACAINE HYDROCHLORIDE AND EPINEPHRINE 5; 5 MG/ML; UG/ML
INJECTION, SOLUTION EPIDURAL; INTRACAUDAL; PERINEURAL
Status: DISCONTINUED | OUTPATIENT
Start: 2024-09-03 | End: 2024-09-03 | Stop reason: HOSPADM

## 2024-09-03 RX ORDER — LIDOCAINE HYDROCHLORIDE 10 MG/ML
1 INJECTION, SOLUTION EPIDURAL; INFILTRATION; INTRACAUDAL; PERINEURAL ONCE
Status: COMPLETED | OUTPATIENT
Start: 2024-09-03 | End: 2024-09-03

## 2024-09-03 RX ORDER — ONDANSETRON HYDROCHLORIDE 2 MG/ML
INJECTION, SOLUTION INTRAVENOUS
Status: DISCONTINUED | OUTPATIENT
Start: 2024-09-03 | End: 2024-09-03

## 2024-09-03 RX ORDER — FENTANYL CITRATE 50 UG/ML
INJECTION, SOLUTION INTRAMUSCULAR; INTRAVENOUS
Status: DISCONTINUED | OUTPATIENT
Start: 2024-09-03 | End: 2024-09-03

## 2024-09-03 RX ADMIN — MUPIROCIN: 20 OINTMENT TOPICAL at 08:09

## 2024-09-03 RX ADMIN — SODIUM CHLORIDE: 9 INJECTION, SOLUTION INTRAVENOUS at 08:09

## 2024-09-03 RX ADMIN — LIDOCAINE HYDROCHLORIDE 50 MG: 20 INJECTION INTRAVENOUS at 08:09

## 2024-09-03 RX ADMIN — DEXAMETHASONE SODIUM PHOSPHATE 4 MG: 4 INJECTION, SOLUTION INTRAMUSCULAR; INTRAVENOUS at 09:09

## 2024-09-03 RX ADMIN — SODIUM CHLORIDE: 0.9 INJECTION, SOLUTION INTRAVENOUS at 08:09

## 2024-09-03 RX ADMIN — FENTANYL CITRATE 25 MCG: 50 INJECTION, SOLUTION INTRAMUSCULAR; INTRAVENOUS at 08:09

## 2024-09-03 RX ADMIN — LIDOCAINE HYDROCHLORIDE 10 MG: 10 INJECTION, SOLUTION EPIDURAL; INFILTRATION; INTRACAUDAL; PERINEURAL at 07:09

## 2024-09-03 RX ADMIN — VANCOMYCIN HYDROCHLORIDE 1000 MG: 1 INJECTION, POWDER, LYOPHILIZED, FOR SOLUTION INTRAVENOUS at 08:09

## 2024-09-03 RX ADMIN — ONDANSETRON 4 MG: 2 INJECTION INTRAMUSCULAR; INTRAVENOUS at 09:09

## 2024-09-03 RX ADMIN — PROPOFOL 40 MG: 10 INJECTION, EMULSION INTRAVENOUS at 08:09

## 2024-09-03 NOTE — OP NOTE
"[facility]  Operative Note    Date: 09/03/2024    Name: Cheryl Ghosh    MRN: [id number]    Pre-Op Diagnosis: Full Incontinence of feces    Post-Op Diagnosis: Same    Procedure(s) Performed:   Tined Lead Placement/Stage 1 Interstim (45692)  Fluoro report reading (97443)    Specimen(s): None    Staff Surgeon: Fransisca Ojeda    Assistant Surgeon: Katharina Daniels (fellow)    Anesthesia: Monitor Anesthesia Care    Indications: Cheryl Ghosh is a 89 y.o. female with a history of fecal incontinence, refractory to conservative therapy.  After a discussion of risks and benefits she agreed to proceed with Interstim trial.  Please see my dictated notes for further details.    Details of procedure:   The patient was taken to the operating room and transferred to the OR table in the prone position. A pillow was placed under the lower abdomen to flatten the sacrum.  The toes were allowed to dangle freely, and socks were removed. They were then placed under sedation by the anesthesia team.  The patient was prepped and draped in the usual sterile fashion using I[place]an.      The C-arm was moved into the AP position to provide fluoroscopic guidance of the sacrum.  The medial edges of the foramina were identified and marked.  The C-arm was then moved into the lateral position to identify the S3 foramen.  Once the needle point was determined, local injection of 0.25% Marcaine with epinephrine mixed with 1% lidocaine was administered.  A 3.5" foramen needle was placed into the superior, medial aspect of the left S3 foramen.  Appropriate needle depth was confirmed using fluoroscopy.  Proper S3 needle location was also confirmed by direct observation of bellowing of the perineum.       The foramen needle stylet was removed and the directional guide was placed through the needle using markers on the guide.  The foramen needle was removed by sliding it over this guide.  A small incision was made on the skin, and the lead " introducer with dilator was placed over the directional guide.  This was introduced until the radiopaque marker was retirement through the foramen using fluoroscopic guidance.  The dilator was then removed along with the directional guide.  Using fluoroscopy, the tined lead with bent stylet was placed through the introducer until the electrodes 2 and 3 straddled the anterior surface of the sacrum.  All 4 electrodes were tested, and confirmed to produce bellowing.  After satisfactory lead positioning was confirmed, the introducer was removed over the lead under continuous fluoroscopy, deploying the tines into the presacral tissue.  All 4 electrodes were retested confirming an appropriate response, and final images were saved.     The future internal neurostimulator pocket site was identified below the iliac crest and lateral to the sacrum on the left side.  Local was administered and an incision was made into the subcutaneous tissue creating a connection site.  Blunt dissection was used to create a small pocket, and hemostasis was achieved with cautery. A tunneling tool with sheath was placed from the lead exit site subcutaneously to a small incised pocket connection site.  The sheath was removed. The lead was cleaned and dried. The lead was inserted into the temporary percutaneous extension with visual confirmation of blue tip placement.  The screw was tightened until audible clicks were heard.      Using the tunneling tool and sheath, subcutaneous tunnel was created from the pocket site to the contralateral side and exited at a localized site. The percutaneous extension was placed through the sheath, and the sheath was removed.     The connection components were placed into the small pocket.  The incisions and pockets were irrigated with sterile water and closed with multiple layers of Vicryl suture, followed by running subcuticular Monocryl. The percutaneous extension site was secured with a Biopatch and Tegaderm.  The twist lock cable was then plugged into the ENS and placed into the pouch.  All counts were correct.  I was present and scrubbed for the entire procedure. The patient was transferred to the operating room in good condition.     Using the  and ENS, the patient was programmed to the electrode of optimum sensation and provided utilization instructions prior to discharge.  They will complete a bowel and bladder diary during the test.  To help document the results of this procedure.    Estimated Blood Loss: 5mL    Drains/Implants:   Implant Name Type Inv. Item Serial No.  Lot No. LRB No. Used Action   LEAD INTERSTIM 2 SURESCAN 28CM - [id number]  LEAD INTERSTIM 2 SURESCAN 28CM  [employer] [facility] [id number] N/A 1 Implanted       Wound Class: I (clean)    Fransisca Ojeda    Thresholds:  Lead Bellow  Toe  0 0.3  0.7  1 0.8  0.8  2 0.7  1.0  3 0.5  0.4

## 2024-09-03 NOTE — TRANSFER OF CARE
"Anesthesia Transfer of Care Note    Patient: Cheryl Ghosh    Procedure(s) Performed: Procedure(s) (LRB):  INSERTION, NEUROSTIMULATOR, TEMPORARY, SACRAL (N/A)    Patient location: [facility]    Anesthesia Type: general    Transport from OR: Transported from OR on 6-10 L/min O2 by face mask with adequate spontaneous ventilation    Post pain: adequate analgesia    Post assessment: no apparent anesthetic complications and tolerated procedure well    Post vital signs: stable    Level of consciousness: sedated    Nausea/Vomiting: no nausea/vomiting    Complications: none    Transfer of care protocol was followed      Last vitals: Visit Vitals  BP (!) 155/67 (BP Location: Left arm, Patient Position: Lying)   Pulse (!) 49   Temp 36.3 °C (97.3 °F) (Skin)   Resp 20   Ht 5' 2" (1.575 m)   Wt 59.9 kg (132 lb)   LMP  (LMP Unknown)   SpO2 100%   Breastfeeding No   BMI 24.14 kg/m²     "

## 2024-09-03 NOTE — INTERVAL H&P NOTE
The patient has been examined and the H&P has been reviewed:    I concur with the findings and no changes have occurred since H&P was written.    Surgery risks, benefits and alternative options discussed and understood by patient/family.      SNS placement

## 2024-09-03 NOTE — BRIEF OP NOTE
[facility] - Surgery ([facility])  Brief Operative Note    SUMMARY     Surgery Date: 9/3/2024     Surgeons and Role:     * Fransisca Ojeda MD - Primary     * Katharina Daniels MD - Fellow    Assisting Surgeon: None    Pre-op Diagnosis:  Incontinence of feces, unspecified fecal incontinence type [R15.9]    Post-op Diagnosis:  Post-Op Diagnosis Codes:     * Incontinence of feces, unspecified fecal incontinence type [R15.9]    Procedure(s) (LRB):  INSERTION, NEUROSTIMULATOR, TEMPORARY, SACRAL (N/A)    Anesthesia: Monitor Anesthesia Care    Implants:  Implant Name Type Inv. Item Serial No.  Lot No. LRB No. Used Action   LEAD INTERSTIM 2 SURESCAN 28CM - [id number]  LEAD INTERSTIM 2 SURESCAN 28CM  [employer] [facility] [id number] N/A 1 Implanted       Operative Findings: sinus cavity with friable tissues, mucous suctioned and irrigated out, bowel lumen pink and healthy, replaced with 14fr NGT with black sponge    Estimated Blood Loss: none    Estimated Blood Loss has been documented.         Specimens:   Specimen (24h ago, onward)      None            [id number]

## 2024-09-03 NOTE — DISCHARGE INSTRUCTIONS
Today you had the first stage of your Interstim placed.    ACTIVITY  There are no restrictions in activity.      DIET  Continue your normal diet. You may eat the same foods you ate before your procedure.  Drink plenty of fluids during the first 24-48 hours following your procedure.     MEDICATIONS / PAIN  Resume all other previous medications from your prescribing physician.  You may take Acetaminophen (Tylenol) and/or Ibuprofen (Advil) for pain as needed. It is ok to take them together.  Do not exceed the daily maximum dose listed on the package label.  You have a numbing injection from the surgery, this will start to wear off after 5-6 hours and your pain may increase.  This is normal.    WOUND CARE  There is a clear dressing on your lower back where the lead is coming out of the skin.  DO NOT REMOVE THIS DRESSING.  It should stay in place until your next procedure.  Keep the area where the dressing is dry.  Do not submerge the dressing under water (no swimming or tub baths).  You can sponge bathe but do not get the clear dressing wet.      SIGNS AND SYMPTOMS TO REPORT TO THE DOCTOR  Fever greater than 101° F   Redness of the skin at the incision site or drainage of fluid from any of the incisions.  Call your doctor with any questions or concerns.     The representative from [employer] will be checking in with you, and will give you a number to call if you have any issues with the device during the 2 week test period.     For any emergency situation, call 911 immediately or go to your nearest emergency room.

## 2024-09-03 NOTE — ANESTHESIA PREPROCEDURE EVALUATION
09/03/2024  Cheryl Ghosh is a 89 y.o., female.      Pre-op Assessment    I have reviewed the Patient Summary Reports.          Review of Systems  Anesthesia Hx:  No problems with previous Anesthesia                Social:  Non-Smoker       Hematology/Oncology:  Hematology Normal   Oncology Normal                                   EENT/Dental:  EENT/Dental Normal           Cardiovascular:     Hypertension   CAD                                        Pulmonary:  Pulmonary Normal                       Renal/:  Chronic Renal Disease, CKD                Hepatic/GI:  Hepatic/GI Normal                 Musculoskeletal:         Spine Disorders: lumbar            Neurological:  TIA,                                     Endocrine:  Endocrine Normal            Dermatological:  Skin Normal    Psych:  Psychiatric Normal                Physical Exam  General: Alert and Oriented    Airway:  Mallampati: II / II  Mouth Opening: Normal  TM Distance: Normal  Tongue: Normal  Neck ROM: Normal ROM    Dental:  Intact    Chest/Lungs:  Clear to auscultation, Normal Respiratory Rate    Heart:  Rate: Normal  Rhythm: Regular Rhythm  Sounds: Normal    Anesthesia Plan  Type of Anesthesia, risks & benefits discussed:    Anesthesia Type: Gen ETT, Gen Natural Airway  Intra-op Monitoring Plan: Standard ASA Monitors  Post Op Pain Control Plan: multimodal analgesia  Airway Plan: Direct  Informed Consent: Informed consent signed with the Patient and all parties understand the risks and agree with anesthesia plan.  All questions answered.   ASA Score: 3    Ready For Surgery From Anesthesia Perspective.   .

## 2024-09-03 NOTE — BRIEF OP NOTE
[facility] (2nd Fl)  Brief Operative Note    Surgery Date: 9/3/2024     Surgeons and Role:     * Fransisca Ojeda MD - Primary     * Katharina Daniels MD - Fellow    Assisting Surgeon: None    Pre-op Diagnosis:  Incontinence of feces, unspecified fecal incontinence type [R15.9]    Post-op Diagnosis:  Post-Op Diagnosis Codes:     * Incontinence of feces, unspecified fecal incontinence type [R15.9]    Procedure(s) (LRB):  INSERTION, NEUROSTIMULATOR, TEMPORARY, SACRAL (N/A)    Anesthesia: Monitor Anesthesia Care    Operative Findings: appropriate and robust response to lead placement confirmed with fluoroscopy, tunneled temp leads exiting out left hip    Estimated Blood Loss: * No values recorded between 9/3/2024  8:55 AM and 9/3/2024  9:56 AM *         Specimens:   Specimen (24h ago, onward)      None              Discharge Note    OUTCOME: Patient tolerated treatment/procedure well without complication and is now ready for discharge.    DISPOSITION: Home or Self Care    FINAL DIAGNOSIS:  fecal incontinence    FOLLOWUP: In clinic    DISCHARGE INSTRUCTIONS:  Today you had the first stage of your Interstim placed.    ACTIVITY  There are no restrictions in activity.      DIET  Continue your normal diet. You may eat the same foods you ate before your procedure.  Drink plenty of fluids during the first 24-48 hours following your procedure.     MEDICATIONS / PAIN  Resume all other previous medications from your prescribing physician.  You may take Acetaminophen (Tylenol) and/or Ibuprofen (Advil) for pain as needed. It is ok to take them together.  Do not exceed the daily maximum dose listed on the package label.  You have a numbing injection from the surgery, this will start to wear off after 5-6 hours and your pain may increase.  This is normal.    WOUND CARE  There is a clear dressing on your lower back where the lead is coming out of the skin.  DO NOT REMOVE THIS DRESSING.  It should stay in place until your  next procedure.  Keep the area where the dressing is dry.  Do not submerge the dressing under water (no swimming or tub baths).  You can sponge bathe but do not get the clear dressing wet.      SIGNS AND SYMPTOMS TO REPORT TO THE DOCTOR  Fever greater than 101° F   Redness of the skin at the incision site or drainage of fluid from any of the incisions.  Call your doctor with any questions or concerns.     The representative from [employer] will be checking in with you, and will give you a number to call if you have any issues with the device during the 2 week test period.     For any emergency situation, call 911 immediately or go to your nearest emergency room.

## 2024-09-05 NOTE — ANESTHESIA POSTPROCEDURE EVALUATION
Anesthesia Post Evaluation    Patient: Cheryl Ghosh    Procedure(s) Performed: Procedure(s) (LRB):  INSERTION, NEUROSTIMULATOR, TEMPORARY, SACRAL (N/A)    Final Anesthesia Type: general      Patient location during evaluation: PACU  Patient participation: Yes- Able to Participate  Level of consciousness: awake and alert  Post-procedure vital signs: reviewed and stable  Pain management: adequate  Airway patency: patent    PONV status: None or treated.  Anesthetic complications: no      Cardiovascular status: hemodynamically stable  Respiratory status: spontaneous ventilation  Hydration status: euvolemic  Follow-up not needed.          Vitals Value Taken Time   /67 09/03/24 1145   Temp 36.6 °C (97.9 °F) 09/03/24 1145   Pulse 51 09/03/24 1145   Resp 20 09/03/24 1145   SpO2 97 % 09/03/24 1145         No case tracking events are documented in the log.      Pain/Enid Score: No data recorded

## 2024-09-13 ENCOUNTER — TELEPHONE (OUTPATIENT)
Dept: SURGERY | Facility: CLINIC | Age: 89
End: 2024-09-13
Payer: MEDICARE

## 2024-09-13 NOTE — TELEPHONE ENCOUNTER
Data Reviewed from Interstim trial:    Data shows a 70% reduction in number of daily accidents, and a 100% decrease in the number of needed pad changes daily.    She has met criteria for permanent Interstim device placement.        Fransisca Ojeda

## 2024-09-13 NOTE — TELEPHONE ENCOUNTER
Returned pt's call regarding message below left w/ phone staff.    Pt states she was notified about the insurance denial. Informed pt that the data from [employer] has been sent to the pre-service department to work on an appeal. Pt had questions regarding settings, expectations, intra-op questions about the Interstim. She was advised to reach out to Jay for the specifics r/t the device. Confirmed pt has Jay's ([employer] Rep) phone number for contact.   Arrival time of 0500 given for procedure next Tuesday, 9/17. Pt asked if procedure was to still take place if not approved. RN informed Mrs. Luna that she would have a procedure -- with insurance approval a battery would be placed but with a denial, the leads would be removed.     Pt verbalized understanding. No additional needs identified at this time.    ----- Message -----  From: Jenifer Simmons  Sent: 9/13/2024   8:55 AM CDT  To: [facility] Staff    Cheryl Augie calling regarding Patient Advice (message) for #pt is returning call from office concerning surgery, asking for call back

## 2024-09-16 ENCOUNTER — TELEPHONE (OUTPATIENT)
Dept: SURGERY | Facility: CLINIC | Age: 89
End: 2024-09-16
Payer: MEDICARE

## 2024-09-16 NOTE — TELEPHONE ENCOUNTER
Called [employer] to discuss denial of Interstim generator implant tomorrow.    Spoke with Fransisca Augustin, Mando Rodriguez, and Delilah. Was on the phone for >60 minutes.    Discussed with each person I was transferred to that patient would be at risk for infection with delay of procedure. Requested expedited review.    Authorization is pending. Tracking number is [id number].    Fransisca Ojeda

## 2024-09-17 ENCOUNTER — ANESTHESIA (OUTPATIENT)
Dept: SURGERY | Facility: HOSPITAL | Age: 89
End: 2024-09-17
Payer: MEDICARE

## 2024-09-17 ENCOUNTER — ANESTHESIA EVENT (OUTPATIENT)
Dept: SURGERY | Facility: HOSPITAL | Age: 89
End: 2024-09-17
Payer: MEDICARE

## 2024-09-17 ENCOUNTER — HOSPITAL ENCOUNTER (OUTPATIENT)
Facility: HOSPITAL | Age: 89
Discharge: HOME | End: 2024-09-17
Attending: COLON & RECTAL SURGERY | Admitting: COLON & RECTAL SURGERY
Payer: MEDICARE

## 2024-09-17 VITALS
DIASTOLIC BLOOD PRESSURE: 72 MMHG | WEIGHT: 131.19 LBS | HEART RATE: 62 BPM | BODY MASS INDEX: 22.4 KG/M2 | TEMPERATURE: 98 F | SYSTOLIC BLOOD PRESSURE: 159 MMHG | RESPIRATION RATE: 20 BRPM | HEIGHT: 64 IN | OXYGEN SATURATION: 98 %

## 2024-09-17 DIAGNOSIS — R15.9 FECAL INCONTINENCE: ICD-10-CM

## 2024-09-17 PROCEDURE — 95971 ALYS SMPL SP/PN NPGT W/PRGRM: CPT | Mod: HCNC,,, | Performed by: COLON & RECTAL SURGERY

## 2024-09-17 PROCEDURE — 36000707: Mod: HCNC | Performed by: COLON & RECTAL SURGERY

## 2024-09-17 PROCEDURE — 64590 INS/RPL PRPH SAC/GSTR NPG/R: CPT | Mod: HCNC,,, | Performed by: COLON & RECTAL SURGERY

## 2024-09-17 PROCEDURE — 25000003 PHARM REV CODE 250: Mod: HCNC | Performed by: COLON & RECTAL SURGERY

## 2024-09-17 PROCEDURE — 25000003 PHARM REV CODE 250: Mod: HCNC | Performed by: NURSE ANESTHETIST, CERTIFIED REGISTERED

## 2024-09-17 PROCEDURE — 36000706: Mod: HCNC | Performed by: COLON & RECTAL SURGERY

## 2024-09-17 PROCEDURE — C1787 PATIENT PROGR, NEUROSTIM: HCPCS | Mod: HCNC | Performed by: COLON & RECTAL SURGERY

## 2024-09-17 PROCEDURE — 37000009 HC ANESTHESIA EA ADD 15 MINS: Mod: HCNC | Performed by: COLON & RECTAL SURGERY

## 2024-09-17 PROCEDURE — C1767 GENERATOR, NEURO NON-RECHARG: HCPCS | Mod: HCNC | Performed by: COLON & RECTAL SURGERY

## 2024-09-17 PROCEDURE — 63600175 PHARM REV CODE 636 W HCPCS: Mod: HCNC | Performed by: NURSE PRACTITIONER

## 2024-09-17 PROCEDURE — 63600175 PHARM REV CODE 636 W HCPCS: Mod: HCNC | Performed by: NURSE ANESTHETIST, CERTIFIED REGISTERED

## 2024-09-17 PROCEDURE — 71000015 HC POSTOP RECOV 1ST HR: Mod: HCNC | Performed by: COLON & RECTAL SURGERY

## 2024-09-17 PROCEDURE — 37000008 HC ANESTHESIA 1ST 15 MINUTES: Mod: HCNC | Performed by: COLON & RECTAL SURGERY

## 2024-09-17 PROCEDURE — 71000016 HC POSTOP RECOV ADDL HR: Mod: HCNC | Performed by: COLON & RECTAL SURGERY

## 2024-09-17 PROCEDURE — 25000003 PHARM REV CODE 250: Mod: HCNC | Performed by: NURSE PRACTITIONER

## 2024-09-17 PROCEDURE — 71000044 HC DOSC ROUTINE RECOVERY FIRST HOUR: Mod: HCNC | Performed by: COLON & RECTAL SURGERY

## 2024-09-17 DEVICE — SYS INTERSTIM X RECHARGE FREE: Type: IMPLANTABLE DEVICE | Site: SACRUM | Status: FUNCTIONAL

## 2024-09-17 RX ORDER — SUCCINYLCHOLINE CHLORIDE 20 MG/ML
INJECTION INTRAMUSCULAR; INTRAVENOUS
Status: DISCONTINUED | OUTPATIENT
Start: 2024-09-17 | End: 2024-09-17

## 2024-09-17 RX ORDER — LIDOCAINE HYDROCHLORIDE 10 MG/ML
INJECTION, SOLUTION INFILTRATION; PERINEURAL
Status: DISCONTINUED | OUTPATIENT
Start: 2024-09-17 | End: 2024-09-17 | Stop reason: HOSPADM

## 2024-09-17 RX ORDER — PROPOFOL 10 MG/ML
VIAL (ML) INTRAVENOUS
Status: DISCONTINUED | OUTPATIENT
Start: 2024-09-17 | End: 2024-09-17

## 2024-09-17 RX ORDER — GLUCAGON 1 MG
1 KIT INJECTION
Status: CANCELLED | OUTPATIENT
Start: 2024-09-17

## 2024-09-17 RX ORDER — LIDOCAINE HYDROCHLORIDE 20 MG/ML
INJECTION INTRAVENOUS
Status: DISCONTINUED | OUTPATIENT
Start: 2024-09-17 | End: 2024-09-17

## 2024-09-17 RX ORDER — MEPERIDINE HYDROCHLORIDE 50 MG/ML
12.5 INJECTION INTRAMUSCULAR; INTRAVENOUS; SUBCUTANEOUS ONCE AS NEEDED
Status: CANCELLED | OUTPATIENT
Start: 2024-09-17 | End: 2024-09-18

## 2024-09-17 RX ORDER — ONDANSETRON HYDROCHLORIDE 2 MG/ML
INJECTION, SOLUTION INTRAVENOUS
Status: DISCONTINUED | OUTPATIENT
Start: 2024-09-17 | End: 2024-09-17

## 2024-09-17 RX ORDER — HYDROMORPHONE HYDROCHLORIDE 1 MG/ML
0.2 INJECTION, SOLUTION INTRAMUSCULAR; INTRAVENOUS; SUBCUTANEOUS EVERY 5 MIN PRN
Status: CANCELLED | OUTPATIENT
Start: 2024-09-17

## 2024-09-17 RX ORDER — FENTANYL CITRATE 50 UG/ML
INJECTION, SOLUTION INTRAMUSCULAR; INTRAVENOUS
Status: DISCONTINUED | OUTPATIENT
Start: 2024-09-17 | End: 2024-09-17

## 2024-09-17 RX ORDER — ROCURONIUM BROMIDE 10 MG/ML
INJECTION, SOLUTION INTRAVENOUS
Status: DISCONTINUED | OUTPATIENT
Start: 2024-09-17 | End: 2024-09-17

## 2024-09-17 RX ORDER — ONDANSETRON HYDROCHLORIDE 2 MG/ML
4 INJECTION, SOLUTION INTRAVENOUS DAILY PRN
Status: CANCELLED | OUTPATIENT
Start: 2024-09-17

## 2024-09-17 RX ORDER — DEXAMETHASONE SODIUM PHOSPHATE 4 MG/ML
INJECTION, SOLUTION INTRA-ARTICULAR; INTRALESIONAL; INTRAMUSCULAR; INTRAVENOUS; SOFT TISSUE
Status: DISCONTINUED | OUTPATIENT
Start: 2024-09-17 | End: 2024-09-17

## 2024-09-17 RX ORDER — EPHEDRINE SULFATE 50 MG/ML
INJECTION, SOLUTION INTRAVENOUS
Status: DISCONTINUED | OUTPATIENT
Start: 2024-09-17 | End: 2024-09-17

## 2024-09-17 RX ORDER — BUPIVACAINE HYDROCHLORIDE AND EPINEPHRINE 2.5; 5 MG/ML; UG/ML
INJECTION, SOLUTION EPIDURAL; INFILTRATION; INTRACAUDAL; PERINEURAL
Status: DISCONTINUED | OUTPATIENT
Start: 2024-09-17 | End: 2024-09-17 | Stop reason: HOSPADM

## 2024-09-17 RX ORDER — LIDOCAINE HYDROCHLORIDE 10 MG/ML
1 INJECTION, SOLUTION EPIDURAL; INFILTRATION; INTRACAUDAL; PERINEURAL ONCE
Status: COMPLETED | OUTPATIENT
Start: 2024-09-17 | End: 2024-09-17

## 2024-09-17 RX ORDER — SODIUM CHLORIDE 9 MG/ML
INJECTION, SOLUTION INTRAVENOUS CONTINUOUS
Status: CANCELLED | OUTPATIENT
Start: 2024-09-17

## 2024-09-17 RX ORDER — MUPIROCIN 20 MG/G
OINTMENT TOPICAL
Status: DISCONTINUED | OUTPATIENT
Start: 2024-09-17 | End: 2024-09-17 | Stop reason: HOSPADM

## 2024-09-17 RX ORDER — SODIUM CHLORIDE 9 MG/ML
INJECTION, SOLUTION INTRAVENOUS CONTINUOUS
Status: DISCONTINUED | OUTPATIENT
Start: 2024-09-17 | End: 2024-09-17 | Stop reason: HOSPADM

## 2024-09-17 RX ADMIN — ONDANSETRON 4 MG: 2 INJECTION INTRAMUSCULAR; INTRAVENOUS at 11:09

## 2024-09-17 RX ADMIN — EPHEDRINE SULFATE 10 MG: 50 INJECTION INTRAVENOUS at 11:09

## 2024-09-17 RX ADMIN — FENTANYL CITRATE 50 MCG: 50 INJECTION, SOLUTION INTRAMUSCULAR; INTRAVENOUS at 11:09

## 2024-09-17 RX ADMIN — MUPIROCIN: 20 OINTMENT TOPICAL at 06:09

## 2024-09-17 RX ADMIN — SODIUM CHLORIDE: 0.9 INJECTION, SOLUTION INTRAVENOUS at 10:09

## 2024-09-17 RX ADMIN — ROCURONIUM BROMIDE 10 MG: 10 INJECTION, SOLUTION INTRAVENOUS at 11:09

## 2024-09-17 RX ADMIN — GLYCOPYRROLATE 0.2 MG: 0.2 INJECTION INTRAMUSCULAR; INTRAVENOUS at 11:09

## 2024-09-17 RX ADMIN — SUCCINYLCHOLINE 200 MG: 20 INJECTION, SOLUTION INTRAMUSCULAR; INTRAVENOUS at 11:09

## 2024-09-17 RX ADMIN — LIDOCAINE HYDROCHLORIDE 10 MG: 10 INJECTION, SOLUTION EPIDURAL; INFILTRATION; INTRACAUDAL; PERINEURAL at 06:09

## 2024-09-17 RX ADMIN — SUGAMMADEX 200 MG: 100 INJECTION, SOLUTION INTRAVENOUS at 11:09

## 2024-09-17 RX ADMIN — LIDOCAINE HYDROCHLORIDE 80 MG: 20 INJECTION INTRAVENOUS at 11:09

## 2024-09-17 RX ADMIN — SODIUM CHLORIDE: 9 INJECTION, SOLUTION INTRAVENOUS at 07:09

## 2024-09-17 RX ADMIN — PROPOFOL 120 MG: 10 INJECTION, EMULSION INTRAVENOUS at 11:09

## 2024-09-17 RX ADMIN — VANCOMYCIN HYDROCHLORIDE 1750 MG: 500 INJECTION, POWDER, LYOPHILIZED, FOR SOLUTION INTRAVENOUS at 07:09

## 2024-09-17 RX ADMIN — DEXAMETHASONE SODIUM PHOSPHATE 4 MG: 4 INJECTION INTRA-ARTICULAR; INTRALESIONAL; INTRAMUSCULAR; INTRAVENOUS; SOFT TISSUE at 11:09

## 2024-09-17 NOTE — ANESTHESIA PROCEDURE NOTES
Intubation    Date/Time: 9/17/2024 11:06 AM    Performed by: Arin Farias CRNA  Authorized by: Tonny Sosa Jr., MD    Intubation:     Induction:  Rapid sequence induction    Intubated:  Postinduction    Mask Ventilation:  Not attempted    Attempted By:  CRNA    Method of Intubation:  Video laryngoscopy    Blade:  Molina 3    Laryngeal View Grade: Grade I - full view of cords      Difficult Airway Encountered?: No      Complications:  None    Airway Device:  Oral endotracheal tube    Airway Device Size:  7.0    Style/Cuff Inflation:  Cuffed    Tube secured:  21    Secured at:  The lips    Placement Verified By:  Capnometry    Complicating Factors:  None    Findings Post-Intubation:  BS equal bilateral and atraumatic/condition of teeth unchanged

## 2024-09-17 NOTE — BRIEF OP NOTE
[facility] ([facility])  Brief Operative Note    Surgery Date: 9/17/2024     Surgeons and Role:     * Fransisca Ojeda MD - Primary     * Katharina Daniels MD - Fellow    Assisting Surgeon: None    Pre-op Diagnosis:  Incontinence of feces, unspecified fecal incontinence type [R15.9]    Post-op Diagnosis:  Post-Op Diagnosis Codes:     * Incontinence of feces, unspecified fecal incontinence type [R15.9]    Procedure(s) (LRB):  INSERTION, NEUROSTIMULATOR, PERMANENT, SACRAL (N/A)    Anesthesia: Monitor Anesthesia Care    Operative Findings: battery implanted with appropriate response    Estimated Blood Loss: minimal         Specimens:   Specimen (24h ago, onward)      None              Discharge Note    OUTCOME: Patient tolerated treatment/procedure well without complication and is now ready for discharge.    DISPOSITION: Home or Self Care    FINAL DIAGNOSIS:  Fecal incontinence    FOLLOWUP: In clinic    DISCHARGE INSTRUCTIONS:    Today you had the second stage of your Interstim placed.     ACTIVITY  There are no restrictions in activity.      DIET  Continue your normal diet. You may eat the same foods you ate before your procedure.  Drink plenty of fluids during the first 24-48 hours following your procedure.     MEDICATIONS / PAIN  Resume all other previous medications from your prescribing physician. Okay to restart aspirin tomorrow.  You may take Acetaminophen (Tylenol) and/or Ibuprofen (Advil) for pain as needed. It is ok to take them together.  Do not exceed the daily maximum dose listed on the package label.  You have a numbing injection from the surgery, this will start to wear off after 5-6 hours and your pain may increase.  This is normal.     WOUND CARE  There is a clear glue on your lower back where the battery pack is implanted.  DO NOT PICK AT THIS GLUE.  Keep the area where the dressing is dry.  Do not submerge the dressing under water (no swimming or tub baths).  You can shower in two days on 9/19  but do not scrub the wound.      SIGNS AND SYMPTOMS TO REPORT TO THE DOCTOR  Fever greater than 101° F   Redness of the skin at the incision site or drainage of fluid from any of the incisions.  Call your doctor with any questions or concerns.     The representative from [employer] will be checking in with you, and will give you a number to call if you have any issues with the device.      For any emergency situation, call 911 immediately or go to your nearest emergency room.

## 2024-09-17 NOTE — DISCHARGE INSTRUCTIONS
Today you had the second stage of your Interstim placed.    ACTIVITY  There are no restrictions in activity.      DIET  Continue your normal diet. You may eat the same foods you ate before your procedure.  Drink plenty of fluids during the first 24-48 hours following your procedure.     MEDICATIONS / PAIN  Resume all other previous medications from your prescribing physician. Okay to restart aspirin tomorrow.  You may take Acetaminophen (Tylenol) and/or Ibuprofen (Advil) for pain as needed. It is ok to take them together.  Do not exceed the daily maximum dose listed on the package label.  You have a numbing injection from the surgery, this will start to wear off after 5-6 hours and your pain may increase.  This is normal.    WOUND CARE  There is a clear glue on your lower back where the battery pack is implanted.  DO NOT PICK AT THIS GLUE.  Keep the area where the dressing is dry.  Do not submerge the dressing under water (no swimming or tub baths).  You can shower in two days on 9/19 but do not scrub the wound.      SIGNS AND SYMPTOMS TO REPORT TO THE DOCTOR  Fever greater than 101° F   Redness of the skin at the incision site or drainage of fluid from any of the incisions.  Call your doctor with any questions or concerns.     The representative from [employer] will be checking in with you, and will give you a number to call if you have any issues with the device.     For any emergency situation, call 911 immediately or go to your nearest emergency room.

## 2024-09-17 NOTE — OP NOTE
"[facility]  Operative Note    Date: 09/17/2024    Name: Cheryl Ghosh    MRN: [id number]    Pre-Op Diagnosis: Fecal incontinence    Post-Op Diagnosis: Same    Procedure(s) Performed:   INSERTION, NEUROSTIMULATOR, PERMANENT, SACRAL    Specimen(s): None    Staff Surgeon: Fransisca Ojeda    Assistant Surgeon: Katharina Daniels (fellow)    Anesthesia: Monitor Anesthesia Care    Details of procedure:  The patient was taken to the operating room and transferred to the OR table in the prone position.  SCD boots were applied.  She was placed under sedation by the anesthesia team.  IV antibiotics were administered.  Her back was prepped with ChloraPrep and draped in the standard sterile fashion.  After an appropriate time-out, 0.25% Marcaine with epinephrine mixed with 1% lidocaine was injected near the incision for the planned battery.  The incision was then opened with a scalpel and sutures were removed.  We entered the previously created pocket.  The percutaneous extension was delivered. This was  from the lead, cut and delivered sterilely from the field. The quadripolar lead was then attached to the InterStim X implantable pulse generator, which was pre-placed within the pocket. The single setscrew was tightened until a "click" was heard.  The pocket had been thoroughly irrigated with sterile water. After impedance testing demonstrated no abnormalities on all electrode pairs, the incision was closed in 3 layers, deeply with a 3-0 Vicryl and superficially with a running 3-0 Monocryl. The incision was covered with Dermabond.  The procedure was then terminated.  All sponge instrument counts were correct.  The patient was awakened and transferred to recovery in good condition.  I was present scrubbed for the entire procedure.    Once awake and alert, complex electronic analysis and programming of the SNM pulse generator was performed, including setting active contact groups, amplitude, pulse width, " frequency, cycling, and lockout parameters.     Estimated Blood Loss: 5mL    Drains/Implants:   Implant Name Type Inv. Item Serial No.  Lot No. LRB No. Used Action   SYS INTERSTIM X RECHARGE FREE - [id number]  SYS INTERSTIM X RECHARGE FREE [id number] [employer]  Right 1 Implanted       Wound Class: I    Fransisca Ojeda

## 2024-09-17 NOTE — TRANSFER OF CARE
"Anesthesia Transfer of Care Note    Patient: Cheryl Ghosh    Procedure(s) Performed: Procedure(s) (LRB):  INSERTION, NEUROSTIMULATOR, PERMANENT, SACRAL (N/A)    Patient location: [facility]    Anesthesia Type: general    Transport from OR: Transported from OR on room air with adequate spontaneous ventilation    Post pain: adequate analgesia    Post assessment: no apparent anesthetic complications and tolerated procedure well    Post vital signs: stable    Level of consciousness: awake and alert    Nausea/Vomiting: no nausea/vomiting    Complications: none    Transfer of care protocol was followed    Last vitals: Visit Vitals  BP (!) 196/82 (BP Location: Left arm, Patient Position: Lying)   Pulse 96   Temp 36.5 °C (97.7 °F) (Temporal)   Resp 16   Ht 5' 4" (1.626 m)   Wt 59.5 kg (131 lb 2.8 oz)   LMP  (LMP Unknown)   SpO2 99%   Breastfeeding No   BMI 22.52 kg/m²     "

## 2024-09-17 NOTE — INTERVAL H&P NOTE
The patient has been examined and the H&P has been reviewed:    Improving after interstim placement , no other changes    Surgery risks, benefits and alternative options discussed and understood by patient/family.        Dx: fecal incontinence and urinary

## 2024-09-17 NOTE — ANESTHESIA POSTPROCEDURE EVALUATION
Anesthesia Post Evaluation    Patient: Cheryl Ghosh    Procedure(s) Performed: Procedure(s) (LRB):  INSERTION, NEUROSTIMULATOR, PERMANENT, SACRAL (N/A)    Final Anesthesia Type: general      Patient location during evaluation: PACU  Patient participation: Yes- Able to Participate  Level of consciousness: awake and alert  Post-procedure vital signs: reviewed and stable  Pain management: adequate  Airway patency: patent    PONV status at discharge: No PONV  Anesthetic complications: no      Cardiovascular status: blood pressure returned to baseline  Respiratory status: spontaneous ventilation and room air  Hydration status: euvolemic  Follow-up not needed.              Vitals Value Taken Time   /70 09/17/24 1316   Temp 36.5 °C (97.7 °F) 09/17/24 1205   Pulse 62 09/17/24 1328   Resp 20 09/17/24 1328   SpO2 97 % 09/17/24 1328   Vitals shown include unfiled device data.      No case tracking events are documented in the log.      Pain/Enid Score: Enid Score: 9 (9/17/2024 12:05 PM)

## 2024-09-17 NOTE — PROGRESS NOTES
Pt last drank coffee with creamer at 0345 this morning. Charge RN notified and OR desk notified. Attempted to call anesthesia team, no answer.

## 2024-09-17 NOTE — ANESTHESIA PREPROCEDURE EVALUATION
09/17/2024  Cheryl Ghosh is a 89 y.o., female.      Pre-op Assessment    I have reviewed the Patient Summary Reports.     I have reviewed the Nursing Notes.       Review of Systems  Anesthesia Hx:  No problems with previous Anesthesia                Hematology/Oncology:  Hematology Normal   Oncology Normal                                   EENT/Dental:  EENT/Dental Normal           Cardiovascular:  Cardiovascular Normal       CAD                                        Pulmonary:      Shortness of breath                  Renal/:  Chronic Renal Disease                Hepatic/GI:  Hepatic/GI Normal                 Musculoskeletal:  Arthritis               Neurological:  TIA,  Neuromuscular Disease,                                   Endocrine:  Endocrine Normal            Dermatological:  Skin Normal    Psych:  Psychiatric Normal                    Physical Exam  General: Well nourished    Airway:  Mallampati: II   Mouth Opening: Normal  TM Distance: Normal  Tongue: Normal  Neck ROM: Normal ROM    Dental:  Intact        Anesthesia Plan  Type of Anesthesia, risks & benefits discussed:    Anesthesia Type: Gen ETT  Intra-op Monitoring Plan: Standard ASA Monitors  Post Op Pain Control Plan: multimodal analgesia  Induction:  IV  Airway Plan: Direct  Informed Consent: Informed consent signed with the Patient and all parties understand the risks and agree with anesthesia plan.  All questions answered. Patient consented to blood products? No  ASA Score: 3  Day of Surgery Review of History & Physical: H&P Update referred to the surgeon/provider.    Ready For Surgery From Anesthesia Perspective.     .

## 2024-09-18 ENCOUNTER — TELEPHONE (OUTPATIENT)
Dept: SURGERY | Facility: CLINIC | Age: 89
End: 2024-09-18
Payer: MEDICARE

## 2024-09-18 NOTE — TELEPHONE ENCOUNTER
Returned Flor's [w/ Sharee] call regarding message left below w/ phone staff.    Following verification of pt information, Flor confirmed approval for permanent Interstim placement for DOS Sept 17th-18th. Informed Sharee RN that the procedure was performed yesterday & she confirmed with CRS RN that the approval WILL COVER the procedure. CRS RN to forward Auth # below to Hoda aviles/ the PreService Dept as she was working on Mrs. Ghosh's claim.    No additional needs were identified at this time.       ----- Message from Carrie Galicia sent at 9/18/2024 10:38 AM CDT -----  Regarding: Flor ayers MOGL called with Auth # [id number] for Interstim date of service for Sept 17th -18th  Name of Who is Calling:Flor        What is the request in detail:Flor ayers MOGL called with Auth # [id number] for Interstim date of service for Sept 17th -18th. Please advise         Can the clinic reply by [facility]:No        What Number to Call Back if not in [facility]: [phone]

## 2024-09-24 ENCOUNTER — PATIENT MESSAGE (OUTPATIENT)
Dept: FAMILY MEDICINE | Facility: CLINIC | Age: 89
End: 2024-09-24
Payer: MEDICARE

## 2024-10-03 ENCOUNTER — OFFICE VISIT (OUTPATIENT)
Dept: SURGERY | Facility: CLINIC | Age: 89
End: 2024-10-03
Attending: COLON & RECTAL SURGERY
Payer: MEDICARE

## 2024-10-03 VITALS
SYSTOLIC BLOOD PRESSURE: 143 MMHG | HEART RATE: 56 BPM | WEIGHT: 131.81 LBS | BODY MASS INDEX: 22.5 KG/M2 | HEIGHT: 64 IN | DIASTOLIC BLOOD PRESSURE: 66 MMHG

## 2024-10-03 DIAGNOSIS — Z98.890 POST-OPERATIVE STATE: Primary | ICD-10-CM

## 2024-10-03 PROCEDURE — 99999 PR PBB SHADOW E&M-EST. PATIENT-LVL III: CPT | Mod: PBBFAC,HCNC,, | Performed by: COLON & RECTAL SURGERY

## 2024-10-03 PROCEDURE — 1101F PT FALLS ASSESS-DOCD LE1/YR: CPT | Mod: HCNC,CPTII,S$GLB, | Performed by: COLON & RECTAL SURGERY

## 2024-10-03 PROCEDURE — 3288F FALL RISK ASSESSMENT DOCD: CPT | Mod: HCNC,CPTII,S$GLB, | Performed by: COLON & RECTAL SURGERY

## 2024-10-03 PROCEDURE — 99024 POSTOP FOLLOW-UP VISIT: CPT | Mod: HCNC,S$GLB,, | Performed by: COLON & RECTAL SURGERY

## 2024-10-03 PROCEDURE — 1160F RVW MEDS BY RX/DR IN RCRD: CPT | Mod: HCNC,CPTII,S$GLB, | Performed by: COLON & RECTAL SURGERY

## 2024-10-03 PROCEDURE — 1159F MED LIST DOCD IN RCRD: CPT | Mod: HCNC,CPTII,S$GLB, | Performed by: COLON & RECTAL SURGERY

## 2024-10-03 PROCEDURE — 1126F AMNT PAIN NOTED NONE PRSNT: CPT | Mod: HCNC,CPTII,S$GLB, | Performed by: COLON & RECTAL SURGERY

## 2024-10-03 NOTE — PROGRESS NOTES
"CRS Office Visit Follow-up    SUBJECTIVE:     History of Present Illness:  Patient is a 89 y.o. female who presents following Interstim placement on 9/17/2024. Their post-operative course was uncomplicated. There are no new complaints today.  No fecal accidents since surgery.  Bladder symptoms are improved, but still has overnight issues.    Review of Systems:  ROS    OBJECTIVE:     Vital Signs (Most Recent)  BP (!) 143/66 (BP Location: Left arm, Patient Position: Sitting)   Pulse (!) 56   Ht 5' 4" (1.626 m)   Wt 59.8 kg (131 lb 13.4 oz)   LMP  (LMP Unknown)   BMI 22.63 kg/m²     Physical Exam:  General: White female in no distress   Neuro: Alert and oriented x 4.  Moves all extremities.     HEENT: No icterus.  Trachea midline  Respiratory: Respirations are even and unlabored  Cardiac: Regular rate  Extremities: Warm dry and intact  Skin: Skin incisions healing well      ASSESSMENT/PLAN:     88yo F s/p Interstim placement on 9/17/2024, doing well    UroGyn referral  RTC 1 year    Fransisca Ojeda MD  Staff Surgeon, Colon and Rectal Surgery  [facility]    "

## 2024-10-11 ENCOUNTER — OFFICE VISIT (OUTPATIENT)
Dept: FAMILY MEDICINE | Facility: CLINIC | Age: 89
End: 2024-10-11
Attending: FAMILY MEDICINE
Payer: MEDICARE

## 2024-10-11 ENCOUNTER — LAB VISIT (OUTPATIENT)
Dept: LAB | Facility: HOSPITAL | Age: 89
End: 2024-10-11
Attending: FAMILY MEDICINE
Payer: MEDICARE

## 2024-10-11 VITALS
OXYGEN SATURATION: 98 % | BODY MASS INDEX: 22.29 KG/M2 | SYSTOLIC BLOOD PRESSURE: 112 MMHG | WEIGHT: 129.88 LBS | DIASTOLIC BLOOD PRESSURE: 68 MMHG | HEART RATE: 56 BPM

## 2024-10-11 DIAGNOSIS — I10 HTN (HYPERTENSION), BENIGN: ICD-10-CM

## 2024-10-11 DIAGNOSIS — M81.0 OSTEOPOROSIS, UNSPECIFIED OSTEOPOROSIS TYPE, UNSPECIFIED PATHOLOGICAL FRACTURE PRESENCE: ICD-10-CM

## 2024-10-11 DIAGNOSIS — N39.0 UTI (URINARY TRACT INFECTION), BACTERIAL: ICD-10-CM

## 2024-10-11 DIAGNOSIS — I70.0 AORTIC ATHEROSCLEROSIS: ICD-10-CM

## 2024-10-11 DIAGNOSIS — N25.81 SECONDARY HYPERPARATHYROIDISM OF RENAL ORIGIN: ICD-10-CM

## 2024-10-11 DIAGNOSIS — J30.89 NON-SEASONAL ALLERGIC RHINITIS DUE TO OTHER ALLERGIC TRIGGER: ICD-10-CM

## 2024-10-11 DIAGNOSIS — Z23 FLU VACCINE NEED: ICD-10-CM

## 2024-10-11 DIAGNOSIS — N18.31 STAGE 3A CHRONIC KIDNEY DISEASE: ICD-10-CM

## 2024-10-11 DIAGNOSIS — N18.32 ANEMIA OF CHRONIC RENAL FAILURE, STAGE 3B: ICD-10-CM

## 2024-10-11 DIAGNOSIS — D63.1 ANEMIA OF CHRONIC RENAL FAILURE, STAGE 3B: ICD-10-CM

## 2024-10-11 DIAGNOSIS — I10 ESSENTIAL HYPERTENSION: Primary | ICD-10-CM

## 2024-10-11 DIAGNOSIS — A49.9 UTI (URINARY TRACT INFECTION), BACTERIAL: ICD-10-CM

## 2024-10-11 DIAGNOSIS — E78.2 MIXED HYPERLIPIDEMIA: ICD-10-CM

## 2024-10-11 DIAGNOSIS — I10 ESSENTIAL HYPERTENSION: ICD-10-CM

## 2024-10-11 DIAGNOSIS — I25.10 CORONARY ARTERY DISEASE INVOLVING NATIVE CORONARY ARTERY OF NATIVE HEART WITHOUT ANGINA PECTORIS: ICD-10-CM

## 2024-10-11 DIAGNOSIS — G47.00 INSOMNIA, UNSPECIFIED TYPE: ICD-10-CM

## 2024-10-11 LAB
ALBUMIN SERPL BCP-MCNC: 3.9 G/DL (ref 3.5–5.2)
ALP SERPL-CCNC: 49 U/L (ref 55–135)
ALT SERPL W/O P-5'-P-CCNC: 14 U/L (ref 10–44)
ANION GAP SERPL CALC-SCNC: 8 MMOL/L (ref 8–16)
AST SERPL-CCNC: 27 U/L (ref 10–40)
BACTERIA #/AREA URNS HPF: ABNORMAL /HPF
BASOPHILS # BLD AUTO: 0.04 K/UL (ref 0–0.2)
BASOPHILS NFR BLD: 0.7 % (ref 0–1.9)
BILIRUB SERPL-MCNC: 0.6 MG/DL (ref 0.1–1)
BILIRUB UR QL STRIP: NEGATIVE
BUN SERPL-MCNC: 25 MG/DL (ref 8–23)
CALCIUM SERPL-MCNC: 9 MG/DL (ref 8.7–10.5)
CHLORIDE SERPL-SCNC: 109 MMOL/L (ref 95–110)
CLARITY UR: CLEAR
CO2 SERPL-SCNC: 24 MMOL/L (ref 23–29)
COLOR UR: YELLOW
CREAT SERPL-MCNC: 1.3 MG/DL (ref 0.5–1.4)
DIFFERENTIAL METHOD BLD: ABNORMAL
EOSINOPHIL # BLD AUTO: 0.1 K/UL (ref 0–0.5)
EOSINOPHIL NFR BLD: 1.6 % (ref 0–8)
ERYTHROCYTE [DISTWIDTH] IN BLOOD BY AUTOMATED COUNT: 13 % (ref 11.5–14.5)
EST. GFR  (NO RACE VARIABLE): 39 ML/MIN/1.73 M^2
GLUCOSE SERPL-MCNC: 95 MG/DL (ref 70–110)
GLUCOSE UR QL STRIP: NEGATIVE
HCT VFR BLD AUTO: 37.6 % (ref 37–48.5)
HGB BLD-MCNC: 12.6 G/DL (ref 12–16)
HGB UR QL STRIP: NEGATIVE
IMM GRANULOCYTES # BLD AUTO: 0.01 K/UL (ref 0–0.04)
IMM GRANULOCYTES NFR BLD AUTO: 0.2 % (ref 0–0.5)
KETONES UR QL STRIP: NEGATIVE
LEUKOCYTE ESTERASE UR QL STRIP: ABNORMAL
LYMPHOCYTES # BLD AUTO: 2.3 K/UL (ref 1–4.8)
LYMPHOCYTES NFR BLD: 37.4 % (ref 18–48)
MCH RBC QN AUTO: 31.5 PG (ref 27–31)
MCHC RBC AUTO-ENTMCNC: 33.5 G/DL (ref 32–36)
MCV RBC AUTO: 94 FL (ref 82–98)
MICROSCOPIC COMMENT: ABNORMAL
MONOCYTES # BLD AUTO: 0.6 K/UL (ref 0.3–1)
MONOCYTES NFR BLD: 9.4 % (ref 4–15)
NEUTROPHILS # BLD AUTO: 3.1 K/UL (ref 1.8–7.7)
NEUTROPHILS NFR BLD: 50.7 % (ref 38–73)
NITRITE UR QL STRIP: POSITIVE
NRBC BLD-RTO: 0 /100 WBC
PH UR STRIP: 6 [PH] (ref 5–8)
PLATELET # BLD AUTO: 141 K/UL (ref 150–450)
PMV BLD AUTO: 11 FL (ref 9.2–12.9)
POTASSIUM SERPL-SCNC: 5 MMOL/L (ref 3.5–5.1)
PROT SERPL-MCNC: 6.7 G/DL (ref 6–8.4)
PROT UR QL STRIP: NEGATIVE
RBC # BLD AUTO: 4 M/UL (ref 4–5.4)
SODIUM SERPL-SCNC: 141 MMOL/L (ref 136–145)
SP GR UR STRIP: 1.01 (ref 1–1.03)
SQUAMOUS #/AREA URNS HPF: 0 /HPF
URN SPEC COLLECT METH UR: ABNORMAL
UROBILINOGEN UR STRIP-ACNC: NEGATIVE EU/DL
WBC # BLD AUTO: 6.07 K/UL (ref 3.9–12.7)
WBC #/AREA URNS HPF: 13 /HPF (ref 0–5)

## 2024-10-11 PROCEDURE — 99999 PR PBB SHADOW E&M-EST. PATIENT-LVL IV: CPT | Mod: PBBFAC,HCNC,, | Performed by: FAMILY MEDICINE

## 2024-10-11 PROCEDURE — 80053 COMPREHEN METABOLIC PANEL: CPT | Mod: HCNC | Performed by: FAMILY MEDICINE

## 2024-10-11 PROCEDURE — 85025 COMPLETE CBC W/AUTO DIFF WBC: CPT | Mod: HCNC | Performed by: FAMILY MEDICINE

## 2024-10-11 PROCEDURE — 36415 COLL VENOUS BLD VENIPUNCTURE: CPT | Mod: HCNC | Performed by: FAMILY MEDICINE

## 2024-10-11 PROCEDURE — 81000 URINALYSIS NONAUTO W/SCOPE: CPT | Mod: HCNC | Performed by: FAMILY MEDICINE

## 2024-10-11 RX ORDER — FLUTICASONE PROPIONATE 50 MCG
1 SPRAY, SUSPENSION (ML) NASAL DAILY
Qty: 16 G | Refills: 2 | Status: SHIPPED | OUTPATIENT
Start: 2024-10-11

## 2024-10-11 RX ORDER — HYDROXYZINE HYDROCHLORIDE 50 MG/1
50 TABLET, FILM COATED ORAL NIGHTLY
Qty: 30 TABLET | Refills: 2 | Status: SHIPPED | OUTPATIENT
Start: 2024-10-11

## 2024-10-11 NOTE — PROGRESS NOTES
Subjective:       Patient ID: Cheryl Ghosh is a 89 y.o. female.    Chief Complaint: Follow-up    88 yr old pleasant white female with HTN, CKD III, OA, osteopenia hip, hyperlipidemia, chronic back pain, BPPV, presents today for her 3 month check. No new complaints today      HTN - controlled - low salt diet - having issues with medications lisinopril n losartan      CKD III - stable - improving       HLD - slightly worsening since stopped statin x 30 days           - she was asked to start statin but due to her fall and issues with her ribs and back, she was never able to start it.    Back pain - she follows pain clinic for injections - c/o neuropathic pain in legs - was on neurontin in the past and she started taking it and working    Osteoporosis - started back on fosamax - doing well - next dexa in a year    HISTORY as below - reviewed    Health maintenance  -labs UTD  -mammo UTD  -colon screen UTD  -vaccines UTD    Follow-up  Associated symptoms include arthralgias, chest pain, myalgias, vomiting and weakness. Pertinent negatives include no congestion, diaphoresis, headaches, joint swelling, nausea, neck pain or sore throat.   Back Pain  This is a chronic problem. The current episode started more than 1 year ago. The problem occurs intermittently. The problem has been gradually improving since onset. The pain is present in the lumbar spine. The quality of the pain is described as aching. The pain does not radiate. The pain is at a severity of 5/10. The pain is moderate. The symptoms are aggravated by position and sitting. Associated symptoms include chest pain and weakness. Pertinent negatives include no dysuria, headaches or leg pain. She has tried analgesics and ice for the symptoms. The treatment provided mild relief.   Hypertension  This is a chronic problem. The current episode started more than 1 year ago. The problem has been waxing and waning since onset. The problem is resistant. Associated symptoms  include blurred vision, chest pain and malaise/fatigue. Pertinent negatives include no anxiety, headaches, neck pain, orthopnea, palpitations, peripheral edema, PND, shortness of breath or sweats. Agents associated with hypertension include NSAIDs. There are no known risk factors for coronary artery disease. Past treatments include nothing. The current treatment provides significant improvement. Compliance problems include exercise.  There is no history of chronic renal disease.   Hyperlipidemia  This is a chronic problem. The current episode started more than 1 year ago. The problem is uncontrolled. Recent lipid tests were reviewed and are high. She has no history of chronic renal disease, diabetes, hypothyroidism, liver disease, obesity or nephrotic syndrome. There are no known factors aggravating her hyperlipidemia. Associated symptoms include chest pain and myalgias. Pertinent negatives include no focal sensory loss, leg pain or shortness of breath. Current antihyperlipidemic treatment includes diet change. The current treatment provides mild improvement of lipids. There are no compliance problems.  Risk factors for coronary artery disease include dyslipidemia.     Review of Systems   Constitutional:  Positive for activity change and malaise/fatigue. Negative for diaphoresis and unexpected weight change.   HENT:  Positive for hearing loss. Negative for nasal congestion, ear discharge, rhinorrhea, sore throat, trouble swallowing and voice change.    Eyes:  Positive for blurred vision. Negative for pain, discharge and visual disturbance.   Respiratory: Negative.  Negative for chest tightness, shortness of breath and wheezing.    Cardiovascular:  Positive for chest pain. Negative for palpitations, orthopnea and PND.   Gastrointestinal:  Positive for vomiting. Negative for abdominal distention, anal bleeding, blood in stool, constipation, diarrhea and nausea.   Endocrine: Negative.  Negative for cold intolerance,  polydipsia and polyuria.   Genitourinary: Negative.  Negative for decreased urine volume, difficulty urinating, dysuria, frequency, hematuria, menstrual problem and vaginal pain.   Musculoskeletal:  Positive for arthralgias, back pain and myalgias. Negative for gait problem, joint swelling, leg pain and neck pain.   Integumentary:  Negative for color change, pallor and wound. Negative.   Allergic/Immunologic: Negative.  Negative for environmental allergies and immunocompromised state.   Neurological:  Positive for weakness. Negative for dizziness, tremors, seizures, speech difficulty and headaches.   Hematological: Negative.  Negative for adenopathy. Does not bruise/bleed easily.   Psychiatric/Behavioral: Negative.  Negative for agitation, confusion, decreased concentration, dysphoric mood, hallucinations, self-injury and suicidal ideas. The patient is not nervous/anxious.          PMH/PSH/FH/SH/MED/ALLERGY reviewed    Past Medical History:   Diagnosis Date    Arthritis     lumbar    Blood transfusion     Chronic kidney disease, stage III (moderate) 8/2/2017    Coronary artery disease involving native coronary artery without angina pectoris 3/9/2021    Degenerative disc disease     lumbar    Insomnia 11/15/2022    Insulinoma     Pancreatic disease     Urinary incontinence 12/7/2015       Past Surgical History:   Procedure Laterality Date    ADENOIDECTOMY      APPENDECTOMY      CATARACT EXTRACTION, BILATERAL      COLONOSCOPY N/A 8/2/2024    Procedure: COLONOSCOPY;  Surgeon: Fransisca Ojeda MD;  Location: [address];  Service: Endoscopy;  Laterality: N/A;  6/19/24-Dr. Ojeda pt, PEG, instr portal-DS  8/1 please place extra luci pads on bed before going to recovery-ml  8/1-pt notified of earlier arrival time (0645am)-[facility] in [place]    EYE SURGERY      HYSTERECTOMY      SAMM/BSO    IMPLANTATION OF PERMANENT SACRAL NERVE STIMULATOR N/A 9/17/2024    Procedure: INSERTION, NEUROSTIMULATOR, PERMANENT, SACRAL;  Surgeon:  Fransisca Ojeda MD;  Location: [facility] OR 2ND FLR;  Service: Colon and Rectal;  Laterality: N/A;  NO fluoro    INJECTION OF JOINT Bilateral 2/4/2020    Procedure: Injection, Joint--Bilateral GTB;  Surgeon: Arina Carrera Jr., MD;  Location: [facility] [place] PAIN MGT;  Service: Pain Management;  Laterality: Bilateral;  Oral sedation    INJECTION OF STEROID Right 12/20/2018    Procedure: INJECTION, STEROID-- Right SI Joint Block and  Steroid Injection;  Surgeon: Xavier Dasilva MD;  Location: [facility] [place] OR;  Service: Neurosurgery;  Laterality: Right;  INJECTION, STEROID-- Right SI Joint Block and  Steroid Injection  SURGERY TIME:1 HR  LOS: 0 DAYS  RADIOLOGY: C- arm  BED: Regular Bed  with Pillows  POSITION: Prone      INJECTION OF STEROID Left 6/19/2019    Procedure: INJECTION, STEROID Procedure:Left sacroiliac joint block / steroid injection Surgery Time: 30mins LOS: Anesthesia: MAC Radiology: C-arm Bed: Regular Bed Position: Prone;  Surgeon: Xavier Dasilva MD;  Location: [facility] [place] OR;  Service: Neurosurgery;  Laterality: Left;    INSERTION, NEUROSTIMULATOR, TEMPORARY, SACRAL N/A 9/3/2024    Procedure: INSERTION, NEUROSTIMULATOR, TEMPORARY, SACRAL;  Surgeon: Fransisca Ojeda MD;  Location: [facility] OR 2ND FLR;  Service: Colon and Rectal;  Laterality: N/A;  needs fluoro    MINIMALLY INVASIVE FUSION OF SPINE Right 2/6/2019    Procedure: FUSION, SPINE, MINIMALLY INVASIVE Right Sacroiliac Joint Fusion -  I Fuse;  Surgeon: Xavier Dasilva MD;  Location: [facility] [place] OR;  Service: Neurosurgery;  Laterality: Right;  Procedure: Right Sacroiliac Joint Fusion -  I Fuse  Surgery Time: 1.5 Hr  LOS: 0  Anesthesia: General  Radiology: C-Arm  Bed: [place] 4 Poster  Position: Prone  Equipment: I Fuse [place] [phone] (notified 1/24 EF)    OOPHORECTOMY      PANCREAS SURGERY      Insulanoma    RADIOFREQUENCY ABLATION OF LUMBAR MEDIAL BRANCH NERVE AT SINGLE LEVEL Right 8/28/2018    Procedure: RADIOFREQUENCY ABLATION, NERVE, MEDIAL BRANCH, LUMBAR, 1 LEVEL-  Right L3,4,5 IV SEDATION;  Surgeon: Eddie Vega MD;  Location: [facility] PAIN MGT;  Service: Pain Management;  Laterality: Right;    RADIOFREQUENCY ABLATION OF LUMBAR MEDIAL BRANCH NERVE AT SINGLE LEVEL Left 9/4/2018    Procedure: RADIOFREQUENCY ABLATION, NERVE, MEDIAL BRANCH, LUMBAR, 1 LEVEL - Left L3,4,5 IV SEADTION;  Surgeon: Eddie Vega MD;  Location: [facility] PAIN MGT;  Service: Pain Management;  Laterality: Left;  Patient takes ASA     REFRACTIVE SURGERY Bilateral     SPINAL FUSION Left 7/31/2019    Procedure: FUSION, SPINE SI Joint Fusion;  Surgeon: Xavier Dasilva MD;  Location: [facility] OR;  Service: Neurosurgery;  Laterality: Left;  Procedure: Left SI joint fusion  Surgery Time: 1.5hr  LOS:0  Anesthesia: General   Radiology: C-arm   Bed: [place] [id number] Poster  Position: Prone  Equipment: Codi Diaz- [phone] (Codi notified)    TONSILLECTOMY         Family History   Problem Relation Name Age of Onset    Breast cancer Mother      Heart disease Father      Aneurysm Sister      Glaucoma Daughter      Heart attack Son      Breast cancer Maternal Grandmother      Breast cancer Other niece        Social History     Socioeconomic History    Marital status:    Tobacco Use    Smoking status: Never     Passive exposure: Never    Smokeless tobacco: Never   Substance and Sexual Activity    Alcohol use: Yes     Alcohol/week: 1.0 - 2.0 standard drink of alcohol     Types: 1 - 2 Glasses of wine per week    Drug use: No    Sexual activity: Yes     Partners: Male     Social Drivers of Health     Financial Resource Strain: Low Risk  (1/23/2024)    Overall Financial Resource Strain (CARDIA)     Difficulty of Paying Living Expenses: Not hard at all   Food Insecurity: No Food Insecurity (1/23/2024)    Hunger Vital Sign     Worried About Running Out of Food in the Last Year: Never true     Ran Out of Food in the Last Year: Never true   Transportation Needs: No Transportation Needs (1/23/2024)    PRAPARE -  Transportation     Lack of Transportation (Medical): No     Lack of Transportation (Non-Medical): No   Physical Activity: Insufficiently Active (1/23/2024)    Exercise Vital Sign     Days of Exercise per Week: 3 days     Minutes of Exercise per Session: 20 min   Stress: No Stress Concern Present (1/23/2024)    Citizen of [place] [employer] - Occupational Stress Questionnaire     Feeling of Stress : Only a little   Housing Stability: Low Risk  (1/23/2024)    Housing Stability Vital Sign     Unable to Pay for Housing in the Last Year: No     Number of Places Lived in the Last Year: 1     Unstable Housing in the Last Year: No       Current Outpatient Medications   Medication Sig Dispense Refill    acetaminophen (TYLENOL 8 HOUR ORAL) Take by mouth.      amLODIPine (NORVASC) 2.5 MG tablet TAKE 1 TABLET ONE TIME DAILY (Patient taking differently: Take 2.5 mg by mouth every evening.) 90 tablet 3    aspirin (ECOTRIN) 81 MG EC tablet Take 1 tablet (81 mg total) by mouth once daily. Resume 48 hours post-surgery  0    b complex vitamins tablet Take 1 tablet by mouth once daily.      baclofen (LIORESAL) 10 MG tablet Take 1 tablet (10 mg total) by mouth 3 (three) times daily. (Patient taking differently: Take 10 mg by mouth 3 (three) times daily as needed.) 270 tablet 4    calcium-vitamin D 600 mg(1,500mg) -400 unit Tab once daily.       estradioL (ESTRACE) 0.01 % (0.1 mg/gram) vaginal cream 0.5 grams with applicator or dime-sized amount with finger in vagina nightly x 2 weeks, then twice a week thereafter 42.5 g 11    FLUZONE HIGHDOSE QUAD 23-24  mcg/0.7 mL Syrg       gabapentin (NEURONTIN) 300 MG capsule TAKE 1 CAPSULE THREE TIMES DAILY 270 capsule 3    lipase-protease-amylase 6,000-19,000-30,000 units (CREON) 6,000-19,000 -30,000 unit capsule Take 2 capsules by mouth 3 (three) times daily with meals. 300 capsule 3    oxyCODONE-acetaminophen (PERCOCET) 5-325 mg per tablet Take 1 tablet by mouth every 8 (eight)  hours as needed for Pain. 21 tablet 0    pravastatin (PRAVACHOL) 40 MG tablet Take 1 tablet (40 mg total) by mouth every evening. 90 tablet 3    pyridoxine, vitamin B6, (B-6) 100 MG Tab Take 100 mg by mouth once daily.      raloxifene (EVISTA) 60 mg tablet Take 1 tablet (60 mg total) by mouth once daily. 30 tablet 11    traZODone (DESYREL) 100 MG tablet Take 2 tablets (200 mg total) by mouth nightly as needed for Insomnia. 180 tablet 3    fluticasone propionate (FLONASE) 50 mcg/actuation nasal spray 1 spray (50 mcg total) by Each Nostril route once daily. 16 g 2    hydrOXYzine (ATARAX) 50 MG tablet Take 1 tablet (50 mg total) by mouth every evening. 30 tablet 2     No current facility-administered medications for this visit.     Facility-Administered Medications Ordered in Other Visits   Medication Dose Route Frequency Provider Last Rate Last Admin    gentamicin 80 mg/100ml NACL IVPB IVPB 80 mg  80 mg Intravenous 30 Min Pre-Op Wiliam Urias PA-C   80 mg at 07/31/19 0747    vancomycin in dextrose 5 % 1 gram/250 mL IVPB 1,000 mg  1,000 mg Intravenous 30 Min Pre-Op Wiliam Urias PA-C   1,000 mg at 07/31/19 0738       Review of patient's allergies indicates:   Allergen Reactions    Pcn [penicillins] Hives and Nausea And Vomiting       Objective:       Vitals:    10/11/24 0829   BP: 112/68   Pulse: (!) 56       Physical Exam  Constitutional:       General: She is not in acute distress.     Appearance: She is well-developed. She is not diaphoretic.   HENT:      Head: Normocephalic and atraumatic.      Right Ear: External ear normal.      Left Ear: External ear normal.      Nose: Nose normal.      Mouth/Throat:      Pharynx: No oropharyngeal exudate.   Eyes:      General: No scleral icterus.        Right eye: No discharge.         Left eye: No discharge.      Conjunctiva/sclera: Conjunctivae normal.      Pupils: Pupils are equal, round, and reactive to light.   Neck:      Thyroid: No thyromegaly.      Vascular: No  JVD.      Trachea: No tracheal deviation.   Cardiovascular:      Rate and Rhythm: Normal rate and regular rhythm.      Heart sounds: Normal heart sounds. No murmur heard.     No friction rub. No gallop.   Pulmonary:      Effort: Pulmonary effort is normal.      Breath sounds: Normal breath sounds. No stridor. No wheezing or rales.   Chest:      Chest wall: No tenderness.   Abdominal:      General: Bowel sounds are normal. There is no distension.      Palpations: Abdomen is soft. There is no mass.      Tenderness: There is no abdominal tenderness. There is no guarding or rebound.      Hernia: No hernia is present.   Musculoskeletal:         General: No tenderness. Normal range of motion.      Cervical back: Normal range of motion and neck supple.   Lymphadenopathy:      Cervical: No cervical adenopathy.   Skin:     General: Skin is warm and dry.      Coloration: Skin is not pale.      Findings: No erythema or rash.   Neurological:      Mental Status: She is alert and oriented to person, place, and time.      Cranial Nerves: No cranial nerve deficit.      Motor: No abnormal muscle tone.      Coordination: Coordination normal.      Deep Tendon Reflexes: Reflexes are normal and symmetric. Reflexes normal.   Psychiatric:         Behavior: Behavior normal.         Thought Content: Thought content normal.         Judgment: Judgment normal.         Assessment:       Problem List Items Addressed This Visit       Stage 3a chronic kidney disease    Relevant Orders    CBC Auto Differential (Completed)    Comprehensive Metabolic Panel    Urinalysis    Secondary hyperparathyroidism of renal origin    Osteoporosis    Mixed hyperlipidemia    Insomnia    Relevant Medications    hydrOXYzine (ATARAX) 50 MG tablet    HTN (hypertension), benign    Coronary artery disease involving native coronary artery without angina pectoris    Aortic atherosclerosis    Anemia of chronic renal failure, stage 3b     Other Visit Diagnoses        Essential hypertension    -  Primary    Relevant Orders    Hypertension Digital Medicine (HDMP) Enrollment Order (Completed)    CBC Auto Differential (Completed)    Comprehensive Metabolic Panel    Urinalysis    Flu vaccine need        Relevant Medications    influenza (adjuvanted) (Fluad) 45 mcg/0.5 mL IM vaccine (> or = 66 yo) 0.5 mL (Completed)    Non-seasonal allergic rhinitis due to other allergic trigger        Relevant Medications    fluticasone propionate (FLONASE) 50 mcg/actuation nasal spray    UTI (urinary tract infection), bacterial        Relevant Orders    Urine culture            Plan:           Cheryl was seen today for follow-up.    Diagnoses and all orders for this visit:    Essential hypertension  -     Hypertension Digital Medicine (HDMP) Enrollment Order  -     CBC Auto Differential; Future  -     Comprehensive Metabolic Panel; Future  -     Urinalysis; Future    Flu vaccine need  -     influenza (adjuvanted) (Fluad) 45 mcg/0.5 mL IM vaccine (> or = 66 yo) 0.5 mL    Mixed hyperlipidemia    Secondary hyperparathyroidism of renal origin    Stage 3a chronic kidney disease  -     CBC Auto Differential; Future  -     Comprehensive Metabolic Panel; Future  -     Urinalysis; Future    Osteoporosis, unspecified osteoporosis type, unspecified pathological fracture presence    Anemia of chronic renal failure, stage 3b    Aortic atherosclerosis    Insomnia, unspecified type  -     hydrOXYzine (ATARAX) 50 MG tablet; Take 1 tablet (50 mg total) by mouth every evening.    HTN (hypertension), benign    Coronary artery disease involving native coronary artery of native heart without angina pectoris    Non-seasonal allergic rhinitis due to other allergic trigger  -     fluticasone propionate (FLONASE) 50 mcg/actuation nasal spray; 1 spray (50 mcg total) by Each Nostril route once daily.    UTI (urinary tract infection), bacterial  -     Urine culture; Future      HTN  -controlled    TIA  -follow neurology - HI  plavix per neuro recommendations and just stay on ASA      Insomnia  -try atarax nightly with trazodone  HLD  -diet controlled and statin    Urine incontinence  -lifestyle changes - intolerant to meds    Neuropathic pain  -continue neurontin    OA knee B/L  -mobic prn    CKD III  -lab and urine analysis  -adequate hydration  -follow nephrology    Muscle spasm  -baclofen  and percocet prn    Osteoporosis  -Evista.Side effects of medications have been discussed and patient agreed to proceed with treatment and understands the risks and benefits.      Insomnia  -increase trazodone to 200    CKD III  -stable      Spent adequate time in obtaining history and explaining differentials    40  minutes spent during this visit of which greater than 50% devoted to face-face counseling and coordination of care regarding diagnosis and management plan    RTC 3 months

## 2024-10-11 NOTE — PROGRESS NOTES
Per provider order in chart, vaccine given to patient.  FLU vaccine, for 65 and older.    right deltoid   Patient tolerated well.    Katharina Lemos, CMA

## 2024-10-14 ENCOUNTER — PATIENT MESSAGE (OUTPATIENT)
Dept: FAMILY MEDICINE | Facility: CLINIC | Age: 89
End: 2024-10-14
Payer: MEDICARE

## 2024-10-14 DIAGNOSIS — N39.0 UTI (URINARY TRACT INFECTION), BACTERIAL: Primary | ICD-10-CM

## 2024-10-14 DIAGNOSIS — A49.9 UTI (URINARY TRACT INFECTION), BACTERIAL: Primary | ICD-10-CM

## 2024-10-14 RX ORDER — CEFDINIR 300 MG/1
300 CAPSULE ORAL 2 TIMES DAILY
Qty: 20 CAPSULE | Refills: 0 | Status: SHIPPED | OUTPATIENT
Start: 2024-10-14 | End: 2024-10-24

## 2024-10-14 NOTE — TELEPHONE ENCOUNTER
I notified patient that she has a urine infection and that antibiotics were send to [facility] in [place]. Patient voices understanding.

## 2024-10-15 ENCOUNTER — CLINICAL SUPPORT (OUTPATIENT)
Dept: OTOLARYNGOLOGY | Facility: CLINIC | Age: 89
End: 2024-10-15
Payer: MEDICARE

## 2024-10-15 ENCOUNTER — OFFICE VISIT (OUTPATIENT)
Dept: OTOLARYNGOLOGY | Facility: CLINIC | Age: 89
End: 2024-10-15
Payer: MEDICARE

## 2024-10-15 VITALS
HEART RATE: 48 BPM | SYSTOLIC BLOOD PRESSURE: 131 MMHG | BODY MASS INDEX: 22.35 KG/M2 | WEIGHT: 130.19 LBS | DIASTOLIC BLOOD PRESSURE: 62 MMHG

## 2024-10-15 DIAGNOSIS — J37.0 CHRONIC LARYNGITIS: Chronic | ICD-10-CM

## 2024-10-15 DIAGNOSIS — R49.0 HOARSENESS OF VOICE: Chronic | ICD-10-CM

## 2024-10-15 DIAGNOSIS — H69.90 DYSFUNCTION OF EUSTACHIAN TUBE, UNSPECIFIED LATERALITY: Primary | Chronic | ICD-10-CM

## 2024-10-15 DIAGNOSIS — H90.3 SENSORINEURAL HEARING LOSS, BILATERAL: Primary | ICD-10-CM

## 2024-10-15 DIAGNOSIS — J31.0 CHRONIC RHINITIS: Chronic | ICD-10-CM

## 2024-10-15 DIAGNOSIS — H90.3 SENSORINEURAL HEARING LOSS (SNHL) OF BOTH EARS: Chronic | ICD-10-CM

## 2024-10-15 PROCEDURE — 1101F PT FALLS ASSESS-DOCD LE1/YR: CPT | Mod: HCNC,CPTII,S$GLB, | Performed by: OTOLARYNGOLOGY

## 2024-10-15 PROCEDURE — 1160F RVW MEDS BY RX/DR IN RCRD: CPT | Mod: HCNC,CPTII,S$GLB, | Performed by: OTOLARYNGOLOGY

## 2024-10-15 PROCEDURE — 92557 COMPREHENSIVE HEARING TEST: CPT | Mod: HCNC,S$GLB,, | Performed by: PHYSICIAN ASSISTANT

## 2024-10-15 PROCEDURE — 99999 PR PBB SHADOW E&M-EST. PATIENT-LVL I: CPT | Mod: PBBFAC,HCNC,, | Performed by: PHYSICIAN ASSISTANT

## 2024-10-15 PROCEDURE — 1126F AMNT PAIN NOTED NONE PRSNT: CPT | Mod: HCNC,CPTII,S$GLB, | Performed by: OTOLARYNGOLOGY

## 2024-10-15 PROCEDURE — 99999 PR PBB SHADOW E&M-EST. PATIENT-LVL IV: CPT | Mod: PBBFAC,HCNC,, | Performed by: OTOLARYNGOLOGY

## 2024-10-15 PROCEDURE — 3288F FALL RISK ASSESSMENT DOCD: CPT | Mod: HCNC,CPTII,S$GLB, | Performed by: OTOLARYNGOLOGY

## 2024-10-15 PROCEDURE — 92567 TYMPANOMETRY: CPT | Mod: HCNC,S$GLB,, | Performed by: PHYSICIAN ASSISTANT

## 2024-10-15 PROCEDURE — 99214 OFFICE O/P EST MOD 30 MIN: CPT | Mod: HCNC,S$GLB,, | Performed by: OTOLARYNGOLOGY

## 2024-10-15 PROCEDURE — 1159F MED LIST DOCD IN RCRD: CPT | Mod: HCNC,CPTII,S$GLB, | Performed by: OTOLARYNGOLOGY

## 2024-10-15 NOTE — PROGRESS NOTES
"Chief Complaint   Patient presents with    Ear Fullness     Bilateral, feels like ears won't "pop"       HPI:  Patient is a 89 y.o. female who has previously seen me for MANUELITO, CI, hearing loss.      Since the last visit, the patient reports issues with hoarseness.  This has been ongoing for many years intermittently.  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   Her voice is progressively worsening over this time. There are pitch breaks or cracks. There is vocal fatigue. She denies dysphagia, odynophagia, throat pain, and otalgia.  There is no aspiration or choking.  There is no hemoptysis or hematemesis. She is breathing well.     She admits to throat clearing and cough. She denies heartburn and reflux.    She also wears hearing aids, but she feels that when she is straining to talk, she feels she's "in a barrel".    She does not have any significant nasal symptoms or history of allergy/sinus issues.    Interval HPI 10/15/2024 :      Patient states she did see speech therapy but voice still is having issues with intermittent hoarseness.  She also still complains of feeling like she can not get her ears to pop or that she is in a barrel.  Her PCP gave her prescription for Flonase that she started yesterday.              Active Ambulatory Problems     Diagnosis Date Noted    Facet arthritis of lumbar region 10/02/2012    Spondylosis without myelopathy 11/06/2013    Greater trochanteric bursitis of both hips 01/27/2014    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    OA (osteoarthritis) of knee 01/05/2015    Osteopenia 02/26/2015    Left lumbar radiculopathy 08/13/2015    Urinary incontinence 12/07/2015    Cystocele, midline 07/22/2016    Neuropathy 12/02/2016    SOB (shortness of breath) 05/26/2017    Aortic atherosclerosis 04/24/2014    Lumbar spondylosis 09/27/2012    Hyperlipidemia 08/02/2017    Stage 3a chronic kidney disease 08/02/2017    TIA " (transient ischemic attack) 11/24/2017    Chronic diastolic heart failure 11/25/2017    HTN (hypertension), benign 04/11/2018    Sacroiliitis 07/24/2018    Chronic pain 08/28/2018    Secondary hyperparathyroidism of renal origin 10/12/2018    Chronic SI joint pain 12/20/2018    Sacroiliac joint dysfunction of right side 02/06/2019    Pain of left sacroiliac joint 06/19/2019    Sacroiliac joint dysfunction of left side 07/31/2019    Bilateral impacted cerumen 10/17/2019    Decreased range of motion of both hips 01/27/2020    Other nonthrombocytopenic purpura 02/02/2021    Syncope and collapse 03/09/2021    Coronary artery disease involving native coronary artery without angina pectoris 03/09/2021    Vasovagal near syncope 03/24/2021    Pre-syncope 07/21/2021    Mixed hyperlipidemia 10/14/2022    Osteoporosis 11/15/2022    Insomnia 11/15/2022    Anemia of chronic renal failure, stage 3b 01/12/2024    Urinary incontinence, mixed 01/24/2024    Vaginal atrophy 01/24/2024    Fecal smearing 01/24/2024    Vaginal vault prolapse 01/24/2024    Rectocele, female 01/24/2024    Dorsalgia 07/01/2024    Lumbar radiculopathy 07/01/2024    Impaired mobility and activities of daily living 07/01/2024    Fecal incontinence 09/03/2024     Resolved Ambulatory Problems     Diagnosis Date Noted    Chest pain 05/01/2014    Cholecystolithiasis 05/01/2014    Chest wall pain 08/26/2014    BPPV (benign paroxysmal positional vertigo) 01/08/2015    Acute sinusitis 11/16/2015    Laryngitis 11/16/2015    Bronchitis 11/16/2015    BMI 22.0-22.9, adult 07/22/2016    Palpitations 05/26/2017    Hypertensive urgency 11/24/2017    History of UTI 01/24/2024     Past Medical History:   Diagnosis Date    Arthritis     Blood transfusion     Chronic kidney disease, stage III (moderate) 8/2/2017    Degenerative disc disease     Insulinoma     Pancreatic disease        Review of Systems  General: negative for chills, fever or weight loss  Psychological: negative  for mood changes or depression  Ophthalmic: negative for blurry vision, photophobia or eye pain  ENT: see HPI  Respiratory: no cough, shortness of breath, or wheezing  Cardiovascular: no chest pain or dyspnea on exertion  Gastrointestinal: no abdominal pain, change in bowel habits, or black/ bloody stools  Musculoskeletal: negative for gait disturbance or muscular weakness  Neurological: no syncope or seizures; no ataxia  Dermatological: negative for pruritis, rash and jaundice  Hematologic/lymphatic: no easy bruising, no new adenopathy    Physical Exam     Vitals:    10/15/24 1137   BP: 131/62   Pulse: (!) 48         Constitutional:   She is oriented to person, place, and time. Vital signs are normal. She appears well-developed and well-nourished. She appears alert. She is cooperative. Normal speech.  Hoarse voice (gravelly sounding voice).      Head:  Normocephalic and atraumatic. Salivary glands normal.  Facial strength is normal.      Ears:    Right Ear: Tympanic membrane is not perforated and not erythematous. No middle ear effusion.   Left Ear: Tympanic membrane is not perforated and not erythematous.  No middle ear effusion.     Nose:  Mucosal edema present. No rhinorrhea, septal deviation or polyps. Turbinate hypertrophy (3+, boggy, congested mucosa).  Turbinates normal.  Right sinus exhibits no maxillary sinus tenderness and no frontal sinus tenderness. Left sinus exhibits no maxillary sinus tenderness and no frontal sinus tenderness.     Mouth/Throat  Oropharynx clear and moist without lesions or asymmetry, normal uvula midline, lips, teeth, and gums normal and oropharynx normal. No uvula swelling, oral lesions, trismus or mucous membrane lesions. No oropharyngeal exudate, posterior oropharyngeal edema or posterior oropharyngeal erythema. Tonsillar exudate.  Mirror exam not performed due to patient tolerance.  Mirror exam not performed due to patient tolerance.      Neck:  Neck normal without thyromegaly  masses, asymmetry, normal tracheal structure, crepitus, and tenderness, thyroid normal, trachea normal, phonation normal, full range of motion with neck supple and no adenopathy. No JVD present. Carotid bruit is not present. No tracheal deviation present. No thyroid mass and no thyromegaly present.     She has no cervical adenopathy.     Cardiovascular:    Normal rate, regular rhythm and rate and rhythm, heart sounds, and pulses normal.              Pulmonary/Chest:   Effort and breath sounds normal.     Psychiatric:   She has a normal mood and affect. Her speech is normal and behavior is normal.     Neurological:   She is alert and oriented to person, place, and time. She has neurological normal, alert and oriented. No cranial nerve deficit.     Skin:   No abrasions, lacerations, lesions, or rashes.                     Audiogram: Interpreted by me and reviewed with the patient today.  Moderate bilateral hearing loss, sensorineural.  Tympanograms type a bilaterally.        Assessment/Plan:      ICD-10-CM ICD-9-CM    1. Dysfunction of Eustachian tube, unspecified laterality  H69.90 381.81       2. Hoarseness of voice  R49.0 784.42       3. Chronic laryngitis  J37.0 476.0       4. Chronic rhinitis  J31.0 472.0       5. Sensorineural hearing loss (SNHL) of both ears  H90.3 389.18           We discussed that her hoarseness is likely due to her Presby larynx, and other than speech therapy and vocal hygiene, only other thing to consider would be the surgical options discussed with the speech therapist at the voice center.    She will continue the nasal sprays started by her PCP and see how that is working for her Eustachian tube symptoms over the next few weeks.  I will add further treatment or change medications if needed.      Follow up in 3-4 mos.     Estee Hernandez MD  [facility]

## 2024-10-15 NOTE — PROGRESS NOTES
"Cheryl Ghosh, a 89 y.o. female, was seen today in the clinic for an audiologic evaluation.  Ms. Ghosh feels like she cannot get her ears to pop and she feels like she is "in a 'barrel".  She has been wearing bilateral hearing aids with success.  She does complain of itchy ears.  She denies ear pain, ear drainage or tinnitus.  She also complains of intermittent hoarseness and this concerns her because she continues to be a  at [employer]. She denies a family history of hearing loss or a history of noise exposure.    Audiogram results revealed a mild to moderate sensorineural hearing loss bilaterally.  Speech reception thresholds were noted at 40 dB in the right ear and 40 dB in the left ear.  Speech discrimination scores were 88% in the right ear and 84% in the left ear.  Tympanometry revealed Type A in the right ear and Type A in the left ear. The results of the audiogram were reviewed with the patient and the benefits of amplification were discussed.    Recommendations:  Otologic evaluation  Annual audiogram  Hearing protection when in noise  Continued and consistent use of amplification      "

## 2024-10-15 NOTE — PATIENT INSTRUCTIONS
The patient was told to continue to use the nasal steroid and/or nasal antihistamine nasal spray and we discussed in detail the proper mechanism of use directing the spray away from the nasal septum.  In addition, we also discussed that it will take 3-4 weeks of daily use to achieve maximal effectiveness.  The patient will please call in 3-4 weeks with their progress.  If allergy symptoms persist at that time, we could consider additional medications and possibly allergy testing.     How do you use a Nasal Corticosteroid Spray?    Make sure you understand your dosing instructions. Spray only the number of prescribed sprays in each nostril. Read the package instructions before using your spray the first time.    Most corticosteroid sprays suggest the following steps:    Wash your hands well.    Gently blow your nose to clear the passageway.    Shake the container several times.    Tilt your head slightly downward.  Use the opposite hand from the nostril you will be spraying to hold the spray bottle.    Block one nostril with your finger.  Insert the nasal applicator into the other nostril.    Aim the spray toward the outer wall of the nostril.  Inhale slowly through the nose and press the .    Breathe out and repeat to apply the prescribed number of sprays.  Repeat these steps for the other nostril.     Avoid sneezing or blowing your nose right after spraying.              VOCAL HYGIENE AND HYDRATION RECOMMENDATIONS     DO   Drink plenty of waterabout  oz a day! If your urine is pale, you should be well-hydrated.  Try some of these tips to increase hydration as well.  Eat wet snacks such as grapes, melon, cucumbers, apple slices   Reduce intake coffee and other caffeinated and carbonated beverages   Suck on pastille lozenges or sugar free candies (not mentholated lozenges)   Use a room or personal humidifier   Use sinus rinses/irrigations or nasal saline spray   Inhale steam for a few  minutes before speaking or singing   Pay attention to how, and how much, you use your voice.   Give yourself vocal breaks throughout the day.   Breathe when talking, take frequent pauses for breath.   Use a microphone when speaking in front of a group of 15 or more to reduce voice strain.   Learn how to use your voice correctly to get loud with voice therapy techniques.  Stay healthy. Eat right and get plenty of rest. Follow a reflux precaution diet if indicated.   ____________________________________________________________________    REDUCE   Loud talking, yelling, cheering, or screaming. Voice injury can take place over a long time, or all at once.  If you need to talk loud or yell, be sure you are doing it properly.   Clearing your throat and forceful coughing. The vocal folds collect mucus when irritated or inflamed and this can cause the urge to clear your throat. The trauma of clearing the throat creates more inflammation and mucus. See tips above for keeping hydrated to assist with cutting back on throat clearing.  Instead of clearing your throat, try these behaviors until the sensation passes:   Take a sip of water   Silently clear with a hard exhale making an hhh sound   Swallow hard   Drying agents such as anti-histamines or decongestant medications. You should always take medications as prescribed, but keep in mind these will dry you out, so increase your hydration!   ____________________________________________________________________    AVOID   Inhalant irritants such as smoke, strong fumes, and allergens. Take advantage of 1-800-QUIT-NOW if you are ready to stop smoking.   Unnecessary non-speech noises, such as grunting during exercise, loud noises, or excessively high- or low-pitched sounds. Save your voice for talking and singing!   Whispering. Whispering places your vocal folds in a tenser position than usual.

## 2025-01-13 ENCOUNTER — OFFICE VISIT (OUTPATIENT)
Dept: FAMILY MEDICINE | Facility: CLINIC | Age: OVER 89
End: 2025-01-13
Attending: FAMILY MEDICINE
Payer: MEDICARE

## 2025-01-13 VITALS
BODY MASS INDEX: 22.2 KG/M2 | DIASTOLIC BLOOD PRESSURE: 70 MMHG | HEIGHT: 64 IN | WEIGHT: 130.06 LBS | SYSTOLIC BLOOD PRESSURE: 126 MMHG | OXYGEN SATURATION: 97 % | TEMPERATURE: 98 F | HEART RATE: 62 BPM

## 2025-01-13 DIAGNOSIS — D63.1 ANEMIA OF CHRONIC RENAL FAILURE, STAGE 3B: ICD-10-CM

## 2025-01-13 DIAGNOSIS — I10 ESSENTIAL HYPERTENSION: ICD-10-CM

## 2025-01-13 DIAGNOSIS — I25.10 CORONARY ARTERY DISEASE INVOLVING NATIVE CORONARY ARTERY OF NATIVE HEART WITHOUT ANGINA PECTORIS: ICD-10-CM

## 2025-01-13 DIAGNOSIS — E78.5 HYPERLIPIDEMIA, UNSPECIFIED HYPERLIPIDEMIA TYPE: ICD-10-CM

## 2025-01-13 DIAGNOSIS — N18.31 STAGE 3A CHRONIC KIDNEY DISEASE: ICD-10-CM

## 2025-01-13 DIAGNOSIS — G47.00 INSOMNIA, UNSPECIFIED TYPE: ICD-10-CM

## 2025-01-13 DIAGNOSIS — R21 FACIAL RASH: ICD-10-CM

## 2025-01-13 DIAGNOSIS — M81.0 OSTEOPOROSIS, UNSPECIFIED OSTEOPOROSIS TYPE, UNSPECIFIED PATHOLOGICAL FRACTURE PRESENCE: ICD-10-CM

## 2025-01-13 DIAGNOSIS — I70.0 AORTIC ATHEROSCLEROSIS: ICD-10-CM

## 2025-01-13 DIAGNOSIS — N25.81 SECONDARY HYPERPARATHYROIDISM OF RENAL ORIGIN: ICD-10-CM

## 2025-01-13 DIAGNOSIS — E78.2 MIXED HYPERLIPIDEMIA: Primary | ICD-10-CM

## 2025-01-13 DIAGNOSIS — I10 HTN (HYPERTENSION), BENIGN: ICD-10-CM

## 2025-01-13 DIAGNOSIS — N18.32 ANEMIA OF CHRONIC RENAL FAILURE, STAGE 3B: ICD-10-CM

## 2025-01-13 PROCEDURE — 1159F MED LIST DOCD IN RCRD: CPT | Mod: HCNC,CPTII,S$GLB, | Performed by: FAMILY MEDICINE

## 2025-01-13 PROCEDURE — 3288F FALL RISK ASSESSMENT DOCD: CPT | Mod: HCNC,CPTII,S$GLB, | Performed by: FAMILY MEDICINE

## 2025-01-13 PROCEDURE — 99999 PR PBB SHADOW E&M-EST. PATIENT-LVL IV: CPT | Mod: PBBFAC,HCNC,, | Performed by: FAMILY MEDICINE

## 2025-01-13 PROCEDURE — 1101F PT FALLS ASSESS-DOCD LE1/YR: CPT | Mod: HCNC,CPTII,S$GLB, | Performed by: FAMILY MEDICINE

## 2025-01-13 PROCEDURE — 99215 OFFICE O/P EST HI 40 MIN: CPT | Mod: HCNC,S$GLB,, | Performed by: FAMILY MEDICINE

## 2025-01-13 PROCEDURE — 1160F RVW MEDS BY RX/DR IN RCRD: CPT | Mod: HCNC,CPTII,S$GLB, | Performed by: FAMILY MEDICINE

## 2025-01-13 RX ORDER — PRAVASTATIN SODIUM 40 MG/1
40 TABLET ORAL NIGHTLY
Qty: 90 TABLET | Refills: 3 | Status: SHIPPED | OUTPATIENT
Start: 2025-01-13

## 2025-01-13 RX ORDER — HYDROCORTISONE 25 MG/G
OINTMENT TOPICAL 2 TIMES DAILY
Qty: 30 G | Refills: 3 | Status: SHIPPED | OUTPATIENT
Start: 2025-01-13

## 2025-01-13 NOTE — PROGRESS NOTES
Subjective:       Patient ID: Cheryl Ghosh is a 89 y.o. female.    Chief Complaint: Follow-up    89 yr old pleasant white female with HTN, CKD III, OA, osteopenia hip, hyperlipidemia, chronic back pain, BPPV, presents today for her 3 month check. No new complaints today      HTN - controlled - low salt diet - having issues with medications lisinopril n losartan      CKD III - stable - improving       HLD - slightly worsening since stopped statin x 30 days           - she was asked to start statin but due to her fall and issues with her ribs and back, she was never able to start it.    Back pain - she follows pain clinic for injections - c/o neuropathic pain in legs - was on neurontin in the past and she started taking it and working    Osteoporosis - started back on fosamax - doing well - next dexa in a year    HISTORY as below - reviewed    Health maintenance  -labs UTD  -mammo UTD  -colon screen UTD  -vaccines UTD    Follow-up  Associated symptoms include arthralgias, chest pain, myalgias, vomiting and weakness. Pertinent negatives include no congestion, diaphoresis, headaches, joint swelling, nausea, neck pain or sore throat.   Back Pain  This is a chronic problem. The current episode started more than 1 year ago. The problem occurs intermittently. The problem has been gradually improving since onset. The pain is present in the lumbar spine. The quality of the pain is described as aching. The pain does not radiate. The pain is at a severity of 5/10. The pain is moderate. The symptoms are aggravated by position and sitting. Associated symptoms include chest pain and weakness. Pertinent negatives include no dysuria, headaches or leg pain. She has tried analgesics and ice for the symptoms. The treatment provided mild relief.   Hypertension  This is a chronic problem. The current episode started more than 1 year ago. The problem has been waxing and waning since onset. The problem is resistant. Associated symptoms  include blurred vision, chest pain and malaise/fatigue. Pertinent negatives include no anxiety, headaches, neck pain, orthopnea, palpitations, peripheral edema, PND, shortness of breath or sweats. Agents associated with hypertension include NSAIDs. There are no known risk factors for coronary artery disease. Past treatments include nothing. The current treatment provides significant improvement. Compliance problems include exercise.  There is no history of chronic renal disease.   Hyperlipidemia  This is a chronic problem. The current episode started more than 1 year ago. The problem is uncontrolled. Recent lipid tests were reviewed and are high. She has no history of chronic renal disease, diabetes, hypothyroidism, liver disease, obesity or nephrotic syndrome. There are no known factors aggravating her hyperlipidemia. Associated symptoms include chest pain and myalgias. Pertinent negatives include no focal sensory loss, leg pain or shortness of breath. Current antihyperlipidemic treatment includes diet change. The current treatment provides mild improvement of lipids. There are no compliance problems.  Risk factors for coronary artery disease include dyslipidemia.     Review of Systems   Constitutional:  Positive for activity change and malaise/fatigue. Negative for diaphoresis and unexpected weight change.   HENT:  Positive for hearing loss. Negative for nasal congestion, ear discharge, rhinorrhea, sore throat, trouble swallowing and voice change.    Eyes:  Positive for blurred vision. Negative for pain, discharge and visual disturbance.   Respiratory: Negative.  Negative for chest tightness, shortness of breath and wheezing.    Cardiovascular:  Positive for chest pain. Negative for palpitations, orthopnea and PND.   Gastrointestinal:  Positive for vomiting. Negative for abdominal distention, anal bleeding, blood in stool, constipation, diarrhea and nausea.   Endocrine: Negative.  Negative for cold intolerance,  polydipsia and polyuria.   Genitourinary: Negative.  Negative for decreased urine volume, difficulty urinating, dysuria, frequency, hematuria, menstrual problem and vaginal pain.   Musculoskeletal:  Positive for arthralgias, back pain and myalgias. Negative for gait problem, joint swelling, leg pain and neck pain.   Integumentary:  Negative for color change, pallor and wound. Negative.   Allergic/Immunologic: Negative.  Negative for environmental allergies and immunocompromised state.   Neurological:  Positive for weakness. Negative for dizziness, tremors, seizures, speech difficulty and headaches.   Hematological: Negative.  Negative for adenopathy. Does not bruise/bleed easily.   Psychiatric/Behavioral: Negative.  Negative for agitation, confusion, decreased concentration, dysphoric mood, hallucinations, self-injury and suicidal ideas. The patient is not nervous/anxious.          PMH/PSH/FH/SH/MED/ALLERGY reviewed    Past Medical History:   Diagnosis Date    Arthritis     lumbar    Blood transfusion     Chronic kidney disease, stage III (moderate) 8/2/2017    Coronary artery disease involving native coronary artery without angina pectoris 3/9/2021    Degenerative disc disease     lumbar    Insomnia 11/15/2022    Insulinoma     Pancreatic disease     Urinary incontinence 12/7/2015       Past Surgical History:   Procedure Laterality Date    ADENOIDECTOMY      APPENDECTOMY      CATARACT EXTRACTION, BILATERAL      COLONOSCOPY N/A 8/2/2024    Procedure: COLONOSCOPY;  Surgeon: Fransisca Ojeda MD;  Location: [address];  Service: Endoscopy;  Laterality: N/A;  6/19/24-Dr. Ojeda pt, PEG, instr portal-DS  8/1 please place extra luci pads on bed before going to recovery-ml  8/1-pt notified of earlier arrival time (0645am)-[facility]    EYE SURGERY      HYSTERECTOMY      SAMM/BSO    IMPLANTATION OF PERMANENT SACRAL NERVE STIMULATOR N/A 9/17/2024    Procedure: INSERTION, NEUROSTIMULATOR, PERMANENT, SACRAL;  Surgeon:  Fransisca Ojeda MD;  Location: [facility] OR 2ND FLR;  Service: Colon and Rectal;  Laterality: N/A;  NO fluoro    INJECTION OF JOINT Bilateral 2/4/2020    Procedure: Injection, Joint--Bilateral GTB;  Surgeon: Arina Carrera Jr., MD;  Location: [facility] PAIN MGT;  Service: Pain Management;  Laterality: Bilateral;  Oral sedation    INJECTION OF STEROID Right 12/20/2018    Procedure: INJECTION, STEROID-- Right SI Joint Block and  Steroid Injection;  Surgeon: Xavier Dasilva MD;  Location: [facility] OR;  Service: Neurosurgery;  Laterality: Right;  INJECTION, STEROID-- Right SI Joint Block and  Steroid Injection  SURGERY TIME:1 HR  LOS: 0 DAYS  RADIOLOGY: C- arm  BED: Regular Bed  with Pillows  POSITION: Prone      INJECTION OF STEROID Left 6/19/2019    Procedure: INJECTION, STEROID Procedure:Left sacroiliac joint block / steroid injection Surgery Time: 30mins LOS: Anesthesia: MAC Radiology: C-arm Bed: Regular Bed Position: Prone;  Surgeon: Xavier Dasilva MD;  Location: [facility] OR;  Service: Neurosurgery;  Laterality: Left;    INSERTION, NEUROSTIMULATOR, TEMPORARY, SACRAL N/A 9/3/2024    Procedure: INSERTION, NEUROSTIMULATOR, TEMPORARY, SACRAL;  Surgeon: Fransisca Ojeda MD;  Location: [facility] OR 2ND FLR;  Service: Colon and Rectal;  Laterality: N/A;  needs fluoro    MINIMALLY INVASIVE FUSION OF SPINE Right 2/6/2019    Procedure: FUSION, SPINE, MINIMALLY INVASIVE Right Sacroiliac Joint Fusion -  I Fuse;  Surgeon: Xavier Dasilva MD;  Location: [facility] OR;  Service: Neurosurgery;  Laterality: Right;  Procedure: Right Sacroiliac Joint Fusion -  I Fuse  Surgery Time: 1.5 Hr  LOS: 0  Anesthesia: General  Radiology: C-Arm  Bed: [place] 4 Poster  Position: Prone  Equipment: I Fuse [place] [phone] (notified 1/24 EF)    OOPHORECTOMY      PANCREAS SURGERY      Insulanoma    RADIOFREQUENCY ABLATION OF LUMBAR MEDIAL BRANCH NERVE AT SINGLE LEVEL Right 8/28/2018    Procedure: RADIOFREQUENCY ABLATION, NERVE, MEDIAL BRANCH, LUMBAR, 1 LEVEL-  Right L3,4,5 IV SEDATION;  Surgeon: Eddie Vega MD;  Location: [facility] PAIN MGT;  Service: Pain Management;  Laterality: Right;    RADIOFREQUENCY ABLATION OF LUMBAR MEDIAL BRANCH NERVE AT SINGLE LEVEL Left 9/4/2018    Procedure: RADIOFREQUENCY ABLATION, NERVE, MEDIAL BRANCH, LUMBAR, 1 LEVEL - Left L3,4,5 IV SEADTION;  Surgeon: Eddie Vega MD;  Location: [facility] PAIN MGT;  Service: Pain Management;  Laterality: Left;  Patient takes ASA     REFRACTIVE SURGERY Bilateral     SPINAL FUSION Left 7/31/2019    Procedure: FUSION, SPINE SI Joint Fusion;  Surgeon: Xavier Dasilva MD;  Location: [facility] OR;  Service: Neurosurgery;  Laterality: Left;  Procedure: Left SI joint fusion  Surgery Time: 1.5hr  LOS:0  Anesthesia: General   Radiology: C-arm   Bed: [place] [address] Poster  Position: Prone  Equipment: Codi Diaz- [phone] (Codi notified)    TONSILLECTOMY         Family History   Problem Relation Name Age of Onset    Breast cancer Mother      Heart disease Father      Aneurysm Sister      Glaucoma Daughter      Heart attack Son      Breast cancer Maternal Grandmother      Breast cancer Other niece        Social History     Socioeconomic History    Marital status:    Tobacco Use    Smoking status: Never     Passive exposure: Never    Smokeless tobacco: Never   Substance and Sexual Activity    Alcohol use: Yes     Alcohol/week: 1.0 - 2.0 standard drink of alcohol     Types: 1 - 2 Glasses of wine per week    Drug use: No    Sexual activity: Yes     Partners: Male     Social Drivers of Health     Financial Resource Strain: Low Risk  (1/23/2024)    Overall Financial Resource Strain (CARDIA)     Difficulty of Paying Living Expenses: Not hard at all   Food Insecurity: No Food Insecurity (1/23/2024)    Hunger Vital Sign     Worried About Running Out of Food in the Last Year: Never true     Ran Out of Food in the Last Year: Never true   Transportation Needs: No Transportation Needs (1/23/2024)    PRAPARE -  Transportation     Lack of Transportation (Medical): No     Lack of Transportation (Non-Medical): No   Physical Activity: Insufficiently Active (1/23/2024)    Exercise Vital Sign     Days of Exercise per Week: 3 days     Minutes of Exercise per Session: 20 min   Stress: No Stress Concern Present (1/23/2024)    [employer] - Occupational Stress Questionnaire     Feeling of Stress : Only a little   Housing Stability: Low Risk  (1/23/2024)    Housing Stability Vital Sign     Unable to Pay for Housing in the Last Year: No     Number of Places Lived in the Last Year: 1     Unstable Housing in the Last Year: No       Current Outpatient Medications   Medication Sig Dispense Refill    acetaminophen (TYLENOL 8 HOUR ORAL) Take by mouth.      aspirin (ECOTRIN) 81 MG EC tablet Take 1 tablet (81 mg total) by mouth once daily. Resume 48 hours post-surgery  0    b complex vitamins tablet Take 1 tablet by mouth once daily.      baclofen (LIORESAL) 10 MG tablet Take 1 tablet (10 mg total) by mouth 3 (three) times daily. 270 tablet 4    calcium-vitamin D 600 mg(1,500mg) -400 unit Tab once daily.       estradioL (ESTRACE) 0.01 % (0.1 mg/gram) vaginal cream 0.5 grams with applicator or dime-sized amount with finger in vagina nightly x 2 weeks, then twice a week thereafter 42.5 g 11    fluticasone propionate (FLONASE) 50 mcg/actuation nasal spray 1 spray (50 mcg total) by Each Nostril route once daily. 16 g 2    FLUZONE HIGHDOSE QUAD 23-24  mcg/0.7 mL Syrg       gabapentin (NEURONTIN) 300 MG capsule TAKE 1 CAPSULE THREE TIMES DAILY 270 capsule 3    hydrOXYzine (ATARAX) 50 MG tablet Take 1 tablet (50 mg total) by mouth every evening. 30 tablet 2    oxyCODONE-acetaminophen (PERCOCET) 5-325 mg per tablet Take 1 tablet by mouth every 8 (eight) hours as needed for Pain. 21 tablet 0    pravastatin (PRAVACHOL) 40 MG tablet Take 1 tablet (40 mg total) by mouth every evening. 90 tablet 3    pyridoxine, vitamin  B6, (B-6) 100 MG Tab Take 100 mg by mouth once daily.      raloxifene (EVISTA) 60 mg tablet Take 1 tablet (60 mg total) by mouth once daily. 30 tablet 11    traZODone (DESYREL) 100 MG tablet Take 2 tablets (200 mg total) by mouth nightly as needed for Insomnia. 180 tablet 3     No current facility-administered medications for this visit.     Facility-Administered Medications Ordered in Other Visits   Medication Dose Route Frequency Provider Last Rate Last Admin    gentamicin 80 mg/100ml NACL IVPB IVPB 80 mg  80 mg Intravenous 30 Min Pre-Op Wiliam Urias PA-C   80 mg at 07/31/19 0747    vancomycin in dextrose 5 % 1 gram/250 mL IVPB 1,000 mg  1,000 mg Intravenous 30 Min Pre-Op Wiliam Urias PA-C   1,000 mg at 07/31/19 0738       Review of patient's allergies indicates:   Allergen Reactions    Pcn [penicillins] Hives and Nausea And Vomiting       Objective:       There were no vitals filed for this visit.      Physical Exam  Constitutional:       General: She is not in acute distress.     Appearance: She is well-developed. She is not diaphoretic.   HENT:      Head: Normocephalic and atraumatic.      Right Ear: External ear normal.      Left Ear: External ear normal.      Nose: Nose normal.      Mouth/Throat:      Pharynx: No oropharyngeal exudate.   Eyes:      General: No scleral icterus.        Right eye: No discharge.         Left eye: No discharge.      Conjunctiva/sclera: Conjunctivae normal.      Pupils: Pupils are equal, round, and reactive to light.   Neck:      Thyroid: No thyromegaly.      Vascular: No JVD.      Trachea: No tracheal deviation.   Cardiovascular:      Rate and Rhythm: Normal rate and regular rhythm.      Heart sounds: Normal heart sounds. No murmur heard.     No friction rub. No gallop.   Pulmonary:      Effort: Pulmonary effort is normal.      Breath sounds: Normal breath sounds. No stridor. No wheezing or rales.   Chest:      Chest wall: No tenderness.   Abdominal:      General: Bowel  sounds are normal. There is no distension.      Palpations: Abdomen is soft. There is no mass.      Tenderness: There is no abdominal tenderness. There is no guarding or rebound.      Hernia: No hernia is present.   Musculoskeletal:         General: No tenderness. Normal range of motion.      Cervical back: Normal range of motion and neck supple.   Lymphadenopathy:      Cervical: No cervical adenopathy.   Skin:     General: Skin is warm and dry.      Coloration: Skin is not pale.      Findings: No erythema or rash.   Neurological:      Mental Status: She is alert and oriented to person, place, and time.      Cranial Nerves: No cranial nerve deficit.      Motor: No abnormal muscle tone.      Coordination: Coordination normal.      Deep Tendon Reflexes: Reflexes are normal and symmetric. Reflexes normal.   Psychiatric:         Behavior: Behavior normal.         Thought Content: Thought content normal.         Judgment: Judgment normal.         Assessment:       Problem List Items Addressed This Visit       Stage 3a chronic kidney disease    Secondary hyperparathyroidism of renal origin    Osteoporosis    Mixed hyperlipidemia - Primary    Insomnia    HTN (hypertension), benign    Coronary artery disease involving native coronary artery without angina pectoris    Aortic atherosclerosis    Anemia of chronic renal failure, stage 3b     Other Visit Diagnoses       Essential hypertension                Plan:           Cheryl was seen today for follow-up.    Diagnoses and all orders for this visit:    Mixed hyperlipidemia    Essential hypertension    Stage 3a chronic kidney disease    Aortic atherosclerosis    Anemia of chronic renal failure, stage 3b    Secondary hyperparathyroidism of renal origin    HTN (hypertension), benign    Osteoporosis, unspecified osteoporosis type, unspecified pathological fracture presence    Insomnia, unspecified type    Coronary artery disease involving native coronary artery of native heart  without angina pectoris      HTN  -controlled    TIA  -follow neurology - DC plavix per neuro recommendations and just stay on ASA      Insomnia  -try atarax nightly with trazodone  HLD  -diet controlled and statin    Urine incontinence  -lifestyle changes - intolerant to meds    Neuropathic pain  -continue neurontin    OA knee B/L  -mobic prn    CKD III  -lab and urine analysis  -adequate hydration  -follow nephrology    Muscle spasm  -baclofen  and percocet prn    Osteoporosis  -Evista.Side effects of medications have been discussed and patient agreed to proceed with treatment and understands the risks and benefits.      Insomnia  -increase trazodone to 200    CKD III  -stable      Spent adequate time in obtaining history and explaining differentials    40  minutes spent during this visit of which greater than 50% devoted to face-face counseling and coordination of care regarding diagnosis and management plan    RTC 3 months

## 2025-01-30 DIAGNOSIS — Z00.00 ENCOUNTER FOR MEDICARE ANNUAL WELLNESS EXAM: ICD-10-CM

## 2025-03-17 ENCOUNTER — TELEPHONE (OUTPATIENT)
Dept: FAMILY MEDICINE | Facility: CLINIC | Age: OVER 89
End: 2025-03-17
Payer: MEDICARE

## 2025-03-17 NOTE — TELEPHONE ENCOUNTER
Called pt and she is aware orders in computer       ----- Message from [employer] sent at 3/14/2025  4:51 PM CDT -----  Type:  MammogramCaller is requesting to schedule their annual mammogram appointment.      Order is not listed in EPIC.  Please enter order and contact patient to schedule.Name of Caller:Cheryl Where would they like the mammogram     performed? [facility] Would the patient rather a call back or a response     via [facility]? Call back Best Call Back Number: [phone]     Information: Pt states she made an appointment for her mammogram via [facility], and the pt states when she scheduled it informed her to get a message     over to her PCP to get orders in the system for the appointment. Pt states to     please give her a call back when the orders are in.

## 2025-04-14 DIAGNOSIS — R21 FACIAL RASH: ICD-10-CM

## 2025-04-14 RX ORDER — HYDROCORTISONE 25 MG/G
OINTMENT TOPICAL 2 TIMES DAILY
Qty: 28.35 G | Refills: 0 | Status: SHIPPED | OUTPATIENT
Start: 2025-04-14

## 2025-04-14 NOTE — TELEPHONE ENCOUNTER
Refill Routing Note   Medication(s) are not appropriate for processing by [facility] for the following reason(s):        New or recently adjusted medication    [facility] action(s):  Defer     Requires labs : Yes             Appointments  past 12m or future 3m with PCP    Date Provider   Last Visit   1/13/2025 Joey Mueller MD   Next Visit   7/14/2025 Joey Mueller MD   ED visits in past 90 days: 0        Note composed:6:17 AM 04/14/2025

## 2025-04-14 NOTE — TELEPHONE ENCOUNTER
Care Due:                  Date            Visit Type   Department     Provider  --------------------------------------------------------------------------------                                EP -                              PRIMARY      [facility] FAMILY  Last Visit: 01-      CARE ([facility])   MEDICINE       Joey Mueller                               -                              PRIMARY      [facility]  Next Visit: 07-      CARE ([facility])   MEDICINE       Joey Mueller                                                            Last  Test          Frequency    Reason                     Performed    Due Date  --------------------------------------------------------------------------------    Mg Level....  12 months..  raloxifene...............  Not Found    Overdue    Phosphate...  15 months..  raloxifene...............  Not Found    Overdue    Vitamin D...  15 months..  raloxifene...............  Not Found    Overdue    [employer] Embedded Care Due Messages. Reference number: [id number].   4/14/2025 1:58:03 AM CDT

## 2025-05-07 DIAGNOSIS — R21 FACIAL RASH: ICD-10-CM

## 2025-05-07 DIAGNOSIS — G47.00 INSOMNIA, UNSPECIFIED TYPE: ICD-10-CM

## 2025-05-07 RX ORDER — TRAZODONE HYDROCHLORIDE 100 MG/1
TABLET ORAL
Qty: 180 TABLET | Refills: 2 | Status: SHIPPED | OUTPATIENT
Start: 2025-05-07

## 2025-05-07 RX ORDER — HYDROCORTISONE 25 MG/G
OINTMENT TOPICAL 2 TIMES DAILY
Qty: 30 G | Refills: 2 | Status: SHIPPED | OUTPATIENT
Start: 2025-05-07

## 2025-05-07 NOTE — TELEPHONE ENCOUNTER
Provider Staff:  Action required for this patient    Requires labs      Please see care gap opportunities below in Care Due Message.    Thanks!  [facility]     Appointments      Date Provider   Last Visit   1/13/2025 Joey Mueller MD   Next Visit   7/14/2025 Joey Mueller MD     Refill Decision Note   Cheryl Ghosh  is requesting a refill authorization.  Brief Assessment and Rationale for Refill:  Approve     Medication Therapy Plan:         Comments:     Note composed:8:25 AM 05/07/2025

## 2025-05-07 NOTE — TELEPHONE ENCOUNTER
Care Due:                  Date            Visit Type   Department     Provider  --------------------------------------------------------------------------------                                EP -                              PRIMARY      [facility] FAMILY  Last Visit: 01-      CARE ([facility])   MEDICINE       Joey Mueller                               -                              PRIMARY      [facility]  Next Visit: 07-      CARE ([facility])   MEDICINE       Joey Mueller                                                            Last  Test          Frequency    Reason                     Performed    Due Date  --------------------------------------------------------------------------------    Lipid Panel.  12 months..  pravastatin..............  07- 07-    [facility] Embedded Care Due Messages. Reference number: [id number].   5/07/2025 4:37:32 AM CDT

## 2025-06-18 ENCOUNTER — OFFICE VISIT (OUTPATIENT)
Dept: FAMILY MEDICINE | Facility: CLINIC | Age: OVER 89
End: 2025-06-18
Payer: MEDICARE

## 2025-06-18 VITALS
OXYGEN SATURATION: 96 % | DIASTOLIC BLOOD PRESSURE: 60 MMHG | WEIGHT: 130.31 LBS | HEART RATE: 60 BPM | SYSTOLIC BLOOD PRESSURE: 128 MMHG | HEIGHT: 64 IN | BODY MASS INDEX: 22.25 KG/M2 | TEMPERATURE: 98 F

## 2025-06-18 DIAGNOSIS — J06.9 UPPER RESPIRATORY INFECTION, ACUTE: Primary | ICD-10-CM

## 2025-06-18 DIAGNOSIS — J02.9 SORE THROAT: ICD-10-CM

## 2025-06-18 DIAGNOSIS — R05.1 ACUTE COUGH: ICD-10-CM

## 2025-06-18 LAB
CTP QC/QA: YES
MOLECULAR STREP A: NEGATIVE
POC MOLECULAR INFLUENZA A AGN: NEGATIVE
POC MOLECULAR INFLUENZA B AGN: NEGATIVE
SARS-COV-2 RDRP RESP QL NAA+PROBE: NEGATIVE

## 2025-06-18 PROCEDURE — 87651 STREP A DNA AMP PROBE: CPT | Mod: QW,HCNC,S$GLB,

## 2025-06-18 PROCEDURE — 1101F PT FALLS ASSESS-DOCD LE1/YR: CPT | Mod: CPTII,HCNC,S$GLB,

## 2025-06-18 PROCEDURE — 87635 SARS-COV-2 COVID-19 AMP PRB: CPT | Mod: QW,HCNC,S$GLB,

## 2025-06-18 PROCEDURE — 1159F MED LIST DOCD IN RCRD: CPT | Mod: CPTII,HCNC,S$GLB,

## 2025-06-18 PROCEDURE — 1125F AMNT PAIN NOTED PAIN PRSNT: CPT | Mod: CPTII,HCNC,S$GLB,

## 2025-06-18 PROCEDURE — 99215 OFFICE O/P EST HI 40 MIN: CPT | Mod: HCNC,S$GLB,,

## 2025-06-18 PROCEDURE — 99999 PR PBB SHADOW E&M-EST. PATIENT-LVL V: CPT | Mod: PBBFAC,HCNC,,

## 2025-06-18 PROCEDURE — 1160F RVW MEDS BY RX/DR IN RCRD: CPT | Mod: CPTII,HCNC,S$GLB,

## 2025-06-18 PROCEDURE — 3288F FALL RISK ASSESSMENT DOCD: CPT | Mod: CPTII,HCNC,S$GLB,

## 2025-06-18 PROCEDURE — 87502 INFLUENZA DNA AMP PROBE: CPT | Mod: QW,HCNC,S$GLB,

## 2025-06-18 RX ORDER — FLUTICASONE PROPIONATE 50 MCG
1 SPRAY, SUSPENSION (ML) NASAL DAILY
Qty: 16 G | Refills: 0 | Status: SHIPPED | OUTPATIENT
Start: 2025-06-18

## 2025-06-18 RX ORDER — ALBUTEROL SULFATE 90 UG/1
2 INHALANT RESPIRATORY (INHALATION) EVERY 6 HOURS PRN
Qty: 18 G | Refills: 0 | Status: SHIPPED | OUTPATIENT
Start: 2025-06-18

## 2025-06-18 RX ORDER — BENZONATATE 200 MG/1
200 CAPSULE ORAL 3 TIMES DAILY PRN
Qty: 30 CAPSULE | Refills: 0 | Status: SHIPPED | OUTPATIENT
Start: 2025-06-18 | End: 2025-06-28

## 2025-06-18 RX ORDER — AZITHROMYCIN 250 MG/1
TABLET, FILM COATED ORAL
Qty: 6 TABLET | Refills: 0 | Status: SHIPPED | OUTPATIENT
Start: 2025-06-18 | End: 2025-06-23

## 2025-06-18 NOTE — PATIENT INSTRUCTIONS
Patient Instructions   Recommend oral antihistamine (claritin, zyrtec, allegra,xyzal)) +/- oral decongestant (pseudoephedrine)  for rhinorrhea/ear congestion for 3 to 5 days if blood pressure is <130/80mmhg, steroid nasal spray (flonase) +/- , prescription (promethazine-DM) at night due to drowsiness  /OTC cough medicine (theraflu or Mucinex Dm 12 hour or Coricidin HBP® Maximum Strength Cold & Flu Day),  Tylenol (Acetaminophen) and/or Motrin (Ibuprofen) as directed for control of pain and/or fever.        Please drink plenty of fluids.  Please get plenty of rest.  Nasal irrigation with a saline spray or Netti Pot like device per their directions is also recommended.  If you smoke, please stop smoking.     To help ease a sore throat, you can:  Use a sore throat spray.  Suck on hard candy or throat lozenges.  Gargle with warm saltwater a few times each day. Mix of 1/4 teaspoon (1.25 grams) salt in 8 ounces (240 mL) of warm water.  Use a cool mist humidifier to help you breathe easier.     If you negative (-) for a COVID test today and you are continuing to have symptoms, it is recommended to repeat the test in 48 hours x 3. If you continue to be negative, you may return to school/work once you have improved symptoms and no fever for 24 hours without any medications. This applies to all viral illnesses.         Discussed prescriptions and over-the-counter medicines to help with patient's symptoms:  A steroid nose spray (flonase) and antihistamine nasal spray (azelastine) can help with a stuffy nose. It can also help with drainage down the back of your throat.  An antihistamine (loratadine,zyrtec,allegra, xyzal) can help with itching, sneezing, or runny nose.  An antihistamine eye drop can help with itchy eyes.  A decongestant (pseudoephedrine,  Phenylephrine, oxymetazoline aka afrin nasal spray) can help with a stuffy nose. Take <10 days for congestion and rhinorrhea. Once symptoms improve, proceed with  loratadine/zyrtec once a day. These ingredients can keep you up all night, decrease appetite, feel jittery, and raise blood pressure with long term use.  OTC Coricidin can be used for patients with hypertension and palpitations because you cannot use ingredients such as pseudoephedrine and phenylephrine for oral decongestants.  Coricidin HBP Cough & Cold (Chlorpheniramine/Dextromethorphan)  Coricidin HBP Maximum Strength Multi-Symptom Flu  (Acetaminophen,Dextromethorphan, Chlorpheniramine)  Coricidin HBP® Maximum Strength Cold & Flu Day/Night (Acetaminophen,Dextromethorphan, Doxylamine, Guaifenesin)  Coricidin HBP Chest Congestion & Cough (Dextromethorphan + Guaifenesin)  Mucinex DM: Guaifenesin,Dextromethorphan     Medications that control cough are suppressants and expectorants. Suppressants are tessalon pearls and dextromethorphan. If you have a productive cough with sputum, you need an expectorant called guaifenesin. Dextromethorphan and Guaifenesin are active ingredients in many OTC cough/cold medications such as Dayquil/Nyquil, Mucinex, and Robitussin Mucus+Chest Congestion.             Common Cold Medicine Ingredients Cheat sheet  Acetaminophen (APAP) -pain reliever/fever reducer  Dextromethorphan - cough suppressant  Guaifenesin - expectorant/thins and loosens mucus  Phenylephrine - nasal decongestant  Diphenhydramine or Doxylamine succinate - antihistamine, helps you fall asleep  Promethazine or Brompheniramine - Prescription strength antihistamines     If not allergic, take Tylenol (Acetaminophen) 650 mg to  1 g every 6 hours as needed  and/or Motrin (Ibuprofen) 600 to 800 mg every 6 hours as needed for fever or pain.     If your blood pressure was elevated during your visit and this was discussed with you, please obtain a home blood pressure cuff and take your blood pressure once daily for two weeks, record values and follow up with your PCP. If you were started on blood pressure medication today, ensure  you obtain a follow up appointment with your PCP in 5-7 days for a re-check, if you develop shortness of breath, severe headache, weakness, chest pain, vision problems after leaving urgent care, call 911 and go to the ER immediately.       ER/Urgent Care precautions discussed with patient. Go to ER for new or worsening symptoms.

## 2025-06-18 NOTE — PROGRESS NOTES
Subjective     Patient ID: Cheryl Ghosh is a 89 y.o. female.    Chief Complaint: Sore Throat (Post nasal drip, coughing)      Patient is a pleasant 89 year old white female with pmhx as reviewed below who presents to clinic alone today as a new patient to me, established with Dr. Mueller, for acute c/o sore throat and cough. Started with symptoms 4-5 days ago. Denies fever. Endorses congestion, sore throat and cough. Denies CP or SOB.   Has been doing home treatment with listerine, hot tea, honey and warm fluids. Throat pain is 7/10 today.       Review of Systems   Constitutional:  Negative for chills and fever.   HENT:  Positive for nasal congestion and sore throat. Negative for ear pain and sinus pressure/congestion.    Eyes:  Negative for visual disturbance.   Respiratory:  Positive for cough. Negative for shortness of breath.    Cardiovascular:  Negative for chest pain.   Gastrointestinal:  Negative for abdominal pain, nausea and vomiting.   Musculoskeletal:  Negative for back pain and neck pain.   Integumentary:  Negative for rash.   Neurological:  Negative for dizziness and headaches.   Psychiatric/Behavioral:  Negative for confusion.      Past Medical History:   Diagnosis Date    Arthritis     lumbar    Blood transfusion     Chronic kidney disease, stage III (moderate) 8/2/2017    Coronary artery disease involving native coronary artery without angina pectoris 3/9/2021    Degenerative disc disease     lumbar    Insomnia 11/15/2022    Insulinoma     Pancreatic disease     Urinary incontinence 12/7/2015       Past Surgical History:   Procedure Laterality Date    ADENOIDECTOMY      APPENDECTOMY      CATARACT EXTRACTION, BILATERAL      COLONOSCOPY N/A 8/2/2024    Procedure: COLONOSCOPY;  Surgeon: Fransisca Ojeda MD;  Location: [address]);  Service: Endoscopy;  Laterality: N/A;  6/19/24-Dr. Ojeda pt, PEG, instr portal-DS  8/1 please place extra luci pads on bed before going to recovery-ml  8/1-pt  notified of earlier arrival time (0645am)-[facility]    EYE SURGERY      HYSTERECTOMY      SAMM/BSO    IMPLANTATION OF PERMANENT SACRAL NERVE STIMULATOR N/A 9/17/2024    Procedure: INSERTION, NEUROSTIMULATOR, PERMANENT, SACRAL;  Surgeon: Fransisca Ojeda MD;  Location: [facility] OR 2ND FLR;  Service: Colon and Rectal;  Laterality: N/A;  NO fluoro    INJECTION OF JOINT Bilateral 2/4/2020    Procedure: Injection, Joint--Bilateral GTB;  Surgeon: Arina Carrera Jr., MD;  Location: [facility] PAIN MGT;  Service: Pain Management;  Laterality: Bilateral;  Oral sedation    INJECTION OF STEROID Right 12/20/2018    Procedure: INJECTION, STEROID-- Right SI Joint Block and  Steroid Injection;  Surgeon: Xavier Dasilva MD;  Location: [facility] OR;  Service: Neurosurgery;  Laterality: Right;  INJECTION, STEROID-- Right SI Joint Block and  Steroid Injection  SURGERY TIME:1 HR  LOS: 0 DAYS  RADIOLOGY: C- arm  BED: Regular Bed  with Pillows  POSITION: Prone      INJECTION OF STEROID Left 6/19/2019    Procedure: INJECTION, STEROID Procedure:Left sacroiliac joint block / steroid injection Surgery Time: 30mins LOS: Anesthesia: MAC Radiology: C-arm Bed: Regular Bed Position: Prone;  Surgeon: Xavier Dasilva MD;  Location: [facility] OR;  Service: Neurosurgery;  Laterality: Left;    INSERTION, NEUROSTIMULATOR, TEMPORARY, SACRAL N/A 9/3/2024    Procedure: INSERTION, NEUROSTIMULATOR, TEMPORARY, SACRAL;  Surgeon: Fransisca Ojeda MD;  Location: [facility] OR 2ND FLR;  Service: Colon and Rectal;  Laterality: N/A;  needs fluoro    MINIMALLY INVASIVE FUSION OF SPINE Right 2/6/2019    Procedure: FUSION, SPINE, MINIMALLY INVASIVE Right Sacroiliac Joint Fusion -  I Fuse;  Surgeon: Xavier Dasilva MD;  Location: [facility] OR;  Service: Neurosurgery;  Laterality: Right;  Procedure: Right Sacroiliac Joint Fusion -  I Fuse  Surgery Time: 1.5 Hr  LOS: 0  Anesthesia: General  Radiology: C-Arm  Bed: [place] 4 Poster  Position: Prone  Equipment: I Fuse [place] [phone]  (notified 1/24 EF)    OOPHORECTOMY      PANCREAS SURGERY      Insulanoma    RADIOFREQUENCY ABLATION OF LUMBAR MEDIAL BRANCH NERVE AT SINGLE LEVEL Right 8/28/2018    Procedure: RADIOFREQUENCY ABLATION, NERVE, MEDIAL BRANCH, LUMBAR, 1 LEVEL- Right L3,4,5 IV SEDATION;  Surgeon: Eddie Vega MD;  Location: [facility] PAIN MGT;  Service: Pain Management;  Laterality: Right;    RADIOFREQUENCY ABLATION OF LUMBAR MEDIAL BRANCH NERVE AT SINGLE LEVEL Left 9/4/2018    Procedure: RADIOFREQUENCY ABLATION, NERVE, MEDIAL BRANCH, LUMBAR, 1 LEVEL - Left L3,4,5 IV SEADTION;  Surgeon: Eddie Vega MD;  Location: [facility] PAIN MGT;  Service: Pain Management;  Laterality: Left;  Patient takes ASA     REFRACTIVE SURGERY Bilateral     SPINAL FUSION Left 7/31/2019    Procedure: FUSION, SPINE SI Joint Fusion;  Surgeon: Xavier Dasilva MD;  Location: [facility] OR;  Service: Neurosurgery;  Laterality: Left;  Procedure: Left SI joint fusion  Surgery Time: 1.5hr  LOS:0  Anesthesia: General   Radiology: C-arm   Bed: [place] [id number] Poster  Position: Prone  Equipment: Codi Diaz- [phone] (Codi notified)    TONSILLECTOMY         Family History   Problem Relation Name Age of Onset    Breast cancer Mother      Heart disease Father      Aneurysm Sister      Glaucoma Daughter      Heart attack Son      Breast cancer Maternal Grandmother      Breast cancer Other niece        Social History     Socioeconomic History    Marital status:    Tobacco Use    Smoking status: Never     Passive exposure: Never    Smokeless tobacco: Never   Substance and Sexual Activity    Alcohol use: Yes     Alcohol/week: 1.0 - 2.0 standard drink of alcohol     Types: 1 - 2 Glasses of wine per week    Drug use: No    Sexual activity: Yes     Partners: Male     Social Drivers of Health     Financial Resource Strain: Low Risk  (1/23/2024)    Overall Financial Resource Strain (CARDIA)     Difficulty of Paying Living Expenses: Not hard at all   Food Insecurity: No Food  "Insecurity (1/23/2024)    Hunger Vital Sign     Worried About Running Out of Food in the Last Year: Never true     Ran Out of Food in the Last Year: Never true   Transportation Needs: No Transportation Needs (1/23/2024)    PRAPARE - Transportation     Lack of Transportation (Medical): No     Lack of Transportation (Non-Medical): No   Physical Activity: Insufficiently Active (1/23/2024)    Exercise Vital Sign     Days of Exercise per Week: 3 days     Minutes of Exercise per Session: 20 min   Stress: No Stress Concern Present (1/23/2024)    [employer] - Occupational Stress Questionnaire     Feeling of Stress : Only a little   Housing Stability: Low Risk  (1/23/2024)    Housing Stability Vital Sign     Unable to Pay for Housing in the Last Year: No     Number of Places Lived in the Last Year: 1     Unstable Housing in the Last Year: No       Current Medications[1]    Review of patient's allergies indicates:   Allergen Reactions    Pcn [penicillins] Hives and Nausea And Vomiting         Objective     Vitals:    06/18/25 1531   BP: 128/60   Pulse: 60   Temp: 98.2 °F (36.8 °C)   TempSrc: Oral   SpO2: 96%   Weight: 59.1 kg (130 lb 4.7 oz)   Height: 5' 4" (1.626 m)   PainSc:   7   PainLoc: Throat      Physical Exam  Vitals and nursing note reviewed.   Constitutional:       General: She is not in acute distress.     Appearance: Normal appearance. She is not ill-appearing.   HENT:      Head: Normocephalic and atraumatic.      Right Ear: A middle ear effusion is present.      Left Ear: A middle ear effusion is present.      Nose: Congestion present. No rhinorrhea.      Right Turbinates: Enlarged.      Left Turbinates: Enlarged.      Mouth/Throat:      Lips: Pink.      Mouth: Mucous membranes are moist.      Pharynx: Oropharynx is clear. Posterior oropharyngeal erythema and postnasal drip present. No pharyngeal swelling or oropharyngeal exudate.   Eyes:      General: No scleral icterus.        " Right eye: No discharge.         Left eye: No discharge.      Extraocular Movements: Extraocular movements intact.      Conjunctiva/sclera: Conjunctivae normal.   Cardiovascular:      Rate and Rhythm: Normal rate and regular rhythm.      Pulses: Normal pulses.      Heart sounds: Normal heart sounds. No murmur heard.  Pulmonary:      Effort: Pulmonary effort is normal. No respiratory distress.      Breath sounds: Examination of the right-upper field reveals wheezing. Examination of the left-upper field reveals wheezing. Wheezing present.   Abdominal:      General: Abdomen is flat. Bowel sounds are normal. There is no distension.      Palpations: Abdomen is soft. There is no mass.      Tenderness: There is no abdominal tenderness.   Musculoskeletal:         General: Normal range of motion.      Cervical back: Normal range of motion.      Right lower leg: No edema.      Left lower leg: No edema.   Lymphadenopathy:      Head:      Right side of head: Submandibular adenopathy present.      Left side of head: Submandibular adenopathy present.   Skin:     General: Skin is warm and dry.   Neurological:      General: No focal deficit present.      Mental Status: She is alert and oriented to person, place, and time.   Psychiatric:         Mood and Affect: Mood normal.         Behavior: Behavior normal. Behavior is cooperative.         Cognition and Memory: Cognition and memory normal.          Assessment and Plan     1. Upper respiratory infection, acute  -     fluticasone propionate (FLONASE) 50 mcg/actuation nasal spray; 1 spray (50 mcg total) by Each Nostril route once daily.  Dispense: 16 g; Refill: 0  -     azithromycin (Z-DANA) 250 MG tablet; Take 2 tablets by mouth on day 1; Take 1 tablet by mouth on days 2-5  Dispense: 6 tablet; Refill: 0  -     albuterol (VENTOLIN HFA) 90 mcg/actuation inhaler; Inhale 2 puffs into the lungs every 6 (six) hours as needed for Wheezing or Shortness of Breath. Rescue  Dispense: 18 g;  Refill: 0  2. Acute cough  -     POCT COVID-19 Rapid Screening  -     POCT Influenza A/B Molecular  -     benzonatate (TESSALON) 200 MG capsule; Take 1 capsule (200 mg total) by mouth 3 (three) times daily as needed for Cough.  Dispense: 30 capsule; Refill: 0  3. Sore throat  -     POCT Strep A, Molecular    New problem; started with s/s 4-5 days ago. Covid, flu, and strep swab all negative today. +Wheezing noted bilaterally. Denies CP or SOB. Take medications as prescribed. Stay hydrated. Take tylenol for pain. Other information for at  home care discussed and provided in AVS. RTC if s/s worsen or fail to improve.     ER/Urgent Care precautions discussed with patient. Go to ER for new or worsening symptoms.         Follow up in about 1 week (around 6/25/2025), or if symptoms worsen or fail to improve.    40 minutes of total time spent on the encounter, which includes face to face time and non-face to face time preparing to see the patient (eg, review of tests), Obtaining and/or reviewing separately obtained history, Documenting clinical information in the electronic or other health record, Independently interpreting results (not separately reported) and communicating results to the patient/family/caregiver, or Care coordination (not separately reported).      Robyn Liao, MSN, APRN, FNP-C  Family Medicine  Office #[phone]          [1]   Current Outpatient Medications   Medication Sig Dispense Refill    acetaminophen (TYLENOL 8 HOUR ORAL) Take by mouth.      aspirin (ECOTRIN) 81 MG EC tablet Take 1 tablet (81 mg total) by mouth once daily. Resume 48 hours post-surgery  0    b complex vitamins tablet Take 1 tablet by mouth once daily.      baclofen (LIORESAL) 10 MG tablet Take 1 tablet (10 mg total) by mouth 3 (three) times daily. 270 tablet 4    calcium-vitamin D 600 mg(1,500mg) -400 unit Tab once daily.       estradioL (ESTRACE) 0.01 % (0.1 mg/gram) vaginal cream 0.5 grams with applicator or dime-sized  amount with finger in vagina nightly x 2 weeks, then twice a week thereafter 42.5 g 11    FLUZONE HIGHDOSE QUAD 23-24  mcg/0.7 mL Syrg       gabapentin (NEURONTIN) 300 MG capsule TAKE 1 CAPSULE THREE TIMES DAILY 270 capsule 3    hydrocortisone 2.5 % ointment APPLY TOPICALLY 2 (TWO) TIMES DAILY. 30 g 2    hydrOXYzine (ATARAX) 50 MG tablet Take 1 tablet (50 mg total) by mouth every evening. 30 tablet 2    oxyCODONE-acetaminophen (PERCOCET) 5-325 mg per tablet Take 1 tablet by mouth every 8 (eight) hours as needed for Pain. 21 tablet 0    pravastatin (PRAVACHOL) 40 MG tablet Take 1 tablet (40 mg total) by mouth every evening. 90 tablet 3    pyridoxine, vitamin B6, (B-6) 100 MG Tab Take 100 mg by mouth once daily.      raloxifene (EVISTA) 60 mg tablet Take 1 tablet (60 mg total) by mouth once daily. 30 tablet 11    traZODone (DESYREL) 100 MG tablet TAKE 2 TABLETS NIGHTLY AS NEEDED FOR INSOMNIA. 180 tablet 2    albuterol (VENTOLIN HFA) 90 mcg/actuation inhaler Inhale 2 puffs into the lungs every 6 (six) hours as needed for Wheezing or Shortness of Breath. Rescue 18 g 0    azithromycin (Z-DANA) 250 MG tablet Take 2 tablets by mouth on day 1; Take 1 tablet by mouth on days 2-5 6 tablet 0    benzonatate (TESSALON) 200 MG capsule Take 1 capsule (200 mg total) by mouth 3 (three) times daily as needed for Cough. 30 capsule 0    fluticasone propionate (FLONASE) 50 mcg/actuation nasal spray 1 spray (50 mcg total) by Each Nostril route once daily. 16 g 0     No current facility-administered medications for this visit.     Facility-Administered Medications Ordered in Other Visits   Medication Dose Route Frequency Provider Last Rate Last Admin    gentamicin 80 mg/100ml NACL IVPB IVPB 80 mg  80 mg Intravenous 30 Min Pre-Op Wiliam Urias PA-C   80 mg at 07/31/19 0738    vancomycin in dextrose 5 % 1 gram/250 mL IVPB 1,000 mg  1,000 mg Intravenous 30 Min Pre-Op Wiliam Urias PA-C   1,000 mg at 07/31/19 0770

## 2025-07-14 ENCOUNTER — HOSPITAL ENCOUNTER (OUTPATIENT)
Dept: RADIOLOGY | Facility: HOSPITAL | Age: OVER 89
Discharge: HOME | End: 2025-07-14
Attending: FAMILY MEDICINE
Payer: MEDICARE

## 2025-07-14 ENCOUNTER — OFFICE VISIT (OUTPATIENT)
Dept: FAMILY MEDICINE | Facility: CLINIC | Age: OVER 89
End: 2025-07-14
Attending: FAMILY MEDICINE
Payer: MEDICARE

## 2025-07-14 VITALS
HEART RATE: 53 BPM | WEIGHT: 126.75 LBS | SYSTOLIC BLOOD PRESSURE: 120 MMHG | DIASTOLIC BLOOD PRESSURE: 70 MMHG | TEMPERATURE: 98 F | OXYGEN SATURATION: 98 % | HEIGHT: 64 IN | BODY MASS INDEX: 21.64 KG/M2

## 2025-07-14 DIAGNOSIS — M81.0 OSTEOPOROSIS, UNSPECIFIED OSTEOPOROSIS TYPE, UNSPECIFIED PATHOLOGICAL FRACTURE PRESENCE: ICD-10-CM

## 2025-07-14 DIAGNOSIS — E78.2 MIXED HYPERLIPIDEMIA: ICD-10-CM

## 2025-07-14 DIAGNOSIS — N39.0 URINARY TRACT INFECTION ASSOCIATED WITH CATHETERIZATION OF URINARY TRACT, UNSPECIFIED INDWELLING URINARY CATHETER TYPE, SUBSEQUENT ENCOUNTER: ICD-10-CM

## 2025-07-14 DIAGNOSIS — M46.1 SACROILIITIS: ICD-10-CM

## 2025-07-14 DIAGNOSIS — T83.511D URINARY TRACT INFECTION ASSOCIATED WITH CATHETERIZATION OF URINARY TRACT, UNSPECIFIED INDWELLING URINARY CATHETER TYPE, SUBSEQUENT ENCOUNTER: ICD-10-CM

## 2025-07-14 DIAGNOSIS — I70.0 AORTIC ATHEROSCLEROSIS: ICD-10-CM

## 2025-07-14 DIAGNOSIS — N18.31 STAGE 3A CHRONIC KIDNEY DISEASE: ICD-10-CM

## 2025-07-14 DIAGNOSIS — E78.5 HYPERLIPIDEMIA, UNSPECIFIED HYPERLIPIDEMIA TYPE: ICD-10-CM

## 2025-07-14 DIAGNOSIS — I10 ESSENTIAL HYPERTENSION: Primary | ICD-10-CM

## 2025-07-14 DIAGNOSIS — G57.93 NEUROPATHIC PAIN OF BOTH LEGS: ICD-10-CM

## 2025-07-14 DIAGNOSIS — G47.00 INSOMNIA, UNSPECIFIED TYPE: ICD-10-CM

## 2025-07-14 DIAGNOSIS — M62.838 MUSCLE SPASM: ICD-10-CM

## 2025-07-14 DIAGNOSIS — N95.2 VAGINAL ATROPHY: ICD-10-CM

## 2025-07-14 DIAGNOSIS — N25.81 SECONDARY HYPERPARATHYROIDISM OF RENAL ORIGIN: ICD-10-CM

## 2025-07-14 PROCEDURE — 77080 DXA BONE DENSITY AXIAL: CPT | Mod: 26,HCNC,, | Performed by: RADIOLOGY

## 2025-07-14 PROCEDURE — 77080 DXA BONE DENSITY AXIAL: CPT | Mod: TC,HCNC

## 2025-07-14 PROCEDURE — 99999 PR PBB SHADOW E&M-EST. PATIENT-LVL III: CPT | Mod: PBBFAC,HCNC,, | Performed by: FAMILY MEDICINE

## 2025-07-14 RX ORDER — ESTRADIOL 0.1 MG/G
CREAM VAGINAL
Qty: 42.5 G | Refills: 11 | Status: SHIPPED | OUTPATIENT
Start: 2025-07-14

## 2025-07-14 RX ORDER — RALOXIFENE HYDROCHLORIDE 60 MG/1
60 TABLET, FILM COATED ORAL DAILY
Qty: 90 TABLET | Refills: 3 | Status: SHIPPED | OUTPATIENT
Start: 2025-07-14 | End: 2026-07-14

## 2025-07-14 RX ORDER — PRAVASTATIN SODIUM 40 MG/1
40 TABLET ORAL NIGHTLY
Qty: 90 TABLET | Refills: 3 | Status: SHIPPED | OUTPATIENT
Start: 2025-07-14

## 2025-07-14 RX ORDER — GABAPENTIN 300 MG/1
CAPSULE ORAL
Qty: 270 CAPSULE | Refills: 3 | Status: SHIPPED | OUTPATIENT
Start: 2025-07-14

## 2025-07-14 RX ORDER — TRAZODONE HYDROCHLORIDE 100 MG/1
TABLET ORAL
Qty: 180 TABLET | Refills: 2 | Status: SHIPPED | OUTPATIENT
Start: 2025-07-14

## 2025-07-14 RX ORDER — BACLOFEN 10 MG/1
10 TABLET ORAL 3 TIMES DAILY
Qty: 270 TABLET | Refills: 4 | Status: SHIPPED | OUTPATIENT
Start: 2025-07-14

## 2025-07-14 NOTE — PROGRESS NOTES
Subjective:       Patient ID: Cheryl Ghosh is a 89 y.o. female.    Chief Complaint: Follow-up    89 yr old pleasant white female with HTN, CKD III, OA, osteopenia hip, hyperlipidemia, chronic back pain, BPPV, presents today for her 3 month check. No new complaints today      HTN - controlled - low salt diet - having issues with medications lisinopril n losartan  has mild diastolic heart failure but stable      CKD III - stable - improving       HLD - slightly worsening since stopped statin x 30 days           - she was asked to start statin but due to her fall and issues with her ribs and back, she was never able to start it.    Back pain - she follows pain clinic for injections - c/o neuropathic pain in legs - was on neurontin in the past and she started taking it and working    Osteoporosis - started back on fosamax - doing well - next dexa in a year    HISTORY as below - reviewed    Health maintenance  -labs UTD  -mammo UTD  -colon screen UTD  -vaccines UTD    Follow-up  Associated symptoms include arthralgias, chest pain, myalgias, vomiting and weakness. Pertinent negatives include no congestion, diaphoresis, headaches, joint swelling, nausea, neck pain or sore throat.   Back Pain  This is a chronic problem. The current episode started more than 1 year ago. The problem occurs intermittently. The problem has been gradually improving since onset. The pain is present in the lumbar spine. The quality of the pain is described as aching. The pain does not radiate. The pain is at a severity of 5/10. The pain is moderate. The symptoms are aggravated by position and sitting. Associated symptoms include chest pain and weakness. Pertinent negatives include no dysuria, headaches or leg pain. She has tried analgesics and ice for the symptoms. The treatment provided mild relief.   Hypertension  This is a chronic problem. The current episode started more than 1 year ago. The problem has been waxing and waning since onset.  The problem is resistant. Associated symptoms include blurred vision, chest pain and malaise/fatigue. Pertinent negatives include no anxiety, headaches, neck pain, orthopnea, palpitations, peripheral edema, PND, shortness of breath or sweats. Agents associated with hypertension include NSAIDs. There are no known risk factors for coronary artery disease. Past treatments include nothing. The current treatment provides significant improvement. Compliance problems include exercise.  There is no history of chronic renal disease.   Hyperlipidemia  This is a chronic problem. The current episode started more than 1 year ago. The problem is uncontrolled. Recent lipid tests were reviewed and are high. She has no history of chronic renal disease, diabetes, hypothyroidism, liver disease, obesity or nephrotic syndrome. There are no known factors aggravating her hyperlipidemia. Associated symptoms include chest pain and myalgias. Pertinent negatives include no focal sensory loss, leg pain or shortness of breath. Current antihyperlipidemic treatment includes diet change. The current treatment provides mild improvement of lipids. There are no compliance problems.  Risk factors for coronary artery disease include dyslipidemia.     Review of Systems   Constitutional:  Positive for activity change and malaise/fatigue. Negative for diaphoresis and unexpected weight change.   HENT:  Positive for hearing loss. Negative for nasal congestion, ear discharge, rhinorrhea, sore throat, trouble swallowing and voice change.    Eyes:  Positive for blurred vision. Negative for pain, discharge and visual disturbance.   Respiratory: Negative.  Negative for chest tightness, shortness of breath and wheezing.    Cardiovascular:  Positive for chest pain. Negative for palpitations, orthopnea and PND.   Gastrointestinal:  Positive for vomiting. Negative for abdominal distention, anal bleeding, blood in stool, constipation, diarrhea and nausea.    Endocrine: Negative.  Negative for cold intolerance, polydipsia and polyuria.   Genitourinary: Negative.  Negative for decreased urine volume, difficulty urinating, dysuria, frequency, hematuria, menstrual problem and vaginal pain.   Musculoskeletal:  Positive for arthralgias, back pain and myalgias. Negative for gait problem, joint swelling, leg pain and neck pain.   Integumentary:  Negative for color change, pallor and wound. Negative.   Allergic/Immunologic: Negative.  Negative for environmental allergies and immunocompromised state.   Neurological:  Positive for weakness. Negative for dizziness, tremors, seizures, speech difficulty and headaches.   Hematological: Negative.  Negative for adenopathy. Does not bruise/bleed easily.   Psychiatric/Behavioral: Negative.  Negative for agitation, confusion, decreased concentration, dysphoric mood, hallucinations, self-injury and suicidal ideas. The patient is not nervous/anxious.          PMH/PSH/FH/SH/MED/ALLERGY reviewed    Past Medical History:   Diagnosis Date    Arthritis     lumbar    Blood transfusion     Chronic kidney disease, stage III (moderate) 8/2/2017    Coronary artery disease involving native coronary artery without angina pectoris 3/9/2021    Degenerative disc disease     lumbar    Insomnia 11/15/2022    Insulinoma     Pancreatic disease     Urinary incontinence 12/7/2015       Past Surgical History:   Procedure Laterality Date    ADENOIDECTOMY      APPENDECTOMY      CATARACT EXTRACTION, BILATERAL      COLONOSCOPY N/A 8/2/2024    Procedure: COLONOSCOPY;  Surgeon: Fransisca Ojeda MD;  Location: [address];  Service: Endoscopy;  Laterality: N/A;  6/19/24-Dr. Ojeda pt, PEG, instr portal-DS  8/1 please place extra luci pads on bed before going to recovery-ml  8/1-pt notified of earlier arrival time (0645am)-[facility]    EYE SURGERY      HYSTERECTOMY      SAMM/BSO    IMPLANTATION OF PERMANENT SACRAL NERVE STIMULATOR N/A 9/17/2024    Procedure:  INSERTION, NEUROSTIMULATOR, PERMANENT, SACRAL;  Surgeon: Fransisca Ojeda MD;  Location: [facility] OR 2ND FLR;  Service: Colon and Rectal;  Laterality: N/A;  NO fluoro    INJECTION OF JOINT Bilateral 2/4/2020    Procedure: Injection, Joint--Bilateral GTB;  Surgeon: Arina Carrera Jr., MD;  Location: [facility] PAIN MGT;  Service: Pain Management;  Laterality: Bilateral;  Oral sedation    INJECTION OF STEROID Right 12/20/2018    Procedure: INJECTION, STEROID-- Right SI Joint Block and  Steroid Injection;  Surgeon: Xavier Dasilva MD;  Location: [facility] OR;  Service: Neurosurgery;  Laterality: Right;  INJECTION, STEROID-- Right SI Joint Block and  Steroid Injection  SURGERY TIME:1 HR  LOS: 0 DAYS  RADIOLOGY: C- arm  BED: Regular Bed  with Pillows  POSITION: Prone      INJECTION OF STEROID Left 6/19/2019    Procedure: INJECTION, STEROID Procedure:Left sacroiliac joint block / steroid injection Surgery Time: 30mins LOS: Anesthesia: MAC Radiology: C-arm Bed: Regular Bed Position: Prone;  Surgeon: Xavier Dasilva MD;  Location: [facility] OR;  Service: Neurosurgery;  Laterality: Left;    INSERTION, NEUROSTIMULATOR, TEMPORARY, SACRAL N/A 9/3/2024    Procedure: INSERTION, NEUROSTIMULATOR, TEMPORARY, SACRAL;  Surgeon: Fransisca Ojeda MD;  Location: [facility] OR 2ND FLR;  Service: Colon and Rectal;  Laterality: N/A;  needs fluoro    MINIMALLY INVASIVE FUSION OF SPINE Right 2/6/2019    Procedure: FUSION, SPINE, MINIMALLY INVASIVE Right Sacroiliac Joint Fusion -  I Fuse;  Surgeon: Xavier Dasilva MD;  Location: [facility] OR;  Service: Neurosurgery;  Laterality: Right;  Procedure: Right Sacroiliac Joint Fusion -  I Fuse  Surgery Time: 1.5 Hr  LOS: 0  Anesthesia: General  Radiology: C-Arm  Bed: [place] [id number] Poster  Position: Prone  Equipment: I Fuse Codi [id number] (notified 1/24 EF)    OOPHORECTOMY      PANCREAS SURGERY      Insulanoma    RADIOFREQUENCY ABLATION OF LUMBAR MEDIAL BRANCH NERVE AT SINGLE LEVEL Right 8/28/2018    Procedure:  RADIOFREQUENCY ABLATION, NERVE, MEDIAL BRANCH, LUMBAR, 1 LEVEL- Right L3,4,5 IV SEDATION;  Surgeon: Eddie Vega MD;  Location: [facility] PAIN MGT;  Service: Pain Management;  Laterality: Right;    RADIOFREQUENCY ABLATION OF LUMBAR MEDIAL BRANCH NERVE AT SINGLE LEVEL Left 9/4/2018    Procedure: RADIOFREQUENCY ABLATION, NERVE, MEDIAL BRANCH, LUMBAR, 1 LEVEL - Left L3,4,5 IV SEADTION;  Surgeon: Eddie Vega MD;  Location: [facility] PAIN MGT;  Service: Pain Management;  Laterality: Left;  Patient takes ASA     REFRACTIVE SURGERY Bilateral     SPINAL FUSION Left 7/31/2019    Procedure: FUSION, SPINE SI Joint Fusion;  Surgeon: Xavier Dasilva MD;  Location: [facility] OR;  Service: Neurosurgery;  Laterality: Left;  Procedure: Left SI joint fusion  Surgery Time: 1.5hr  LOS:0  Anesthesia: General   Radiology: C-arm   Bed: [place] [id number] Poster  Position: Prone  Equipment: Codi Diaz- [id number] (Codi notified)    TONSILLECTOMY         Family History   Problem Relation Name Age of Onset    Breast cancer Mother      Heart disease Father      Aneurysm Sister      Glaucoma Daughter      Heart attack Son      Breast cancer Maternal Grandmother      Breast cancer Other niece        Social History     Socioeconomic History    Marital status:    Tobacco Use    Smoking status: Never     Passive exposure: Never    Smokeless tobacco: Never   Substance and Sexual Activity    Alcohol use: Yes     Alcohol/week: 1.0 - 2.0 standard drink of alcohol     Types: 1 - 2 Glasses of wine per week    Drug use: No    Sexual activity: Yes     Partners: Male     Social Drivers of Health     Financial Resource Strain: Low Risk  (1/23/2024)    Overall Financial Resource Strain (CARDIA)     Difficulty of Paying Living Expenses: Not hard at all   Food Insecurity: No Food Insecurity (1/23/2024)    Hunger Vital Sign     Worried About Running Out of Food in the Last Year: Never true     Ran Out of Food in the Last Year: Never true   Transportation  Needs: No Transportation Needs (1/23/2024)    PRAPARE - Transportation     Lack of Transportation (Medical): No     Lack of Transportation (Non-Medical): No   Physical Activity: Insufficiently Active (1/23/2024)    Exercise Vital Sign     Days of Exercise per Week: 3 days     Minutes of Exercise per Session: 20 min   Stress: No Stress Concern Present (1/23/2024)    [employer] - Occupational Stress Questionnaire     Feeling of Stress : Only a little   Housing Stability: Low Risk  (1/23/2024)    Housing Stability Vital Sign     Unable to Pay for Housing in the Last Year: No     Number of Places Lived in the Last Year: 1     Unstable Housing in the Last Year: No       Current Outpatient Medications   Medication Sig Dispense Refill    acetaminophen (TYLENOL 8 HOUR ORAL) Take by mouth.      albuterol (VENTOLIN HFA) 90 mcg/actuation inhaler Inhale 2 puffs into the lungs every 6 (six) hours as needed for Wheezing or Shortness of Breath. Rescue 18 g 0    aspirin (ECOTRIN) 81 MG EC tablet Take 1 tablet (81 mg total) by mouth once daily. Resume 48 hours post-surgery  0    b complex vitamins tablet Take 1 tablet by mouth once daily.      baclofen (LIORESAL) 10 MG tablet Take 1 tablet (10 mg total) by mouth 3 (three) times daily. 270 tablet 4    calcium-vitamin D 600 mg(1,500mg) -400 unit Tab once daily.       estradioL (ESTRACE) 0.01 % (0.1 mg/gram) vaginal cream 0.5 grams with applicator or dime-sized amount with finger in vagina nightly x 2 weeks, then twice a week thereafter 42.5 g 11    fluticasone propionate (FLONASE) 50 mcg/actuation nasal spray 1 spray (50 mcg total) by Each Nostril route once daily. 16 g 0    FLUZONE HIGHDOSE QUAD 23-24  mcg/0.7 mL Syrg       gabapentin (NEURONTIN) 300 MG capsule TAKE 1 CAPSULE THREE TIMES DAILY 270 capsule 3    hydrocortisone 2.5 % ointment APPLY TOPICALLY 2 (TWO) TIMES DAILY. 30 g 2    oxyCODONE-acetaminophen (PERCOCET) 5-325 mg per tablet Take 1  tablet by mouth every 8 (eight) hours as needed for Pain. 21 tablet 0    pravastatin (PRAVACHOL) 40 MG tablet Take 1 tablet (40 mg total) by mouth every evening. 90 tablet 3    pyridoxine, vitamin B6, (B-6) 100 MG Tab Take 100 mg by mouth once daily.      raloxifene (EVISTA) 60 mg tablet Take 1 tablet (60 mg total) by mouth once daily. 90 tablet 3    traZODone (DESYREL) 100 MG tablet TAKE 2 TABLETS NIGHTLY AS NEEDED FOR INSOMNIA. 180 tablet 2     No current facility-administered medications for this visit.     Facility-Administered Medications Ordered in Other Visits   Medication Dose Route Frequency Provider Last Rate Last Admin    gentamicin 80 mg/100ml NACL IVPB IVPB 80 mg  80 mg Intravenous 30 Min Pre-Op Wiliam Urias PA-C   80 mg at 07/31/19 0747    vancomycin in dextrose 5 % 1 gram/250 mL IVPB 1,000 mg  1,000 mg Intravenous 30 Min Pre-Op Wiliam Urias PA-C   1,000 mg at 07/31/19 0738       Review of patient's allergies indicates:   Allergen Reactions    Pcn [penicillins] Hives and Nausea And Vomiting       Objective:       Vitals:    07/14/25 0937   BP: 120/70   Pulse: (!) 53   Temp: 98 °F (36.7 °C)         Physical Exam  Constitutional:       General: She is not in acute distress.     Appearance: She is well-developed. She is not diaphoretic.   HENT:      Head: Normocephalic and atraumatic.      Right Ear: External ear normal.      Left Ear: External ear normal.      Nose: Nose normal.      Mouth/Throat:      Pharynx: No oropharyngeal exudate.   Eyes:      General: No scleral icterus.        Right eye: No discharge.         Left eye: No discharge.      Conjunctiva/sclera: Conjunctivae normal.      Pupils: Pupils are equal, round, and reactive to light.   Neck:      Thyroid: No thyromegaly.      Vascular: No JVD.      Trachea: No tracheal deviation.   Cardiovascular:      Rate and Rhythm: Normal rate and regular rhythm.      Heart sounds: Normal heart sounds. No murmur heard.     No friction rub. No  gallop.   Pulmonary:      Effort: Pulmonary effort is normal.      Breath sounds: Normal breath sounds. No stridor. No wheezing or rales.   Chest:      Chest wall: No tenderness.   Abdominal:      General: Bowel sounds are normal. There is no distension.      Palpations: Abdomen is soft. There is no mass.      Tenderness: There is no abdominal tenderness. There is no guarding or rebound.      Hernia: No hernia is present.   Musculoskeletal:         General: No tenderness. Normal range of motion.      Cervical back: Normal range of motion and neck supple.   Lymphadenopathy:      Cervical: No cervical adenopathy.   Skin:     General: Skin is warm and dry.      Coloration: Skin is not pale.      Findings: No erythema or rash.   Neurological:      Mental Status: She is alert and oriented to person, place, and time.      Cranial Nerves: No cranial nerve deficit.      Motor: No abnormal muscle tone.      Coordination: Coordination normal.      Deep Tendon Reflexes: Reflexes are normal and symmetric. Reflexes normal.   Psychiatric:         Behavior: Behavior normal.         Thought Content: Thought content normal.         Judgment: Judgment normal.         Assessment:       Problem List Items Addressed This Visit       Vaginal atrophy    Relevant Medications    estradioL (ESTRACE) 0.01 % (0.1 mg/gram) vaginal cream    Stage 3a chronic kidney disease    Relevant Orders    CBC Auto Differential    Comprehensive Metabolic Panel    Lipid Panel    PTH, Intact    Urinalysis    Urine Culture High Risk    Microalbumin/Creatinine Ratio, Urine    Secondary hyperparathyroidism of renal origin    Relevant Orders    CBC Auto Differential    Comprehensive Metabolic Panel    Lipid Panel    PTH, Intact    Urinalysis    Urine Culture High Risk    Microalbumin/Creatinine Ratio, Urine    Sacroiliitis    Relevant Medications    baclofen (LIORESAL) 10 MG tablet    Osteoporosis    Relevant Medications    raloxifene (EVISTA) 60 mg tablet     Other Relevant Orders    DXA Bone Density Axial Skeleton 1 or more sites    Mixed hyperlipidemia    Relevant Medications    pravastatin (PRAVACHOL) 40 MG tablet    Other Relevant Orders    CBC Auto Differential    Comprehensive Metabolic Panel    Lipid Panel    PTH, Intact    Urinalysis    Urine Culture High Risk    Microalbumin/Creatinine Ratio, Urine    Insomnia    Relevant Medications    traZODone (DESYREL) 100 MG tablet    Other Relevant Orders    CBC Auto Differential    Comprehensive Metabolic Panel    Lipid Panel    PTH, Intact    Urinalysis    Urine Culture High Risk    Microalbumin/Creatinine Ratio, Urine    Hyperlipidemia    Relevant Medications    pravastatin (PRAVACHOL) 40 MG tablet    Other Relevant Orders    CBC Auto Differential    Comprehensive Metabolic Panel    Lipid Panel    PTH, Intact    Urinalysis    Urine Culture High Risk    Microalbumin/Creatinine Ratio, Urine    Aortic atherosclerosis    Relevant Medications    pravastatin (PRAVACHOL) 40 MG tablet     Other Visit Diagnoses         Essential hypertension    -  Primary    Relevant Orders    CBC Auto Differential    Comprehensive Metabolic Panel    Lipid Panel    PTH, Intact    Urinalysis    Urine Culture High Risk    Microalbumin/Creatinine Ratio, Urine      Urinary tract infection associated with catheterization of urinary tract, unspecified indwelling urinary catheter type, subsequent encounter        Relevant Orders    CBC Auto Differential    Comprehensive Metabolic Panel    Lipid Panel    PTH, Intact    Urinalysis    Urine Culture High Risk    Microalbumin/Creatinine Ratio, Urine      Neuropathic pain of both legs        Relevant Medications    gabapentin (NEURONTIN) 300 MG capsule      Muscle spasm        Relevant Medications    baclofen (LIORESAL) 10 MG tablet            Plan:           Cheryl was seen today for follow-up.    Diagnoses and all orders for this visit:    Essential hypertension  -     CBC Auto Differential; Future  -      Comprehensive Metabolic Panel; Future  -     Lipid Panel; Future  -     PTH, Intact; Future  -     Urinalysis; Future  -     Urine Culture High Risk; Future  -     Microalbumin/Creatinine Ratio, Urine; Future    Insomnia, unspecified type  -     CBC Auto Differential; Future  -     Comprehensive Metabolic Panel; Future  -     Lipid Panel; Future  -     PTH, Intact; Future  -     Urinalysis; Future  -     Urine Culture High Risk; Future  -     Microalbumin/Creatinine Ratio, Urine; Future  -     traZODone (DESYREL) 100 MG tablet; TAKE 2 TABLETS NIGHTLY AS NEEDED FOR INSOMNIA.    Mixed hyperlipidemia  -     CBC Auto Differential; Future  -     Comprehensive Metabolic Panel; Future  -     Lipid Panel; Future  -     PTH, Intact; Future  -     Urinalysis; Future  -     Urine Culture High Risk; Future  -     Microalbumin/Creatinine Ratio, Urine; Future  -     pravastatin (PRAVACHOL) 40 MG tablet; Take 1 tablet (40 mg total) by mouth every evening.    Stage 3a chronic kidney disease  -     CBC Auto Differential; Future  -     Comprehensive Metabolic Panel; Future  -     Lipid Panel; Future  -     PTH, Intact; Future  -     Urinalysis; Future  -     Urine Culture High Risk; Future  -     Microalbumin/Creatinine Ratio, Urine; Future    Secondary hyperparathyroidism of renal origin  -     CBC Auto Differential; Future  -     Comprehensive Metabolic Panel; Future  -     Lipid Panel; Future  -     PTH, Intact; Future  -     Urinalysis; Future  -     Urine Culture High Risk; Future  -     Microalbumin/Creatinine Ratio, Urine; Future    Urinary tract infection associated with catheterization of urinary tract, unspecified indwelling urinary catheter type, subsequent encounter  -     CBC Auto Differential; Future  -     Comprehensive Metabolic Panel; Future  -     Lipid Panel; Future  -     PTH, Intact; Future  -     Urinalysis; Future  -     Urine Culture High Risk; Future  -     Microalbumin/Creatinine Ratio, Urine;  Future    Osteoporosis, unspecified osteoporosis type, unspecified pathological fracture presence  -     raloxifene (EVISTA) 60 mg tablet; Take 1 tablet (60 mg total) by mouth once daily.  -     DXA Bone Density Axial Skeleton 1 or more sites; Future    Neuropathic pain of both legs  -     gabapentin (NEURONTIN) 300 MG capsule; TAKE 1 CAPSULE THREE TIMES DAILY    Sacroiliitis  -     baclofen (LIORESAL) 10 MG tablet; Take 1 tablet (10 mg total) by mouth 3 (three) times daily.    Muscle spasm  -     baclofen (LIORESAL) 10 MG tablet; Take 1 tablet (10 mg total) by mouth 3 (three) times daily.    Vaginal atrophy  -     estradioL (ESTRACE) 0.01 % (0.1 mg/gram) vaginal cream; 0.5 grams with applicator or dime-sized amount with finger in vagina nightly x 2 weeks, then twice a week thereafter    Aortic atherosclerosis  -     pravastatin (PRAVACHOL) 40 MG tablet; Take 1 tablet (40 mg total) by mouth every evening.    Hyperlipidemia, unspecified hyperlipidemia type  -     pravastatin (PRAVACHOL) 40 MG tablet; Take 1 tablet (40 mg total) by mouth every evening.      HTN  -controlled    TIA  -follow neurology - DC plavix per neuro recommendations and just stay on ASA      Insomnia  - trazodone    HLD  -diet controlled and statin    Urine incontinence  -lifestyle changes - intolerant to meds    Neuropathic pain  -continue neurontin    OA knee B/L  -mobic prn    CKD III  -lab and urine analysis  -adequate hydration  -follow nephrology    Muscle spasm  -baclofen  and percocet prn    Osteoporosis  -Evista.Side effects of medications have been discussed and patient agreed to proceed with treatment and understands the risks and benefits.dexa    Insomnia  -increase trazodone to 200    CKD III  -stable      Spent adequate time in obtaining history and explaining differentials    40  minutes spent during this visit of which greater than 50% devoted to face-face counseling and coordination of care regarding diagnosis and management  plan    RTC 3 months

## 2025-07-15 DIAGNOSIS — R21 FACIAL RASH: ICD-10-CM

## 2025-07-15 RX ORDER — HYDROCORTISONE 25 MG/G
OINTMENT TOPICAL 2 TIMES DAILY
Qty: 28.35 G | Refills: 11 | Status: SHIPPED | OUTPATIENT
Start: 2025-07-15

## 2025-07-15 NOTE — TELEPHONE ENCOUNTER
Care Due:                  Date            Visit Type   Department     Provider  --------------------------------------------------------------------------------                                EP -                              PRIMARY      [facility] FAMILY  Last Visit: 07-      CARE ([facility])   MEDICINE       Joey Mueller                               -                              PRIMARY      [facility]  Next Visit: 01-      CARE ([facility])   MEDICINE       Joey Mueller                                                            Last  Test          Frequency    Reason                     Performed    Due Date  --------------------------------------------------------------------------------    Mg Level....  12 months..  raloxifene...............  Not Found    Overdue    Phosphate...  15 months..  raloxifene...............  Not Found    Overdue    Vitamin D...  15 months..  raloxifene...............  Not Found    Overdue    [employer] Embedded Care Due Messages. Reference number: [id number].   7/15/2025 3:03:39 AM CDT

## 2025-07-15 NOTE — TELEPHONE ENCOUNTER
Provider Staff:  Action required for this patient    Requires labs      Please see care gap opportunities below in Care Due Message.    Thanks!  [facility]     Appointments      Date Provider   Last Visit   7/14/2025 Joey Mueller MD   Next Visit   Visit date not found Joey Mueller MD     Refill Decision Note   Cheryl Ghosh  is requesting a refill authorization.  Brief Assessment and Rationale for Refill:  Approve     Medication Therapy Plan:        Comments:     Note composed:4:53 PM 07/15/2025

## 2025-08-04 ENCOUNTER — PATIENT MESSAGE (OUTPATIENT)
Dept: FAMILY MEDICINE | Facility: CLINIC | Age: OVER 89
End: 2025-08-04
Payer: MEDICARE

## 2025-08-04 DIAGNOSIS — R29.6 FALLS: ICD-10-CM

## 2025-08-04 DIAGNOSIS — R26.89 BALANCE DISORDER: Primary | ICD-10-CM

## 2025-08-06 PROBLEM — Z74.09 IMPAIRED FUNCTIONAL MOBILITY, BALANCE, AND ENDURANCE: Status: ACTIVE | Noted: 2025-08-06

## 2025-08-12 ENCOUNTER — PATIENT MESSAGE (OUTPATIENT)
Dept: SURGERY | Facility: CLINIC | Age: OVER 89
End: 2025-08-12
Payer: MEDICARE

## 2025-08-19 DIAGNOSIS — M81.0 OSTEOPOROSIS, UNSPECIFIED OSTEOPOROSIS TYPE, UNSPECIFIED PATHOLOGICAL FRACTURE PRESENCE: ICD-10-CM

## 2025-08-19 RX ORDER — RALOXIFENE HYDROCHLORIDE 60 MG/1
60 TABLET, FILM COATED ORAL DAILY
Qty: 30 TABLET | Refills: 11 | Status: SHIPPED | OUTPATIENT
Start: 2025-08-19

## (undated) DEVICE — TOWEL OR NONABSORB ADH 17X26

## (undated) DEVICE — SYR 10CC LUER LOCK

## (undated) DEVICE — KIT SPINAL PATIENT CARE JACK

## (undated) DEVICE — GLOVE 7.0 PROTEXIS PI BLUE

## (undated) DEVICE — DRESSING ANTIMICROBIAL 1 INCH

## (undated) DEVICE — SEE MEDLINE ITEM 157150

## (undated) DEVICE — SCREENER ISTIM EXT NEROSTIMLTR

## (undated) DEVICE — SUT MCRYL PLUS 4-0 PS2 27IN

## (undated) DEVICE — SEE MEDLINE ITEM 156905

## (undated) DEVICE — MARKER SKIN STND TIP BLUE BARR

## (undated) DEVICE — DRESSING MEPILEX BORDER 4 X 4

## (undated) DEVICE — NDL 22GA X1 1/2 REG BEVEL

## (undated) DEVICE — CLOSURE SKIN STERI STRIP 1/4X4

## (undated) DEVICE — TRAY MINOR GEN SURG OMC

## (undated) DEVICE — ALCOHOL 70% ISOP W/GREEN 16OZ

## (undated) DEVICE — ELECTRODE REM PLYHSV RETURN 9

## (undated) DEVICE — SEE MEDLINE ITEM 146313

## (undated) DEVICE — CONTAINER SPECIMEN STRL 4OZ

## (undated) DEVICE — SOL WATER STRL IRR 1000ML

## (undated) DEVICE — NDL HYPO REG 25G X 1 1/2

## (undated) DEVICE — ADHESIVE MASTISOL VIAL 48/BX

## (undated) DEVICE — PACK DRAPE UNIVERSAL CONVERTOR

## (undated) DEVICE — SEE MEDLINE ITEM 146292

## (undated) DEVICE — TEGADERM IV

## (undated) DEVICE — COVER PROBE NL STRL 3.6X96IN

## (undated) DEVICE — APPLICATOR CHLORAPREP ORN 26ML

## (undated) DEVICE — DRAPE HALF SURGICAL 40X58IN

## (undated) DEVICE — PAD CURAD NONADH 3X4IN

## (undated) DEVICE — SUT 0 VICRYL / UR6 (J603)

## (undated) DEVICE — BANDAGE ADHESIVE

## (undated) DEVICE — CORD BIPOLAR 12 FOOT

## (undated) DEVICE — DRAPE C-ARM ELAS CLIP 42X120IN

## (undated) DEVICE — COVER OVERHEAD SURG LT BLUE

## (undated) DEVICE — DRAPE STERI-DRAPE 1000 17X11IN

## (undated) DEVICE — SYR DISP LL 5CC

## (undated) DEVICE — SEE MEDLINE ITEM 157116

## (undated) DEVICE — DRAPE C-ARM/MOBILE XRAY 44X80

## (undated) DEVICE — DRAPE INCISE IOBAN 2 23X17IN

## (undated) DEVICE — DRESSING AQUACEL FOAM 5 X 5

## (undated) DEVICE — GAUZE SPONGE 4X4 12PLY

## (undated) DEVICE — DRESSING TELFA N ADH 3X8

## (undated) DEVICE — GLOVE PROTEXIS HYDROGEL SZ7

## (undated) DEVICE — DRAPE INCISE IOBAN 2 23X23IN

## (undated) DEVICE — NDL SPINAL SPINOCAN 22GX3.5

## (undated) DEVICE — CLOSURE SKIN STERI STRIP 1/2X4

## (undated) DEVICE — ADHESIVE DERMABOND ADVANCED

## (undated) DEVICE — SEE MEDLINE ITEM 157117

## (undated) DEVICE — SKINMARKER W/RULER DEVON

## (undated) DEVICE — DRESSING TRANS 4X4 TEGADERM

## (undated) DEVICE — SCALPEL #11 BLADE STRL DISP

## (undated) DEVICE — DRAPE LAP T SHT W/ INSTR PAD

## (undated) DEVICE — DRESSING LEUKOPLAST FLEX 1X3IN

## (undated) DEVICE — TRAY FOLEY 16FR INFECTION CONT

## (undated) DEVICE — DRESSING CHG FOAM 7MM 1IN

## (undated) DEVICE — HANDSET INTERSTIM X COMM

## (undated) DEVICE — DRESSING AQUACEL FOAM 3 X 3

## (undated) DEVICE — DRESSING SURGICAL 1/2X1/2

## (undated) DEVICE — SUT CTD VICRYL 3-0 CR/SH

## (undated) DEVICE — TAPE SILK 3IN

## (undated) DEVICE — SUT VICRYL 2-0 SH VCP317H

## (undated) DEVICE — DRAPE C-ARMOR EQUIPMENT COVER

## (undated) DEVICE — GLOVE BIOGEL ECLIPSE SZ 7

## (undated) DEVICE — GLOVE 7.5 PROTEXIS PI BLUE

## (undated) DEVICE — DRESSING TRANS 4X4 3/4

## (undated) DEVICE — SUT CTD VICRYL 2-0 CR/CT-2

## (undated) DEVICE — KIT INTERSTIM II PERC LEAD EXT

## (undated) DEVICE — DRAPE SURG W/TWL 17 5/8X23

## (undated) DEVICE — SUT VICRYL 3-0 27 SH

## (undated) DEVICE — DRAPE C ARM 42 X 120 10/BX

## (undated) DEVICE — BLADE ELECTRO EDGE INSULATED

## (undated) DEVICE — SEE MEDLINE ITEM 152622

## (undated) DEVICE — CLIPPER BLADE MOD 4406 (CAREF)